# Patient Record
Sex: FEMALE | Race: WHITE | NOT HISPANIC OR LATINO | Employment: OTHER | URBAN - METROPOLITAN AREA
[De-identification: names, ages, dates, MRNs, and addresses within clinical notes are randomized per-mention and may not be internally consistent; named-entity substitution may affect disease eponyms.]

---

## 2017-01-06 ENCOUNTER — TELEPHONE (OUTPATIENT)
Dept: FAMILY MEDICINE | Facility: CLINIC | Age: 52
End: 2017-01-06

## 2017-01-06 DIAGNOSIS — Z00.00 ROUTINE GENERAL MEDICAL EXAMINATION AT A HEALTH CARE FACILITY: Primary | ICD-10-CM

## 2017-01-06 NOTE — TELEPHONE ENCOUNTER
----- Message from Arina Cabral sent at 1/6/2017 10:04 AM CST -----  Doctor appointment and lab have been scheduled.  Please link lab orders to the lab appointment.  Date of doctor appointment:  05/09/17  Physical or EP:  epp  Date of lab appointment:  05/02/17  Comments:

## 2017-04-12 ENCOUNTER — PATIENT MESSAGE (OUTPATIENT)
Dept: OBSTETRICS AND GYNECOLOGY | Facility: CLINIC | Age: 52
End: 2017-04-12

## 2017-04-12 RX ORDER — AMITRIPTYLINE HYDROCHLORIDE 10 MG/1
10 TABLET, FILM COATED ORAL NIGHTLY
Qty: 30 TABLET | Refills: 5 | Status: SHIPPED | OUTPATIENT
Start: 2017-04-12 | End: 2017-06-24

## 2017-04-12 NOTE — TELEPHONE ENCOUNTER
Dr. Mccarthy, Can you please refill my prescription for Elavil (Amitriotyln) to [facility] [phone], I am running low.   Thanks,   Melvi Brand

## 2017-05-02 ENCOUNTER — LAB VISIT (OUTPATIENT)
Dept: LAB | Facility: HOSPITAL | Age: 52
End: 2017-05-02
Attending: FAMILY MEDICINE
Payer: COMMERCIAL

## 2017-05-02 DIAGNOSIS — Z00.00 ROUTINE GENERAL MEDICAL EXAMINATION AT A HEALTH CARE FACILITY: ICD-10-CM

## 2017-05-02 LAB
ALBUMIN SERPL BCP-MCNC: 3.4 G/DL
ALP SERPL-CCNC: 41 U/L
ALT SERPL W/O P-5'-P-CCNC: 11 U/L
ANION GAP SERPL CALC-SCNC: 7 MMOL/L
AST SERPL-CCNC: 20 U/L
BASOPHILS # BLD AUTO: 0.02 K/UL
BASOPHILS NFR BLD: 0.4 %
BILIRUB SERPL-MCNC: 0.7 MG/DL
BUN SERPL-MCNC: 16 MG/DL
CALCIUM SERPL-MCNC: 8.9 MG/DL
CHLORIDE SERPL-SCNC: 106 MMOL/L
CHOLEST/HDLC SERPL: 3.2 {RATIO}
CO2 SERPL-SCNC: 26 MMOL/L
CREAT SERPL-MCNC: 0.9 MG/DL
DIFFERENTIAL METHOD: NORMAL
EOSINOPHIL # BLD AUTO: 0.1 K/UL
EOSINOPHIL NFR BLD: 1.9 %
ERYTHROCYTE [DISTWIDTH] IN BLOOD BY AUTOMATED COUNT: 14.1 %
EST. GFR  (AFRICAN AMERICAN): >60 ML/MIN/1.73 M^2
EST. GFR  (NON AFRICAN AMERICAN): >60 ML/MIN/1.73 M^2
GLUCOSE SERPL-MCNC: 86 MG/DL
HCT VFR BLD AUTO: 38.5 %
HDL/CHOLESTEROL RATIO: 31.6 %
HDLC SERPL-MCNC: 177 MG/DL
HDLC SERPL-MCNC: 56 MG/DL
HGB BLD-MCNC: 12.4 G/DL
LDLC SERPL CALC-MCNC: 100.6 MG/DL
LYMPHOCYTES # BLD AUTO: 2.1 K/UL
LYMPHOCYTES NFR BLD: 43 %
MCH RBC QN AUTO: 28.7 PG
MCHC RBC AUTO-ENTMCNC: 32.2 %
MCV RBC AUTO: 89 FL
MONOCYTES # BLD AUTO: 0.5 K/UL
MONOCYTES NFR BLD: 9.5 %
NEUTROPHILS # BLD AUTO: 2.2 K/UL
NEUTROPHILS NFR BLD: 45.2 %
NONHDLC SERPL-MCNC: 121 MG/DL
PLATELET # BLD AUTO: 228 K/UL
PMV BLD AUTO: 11.8 FL
POTASSIUM SERPL-SCNC: 5 MMOL/L
PROT SERPL-MCNC: 6.9 G/DL
RBC # BLD AUTO: 4.32 M/UL
SODIUM SERPL-SCNC: 139 MMOL/L
TRIGL SERPL-MCNC: 102 MG/DL
TSH SERPL DL<=0.005 MIU/L-ACNC: 1.73 UIU/ML
WBC # BLD AUTO: 4.86 K/UL

## 2017-05-02 PROCEDURE — 36415 COLL VENOUS BLD VENIPUNCTURE: CPT | Mod: PO

## 2017-05-02 PROCEDURE — 84443 ASSAY THYROID STIM HORMONE: CPT

## 2017-05-02 PROCEDURE — 80053 COMPREHEN METABOLIC PANEL: CPT

## 2017-05-02 PROCEDURE — 85025 COMPLETE CBC W/AUTO DIFF WBC: CPT

## 2017-05-02 PROCEDURE — 80061 LIPID PANEL: CPT

## 2017-05-09 ENCOUNTER — OFFICE VISIT (OUTPATIENT)
Dept: FAMILY MEDICINE | Facility: CLINIC | Age: 52
End: 2017-05-09
Payer: COMMERCIAL

## 2017-05-09 VITALS
HEART RATE: 72 BPM | TEMPERATURE: 98 F | SYSTOLIC BLOOD PRESSURE: 120 MMHG | HEIGHT: 66 IN | DIASTOLIC BLOOD PRESSURE: 88 MMHG | BODY MASS INDEX: 21.64 KG/M2 | WEIGHT: 134.69 LBS

## 2017-05-09 DIAGNOSIS — N92.6 IRREGULAR MENSES: ICD-10-CM

## 2017-05-09 DIAGNOSIS — Z12.11 SCREEN FOR COLON CANCER: ICD-10-CM

## 2017-05-09 DIAGNOSIS — Z00.00 ROUTINE GENERAL MEDICAL EXAMINATION AT A HEALTH CARE FACILITY: Primary | ICD-10-CM

## 2017-05-09 DIAGNOSIS — L98.9 SKIN LESION OF BACK: ICD-10-CM

## 2017-05-09 PROCEDURE — 99999 PR PBB SHADOW E&M-EST. PATIENT-LVL III: CPT | Mod: PBBFAC,,, | Performed by: FAMILY MEDICINE

## 2017-05-09 PROCEDURE — 99396 PREV VISIT EST AGE 40-64: CPT | Mod: S$GLB,,, | Performed by: FAMILY MEDICINE

## 2017-05-09 NOTE — MR AVS SNAPSHOT
[facility]  [address] [facility], [address]  [place] [address]  Phone: [phone]  Fax: [phone]                  Vicky Brand   2017 2:00 PM   Office Visit    Description:  Female : 1965   Provider:  Lia Cristina MD   Department:  [facility]           Reason for Visit     Annual Exam           Diagnoses this Visit        Comments    Routine general medical examination at a health care facility    -  Primary     Screen for colon cancer         Irregular menses         Skin lesion of back                To Do List           Goals (5 Years of Data)     None      [facility]     [facility] Care Line -  Assistance  Unless otherwise directed by your provider, please contact [facility], our nurse care line that is available for  assistance.     Registered nurses in the [facility] provide: appointment scheduling, clinical advisement, health education, and other advisory services.  Call: [phone] (toll free)               Medications           Message regarding Medications     Verify the changes and/or additions to your medication regime listed below are the same as discussed with your clinician today.  If any of these changes or additions are incorrect, please notify your healthcare provider.             Verify that the below list of medications is an accurate representation of the medications you are currently taking.  If none reported, the list may be blank. If incorrect, please contact your healthcare provider. Carry this list with you in case of emergency.           Current Medications     amitriptyline (ELAVIL) 10 MG tablet Take 1 tablet (10 mg total) by mouth every evening.    multivitamin (ONE DAILY MULTIVITAMIN) per tablet Take 1 tablet by mouth once daily.    triamcinolone acetonide 0.1% (KENALOG) 0.1 % cream Apply topically 2 (two) times daily.    azelastine (ASTELIN) 137 mcg (0.1 %)  "nasal spray 1 spray (137 mcg total) by Nasal route 2 (two) times daily.           Clinical Reference Information           Your Vitals Were     BP Pulse Temp Height Weight Last Period    120/88 (BP Location: Right arm) 72 98.4 °F (36.9 °C) 5' 6" (1.676 m) 61.1 kg (134 lb 11.2 oz) 04/21/2017 (Exact Date)    BMI                21.74 kg/m2          Blood Pressure          Most Recent Value    BP  120/88      Allergies as of 5/9/2017     Sulfa (Sulfonamide Antibiotics)    Cetirizine    Erythromycin    Zyrtec-d  [Cetirizine-pseudoephedrine]      Immunizations Administered on Date of Encounter - 5/9/2017     None      Orders Placed During Today's Visit      Normal Orders This Visit    Ambulatory consult to Dermatology     Case request GI: COLONOSCOPY       Instructions    Follow with GYN Dr Solis  if needed     ==============================================================      I'd like to advise you on current CANCER SCREENING recommendations:  ~~~~~~~~~~~~~~~~~~~~~~~~~~~~~~~~~~~~~~~~~~~~~~~~~~~~~~~~~~~~~   If all previous PAP SMEARS have been normal, no current problems, and no family history of female cancers, it is recommended to have Pap smear every 3 years (or after 31 yo, every 5 years with HPV co-testing).   MAMMOGRAM  every 1-2 years, from 50 - 74 years old. We can discuss your risk at 40 & determine whether to get mammogram sooner  Of course, I can perform this sooner if there is family history of cancer, or if you have problems or questions    RECOMMENDATIONS FOR FEMALES    Prevent the #1 cause of death- cardiovascular disease (HEART ATTACK & STROKE) by checking for normal blood pressure, cholesterol, sugars, & by not smoking.     VACCINES: Yearly FLU shot, ONE PNEUMONIA shot after 65, ONE SHINGLES shot after 60    Screening colonoscopy at AGE  50 & every 10 years to check for COLON CANCER,  one of the most common & preventable cancers. Repeat in 3 years if POLYP found    I recommend  high fiber (5 fresh " fruits or vegetables daily), low fat diet and aerobic  exercise (huffing/ puffing/ sweating for 20 min straight at least 4 days a week)    Follow up yearly with mammogram (every 2 years) , fasting lipids, CMP, CBC, TSH prior.   ==============================================================           Language Assistance Services     ATTENTION: Language assistance services are available, free of charge. Please call [phone].      ATENCIÓN: Si habla español, tiene a downs disposición servicios gratuitos de asistencia lingüística. Llame al [phone].     CHÚ Ý: N?u b?n nói Ti?ng Vi?t, có các d?ch v? h? tr? ngôn ng? mi?n phí dành cho b?n. G?i s? [phone].         [facility] complies with applicable Federal civil rights laws and does not discriminate on the basis of race, color, national origin, age, disability, or sex.

## 2017-05-09 NOTE — PROGRESS NOTES
Subjective:      Patient ID: Vicky Brand is a 51 y.o. female.    Chief Complaint: Annual Exam    Here today for general exam.     Last year her menses were irregular, but now they've been a little more frequent.  Ultrasound last year showed normal ovarian cysts, no significant fibroids.  She will follow-up with GYN    She has difficulty with sleeping 5 days prior to her menses and gynecologist prescribed amitriptyline 2.5 mg nightly which greatly helps.    Her mom had hip fracture and osteoporosis she is asking when to start checking for this.  She does exercise and walk regularly      She has a lesion on her left back which was only noted a few weeks ago.  She would like me to evaluate this.    Denies any chest pain, shortness of breath, nausea vomiting constipation diarrhea, blood in stool, heartburn    Never had colonoscopy      No results found for: HGBA1C  No results found for: MICROALBUR  Lab Results   Component Value Date    LDLCALC 100.6 05/02/2017    LDLCALC 83.8 04/27/2016    CHOL 177 05/02/2017    HDL 56 05/02/2017    TRIG 102 05/02/2017       Lab Results   Component Value Date     05/02/2017    K 5.0 05/02/2017     05/02/2017    CO2 26 05/02/2017    GLU 86 05/02/2017    BUN 16 05/02/2017    CREATININE 0.9 05/02/2017    CALCIUM 8.9 05/02/2017    PROT 6.9 05/02/2017    ALBUMIN 3.4 (L) 05/02/2017    BILITOT 0.7 05/02/2017    ALKPHOS 41 (L) 05/02/2017    AST 20 05/02/2017    ALT 11 05/02/2017    ANIONGAP 7 (L) 05/02/2017    ESTGFRAFRICA >60.0 05/02/2017    EGFRNONAA >60.0 05/02/2017    WBC 4.86 05/02/2017    HGB 12.4 05/02/2017    HCT 38.5 05/02/2017    MCV 89 05/02/2017     05/02/2017    TSH 1.731 05/02/2017         Current Outpatient Prescriptions on File Prior to Visit   Medication Sig    amitriptyline (ELAVIL) 10 MG tablet Take 1 tablet (10 mg total) by mouth every evening.    multivitamin (ONE DAILY MULTIVITAMIN) per tablet Take 1 tablet by mouth once daily.     "triamcinolone acetonide 0.1% (KENALOG) 0.1 % cream Apply topically 2 (two) times daily.    azelastine (ASTELIN) 137 mcg (0.1 %) nasal spray 1 spray (137 mcg total) by Nasal route 2 (two) times daily.     No current facility-administered medications on file prior to visit.      Past Medical History:   Diagnosis Date    Allergic rhinitis 3/25/2014    Dysmenorrhea     Eye abnormalities     early yellowing of lens, precursor to cataracts, Dr Webster,  wear glasses at all times    Insomnia 04/15/2015    prior to menses, amitryptiline 2.5 mg qhs helps from GYN    Irregular menses 5/9/2017    Ocular migraine     vision fuzzy x 3 in 2013, metallic shapes in her vision, Dr Webster,  resolved after 30 min , no pain    Oral contraceptive use 4/15/2014    Shoulder pain 2007    LEFT, after fall, better with PT at [facility]    Vision changes 3/25/2014     Past Surgical History:   Procedure Laterality Date    DILATION AND CURETTAGE OF UTERUS      TUBAL LIGATION       Social History     Social History Narrative    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 She appears well-developed and well-nourished.   HENT:   Head: Normocephalic and atraumatic.   Right Ear: External ear normal.   Left Ear: External ear normal.   Nose: Nose normal.   Mouth/Throat: Oropharynx is clear and moist.   Eyes: EOM are normal. Pupils are equal, round, and reactive to light.   Neck: Neck supple. No thyromegaly present.   Cardiovascular: Normal rate, " regular rhythm, normal heart sounds and intact distal pulses.    No murmur heard.  Pulmonary/Chest: Effort normal and breath sounds normal. She has no wheezes.   Abdominal: Soft. Bowel sounds are normal. She exhibits no distension and no mass. There is no tenderness. There is no rebound and no guarding.   Musculoskeletal: She exhibits no edema.   Lymphadenopathy:     She has no cervical adenopathy.   Neurological: She is alert and oriented to person, place, and time.   Skin: Skin is warm and dry.   0.25 flaking lesion brown left mid back below bra line   Psychiatric: She has a normal mood and affect. Her behavior is normal.     Assessment:     1. Routine general medical examination at a health care facility    2. Screen for colon cancer    3. Irregular menses    4. Skin lesion of back      Plan:     Orders Placed This Encounter    Ambulatory consult to Dermatology    Case request GI: COLONOSCOPY       Patient Instructions   Follow with GYN Dr Solis  if needed     ==============================================================      I'd like to advise you on current CANCER SCREENING recommendations:  ~~~~~~~~~~~~~~~~~~~~~~~~~~~~~~~~~~~~~~~~~~~~~~~~~~~~~~~~~~~~~   If all previous PAP SMEARS have been normal, no current problems, and no family history of female cancers, it is recommended to have Pap smear every 3 years (or after 31 yo, every 5 years with HPV co-testing).   MAMMOGRAM  every 1-2 years, from 50 - 74 years old. We can discuss your risk at 40 & determine whether to get mammogram sooner  Of course, I can perform this sooner if there is family history of cancer, or if you have problems or questions    RECOMMENDATIONS FOR FEMALES    Prevent the #1 cause of death- cardiovascular disease (HEART ATTACK & STROKE) by checking for normal blood pressure, cholesterol, sugars, & by not smoking.     VACCINES: Yearly FLU shot, ONE PNEUMONIA shot after 65, ONE SHINGLES shot after 60    Screening colonoscopy at AGE  50 &  every 10 years to check for COLON CANCER,  one of the most common & preventable cancers. Repeat in 3 years if POLYP found    I recommend  high fiber (5 fresh fruits or vegetables daily), low fat diet and aerobic  exercise (huffing/ puffing/ sweating for 20 min straight at least 4 days a week)    Follow up yearly with mammogram (every 2 years) , fasting lipids, CMP, CBC, TSH prior.   ==============================================================

## 2017-05-09 NOTE — PATIENT INSTRUCTIONS
Follow with GYN Dr Solis  if needed     ==============================================================      I'd like to advise you on current CANCER SCREENING recommendations:  ~~~~~~~~~~~~~~~~~~~~~~~~~~~~~~~~~~~~~~~~~~~~~~~~~~~~~~~~~~~~~   If all previous PAP SMEARS have been normal, no current problems, and no family history of female cancers, it is recommended to have Pap smear every 3 years (or after 29 yo, every 5 years with HPV co-testing).   MAMMOGRAM  every 1-2 years, from 50 - 74 years old. We can discuss your risk at 40 & determine whether to get mammogram sooner  Of course, I can perform this sooner if there is family history of cancer, or if you have problems or questions    RECOMMENDATIONS FOR FEMALES    Prevent the #1 cause of death- cardiovascular disease (HEART ATTACK & STROKE) by checking for normal blood pressure, cholesterol, sugars, & by not smoking.     VACCINES: Yearly FLU shot, ONE PNEUMONIA shot after 65, ONE SHINGLES shot after 60    Screening colonoscopy at AGE  50 & every 10 years to check for COLON CANCER,  one of the most common & preventable cancers. Repeat in 3 years if POLYP found    I recommend  high fiber (5 fresh fruits or vegetables daily), low fat diet and aerobic  exercise (huffing/ puffing/ sweating for 20 min straight at least 4 days a week)    Follow up yearly with mammogram (every 2 years) , fasting lipids, CMP, CBC, TSH prior.   ==============================================================

## 2017-05-10 DIAGNOSIS — Z12.11 SPECIAL SCREENING FOR MALIGNANT NEOPLASMS, COLON: Primary | ICD-10-CM

## 2017-05-10 RX ORDER — POLYETHYLENE GLYCOL 3350, SODIUM SULFATE ANHYDROUS, SODIUM BICARBONATE, SODIUM CHLORIDE, POTASSIUM CHLORIDE 236; 22.74; 6.74; 5.86; 2.97 G/4L; G/4L; G/4L; G/4L; G/4L
4 POWDER, FOR SOLUTION ORAL ONCE
Qty: 4000 ML | Refills: 0 | Status: SHIPPED | OUTPATIENT
Start: 2017-05-10 | End: 2017-05-10

## 2017-06-05 DIAGNOSIS — Z12.11 SPECIAL SCREENING FOR MALIGNANT NEOPLASMS, COLON: Primary | ICD-10-CM

## 2017-06-05 RX ORDER — SODIUM, POTASSIUM,MAG SULFATES 17.5-3.13G
SOLUTION, RECONSTITUTED, ORAL ORAL
Qty: 1 BOTTLE | Refills: 0 | Status: ON HOLD | OUTPATIENT
Start: 2017-06-05 | End: 2017-06-09 | Stop reason: HOSPADM

## 2017-06-09 ENCOUNTER — HOSPITAL ENCOUNTER (OUTPATIENT)
Facility: HOSPITAL | Age: 52
Discharge: HOME | End: 2017-06-09
Attending: COLON & RECTAL SURGERY | Admitting: COLON & RECTAL SURGERY
Payer: COMMERCIAL

## 2017-06-09 ENCOUNTER — ANESTHESIA (OUTPATIENT)
Dept: ENDOSCOPY | Facility: HOSPITAL | Age: 52
End: 2017-06-09
Payer: COMMERCIAL

## 2017-06-09 ENCOUNTER — SURGERY (OUTPATIENT)
Age: 52
End: 2017-06-09

## 2017-06-09 ENCOUNTER — ANESTHESIA EVENT (OUTPATIENT)
Dept: ENDOSCOPY | Facility: HOSPITAL | Age: 52
End: 2017-06-09
Payer: COMMERCIAL

## 2017-06-09 VITALS
RESPIRATION RATE: 14 BRPM | BODY MASS INDEX: 21.53 KG/M2 | TEMPERATURE: 98 F | DIASTOLIC BLOOD PRESSURE: 80 MMHG | WEIGHT: 134 LBS | HEART RATE: 76 BPM | OXYGEN SATURATION: 99 % | SYSTOLIC BLOOD PRESSURE: 126 MMHG | HEIGHT: 66 IN

## 2017-06-09 DIAGNOSIS — Z12.11 SCREENING FOR COLON CANCER: ICD-10-CM

## 2017-06-09 LAB — HCG INTACT+B SERPL-ACNC: <1.2 MIU/ML

## 2017-06-09 PROCEDURE — 37000009 HC ANESTHESIA EA ADD 15 MINS: Performed by: COLON & RECTAL SURGERY

## 2017-06-09 PROCEDURE — D9220A PRA ANESTHESIA: Mod: 33,ANES,, | Performed by: ANESTHESIOLOGY

## 2017-06-09 PROCEDURE — D9220A PRA ANESTHESIA: Mod: 33,CRNA,, | Performed by: NURSE ANESTHETIST, CERTIFIED REGISTERED

## 2017-06-09 PROCEDURE — 63600175 PHARM REV CODE 636 W HCPCS: Performed by: NURSE ANESTHETIST, CERTIFIED REGISTERED

## 2017-06-09 PROCEDURE — G0121 COLON CA SCRN NOT HI RSK IND: HCPCS | Performed by: COLON & RECTAL SURGERY

## 2017-06-09 PROCEDURE — 25000003 PHARM REV CODE 250: Performed by: NURSE PRACTITIONER

## 2017-06-09 PROCEDURE — 84702 CHORIONIC GONADOTROPIN TEST: CPT

## 2017-06-09 PROCEDURE — 37000008 HC ANESTHESIA 1ST 15 MINUTES: Performed by: COLON & RECTAL SURGERY

## 2017-06-09 PROCEDURE — 25000003 PHARM REV CODE 250: Performed by: NURSE ANESTHETIST, CERTIFIED REGISTERED

## 2017-06-09 PROCEDURE — G0121 COLON CA SCRN NOT HI RSK IND: HCPCS | Mod: ,,, | Performed by: COLON & RECTAL SURGERY

## 2017-06-09 RX ORDER — LIDOCAINE HCL/PF 100 MG/5ML
SYRINGE (ML) INTRAVENOUS
Status: DISCONTINUED | OUTPATIENT
Start: 2017-06-09 | End: 2017-06-09

## 2017-06-09 RX ORDER — SODIUM CHLORIDE 9 MG/ML
INJECTION, SOLUTION INTRAVENOUS CONTINUOUS
Status: DISCONTINUED | OUTPATIENT
Start: 2017-06-09 | End: 2017-06-09 | Stop reason: HOSPADM

## 2017-06-09 RX ORDER — PROPOFOL 10 MG/ML
VIAL (ML) INTRAVENOUS
Status: DISCONTINUED | OUTPATIENT
Start: 2017-06-09 | End: 2017-06-09

## 2017-06-09 RX ORDER — PROPOFOL 10 MG/ML
VIAL (ML) INTRAVENOUS CONTINUOUS PRN
Status: DISCONTINUED | OUTPATIENT
Start: 2017-06-09 | End: 2017-06-09

## 2017-06-09 RX ADMIN — LIDOCAINE HYDROCHLORIDE 50 MG: 20 INJECTION, SOLUTION INTRAVENOUS at 11:06

## 2017-06-09 RX ADMIN — PROPOFOL 80 MG: 10 INJECTION, EMULSION INTRAVENOUS at 11:06

## 2017-06-09 RX ADMIN — SODIUM CHLORIDE: 0.9 INJECTION, SOLUTION INTRAVENOUS at 11:06

## 2017-06-09 RX ADMIN — SODIUM CHLORIDE: 0.9 INJECTION, SOLUTION INTRAVENOUS at 07:06

## 2017-06-09 RX ADMIN — PROPOFOL 175 MCG/KG/MIN: 10 INJECTION, EMULSION INTRAVENOUS at 11:06

## 2017-06-09 NOTE — ANESTHESIA RELEASE NOTE
"Anesthesia Release from PACU Note    Patient: Vicky Brand    Procedure(s) Performed: Procedure(s) (LRB):  COLONOSCOPY (N/A)    Anesthesia type: general    Post pain: Adequate analgesia    Post assessment: no apparent anesthetic complications, tolerated procedure well and no evidence of recall    Last Vitals:   Visit Vitals  /80   Pulse 76   Temp 36.6 °C (97.8 °F)   Resp 14   Ht 5' 6" (1.676 m)   Wt 60.8 kg (134 lb)   SpO2 99%   Breastfeeding? No   BMI 21.63 kg/m²       Post vital signs: stable    Level of consciousness: awake, alert  and oriented    Nausea/Vomiting: no nausea/no vomiting    Complications: none    Airway Patency: patent    Respiratory: unassisted, spontaneous ventilation, room air    Cardiovascular: stable and blood pressure at baseline    Hydration: euvolemic  "

## 2017-06-09 NOTE — TRANSFER OF CARE
"Anesthesia Transfer of Care Note    Patient: Vicky Brand    Procedure(s) Performed: Procedure(s) (LRB):  COLONOSCOPY (N/A)    Patient location: PACU    Anesthesia Type: general    Transport from OR: Transported from OR on room air with adequate spontaneous ventilation    Post pain: adequate analgesia    Post assessment: no apparent anesthetic complications    Post vital signs: stable    Level of consciousness: sedated    Nausea/Vomiting: no nausea/vomiting    Complications: none    Transfer of care protocol was followed      Last vitals:   Visit Vitals  /63   Pulse 83   Temp 36.6 °C (97.8 °F) (Oral)   Resp 16   Ht 5' 6" (1.676 m)   Wt 60.8 kg (134 lb)   SpO2 100%   Breastfeeding? No   BMI 21.63 kg/m²     "

## 2017-06-09 NOTE — ANESTHESIA PREPROCEDURE EVALUATION
06/09/2017  Vicky Brand is a 51 y.o., female.    Anesthesia Evaluation    I have reviewed the Patient Summary Reports.    I have reviewed the Nursing Notes.   I have reviewed the Medications.     Review of Systems  Anesthesia Hx:  No problems with previous Anesthesia  History of prior surgery of interest to airway management or planning: Previous anesthesia: General Denies Family Hx of Anesthesia complications.   Denies Personal Hx of Anesthesia complications.   Social:  Non-Smoker    Hematology/Oncology:  Hematology Normal   Oncology Normal     EENT/Dental:EENT/Dental Normal   Cardiovascular:  Cardiovascular Normal Exercise tolerance: good     Pulmonary:  Pulmonary Normal    Renal/:  Renal/ Normal     Hepatic/GI:  Hepatic/GI Normal    Musculoskeletal:  Musculoskeletal Normal    Neurological:   Headaches    Endocrine:  Endocrine Normal    Dermatological:  Skin Normal    Psych:  Psychiatric Normal           Physical Exam  General:  Well nourished    Airway/Jaw/Neck:  Airway Findings: Mouth Opening: Small, but > 3cm Tongue: Normal  General Airway Assessment: Adult, Average  Mallampati: II  Improves to II with phonation.  TM Distance: 4 - 6 cm        Eyes/Ears/Nose:  EYES/EARS/NOSE FINDINGS: Normal   Dental:  Dental Findings: In tact   Chest/Lungs:  Chest/Lungs Findings: Clear to auscultation, Normal Respiratory Rate     Heart/Vascular:  Heart Findings: Rate: Normal  Rhythm: Regular Rhythm  Sounds: Normal  Heart murmur: negative Vascular Findings: Normal    Abdomen:  Abdomen Findings: Normal    Musculoskeletal:  Musculoskeletal Findings: Normal   Skin:  Skin Findings: Normal    Mental Status:  Mental Status Findings:  Cooperative, Alert and Oriented         Anesthesia Plan  Type of Anesthesia, risks & benefits discussed:  Anesthesia Type:  general  Patient's Preference:   Intra-op Monitoring  Plan:   Intra-op Monitoring Plan Comments:   Post Op Pain Control Plan:   Post Op Pain Control Plan Comments:   Induction:   IV  Beta Blocker:  Patient is not currently on a Beta-Blocker (No further documentation required).       Informed Consent: Patient understands risks and agrees with Anesthesia plan.  Questions answered. Anesthesia consent signed with patient.  ASA Score: 2     Day of Surgery Review of History & Physical:            Ready For Surgery From Anesthesia Perspective.

## 2017-06-09 NOTE — ANESTHESIA POSTPROCEDURE EVALUATION
"Anesthesia Post Evaluation    Patient: Vicky Brand    Procedure(s) Performed: Procedure(s) (LRB):  COLONOSCOPY (N/A)    Final Anesthesia Type: general  Patient location during evaluation: GI PACU  Patient participation: Yes- Able to Participate  Level of consciousness: awake and alert and oriented  Post-procedure vital signs: reviewed and stable  Pain management: adequate  Airway patency: patent  PONV status at discharge: No PONV  Anesthetic complications: no      Cardiovascular status: hemodynamically stable  Respiratory status: unassisted, spontaneous ventilation and room air  Hydration status: euvolemic  Follow-up not needed.        Visit Vitals  /80   Pulse 76   Temp 36.6 °C (97.8 °F)   Resp 14   Ht 5' 6" (1.676 m)   Wt 60.8 kg (134 lb)   SpO2 99%   Breastfeeding? No   BMI 21.63 kg/m²       Pain/Alan Score: Pain Assessment Performed: Yes (6/9/2017  7:57 AM)  Presence of Pain: denies (6/9/2017  7:57 AM)  Alan Score: 10 (6/9/2017 11:57 AM)      "

## 2017-06-09 NOTE — PATIENT INSTRUCTIONS
Discharge Summary/Instructions for after Colonoscopy without   Biopsy/Polypectomy  Patient Name: Vicky Brand  Patient MRN: [id number]  Patient YOB: 1965 Friday, June 09, 2017    Davy Prince MD  RESTRICTIONS ON ACTIVITY:  - Do not drive a car or operate machinery until the day after the procedure.      - The following day: return to full activity including work.  - Diet: Eat and drink normally unless instructed otherwise.  TREATMENT FOR COMMON SIDE EFFECTS:  - Mild abdominal pain and bloating or excessive gas: walk, eat lightly, and   use a heating pad.  SYMPTOMS TO WATCH FOR AND REPORT TO YOUR PHYSICIAN:  1. Severe abdominal pain.  2. Fever greater than 101 degrees F within 24 hours after a procedure.  3. A large amount of rectal bleeding. (A small amount of blood from the   rectum is not serious, especially if hemorrhoids are present.  3.  Because air was put into your colon during the procedure, expelling   large amounts of air from your rectum is normal.  4.  You may not have a bowel movement for 1-3 days because of the   colonoscopy prep.  This is normal.  5.  Call you Doctor or go to the emergency room if you notice any of the   following:   Chills and/or fever over 101   Persistent vomiting   Severe abdominal pain, other than gas cramps   Severe chest pain   Black, tarry stools   Any bleeding - exceeding one cup  Your doctor recommends these additional instructions:  You are being discharged to home.   Eat a high fiber diet.   Continue your present medications.   You have a contact number available for emergencies.  The signs and symptoms   of potential delayed complications were discussed with you.  You may return   to normal activities tomorrow.  Written discharge instructions were   provided to you.   Your physician has recommended a repeat colonoscopy in 10 years for   screening purposes.  If you have any questions or problems, please call your physician.  COLON AND RECTAL SURGERY OFFICE:   [phone]  EMERGENCY PHONE NUMBER: [phone]  Davy Prince MD  6/9/2017 11:25:32 AM  This report has been verified and signed electronically.

## 2017-06-09 NOTE — H&P
Procedure : Colonoscopy    Indication(s):  asymptomatic screening exam    Review of patient's allergies indicates:   Allergen Reactions    Sulfa (sulfonamide antibiotics) Rash    Cetirizine      Other reaction(s): Unable to Function    Erythromycin      Other reaction(s): Nausea    Zyrtec-d  [cetirizine-pseudoephedrine]      Other reaction(s): Inability to focus/function       Past Medical History:   Diagnosis Date    Allergic rhinitis 3/25/2014    Dysmenorrhea     Eye abnormalities     early yellowing of lens, precursor to cataracts, Dr Webster,  wear glasses at all times    Insomnia 04/15/2015    prior to menses, amitryptiline 2.5 mg qhs helps from GYN    Irregular menses 5/9/2017    Ocular migraine     vision fuzzy x 3 in 2013, metallic shapes in her vision, Dr Webster,  resolved after 30 min , no pain    Oral contraceptive use 4/15/2014    Shoulder pain 2007    LEFT, after fall, better with PT at [facility]    Vision changes 3/25/2014       Prior to Admission medications    Medication Sig Start Date End Date Taking? Authorizing Provider   amitriptyline (ELAVIL) 10 MG tablet Take 1 tablet (10 mg total) by mouth every evening. 4/12/17  Yes Arabella Mccarthy MD   multivitamin (ONE DAILY MULTIVITAMIN) per tablet Take 1 tablet by mouth once daily.   Yes Historical Provider, MD   sodium,potassium,mag sulfates (SUPREP BOWEL PREP KIT) 17.5-3.13-1.6 gram SolR As directed, dispense 1 kit. 6/5/17  Yes Davy Prince MD   azelastine (ASTELIN) 137 mcg (0.1 %) nasal spray 1 spray (137 mcg total) by Nasal route 2 (two) times daily. 12/7/16 12/7/17  Apurva Bermudez MD   triamcinolone acetonide 0.1% (KENALOG) 0.1 % cream Apply topically 2 (two) times daily. 10/23/16   Raisa Sykes NP       Sedation Problems: NO    Family History   Problem Relation Age of Onset    Diabetes Father     Stroke Father 49    Hypertension Mother        Fam Hx of Sedation Problems: NO    Social History     Social  History    Marital status:      Spouse name: N/A    Number of children: N/A    Years of education: N/A     Occupational History    Not on file.     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, , no children, nonsmoker, nondrinker , GYN here & Dr Mccarthy               Review of Systems -     Respiratory ROS: no cough, shortness of breath, or wheezing  Cardiovascular ROS: no chest pain or dyspnea on exertion  Gastrointestinal ROS: no abdominal pain, change in bowel habits, or black or bloody stools  Musculoskeletal ROS: negative  Neurological ROS: no TIA or stroke symptoms        Physical Exam:  General: no distress  Head: normocephalic  Airway:  normal oropharynx, airway normal  Neck: supple, symmetrical, trachea midline  Lungs:  normal respiratory effort  Heart: regular rate and rhythm  Abdomen: soft, non-tender non-distented; bowel sounds normal; no masses,  no organomegaly  Extremities: no cyanosis or edema, or clubbing       Deep Sedation: Mallampati Score per anesthesia     SedationPlan :Moderate     ASA : I    Patient is medically cleared for anesthesia.    Anesthesia/Surgery risks, benefits and alternative options discussed and understood by patient/family.

## 2017-06-16 ENCOUNTER — TELEPHONE (OUTPATIENT)
Dept: ENDOSCOPY | Facility: HOSPITAL | Age: 52
End: 2017-06-16

## 2017-06-24 ENCOUNTER — OFFICE VISIT (OUTPATIENT)
Dept: INTERNAL MEDICINE | Facility: CLINIC | Age: 52
End: 2017-06-24
Payer: COMMERCIAL

## 2017-06-24 VITALS
BODY MASS INDEX: 21.69 KG/M2 | WEIGHT: 134.94 LBS | HEART RATE: 84 BPM | TEMPERATURE: 99 F | RESPIRATION RATE: 16 BRPM | SYSTOLIC BLOOD PRESSURE: 105 MMHG | DIASTOLIC BLOOD PRESSURE: 76 MMHG | HEIGHT: 66 IN

## 2017-06-24 DIAGNOSIS — J04.0 LARYNGITIS: Primary | ICD-10-CM

## 2017-06-24 PROBLEM — Z12.11 SCREENING FOR COLON CANCER: Status: RESOLVED | Noted: 2017-06-09 | Resolved: 2017-06-24

## 2017-06-24 PROCEDURE — 99999 PR PBB SHADOW E&M-EST. PATIENT-LVL III: CPT | Mod: PBBFAC,,, | Performed by: FAMILY MEDICINE

## 2017-06-24 PROCEDURE — 99212 OFFICE O/P EST SF 10 MIN: CPT | Mod: S$GLB,,, | Performed by: FAMILY MEDICINE

## 2017-06-24 RX ORDER — AMITRIPTYLINE HYDROCHLORIDE 10 MG/1
10 TABLET, FILM COATED ORAL NIGHTLY
Start: 2017-06-24 | End: 2017-08-31 | Stop reason: SDUPTHER

## 2017-06-24 NOTE — PROGRESS NOTES
Subjective:     Patient ID: Vicky Brand is a 51 y.o. female.    Chief Complaint: Laryngitis (since yesterday; last occured 12/2016)    HPI sick since yesterday with scratchy throat and PND, No voice, no fever, no ear pain,  Slight cough, No chest pain no sob,   She was sick in December with similar stuff symptoms - review of chart shows she was given a zpak then and astylin nasal spray which she didn't think helped much.   She had the episode in December for 2 weeks.     Review of Systems  per HPI  Objective:      Physical Exam   Constitutional: She appears well-developed and well-nourished.   HENT:   Head: Normocephalic and atraumatic.   Right Ear: External ear normal.   Left Ear: External ear normal.   Nose: Nose normal.   Mouth/Throat: Oropharynx is clear and moist.   Eyes: Conjunctivae and EOM are normal. Pupils are equal, round, and reactive to light.   Neck: Normal range of motion. Neck supple.   Cardiovascular: Normal rate, regular rhythm, normal heart sounds and intact distal pulses.    Pulmonary/Chest: Effort normal and breath sounds normal.   Skin: Skin is warm and dry.   Psychiatric: She has a normal mood and affect. Her behavior is normal. Judgment and thought content normal.   Nursing note and vitals reviewed.      Assessment:     Vicky was seen today for laryngitis.    Diagnoses and all orders for this visit:    Laryngitis  Rest, fluids, mucinex,   Tylenol prn for pain, may get in steamy shower and hydrate, tea with honey or cool drinks may help.   If not improving over 1-2 weeks then recommend ent consultation since that would be the second prolonged episode of laryngitis in 6 months  Pt agrees to this plan. Literature on this disorder provided

## 2017-06-24 NOTE — PATIENT INSTRUCTIONS
Laryngitis    Laryngitis is a swelling of the tissues around the vocal cords. Symptoms include a hoarse (scratchy) voice. The voice may be lost completely. It may be caused by a viral illness, such as a head or chest cold. It may also be due to overuse and strain of the voice. Smoking, drinking alcohol, acid reflux, allergies, or inhaling harsh chemicals may also lead to symptoms. This condition will usually resolve in 1-2 weeks.  Home care  · Rest your voice until it recovers. Talk as little as possible. If your symptoms are severe, rest at home for a day or so.  · Breathing cool steam from a humidifier/vaporizer or in a steamy shower may be helpful.  · Drink plenty of fluids to stay well hydrated.  · Do not smoke  Follow-up care  Follow up with your healthcare provider or this facility if you have not improved after one week.  When to seek medical advice  Contact your healthcare provider for any of the following:  · Severe pain with swallowing  · Trouble opening mouth  · Neck swelling, neck pain, or trouble moving neck  · Noisy breathing or trouble breathing  · Fever of 100.4°F (38.ºC) or higher, or as directed by your healthcare provider  · Drooling  · Symptoms do not resolve in 2 weeks  Date Last Reviewed: 4/26/2015 © 2000-2016 [employer]. [address] [place] [address], [place] [address]. All rights reserved. This information is not intended as a substitute for professional medical care. Always follow your healthcare professional's instructions.

## 2017-06-27 ENCOUNTER — OFFICE VISIT (OUTPATIENT)
Dept: OBSTETRICS AND GYNECOLOGY | Facility: CLINIC | Age: 52
End: 2017-06-27
Payer: COMMERCIAL

## 2017-06-27 VITALS
DIASTOLIC BLOOD PRESSURE: 64 MMHG | WEIGHT: 134.25 LBS | BODY MASS INDEX: 21.57 KG/M2 | HEIGHT: 66 IN | SYSTOLIC BLOOD PRESSURE: 116 MMHG

## 2017-06-27 DIAGNOSIS — Z87.42 HISTORY OF METRORRHAGIA: ICD-10-CM

## 2017-06-27 DIAGNOSIS — N95.1 PERIMENOPAUSAL: Primary | ICD-10-CM

## 2017-06-27 PROCEDURE — 99999 PR PBB SHADOW E&M-EST. PATIENT-LVL IV: CPT | Mod: PBBFAC,,, | Performed by: OBSTETRICS & GYNECOLOGY

## 2017-06-27 PROCEDURE — 99214 OFFICE O/P EST MOD 30 MIN: CPT | Mod: S$GLB,,, | Performed by: OBSTETRICS & GYNECOLOGY

## 2017-06-27 NOTE — PROGRESS NOTES
CC: perimenopausal bleeding    Vicky Brand is a 51 y.o. female  presents for perimenopausal bleeding.    DId not tolerate OCPs- optical migraines.  We evaluated her menses calendar-  Irregular bleeding with perimenopausal sx, mood changes,  Insomnia. Etc  Dysmenorrhea  TSH and CBC normal in May.   EMBx last year was benign.  US as follows      Findings: The uterus measures 8.7 cm x 4.8 cm x 5.7 cm.There is redemonstration of the right fundal fibroid.  Although the fibroid measures slightly smaller on today's exam, it is a not significantly changed in size from the recent exam.  The endometrium measures approximately 1.1 cm. The right ovary measures 3.5 cm x 1.2 cm x 1.7 cm.  The left ovary measures 5.7 cm x 3.1 cm x 4.3 cm.  There is a small amount of free fluid surrounding the left ovary.  Normal ovarian arterial and venous flow.  No adnexal abnormalities identified.   Impression        1.  Interval resolution of right ovarian cyst and development of a left ovarian cyst.  This is likely physiologic however followup can be performed in 8-12 weeks to document resolution of this cyst.  The small amount of free fluid surrounding the left ovary may represent rupturing of the cyst.         Past Medical History:   Diagnosis Date    Allergic rhinitis 3/25/2014    Dysmenorrhea     Eye abnormalities     early yellowing of lens, precursor to cataracts, Dr Webster,  wear glasses at all times    Insomnia 04/15/2015    prior to menses, amitryptiline 2.5 mg qhs helps from GYN    Irregular menses 2017    Ocular migraine     vision fuzzy x 3 in , metallic shapes in her vision, Dr Webster,  resolved after 30 min , no pain    Oral contraceptive use 4/15/2014    Shoulder pain     LEFT, after fall, better with PT [facility] [place]    Vision changes 3/25/2014       Past Surgical History:   Procedure Laterality Date    COLONOSCOPY N/A 2017    Procedure: COLONOSCOPY;  Surgeon: Davy AYALA  "MD Niki;  Location: [facility] (4TH FLR);  Service: Endoscopy;  Laterality: N/A;    DILATION AND CURETTAGE OF UTERUS      TUBAL LIGATION         OB History    Para Term  AB Living   1       1     SAB TAB Ectopic Multiple Live Births   1              # Outcome Date GA Lbr Giorgio/2nd Weight Sex Delivery Anes PTL Lv   1 SAB                   Family History   Problem Relation Age of Onset    Diabetes Father     Stroke Father 49    Hypertension Mother        Social History   Substance Use Topics    Smoking status: Never Smoker    Smokeless tobacco: Never Used    Alcohol use No       /65   Ht 5' 6" (1.676 m)   Wt 60.9 kg (134 lb 4.2 oz)   LMP 06/10/2017 (Exact Date)   BMI 21.67 kg/m²     ROS:  GENERAL: Denies weight gain or weight loss. Feeling well overall.   SKIN: Denies rash or lesions.   HEAD: Denies head injury or headache.   NODES: Denies enlarged lymph nodes.   CHEST: Denies chest pain or shortness of breath.   CARDIOVASCULAR: Denies palpitations or left sided chest pain.   ABDOMEN: No abdominal pain, constipation, diarrhea, nausea, vomiting or rectal bleeding.   URINARY: No frequency, dysuria, hematuria, or burning on urination.  REPRODUCTIVE: See HPI.   BREASTS: The patient performs breast self-examination and denies pain, lumps, or nipple discharge.   HEMATOLOGIC: No easy bruisability or excessive bleeding.  MUSCULOSKELETAL: Denies joint pain or swelling.   NEUROLOGIC: Denies syncope or weakness.   PSYCHIATRIC: Denies depression, anxiety or mood swings.    Physical Exam:    APPEARANCE: Well nourished, well developed, in no acute distress.  AFFECT: WNL, alert and oriented x 3  SKIN: No acne or hirsutism  NECK: Neck symmetric without masses or thyromegaly  NODES: No inguinal, cervical, axillary, or femoral lymph node enlargement  CHEST: Good respiratory effect  ABDOMEN: Soft.  No tenderness or masses.  No hepatosplenomegaly.  No hernias.  PELVIC: Normal external genitalia without " lesions.  Normal hair distribution.  Adequate perineal body, normal urethral meatus.  Vagina moist and well rugated without lesions or discharge.  Cervix pink, without lesions, discharge or tenderness.  No significant cystocele or rectocele.  Bimanual exam shows uterus to be 10 wk size, irregular, mobile and slightly  tender.  Fibroids present.   Adnexa without masses or tenderness.    EXTREMITIES: No edema.    ASSESSMENT AND PLAN  1. Perimenopausal  US Pelvis Comp with Transvag NON-OB (xpd    Case Request Operating Room: ABLATION-ENDOMETRIAL, D & C hyst   2. History of metrorrhagia  US Pelvis Comp with Transvag NON-OB (xpd    Case Request Operating Room: ABLATION-ENDOMETRIAL, D & C hyst   3.      History of BTL      Discussed fibroid treatment options- Possible UAE, VS myomectomy vs HYST. Treat menorrhagia with OCPs, Lysteda or medical management.   Risks and benefits of fibroid procedures discussed.   Will consider options and notify the office once able to review options.         Patient was counseled today on possible medical vs surgical management  WIll procede with  A Novasure endometrial ablation

## 2017-06-29 ENCOUNTER — HOSPITAL ENCOUNTER (OUTPATIENT)
Dept: RADIOLOGY | Facility: HOSPITAL | Age: 52
Discharge: HOME | End: 2017-06-29
Attending: OBSTETRICS & GYNECOLOGY
Payer: COMMERCIAL

## 2017-06-29 DIAGNOSIS — Z87.42 HISTORY OF METRORRHAGIA: ICD-10-CM

## 2017-06-29 DIAGNOSIS — N95.1 PERIMENOPAUSAL: ICD-10-CM

## 2017-06-29 PROCEDURE — 76856 US EXAM PELVIC COMPLETE: CPT | Mod: 26,,, | Performed by: RADIOLOGY

## 2017-06-29 PROCEDURE — 76830 TRANSVAGINAL US NON-OB: CPT | Mod: 26,,, | Performed by: RADIOLOGY

## 2017-06-29 PROCEDURE — 76856 US EXAM PELVIC COMPLETE: CPT | Mod: TC

## 2017-07-26 ENCOUNTER — INITIAL CONSULT (OUTPATIENT)
Dept: DERMATOLOGY | Facility: CLINIC | Age: 52
End: 2017-07-26
Payer: COMMERCIAL

## 2017-07-26 ENCOUNTER — OFFICE VISIT (OUTPATIENT)
Dept: FAMILY MEDICINE | Facility: CLINIC | Age: 52
End: 2017-07-26
Payer: COMMERCIAL

## 2017-07-26 VITALS
SYSTOLIC BLOOD PRESSURE: 110 MMHG | HEIGHT: 66 IN | TEMPERATURE: 99 F | WEIGHT: 131.81 LBS | HEART RATE: 84 BPM | DIASTOLIC BLOOD PRESSURE: 79 MMHG | BODY MASS INDEX: 21.18 KG/M2

## 2017-07-26 DIAGNOSIS — D22.9 NEVUS: ICD-10-CM

## 2017-07-26 DIAGNOSIS — L81.4 LENTIGO: ICD-10-CM

## 2017-07-26 DIAGNOSIS — R19.7 DIARRHEA, UNSPECIFIED TYPE: Primary | ICD-10-CM

## 2017-07-26 DIAGNOSIS — D18.00 ANGIOMA: ICD-10-CM

## 2017-07-26 DIAGNOSIS — L82.1 SK (SEBORRHEIC KERATOSIS): Primary | ICD-10-CM

## 2017-07-26 PROCEDURE — 99999 PR PBB SHADOW E&M-EST. PATIENT-LVL III: CPT | Mod: PBBFAC,,, | Performed by: FAMILY MEDICINE

## 2017-07-26 PROCEDURE — 99203 OFFICE O/P NEW LOW 30 MIN: CPT | Mod: S$GLB,,, | Performed by: DERMATOLOGY

## 2017-07-26 PROCEDURE — 99999 PR PBB SHADOW E&M-EST. PATIENT-LVL II: CPT | Mod: PBBFAC,,, | Performed by: DERMATOLOGY

## 2017-07-26 PROCEDURE — 99214 OFFICE O/P EST MOD 30 MIN: CPT | Mod: S$GLB,,, | Performed by: FAMILY MEDICINE

## 2017-07-26 RX ORDER — HYOSCYAMINE SULFATE 0.125 MG
125 TABLET ORAL EVERY 4 HOURS PRN
Qty: 30 TABLET | Refills: 0 | Status: SHIPPED | OUTPATIENT
Start: 2017-07-26 | End: 2017-09-15

## 2017-07-26 NOTE — PROGRESS NOTES
Subjective:      Patient ID: Vicky Brand is a 51 y.o. female.    Chief Complaint: Diarrhea (1 week intermittent) and GI Problem (stomach pain 6 days)    Here today for diarrhea over the past week, stomach cramping, 4 days ago she had 5 diarrheal episodes.  Abdominal cramps awakened at night. No blood in stool.  Denies any fever, no nausea vomiting.  She is tolerating water.  She has decreased appetite.  She has not traveled.  Pepto-Bismol did not help.  No recent antibiotics, no raw foods, no contact with sick individuals.  She actually stopped amitriptyline 2 weeks ago which she took for insomnia.    Normal colonoscopy last month    Has upcoming endometrial ablation procedure scheduled with Dr Jarrell 8/2 for irregular menses      Current Outpatient Prescriptions on File Prior to Visit   Medication Sig    amitriptyline (ELAVIL) 10 MG tablet Take 1 tablet (10 mg total) by mouth every evening.    multivitamin (ONE DAILY MULTIVITAMIN) per tablet Take 1 tablet by mouth once daily.     No current facility-administered medications on file prior to visit.      Past Medical History:   Diagnosis Date    Allergic rhinitis 3/25/2014    Dysmenorrhea     Eye abnormalities     early yellowing of lens, precursor to cataracts, Dr Webster,  wear glasses at all times    Insomnia 04/15/2015    prior to menses, amitryptiline 2.5 mg qhs helps from GYN    Irregular menses 5/9/2017    Ocular migraine     vision fuzzy x 3 in 2013, metallic shapes in her vision, Dr Webster,  resolved after 30 min , no pain    Oral contraceptive use 4/15/2014    Shoulder pain 2007    LEFT, after fall, better with PT [facility] [place]    Vision changes 3/25/2014     Past Surgical History:   Procedure Laterality Date    COLONOSCOPY N/A 6/9/2017    Procedure: COLONOSCOPY;  Surgeon: Davy Prince MD;  Location: 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   Eyes: Conjunctivae are normal.   Abdominal: Soft. She exhibits no distension and no mass. There is tenderness. There is no rebound and no guarding. No hernia.   Mild tenderness diffuse, increase bowel sounds     Assessment:     1. Diarrhea, unspecified type      Plan:     Orders Placed This Encounter    Stool culture    Clostridium difficile EIA    Stool Exam-Ova,Cysts,Parasites    hyoscyamine (ANASPAZ,LEVSIN) 0.125 mg Tab     Let me know if sx persist    I will email results    Patient Instructions   Clear liquids and fluid replacement drinks     [place] diet after that  Avoid milk products and  fried, heavy foods    Immodium( over the counter )as needed for diarrhea.                                               We will send stool for stool culture, Ova, cysts, and parasites, and Clostridium Difficile.    She will decide if she is strong enough for surgery 8/2                                    "

## 2017-07-26 NOTE — PATIENT INSTRUCTIONS
Clear liquids and fluid replacement drinks     [place] diet after that  Avoid milk products and  fried, heavy foods    Immodium( over the counter )as needed for diarrhea.                                               We will send stool for stool culture, Ova, cysts, and parasites, and Clostridium Difficile.    She will decide if she is strong enough for surgery 8/2

## 2017-07-26 NOTE — LETTER
July 26, 2017      Lia Cristina MD  [address]  [address]  [place] [facility] [address]           [facility]  [address] [place] [address]  Phone: [phone]  Fax: [phone]          Patient: Vicky Brand   MR Number: [id number]   YOB: 1965   Date of Visit: 7/26/2017       Dear Dr. Lia Cristina:    Thank you for referring Vicky Brand to me for evaluation. Attached you will find relevant portions of my assessment and plan of care.    If you have questions, please do not hesitate to call me. I look forward to following Vicky Brand along with you.    Sincerely,    Mallory Sanford MD    Enclosure  CC:  No Recipients    If you would like to receive this communication electronically, please contact [email or web address] or [phone] to request more information on [employer] access.    For providers and/or their staff who would like to refer a patient to [facility], please contact us through our one-stop-shop provider referral line, [facility], at [phone].    If you feel you have received this communication in error or would no longer like to receive these types of communications, please e-mail [email or web address]

## 2017-07-26 NOTE — PROGRESS NOTES
Subjective:       Patient ID:  Vicky Brand is a 51 y.o. female who presents for   Chief Complaint   Patient presents with    Mole     Pt presents for mole on back x 2 months.  Grew rapidly but now stable.  Initially was itching. Denies pain or bleeding. No tx. Saw PCP in may for new mole and has grown over the past 2 months.  No h/o MM or NMSC.   Mother with h/o NMSC.      Mole  - Initial  Affected locations: back  Duration: 2 months  Signs / symptoms: itching  Severity: mild to moderate  Aggravated by: nothing  Relieving factors/Treatments tried: nothing  Improvement on treatment: no relief        Review of Systems   Constitutional: Negative for fever, chills, weight loss, weight gain, fatigue, night sweats and malaise.   Gastrointestinal: Positive for nausea, vomiting and diarrhea.   Skin: Positive for activity-related sunscreen use and wears hat (with activity.). Negative for daily sunscreen use.   Hematologic/Lymphatic: Does not bruise/bleed easily.        Objective:    Physical Exam   Constitutional: She appears well-developed and well-nourished. No distress.   Neurological: She is alert and oriented to person, place, and time. She is not disoriented.   Psychiatric: She has a normal mood and affect.   Skin:   Areas Examined (abnormalities noted in diagram):   Scalp / Hair Palpated and Inspected  Head / Face Inspection Performed  Neck Inspection Performed  Chest / Axilla Inspection Performed  Abdomen Inspection Performed  Genitals / Buttocks / Groin Inspection Performed  Back Inspection Performed  RUE Inspected  LUE Inspection Performed  RLE Inspected  LLE Inspection Performed  Nails and Digits Inspection Performed              Diagram Legend     Erythematous scaling macule/papule c/w actinic keratosis       Vascular papule c/w angioma      Pigmented verrucoid papule/plaque c/w seborrheic keratosis      Yellow umbilicated papule c/w sebaceous hyperplasia      Irregularly shaped tan macule c/w  lentigo     1-2 mm smooth white papules consistent with Milia      Movable subcutaneous cyst with punctum c/w epidermal inclusion cyst      Subcutaneous movable cyst c/w pilar cyst      Firm pink to brown papule c/w dermatofibroma      Pedunculated fleshy papule(s) c/w skin tag(s)      Evenly pigmented macule c/w junctional nevus     Mildly variegated pigmented, slightly irregular-bordered macule c/w mildly atypical nevus      Flesh colored to evenly pigmented papule c/w intradermal nevus       Pink pearly papule/plaque c/w basal cell carcinoma      Erythematous hyperkeratotic cursted plaque c/w SCC      Surgical scar with no sign of skin cancer recurrence      Open and closed comedones      Inflammatory papules and pustules      Verrucoid papule consistent consistent with wart     Erythematous eczematous patches and plaques     Dystrophic onycholytic nail with subungual debris c/w onychomycosis     Umbilicated papule    Erythematous-base heme-crusted tan verrucoid plaque consistent with inflamed seborrheic keratosis     Erythematous Silvery Scaling Plaque c/w Psoriasis     See annotation      Assessment / Plan:        SK (seborrheic keratosis)  These are benign inherited growths without a malignant potential. Reassurance given to patient. No treatment is necessary.     Lentigo  This is a benign hyperpigmented sun induced lesion. Daily sun protection will reduce the number of new lesions. Treatment of these benign lesions are considered cosmetic.  The nature of sun-induced photo-aging and skin cancers is discussed.  Sun avoidance, protective clothing, and the use of 30-SPF sunscreens is advised. Observe closely for skin damage/changes, and call if such occurs.    Nevus  Discussed ABCDE's of nevi.  Monitor for new mole or moles that are becoming bigger, darker, irritated, or developing irregular borders. Brochure provided.    Angioma  These are benign vascular lesions that are inherited.  Treatment is not  necessary.    Total body skin examination performed today including at least 12 points as noted in physical examination. No lesions suspicious for malignancy noted.             Return if symptoms worsen or fail to improve.

## 2017-07-27 ENCOUNTER — LAB VISIT (OUTPATIENT)
Dept: LAB | Facility: HOSPITAL | Age: 52
End: 2017-07-27
Attending: FAMILY MEDICINE
Payer: COMMERCIAL

## 2017-07-27 DIAGNOSIS — R19.7 DIARRHEA, UNSPECIFIED TYPE: ICD-10-CM

## 2017-07-27 PROCEDURE — 87209 SMEAR COMPLEX STAIN: CPT

## 2017-07-27 PROCEDURE — 87427 SHIGA-LIKE TOXIN AG IA: CPT

## 2017-07-27 PROCEDURE — 87045 FECES CULTURE AEROBIC BACT: CPT

## 2017-07-27 PROCEDURE — 87046 STOOL CULTR AEROBIC BACT EA: CPT

## 2017-07-28 ENCOUNTER — TELEPHONE (OUTPATIENT)
Dept: FAMILY MEDICINE | Facility: CLINIC | Age: 52
End: 2017-07-28

## 2017-07-28 LAB
E COLI SXT1 STL QL IA: NEGATIVE
E COLI SXT2 STL QL IA: NEGATIVE
O+P STL TRI STN: NORMAL

## 2017-07-28 NOTE — TELEPHONE ENCOUNTER
----- Message from Олег Barger sent at 7/28/2017  9:47 AM CDT -----  Contact: Lab Client Services  Hi,           my name is Олег we received a specimen for Clostridium Difficile test. The test was unable to be performed due to the stool sample was formed stool. If you have any questions regarding this please contact client services at [phone]. Thank You.

## 2017-07-31 ENCOUNTER — HOSPITAL ENCOUNTER (OUTPATIENT)
Dept: PREADMISSION TESTING | Facility: OTHER | Age: 52
Discharge: HOME | End: 2017-07-31
Attending: OBSTETRICS & GYNECOLOGY
Payer: COMMERCIAL

## 2017-07-31 ENCOUNTER — ANESTHESIA EVENT (OUTPATIENT)
Dept: SURGERY | Facility: OTHER | Age: 52
End: 2017-07-31
Payer: COMMERCIAL

## 2017-07-31 ENCOUNTER — OFFICE VISIT (OUTPATIENT)
Dept: OBSTETRICS AND GYNECOLOGY | Facility: CLINIC | Age: 52
End: 2017-07-31
Payer: COMMERCIAL

## 2017-07-31 VITALS
HEART RATE: 60 BPM | OXYGEN SATURATION: 100 % | DIASTOLIC BLOOD PRESSURE: 60 MMHG | TEMPERATURE: 98 F | BODY MASS INDEX: 21.53 KG/M2 | SYSTOLIC BLOOD PRESSURE: 122 MMHG | HEIGHT: 66 IN | WEIGHT: 134 LBS

## 2017-07-31 VITALS
WEIGHT: 134.94 LBS | HEIGHT: 66 IN | DIASTOLIC BLOOD PRESSURE: 80 MMHG | SYSTOLIC BLOOD PRESSURE: 120 MMHG | BODY MASS INDEX: 21.69 KG/M2

## 2017-07-31 DIAGNOSIS — N92.4 EXCESSIVE BLEEDING IN PREMENOPAUSAL PERIOD: ICD-10-CM

## 2017-07-31 DIAGNOSIS — N95.1 PERIMENOPAUSAL: Primary | ICD-10-CM

## 2017-07-31 DIAGNOSIS — N94.6 DYSMENORRHEA: Primary | ICD-10-CM

## 2017-07-31 DIAGNOSIS — N92.6 IRREGULAR MENSTRUAL CYCLE: ICD-10-CM

## 2017-07-31 LAB
BACTERIA STL CULT: NORMAL
HCT VFR BLD AUTO: 36.9 %
HGB BLD-MCNC: 11.8 G/DL

## 2017-07-31 PROCEDURE — 36415 COLL VENOUS BLD VENIPUNCTURE: CPT

## 2017-07-31 PROCEDURE — 99499 UNLISTED E&M SERVICE: CPT | Mod: S$GLB,,, | Performed by: OBSTETRICS & GYNECOLOGY

## 2017-07-31 PROCEDURE — 85014 HEMATOCRIT: CPT

## 2017-07-31 PROCEDURE — 99999 PR PBB SHADOW E&M-EST. PATIENT-LVL III: CPT | Mod: PBBFAC,,, | Performed by: OBSTETRICS & GYNECOLOGY

## 2017-07-31 PROCEDURE — 85018 HEMOGLOBIN: CPT

## 2017-07-31 RX ORDER — ONDANSETRON 4 MG/1
8 TABLET, ORALLY DISINTEGRATING ORAL EVERY 8 HOURS PRN
Status: DISCONTINUED | OUTPATIENT
Start: 2017-07-31 | End: 2017-08-02 | Stop reason: HOSPADM

## 2017-07-31 RX ORDER — FAMOTIDINE 20 MG/1
20 TABLET, FILM COATED ORAL
Status: CANCELLED | OUTPATIENT
Start: 2017-07-31 | End: 2017-07-31

## 2017-07-31 RX ORDER — HYDROCODONE BITARTRATE AND ACETAMINOPHEN 5; 325 MG/1; MG/1
1 TABLET ORAL EVERY 6 HOURS PRN
Qty: 30 TABLET | Refills: 0 | Status: SHIPPED | OUTPATIENT
Start: 2017-07-31 | End: 2017-09-15

## 2017-07-31 RX ORDER — SODIUM CHLORIDE, SODIUM LACTATE, POTASSIUM CHLORIDE, CALCIUM CHLORIDE 600; 310; 30; 20 MG/100ML; MG/100ML; MG/100ML; MG/100ML
INJECTION, SOLUTION INTRAVENOUS CONTINUOUS
Status: CANCELLED | OUTPATIENT
Start: 2017-07-31

## 2017-07-31 RX ORDER — OXYCODONE HYDROCHLORIDE 5 MG/1
5 TABLET ORAL ONCE AS NEEDED
Status: CANCELLED | OUTPATIENT
Start: 2017-07-31 | End: 2017-07-31

## 2017-07-31 RX ORDER — LIDOCAINE HYDROCHLORIDE 10 MG/ML
1 INJECTION, SOLUTION EPIDURAL; INFILTRATION; INTRACAUDAL; PERINEURAL ONCE
Status: CANCELLED | OUTPATIENT
Start: 2017-07-31 | End: 2017-07-31

## 2017-07-31 NOTE — DISCHARGE INSTRUCTIONS
PRE-ADMIT TESTING -  [phone]    [address]  [facility]        OUTPATIENT SURGERY UNIT - [phone]    Your surgery has been scheduled at [facility]. We are pleased to have the opportunity to serve you. For Further Information please call [phone].    On the day of surgery please report to the Information Desk on the 1st floor.    · CONTACT YOUR PHYSICIAN'S OFFICE THE DAY PRIOR TO YOUR SURGERY TO OBTAIN YOUR ARRIVAL TIME.     · The evening before surgery do not eat anything after 9 p.m. ( this includes hard candy, chewing gum and mints).  You may only have GATORADE, POWERADE AND WATER  from 9 p.m. until you leave your home.   DO NOT DRINK ANY LIQUIDS ON THE WAY TO THE HOSPITAL.      SPECIAL MEDICATION INSTRUCTIONS: TAKE medications checked off by the Anesthesiologist on your Medication List.    Angiogram Patients: Take medications as instructed by your physician, including aspirin.     Surgery Patients:    If you take ASPIRIN - Your PHYSICIAN/SURGEON will need to inform you IF/OR when you need to stop taking aspirin prior to your surgery.     Do Not take any medications containing IBUPROFEN.  Do Not Wear any make-up or dark nail polish   (especially eye make-up) to surgery. If you come to surgery with makeup on you will be required to remove the makeup or nail polish.    Do not shave your surgical area at least 5 days prior to your surgery. The surgical prep will be performed at the hospital according to Infection Control regulations.    Leave all valuables at home.   Do Not wear any jewelry or watches, including any metal in body piercings.  Contact Lens must be removed before surgery. Either do not wear the contact lens or bring a case and solution for storage.  Please bring a container for eyeglasses or dentures as required.  Bring any paperwork your physician has provided, such as consent forms,  history and physicals, doctor's orders, etc.   Bring comfortable clothes  that are loose fitting to wear upon discharge. Take into consideration the type of surgery being performed.  Maintain your diet as advised per your physician the day prior to surgery.      Adequate rest the night before surgery is advised.   Park in the Parking lot behind the hospital or in the [facility] across the street from the parking lot. Parking is complimentary.  If you will be discharged the same day as your procedure, please arrange for a responsible adult to drive you home or to accompany you if traveling by taxi.   YOU WILL NOT BE PERMITTED TO DRIVE OR TO LEAVE THE HOSPITAL ALONE AFTER SURGERY.   It is strongly recommended that you arrange for someone to remain with you for the first 24 hrs following your surgery.       Thank you for your cooperation.  The Staff of [facility].        Bathing Instructions                                                                 Please shower the evening before and morning of your procedure with    ANTIBACTERIAL SOAP. ( DIAL, etc )  Concentrate on the surgical area   for at least 3 minutes and rinse completely. Dry off as usual.   Do not use any deodorant, powder, body lotions, perfume, after shave or    cologne.

## 2017-07-31 NOTE — ANESTHESIA PREPROCEDURE EVALUATION
07/31/2017  Vicky Brand is a 51 y.o., female.    Anesthesia Evaluation    I have reviewed the Patient Summary Reports.    I have reviewed the Nursing Notes.   I have reviewed the Medications.     Review of Systems  Anesthesia Hx:  No problems with previous Anesthesia    Social:  Non-Smoker, No Alcohol Use    Hematology/Oncology:  Hematology Normal   Oncology Normal     EENT/Dental:EENT/Dental Normal   Cardiovascular:  Cardiovascular Normal Exercise tolerance: good     Pulmonary:  Pulmonary Normal    Renal/:  Renal/ Normal     Hepatic/GI:  Hepatic/GI Normal    Musculoskeletal:  Musculoskeletal Normal    Neurological:   Headaches    Endocrine:  Endocrine Normal    Dermatological:  Skin Normal    Psych:  Psychiatric Normal           Physical Exam  General:  Well nourished    Airway/Jaw/Neck:  Airway Findings: Mouth Opening: Normal Tongue: Normal  General Airway Assessment: Adult, Average  Mallampati: II  TM Distance: 4 - 6 cm  Jaw/Neck Findings:  Neck ROM: Normal ROM      Dental:  Dental Findings: In tact             Anesthesia Plan  Type of Anesthesia, risks & benefits discussed:  Anesthesia Type:  general  Patient's Preference:   Intra-op Monitoring Plan: standard ASA monitors  Intra-op Monitoring Plan Comments:   Post Op Pain Control Plan:   Post Op Pain Control Plan Comments:   Induction:   IV  Beta Blocker:         Informed Consent: Patient understands risks and agrees with Anesthesia plan.  Questions answered. Anesthesia consent signed with patient.  ASA Score: 1     Day of Surgery Review of History & Physical:    H&P update referred to the surgeon.     Anesthesia Plan Notes: H/H        Ready For Surgery From Anesthesia Perspective.

## 2017-07-31 NOTE — PROGRESS NOTES
C.C Pre-op Exam (surgery 8-2 D&C hyst w/endometrial ablation)      HPI : Vicky Brand is a 51 y.o. female  for evaluation and discussion of treatment options for Pre-op Exam (surgery 8-2 D&C hyst w/endometrial ablation)  Dysmenorrhea, menorrhagia and irregular cycles  Benign EMBx    Uterus measures 9.0cm with a single subserosal fibroid which may be pedunculated measuring 3.1 x 2.3 x 3.6 cm, slightly enlarged from the prior examination when it measured 2.2 x 2.4 x 1.6 cm.  The endometrial stripe is not thickened in this perimenopausal patient, measuring 8mm.    The ovaries are normal in size and appearance.    Right ovary measures 3.4 x 1.7 x 2.2cm.  There is normal arterial and venous flow with resistive index 0.69. There are multiple follicles, the largest measuring 1.4 x 0.9 x 1.5 cm.    Left ovary measures 2.9 x 1.5 x 1.4 cm. There is normal arterial and venous flow with resistive index 0.65. There multiple follicles largest measuring 1.0 x 0.6 x .7 cm.    There is no evidence of free fluid within the pelvis.   Impression         Enlarged fundal subserosal possibly pedunculated fibroid.    Otherwise no significant sonographic abnormalities.         Past Medical History:   Diagnosis Date    Allergic rhinitis 3/25/2014    Dysmenorrhea     Eye abnormalities     early yellowing of lens, precursor to cataracts, Dr Webster,  wear glasses at all times    Insomnia 04/15/2015    prior to menses, amitryptiline 2.5 mg qhs helps from GYN    Irregular menses 2017    Ocular migraine     vision fuzzy x 3 in , metallic shapes in her vision, Dr Webster,  resolved after 30 min , no pain    Oral contraceptive use 4/15/2014    Shoulder pain     LEFT, after fall, better with PT at [facility]    Vision changes 3/25/2014     Past Surgical History:   Procedure Laterality Date    COLONOSCOPY N/A 2017    Procedure: COLONOSCOPY;  Surgeon: Davy Prince MD;  Location: [address]  "[facility]);  Service: Endoscopy;  Laterality: N/A;    DILATION AND CURETTAGE OF UTERUS      TUBAL LIGATION       Family History   Problem Relation Age of Onset    Diabetes Father     Stroke Father 49    Hypertension Mother     Ovarian cancer Maternal Grandmother     Melanoma Neg Hx     Breast cancer Neg Hx     Colon cancer Neg Hx      Social History   Substance Use Topics    Smoking status: Never Smoker    Smokeless tobacco: Never Used    Alcohol use No     OB History    Para Term  AB Living   1       1     SAB TAB Ectopic Multiple Live Births   1              # Outcome Date GA Lbr Giorgio/2nd Weight Sex Delivery Anes PTL Lv   1 SAB                   /80   Ht 5' 5.5" (1.664 m)   Wt 61.2 kg (134 lb 14.7 oz)   LMP 2017 (Exact Date)   BMI 22.11 kg/m²     ROS:    GENERAL: Denies weight gain or weight loss. Feeling well overall.   SKIN: Denies rash or lesions.   HEAD: Denies head injury or headache.   NODES: Denies enlarged lymph nodes.   CHEST: Denies chest pain or shortness of breath.   CARDIOVASCULAR: Denies palpitations or left sided chest pain.   ABDOMEN: No abdominal pain, constipation, diarrhea, nausea, vomiting or rectal bleeding.   URINARY: No frequency, dysuria, hematuria, or burning on urination.  REPRODUCTIVE: See HPI.   BREASTS: The patient performs breast self-examination and denies pain, lumps, or nipple discharge.   HEMATOLOGIC: No easy bruisability or excessive bleeding with the exception of menstrual cycles.  MUSCULOSKELETAL: Denies joint pain or swelling.   NEUROLOGIC: Denies syncope or weakness.   PSYCHIATRIC: Denies depression, anxiety or mood swings.    PHYSICAL EXAM:    APPEARANCE: Well nourished, well developed, in no acute distress.  AFFECT: WNL, alert and oriented x 3  SKIN: No acne or hirsutism  NECK: Neck symmetric without masses or thyromegaly  NODES: No inguinal, cervical, axillary, or femoral lymph node enlargement  CHEST: Good respiratory effect  ABDOMEN: " Soft.  No tenderness or masses.  No hepatosplenomegaly.  No hernias.  PELVIC: Normal external genitalia without lesions.  Normal hair distribution.  Adequate perineal body, normal urethral meatus.  Vagina moist and well rugated without lesions or discharge.  Cervix pink, without lesions, discharge or tenderness.  No significant cystocele or rectocele.  Bimanual exam shows uterus to be normal size, regular, mobile and nontender.  Adnexa without masses or tenderness.    EXTREMITIES: No edema.    ASSESSMENT & PLAN:    1. Dysmenorrhea    2. Excessive bleeding in premenopausal period    3. Irregular menstrual cycle        I have discussed the risks, benefits, indications, and alternatives of the procedure in detail.  The patient verbalizes her understanding.  All questions answered.  Consents signed.  The patient agrees to proceed to proceed as planned.      Vicodin rx sent  Post op appt in 4-6 wks

## 2017-08-02 ENCOUNTER — HOSPITAL ENCOUNTER (OUTPATIENT)
Facility: OTHER | Age: 52
Discharge: HOME | End: 2017-08-02
Attending: OBSTETRICS & GYNECOLOGY | Admitting: OBSTETRICS & GYNECOLOGY
Payer: COMMERCIAL

## 2017-08-02 ENCOUNTER — ANESTHESIA (OUTPATIENT)
Dept: SURGERY | Facility: OTHER | Age: 52
End: 2017-08-02
Payer: COMMERCIAL

## 2017-08-02 VITALS
HEIGHT: 66 IN | OXYGEN SATURATION: 100 % | HEART RATE: 75 BPM | TEMPERATURE: 98 F | SYSTOLIC BLOOD PRESSURE: 128 MMHG | WEIGHT: 134 LBS | DIASTOLIC BLOOD PRESSURE: 63 MMHG | RESPIRATION RATE: 16 BRPM | BODY MASS INDEX: 21.53 KG/M2

## 2017-08-02 DIAGNOSIS — N92.4 EXCESSIVE BLEEDING IN PREMENOPAUSAL PERIOD: ICD-10-CM

## 2017-08-02 DIAGNOSIS — N94.6 DYSMENORRHEA: Primary | ICD-10-CM

## 2017-08-02 DIAGNOSIS — Z98.890 S/P D&C (STATUS POST DILATION AND CURETTAGE): ICD-10-CM

## 2017-08-02 LAB
B-HCG UR QL: NEGATIVE
CTP QC/QA: YES
POCT GLUCOSE: 93 MG/DL (ref 70–110)

## 2017-08-02 PROCEDURE — 25000003 PHARM REV CODE 250: Performed by: NURSE ANESTHETIST, CERTIFIED REGISTERED

## 2017-08-02 PROCEDURE — 36000707: Performed by: OBSTETRICS & GYNECOLOGY

## 2017-08-02 PROCEDURE — 58563 HYSTEROSCOPY ABLATION: CPT | Mod: ,,, | Performed by: OBSTETRICS & GYNECOLOGY

## 2017-08-02 PROCEDURE — 36000706: Performed by: OBSTETRICS & GYNECOLOGY

## 2017-08-02 PROCEDURE — 25000003 PHARM REV CODE 250: Performed by: SPECIALIST

## 2017-08-02 PROCEDURE — 37000008 HC ANESTHESIA 1ST 15 MINUTES: Performed by: OBSTETRICS & GYNECOLOGY

## 2017-08-02 PROCEDURE — 71000015 HC POSTOP RECOV 1ST HR: Performed by: OBSTETRICS & GYNECOLOGY

## 2017-08-02 PROCEDURE — 81025 URINE PREGNANCY TEST: CPT | Performed by: SPECIALIST

## 2017-08-02 PROCEDURE — 63600175 PHARM REV CODE 636 W HCPCS: Performed by: NURSE ANESTHETIST, CERTIFIED REGISTERED

## 2017-08-02 PROCEDURE — 71000033 HC RECOVERY, INTIAL HOUR: Performed by: OBSTETRICS & GYNECOLOGY

## 2017-08-02 PROCEDURE — 25000003 PHARM REV CODE 250: Performed by: STUDENT IN AN ORGANIZED HEALTH CARE EDUCATION/TRAINING PROGRAM

## 2017-08-02 PROCEDURE — C1782 MORCELLATOR: HCPCS | Performed by: OBSTETRICS & GYNECOLOGY

## 2017-08-02 PROCEDURE — 88305 TISSUE EXAM BY PATHOLOGIST: CPT | Mod: 26,,, | Performed by: PATHOLOGY

## 2017-08-02 PROCEDURE — 27201423 OPTIME MED/SURG SUP & DEVICES STERILE SUPPLY: Performed by: OBSTETRICS & GYNECOLOGY

## 2017-08-02 PROCEDURE — 88305 TISSUE EXAM BY PATHOLOGIST: CPT | Performed by: PATHOLOGY

## 2017-08-02 PROCEDURE — 37000009 HC ANESTHESIA EA ADD 15 MINS: Performed by: OBSTETRICS & GYNECOLOGY

## 2017-08-02 PROCEDURE — 71000039 HC RECOVERY, EACH ADD'L HOUR: Performed by: OBSTETRICS & GYNECOLOGY

## 2017-08-02 PROCEDURE — 82962 GLUCOSE BLOOD TEST: CPT | Performed by: OBSTETRICS & GYNECOLOGY

## 2017-08-02 PROCEDURE — 63600175 PHARM REV CODE 636 W HCPCS: Performed by: SPECIALIST

## 2017-08-02 PROCEDURE — 71000016 HC POSTOP RECOV ADDL HR: Performed by: OBSTETRICS & GYNECOLOGY

## 2017-08-02 RX ORDER — PROMETHAZINE HYDROCHLORIDE 25 MG/1
25 TABLET ORAL EVERY 6 HOURS PRN
Status: DISCONTINUED | OUTPATIENT
Start: 2017-08-02 | End: 2017-08-02 | Stop reason: HOSPADM

## 2017-08-02 RX ORDER — DIPHENHYDRAMINE HYDROCHLORIDE 50 MG/ML
25 INJECTION INTRAMUSCULAR; INTRAVENOUS EVERY 6 HOURS PRN
Status: DISCONTINUED | OUTPATIENT
Start: 2017-08-02 | End: 2017-08-02 | Stop reason: HOSPADM

## 2017-08-02 RX ORDER — MIDAZOLAM HYDROCHLORIDE 1 MG/ML
INJECTION INTRAMUSCULAR; INTRAVENOUS
Status: DISCONTINUED | OUTPATIENT
Start: 2017-08-02 | End: 2017-08-02

## 2017-08-02 RX ORDER — ONDANSETRON 2 MG/ML
INJECTION INTRAMUSCULAR; INTRAVENOUS
Status: DISCONTINUED | OUTPATIENT
Start: 2017-08-02 | End: 2017-08-02

## 2017-08-02 RX ORDER — ONDANSETRON 4 MG/1
4 TABLET, ORALLY DISINTEGRATING ORAL EVERY 6 HOURS PRN
Qty: 30 TABLET | Refills: 0 | Status: SHIPPED | OUTPATIENT
Start: 2017-08-02 | End: 2017-09-15

## 2017-08-02 RX ORDER — IBUPROFEN 600 MG/1
600 TABLET ORAL EVERY 6 HOURS PRN
Status: DISCONTINUED | OUTPATIENT
Start: 2017-08-02 | End: 2017-08-02 | Stop reason: HOSPADM

## 2017-08-02 RX ORDER — PROPOFOL 10 MG/ML
VIAL (ML) INTRAVENOUS
Status: DISCONTINUED | OUTPATIENT
Start: 2017-08-02 | End: 2017-08-02

## 2017-08-02 RX ORDER — SODIUM CHLORIDE, SODIUM LACTATE, POTASSIUM CHLORIDE, CALCIUM CHLORIDE 600; 310; 30; 20 MG/100ML; MG/100ML; MG/100ML; MG/100ML
INJECTION, SOLUTION INTRAVENOUS CONTINUOUS
Status: DISCONTINUED | OUTPATIENT
Start: 2017-08-02 | End: 2017-08-02 | Stop reason: HOSPADM

## 2017-08-02 RX ORDER — ONDANSETRON 8 MG/1
8 TABLET, ORALLY DISINTEGRATING ORAL EVERY 8 HOURS PRN
Status: DISCONTINUED | OUTPATIENT
Start: 2017-08-02 | End: 2017-08-02 | Stop reason: HOSPADM

## 2017-08-02 RX ORDER — LIDOCAINE HCL/PF 100 MG/5ML
SYRINGE (ML) INTRAVENOUS
Status: DISCONTINUED | OUTPATIENT
Start: 2017-08-02 | End: 2017-08-02

## 2017-08-02 RX ORDER — OXYCODONE AND ACETAMINOPHEN 5; 325 MG/1; MG/1
1 TABLET ORAL EVERY 4 HOURS PRN
Qty: 15 TABLET | Refills: 0 | Status: SHIPPED | OUTPATIENT
Start: 2017-08-02 | End: 2017-09-15

## 2017-08-02 RX ORDER — SODIUM CHLORIDE 0.9 % (FLUSH) 0.9 %
3 SYRINGE (ML) INJECTION
Status: DISCONTINUED | OUTPATIENT
Start: 2017-08-02 | End: 2017-08-02 | Stop reason: HOSPADM

## 2017-08-02 RX ORDER — MEPERIDINE HYDROCHLORIDE 50 MG/ML
12.5 INJECTION INTRAMUSCULAR; INTRAVENOUS; SUBCUTANEOUS ONCE AS NEEDED
Status: COMPLETED | OUTPATIENT
Start: 2017-08-02 | End: 2017-08-02

## 2017-08-02 RX ORDER — FENTANYL CITRATE 50 UG/ML
25 INJECTION, SOLUTION INTRAMUSCULAR; INTRAVENOUS EVERY 5 MIN PRN
Status: DISCONTINUED | OUTPATIENT
Start: 2017-08-02 | End: 2017-08-02 | Stop reason: HOSPADM

## 2017-08-02 RX ORDER — LIDOCAINE HYDROCHLORIDE 10 MG/ML
1 INJECTION, SOLUTION EPIDURAL; INFILTRATION; INTRACAUDAL; PERINEURAL ONCE
Status: DISCONTINUED | OUTPATIENT
Start: 2017-08-02 | End: 2017-08-02 | Stop reason: HOSPADM

## 2017-08-02 RX ORDER — KETOROLAC TROMETHAMINE 30 MG/ML
INJECTION, SOLUTION INTRAMUSCULAR; INTRAVENOUS
Status: DISCONTINUED | OUTPATIENT
Start: 2017-08-02 | End: 2017-08-02

## 2017-08-02 RX ORDER — IBUPROFEN 600 MG/1
600 TABLET ORAL 3 TIMES DAILY
Qty: 30 TABLET | Refills: 0 | Status: SHIPPED | OUTPATIENT
Start: 2017-08-02 | End: 2017-09-15

## 2017-08-02 RX ORDER — HYDROMORPHONE HYDROCHLORIDE 2 MG/ML
0.4 INJECTION, SOLUTION INTRAMUSCULAR; INTRAVENOUS; SUBCUTANEOUS EVERY 5 MIN PRN
Status: DISCONTINUED | OUTPATIENT
Start: 2017-08-02 | End: 2017-08-02 | Stop reason: HOSPADM

## 2017-08-02 RX ORDER — GLYCOPYRROLATE 0.2 MG/ML
INJECTION INTRAMUSCULAR; INTRAVENOUS
Status: DISCONTINUED | OUTPATIENT
Start: 2017-08-02 | End: 2017-08-02

## 2017-08-02 RX ORDER — OXYCODONE HYDROCHLORIDE 5 MG/1
5 TABLET ORAL ONCE AS NEEDED
Status: DISCONTINUED | OUTPATIENT
Start: 2017-08-02 | End: 2017-08-02 | Stop reason: HOSPADM

## 2017-08-02 RX ORDER — FAMOTIDINE 20 MG/1
20 TABLET, FILM COATED ORAL
Status: COMPLETED | OUTPATIENT
Start: 2017-08-02 | End: 2017-08-02

## 2017-08-02 RX ORDER — ACETAMINOPHEN 10 MG/ML
INJECTION, SOLUTION INTRAVENOUS
Status: DISCONTINUED | OUTPATIENT
Start: 2017-08-02 | End: 2017-08-02

## 2017-08-02 RX ORDER — DEXAMETHASONE SODIUM PHOSPHATE 4 MG/ML
INJECTION, SOLUTION INTRA-ARTICULAR; INTRALESIONAL; INTRAMUSCULAR; INTRAVENOUS; SOFT TISSUE
Status: DISCONTINUED | OUTPATIENT
Start: 2017-08-02 | End: 2017-08-02

## 2017-08-02 RX ORDER — HYDROCODONE BITARTRATE AND ACETAMINOPHEN 5; 325 MG/1; MG/1
1 TABLET ORAL EVERY 4 HOURS PRN
Status: DISCONTINUED | OUTPATIENT
Start: 2017-08-02 | End: 2017-08-02 | Stop reason: HOSPADM

## 2017-08-02 RX ORDER — OXYCODONE HYDROCHLORIDE 5 MG/1
5 TABLET ORAL
Status: DISCONTINUED | OUTPATIENT
Start: 2017-08-02 | End: 2017-08-02 | Stop reason: HOSPADM

## 2017-08-02 RX ORDER — FENTANYL CITRATE 50 UG/ML
INJECTION, SOLUTION INTRAMUSCULAR; INTRAVENOUS
Status: DISCONTINUED | OUTPATIENT
Start: 2017-08-02 | End: 2017-08-02

## 2017-08-02 RX ADMIN — FENTANYL CITRATE 100 MCG: 50 INJECTION, SOLUTION INTRAMUSCULAR; INTRAVENOUS at 02:08

## 2017-08-02 RX ADMIN — FAMOTIDINE 20 MG: 20 TABLET, FILM COATED ORAL at 10:08

## 2017-08-02 RX ADMIN — LIGHT MINERAL OIL, WHITE PETROLATUM 1 TUBE: 150; 850 OINTMENT OPHTHALMIC at 02:08

## 2017-08-02 RX ADMIN — KETOROLAC TROMETHAMINE 30 MG: 30 INJECTION, SOLUTION INTRAMUSCULAR; INTRAVENOUS at 03:08

## 2017-08-02 RX ADMIN — DEXTROSE 1 G: 50 INJECTION, SOLUTION INTRAVENOUS at 02:08

## 2017-08-02 RX ADMIN — DEXAMETHASONE SODIUM PHOSPHATE 8 MG: 4 INJECTION, SOLUTION INTRAMUSCULAR; INTRAVENOUS at 02:08

## 2017-08-02 RX ADMIN — OXYCODONE HYDROCHLORIDE 5 MG: 5 TABLET ORAL at 04:08

## 2017-08-02 RX ADMIN — MIDAZOLAM HYDROCHLORIDE 2 MG: 1 INJECTION, SOLUTION INTRAMUSCULAR; INTRAVENOUS at 02:08

## 2017-08-02 RX ADMIN — CARBOXYMETHYLCELLULOSE SODIUM 4 DROP: 2.5 SOLUTION/ DROPS OPHTHALMIC at 02:08

## 2017-08-02 RX ADMIN — ONDANSETRON 4 MG: 2 INJECTION INTRAMUSCULAR; INTRAVENOUS at 02:08

## 2017-08-02 RX ADMIN — ACETAMINOPHEN 1000 MG: 10 INJECTION, SOLUTION INTRAVENOUS at 02:08

## 2017-08-02 RX ADMIN — SODIUM CHLORIDE, SODIUM LACTATE, POTASSIUM CHLORIDE, AND CALCIUM CHLORIDE: 600; 310; 30; 20 INJECTION, SOLUTION INTRAVENOUS at 01:08

## 2017-08-02 RX ADMIN — MEPERIDINE HYDROCHLORIDE 12.5 MG: 50 INJECTION INTRAMUSCULAR; INTRAVENOUS; SUBCUTANEOUS at 04:08

## 2017-08-02 RX ADMIN — LIDOCAINE HYDROCHLORIDE 100 MG: 20 INJECTION, SOLUTION INTRAVENOUS at 02:08

## 2017-08-02 RX ADMIN — GLYCOPYRROLATE 0.2 MG: 0.2 INJECTION, SOLUTION INTRAMUSCULAR; INTRAVENOUS at 02:08

## 2017-08-02 RX ADMIN — ONDANSETRON 8 MG: 8 TABLET, ORALLY DISINTEGRATING ORAL at 05:08

## 2017-08-02 RX ADMIN — PROPOFOL 200 MG: 10 INJECTION, EMULSION INTRAVENOUS at 02:08

## 2017-08-02 NOTE — OP NOTE
Operative Report     Procedure:  1. Dilation and Curettage  2. Endometrial ablation     Date of Surgery: 08/02/2017     Surgeon: Jannette Jarrell MD     Assistant: Sushma K Reddy, MD - Resident     Pre-Op Diagnosis:   1. AUB  2. Uterine polyps    Post-op Diagnosis:  same     EBL: < 25 cc      IVF:  1000 cc     Urine Output: 25 cc     H-scope Fluid Deficit: 130 cc with additional fluid on drapes/floor      Cavity Width: 4.4 cm  Cavity Length: 5 cm  Power: 121  Time: 57 sec      Specimen:   1. Endometrial currettings     Findings:   1. Normal vulva and introitus  2. Uterus sounded to 8.5 cm with cervical length of 3.5 cm  3. Upon hysteroscopic inspection, one right-sided fundal polyp was noted. No other uterine abnormalities were observed.  4. The endometrial cavity was ablated using the Novasure device  5. Post-ablation assessment revealed thorough ablation of endometrial cavity and no remaining bleeding sites.     Procedure in Detail:  The patient was taken to the Operating Room where general anesthesia was obtained, the patient was placed in the lithotomy position, with her legs in the Yellow fin stirrups. The patient was then prepped and draped in the normal sterile fashion and her bladder was drained using in-and-out catheterization. The right angle retractor was placed in the posterior aspect of the vagina and another right angle retractor was placed in the anterior aspect of the vagina.  The cervix was visualized and a single-tooth tenaculum was placed on the anterior aspect of the cervix. The uterus sounded to 8.5 cm. The cervix was dilated to 16 Martin[place] to allow introduction of the 5mm hysteroscope. The hysteroscope was then white balanced and the line cleared of air. The hysteroscope was inserted until the tip was at the point of the internal cervical os, and the length of the cervix was measured to be 3.5 cm. The hysteroscope was then inserted into the cervical os and the uterine cavity was directly  visualized, as well as both ostea.  A right-sided fundal/ cornual polyp was noted. There were no other abnormal uterine findings. Curettage was performed to remove endometrial tissue for sampling .  The Novasure system was placed into the endometrial cavity, the width was measured.  The Novasure system was placed into the endometrial cavity to the uterine fundus, and was then pulled back 0.5 cm from the fundus, and was then deployed. Following removal of the Novasure device, the cavity was again inspected by hysteroscopy, and a thorough burn was noted. The cervix was not burned.     The single tooth tenaculum was then removed and the cervix was noted to be hemostatic.     Sponge, lap and needle counts were correct x 2.  The patient tolerated the procedure well and was taken to recovery in stable condition.     Sushma K. Reddy, MD

## 2017-08-02 NOTE — INTERVAL H&P NOTE
The patient has been examined and the H&P has been reviewed:    I concur with the findings and no changes have occurred since H&P was written.    Anesthesia/Surgery risks, benefits and alternative options discussed and understood by patient/family.          Active Hospital Problems    Diagnosis  POA    Dysmenorrhea [N94.6]  Yes      Resolved Hospital Problems    Diagnosis Date Resolved POA   No resolved problems to display.

## 2017-08-02 NOTE — DISCHARGE INSTRUCTIONS
Home Care Instruction D&C Hysteroscopy             ACTIVITY LEVEL:  If you received sedation and/or an anesthetic, you may feel sleepy for several hours. Rest until you feel more  awake. Gradually resume your normal activities.    DIET:  At home, continue with liquids. If there is no nausea, you may eat a soft diet and gradually resume a regular diet.    BATHING:  You may shower or tub bathe as desired in one day.    CARE:  You can expect watery or bloody vaginal discharge for several days. Do not use tampons, please only use pads. Sexual activity is restricted as advised by your doctor.    MEDICATIONS:  You will receive instructions for any pain and/or antibiotic prescriptions. Pain medication should be taken only if needed and as directed. Antibiotics, if ordered, should be taken as directed until the entire prescription is completed.    WHEN TO CALL THE DOCTOR:   For any heavy vaginal bleeding   Fever over 101°F (38.4°C)   Any lower abdominal pain not relieved by the pain mediation    FOR EMERGENCIES:  If any unusual problems or difficulties occur, contact Dr. Jarrell or the resident at [phone] (page ) or the Clinic office, [phone].      Endometrial Ablation  Endometrial ablation is an outpatient surgery that can reduce or stop heavy menstrual bleeding. Ablation destroys the lining of the uterus. This surgery is for women who do not want to have any more children and who have not yet entered menopause. It should not be used by women with endometrial hyperplasia or cancer of the uterus.  Treatment takes less than an hour, and you can go home later that day.    Your recovery  · You may have cramping or aching in your abdomen after surgery. Your health care provider can give you pain medicine.  · You may also have a bloody or watery discharge or bleeding for days or weeks. Use sanitary pads, not tampons.  · Dont have sexual intercourse or play active sports for 2 weeks after surgery.  · You  can likely return to work in 2 days.  · Ask your health care provider about using contraception after an ablation.  · Your health care provider will see you in about 6 weeks to be sure youre healing well.    Call your health care provider if you have any of the following after surgery:    · Persistent or increased abdominal pain  · Shortness of breath  · Heavy vaginal bleeding  · Fever over 100.4°F (38°C) or chills  · Nausea  · Frequent urination for 24 hours         Discharge Instructions: After Your Surgery  Youve just had surgery. During surgery you were given medicine called anesthesia to keep you relaxed and free of pain. After surgery you may have some pain or nausea. This is common. Here are some tips for feeling better and getting well after surgery.     Stay on schedule with your medication.     Going home  Your doctor or nurse will show you how to take care of yourself when you go home. He or she will also answer your questions. Have an adult family member or friend drive you home. For the first 24 hours after your surgery:    · Do not drive or use heavy equipment.  · Do not make important decisions or sign legal papers.  · Do not drink alcohol.  · Have someone stay with you, if needed. He or she can watch for problems and help keep you safe.    Be sure to go to all follow-up visits with your doctor. And rest after your surgery for as long as your doctor tells you to.    Coping with pain  If you have pain after surgery, pain medicine will help you feel better. Take it as told, before pain becomes severe. Also, ask your doctor or pharmacist about other ways to control pain. This might be with heat, ice, or relaxation. And follow any other instructions your surgeon or nurse gives you.    Tips for taking pain medicine  To get the best relief possible, remember these points:    · Pain medicines can upset your stomach. Taking them with a little food may help.  · Most pain relievers taken by mouth need at  least 20 to 30 minutes to start to work.  · Taking medicine on a schedule can help you remember to take it. Try to time your medicine so that you can take it before starting an activity. This might be before you get dressed, go for a walk, or sit down for dinner.  · Constipation is a common side effect of pain medicines. Call your doctor before taking any medicines such as laxatives or stool softeners to help ease constipation. Also ask if you should skip any foods. Drinking lots of fluids and eating foods such as fruits and vegetables that are high in fiber can also help. Remember, do not take laxatives unless your surgeon has prescribed them.  · Drinking alcohol and taking pain medicine can cause dizziness and slow your breathing. It can even be deadly. Do not drink alcohol while taking pain medicine.  · Pain medicine can make you react more slowly to things. Do not drive or run machinery while taking pain medicine.    Your health care provider may tell you to take acetaminophen to help ease your pain. Ask him or her how much you are supposed to take each day. Acetaminophen or other pain relievers may interact with your prescription medicines or other over-the-counter (OTC) drugs. Some prescription medicines have acetaminophen and other ingredients. Using both prescription and OTC acetaminophen for pain can cause you to overdose. Read the labels on your OTC medicines with care. This will help you to clearly know the list of ingredients, how much to take, and any warnings. It may also help you not take too much acetaminophen. If you have questions or do not understand the information, ask your pharmacist or health care provider to explain it to you before you take the OTC medicine.    Managing nausea  Some people have an upset stomach after surgery. This is often because of anesthesia, pain, or pain medicine, or the stress of surgery. These tips will help you handle nausea and eat healthy foods as you get better.  If you were on a special food plan before surgery, ask your doctor if you should follow it while you get better. These tips may help:    · Do not push yourself to eat. Your body will tell you when to eat and how much.  · Start off with clear liquids and soup. They are easier to digest.  · Next try semi-solid foods, such as mashed potatoes, applesauce, and gelatin, as you feel ready.  · Slowly move to solid foods. Dont eat fatty, rich, or spicy foods at first.  · Do not force yourself to have 3 large meals a day. Instead eat smaller amounts more often.  · Take pain medicines with a small amount of solid food, such as crackers or toast, to avoid nausea.     Call your surgeon if  · You still have pain an hour after taking medicine. The medicine may not be strong enough.  · You feel too sleepy, dizzy, or groggy. The medicine may be too strong.  · You have side effects like nausea, vomiting, or skin changes, such as rash, itching, or hives.       If you have obstructive sleep apnea  You were given anesthesia medicine during surgery to keep you comfortable and free of pain. After surgery, you may have more apnea spells because of this medicine and other medicines you were given. The spells may last longer than usual.   At home:    · Keep using the continuous positive airway pressure (CPAP) device when you sleep. Unless your health care provider tells you not to, use it when you sleep, day or night. CPAP is a common device used to treat obstructive sleep apnea.  · Talk with your provider before taking any pain medicine, muscle relaxants, or sedatives. Your provider will tell you about the possible dangers of taking these medicines.    © 8499-1420 [employer]. [address] [place] [address], [place] [address]. All rights reserved. This information is not intended as a substitute for professional medical care. Always follow your healthcare professional's instructions.    PLEASE FOLLOW ANY OTHER INSTRUCTIONS PROVIDED  TO YOU BY DR. VIZCAINO!

## 2017-08-02 NOTE — TRANSFER OF CARE
"Anesthesia Transfer of Care Note    Patient: Vicky Brand    Procedure(s) Performed: Procedure(s) (LRB):  ABLATION-ENDOMETRIAL, (N/A)  [id number] (N/A)    Patient location: PACU    Anesthesia Type: general    Transport from OR: Transported from OR on 2-3 L/min O2 by NC with adequate spontaneous ventilation    Post pain: adequate analgesia    Post assessment: no apparent anesthetic complications and tolerated procedure well    Post vital signs: stable    Level of consciousness: awake, alert and oriented    Nausea/Vomiting: no nausea/vomiting    Complications: none    Transfer of care protocol was followed      Last vitals:   Visit Vitals  /60 (BP Location: Left arm, Patient Position: Sitting, BP Method: Automatic)   Pulse 61   Temp 36.6 °C (97.9 °F) (Oral)   Resp 18   Ht 5' 6" (1.676 m)   Wt 60.8 kg (134 lb)   LMP 08/02/2017 (Exact Date)   SpO2 100%   Breastfeeding? No   BMI 21.63 kg/m²     "

## 2017-08-02 NOTE — H&P (VIEW-ONLY)
C.C Pre-op Exam (surgery 8-2 D&C hyst w/endometrial ablation)      HPI : Vicky Brand is a 51 y.o. female  for evaluation and discussion of treatment options for Pre-op Exam (surgery 8-2 D&C hyst w/endometrial ablation)  Dysmenorrhea, menorrhagia and irregular cycles  Benign EMBx    Uterus measures 9.0cm with a single subserosal fibroid which may be pedunculated measuring 3.1 x 2.3 x 3.6 cm, slightly enlarged from the prior examination when it measured 2.2 x 2.4 x 1.6 cm.  The endometrial stripe is not thickened in this perimenopausal patient, measuring 8mm.    The ovaries are normal in size and appearance.    Right ovary measures 3.4 x 1.7 x 2.2cm.  There is normal arterial and venous flow with resistive index 0.69. There are multiple follicles, the largest measuring 1.4 x 0.9 x 1.5 cm.    Left ovary measures 2.9 x 1.5 x 1.4 cm. There is normal arterial and venous flow with resistive index 0.65. There multiple follicles largest measuring 1.0 x 0.6 x .7 cm.    There is no evidence of free fluid within the pelvis.   Impression         Enlarged fundal subserosal possibly pedunculated fibroid.    Otherwise no significant sonographic abnormalities.         Past Medical History:   Diagnosis Date    Allergic rhinitis 3/25/2014    Dysmenorrhea     Eye abnormalities     early yellowing of lens, precursor to cataracts, Dr Webster,  wear glasses at all times    Insomnia 04/15/2015    prior to menses, amitryptiline 2.5 mg qhs helps from GYN    Irregular menses 2017    Ocular migraine     vision fuzzy x 3 in , metallic shapes in her vision, Dr Webster,  resolved after 30 min , no pain    Oral contraceptive use 4/15/2014    Shoulder pain     LEFT, after fall, better with PT at [facility]    Vision changes 3/25/2014     Past Surgical History:   Procedure Laterality Date    COLONOSCOPY N/A 2017    Procedure: COLONOSCOPY;  Surgeon: Davy Prince MD;  Location: [address]  "[facility]);  Service: Endoscopy;  Laterality: N/A;    DILATION AND CURETTAGE OF UTERUS      TUBAL LIGATION       Family History   Problem Relation Age of Onset    Diabetes Father     Stroke Father 49    Hypertension Mother     Ovarian cancer Maternal Grandmother     Melanoma Neg Hx     Breast cancer Neg Hx     Colon cancer Neg Hx      Social History   Substance Use Topics    Smoking status: Never Smoker    Smokeless tobacco: Never Used    Alcohol use No     OB History    Para Term  AB Living   1       1     SAB TAB Ectopic Multiple Live Births   1              # Outcome Date GA Lbr Giorgio/2nd Weight Sex Delivery Anes PTL Lv   1 SAB                   /80   Ht 5' 5.5" (1.664 m)   Wt 61.2 kg (134 lb 14.7 oz)   LMP 2017 (Exact Date)   BMI 22.11 kg/m²     ROS:    GENERAL: Denies weight gain or weight loss. Feeling well overall.   SKIN: Denies rash or lesions.   HEAD: Denies head injury or headache.   NODES: Denies enlarged lymph nodes.   CHEST: Denies chest pain or shortness of breath.   CARDIOVASCULAR: Denies palpitations or left sided chest pain.   ABDOMEN: No abdominal pain, constipation, diarrhea, nausea, vomiting or rectal bleeding.   URINARY: No frequency, dysuria, hematuria, or burning on urination.  REPRODUCTIVE: See HPI.   BREASTS: The patient performs breast self-examination and denies pain, lumps, or nipple discharge.   HEMATOLOGIC: No easy bruisability or excessive bleeding with the exception of menstrual cycles.  MUSCULOSKELETAL: Denies joint pain or swelling.   NEUROLOGIC: Denies syncope or weakness.   PSYCHIATRIC: Denies depression, anxiety or mood swings.    PHYSICAL EXAM:    APPEARANCE: Well nourished, well developed, in no acute distress.  AFFECT: WNL, alert and oriented x 3  SKIN: No acne or hirsutism  NECK: Neck symmetric without masses or thyromegaly  NODES: No inguinal, cervical, axillary, or femoral lymph node enlargement  CHEST: Good respiratory effect  ABDOMEN: " Soft.  No tenderness or masses.  No hepatosplenomegaly.  No hernias.  PELVIC: Normal external genitalia without lesions.  Normal hair distribution.  Adequate perineal body, normal urethral meatus.  Vagina moist and well rugated without lesions or discharge.  Cervix pink, without lesions, discharge or tenderness.  No significant cystocele or rectocele.  Bimanual exam shows uterus to be normal size, regular, mobile and nontender.  Adnexa without masses or tenderness.    EXTREMITIES: No edema.    ASSESSMENT & PLAN:    1. Dysmenorrhea    2. Excessive bleeding in premenopausal period    3. Irregular menstrual cycle        I have discussed the risks, benefits, indications, and alternatives of the procedure in detail.  The patient verbalizes her understanding.  All questions answered.  Consents signed.  The patient agrees to proceed to proceed as planned.      Vicodin rx sent  Post op appt in 4-6 wks

## 2017-08-02 NOTE — ANESTHESIA POSTPROCEDURE EVALUATION
"Anesthesia Post Evaluation    Patient: Vicky Brand    Procedure(s) Performed: Procedure(s) (LRB):  ABLATION-ENDOMETRIAL WITH  NOVASURE (N/A)  [id number] (N/A)    Final Anesthesia Type: general  Patient location during evaluation: PACU  Patient participation: Yes- Able to Participate  Level of consciousness: awake and alert and oriented  Post-procedure vital signs: reviewed and stable  Pain management: adequate  Airway patency: patent  PONV status at discharge: No PONV  Anesthetic complications: no      Cardiovascular status: blood pressure returned to baseline and hemodynamically stable  Respiratory status: unassisted, spontaneous ventilation and room air  Hydration status: euvolemic  Follow-up not needed.        Visit Vitals  /65   Pulse 72   Temp 36.4 °C (97.5 °F)   Resp 16   Ht 5' 6" (1.676 m)   Wt 60.8 kg (134 lb)   LMP 08/02/2017 (Exact Date)   SpO2 99%   Breastfeeding? No   BMI 21.63 kg/m²       Pain/Alan Score: Pain Assessment Performed: Yes (8/2/2017 10:00 AM)  Presence of Pain: complains of pain/discomfort (8/2/2017  4:10 PM)  Pain Rating Prior to Med Admin: 0 (8/2/2017  4:50 PM)  Pain Rating Post Med Admin: 3 (8/2/2017  4:20 PM)  Alan Score: 9 (8/2/2017  4:50 PM)      "

## 2017-08-02 NOTE — PLAN OF CARE
Patient prefers to have  Lamonte Brand present for discharge teaching. Please contact them @[phone].

## 2017-08-02 NOTE — BRIEF OP NOTE
[facility], operated by [facility]  Brief Operative Note     SUMMARY     Surgery Date: 8/2/2017     Surgeon(s) and Role:     * Jannette Jarrell MD - Primary     * Sushma Reddy, MD - Assisting        Pre-op Diagnosis:  Perimenopausal [N95.1]  History of metrorrhagia [Z87.42]    Post-op Diagnosis:  Post-Op Diagnosis Codes:     * Perimenopausal [N95.1]     * History of metrorrhagia [Z87.42]    Procedure(s) (LRB):  ABLATION-ENDOMETRIAL WITH  NOVASURE (N/A)  [id number] (N/A)    Anesthesia: General    Description of the findings of the procedure:   1. AUB  2. Right fundal polyp, resected using resectoscope  3. Endometrial ablation using Novasure    Findings/Key Components:   1. Normal vulva and introitus  2. Uterus sounded to 8.5 cm with cervical length of 3 cm  3. Upon hysteroscopic inspection, no anatomical abnormalities were noted. There was visible bleeding of the endometrium.  4. The endometrial cavity was ablated using the Novasure device  5. Post-ablation assessment revealed thorough ablation of endometrial cavity and no remaining bleeding sites.    Estimated Blood Loss: <25 cc       Specimens:   Specimen (12h ago through future)    Start     Ordered    08/02/17 1517  Specimen to Pathology - Surgery  Once     Comments:  1. ENDOMETRIAL CURRETTINGS      08/02/17 1517          Discharge Note    SUMMARY     Admit Date: 8/2/2017    Discharge Date and Time:  08/02/2017 4:10 PM    Hospital Course (synopsis of major diagnoses, care, treatment, and services provided during the course of the hospital stay): Patient presented for scheduled procedure. Please see OP note for further details. Tolerated procedure well and patient was taken to recovery in a stable condition. Prior to discharge patient was able to void, ambulate, tolerate PO and pain was well controlled with PO meds. Patient was given routine post-op instructions for which patient voiced understanding. Follow up appointment made with Dr. Jarrell in  4-6 weeks. Patient was subsequently discharged home.       Final Diagnosis: Post-Op Diagnosis Codes:     * Perimenopausal [N95.1]     * History of metrorrhagia [Z87.42]    Disposition: Home or Self Care    Follow Up/Patient Instructions:     Medications:  Reconciled Home Medications:   Current Discharge Medication List      START taking these medications    Details   ibuprofen (ADVIL,MOTRIN) 600 MG tablet Take 1 tablet (600 mg total) by mouth 3 (three) times daily.  Qty: 30 tablet, Refills: 0      oxycodone-acetaminophen (PERCOCET) 5-325 mg per tablet Take 1 tablet by mouth every 4 (four) hours as needed for Pain.  Qty: 15 tablet, Refills: 0         CONTINUE these medications which have NOT CHANGED    Details   amitriptyline (ELAVIL) 10 MG tablet Take 1 tablet (10 mg total) by mouth every evening.      hyoscyamine (ANASPAZ,LEVSIN) 0.125 mg Tab Take 1 tablet (125 mcg total) by mouth every 4 (four) hours as needed.  Qty: 30 tablet, Refills: 0      multivitamin (ONE DAILY MULTIVITAMIN) per tablet Take 1 tablet by mouth once daily.      hydrocodone-acetaminophen 5-325mg (NORCO) 5-325 mg per tablet Take 1 tablet by mouth every 6 (six) hours as needed.  Qty: 30 tablet, Refills: 0    Associated Diagnoses: Dysmenorrhea; Excessive bleeding in premenopausal period             Discharge Procedure Orders  Diet general     Activity as tolerated     Other restrictions (specify):   Order Comments: Pelvic rest for 1-2 weeks. Nothing in vagina -no sex, tampons, douching, etc.     Call MD for:  temperature >100.4     Call MD for:  persistent nausea and vomiting or diarrhea     Call MD for:  severe uncontrolled pain     Call MD for:  difficulty breathing or increased cough     Call MD for:  severe persistent headache     Call MD for:  worsening rash     Call MD for:  persistent dizziness, light-headedness, or visual disturbances     Call MD for:  increased confusion or weakness     Call MD for:   Order Comments: Vaginal bleeding  soaking 2 or more pads every hour for 2 or more hours       Follow-up Information     Jannette Jarrell MD In 4 weeks.    Specialties:  Obstetrics, Obstetrics and Gynecology  Contact information:  [address] [address] [phone]

## 2017-08-03 NOTE — PLAN OF CARE
Vicky Aprilcampos Brand has met all discharge criteria from Phase II. Vital Signs are stable, ambulating  without difficulty. Nausea has now subsided. Discharge instructions given, patient verbalized understanding. Discharged from facility via wheelchair in stable condition.

## 2017-08-23 DIAGNOSIS — R10.2 PELVIC CRAMPING: Primary | ICD-10-CM

## 2017-08-23 RX ORDER — NAPROXEN SODIUM 550 MG/1
550 TABLET ORAL 2 TIMES DAILY WITH MEALS
Qty: 30 TABLET | Refills: 1 | Status: SHIPPED | OUTPATIENT
Start: 2017-08-23 | End: 2018-07-23

## 2017-08-23 NOTE — TELEPHONE ENCOUNTER
----- Message from Marilyn England sent at 8/23/2017  2:53 PM CDT -----  Contact: pt  _x  1st Request  _  2nd Request  _  3rd Request      Who:pt    Why: pt states that her  The discharge after the ablation  has lasted longer than 5-7 days along with cramps,pt also states that she also has a lil pink discharge     What Number to Call Back: [phone]    When to Expect a call back: (Before the end of the day)   -- if call after 3:00 call back will be tomorrow.

## 2017-08-23 NOTE — TELEPHONE ENCOUNTER
Ablation 8/2/17, Patient reports pelvic cramping and discharge. Patient desires refill on anaprox, Motrin 600 did not help with cramping, anaprox has helped in the past. Patient informed discharge can be expected up to 6-8 weeks. Cramping can be associated with menses or uterus expelling lining. Patient to contact office if condition does not improve.

## 2017-08-30 ENCOUNTER — PATIENT MESSAGE (OUTPATIENT)
Dept: OBSTETRICS AND GYNECOLOGY | Facility: CLINIC | Age: 52
End: 2017-08-30

## 2017-08-31 RX ORDER — AMITRIPTYLINE HYDROCHLORIDE 10 MG/1
10 TABLET, FILM COATED ORAL NIGHTLY
Start: 2017-08-31 | End: 2017-09-02 | Stop reason: SDUPTHER

## 2017-08-31 NOTE — TELEPHONE ENCOUNTER
Patient is requesting a refill of amitriptyline to help with her PMS related insomnia. Patient stated that Dr. Mccarthy was her previous prescriber.

## 2017-09-02 ENCOUNTER — NURSE TRIAGE (OUTPATIENT)
Dept: ADMINISTRATIVE | Facility: CLINIC | Age: 52
End: 2017-09-02

## 2017-09-02 RX ORDER — AMITRIPTYLINE HYDROCHLORIDE 10 MG/1
10 TABLET, FILM COATED ORAL NIGHTLY
Qty: 30 TABLET | Refills: 0 | Status: SHIPPED | OUTPATIENT
Start: 2017-09-02 | End: 2017-11-06 | Stop reason: SDUPTHER

## 2017-09-02 NOTE — TELEPHONE ENCOUNTER
"    Reason for Disposition   [1] Request for URGENT new prescription or refill of "essential" medication (i.e., likelihood of harm to patient if not taken) AND [2] triager unable to fill per unit policy    Protocols used: ST MEDICATION QUESTION CALL-A-    Patient called for refill of amitriptyline. Called Dr. Jarrell (on call md ) for request. Dr. Jarrell gave verbal order for amitriptyline 10 mg PO qhs, #30, 0 refills. Patient should follow up with her pcp. Left message on the [facility] refill request line and left a message for the patient to call me if she has any questions.   "

## 2017-09-15 ENCOUNTER — OFFICE VISIT (OUTPATIENT)
Dept: OBSTETRICS AND GYNECOLOGY | Facility: CLINIC | Age: 52
End: 2017-09-15
Payer: COMMERCIAL

## 2017-09-15 VITALS
SYSTOLIC BLOOD PRESSURE: 122 MMHG | DIASTOLIC BLOOD PRESSURE: 80 MMHG | WEIGHT: 137.13 LBS | HEIGHT: 66 IN | BODY MASS INDEX: 22.04 KG/M2

## 2017-09-15 DIAGNOSIS — Z12.39 BREAST CANCER SCREENING: ICD-10-CM

## 2017-09-15 DIAGNOSIS — N84.0 ENDOMETRIAL POLYP: ICD-10-CM

## 2017-09-15 DIAGNOSIS — N94.6 DYSMENORRHEA: Primary | ICD-10-CM

## 2017-09-15 DIAGNOSIS — N92.6 IRREGULAR BLEEDING: ICD-10-CM

## 2017-09-15 PROCEDURE — 99024 POSTOP FOLLOW-UP VISIT: CPT | Mod: S$GLB,,, | Performed by: OBSTETRICS & GYNECOLOGY

## 2017-09-15 PROCEDURE — 99999 PR PBB SHADOW E&M-EST. PATIENT-LVL III: CPT | Mod: PBBFAC,,, | Performed by: OBSTETRICS & GYNECOLOGY

## 2017-09-15 NOTE — PROGRESS NOTES
"CC: Postoperative visit    Vicky Brand is a 51 y.o. female  presents for a postoperative visit s/p  ABLATION-ENDOMETRIAL WITH  NOVASURE and  [id number]  on 17.  Her postoperative course was uncomplicated.  She is doing well postoperative.  She did have a cycle with yellow discharge at the end of AUG.     Pathology showed:  ENDOMETRIUM: FRAGMENTS OF DISORDERED PROLIFERATIVE ENDOMETRIUM; SEPARATE FRAGMENT  OF ENDOMETRIUM WITH COMPACT STROMA, SUGGESTIVE OF A BENIGN ENDOMETRIAL POLYP; NO  EVIDENCE OF ENDOMETRIAL HYPERPLASIA OR MALIGNANCY    /80   Ht 5' 6" (1.676 m)   Wt 62.2 kg (137 lb 2 oz)   LMP 2017 (Exact Date)   BMI 22.13 kg/m²     ROS:  GENERAL: No fever, chills, fatigability or weight loss.  VULVAR: No pain, no lesions and no itching.  VAGINAL: No relaxation, no itching, no discharge, no abnormal bleeding and no lesions.  ABDOMEN: No abdominal pain. Denies nausea. Denies vomiting. No diarrhea. No constipation  BREAST: Denies pain. No lumps. No discharge.  URINARY: No incontinence, no nocturia, no frequency and no dysuria.  CARDIOVASCULAR: No chest pain. No shortness of breath. No leg cramps.  NEUROLOGICAL: No headaches. No vision changes.    Physical Exam      PELVIC: Normal external genitalia without lesions.  Normal hair distribution.  Adequate perineal body, normal urethral meatus.  Vagina moist and well rugated without lesions or discharge.  Cervix pink, without lesions, discharge or tenderness.  No significant cystocele or rectocele.  Bimanual exam shows uterus to be normal size, regular, mobile and nontender.  Adnexa without masses or tenderness.      1. Dysmenorrhea     2. Irregular bleeding     3. Endometrial polyp         Patient can return to normal activities.  Return to clinic in April for well woman exam/ papsmear  MMG in OCT    "

## 2017-09-27 ENCOUNTER — HOSPITAL ENCOUNTER (OUTPATIENT)
Dept: RADIOLOGY | Facility: HOSPITAL | Age: 52
Discharge: HOME | End: 2017-09-27
Attending: OBSTETRICS & GYNECOLOGY
Payer: COMMERCIAL

## 2017-09-27 DIAGNOSIS — Z12.31 ENCOUNTER FOR SCREENING MAMMOGRAM FOR BREAST CANCER: ICD-10-CM

## 2017-09-27 DIAGNOSIS — Z12.39 BREAST CANCER SCREENING: ICD-10-CM

## 2017-09-27 PROCEDURE — 77067 SCR MAMMO BI INCL CAD: CPT | Mod: TC

## 2017-09-27 PROCEDURE — 77067 SCR MAMMO BI INCL CAD: CPT | Mod: 26,,, | Performed by: RADIOLOGY

## 2017-09-27 PROCEDURE — 77063 BREAST TOMOSYNTHESIS BI: CPT | Mod: 26,,, | Performed by: RADIOLOGY

## 2017-10-27 ENCOUNTER — PATIENT MESSAGE (OUTPATIENT)
Dept: FAMILY MEDICINE | Facility: CLINIC | Age: 52
End: 2017-10-27

## 2017-10-27 ENCOUNTER — PATIENT MESSAGE (OUTPATIENT)
Dept: OBSTETRICS AND GYNECOLOGY | Facility: CLINIC | Age: 52
End: 2017-10-27

## 2017-10-30 PROBLEM — N95.1 INSOMNIA ASSOCIATED WITH MENOPAUSE: Status: ACTIVE | Noted: 2017-10-30

## 2017-11-02 ENCOUNTER — OFFICE VISIT (OUTPATIENT)
Dept: FAMILY MEDICINE | Facility: CLINIC | Age: 52
End: 2017-11-02
Payer: COMMERCIAL

## 2017-11-02 VITALS
HEART RATE: 64 BPM | HEIGHT: 66 IN | BODY MASS INDEX: 21.51 KG/M2 | DIASTOLIC BLOOD PRESSURE: 80 MMHG | WEIGHT: 133.81 LBS | TEMPERATURE: 99 F | SYSTOLIC BLOOD PRESSURE: 120 MMHG

## 2017-11-02 DIAGNOSIS — G47.00 INSOMNIA, UNSPECIFIED TYPE: Primary | ICD-10-CM

## 2017-11-02 DIAGNOSIS — Z23 NEED FOR PROPHYLACTIC VACCINATION AND INOCULATION AGAINST INFLUENZA: ICD-10-CM

## 2017-11-02 PROBLEM — N92.6 IRREGULAR MENSES: Status: RESOLVED | Noted: 2017-05-09 | Resolved: 2017-11-02

## 2017-11-02 PROBLEM — Z98.890 S/P D&C (STATUS POST DILATION AND CURETTAGE): Status: RESOLVED | Noted: 2017-08-02 | Resolved: 2017-11-02

## 2017-11-02 PROBLEM — N95.1 INSOMNIA ASSOCIATED WITH MENOPAUSE: Status: RESOLVED | Noted: 2017-10-30 | Resolved: 2017-11-02

## 2017-11-02 PROCEDURE — 99213 OFFICE O/P EST LOW 20 MIN: CPT | Mod: 25,S$GLB,, | Performed by: FAMILY MEDICINE

## 2017-11-02 PROCEDURE — 99999 PR PBB SHADOW E&M-EST. PATIENT-LVL III: CPT | Mod: PBBFAC,,, | Performed by: FAMILY MEDICINE

## 2017-11-02 PROCEDURE — 90686 IIV4 VACC NO PRSV 0.5 ML IM: CPT | Mod: S$GLB,,, | Performed by: FAMILY MEDICINE

## 2017-11-02 PROCEDURE — 90471 IMMUNIZATION ADMIN: CPT | Mod: S$GLB,,, | Performed by: FAMILY MEDICINE

## 2017-11-02 RX ORDER — TRAZODONE HYDROCHLORIDE 50 MG/1
50 TABLET ORAL NIGHTLY
Qty: 30 TABLET | Refills: 0 | Status: SHIPPED | OUTPATIENT
Start: 2017-11-02 | End: 2017-11-06

## 2017-11-02 NOTE — PROGRESS NOTES
Subjective:      Patient ID: Vicky Brand is a 51 y.o. female.    Chief Complaint: Insomnia    Here today for difficulty sleeping. She has always had insomnia.  After stopping birth control, her gynecologist Dr tillman prescribed amitriptyline, but she would take one-fourth or one-half of a 10 mg tablet nightly for one week prior to her menses.  This greatly helped her to sleep and get through with the hormonal changes. Her mom recently had a pacemaker and she was concerned that amitriptyline could cause an irregular rhythm.  She is here to discuss to treatment options for insomnia.  Melatonin does not help.  She has stopped caffeine, chocolate, she exercises.    She recently had an endometrial ablation and follows with gynecology    No results found for: HGBA1C  No results found for: MICALBCREAT      Current Outpatient Prescriptions on File Prior to Visit   Medication Sig    multivitamin (ONE DAILY MULTIVITAMIN) per tablet Take 1 tablet by mouth once daily.    naproxen sodium (ANAPROX) 550 MG tablet Take 1 tablet (550 mg total) by mouth 2 (two) times daily with meals.    amitriptyline (ELAVIL) 10 MG tablet Take 1 tablet (10 mg total) by mouth every evening.     No current facility-administered medications on file prior to visit.      Past Medical History:   Diagnosis Date    Allergic rhinitis 3/25/2014    Dysmenorrhea     Eye abnormalities     early yellowing of lens, precursor to cataracts, Dr Webster,  wear glasses at all times    Insomnia 04/15/2015    prior to menses, amitryptiline 2.5 mg qhs helps    Insomnia associated with menopause 10/30/2017    amitryptiline helps    Irregular menses 5/9/2017    Ocular migraine     vision fuzzy x 3 in 2013, metallic shapes in her vision, Dr Webster,  resolved after 30 min , no pain    Oral contraceptive use 4/15/2014    Shoulder pain 2007    LEFT, after fall, better with PT at [facility]    Vision changes 3/25/2014     Past Surgical  "History:   Procedure Laterality Date    COLONOSCOPY N/A 6/9/2017    Procedure: COLONOSCOPY;  Surgeon: Davy Prince MD;  Location: [facility] ([address] [place] [address]);  Service: Endoscopy;  Laterality: N/A;    DILATION AND CURETTAGE OF UTERUS      TUBAL LIGATION       Social History     Social History Narrative    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[employer],  & 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    Pulmonary/Chest: Effort normal and breath sounds normal. No respiratory distress.   Psychiatric: She has a normal mood and affect. Her behavior is normal. Judgment and thought content normal.     Assessment:     1. Routine general medical examination at a health care facility    2. Insomnia associated with menopause    3. Need for prophylactic vaccination and inoculation against influenza      Plan:     Orders Placed This Encounter    Flu Vaccine - Quadrivalent (PF) (3 years & older)    traZODone (DESYREL) 50 MG tablet       Patient Instructions   Due flu vaccine    Try trazodone 25-50 mg nightly 1 week prior to menses    If it doesn't work for premenstrual insomnia, I can then refill Amitryptiline 10 mg qhs prn    Follow with GYN    ==============================================================                                  Answers for HPI/ROS submitted by the patient on 11/1/2017   activity change: No  unexpected " weight change: No  neck pain: No  hearing loss: No  rhinorrhea: No  trouble swallowing: No  eye discharge: No  visual disturbance: No  chest tightness: No  wheezing: No  chest pain: No  palpitations: No  blood in stool: No  constipation: No  vomiting: No  diarrhea: No  polydipsia: No  polyuria: No  difficulty urinating: No  hematuria: No  menstrual problem: No  dysuria: No  joint swelling: No  arthralgias: No  headaches: No  weakness: No  confusion: No  dysphoric mood: No

## 2017-11-02 NOTE — PATIENT INSTRUCTIONS
Due flu vaccine    Try trazodone 25-50 mg nightly 1 week prior to menses    If it doesn't work for premenstrual insomnia, I can then refill Amitryptiline 10 mg qhs prn    Follow with GYN    ==============================================================

## 2017-11-06 ENCOUNTER — PATIENT MESSAGE (OUTPATIENT)
Dept: FAMILY MEDICINE | Facility: CLINIC | Age: 52
End: 2017-11-06

## 2017-11-06 RX ORDER — AMITRIPTYLINE HYDROCHLORIDE 10 MG/1
10 TABLET, FILM COATED ORAL NIGHTLY
Qty: 90 TABLET | Refills: 3 | Status: SHIPPED | OUTPATIENT
Start: 2017-11-06 | End: 2017-11-06 | Stop reason: SDUPTHER

## 2017-11-06 RX ORDER — AMITRIPTYLINE HYDROCHLORIDE 10 MG/1
10 TABLET, FILM COATED ORAL NIGHTLY
Qty: 90 TABLET | Refills: 3 | Status: SHIPPED | OUTPATIENT
Start: 2017-11-06 | End: 2018-07-23

## 2018-01-08 ENCOUNTER — TELEPHONE (OUTPATIENT)
Dept: FAMILY MEDICINE | Facility: CLINIC | Age: 53
End: 2018-01-08

## 2018-01-08 DIAGNOSIS — Z00.00 ANNUAL PHYSICAL EXAM: Primary | ICD-10-CM

## 2018-01-08 NOTE — TELEPHONE ENCOUNTER
----- Message from Lea Whyte sent at 1/8/2018  2:43 PM CST -----  Contact: self  Doctor appointment and lab have been scheduled.  Please link lab orders to the lab appointment.  Date of doctor appointment:  5/1018  Physical or EP:  phys  Date of lab appointment:  5/3/18  Comments:

## 2018-03-19 ENCOUNTER — TELEPHONE (OUTPATIENT)
Dept: OBSTETRICS AND GYNECOLOGY | Facility: CLINIC | Age: 53
End: 2018-03-19

## 2018-03-19 NOTE — TELEPHONE ENCOUNTER
----- Message from Bell Don sent at 3/19/2018  3:13 PM CDT -----  Contact: pt  X  1st Request  _  2nd Request  _  3rd Request        Who:ANANYA SHER [[id number]]    Why: Patient states she is returning a call     What Number to Call Back: [phone]    When to Expect a call back: (Before the end of the day)   -- if call after 3:00 call back will be tomorrow.

## 2018-03-19 NOTE — TELEPHONE ENCOUNTER
Spoke with patient regarding getting an appointment for her concerns. Was able to schedule patient.

## 2018-03-19 NOTE — TELEPHONE ENCOUNTER
----- Message from Disha Jose Luis sent at 3/19/2018  2:14 PM CDT -----  x_  1st Request  _  2nd Request  _  3rd Request        Who: ana    Why: pt. Stating she is experiencing abdominal pain and bleeding. Please call to discuss  What Number to Call Back:[phone]      When to Expect a call back: (Within 24 hours)

## 2018-03-20 ENCOUNTER — PATIENT MESSAGE (OUTPATIENT)
Dept: FAMILY MEDICINE | Facility: CLINIC | Age: 53
End: 2018-03-20

## 2018-04-03 ENCOUNTER — OFFICE VISIT (OUTPATIENT)
Dept: OBSTETRICS AND GYNECOLOGY | Facility: CLINIC | Age: 53
End: 2018-04-03
Payer: COMMERCIAL

## 2018-04-03 VITALS
DIASTOLIC BLOOD PRESSURE: 70 MMHG | HEIGHT: 66 IN | WEIGHT: 134.25 LBS | SYSTOLIC BLOOD PRESSURE: 110 MMHG | BODY MASS INDEX: 21.57 KG/M2

## 2018-04-03 DIAGNOSIS — Z12.31 SCREENING MAMMOGRAM, ENCOUNTER FOR: ICD-10-CM

## 2018-04-03 DIAGNOSIS — Z98.890 S/P ENDOMETRIAL ABLATION: ICD-10-CM

## 2018-04-03 DIAGNOSIS — N94.6 DYSMENORRHEA: ICD-10-CM

## 2018-04-03 DIAGNOSIS — N93.9 ABNORMAL UTERINE BLEEDING (AUB): Primary | ICD-10-CM

## 2018-04-03 DIAGNOSIS — Z01.419 WELL WOMAN EXAM WITH ROUTINE GYNECOLOGICAL EXAM: ICD-10-CM

## 2018-04-03 PROCEDURE — 88175 CYTOPATH C/V AUTO FLUID REDO: CPT

## 2018-04-03 PROCEDURE — 99999 PR PBB SHADOW E&M-EST. PATIENT-LVL III: CPT | Mod: PBBFAC,,, | Performed by: OBSTETRICS & GYNECOLOGY

## 2018-04-03 PROCEDURE — 99214 OFFICE O/P EST MOD 30 MIN: CPT | Mod: S$GLB,,, | Performed by: OBSTETRICS & GYNECOLOGY

## 2018-04-03 NOTE — PROGRESS NOTES
"CC: Irregular bleeding    Vicky Brand is a 52 y.o. female  presents for irregular bleeding  Novasure endometrial ablation- .  HAVING DARK BLOOD AND BRIGHT RED BLEEDING PER VAGINA.  DYSMENORRHEA IS INTENSE.       Past Medical History:   Diagnosis Date    Allergic rhinitis 3/25/2014    Dysmenorrhea     Eye abnormalities     early yellowing of lens, precursor to cataracts, Dr Webster,  wear glasses at all times    Insomnia 04/15/2015    prior to menses, amitryptiline 2.5 mg qhs helps    Insomnia associated with menopause 10/30/2017    amitryptiline helps    Irregular menses 2017    Ocular migraine     vision fuzzy x 3 in , metallic shapes in her vision, Dr Webster,  resolved after 30 min , no pain    Oral contraceptive use 4/15/2014    Shoulder pain 2007    LEFT, after fall, better with PT at [facility]    Vision changes 3/25/2014       Past Surgical History:   Procedure Laterality Date    COLONOSCOPY N/A 2017    Procedure: COLONOSCOPY;  Surgeon: Davy Prince MD;  Location: [facility] ([address] [place] [address]);  Service: Endoscopy;  Laterality: N/A;    DILATION AND CURETTAGE OF UTERUS      TUBAL LIGATION         OB History    Para Term  AB Living   1   0   1     SAB TAB Ectopic Multiple Live Births   1              # Outcome Date GA Lbr Giorigo/2nd Weight Sex Delivery Anes PTL Lv   1 SAB                   Family History   Problem Relation Age of Onset    Diabetes Father     Stroke Father 49    Hypertension Mother     Pacemaker/defibrilator Mother     Ovarian cancer Maternal Grandmother     Melanoma Neg Hx     Breast cancer Neg Hx     Colon cancer Neg Hx        Social History   Substance Use Topics    Smoking status: Never Smoker    Smokeless tobacco: Never Used    Alcohol use No       /70   Ht 5' 6" (1.676 m)   Wt 60.9 kg (134 lb 4.2 oz)   LMP 2018 (Exact Date)   BMI 21.67 kg/m²     ROS:  GENERAL: Denies weight gain or weight loss. " Feeling well overall.   SKIN: Denies rash or lesions.   HEAD: Denies head injury or headache.   NODES: Denies enlarged lymph nodes.   CHEST: Denies chest pain or shortness of breath.   CARDIOVASCULAR: Denies palpitations or left sided chest pain.   ABDOMEN: No abdominal pain, constipation, diarrhea, nausea, vomiting or rectal bleeding.   URINARY: No frequency, dysuria, hematuria, or burning on urination.  REPRODUCTIVE: See HPI.   BREASTS: The patient performs breast self-examination and denies pain, lumps, or nipple discharge.   HEMATOLOGIC: No easy bruisability or excessive bleeding.  MUSCULOSKELETAL: Denies joint pain or swelling.   NEUROLOGIC: Denies syncope or weakness.   PSYCHIATRIC: Denies depression, anxiety or mood swings.    Physical Exam:    APPEARANCE: Well nourished, well developed, in no acute distress.  AFFECT: WNL, alert and oriented x 3  SKIN: No acne or hirsutism  NECK: Neck symmetric without masses or thyromegaly  NODES: No inguinal, cervical, axillary, or femoral lymph node enlargement  CHEST: Good respiratory effect  ABDOMEN: Soft.  No tenderness or masses.  No hepatosplenomegaly.  No hernias.  BREASTS: Symmetrical, no skin changes or visible lesions.  No palpable masses, nipple discharge bilaterally.  PELVIC: Normal external genitalia without lesions.  Normal hair distribution.  Adequate perineal body, normal urethral meatus.  Vagina moist and well rugated without lesions or discharge.  Cervix pink, without lesions, discharge or tenderness.  No significant cystocele or rectocele.  Bimanual exam shows uterus to be 10-12 WK size, IRregular, mobile and SLIGHTLY tender.  Adnexa without masses or tenderness.    EXTREMITIES: No edema.    ASSESSMENT AND PLAN  1. Abnormal uterine bleeding (AUB)  US Pelvis Comp with Transvag NON-OB (xpd   2. Well woman exam with routine gynecological exam  Liquid-based pap smear, screening   3. Screening mammogram, encounter for  Mammo Digital Screening Bilat with [facility]    4. Dysmenorrhea     5. S/P endometrial ablation       embx       Discussed fibroid treatment options- Possible UAE, VS myomectomy vs HYST. Treat menorrhagia with OCPs, Lysteda or medical management.   Risks and benefits of fibroid procedures discussed.   Will consider options and notify the office once able to review options.       Patient was counseled today on A.C.S. Pap guidelines and recommendations for yearly pelvic exams, mammograms and monthly self breast exams; to see her PCP for other health maintenance.     F/u PRN

## 2018-04-06 ENCOUNTER — HOSPITAL ENCOUNTER (OUTPATIENT)
Dept: RADIOLOGY | Facility: OTHER | Age: 53
Discharge: HOME | End: 2018-04-06
Attending: OBSTETRICS & GYNECOLOGY
Payer: COMMERCIAL

## 2018-04-06 DIAGNOSIS — N93.9 ABNORMAL UTERINE BLEEDING (AUB): ICD-10-CM

## 2018-04-06 PROCEDURE — 76856 US EXAM PELVIC COMPLETE: CPT | Mod: 26,,, | Performed by: RADIOLOGY

## 2018-04-06 PROCEDURE — 76830 TRANSVAGINAL US NON-OB: CPT | Mod: 26,,, | Performed by: RADIOLOGY

## 2018-04-06 PROCEDURE — 76830 TRANSVAGINAL US NON-OB: CPT | Mod: TC

## 2018-04-09 ENCOUNTER — PATIENT MESSAGE (OUTPATIENT)
Dept: OBSTETRICS AND GYNECOLOGY | Facility: CLINIC | Age: 53
End: 2018-04-09

## 2018-04-10 ENCOUNTER — TELEPHONE (OUTPATIENT)
Dept: OBSTETRICS AND GYNECOLOGY | Facility: CLINIC | Age: 53
End: 2018-04-10

## 2018-04-10 NOTE — TELEPHONE ENCOUNTER
Patient is requesting the results of her recent US.    Angela Arnold posted the test results from vaginal ultrasound, but I don't know how to interpret them. How can I get an explanation of the test results that I can understand?   Thanks, Melvi Brand     Impression       Enlarging heterogeneous mass within the fundus of the uterus likely representing an enlarging degenerating fibroid.    Nonspecific heterogeneity of the myometrium.    Prominent endometrial stripe measuring 15 mm in this patient with history of endometrial ablation.  There is also fluid within the endometrial cavity.  In this patient with abnormal uterine bleeding, close follow-up and/or further evaluation is recommended.    This report was flagged in Epic as abnormal.      Electronically signed by: Vasquez Gunn MD  Date: 04/06/2018  Time: 09:12

## 2018-04-10 NOTE — TELEPHONE ENCOUNTER
There is a possible fibroid that is starting to degenerate which can cause pain and bleeding. I still recommend the endometrial biopsy as discussed at our appt to rule out any pathology inside of the uterine lining.  If the pain and bleeding continues I would recommend removing the uterus with the fibroid-  A hysterectomy

## 2018-04-14 ENCOUNTER — TELEPHONE (OUTPATIENT)
Dept: OBSTETRICS AND GYNECOLOGY | Facility: CLINIC | Age: 53
End: 2018-04-14

## 2018-04-14 ENCOUNTER — PATIENT MESSAGE (OUTPATIENT)
Dept: FAMILY MEDICINE | Facility: CLINIC | Age: 53
End: 2018-04-14

## 2018-04-18 ENCOUNTER — TELEPHONE (OUTPATIENT)
Dept: OBSTETRICS AND GYNECOLOGY | Facility: CLINIC | Age: 53
End: 2018-04-18

## 2018-04-18 NOTE — TELEPHONE ENCOUNTER
Patient called to see how she can find results from 2016. Patient advised that she was sent a message via the patient portal and that she can find that in the message tab. Patient verbalized understanding and verified that she found the message.

## 2018-04-18 NOTE — TELEPHONE ENCOUNTER
----- Message from Nara Murphy sent at 4/18/2018  1:08 PM CDT -----  Contact: pt            Name of Who is Calling: ANANYA SHER [[id number]]      What is the request in detail: pt calling in regards to appt with Dr Mccarthy.. Please advise      Can the clinic reply by [facility]: yes      What [facility] Back [facility]: [phone]

## 2018-04-18 NOTE — TELEPHONE ENCOUNTER
----- Message from Matthew Whyte sent at 4/17/2018 10:41 AM CDT -----  Contact: patient            Name of Who is Calling: Vicky      What is the request in detail: patient is requesting a call back in reference to a question about the results of a previous biopsy done by Dr Mccarthy as well as one about her upcomming biopsy.      Can the clinic reply by [facility]: no      What Number to Call Back [facility] not [facility]: [phone]

## 2018-05-03 ENCOUNTER — LAB VISIT (OUTPATIENT)
Dept: LAB | Facility: HOSPITAL | Age: 53
End: 2018-05-03
Attending: FAMILY MEDICINE
Payer: COMMERCIAL

## 2018-05-03 DIAGNOSIS — Z00.00 ANNUAL PHYSICAL EXAM: ICD-10-CM

## 2018-05-03 LAB
ALBUMIN SERPL BCP-MCNC: 3.9 G/DL
ALP SERPL-CCNC: 60 U/L
ALT SERPL W/O P-5'-P-CCNC: 15 U/L
ANION GAP SERPL CALC-SCNC: 5 MMOL/L
AST SERPL-CCNC: 23 U/L
BASOPHILS # BLD AUTO: 0.05 K/UL
BASOPHILS NFR BLD: 1.1 %
BILIRUB SERPL-MCNC: 0.8 MG/DL
BUN SERPL-MCNC: 16 MG/DL
CALCIUM SERPL-MCNC: 9.8 MG/DL
CHLORIDE SERPL-SCNC: 103 MMOL/L
CHOLEST SERPL-MCNC: 177 MG/DL
CHOLEST/HDLC SERPL: 3 {RATIO}
CO2 SERPL-SCNC: 32 MMOL/L
CREAT SERPL-MCNC: 1 MG/DL
DIFFERENTIAL METHOD: ABNORMAL
EOSINOPHIL # BLD AUTO: 0.1 K/UL
EOSINOPHIL NFR BLD: 2.5 %
ERYTHROCYTE [DISTWIDTH] IN BLOOD BY AUTOMATED COUNT: 12.5 %
EST. GFR  (AFRICAN AMERICAN): >60 ML/MIN/1.73 M^2
EST. GFR  (NON AFRICAN AMERICAN): >60 ML/MIN/1.73 M^2
GLUCOSE SERPL-MCNC: 96 MG/DL
HCT VFR BLD AUTO: 43.9 %
HDLC SERPL-MCNC: 59 MG/DL
HDLC SERPL: 33.3 %
HGB BLD-MCNC: 13.7 G/DL
IMM GRANULOCYTES # BLD AUTO: 0 K/UL
IMM GRANULOCYTES NFR BLD AUTO: 0 %
LDLC SERPL CALC-MCNC: 103.2 MG/DL
LYMPHOCYTES # BLD AUTO: 1.8 K/UL
LYMPHOCYTES NFR BLD: 41.5 %
MCH RBC QN AUTO: 29.1 PG
MCHC RBC AUTO-ENTMCNC: 31.2 G/DL
MCV RBC AUTO: 93 FL
MONOCYTES # BLD AUTO: 0.4 K/UL
MONOCYTES NFR BLD: 9.9 %
NEUTROPHILS # BLD AUTO: 2 K/UL
NEUTROPHILS NFR BLD: 45 %
NONHDLC SERPL-MCNC: 118 MG/DL
NRBC BLD-RTO: 0 /100 WBC
PLATELET # BLD AUTO: 225 K/UL
PMV BLD AUTO: 11.6 FL
POTASSIUM SERPL-SCNC: 4.8 MMOL/L
PROT SERPL-MCNC: 8.1 G/DL
RBC # BLD AUTO: 4.7 M/UL
SODIUM SERPL-SCNC: 140 MMOL/L
TRIGL SERPL-MCNC: 74 MG/DL
WBC # BLD AUTO: 4.43 K/UL

## 2018-05-03 PROCEDURE — 36415 COLL VENOUS BLD VENIPUNCTURE: CPT | Mod: PO

## 2018-05-03 PROCEDURE — 80061 LIPID PANEL: CPT

## 2018-05-03 PROCEDURE — 85025 COMPLETE CBC W/AUTO DIFF WBC: CPT

## 2018-05-03 PROCEDURE — 80053 COMPREHEN METABOLIC PANEL: CPT

## 2018-05-07 ENCOUNTER — PROCEDURE VISIT (OUTPATIENT)
Dept: OBSTETRICS AND GYNECOLOGY | Facility: CLINIC | Age: 53
End: 2018-05-07
Payer: COMMERCIAL

## 2018-05-07 VITALS — WEIGHT: 137.38 LBS | BODY MASS INDEX: 22.08 KG/M2 | HEIGHT: 66 IN

## 2018-05-07 DIAGNOSIS — N94.6 DYSMENORRHEA: ICD-10-CM

## 2018-05-07 DIAGNOSIS — N93.9 ABNORMAL UTERINE BLEEDING (AUB): Primary | ICD-10-CM

## 2018-05-07 DIAGNOSIS — D25.9 UTERINE LEIOMYOMA, UNSPECIFIED LOCATION: ICD-10-CM

## 2018-05-07 LAB
B-HCG UR QL: NEGATIVE
CTP QC/QA: YES

## 2018-05-07 PROCEDURE — 88305 TISSUE EXAM BY PATHOLOGIST: CPT | Performed by: PATHOLOGY

## 2018-05-07 PROCEDURE — 88305 TISSUE EXAM BY PATHOLOGIST: CPT | Mod: 26,,, | Performed by: PATHOLOGY

## 2018-05-07 PROCEDURE — 81025 URINE PREGNANCY TEST: CPT | Mod: S$GLB,,, | Performed by: OBSTETRICS & GYNECOLOGY

## 2018-05-07 PROCEDURE — 58100 BIOPSY OF UTERUS LINING: CPT | Mod: S$GLB,,, | Performed by: OBSTETRICS & GYNECOLOGY

## 2018-05-07 NOTE — PROCEDURES
Endometrial biopsy  Date/Time: 5/7/2018 9:30 AM  Performed by: ROSEANNE VIZCAINO.  Authorized by: ROSEANNE VIZCAINO   Preparation: Patient was prepped and draped in the usual sterile fashion.  Local anesthesia used: no    Anesthesia:  Local anesthesia used: no    Sedation:  Patient sedated: no  Patient tolerance: Patient tolerated the procedure well with no immediate complications      Here for endometrial biopsy  Risks, benefits and alternatives to procedure discussed.        Patient presents for endometrial biopsy.      The risks, benefits and alternatives to procedure was discussed, and will also determine the plan of care, treatments available, the minimal risk of bleeding and infection with endometrial biopsy,and she agrees to proceed.      UPT is negative    ENDOMETRIAL BIOPSY     The cervix was swabbed with betadine times 3.  The anterior lip of the cervix was grasped with a single toothed tenaculum.  A uterine pipelle was inserted into the uterus to a level of 8 cm.  The patient tolerated with above procedure WELL.     All collected specimens sent to pathology for histologic analysis.    Post-biopsy counseling:  The patient was instructed to manage post endometrial biopsy cramping with NSAIDs or Tylenol, or with a prescription per the medication card.  Avoid intercourse, douching, or tampons in the vagina for at least 2-3 days.  .  Report heavy bleeding, worsening pain or pain that does not respond to above medications, or foul-smelling vaginal discharge.   Importance of follow-up stressed.      Follow up based on endometrial biopsy results.    MInimal tissue. Discussed the US findings, concerns for fibroids and further intervention.  Patient wants to conservatively manage at this time.   PRIOR ABLATION-   And dysmenorrhea and bleeding is tolerable at this time

## 2018-05-10 ENCOUNTER — OFFICE VISIT (OUTPATIENT)
Dept: FAMILY MEDICINE | Facility: CLINIC | Age: 53
End: 2018-05-10
Payer: COMMERCIAL

## 2018-05-10 VITALS
DIASTOLIC BLOOD PRESSURE: 80 MMHG | RESPIRATION RATE: 16 BRPM | HEIGHT: 66 IN | WEIGHT: 135.13 LBS | BODY MASS INDEX: 21.72 KG/M2 | TEMPERATURE: 98 F | SYSTOLIC BLOOD PRESSURE: 136 MMHG

## 2018-05-10 DIAGNOSIS — Z00.00 ANNUAL PHYSICAL EXAM: Primary | ICD-10-CM

## 2018-05-10 DIAGNOSIS — F51.03 PARADOXICAL INSOMNIA: ICD-10-CM

## 2018-05-10 DIAGNOSIS — N94.6 DYSMENORRHEA: ICD-10-CM

## 2018-05-10 PROCEDURE — 99396 PREV VISIT EST AGE 40-64: CPT | Mod: S$GLB,,, | Performed by: FAMILY MEDICINE

## 2018-05-10 PROCEDURE — 99999 PR PBB SHADOW E&M-EST. PATIENT-LVL III: CPT | Mod: PBBFAC,,, | Performed by: FAMILY MEDICINE

## 2018-05-10 NOTE — PATIENT INSTRUCTIONS
Follow with GYN    RECOMMENDATIONS FOR FEMALES    Your #1 MEDICINE is DAILY EXERCISE - 15-20 minutes of huffing & puffing EVERY DAY.     Prevent the #1 cause of death- cardiovascular disease (HEART ATTACK & STROKE) by checking for normal blood pressure, cholesterol, sugars, & by not smoking.     VACCINES: Yearly FLU shot, ONE PNEUMONIA shot after 65,  SHINGLES shot after 60    Screening colonoscopy at AGE  50 & every 10 years to check for COLON CANCER,  one of the most common & preventable cancers. Or FIT z12.11, Repeat in 3 years if POLYP found     I recommend  high fiber (5 fresh fruits or vegetables daily), low fat diet and aerobic  exercise (huffing/ puffing/ sweating for 20 min straight at least 4 days a week)    Follow up yearly with  fasting lipids, CMP, CBC, TSH prior.   ==============================================================

## 2018-05-10 NOTE — PROGRESS NOTES
Subjective:      Patient ID: Vicky Brand is a 52 y.o. female.    Chief Complaint: Annual Exam    Here today for general exam.     She is following with GYN [facility] for painful menses & now fibroid has increased in size.     With difficulty sleeping, she takes 1/4 amitryptiline nightly.     BP more elevated with pain of menses. Taking NSAIDS 2-3 days a week.     Denies any chest pain, shortness of breath, nausea vomiting constipation diarrhea, blood in stool, heartburn    The 10-year ASCVD risk score (Nba DHALIWAL Jr., et al., 2013) is: 1.2%    Values used to calculate the score:      Age: 52 years      Sex: Female      Is Non- : No      Diabetic: No      Tobacco smoker: No      Systolic Blood Pressure: 136 mmHg      Is BP treated: No      HDL Cholesterol: 59 mg/dL      Total Cholesterol: 177 mg/dL      No results found for: HGBA1C  No results found for: MICALBCREAT  Lab Results   Component Value Date    LDLCALC 103.2 05/03/2018    LDLCALC 100.6 05/02/2017    CHOL 177 05/03/2018    HDL 59 05/03/2018    TRIG 74 05/03/2018       Lab Results   Component Value Date     05/03/2018    K 4.8 05/03/2018     05/03/2018    CO2 32 (H) 05/03/2018    GLU 96 05/03/2018    BUN 16 05/03/2018    CREATININE 1.0 05/03/2018    CALCIUM 9.8 05/03/2018    PROT 8.1 05/03/2018    ALBUMIN 3.9 05/03/2018    BILITOT 0.8 05/03/2018    ALKPHOS 60 05/03/2018    AST 23 05/03/2018    ALT 15 05/03/2018    ANIONGAP 5 (L) 05/03/2018    ESTGFRAFRICA >60.0 05/03/2018    EGFRNONAA >60.0 05/03/2018    WBC 4.43 05/03/2018    HGB 13.7 05/03/2018    HCT 43.9 05/03/2018    MCV 93 05/03/2018     05/03/2018    TSH 1.731 05/02/2017         Current Outpatient Prescriptions on File Prior to Visit   Medication Sig    amitriptyline (ELAVIL) 10 MG tablet Take 1 tablet (10 mg total) by mouth every evening.    multivitamin (ONE DAILY MULTIVITAMIN) per tablet Take 1 tablet by mouth once daily.    naproxen sodium  "(ANAPROX) 550 MG tablet Take 1 tablet (550 mg total) by mouth 2 (two) times daily with meals.     No current facility-administered medications on file prior to visit.      Past Medical History:   Diagnosis Date    Allergic rhinitis 3/25/2014    Dysmenorrhea     Eye abnormalities     early yellowing of lens, precursor to cataracts, Dr Webster,  wear glasses at all times    Insomnia 04/15/2015    prior to menses, amitryptiline 2.5 mg qhs helps    Insomnia associated with menopause 10/30/2017    amitryptiline helps    Irregular menses 5/9/2017    Ocular migraine     vision fuzzy x 3 in 2013, metallic shapes in her vision, Dr Webster,  resolved after 30 min , no pain    Oral contraceptive use 4/15/2014    Paradoxical insomnia 4/15/2015    Shoulder pain 2007    LEFT, after fall, better with PT at [facility]    Vision changes 3/25/2014     Past Surgical History:   Procedure Laterality Date    COLONOSCOPY N/A 6/9/2017    Procedure: COLONOSCOPY;  Surgeon: Davy Prince MD;  Location: [facility] ([address] [place] [address]);  Service: Endoscopy;  Laterality: N/A;    DILATION AND CURETTAGE OF UTERUS      ENDOMETRIAL ABLATION N/A 2017    TUBAL LIGATION       Social History     Social History Narrative    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[employer],  & 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 She appears " well-developed and well-nourished.   HENT:   Head: Normocephalic and atraumatic.   Right Ear: External ear normal.   Left Ear: External ear normal.   Nose: Nose normal.   Mouth/Throat: Oropharynx is clear and moist.   Eyes: EOM are normal. Pupils are equal, round, and reactive to light.   Neck: Neck supple. No thyromegaly present.   Cardiovascular: Normal rate, regular rhythm, normal heart sounds and intact distal pulses.    No murmur heard.  Pulmonary/Chest: Effort normal and breath sounds normal. She has no wheezes.   Abdominal: Soft. Bowel sounds are normal. She exhibits no distension and no mass. There is no tenderness. There is no rebound and no guarding.   Musculoskeletal: She exhibits no edema.   Lymphadenopathy:     She has no cervical adenopathy.   Neurological: She is alert and oriented to person, place, and time.   Skin: Skin is warm and dry.   Psychiatric: She has a normal mood and affect. Her behavior is normal.     Assessment:     1. Annual physical exam    2. Paradoxical insomnia    3. Dysmenorrhea      Plan:          Patient Instructions   Follow with GYN    RECOMMENDATIONS FOR FEMALES    Your #1 MEDICINE is DAILY EXERCISE - 15-20 minutes of huffing & puffing EVERY DAY.     Prevent the #1 cause of death- cardiovascular disease (HEART ATTACK & STROKE) by checking for normal blood pressure, cholesterol, sugars, & by not smoking.     VACCINES: Yearly FLU shot, ONE PNEUMONIA shot after 65,  SHINGLES shot after 60    Screening colonoscopy at AGE  50 & every 10 years to check for COLON CANCER,  one of the most common & preventable cancers. Or FIT z12.11, Repeat in 3 years if POLYP found     I recommend  high fiber (5 fresh fruits or vegetables daily), low fat diet and aerobic  exercise (huffing/ puffing/ sweating for 20 min straight at least 4 days a week)    Follow up yearly with  fasting lipids, CMP, CBC, TSH prior.    ==============================================================

## 2018-05-21 ENCOUNTER — TELEPHONE (OUTPATIENT)
Dept: OBSTETRICS AND GYNECOLOGY | Facility: CLINIC | Age: 53
End: 2018-05-21

## 2018-05-21 NOTE — TELEPHONE ENCOUNTER
----- Message from Kayla Mcneil sent at 5/21/2018  8:11 AM CDT -----  Contact: pt            Name of Who is Calling: pt      What is the request in detail: calling in regards to her biopsy results      Can the clinic reply by [facility]: no      What Number to Call Back if not in [facility]: [phone]

## 2018-06-20 ENCOUNTER — HOSPITAL ENCOUNTER (OUTPATIENT)
Dept: RADIOLOGY | Facility: HOSPITAL | Age: 53
Discharge: HOME | End: 2018-06-20
Attending: FAMILY MEDICINE
Payer: COMMERCIAL

## 2018-06-20 ENCOUNTER — OFFICE VISIT (OUTPATIENT)
Dept: FAMILY MEDICINE | Facility: CLINIC | Age: 53
End: 2018-06-20
Payer: COMMERCIAL

## 2018-06-20 VITALS
HEART RATE: 75 BPM | BODY MASS INDEX: 22.11 KG/M2 | WEIGHT: 137.56 LBS | TEMPERATURE: 98 F | SYSTOLIC BLOOD PRESSURE: 128 MMHG | HEIGHT: 66 IN | DIASTOLIC BLOOD PRESSURE: 84 MMHG

## 2018-06-20 DIAGNOSIS — M79.675 TOE PAIN, LEFT: Primary | ICD-10-CM

## 2018-06-20 DIAGNOSIS — M79.675 TOE PAIN, LEFT: ICD-10-CM

## 2018-06-20 DIAGNOSIS — R03.0 ELEVATED BLOOD PRESSURE, SITUATIONAL: ICD-10-CM

## 2018-06-20 PROCEDURE — 73660 X-RAY EXAM OF TOE(S): CPT | Mod: TC,FY,PO

## 2018-06-20 PROCEDURE — 99213 OFFICE O/P EST LOW 20 MIN: CPT | Mod: S$GLB,,, | Performed by: FAMILY MEDICINE

## 2018-06-20 PROCEDURE — 3079F DIAST BP 80-89 MM HG: CPT | Mod: CPTII,S$GLB,, | Performed by: FAMILY MEDICINE

## 2018-06-20 PROCEDURE — 3074F SYST BP LT 130 MM HG: CPT | Mod: CPTII,S$GLB,, | Performed by: FAMILY MEDICINE

## 2018-06-20 PROCEDURE — 99999 PR PBB SHADOW E&M-EST. PATIENT-LVL IV: CPT | Mod: PBBFAC,,, | Performed by: FAMILY MEDICINE

## 2018-06-20 PROCEDURE — 3008F BODY MASS INDEX DOCD: CPT | Mod: CPTII,S$GLB,, | Performed by: FAMILY MEDICINE

## 2018-06-20 PROCEDURE — 73660 X-RAY EXAM OF TOE(S): CPT | Mod: 26,LT,, | Performed by: RADIOLOGY

## 2018-06-20 NOTE — PROGRESS NOTES
Subjective:     Patient ID: Vicky Brand is a 52 y.o. female.    Chief Complaint: Toe Pain (little toe on left foot)    HPI  Left little toe pain for 10 days ago after stubbing this on some concrete. It is still painful and swollen.    she is concerned that it is still bothering her. Actually it was better  And then this morning it started bothering her again. She did use her elliptical machine -  She has not   Review of Systems  per HPI  Objective:      Physical Exam  swelling and mild resolving ecchymosis of left 5th toe and to a lesser extent the 4th toe , pain with pressure and movement of 5th to.  Assessment:     Vicky was seen today for toe pain.    Diagnoses and all orders for this visit:    Toe pain, left  -     X-Ray Toe 2 View; Future    Xray was negative- I suspect this is strained and she just re-injured when she did her exercise.   Recommend ice and rest     Elevated blood pressure, situational  Reduce salt, and rtc to see Dr Cristina for this

## 2018-07-16 ENCOUNTER — TELEPHONE (OUTPATIENT)
Dept: FAMILY MEDICINE | Facility: CLINIC | Age: 53
End: 2018-07-16

## 2018-07-16 DIAGNOSIS — M79.676 PAIN OF TOE, UNSPECIFIED LATERALITY: Primary | ICD-10-CM

## 2018-07-16 NOTE — TELEPHONE ENCOUNTER
Please notify pt that at this point if it isnt better I would recommend she see a podiatrist.   I have entered a referral for her to see a podiatrist. Please have her scheduled for this.   thanks

## 2018-07-16 NOTE — TELEPHONE ENCOUNTER
----- Message from Krystle Lopez sent at 7/16/2018  2:59 PM CDT -----  Contact: Self [phone]  Patient would like a call back concerning her visit for her left pinky toe. Patient states it been 6 weeks since the accident and she still experiencing pain. Patient would lie to know whats the next step she can take

## 2018-07-23 ENCOUNTER — OFFICE VISIT (OUTPATIENT)
Dept: PODIATRY | Facility: CLINIC | Age: 53
End: 2018-07-23
Payer: COMMERCIAL

## 2018-07-23 ENCOUNTER — HOSPITAL ENCOUNTER (OUTPATIENT)
Dept: RADIOLOGY | Facility: HOSPITAL | Age: 53
Discharge: HOME | End: 2018-07-23
Attending: PODIATRIST
Payer: COMMERCIAL

## 2018-07-23 VITALS
HEIGHT: 66 IN | HEART RATE: 80 BPM | WEIGHT: 137 LBS | SYSTOLIC BLOOD PRESSURE: 127 MMHG | DIASTOLIC BLOOD PRESSURE: 80 MMHG | BODY MASS INDEX: 22.02 KG/M2

## 2018-07-23 DIAGNOSIS — M79.675 TOE PAIN, LEFT: ICD-10-CM

## 2018-07-23 DIAGNOSIS — S99.922A TOE INJURY, LEFT, INITIAL ENCOUNTER: Primary | ICD-10-CM

## 2018-07-23 DIAGNOSIS — S99.922A TOE INJURY, LEFT, INITIAL ENCOUNTER: ICD-10-CM

## 2018-07-23 PROCEDURE — 3079F DIAST BP 80-89 MM HG: CPT | Mod: CPTII,S$GLB,, | Performed by: PODIATRIST

## 2018-07-23 PROCEDURE — 3074F SYST BP LT 130 MM HG: CPT | Mod: CPTII,S$GLB,, | Performed by: PODIATRIST

## 2018-07-23 PROCEDURE — 73630 X-RAY EXAM OF FOOT: CPT | Mod: TC,FY,PO,LT

## 2018-07-23 PROCEDURE — 3008F BODY MASS INDEX DOCD: CPT | Mod: CPTII,S$GLB,, | Performed by: PODIATRIST

## 2018-07-23 PROCEDURE — 73630 X-RAY EXAM OF FOOT: CPT | Mod: 26,LT,, | Performed by: RADIOLOGY

## 2018-07-23 PROCEDURE — 99999 PR PBB SHADOW E&M-EST. PATIENT-LVL III: CPT | Mod: PBBFAC,,, | Performed by: PODIATRIST

## 2018-07-23 PROCEDURE — 99203 OFFICE O/P NEW LOW 30 MIN: CPT | Mod: S$GLB,,, | Performed by: PODIATRIST

## 2018-07-23 RX ORDER — MELOXICAM 7.5 MG/1
7.5 TABLET ORAL DAILY
Qty: 30 TABLET | Refills: 0 | Status: SHIPPED | OUTPATIENT
Start: 2018-07-23 | End: 2018-10-30

## 2018-07-23 NOTE — PROGRESS NOTES
"Subjective:      Patient ID: Vicky Brand is a 52 y.o. female.    Chief Complaint: Foot Injury (left foot 5th toe)    c/o pain and swelling left fifth toe since stubbing it on 6/10/18. X-ray negative for fx per PCP. Unable to wear enclosed shoe, however relates the pain has improved significantly since it happened and the bruising has fully resolved.    Vitals:    07/23/18 0930   BP: 127/80   Pulse: 80   Weight: 62.1 kg (137 lb)   Height: 5' 6" (1.676 m)   PainSc:   1      Past Medical History:   Diagnosis Date    Allergic rhinitis 3/25/2014    Dysmenorrhea     Eye abnormalities     early yellowing of lens, precursor to cataracts, Dr Webster,  wear glasses at all times    Insomnia 04/15/2015    prior to menses, amitryptiline 2.5 mg qhs helps    Insomnia associated with menopause 10/30/2017    amitryptiline helps    Irregular menses 5/9/2017    Ocular migraine     vision fuzzy x 3 in 2013, metallic shapes in her vision, Dr Webster,  resolved after 30 min , no pain    Oral contraceptive use 4/15/2014    Paradoxical insomnia 4/15/2015    Shoulder pain 2007    LEFT, after fall, better with PT at [facility]    Vision changes 3/25/2014       Past Surgical History:   Procedure Laterality Date    COLONOSCOPY N/A 6/9/2017    Procedure: COLONOSCOPY;  Surgeon: Davy Prince MD;  Location: [facility] ([address] [place] [address]);  Service: Endoscopy;  Laterality: N/A;    DILATION AND CURETTAGE OF UTERUS      ENDOMETRIAL ABLATION N/A 2017    TUBAL LIGATION         Family History   Problem Relation Age of Onset    Diabetes Father     Stroke Father 49    Hypertension Mother     Pacemaker/defibrilator Mother     Ovarian cancer Maternal Grandmother     Melanoma Neg Hx     Breast cancer Neg Hx     Colon cancer Neg Hx        Social History     Social History    Marital status:      Spouse name: N/A    Number of children: N/A    Years of education: N/A     Social History Main Topics    Smoking " status: Never Smoker    Smokeless tobacco: Never Used    Alcohol use No    Drug use: No    Sexual activity: Yes     Partners: Male     Birth control/ protection: See Surgical Hx     Other Topics Concern    Not on file     Social History Narrative    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 30 tablet 0     No current facility-administered medications for this visit.        Review of patient's allergies indicates:   Allergen Reactions    Sulfa (sulfonamide antibiotics) Rash    Cetirizine      Other reaction(s): Unable to Function    Erythromycin      Other reaction(s): Nausea    Zyrtec-d  [cetirizine-pseudoephedrine]      Other reaction(s): Inability to focus/function         Review of Systems   Constitution: Negative for chills, fever, weakness and malaise/fatigue.   Cardiovascular: Negative for chest pain, claudication and leg swelling.   Respiratory: Negative for cough and shortness of breath.    Skin: Negative for color change, itching, poor wound healing and rash.   Musculoskeletal: Negative for back pain, joint pain, muscle cramps and muscle weakness.   Gastrointestinal: Negative for nausea and vomiting.   Neurological: Negative for numbness and paresthesias.   Psychiatric/Behavioral: Negative for altered mental status.           Objective:      Physical Exam   Constitutional: She is oriented to person, place, and time. She appears well-developed and well-nourished. No distress.   Cardiovascular: Intact distal pulses.    Pulses:       Dorsalis pedis pulses are 2+ on the right side, and 2+ on the left side.        Posterior tibial pulses are 2+ on the right side, and 2+ on the left side.   CFT< 3 secs all toes  bilateral foot, skin temp warm bilateral foot, diminished digital hair growth bilateral foot, no lower extremity edema bilateral.     Musculoskeletal:        Feet:    Semi-reducible adductovarus contracture fifth toe bilateral.         Neurological: She is alert and oriented to person, place, and time. She has normal strength. No sensory deficit.   Skin: Skin is warm, dry and intact. Capillary refill takes less than 2 seconds. No ecchymosis and no rash noted. She is not diaphoretic. No cyanosis or erythema. No pallor. Nails show no clubbing.             Assessment:       Encounter Diagnoses   Name Primary?    Toe injury, left, initial encounter Yes    Toe pain, left          Plan:       Vicky was seen today for foot injury.    Diagnoses and all orders for this visit:    Toe injury, left, initial encounter  -     X-Ray Foot Complete Left; Future    Toe pain, left  -     X-Ray Foot Complete Left; Future    Other orders  -     meloxicam (MOBIC) 7.5 MG tablet; Take 1 tablet (7.5 mg total) by mouth once daily.      I counseled the patient on her conditions, their implications and medical management.    Reviewed previous xray noting no fracture or dislocation, however as we discussed may have impacted fracture of the middle or distal phalanx not visualized. Follow up xray ordered.    Continue activity modifications with rest ,ice and elevation prn.    RTC 4-6 weeks or prn.    .

## 2018-07-23 NOTE — LETTER
July 24, 2018      Daly Winston, DO  [address]  [address]  [facility] [address]           [facility]  [address] [place] [address]  Phone: [phone]          Patient: Vicky Brand   MR Number: [id number]   YOB: 1965   Date of Visit: 7/23/2018       Dear Dr. Daly Winston:    Thank you for referring Vicky Brand to me for evaluation. Attached you will find relevant portions of my assessment and plan of care.    If you have questions, please do not hesitate to call me. I look forward to following Vicky Brand along with you.    Sincerely,    Dwaine Jones, ELIAS    Enclosure  CC:  No Recipients    If you would like to receive this communication electronically, please contact [email or web address] or [phone] to request more information on "[employer]" Link access.    For providers and/or their staff who would like to refer a patient to [facility], please contact us through our one-stop-shop provider referral line, [facility] , at [phone].    If you feel you have received this communication in error or would no longer like to receive these types of communications, please e-mail [email or web address]

## 2018-08-20 ENCOUNTER — HOSPITAL ENCOUNTER (OUTPATIENT)
Dept: RADIOLOGY | Facility: HOSPITAL | Age: 53
Discharge: HOME | End: 2018-08-20
Attending: PODIATRIST
Payer: COMMERCIAL

## 2018-08-20 ENCOUNTER — OFFICE VISIT (OUTPATIENT)
Dept: PODIATRY | Facility: CLINIC | Age: 53
End: 2018-08-20
Payer: COMMERCIAL

## 2018-08-20 VITALS
BODY MASS INDEX: 22.02 KG/M2 | DIASTOLIC BLOOD PRESSURE: 81 MMHG | SYSTOLIC BLOOD PRESSURE: 136 MMHG | HEIGHT: 66 IN | WEIGHT: 137 LBS | HEART RATE: 65 BPM

## 2018-08-20 DIAGNOSIS — S92.502G: ICD-10-CM

## 2018-08-20 DIAGNOSIS — M62.81 MUSCLE WEAKNESS OF LOWER EXTREMITY: Primary | ICD-10-CM

## 2018-08-20 PROCEDURE — 99213 OFFICE O/P EST LOW 20 MIN: CPT | Mod: S$GLB,,, | Performed by: PODIATRIST

## 2018-08-20 PROCEDURE — 99999 PR PBB SHADOW E&M-EST. PATIENT-LVL III: CPT | Mod: PBBFAC,,, | Performed by: PODIATRIST

## 2018-08-20 PROCEDURE — 73630 X-RAY EXAM OF FOOT: CPT | Mod: 26,LT,, | Performed by: RADIOLOGY

## 2018-08-20 PROCEDURE — 3079F DIAST BP 80-89 MM HG: CPT | Mod: CPTII,S$GLB,, | Performed by: PODIATRIST

## 2018-08-20 PROCEDURE — 3008F BODY MASS INDEX DOCD: CPT | Mod: CPTII,S$GLB,, | Performed by: PODIATRIST

## 2018-08-20 PROCEDURE — 73630 X-RAY EXAM OF FOOT: CPT | Mod: TC,FY,PO,LT

## 2018-08-20 PROCEDURE — 3075F SYST BP GE 130 - 139MM HG: CPT | Mod: CPTII,S$GLB,, | Performed by: PODIATRIST

## 2018-08-20 NOTE — PROGRESS NOTES
"Subjective:      Patient ID: Vicky Brand is a 52 y.o. female.    Chief Complaint: Follow-up (left foot second toe)    Pt is a 51 y/o female  has a past medical history of Allergic rhinitis, Dysmenorrhea, Eye abnormalities, Insomnia, Insomnia associated with menopause, Irregular menses, Ocular migraine, Oral contraceptive use, Paradoxical insomnia, Shoulder pain, and Vision changes. c/o pain and swelling left fifth toe since stubbing it on 6/10/18. X-ray negative for fx per PCP. Unable to wear enclosed shoe, however relates the pain has improved significantly since it happened and the bruising has fully resolved.    8/20/18: Pt relates to minor pain, relates to decreased flexion of 5th toe of left foot. She has not resume her previous activity yet.    Vitals:    08/20/18 0913   BP: 136/81   Pulse: 65   Weight: 62.1 kg (137 lb)   Height: 5' 6" (1.676 m)   PainSc:   1      Past Medical History:   Diagnosis Date    Allergic rhinitis 3/25/2014    Dysmenorrhea     Eye abnormalities     early yellowing of lens, precursor to cataracts, Dr Webster,  wear glasses at all times    Insomnia 04/15/2015    prior to menses, amitryptiline 2.5 mg qhs helps    Insomnia associated with menopause 10/30/2017    amitryptiline helps    Irregular menses 5/9/2017    Ocular migraine     vision fuzzy x 3 in 2013, metallic shapes in her vision, Dr Webster,  resolved after 30 min , no pain    Oral contraceptive use 4/15/2014    Paradoxical insomnia 4/15/2015    Shoulder pain 2007    LEFT, after fall, better with PT at [facility]    Vision changes 3/25/2014       Past Surgical History:   Procedure Laterality Date    DILATION AND CURETTAGE OF UTERUS      ENDOMETRIAL ABLATION N/A 2017    TUBAL LIGATION         Family History   Problem Relation Age of Onset    Diabetes Father     Stroke Father 49    Hypertension Mother     Pacemaker/defibrilator Mother     Ovarian cancer Maternal Grandmother     Melanoma " Neg Hx     Breast cancer Neg Hx     Colon cancer Neg Hx        Social History     Socioeconomic History    Marital status:      Spouse name: Not on file    Number of children: Not on file    Years of education: Not on file    Highest education level: Not on file   Social Needs    Financial resource strain: Not on file    Food insecurity - worry: Not on file    Food insecurity - inability: Not on file    Transportation needs - medical: Not on file    Transportation needs - non-medical: Not on file   Occupational History    Not on file   Tobacco Use    Smoking status: Never Smoker    Smokeless tobacco: Never Used   Substance and Sexual Activity    Alcohol use: No    Drug use: No    Sexual activity: Yes     Partners: Male     Birth control/protection: See Surgical Hx   Other Topics Concern    Are you pregnant or think you may be? 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 30 tablet 0     No current facility-administered medications for this visit.        Review of patient's allergies indicates:   Allergen Reactions    Sulfa (sulfonamide antibiotics) Rash    Cetirizine      Other reaction(s): Unable to Function    Erythromycin      Other reaction(s): Nausea    Zyrtec-d  [cetirizine-pseudoephedrine]      Other reaction(s): Inability to focus/function         Review of Systems   Constitution: Negative for chills, fever, weakness and malaise/fatigue.   Cardiovascular: Negative for chest pain, claudication and leg swelling.   Respiratory: Negative for cough and shortness of breath.    Skin: Negative for color change, itching, poor wound  healing and rash.   Musculoskeletal: Negative for back pain, joint pain, muscle cramps and muscle weakness.   Gastrointestinal: Negative for nausea and vomiting.   Neurological: Negative for numbness and paresthesias.   Psychiatric/Behavioral: Negative for altered mental status.           Objective:      Physical Exam   Constitutional: She is oriented to person, place, and time. She appears well-developed and well-nourished. No distress.   Cardiovascular: Intact distal pulses.   Pulses:       Dorsalis pedis pulses are 2+ on the right side, and 2+ on the left side.        Posterior tibial pulses are 2+ on the right side, and 2+ on the left side.   CFT< 3 secs all toes bilateral foot, skin temp warm bilateral foot, diminished digital hair growth bilateral foot, no lower extremity edema bilateral.     Musculoskeletal:        Feet:    Semi-reducible adductovarus contracture fifth toe bilateral.      Decreased flexion of 5th, left   Neurological: She is alert and oriented to person, place, and time. She has normal strength. No sensory deficit.   Skin: Skin is warm, dry and intact. Capillary refill takes less than 2 seconds. No ecchymosis and no rash noted. She is not diaphoretic. No cyanosis or erythema. No pallor. Nails show no clubbing.             Assessment:       Encounter Diagnoses   Name Primary?    Muscle weakness of lower extremity Yes    Fracture of fifth toe, left, closed, with delayed healing, subsequent encounter          Plan:       Vicky was seen today for follow-up.    Diagnoses and all orders for this visit:    Muscle weakness of lower extremity  -     Ambulatory Referral to Physical/Occupational Therapy    Fracture of fifth toe, left, closed, with delayed healing, subsequent encounter  -     X-Ray Foot Complete Left; Future  -     Ambulatory Referral to Physical/Occupational Therapy      I counseled the patient on her conditions, their implications and medical management.    Reviewed previous xray  noting delayed union,follow up xray ordered    Recommend PT to help with joint stiffness    Consider bone stimulator    Continue vit D supplement    Continue activity modifications with rest, ice and elevation prn.    RTC as specified     Rosa Sutton, PGY3    I have personally taken the history and examined this patient and agree with the resident's note as stated as above.   Dwaine Jones DPM, FACFAS      .

## 2018-08-21 PROBLEM — S92.502G: Status: ACTIVE | Noted: 2018-08-21

## 2018-08-24 ENCOUNTER — OFFICE VISIT (OUTPATIENT)
Dept: INTERNAL MEDICINE | Facility: CLINIC | Age: 53
End: 2018-08-24
Payer: COMMERCIAL

## 2018-08-24 VITALS
OXYGEN SATURATION: 99 % | SYSTOLIC BLOOD PRESSURE: 122 MMHG | WEIGHT: 138.25 LBS | BODY MASS INDEX: 22.22 KG/M2 | DIASTOLIC BLOOD PRESSURE: 78 MMHG | HEART RATE: 75 BPM | TEMPERATURE: 98 F | HEIGHT: 66 IN

## 2018-08-24 DIAGNOSIS — J04.0 LARYNGITIS: Primary | ICD-10-CM

## 2018-08-24 PROCEDURE — 99999 PR PBB SHADOW E&M-EST. PATIENT-LVL III: CPT | Mod: PBBFAC,,, | Performed by: INTERNAL MEDICINE

## 2018-08-24 PROCEDURE — 3078F DIAST BP <80 MM HG: CPT | Mod: CPTII,S$GLB,, | Performed by: INTERNAL MEDICINE

## 2018-08-24 PROCEDURE — 3008F BODY MASS INDEX DOCD: CPT | Mod: CPTII,S$GLB,, | Performed by: INTERNAL MEDICINE

## 2018-08-24 PROCEDURE — 3074F SYST BP LT 130 MM HG: CPT | Mod: CPTII,S$GLB,, | Performed by: INTERNAL MEDICINE

## 2018-08-24 PROCEDURE — 99213 OFFICE O/P EST LOW 20 MIN: CPT | Mod: S$GLB,,, | Performed by: INTERNAL MEDICINE

## 2018-08-25 NOTE — PROGRESS NOTES
Subjective:       Patient ID: Vicky Brand is a 52 y.o. female.    Chief Complaint: Sore Throat (post nasal drip)    Has lost her voice which happens to her about once a year because of post nasal drip.  She wants to know if anything new has come along to treat this.  Is a teacher      Review of Systems   Constitutional: Negative for activity change, chills, fatigue and fever.   HENT: Negative for congestion, ear pain, nosebleeds, postnasal drip, sinus pressure and sore throat.    Eyes: Negative.  Negative for visual disturbance.   Respiratory: Negative for cough, chest tightness, shortness of breath and wheezing.    Cardiovascular: Negative for chest pain.   Gastrointestinal: Negative for abdominal pain, diarrhea, nausea and vomiting.   Genitourinary: Negative for difficulty urinating, dysuria, frequency and urgency.   Musculoskeletal: Negative for arthralgias and neck stiffness.   Skin: Negative for rash.   Neurological: Negative for dizziness, weakness and headaches.   Psychiatric/Behavioral: Negative for sleep disturbance. The patient is not nervous/anxious.        Objective:      Physical Exam   Constitutional: She is oriented to person, place, and time. She appears well-developed and well-nourished.  Non-toxic appearance. No distress.   HENT:   Head: Normocephalic and atraumatic.   Right Ear: Tympanic membrane, external ear and ear canal normal.   Left Ear: Tympanic membrane, external ear and ear canal normal.   Mouth/Throat:       Eyes: EOM are normal. Pupils are equal, round, and reactive to light. No scleral icterus.   Neck: Normal range of motion. Neck supple. No thyromegaly present.   Cardiovascular: Normal rate, regular rhythm and normal heart sounds.   Pulmonary/Chest: Effort normal and breath sounds normal.   Abdominal: Soft. Bowel sounds are normal. She exhibits no mass. There is no tenderness. There is no rebound.   Musculoskeletal: Normal range of motion.   Lymphadenopathy:     She has  no cervical adenopathy.   Neurological: She is alert and oriented to person, place, and time. She has normal reflexes. She displays normal reflexes. No cranial nerve deficit. She exhibits normal muscle tone. Coordination normal.   Skin: Skin is warm and dry.   Psychiatric: She has a normal mood and affect. Her behavior is normal.       Assessment:       1. Laryngitis        Plan:   Vicky was seen today for sore throat.    Diagnoses and all orders for this visit:    Laryngitis

## 2018-08-29 ENCOUNTER — CLINICAL SUPPORT (OUTPATIENT)
Dept: REHABILITATION | Facility: HOSPITAL | Age: 53
End: 2018-08-29
Attending: PODIATRIST
Payer: COMMERCIAL

## 2018-08-29 DIAGNOSIS — M79.676 PAIN OF FIFTH TOE: ICD-10-CM

## 2018-08-29 PROCEDURE — 97161 PT EVAL LOW COMPLEX 20 MIN: CPT | Mod: PO

## 2018-08-29 PROCEDURE — 97110 THERAPEUTIC EXERCISES: CPT | Mod: PO

## 2018-08-29 NOTE — PROGRESS NOTES
PHYSICAL THERAPY INITIAL EVALUATION     Date: 08/29/2018  Name: Vicky Brand  Clinic Number: [id number]    Visit #: 1   Start Time:  705  Stop Time:  755  Time in treatment: 50 minutes    History   History of Present Illness: Pt is a 51 y/o female  has pain and swelling left fifth toe since stubbing it on 6/10/18. . Sustained fracture   8/20/18: Pt relates to minor pain, relates to decreased flexion of 5th toe of left foot. She has not resume her previous activity yet.       Treatment Diagnosis:   1. Pain of fifth toe         Past Medical History      Past Medical History:   Diagnosis Date    Allergic rhinitis 3/25/2014    Dysmenorrhea     Eye abnormalities     early yellowing of lens, precursor to cataracts, Dr Webster,  wear glasses at all times    Insomnia 04/15/2015    prior to menses, amitryptiline 2.5 mg qhs helps    Insomnia associated with menopause 10/30/2017    amitryptiline helps    Irregular menses 5/9/2017    Ocular migraine     vision fuzzy x 3 in 2013, metallic shapes in her vision, Dr Webster,  resolved after 30 min , no pain    Oral contraceptive use 4/15/2014    Paradoxical insomnia 4/15/2015    Shoulder pain 2007    LEFT, after fall, better with PT at [facility]    Vision changes 3/25/2014       Allergies  Review of patient's allergies indicates:   Allergen Reactions    Sulfa (sulfonamide antibiotics) Rash    Cetirizine      Other reaction(s): Unable to Function    Erythromycin      Other reaction(s): Nausea    Zyrtec-d  [cetirizine-pseudoephedrine]      Other reaction(s): Inability to focus/function       Current Medication list:   Current Outpatient Medications on File Prior to Visit   Medication Sig Dispense Refill    meloxicam (MOBIC) 7.5 MG tablet Take 1 tablet (7.5 mg total) by mouth once daily. 30 tablet 0     No current facility-administered medications on file prior to visit.        Precautions: Standard  Prior Therapy: no    Diagnostic Tests: Bony  resorption or erosion involving the mid and distal phalanges of the left little toe.  Proximal phalanx appears intact.  Seated job  Sports/Recreational Activities: walks several miles, ellyptical  Assistive devices/equipment none    Cultural, Spiritual, Developmental and Educational concerns: none  Abuse/Neglect, Nutritional concerns: none     Patient Therapy Goals: return to normal function of ellyptical and walking    Subjective   Vicky Brand states she has pain 2/10 in left fifth toe but most problem occurs that she cannot bend toes.  She has not done her normal routine of exercises since June.  Activities that increase symptoms:   Walking, wearing shoes      Objective   52 y.o. White female arrives to clinic wearing flip flops.  No abnormality noted in left foot/toes    GAIT: slight decrease in  toe off on left  Joint Mobility: WNL AAROM all joints of left foot/toes/ankle  Initially displayed decreased active toe flexion all digits on left but after isolation and facilitation, pt was able ot achieve normal AROM with appropriate timing  Displays slightly decreased strength to testing but this appears more due to fear than actual loss of strength  PALPATION: Tender to palpation at left fifth digit                  Muscle  Tone:  Normal  Atrophy noted: no    EDEMA:   Left: absent  Right: absent      TREATMENT: Evaluation completed    THEREX: 20 minutes of one to one instruction , manual and/or verbal cues for exercises designed to stretch, strengthen and build endurance of the affected body parts     10 reps Ankle plantar and dorsiflexion  10 reps towel crunches  10 reps up on toes  ABCs of foot 3x 20 sec gastroc stretch with towel  Toe walk  Heel walk  Gait training with emphasis on toe off  Ellyptical x 15 min 9no charge)  Manual Therapy to facilitate active motion of toes    Education   Patient was provided with a written copy of the above home exercises indicated in bold to perform as tolerated  within limits of pain.  Exercises were reviewed and patient was able to demonstrate them prior to the end of the session.  Pt was instructed in ways to improve safety of home environment to prevent falls  Pt instructed in proper use of ice or heat to limb.    All of the patients questions were answered  Goals of therapy, the roles of PT and PTA, and the need for compliance of appointments, attendance policy and HEP was discussed with patient .  Patient expressed understanding and agreement with above education.      No barriers to learning or social/cultural issues were identified that could hinder therapy.    Assessment   Pt with healed fracture of 5th toe left foot was experiencing pain and fear of movement of the toes.  She  felt  Improvement in symptoms post therapy and suffered no adverse effects with treatment.   .  Pt presents with  a stable and uncomplicated clinical presentation which includes a painful toe and fear of movement.  Following today's session she felt much improved, much encouraged and felt as though she could continue HEP and resume normal activity without further sessions in therapy.        G Code   na      Goals   One treatment Pt agrees with goals set.  Independent with HEP.    Plan   One time treatment        Apryl Harrison, PT, MHA, WCC  08/29/2018

## 2018-10-07 ENCOUNTER — PATIENT MESSAGE (OUTPATIENT)
Dept: OBSTETRICS AND GYNECOLOGY | Facility: CLINIC | Age: 53
End: 2018-10-07

## 2018-10-24 ENCOUNTER — HOSPITAL ENCOUNTER (OUTPATIENT)
Dept: RADIOLOGY | Facility: HOSPITAL | Age: 53
Discharge: HOME | End: 2018-10-24
Attending: OBSTETRICS & GYNECOLOGY
Payer: COMMERCIAL

## 2018-10-24 DIAGNOSIS — Z12.31 SCREENING MAMMOGRAM, ENCOUNTER FOR: ICD-10-CM

## 2018-10-24 PROCEDURE — 77067 SCR MAMMO BI INCL CAD: CPT | Mod: TC,PO

## 2018-10-24 PROCEDURE — 77063 BREAST TOMOSYNTHESIS BI: CPT | Mod: 26,,, | Performed by: RADIOLOGY

## 2018-10-24 PROCEDURE — 77067 SCR MAMMO BI INCL CAD: CPT | Mod: 26,,, | Performed by: RADIOLOGY

## 2018-10-30 ENCOUNTER — OFFICE VISIT (OUTPATIENT)
Dept: PODIATRY | Facility: CLINIC | Age: 53
End: 2018-10-30
Payer: COMMERCIAL

## 2018-10-30 VITALS
BODY MASS INDEX: 22.18 KG/M2 | DIASTOLIC BLOOD PRESSURE: 77 MMHG | SYSTOLIC BLOOD PRESSURE: 133 MMHG | WEIGHT: 138 LBS | HEART RATE: 81 BPM | HEIGHT: 66 IN

## 2018-10-30 DIAGNOSIS — M79.675 TOE PAIN, LEFT: ICD-10-CM

## 2018-10-30 DIAGNOSIS — S92.502G: Primary | ICD-10-CM

## 2018-10-30 DIAGNOSIS — B35.1 ONYCHOMYCOSIS: ICD-10-CM

## 2018-10-30 PROCEDURE — 3078F DIAST BP <80 MM HG: CPT | Mod: CPTII,S$GLB,, | Performed by: PODIATRIST

## 2018-10-30 PROCEDURE — 3075F SYST BP GE 130 - 139MM HG: CPT | Mod: CPTII,S$GLB,, | Performed by: PODIATRIST

## 2018-10-30 PROCEDURE — 99999 PR PBB SHADOW E&M-EST. PATIENT-LVL III: CPT | Mod: PBBFAC,,, | Performed by: PODIATRIST

## 2018-10-30 PROCEDURE — 99213 OFFICE O/P EST LOW 20 MIN: CPT | Mod: S$GLB,,, | Performed by: PODIATRIST

## 2018-10-30 PROCEDURE — 3008F BODY MASS INDEX DOCD: CPT | Mod: CPTII,S$GLB,, | Performed by: PODIATRIST

## 2018-10-31 NOTE — PROGRESS NOTES
"Subjective:      Patient ID: Vicky Brand is a 52 y.o. female.    Chief Complaint: Follow-up and Nail Problem (Bilateral)    Pt is a 53 y/o female  has a past medical history of Allergic rhinitis, Dysmenorrhea, Eye abnormalities, Insomnia, Insomnia associated with menopause, Irregular menses, Ocular migraine, Oral contraceptive use, Paradoxical insomnia, Shoulder pain, and Vision changes. c/o pain and swelling left fifth toe since stubbing it on 6/10/18. X-ray negative for fx per PCP. Unable to wear enclosed shoe, however relates the pain has improved significantly since it happened and the bruising has fully resolved.    8/20/18: Pt relates to minor pain, relates to decreased flexion of 5th toe of left foot. She has not resume her previous activity yet.    10/30/18: Follow up for left fifth toe fracture. Relates pain resolved except for intermittent slight pain to the left fifth toe. She has resumed exercising and wearing enclosed shoes. PT x 1 session helped. She continued on with home exercises demonstrated per PT. Also complains of discolored toenails she discovered after removing her nail polish that had been left on all summer.    Vitals:    10/30/18 0929   BP: 133/77   Pulse: 81   Weight: 62.6 kg (138 lb)   Height: 5' 6" (1.676 m)   PainSc: 0-No pain      Past Medical History:   Diagnosis Date    Allergic rhinitis 3/25/2014    Dysmenorrhea     Eye abnormalities     early yellowing of lens, precursor to cataracts, Dr Webster,  wear glasses at all times    Insomnia 04/15/2015    prior to menses, amitryptiline 2.5 mg qhs helps    Insomnia associated with menopause 10/30/2017    amitryptiline helps    Irregular menses 5/9/2017    Ocular migraine     vision fuzzy x 3 in 2013, metallic shapes in her vision, Dr Webster,  resolved after 30 min , no pain    Oral contraceptive use 4/15/2014    Paradoxical insomnia 4/15/2015    Shoulder pain 2007    LEFT, after fall, better with PT at [facility]    Vision changes 3/25/2014       Past Surgical History:   Procedure Laterality Date    ABLATION-ENDOMETRIAL WITH  NOVASURE N/A 8/2/2017    [facility] OR    COLONOSCOPY N/A 6/9/2017    Procedure: COLONOSCOPY;  Surgeon: Davy Prince MD;  Location: [facility] ENDO (4TH FLR);  Service: Endoscopy;  Laterality: N/A;    COLONOSCOPY N/A 6/9/2017    Performed by Davy Prince MD at [facility] ENDO (4TH FLR)    DILATION AND CURETTAGE OF UTERUS      ENDOMETRIAL ABLATION N/A 2017    PEKZJCFNBURM-WVTINPKL-XCRAZDTTT N/A 8/2/2017    [facility] OR    UWHTYMLG-WHXFS-GBAVWTGAKNOT Bilateral 11/21/2014    Per[facility] by Arabella Mccarthy [facility] at [facility] OR    TUBAL LIGATION         Family History   Problem Relation Age of Onset    Diabetes Father     Stroke Father 49    Hypertension Mother     Pacemaker/defibrilator Mother     Ovarian cancer Maternal Grandmother     Melanoma Neg Hx     Breast cancer Neg Hx     Colon cancer Neg Hx        Social History     Socioeconomic History    Marital status:      Spouse name: None    Number of children: None    Years of education: None    Highest education level: None   Social Needs    Financial resource strain: None    Food insecurity - worry: None    Food insecurity - inability: None    Transportation needs - medical: None    Transportation needs - non-medical: None   Occupational History    None   Tobacco Use    Smoking status: Never Smoker    Smokeless tobacco: Never Used   Substance and Sexual Activity    Alcohol use: No    Drug use: No    Sexual activity: Yes     Partners: Male     Birth control/protection: See Surgical Hx   Other Topics Concern    Are you pregnant or think you may be? 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     No current facility-administered medications for this visit.        Review of patient's allergies indicates:   Allergen Reactions    Sulfa (sulfonamide antibiotics) Rash    Cetirizine      Other reaction(s): Unable to Function    Erythromycin      Other reaction(s): Nausea    Zyrtec-d  [cetirizine-pseudoephedrine]      Other reaction(s): Inability to focus/function         Review of Systems   Constitution: Negative for chills, fever, weakness and malaise/fatigue.   Cardiovascular: Negative for chest pain, claudication and leg swelling.   Respiratory: Negative for cough and shortness of breath.    Skin: Positive for color change and nail changes. Negative for itching, poor wound healing and rash.   Musculoskeletal: Negative for back pain, joint pain, muscle cramps and muscle weakness.   Gastrointestinal: Negative for nausea and vomiting.   Neurological: Negative for numbness and paresthesias.   Psychiatric/Behavioral: Negative for altered mental status.           Objective:      Physical Exam   Constitutional: She is oriented to person, place, and time. She appears well-developed and well-nourished. No distress.   Cardiovascular: Intact distal pulses.   Pulses:       Dorsalis pedis pulses are 2+ on the right side, and 2+ on the left side.        Posterior tibial pulses are 2+ on the right side, and 2+ on the left side.   CFT< 3 secs all toes bilateral foot, skin temp warm bilateral foot, diminished digital hair growth bilateral foot, no lower extremity edema bilateral.     Musculoskeletal:        Feet:    Semi-reducible adductovarus contracture fifth toe bilateral.      Decreased flexion of 5th, left   Neurological: She is alert and oriented to person, place, and time. She has normal strength. No sensory deficit.   Skin: Skin is warm, dry and intact. Capillary refill takes less than 2 seconds. No ecchymosis and no rash  noted. She is not diaphoretic. No cyanosis or erythema. No pallor. Nails show no clubbing.   Refer to media picture revealing superficial white onychomycosis of nails 1-5 bilateral.                 Assessment:       Encounter Diagnoses   Name Primary?    Fracture of fifth toe, left, closed, with delayed healing, subsequent encounter Yes    Toe pain, left     Onychomycosis          Plan:       Vicky was seen today for follow-up and nail problem.    Diagnoses and all orders for this visit:    Fracture of fifth toe, left, closed, with delayed healing, subsequent encounter    Toe pain, left    Onychomycosis      I counseled the patient on her conditions, their implications and medical management.    Continue with activity to tolerance.    Discussed treatment options for fungal toenails to include topical vs oral vs laser vs combined therapy in detail.    Recommended Kerasal nail gel available OTC 3-6 months.    RTC prn.    .

## 2019-01-09 ENCOUNTER — TELEPHONE (OUTPATIENT)
Dept: FAMILY MEDICINE | Facility: CLINIC | Age: 54
End: 2019-01-09

## 2019-01-09 DIAGNOSIS — Z00.00 ANNUAL PHYSICAL EXAM: Primary | ICD-10-CM

## 2019-01-09 NOTE — TELEPHONE ENCOUNTER
----- Message from Cortney Whyte sent at 1/9/2019  2:35 PM CST -----  Contact: Patient [phone]  Doctor appointment and lab have been scheduled.  Please link lab orders to the lab appointment.  Date of doctor appointment:  05/22/19    Physical or EP:  Physical    Date of lab appointment:  05/15/19     Thanks

## 2019-02-18 DIAGNOSIS — R10.2 PELVIC CRAMPING: Primary | ICD-10-CM

## 2019-02-19 RX ORDER — NAPROXEN SODIUM 550 MG/1
550 TABLET ORAL 2 TIMES DAILY WITH MEALS
Qty: 30 TABLET | Refills: 3 | Status: SHIPPED | OUTPATIENT
Start: 2019-02-19 | End: 2020-02-05

## 2019-04-09 ENCOUNTER — OFFICE VISIT (OUTPATIENT)
Dept: OBSTETRICS AND GYNECOLOGY | Facility: CLINIC | Age: 54
End: 2019-04-09
Payer: COMMERCIAL

## 2019-04-09 VITALS
SYSTOLIC BLOOD PRESSURE: 132 MMHG | WEIGHT: 141.63 LBS | BODY MASS INDEX: 22.76 KG/M2 | DIASTOLIC BLOOD PRESSURE: 80 MMHG | HEIGHT: 66 IN

## 2019-04-09 DIAGNOSIS — N94.10 DYSPAREUNIA, FEMALE: ICD-10-CM

## 2019-04-09 DIAGNOSIS — D21.9 FIBROIDS: ICD-10-CM

## 2019-04-09 DIAGNOSIS — N95.1 MENOPAUSAL SYMPTOMS: ICD-10-CM

## 2019-04-09 DIAGNOSIS — Z98.890 S/P ENDOMETRIAL ABLATION: ICD-10-CM

## 2019-04-09 DIAGNOSIS — Z12.39 BREAST CANCER SCREENING: ICD-10-CM

## 2019-04-09 DIAGNOSIS — Z01.419 ENCOUNTER FOR GYNECOLOGICAL EXAMINATION WITHOUT ABNORMAL FINDING: Primary | ICD-10-CM

## 2019-04-09 PROCEDURE — 99999 PR PBB SHADOW E&M-EST. PATIENT-LVL III: CPT | Mod: PBBFAC,,, | Performed by: OBSTETRICS & GYNECOLOGY

## 2019-04-09 PROCEDURE — 3079F DIAST BP 80-89 MM HG: CPT | Mod: CPTII,S$GLB,, | Performed by: OBSTETRICS & GYNECOLOGY

## 2019-04-09 PROCEDURE — 3008F BODY MASS INDEX DOCD: CPT | Mod: CPTII,S$GLB,, | Performed by: OBSTETRICS & GYNECOLOGY

## 2019-04-09 PROCEDURE — 99999 PR PBB SHADOW E&M-EST. PATIENT-LVL III: ICD-10-PCS | Mod: PBBFAC,,, | Performed by: OBSTETRICS & GYNECOLOGY

## 2019-04-09 PROCEDURE — 99396 PREV VISIT EST AGE 40-64: CPT | Mod: S$GLB,,, | Performed by: OBSTETRICS & GYNECOLOGY

## 2019-04-09 PROCEDURE — 3079F PR MOST RECENT DIASTOLIC BLOOD PRESSURE 80-89 MM HG: ICD-10-PCS | Mod: CPTII,S$GLB,, | Performed by: OBSTETRICS & GYNECOLOGY

## 2019-04-09 PROCEDURE — 3075F PR MOST RECENT SYSTOLIC BLOOD PRESS GE 130-139MM HG: ICD-10-PCS | Mod: CPTII,S$GLB,, | Performed by: OBSTETRICS & GYNECOLOGY

## 2019-04-09 PROCEDURE — 3075F SYST BP GE 130 - 139MM HG: CPT | Mod: CPTII,S$GLB,, | Performed by: OBSTETRICS & GYNECOLOGY

## 2019-04-09 PROCEDURE — 99396 PR PREVENTIVE VISIT,EST,40-64: ICD-10-PCS | Mod: S$GLB,,, | Performed by: OBSTETRICS & GYNECOLOGY

## 2019-04-09 PROCEDURE — 3008F PR BODY MASS INDEX (BMI) DOCUMENTED: ICD-10-PCS | Mod: CPTII,S$GLB,, | Performed by: OBSTETRICS & GYNECOLOGY

## 2019-04-09 NOTE — PROGRESS NOTES
CC: Well woman exam    Vicky Brand is a 53 y.o. female  presents for a well woman exam.  LMP: No LMP recorded. Patient has had an ablation..    S/P endometrial ablation. Has dark brown bleeding after the ablation.  NO heavy bleeding. Has occasional fibroid pain.  The pain is stable on Naproxen.  Having vision changes.  Having menopausal changes.  Insomnia,  Hot flashes and difficulty going back to sleep at night.      Past Medical History:   Diagnosis Date    Allergic rhinitis 3/25/2014    Dysmenorrhea     Eye abnormalities     early yellowing of lens, precursor to cataracts, Dr Webster,  wear glasses at all times    Insomnia 04/15/2015    prior to menses, amitryptiline 2.5 mg qhs helps    Insomnia associated with menopause 10/30/2017    amitryptiline helps    Irregular menses 2017    Ocular migraine     vision fuzzy x 3 in , metallic shapes in her vision, Dr Webster,  resolved after 30 min , no pain    Oral contraceptive use 4/15/2014    Paradoxical insomnia 4/15/2015    Shoulder pain 2007    LEFT, after fall, better with PT [facility]    Vision changes 3/25/2014     Past Surgical History:   Procedure Laterality Date    ABLATION-ENDOMETRIAL WITH  NOVASURE N/A 2017    [facility] OR    COLONOSCOPY N/A 2017    Performed by Davy Prince MD at [facility] ENDO (4TH FLR)    DILATION AND CURETTAGE OF UTERUS      ENDOMETRIAL ABLATION N/A     OUOKPVWVHNZS-CYQTMSYV-JBQJMLHAG N/A 2017    [facility] OR    NQHRTLXZ-PXTSS-QCYGZCNAMMDH Bilateral 2014    Per[facility]cc[facility] at [facility] OR    TUBAL LIGATION       Social History     Socioeconomic History    Marital status:      Spouse name: Not on file    Number of children: Not on file    Years of education: Not on file    Highest education level: Not on file   Occupational History    Not on file   Social Needs     "Financial resource strain: Not on file    Food insecurity:     Worry: Not on file     Inability: Not on file    Transportation needs:     Medical: Not on file     Non-medical: Not on file   Tobacco Use    Smoking status: Never Smoker    Smokeless tobacco: Never Used   Substance and Sexual Activity    Alcohol use: No    Drug use: No    Sexual activity: Yes     Partners: Male     Birth control/protection: See Surgical Hx   Lifestyle    Physical activity:     Days per week: Not on file     Minutes per session: Not on file    Stress: Not on file   Relationships    Social connections:     Talks on phone: Not on file     Gets together: Not on file     Attends Zoroastrian service: Not on file     Active member of club or organization: Not on file     Attends meetings of clubs or organizations: Not on file     Relationship status: Not on file   Other Topics Concern    Are you pregnant or think you may be? 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, , no children, nonsmoker, nondrinker , GYN here & Dr Mccarthy         Family History   Problem Relation Age of Onset    Diabetes Father     Stroke Father 49    Hypertension Mother     Pacemaker/defibrilator Mother     Ovarian cancer Maternal Grandmother     Melanoma Neg Hx     Breast cancer Neg Hx     Colon cancer Neg Hx      OB History        1    Para        Term   0            AB   1    Living           SAB   1    TAB        Ectopic        Multiple        Live Births                     /80   Ht 5' 6" (1.676 m)   Wt 64.3 kg (141 lb 10.3 oz)   BMI 22.86 kg/m²       ROS:    ROS:  GENERAL: Denies weight gain or weight loss. Feeling well overall.   SKIN: Denies rash or lesions.   HEAD: Denies head injury or headache.   NODES: Denies enlarged lymph nodes.   CHEST: Denies chest pain or shortness of breath. "   CARDIOVASCULAR: Denies palpitations or left sided chest pain.   ABDOMEN: No abdominal pain, constipation, diarrhea, nausea, vomiting or rectal bleeding.   URINARY: No frequency, dysuria, hematuria, or burning on urination.  REPRODUCTIVE: See HPI.   BREASTS: The patient performs breast self-examination and denies pain, lumps, or nipple discharge.   HEMATOLOGIC: No easy bruisability or excessive bleeding.   MUSCULOSKELETAL: Denies joint pain or swelling.   NEUROLOGIC: Denies syncope or weakness.   PSYCHIATRIC: Denies depression, anxiety or mood swings.    PHYSICAL EXAM:    APPEARANCE: Well nourished, well developed, in no acute distress.  AFFECT: WNL, alert and oriented x 3  SKIN: No acne or hirsutism  NECK: Neck symmetric without masses or thyromegaly  NODES: No inguinal, cervical, axillary, or femoral lymph node enlargement  CHEST: Good respiratory effect  ABDOMEN: Soft.  No tenderness or masses.  No hepatosplenomegaly.  No hernias.  BREASTS: Symmetrical, no skin changes or visible lesions.  No palpable masses, nipple discharge bilaterally.  PELVIC: Normal external genitalia without lesions.  Normal hair distribution.  Adequate perineal body, normal urethral meatus.  Vagina moist and well rugated without lesions or discharge.  Cervix pink, without lesions, discharge or tenderness.  No significant cystocele or rectocele.  Bimanual exam shows uterus to be normal size, regular, mobile and nontender.  Adnexa without masses or tenderness.    RECTAL: Rectovaginal exam confirms above with normal sphincter tone, no masses.  EXTREMITIES: No edema.    Diagnosis      ICD-10-CM ICD-9-CM    1. Encounter for gynecological examination without abnormal finding Z01.419 V72.31    2. S/P endometrial ablation Z98.890 V45.89    3. Fibroids D21.9 215.9    4. Breast cancer screening Z12.31 V76.10 Mammo Digital Screening Bilat w/ Tim   5. Menopausal symptoms N95.1 627.2    6. Dyspareunia, female N94.10 625.0 ospemifene (OSPHENA) 60 mg  Tab     Vaginitis prevention including :   a. avoiding feminine products such as deoderant soaps, body wash, bubble bath, douches, scented toilet paper, deoderant tampons or pads, baby or feminine wipes, chronic pad use, etc. and   b. avoiding other vulvovaginal irritants such as long hot baths, humidity, tight, synthetic clothing, chlorine and sitting around in wet bathing suits and   c. wearing cotton underwear, avoiding thong underwear and no underwear to bed and   d. taking showers instead of baths and use a hair dryer on cool setting afterwards to dry and   e.wearing cotton to exercise and shower immediately after exercise and change clothes and   f. using polyurethane condoms without spermicide if sexually active and symptoms are triggered by intercourse;   Diflucan and Mycolog cream use and potential side effects;   -pelvic rest until symptoms resolve.         A full discussion of the benefit-risk ratio of hormonal replacement therapy was carried out. Improvement in vasomotor and other climacteric symptoms is discussed, including possible improvements in sleep and mood. Reduction of risk for osteoporosis was explained. We discussed the study data showing increased risk of thrombo-embolic events such as myocardial infarction, stroke and also possibly breast cancer with estrogen replacement, and how this might affect her. The range of side effects such as breast tenderness, weight gain and including possible increases in lifetime risk of breast cancer and possible thrombotic complications was discussed. We also discussed ACOG's recommendation to use hormone replacement therapy for the relief of hot flashes alone and to be on the lowest dose possible for the shortest amount of time.  Alternative such as herbal and soy-based products were reviewed. All of her questions about this therapy were answered.      Patient was counseled today on A.C.S. Pap guidelines and recommendations for yearly pelvic exams,  mammograms and monthly self breast exams; to see her PCP for other health maintenance.     Follow up in about 1 year (around 4/9/2020).

## 2019-05-15 ENCOUNTER — LAB VISIT (OUTPATIENT)
Dept: LAB | Facility: HOSPITAL | Age: 54
End: 2019-05-15
Attending: FAMILY MEDICINE
Payer: COMMERCIAL

## 2019-05-15 DIAGNOSIS — Z00.00 ANNUAL PHYSICAL EXAM: ICD-10-CM

## 2019-05-15 LAB
ALBUMIN SERPL BCP-MCNC: 3.5 G/DL (ref 3.5–5.2)
ALP SERPL-CCNC: 47 U/L (ref 55–135)
ALT SERPL W/O P-5'-P-CCNC: 7 U/L (ref 10–44)
ANION GAP SERPL CALC-SCNC: 4 MMOL/L (ref 8–16)
AST SERPL-CCNC: 19 U/L (ref 10–40)
BASOPHILS # BLD AUTO: 0.03 K/UL (ref 0–0.2)
BASOPHILS NFR BLD: 0.7 % (ref 0–1.9)
BILIRUB SERPL-MCNC: 0.6 MG/DL (ref 0.1–1)
BUN SERPL-MCNC: 14 MG/DL (ref 6–20)
CALCIUM SERPL-MCNC: 9.7 MG/DL (ref 8.7–10.5)
CHLORIDE SERPL-SCNC: 105 MMOL/L (ref 95–110)
CHOLEST SERPL-MCNC: 151 MG/DL (ref 120–199)
CHOLEST/HDLC SERPL: 3.4 {RATIO} (ref 2–5)
CO2 SERPL-SCNC: 31 MMOL/L (ref 23–29)
CREAT SERPL-MCNC: 0.9 MG/DL (ref 0.5–1.4)
DIFFERENTIAL METHOD: ABNORMAL
EOSINOPHIL # BLD AUTO: 0.1 K/UL (ref 0–0.5)
EOSINOPHIL NFR BLD: 1.9 % (ref 0–8)
ERYTHROCYTE [DISTWIDTH] IN BLOOD BY AUTOMATED COUNT: 12.9 % (ref 11.5–14.5)
EST. GFR  (AFRICAN AMERICAN): >60 ML/MIN/1.73 M^2
EST. GFR  (NON AFRICAN AMERICAN): >60 ML/MIN/1.73 M^2
GLUCOSE SERPL-MCNC: 89 MG/DL (ref 70–110)
HCT VFR BLD AUTO: 41.1 % (ref 37–48.5)
HDLC SERPL-MCNC: 45 MG/DL (ref 40–75)
HDLC SERPL: 29.8 % (ref 20–50)
HGB BLD-MCNC: 12.9 G/DL (ref 12–16)
IMM GRANULOCYTES # BLD AUTO: 0 K/UL (ref 0–0.04)
IMM GRANULOCYTES NFR BLD AUTO: 0 % (ref 0–0.5)
LDLC SERPL CALC-MCNC: 84.4 MG/DL (ref 63–159)
LYMPHOCYTES # BLD AUTO: 1.6 K/UL (ref 1–4.8)
LYMPHOCYTES NFR BLD: 39.8 % (ref 18–48)
MCH RBC QN AUTO: 28.9 PG (ref 27–31)
MCHC RBC AUTO-ENTMCNC: 31.4 G/DL (ref 32–36)
MCV RBC AUTO: 92 FL (ref 82–98)
MONOCYTES # BLD AUTO: 0.5 K/UL (ref 0.3–1)
MONOCYTES NFR BLD: 11.9 % (ref 4–15)
NEUTROPHILS # BLD AUTO: 1.9 K/UL (ref 1.8–7.7)
NEUTROPHILS NFR BLD: 45.7 % (ref 38–73)
NONHDLC SERPL-MCNC: 106 MG/DL
NRBC BLD-RTO: 0 /100 WBC
PLATELET # BLD AUTO: 222 K/UL (ref 150–350)
PMV BLD AUTO: 11.7 FL (ref 9.2–12.9)
POTASSIUM SERPL-SCNC: 4.3 MMOL/L (ref 3.5–5.1)
PROT SERPL-MCNC: 7.3 G/DL (ref 6–8.4)
RBC # BLD AUTO: 4.46 M/UL (ref 4–5.4)
SODIUM SERPL-SCNC: 140 MMOL/L (ref 136–145)
TRIGL SERPL-MCNC: 108 MG/DL (ref 30–150)
WBC # BLD AUTO: 4.12 K/UL (ref 3.9–12.7)

## 2019-05-15 PROCEDURE — 36415 COLL VENOUS BLD VENIPUNCTURE: CPT | Mod: PO

## 2019-05-15 PROCEDURE — 85025 COMPLETE CBC W/AUTO DIFF WBC: CPT

## 2019-05-15 PROCEDURE — 80061 LIPID PANEL: CPT

## 2019-05-15 PROCEDURE — 80053 COMPREHEN METABOLIC PANEL: CPT

## 2019-05-22 ENCOUNTER — TELEPHONE (OUTPATIENT)
Dept: OBSTETRICS AND GYNECOLOGY | Facility: CLINIC | Age: 54
End: 2019-05-22

## 2019-05-22 ENCOUNTER — OFFICE VISIT (OUTPATIENT)
Dept: PRIMARY CARE CLINIC | Facility: CLINIC | Age: 54
End: 2019-05-22
Payer: COMMERCIAL

## 2019-05-22 VITALS
SYSTOLIC BLOOD PRESSURE: 122 MMHG | RESPIRATION RATE: 16 BRPM | BODY MASS INDEX: 22.82 KG/M2 | DIASTOLIC BLOOD PRESSURE: 82 MMHG | WEIGHT: 142 LBS | TEMPERATURE: 98 F | HEIGHT: 66 IN

## 2019-05-22 DIAGNOSIS — N92.6 IRREGULAR MENSES: ICD-10-CM

## 2019-05-22 DIAGNOSIS — F51.03 PARADOXICAL INSOMNIA: ICD-10-CM

## 2019-05-22 DIAGNOSIS — R10.31 RLQ ABDOMINAL PAIN: ICD-10-CM

## 2019-05-22 DIAGNOSIS — H43.812 POSTERIOR VITREOUS DETACHMENT OF LEFT EYE: ICD-10-CM

## 2019-05-22 DIAGNOSIS — D25.1 INTRAMURAL LEIOMYOMA OF UTERUS: ICD-10-CM

## 2019-05-22 DIAGNOSIS — Z00.00 ROUTINE GENERAL MEDICAL EXAMINATION AT A HEALTH CARE FACILITY: Primary | ICD-10-CM

## 2019-05-22 DIAGNOSIS — N94.6 DYSMENORRHEA: ICD-10-CM

## 2019-05-22 PROCEDURE — 3074F SYST BP LT 130 MM HG: CPT | Mod: CPTII,S$GLB,, | Performed by: FAMILY MEDICINE

## 2019-05-22 PROCEDURE — 99396 PR PREVENTIVE VISIT,EST,40-64: ICD-10-PCS | Mod: 25,S$GLB,, | Performed by: FAMILY MEDICINE

## 2019-05-22 PROCEDURE — 3079F PR MOST RECENT DIASTOLIC BLOOD PRESSURE 80-89 MM HG: ICD-10-PCS | Mod: CPTII,S$GLB,, | Performed by: FAMILY MEDICINE

## 2019-05-22 PROCEDURE — 99396 PREV VISIT EST AGE 40-64: CPT | Mod: 25,S$GLB,, | Performed by: FAMILY MEDICINE

## 2019-05-22 PROCEDURE — 99999 PR PBB SHADOW E&M-EST. PATIENT-LVL III: CPT | Mod: PBBFAC,,, | Performed by: FAMILY MEDICINE

## 2019-05-22 PROCEDURE — 99999 PR PBB SHADOW E&M-EST. PATIENT-LVL III: ICD-10-PCS | Mod: PBBFAC,,, | Performed by: FAMILY MEDICINE

## 2019-05-22 PROCEDURE — 3079F DIAST BP 80-89 MM HG: CPT | Mod: CPTII,S$GLB,, | Performed by: FAMILY MEDICINE

## 2019-05-22 PROCEDURE — 3074F PR MOST RECENT SYSTOLIC BLOOD PRESSURE < 130 MM HG: ICD-10-PCS | Mod: CPTII,S$GLB,, | Performed by: FAMILY MEDICINE

## 2019-05-22 NOTE — TELEPHONE ENCOUNTER
Patient is requesting a cream in replace of osphena. Patient would also to have an ultrasound due to pelvic pain stemming from fibroids. Patient is aware that Dr. Jarrell is out and would like to wait for return.

## 2019-05-22 NOTE — PATIENT INSTRUCTIONS
Follow with Ophthalmologist    Follow with GYN - she would like to try vaginal treatment instead of oral pill. Also, concerned about RLQ abd pain  - possible ovarian cyst or her fibroid    If pain worsens, to ER for urgent evaluation of possible appendicits, which is life threatening, but I don't feel that is her diagnosis now. If not better, call me & I can order CT abd/ pelvis    ==============================  RECOMMENDATIONS FOR FEMALES  ==============================  Your #1 MEDICINE is DAILY EXERCISE - 15-20 minutes of huffing & puffing EVERY DAY.     Prevent the #1 cause of death- cardiovascular disease (HEART ATTACK & STROKE) by checking for normal blood pressure, cholesterol, sugars, & by not smoking.     VACCINES: Yearly FLU shot, PNEUMONIA shot after 65,  SHINGLES shot after 50    Screening colonoscopy at AGE  50 & every 10 years to check for COLON CANCER,  one of the most common & preventable cancers (Or FIT kit yearly) Repeat in 3 years if POLYP found     I recommend  high fiber (5 fresh fruits or vegetables daily), low fat diet and aerobic  exercise (huffing/ puffing/ sweating for 20 min straight at least 4 days a week)    Follow up yearly with mammogram, fasting lipids, CMP, CBC prior.   ==============================================================

## 2019-05-22 NOTE — TELEPHONE ENCOUNTER
----- Message from Karly Velázquez sent at 5/22/2019  9:30 AM CDT -----  Contact: self  Patient states that  prescribed her ospemifene (OSPHENA) 60 mg Tab and she also told her about a topical treatment. Patient will like to try the topical treatment because she is worried that the OSPHENA will cause liver damage. Patient also states that she been in pain(abdomen) for the past 16 days in the area of the fibroid. Patient will like a call back to see if she need an ultrasound. Patient can be contacted at [phone].

## 2019-05-22 NOTE — PROGRESS NOTES
Subjective:      Patient ID: Vicky Brand is a 53 y.o. female.    Chief Complaint: Annual Exam    Here today for general exam.     She follows with ophthalmologist and retinal specialist for posterior vitreous detachment of the left eye causing flash sugars.  She was told not to get her heart rate up and stop exercise until her follow-up next week.  This is difficult because she lost exercise.    She saw gynecologist who prescribed oral medication for vaginal atrophy, but she is scared to take oral medication with the possible side effects.  After discussion, she is willing to try vaginal treamtnet.      She has been having right lower quadrant abdominal pain for the past 16 days.  She states it is similar to discomfort that she has with her fibroids.  She says it feels like menstrual pain but is not excruciating.  She is having normal bowel movements, but she is exercising like usual.  She has no nausea and vomiting.  Her appendix is intact. No fever, recent WBC count normal.  Pain is 2/10.  She has only taken for naproxen pills within the past 16 days.  It is worse with lying down night and upon awakening.  She does not feel it too much during the day.    She has discontinued amitriptyline for insomnia.    Denies any chest pain, shortness of breath, nausea vomiting constipation diarrhea, blood in stool, heartburn      Current Outpatient Medications:     naproxen sodium (ANAPROX) 550 MG tablet, Take 1 tablet (550 mg total) by mouth 2 (two) times daily with meals., Disp: 30 tablet, Rfl: 3    ospemifene (OSPHENA) 60 mg Tab, Take 60 mg by mouth once daily., Disp: 90 tablet, Rfl: 4    No results found for: HGBA1C  No results found for: MICALBCREAT  Lab Results   Component Value Date    LDLCALC 84.4 05/15/2019    LDLCALC 103.2 05/03/2018    CHOL 151 05/15/2019    HDL 45 05/15/2019    TRIG 108 05/15/2019       Lab Results   Component Value Date     05/15/2019    K 4.3 05/15/2019     05/15/2019     CO2 31 (H) 05/15/2019    GLU 89 05/15/2019    BUN 14 05/15/2019    CREATININE 0.9 05/15/2019    CALCIUM 9.7 05/15/2019    PROT 7.3 05/15/2019    ALBUMIN 3.5 05/15/2019    BILITOT 0.6 05/15/2019    ALKPHOS 47 (L) 05/15/2019    AST 19 05/15/2019    ALT 7 (L) 05/15/2019    ANIONGAP 4 (L) 05/15/2019    ESTGFRAFRICA >60.0 05/15/2019    EGFRNONAA >60.0 05/15/2019    WBC 4.12 05/15/2019    HGB 12.9 05/15/2019    HGB 13.7 05/03/2018    HCT 41.1 05/15/2019    MCV 92 05/15/2019     05/15/2019    TSH 1.731 05/02/2017       Past Medical History:   Diagnosis Date    Dysmenorrhea     Eye abnormalities     early yellowing of lens, precursor to cataracts, Dr Webster,  wear glasses at all times    Intramural leiomyoma of uterus 5/22/2019    Irregular, dysmenorrhea    Irregular menses 05/09/2017    Ocular migraine     vision fuzzy x 3 in 2013, metallic shapes in her vision, Dr Webster,  resolved after 30 min , no pain    Paradoxical insomnia 10/30/2017    prior to menses, amitryptiline 2.5 mg prn  qhs helps    Posterior vitreous detachment of left eye 5/22/2019    Flashes of light, ocular migranes, Ophthalmologist Dr Orourke, retinal specialist Dr Soni, rec avoid exercise until she follows up 5/2019    Shoulder pain, left 2007    LEFT, after fall, better with PT at [facility]     Past Surgical History:   Procedure Laterality Date    ABLATION-ENDOMETRIAL WITH  NOVASURE N/A 8/2/2017    Per[facility] at [facility]    COLONOSCOPY N/A 6/9/2017    [facility] (4TH FLR)    DILATION AND CURETTAGE OF UTERUS      ENDOMETRIAL ABLATION N/A 2017    [id number]-[id number]-LGTDFDYMC N/A 8/2/2017    Per[facility] at [facility] OR    [id number]-[id number] Bilateral 11/21/2014    Performed by Arabella Mccarthy MD at [facility] OR    TUBAL LIGATION       Social History     Social History Narrative    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[employer], [employer]  "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 She appears well-developed and well-nourished.   HENT:   Head: Normocephalic and atraumatic.   Right Ear: External ear normal.   Left Ear: External ear normal.   Nose: Nose normal.   Mouth/Throat: Oropharynx is clear and moist.   Eyes: Pupils are equal, round, and reactive to light. EOM are normal.   Neck: Neck supple. No thyromegaly present.   Cardiovascular: Normal rate, regular rhythm, normal heart sounds and intact distal pulses.   No murmur heard.  Pulmonary/Chest: Effort normal and breath sounds normal. She has no wheezes.   Abdominal: Soft. Bowel sounds are normal. She exhibits no distension and no mass. There is tenderness. There is no rebound and no guarding.   Mild tenderness right lower quadrant, new no rebound, no guarding   Musculoskeletal: She exhibits no edema.   Lymphadenopathy:     She has no cervical adenopathy.   Neurological: She is alert and oriented to person, place, and time.   Skin: Skin is warm and dry.   Psychiatric: She has a normal mood and affect. Her behavior is normal.     Assessment:     1. Routine general medical examination at a health care facility    2. RLQ abdominal pain    3. Intramural leiomyoma of uterus    4. Irregular menses    5. Dysmenorrhea    6. Posterior vitreous detachment of left eye    7. Paradoxical insomnia      Plan:          Patient Instructions   Follow " with Ophthalmologist    Follow with GYN - she would like to try vaginal treatment instead of oral pill. Also, concerned about RLQ abd pain  - possible ovarian cyst or her fibroid    If pain worsens, to ER for urgent evaluation of possible appendicits, which is life threatening, but I don't feel that is her diagnosis now. If not better, call me & I can order CT abd/ pelvis    ==============================  RECOMMENDATIONS FOR FEMALES  ==============================  Your #1 MEDICINE is DAILY EXERCISE - 15-20 minutes of huffing & puffing EVERY DAY.     Prevent the #1 cause of death- cardiovascular disease (HEART ATTACK & STROKE) by checking for normal blood pressure, cholesterol, sugars, & by not smoking.     VACCINES: Yearly FLU shot, PNEUMONIA shot after 65,  SHINGLES shot after 50    Screening colonoscopy at AGE  50 & every 10 years to check for COLON CANCER,  one of the most common & preventable cancers (Or FIT kit yearly) Repeat in 3 years if POLYP found     I recommend  high fiber (5 fresh fruits or vegetables daily), low fat diet and aerobic  exercise (huffing/ puffing/ sweating for 20 min straight at least 4 days a week)    Follow up yearly with mammogram, fasting lipids, CMP, CBC prior.   ==============================================================                                  Answers for HPI/ROS submitted by the patient on 5/15/2019   activity change: Yes  unexpected weight change: Yes  neck pain: No  hearing loss: No  rhinorrhea: No  trouble swallowing: No  eye discharge: No  visual disturbance: Yes  chest tightness: No  wheezing: No  chest pain: No  palpitations: No  blood in stool: No  constipation: No  vomiting: No  diarrhea: No  polydipsia: No  polyuria: No  difficulty urinating: No  hematuria: No  menstrual problem: Yes  dysuria: No  joint swelling: No  arthralgias: No  headaches: No  weakness: No  confusion: No  dysphoric mood: No

## 2019-08-20 ENCOUNTER — OFFICE VISIT (OUTPATIENT)
Dept: PRIMARY CARE CLINIC | Facility: CLINIC | Age: 54
End: 2019-08-20
Payer: COMMERCIAL

## 2019-08-20 VITALS
TEMPERATURE: 98 F | BODY MASS INDEX: 22.15 KG/M2 | WEIGHT: 137.81 LBS | HEIGHT: 66 IN | RESPIRATION RATE: 16 BRPM | SYSTOLIC BLOOD PRESSURE: 100 MMHG | DIASTOLIC BLOOD PRESSURE: 82 MMHG

## 2019-08-20 DIAGNOSIS — F51.03 PARADOXICAL INSOMNIA: ICD-10-CM

## 2019-08-20 DIAGNOSIS — D25.1 INTRAMURAL LEIOMYOMA OF UTERUS: ICD-10-CM

## 2019-08-20 DIAGNOSIS — J04.0 LARYNGITIS: Primary | ICD-10-CM

## 2019-08-20 DIAGNOSIS — N92.6 IRREGULAR MENSES: ICD-10-CM

## 2019-08-20 PROCEDURE — 3074F SYST BP LT 130 MM HG: CPT | Mod: CPTII,S$GLB,, | Performed by: FAMILY MEDICINE

## 2019-08-20 PROCEDURE — 99213 OFFICE O/P EST LOW 20 MIN: CPT | Mod: 25,S$GLB,, | Performed by: FAMILY MEDICINE

## 2019-08-20 PROCEDURE — 3008F PR BODY MASS INDEX (BMI) DOCUMENTED: ICD-10-PCS | Mod: CPTII,S$GLB,, | Performed by: FAMILY MEDICINE

## 2019-08-20 PROCEDURE — 3079F PR MOST RECENT DIASTOLIC BLOOD PRESSURE 80-89 MM HG: ICD-10-PCS | Mod: CPTII,S$GLB,, | Performed by: FAMILY MEDICINE

## 2019-08-20 PROCEDURE — 3074F PR MOST RECENT SYSTOLIC BLOOD PRESSURE < 130 MM HG: ICD-10-PCS | Mod: CPTII,S$GLB,, | Performed by: FAMILY MEDICINE

## 2019-08-20 PROCEDURE — 3079F DIAST BP 80-89 MM HG: CPT | Mod: CPTII,S$GLB,, | Performed by: FAMILY MEDICINE

## 2019-08-20 PROCEDURE — 99999 PR PBB SHADOW E&M-EST. PATIENT-LVL III: ICD-10-PCS | Mod: PBBFAC,,, | Performed by: FAMILY MEDICINE

## 2019-08-20 PROCEDURE — 99213 PR OFFICE/OUTPT VISIT, EST, LEVL III, 20-29 MIN: ICD-10-PCS | Mod: 25,S$GLB,, | Performed by: FAMILY MEDICINE

## 2019-08-20 PROCEDURE — 99999 PR PBB SHADOW E&M-EST. PATIENT-LVL III: CPT | Mod: PBBFAC,,, | Performed by: FAMILY MEDICINE

## 2019-08-20 PROCEDURE — 96372 THER/PROPH/DIAG INJ SC/IM: CPT | Mod: S$GLB,,, | Performed by: FAMILY MEDICINE

## 2019-08-20 PROCEDURE — 96372 PR INJECTION,THERAP/PROPH/DIAG2ST, IM OR SUBCUT: ICD-10-PCS | Mod: S$GLB,,, | Performed by: FAMILY MEDICINE

## 2019-08-20 PROCEDURE — 3008F BODY MASS INDEX DOCD: CPT | Mod: CPTII,S$GLB,, | Performed by: FAMILY MEDICINE

## 2019-08-20 RX ORDER — BENZONATATE 200 MG/1
200 CAPSULE ORAL 3 TIMES DAILY PRN
Qty: 30 CAPSULE | Refills: 0 | Status: SHIPPED | OUTPATIENT
Start: 2019-08-20 | End: 2019-08-30

## 2019-08-20 RX ORDER — FLUCONAZOLE 150 MG/1
150 TABLET ORAL DAILY
Qty: 1 TABLET | Refills: 2 | Status: SHIPPED | OUTPATIENT
Start: 2019-08-20 | End: 2019-08-21

## 2019-08-20 RX ORDER — METHYLPREDNISOLONE 4 MG/1
TABLET ORAL
Qty: 1 PACKAGE | Refills: 0 | Status: SHIPPED | OUTPATIENT
Start: 2019-08-20 | End: 2019-09-10

## 2019-08-20 RX ORDER — AZITHROMYCIN 250 MG/1
TABLET, FILM COATED ORAL
Qty: 6 TABLET | Refills: 0 | Status: SHIPPED | OUTPATIENT
Start: 2019-08-20 | End: 2020-02-01

## 2019-08-20 RX ORDER — TRIAMCINOLONE ACETONIDE 40 MG/ML
40 INJECTION, SUSPENSION INTRA-ARTICULAR; INTRAMUSCULAR
Status: COMPLETED | OUTPATIENT
Start: 2019-08-20 | End: 2019-08-20

## 2019-08-20 RX ADMIN — TRIAMCINOLONE ACETONIDE 40 MG: 40 INJECTION, SUSPENSION INTRA-ARTICULAR; INTRAMUSCULAR at 01:08

## 2019-08-21 PROBLEM — H43.812 POSTERIOR VITREOUS DETACHMENT OF LEFT EYE: Status: RESOLVED | Noted: 2019-05-22 | Resolved: 2019-08-21

## 2019-08-21 PROBLEM — M79.676 PAIN OF FIFTH TOE: Status: RESOLVED | Noted: 2018-08-29 | Resolved: 2019-08-21

## 2019-08-21 PROBLEM — S92.502G: Status: RESOLVED | Noted: 2018-08-21 | Resolved: 2019-08-21

## 2019-08-21 NOTE — PROGRESS NOTES
Subjective:   Patient ID: Vicky Brand is a 53 y.o. female.    Chief Complaint: Cough and Hoarse (voice completely gone)      Sore Throat    This is a new problem. The current episode started in the past 7 days. The problem has been rapidly worsening. Neither side of throat is experiencing more pain than the other. There has been no fever. The pain is at a severity of 8/10. The pain is severe. Associated symptoms include congestion, a hoarse voice and swollen glands. Pertinent negatives include no abdominal pain, coughing, diarrhea, drooling, ear discharge, ear pain, headaches, plugged ear sensation, neck pain, shortness of breath, stridor, trouble swallowing or vomiting. She has had no exposure to mono. She has tried acetaminophen, cool liquids and gargles for the symptoms. The treatment provided no relief.       Patient queried and denies any further complaints.    LOCATION  DURATION  SEVERITY  QUALITY  TIMING  CAUSE  ASSOCIATED SYMPTOMS  MODIFIERS.    ALLERGIES AND MEDICATIONS: updated and reviewed.  Review of patient's allergies indicates:   Allergen Reactions    Sulfa (sulfonamide antibiotics) Rash    Cetirizine      Other reaction(s): Unable to Function  Other reaction(s): Unable to Function    Erythromycin Nausea And Vomiting     Other reaction(s): Nausea    Zyrtec-d  [cetirizine-pseudoephedrine]      Other reaction(s): Inability to focus/function       Current Outpatient Medications:     naproxen sodium (ANAPROX) 550 MG tablet, Take 1 tablet (550 mg total) by mouth 2 (two) times daily with meals., Disp: 30 tablet, Rfl: 3    ospemifene (OSPHENA) 60 mg Tab, Take 60 mg by mouth once daily., Disp: 90 tablet, Rfl: 4    azithromycin (Z-OBI) 250 MG tablet, Take as directed; e-mycin is not a true allergy but causes nausea-she can take azithromycin, Disp: 6 tablet, Rfl: 0    benzonatate (TESSALON) 200 MG capsule, Take 1 capsule (200 mg total) by mouth 3 (three) times daily as needed for Cough.,  Disp: 30 capsule, Rfl: 0    fluconazole (DIFLUCAN) 150 MG Tab, Take 1 tablet (150 mg total) by mouth once daily. As needed for yeast vaginitis for 1 day, Disp: 1 tablet, Rfl: 2    methylPREDNISolone (MEDROL DOSEPACK) 4 mg tablet, use as directed, Disp: 1 Package, Rfl: 0  No current facility-administered medications for this visit.     Review of Systems   Constitutional: Negative for activity change, appetite change, chills, diaphoresis, fatigue, fever and unexpected weight change.   HENT: Positive for congestion, hoarse voice and sore throat. Negative for drooling, ear discharge, ear pain, facial swelling, hearing loss, nosebleeds, postnasal drip, rhinorrhea, sinus pressure, sneezing, tinnitus, trouble swallowing and voice change.    Eyes: Negative for photophobia, pain, discharge, redness, itching and visual disturbance.   Respiratory: Negative for cough, chest tightness, shortness of breath, wheezing and stridor.    Cardiovascular: Negative for chest pain, palpitations and leg swelling.   Gastrointestinal: Negative for abdominal distention, abdominal pain, anal bleeding, blood in stool, constipation, diarrhea, nausea, rectal pain and vomiting.   Endocrine: Negative for cold intolerance, heat intolerance, polydipsia, polyphagia and polyuria.   Genitourinary: Negative for difficulty urinating, dysuria and flank pain.   Musculoskeletal: Negative for arthralgias, back pain, joint swelling, myalgias and neck pain.   Skin: Negative for rash.   Neurological: Negative for dizziness, tremors, seizures, syncope, speech difficulty, weakness, light-headedness, numbness and headaches.   Psychiatric/Behavioral: Negative for behavioral problems, confusion, decreased concentration, dysphoric mood, sleep disturbance and suicidal ideas. The patient is not nervous/anxious and is not hyperactive.        Objective:     Vitals:    08/20/19 1332   BP: 100/82   Resp: 16   Temp: 98.2 °F (36.8 °C)   Weight: 62.5 kg (137 lb 12.6 oz)  "  Height: 5' 6" (1.676 m)   PainSc: 0-No pain     Body mass index is 22.24 kg/m².    Physical Exam   Constitutional: She is oriented to person, place, and time. She appears well-developed and well-nourished. She is cooperative. She does not have a sickly appearance. No distress.   HENT:   Head: Normocephalic and atraumatic.   Right Ear: Hearing, tympanic membrane, external ear and ear canal normal. No tenderness.   Left Ear: Hearing, tympanic membrane, external ear and ear canal normal. No tenderness.   Nose: Nose normal.   Mouth/Throat: Oropharynx is clear and moist.   Eyes: Pupils are equal, round, and reactive to light. Conjunctivae and lids are normal. Right eye exhibits no discharge. Left eye exhibits no discharge. Right conjunctiva is not injected. Left conjunctiva is not injected. No scleral icterus. Right eye exhibits normal extraocular motion. Left eye exhibits normal extraocular motion.   Neck: Normal range of motion. Neck supple. No JVD present. Carotid bruit is not present. No tracheal deviation and no edema present. No thyromegaly present.   Cardiovascular: Normal rate, regular rhythm, normal heart sounds and normal pulses. Exam reveals no friction rub.   No murmur heard.  Pulmonary/Chest: Effort normal and breath sounds normal. No accessory muscle usage. No respiratory distress. She has no wheezes. She has no rhonchi. She has no rales.   Abdominal: Soft. Bowel sounds are normal. She exhibits no distension, no abdominal bruit, no pulsatile midline mass and no mass. There is no hepatosplenomegaly. There is no tenderness. There is no rebound, no guarding, no CVA tenderness, no tenderness at McBurney's point and negative Delgadillo's sign.   Musculoskeletal: She exhibits no edema.   Lymphadenopathy:        Head (right side): No submandibular, no preauricular and no posterior auricular adenopathy present.        Head (left side): No submandibular, no preauricular and no posterior auricular adenopathy present. "     She has no cervical adenopathy.   Neurological: She is alert and oriented to person, place, and time. GCS eye subscore is 4. GCS verbal subscore is 5. GCS motor subscore is 6.   Skin: Skin is warm and dry. No ecchymosis and no rash noted. Rash is not maculopapular and not urticarial. She is not diaphoretic. No cyanosis or erythema. Nails show no clubbing.   Psychiatric: She has a normal mood and affect. Her speech is normal and behavior is normal. Thought content normal. Her mood appears not anxious. Her affect is not angry and not inappropriate. She does not exhibit a depressed mood.   Nursing note and vitals reviewed.      Assessment and Plan:   Vicky was seen today for cough and hoarse.    Diagnoses and all orders for this visit:    Laryngitis    Irregular menses    Intramural leiomyoma of uterus    Paradoxical insomnia    Other orders  -     triamcinolone acetonide injection 40 mg  -     azithromycin (Z-OBI) 250 MG tablet; Take as directed; e-mycin is not a true allergy but causes nausea-she can take azithromycin  -     methylPREDNISolone (MEDROL DOSEPACK) 4 mg tablet; use as directed  -     fluconazole (DIFLUCAN) 150 MG Tab; Take 1 tablet (150 mg total) by mouth once daily. As needed for yeast vaginitis for 1 day  -     benzonatate (TESSALON) 200 MG capsule; Take 1 capsule (200 mg total) by mouth 3 (three) times daily as needed for Cough.    Hydrate, rest, OTC Mucinex Expectorant as directed, Nasal saline as needed.  OTC Zyrtec as directed.      Follow up in about 2 weeks (around 9/3/2019).    THIS NOTE WILL BE SHARED WITH THE PATIENT.

## 2019-10-25 ENCOUNTER — HOSPITAL ENCOUNTER (OUTPATIENT)
Dept: RADIOLOGY | Facility: HOSPITAL | Age: 54
Discharge: HOME | End: 2019-10-25
Attending: OBSTETRICS & GYNECOLOGY
Payer: COMMERCIAL

## 2019-10-25 DIAGNOSIS — Z12.39 BREAST CANCER SCREENING: ICD-10-CM

## 2019-10-25 PROCEDURE — 77067 SCR MAMMO BI INCL CAD: CPT | Mod: TC,PO

## 2019-10-25 PROCEDURE — 77067 SCR MAMMO BI INCL CAD: CPT | Mod: 26,,, | Performed by: RADIOLOGY

## 2019-10-25 PROCEDURE — 77063 MAMMO DIGITAL SCREENING BILAT WITH TOMOSYNTHESIS_CAD: ICD-10-PCS | Mod: 26,,, | Performed by: RADIOLOGY

## 2019-10-25 PROCEDURE — 77067 MAMMO DIGITAL SCREENING BILAT WITH TOMOSYNTHESIS_CAD: ICD-10-PCS | Mod: 26,,, | Performed by: RADIOLOGY

## 2019-10-25 PROCEDURE — 77063 BREAST TOMOSYNTHESIS BI: CPT | Mod: 26,,, | Performed by: RADIOLOGY

## 2019-12-11 ENCOUNTER — PATIENT MESSAGE (OUTPATIENT)
Dept: PRIMARY CARE CLINIC | Facility: CLINIC | Age: 54
End: 2019-12-11

## 2020-01-16 ENCOUNTER — PATIENT MESSAGE (OUTPATIENT)
Dept: OBSTETRICS AND GYNECOLOGY | Facility: CLINIC | Age: 55
End: 2020-01-16

## 2020-01-16 ENCOUNTER — TELEPHONE (OUTPATIENT)
Dept: OBSTETRICS AND GYNECOLOGY | Facility: CLINIC | Age: 55
End: 2020-01-16

## 2020-01-16 DIAGNOSIS — N95.1 MENOPAUSAL SYMPTOMS: Primary | ICD-10-CM

## 2020-01-16 NOTE — TELEPHONE ENCOUNTER
Patient stated that she would like to revisit hormone replacement to help with episodes of crying and painful intercourse. Patient stated that the previous medication that was suggested had a side effect of cancer, so she did not take it. Requesting labs to check her hormone levels.

## 2020-01-21 ENCOUNTER — LAB VISIT (OUTPATIENT)
Dept: LAB | Facility: HOSPITAL | Age: 55
End: 2020-01-21
Attending: OBSTETRICS & GYNECOLOGY
Payer: COMMERCIAL

## 2020-01-21 DIAGNOSIS — N95.1 MENOPAUSAL SYMPTOMS: ICD-10-CM

## 2020-01-21 LAB
ESTRADIOL SERPL-MCNC: <10 PG/ML
FSH SERPL-ACNC: 125.3 MIU/ML
PROGEST SERPL-MCNC: 0.2 NG/ML

## 2020-01-21 PROCEDURE — 36415 COLL VENOUS BLD VENIPUNCTURE: CPT | Mod: PO

## 2020-01-21 PROCEDURE — 82670 ASSAY OF TOTAL ESTRADIOL: CPT

## 2020-01-21 PROCEDURE — 84144 ASSAY OF PROGESTERONE: CPT

## 2020-01-21 PROCEDURE — 83001 ASSAY OF GONADOTROPIN (FSH): CPT

## 2020-01-21 PROCEDURE — 84402 ASSAY OF FREE TESTOSTERONE: CPT

## 2020-01-22 ENCOUNTER — TELEPHONE (OUTPATIENT)
Dept: OBSTETRICS AND GYNECOLOGY | Facility: CLINIC | Age: 55
End: 2020-01-22

## 2020-01-22 LAB — TESTOST FREE SERPL-MCNC: 0.5 PG/ML

## 2020-01-22 NOTE — TELEPHONE ENCOUNTER
Your numbers are in the postmenopausal range. We can start a low dose estrogen and progesterone tablet to help with the symptoms of menopause such as hot flashes decreased sex drive, mood symptoms but the vaginal dryness can be helped with nightly intrarosa vaginal suppositories without getting on systemic hormones.  Do you want me to send that medication into the pharmacy instead?  ABEBA Jarrell MD

## 2020-01-22 NOTE — TELEPHONE ENCOUNTER
Patient is requesting a prescription for hormone replacement therapy based on her lab results.    I received the results of the blood work; if I am reading them correctly, they all show my hormones to fall within the post menopausal range. That would explain the pain with intercourse. What are the lowest doses of hormone replacement that I could consider using in topical or pill form to reduce pain with intercourse?   Thanks,   Melvi Brand

## 2020-01-24 ENCOUNTER — OFFICE VISIT (OUTPATIENT)
Dept: URGENT CARE | Facility: CLINIC | Age: 55
End: 2020-01-24
Payer: COMMERCIAL

## 2020-01-24 ENCOUNTER — TELEPHONE (OUTPATIENT)
Dept: PRIMARY CARE CLINIC | Facility: CLINIC | Age: 55
End: 2020-01-24

## 2020-01-24 VITALS
RESPIRATION RATE: 17 BRPM | DIASTOLIC BLOOD PRESSURE: 71 MMHG | HEART RATE: 82 BPM | BODY MASS INDEX: 22.02 KG/M2 | WEIGHT: 137 LBS | OXYGEN SATURATION: 100 % | SYSTOLIC BLOOD PRESSURE: 129 MMHG | TEMPERATURE: 97 F | HEIGHT: 66 IN

## 2020-01-24 DIAGNOSIS — T78.40XA ALLERGIC REACTION, INITIAL ENCOUNTER: Primary | ICD-10-CM

## 2020-01-24 PROCEDURE — 99214 OFFICE O/P EST MOD 30 MIN: CPT | Mod: 25,S$GLB,, | Performed by: PHYSICIAN ASSISTANT

## 2020-01-24 PROCEDURE — 96372 PR INJECTION,THERAP/PROPH/DIAG2ST, IM OR SUBCUT: ICD-10-PCS | Mod: S$GLB,,, | Performed by: PHYSICIAN ASSISTANT

## 2020-01-24 PROCEDURE — 99214 PR OFFICE/OUTPT VISIT, EST, LEVL IV, 30-39 MIN: ICD-10-PCS | Mod: 25,S$GLB,, | Performed by: PHYSICIAN ASSISTANT

## 2020-01-24 PROCEDURE — 96372 THER/PROPH/DIAG INJ SC/IM: CPT | Mod: S$GLB,,, | Performed by: PHYSICIAN ASSISTANT

## 2020-01-24 RX ORDER — METHYLPREDNISOLONE 4 MG/1
4 TABLET ORAL SEE ADMIN INSTRUCTIONS
Qty: 1 PACKAGE | Refills: 0 | Status: SHIPPED | OUTPATIENT
Start: 2020-01-25 | End: 2020-02-01

## 2020-01-24 RX ORDER — TRIAMCINOLONE ACETONIDE 0.25 MG/G
CREAM TOPICAL 2 TIMES DAILY
Qty: 80 G | Refills: 0 | Status: SHIPPED | OUTPATIENT
Start: 2020-01-24 | End: 2020-02-05

## 2020-01-24 NOTE — PATIENT INSTRUCTIONS
Start medrol dose epi tomorrow  Start today with benadryl every 6 hours, zantac daily once  If anything worsens please go to the emergency room  Use gentle unscented detergent, avoid the cold and flu medication you used as well as avoid peanuts  Follow up with allergist in 1 week  Apply triamcinolone to any rash      Please return here or go to the Emergency Department for any concerns or worsening of condition.  If you were prescribed antibiotics, please take them to completion.  If you were prescribed a narcotic medication, do not drive or operate heavy equipment or machinery while taking these medications.  Please follow up with your primary care doctor or specialist as needed.    If you  smoke, please stop smoking.        General Allergic Reactions  An allergic reaction is a set of symptoms caused by an allergen. An allergen is something that causes a persons immune system to react. When a person comes in contact with an allergen, it causes the body to release chemicals. These include the chemical histamine. Histamine causes swelling and itching. It may affect the entire body. This is called a general allergic reaction. Often symptoms affect only 1 part of the body. This is called a local allergic reaction.  You are having an allergic reaction. Almost anything can cause one. Different people are allergic to different things. It is usually something that you ate or swallowed, came into contact with by getting or putting it on your skin or clothes, or something you breathed in the air. This can be very annoying and sometimes scary.  Most of us think of allergic reactions when we have a rash or itchy skin. Symptoms can include:  · Itching of the eyes, nose, and roof of the mouth  · Runny or stuffy nose  · Watery eyes   · Sneezing or coughing   · A blocked feeling in the ear  · Red, itchy rash called hives  · Red and purple spots  · Rash, redness, welts, blisters  · Itching, burning, stinging, pain  · Dry, flaky,  cracking, scaly skin  Severe symptoms include:  · Swelling of the face, lips, or other parts of the body  · Hoarse voice  · Trouble swallowing, feeling like your throat is closing  · Trouble breathing, wheezing  · Nausea, vomiting, diarrhea, stomach cramps  · Feeling faint or lightheaded, rapid heart rate  Sometimes the cause may be obvious. But there are so many things that can cause a reaction that you may not be able to figure out. The most important things to help find your allergen are:  · Remembering when it started  · What you were doing at the time or just before that  · Any activities you were involved in  · Any new products or contacts  Below are some common causes. But remember that almost anything can cause a reaction. You may not even be aware that you came into contact with one of these things:  · Dust, mold, pollen  · Plants (common ones are poison ivy and poison oak, but there are many others)   · Animals  · Foods such as shrimp, shellfish, peanuts, milk products, gluten, and eggs. Also food colorings, flavorings, and additives.  · Insect bites or stings such as bees, mosquitos, fleas, ticks  · Medicines such as penicillin, sulfa medicines, amoxicillin, aspirin, and ibuprofen. But any medicine can cause a reaction.  · Jewelry such as nickel or gold. This can be new, or something youve worn for a while, including zippers and buttons.  · Latex such as in gloves, clothes, toys, balloons, or some tapes. Some people allergic to latex may also have problems with foods like bananas, avocados, kiwi, papaya, or chestnuts.  · Lotions, perfumes, cosmetics, soaps, shampoos, skincare products, nail products  · Chemicals or dyes in clothing, linen, , hair dyes, soaps, iodine  Many viruses and common colds can cause a rash that is not an allergic reaction. Sometimes it is hard to tell the difference between allergies, sensitivity, or an intolerance to something. This is especially true with food. Many  things can cause diarrhea, vomiting, stomach cramps, and skin irritation.  Home care    The goal of treatment is to help relieve the symptoms and get you feeling better. The rash will usually fade over several days. But it can sometimes last a couple of weeks. Over the next couple of days, there may be times when it is gets a little worse, and then better again. Here are some things to do:  · If you know what you are allergic to, stay away from it. Future reactions could be worse than this one.  · Avoid tight clothing and anything that heats up your skin (hot showers or baths, direct sunlight). Heat will make itching worse.  · An ice pack will relieve local areas of intense itching and redness. To make an ice pack, put ice cubes in a plastic bag that seals at the top. Wrap it in a thin, clean towel. Dont put the ice directly on the skin because it can damage the skin.  · Oral diphenhydramine is an over-the-counter antihistamine sold at pharmacy and grocery stores. Unless a prescription antihistamine was given, diphenhydramine may be used to reduce itching if large areas of the skin are involved. It may make you sleepy. So be careful using it in the daytime or when going to school, working, or driving. Note: Dont use diphenhydramine if you have glaucoma or if you are a man with trouble urinating due to an enlarged prostate. There are other antihistamines that wont make you so sleepy. These are good choices for daytime use. Ask your pharmacist for suggestions.  · Dont use diphenhydramine cream on your skin. It can cause a further reaction in some people.  · To help prevent an infection, don't scratch the affected area. Scratching may worsen the reaction and damage your skin. It can also lead to an infection. Always check the affected for signs of an infection.  · Call your healthcare provider and ask what you can use to help decrease the itching.  · To decrease allergic reactions, try the following:    · Use  heat-steam to clean your home  · Use high-efficiency particulate (HEPA) vacuums and filters  · Stay away from food and pet triggers  · Kill any cockroaches  · Clean your house often  Follow-up care  Follow up with your healthcare provider, or as advised. If you had a severe reaction today, or if you have had several mild to medium allergic reactions in the past, ask your provider about allergy testing. This can help you find out what you are allergic to. If your reaction included dizziness, fainting, or trouble breathing or swallowing, ask your provider about carrying auto-injectable epinephrine.  Call 911  Call 911 if any of these occur:  · Trouble breathing or swallowing, wheezing  · Cool, moist, pale skin  · Shortness of breath  · Hoarse voice or trouble speaking  · Confused   · Very drowsy or trouble awakening  · Fainting or loss of consciousness  · Rapid heart rate  · Feeling of dizziness or weakness or a sudden drop in blood pressure  · Feeling of doom  · Feeling lightheaded  · Severe nausea or vomiting, or diarrhea  · Seizure  · Swelling in the face, eyelids, lips, mouth, throat or tongue  · Drooling  When to seek medical advice  Call your healthcare provider right away if any of these occur:  · Spreading areas of itching, redness or swelling  · Nausea or stomach cramps or abdominal pain  · Continuing or recurring symptoms  · Spreading areas of redness, swelling, or itching  · Signs of infection at the affected site:  ¨ Spreading redness  ¨ Increased pain or swelling  ¨ Fluid or colored drainage from the site  ¨ Fever of 100.4°F (38°C) or above lasting for 24 to 48 hours, or as directed by your provider  Date Last Reviewed: 3/1/2017  © 9432-0282 [employer]. [address] [place] [address], [place] [address]. All rights reserved. This information is not intended as a substitute for professional medical care. Always follow your healthcare professional's instructions.

## 2020-01-24 NOTE — TELEPHONE ENCOUNTER
Incoming call received from patient stating that she has just gotten out of the shower and she has noticed this rash developing all over her body.  Denies taking any new prescribed medication that she may be allergic to.  Does admit to taking some antihistamine medication from the [facility] that she has taken in the past but has never reacted to.  WE have no appointments, so I advised her to put the medication in her purse along with the box that it came in and go to the [facility] [address] to be evaluated because she is probably allergic to some ingredient in that medication.  States that she will do that.  Also advised her to be very careful purchasing medication from [employer] stores as a lot of those medications are not regulated by the FDA.

## 2020-01-24 NOTE — PROGRESS NOTES
"Subjective:       Patient ID: Vicky Brand is a 54 y.o. female.    Vitals:  height is 5' 6" (1.676 m) and weight is 62.1 kg (137 lb). Her oral temperature is 97.3 °F (36.3 °C). Her blood pressure is 129/71 and her pulse is 82. Her respiration is 17 and oxygen saturation is 100%.     Chief Complaint: Rash    This is a 54-year-old female presents complaining of diffuse rash to bilateral arms, abdomen, upper legs that started this morning.  Rash has been improving but is still present.  It is red and itchy.  This started after being exposed to new detergent over the past week.  She also reports that she ate peanuts yesterday as well as took a cold medication.  She is unsure what caused her reaction.  She denies any shortness of breath, difficulty breathing, tongue swelling, throat swelling, difficulty swallowing.  She denies any allergies other than sulfa antibiotic drugs.    Rash   This is a new problem. The current episode started yesterday. The rash is diffuse. The rash is characterized by itchiness, redness and dryness. She was exposed to a new detergent/soap. Pertinent negatives include no cough, fever or sore throat. Treatments tried: cold and allergy otc. The treatment provided no relief. Her past medical history is significant for allergies.       Constitution: Negative for chills and fever.   HENT: Negative for facial swelling and sore throat.    Neck: Negative for painful lymph nodes.   Eyes: Negative for eye itching and eyelid swelling.   Respiratory: Negative for cough.    Musculoskeletal: Negative for joint pain and joint swelling.   Skin: Positive for rash. Negative for color change, pale, wound, abrasion, laceration, lesion, skin thickening/induration, puncture wound, erythema, bruising, abscess, avulsion and hives.   Allergic/Immunologic: Negative for environmental allergies, immunocompromised state and hives.   Hematologic/Lymphatic: Negative for swollen lymph nodes.       Objective:    "   Physical Exam   Constitutional: She is oriented to person, place, and time. She appears well-developed and well-nourished.   HENT:   Head: Normocephalic and atraumatic. Head is without abrasion, without contusion and without laceration.   Right Ear: External ear normal.   Left Ear: External ear normal.   Nose: Nose normal.   Mouth/Throat: Oropharynx is clear and moist and mucous membranes are normal.   Airway open   Eyes: Pupils are equal, round, and reactive to light. Conjunctivae, EOM and lids are normal.   Neck: Trachea normal, full passive range of motion without pain and phonation normal. Neck supple.   Cardiovascular: Normal rate, regular rhythm and normal heart sounds.   Pulmonary/Chest: Effort normal and breath sounds normal. No stridor. No respiratory distress.   Musculoskeletal: Normal range of motion.   Neurological: She is alert and oriented to person, place, and time.   Skin: Skin is warm, dry and intact. Capillary refill takes less than 2 seconds.   Macular papular urticaria 2 bilateral inner upper arms, bilateral upper inner thighs.  No rash to abdomen or back. abrasion, burn, bruising, erythema and ecchymosis  Psychiatric: She has a normal mood and affect. Her speech is normal and behavior is normal. Judgment and thought content normal. Cognition and memory are normal.   Nursing note and vitals reviewed.        Assessment:       1. Allergic reaction, initial encounter        Plan:           Allergic reaction, initial encounter  -     methylPREDNISolone sod suc(PF) injection 125 mg  -     triamcinolone acetonide 0.025% (KENALOG) 0.025 % cream; Apply topically 2 (two) times daily. for 10 days  Dispense: 80 g; Refill: 0  -     methylPREDNISolone (MEDROL DOSEPACK) 4 mg tablet; Take 1 tablet (4 mg total) by mouth As instructed (Take as directed). Take as directed  Dispense: 1 Package; Refill: 0         Patient Instructions   Start medrol dose epi tomorrow  Start today with benadryl every 6 hours, zantac  daily once  If anything worsens please go to the emergency room  Use gentle unscented detergent, avoid the cold and flu medication you used as well as avoid peanuts  Follow up with allergist in 1 week  Apply triamcinolone to any rash      Please return here or go to the Emergency Department for any concerns or worsening of condition.  If you were prescribed antibiotics, please take them to completion.  If you were prescribed a narcotic medication, do not drive or operate heavy equipment or machinery while taking these medications.  Please follow up with your primary care doctor or specialist as needed.    If you  smoke, please stop smoking.        General Allergic Reactions  An allergic reaction is a set of symptoms caused by an allergen. An allergen is something that causes a persons immune system to react. When a person comes in contact with an allergen, it causes the body to release chemicals. These include the chemical histamine. Histamine causes swelling and itching. It may affect the entire body. This is called a general allergic reaction. Often symptoms affect only 1 part of the body. This is called a local allergic reaction.  You are having an allergic reaction. Almost anything can cause one. Different people are allergic to different things. It is usually something that you ate or swallowed, came into contact with by getting or putting it on your skin or clothes, or something you breathed in the air. This can be very annoying and sometimes scary.  Most of us think of allergic reactions when we have a rash or itchy skin. Symptoms can include:  · Itching of the eyes, nose, and roof of the mouth  · Runny or stuffy nose  · Watery eyes   · Sneezing or coughing   · A blocked feeling in the ear  · Red, itchy rash called hives  · Red and purple spots  · Rash, redness, welts, blisters  · Itching, burning, stinging, pain  · Dry, flaky, cracking, scaly skin  Severe symptoms include:  · Swelling of the face, lips, or  other parts of the body  · Hoarse voice  · Trouble swallowing, feeling like your throat is closing  · Trouble breathing, wheezing  · Nausea, vomiting, diarrhea, stomach cramps  · Feeling faint or lightheaded, rapid heart rate  Sometimes the cause may be obvious. But there are so many things that can cause a reaction that you may not be able to figure out. The most important things to help find your allergen are:  · Remembering when it started  · What you were doing at the time or just before that  · Any activities you were involved in  · Any new products or contacts  Below are some common causes. But remember that almost anything can cause a reaction. You may not even be aware that you came into contact with one of these things:  · Dust, mold, pollen  · Plants (common ones are poison ivy and poison oak, but there are many others)   · Animals  · Foods such as shrimp, shellfish, peanuts, milk products, gluten, and eggs. Also food colorings, flavorings, and additives.  · Insect bites or stings such as bees, mosquitos, fleas, ticks  · Medicines such as penicillin, sulfa medicines, amoxicillin, aspirin, and ibuprofen. But any medicine can cause a reaction.  · Jewelry such as nickel or gold. This can be new, or something youve worn for a while, including zippers and buttons.  · Latex such as in gloves, clothes, toys, balloons, or some tapes. Some people allergic to latex may also have problems with foods like bananas, avocados, kiwi, papaya, or chestnuts.  · Lotions, perfumes, cosmetics, soaps, shampoos, skincare products, nail products  · Chemicals or dyes in clothing, linen, , hair dyes, soaps, iodine  Many viruses and common colds can cause a rash that is not an allergic reaction. Sometimes it is hard to tell the difference between allergies, sensitivity, or an intolerance to something. This is especially true with food. Many things can cause diarrhea, vomiting, stomach cramps, and skin irritation.  Home  care    The goal of treatment is to help relieve the symptoms and get you feeling better. The rash will usually fade over several days. But it can sometimes last a couple of weeks. Over the next couple of days, there may be times when it is gets a little worse, and then better again. Here are some things to do:  · If you know what you are allergic to, stay away from it. Future reactions could be worse than this one.  · Avoid tight clothing and anything that heats up your skin (hot showers or baths, direct sunlight). Heat will make itching worse.  · An ice pack will relieve local areas of intense itching and redness. To make an ice pack, put ice cubes in a plastic bag that seals at the top. Wrap it in a thin, clean towel. Dont put the ice directly on the skin because it can damage the skin.  · Oral diphenhydramine is an over-the-counter antihistamine sold at pharmacy and grocery stores. Unless a prescription antihistamine was given, diphenhydramine may be used to reduce itching if large areas of the skin are involved. It may make you sleepy. So be careful using it in the daytime or when going to school, working, or driving. Note: Dont use diphenhydramine if you have glaucoma or if you are a man with trouble urinating due to an enlarged prostate. There are other antihistamines that wont make you so sleepy. These are good choices for daytime use. Ask your pharmacist for suggestions.  · Dont use diphenhydramine cream on your skin. It can cause a further reaction in some people.  · To help prevent an infection, don't scratch the affected area. Scratching may worsen the reaction and damage your skin. It can also lead to an infection. Always check the affected for signs of an infection.  · Call your healthcare provider and ask what you can use to help decrease the itching.  · To decrease allergic reactions, try the following:    · Use heat-steam to clean your home  · Use high-efficiency particulate (HEPA) vacuums and  filters  · Stay away from food and pet triggers  · Kill any cockroaches  · Clean your house often  Follow-up care  Follow up with your healthcare provider, or as advised. If you had a severe reaction today, or if you have had several mild to medium allergic reactions in the past, ask your provider about allergy testing. This can help you find out what you are allergic to. If your reaction included dizziness, fainting, or trouble breathing or swallowing, ask your provider about carrying auto-injectable epinephrine.  Call 911  Call 911 if any of these occur:  · Trouble breathing or swallowing, wheezing  · Cool, moist, pale skin  · Shortness of breath  · Hoarse voice or trouble speaking  · Confused   · Very drowsy or trouble awakening  · Fainting or loss of consciousness  · Rapid heart rate  · Feeling of dizziness or weakness or a sudden drop in blood pressure  · Feeling of doom  · Feeling lightheaded  · Severe nausea or vomiting, or diarrhea  · Seizure  · Swelling in the face, eyelids, lips, mouth, throat or tongue  · Drooling  When to seek medical advice  Call your healthcare provider right away if any of these occur:  · Spreading areas of itching, redness or swelling  · Nausea or stomach cramps or abdominal pain  · Continuing or recurring symptoms  · Spreading areas of redness, swelling, or itching  · Signs of infection at the affected site:  ¨ Spreading redness  ¨ Increased pain or swelling  ¨ Fluid or colored drainage from the site  ¨ Fever of 100.4°F (38°C) or above lasting for 24 to 48 hours, or as directed by your provider  Date Last Reviewed: 3/1/2017  © 5459-6498 [employer]. [address] [place] [address], [place] [address]. All rights reserved. This information is not intended as a substitute for professional medical care. Always follow your healthcare professional's instructions.

## 2020-01-28 ENCOUNTER — PATIENT MESSAGE (OUTPATIENT)
Dept: PRIMARY CARE CLINIC | Facility: CLINIC | Age: 55
End: 2020-01-28

## 2020-01-28 DIAGNOSIS — J37.0 LARYNGITIS, CHRONIC: Primary | ICD-10-CM

## 2020-01-30 ENCOUNTER — PATIENT MESSAGE (OUTPATIENT)
Dept: INTERNAL MEDICINE | Facility: CLINIC | Age: 55
End: 2020-01-30

## 2020-01-31 ENCOUNTER — HOSPITAL ENCOUNTER (OUTPATIENT)
Dept: RADIOLOGY | Facility: HOSPITAL | Age: 55
Discharge: HOME | End: 2020-01-31
Attending: INTERNAL MEDICINE
Payer: COMMERCIAL

## 2020-01-31 ENCOUNTER — OFFICE VISIT (OUTPATIENT)
Dept: INTERNAL MEDICINE | Facility: CLINIC | Age: 55
End: 2020-01-31
Payer: COMMERCIAL

## 2020-01-31 VITALS
BODY MASS INDEX: 21.86 KG/M2 | HEART RATE: 84 BPM | SYSTOLIC BLOOD PRESSURE: 124 MMHG | DIASTOLIC BLOOD PRESSURE: 78 MMHG | HEIGHT: 66 IN | OXYGEN SATURATION: 99 % | WEIGHT: 136 LBS

## 2020-01-31 DIAGNOSIS — J04.0 LARYNGITIS, ACUTE: ICD-10-CM

## 2020-01-31 DIAGNOSIS — J00 ACUTE RHINITIS: ICD-10-CM

## 2020-01-31 DIAGNOSIS — J04.0 LARYNGITIS, ACUTE: Primary | ICD-10-CM

## 2020-01-31 PROCEDURE — 99999 PR PBB SHADOW E&M-EST. PATIENT-LVL III: CPT | Mod: PBBFAC,,, | Performed by: INTERNAL MEDICINE

## 2020-01-31 PROCEDURE — 3074F PR MOST RECENT SYSTOLIC BLOOD PRESSURE < 130 MM HG: ICD-10-PCS | Mod: CPTII,S$GLB,, | Performed by: INTERNAL MEDICINE

## 2020-01-31 PROCEDURE — 99214 OFFICE O/P EST MOD 30 MIN: CPT | Mod: S$GLB,,, | Performed by: INTERNAL MEDICINE

## 2020-01-31 PROCEDURE — 3008F BODY MASS INDEX DOCD: CPT | Mod: CPTII,S$GLB,, | Performed by: INTERNAL MEDICINE

## 2020-01-31 PROCEDURE — 70360 X-RAY EXAM OF NECK: CPT | Mod: 26,,, | Performed by: RADIOLOGY

## 2020-01-31 PROCEDURE — 3008F PR BODY MASS INDEX (BMI) DOCUMENTED: ICD-10-PCS | Mod: CPTII,S$GLB,, | Performed by: INTERNAL MEDICINE

## 2020-01-31 PROCEDURE — 3078F PR MOST RECENT DIASTOLIC BLOOD PRESSURE < 80 MM HG: ICD-10-PCS | Mod: CPTII,S$GLB,, | Performed by: INTERNAL MEDICINE

## 2020-01-31 PROCEDURE — 99214 PR OFFICE/OUTPT VISIT, EST, LEVL IV, 30-39 MIN: ICD-10-PCS | Mod: S$GLB,,, | Performed by: INTERNAL MEDICINE

## 2020-01-31 PROCEDURE — 3074F SYST BP LT 130 MM HG: CPT | Mod: CPTII,S$GLB,, | Performed by: INTERNAL MEDICINE

## 2020-01-31 PROCEDURE — 70360 X-RAY EXAM OF NECK: CPT | Mod: TC

## 2020-01-31 PROCEDURE — 70360 XR NECK SOFT TISSUE: ICD-10-PCS | Mod: 26,,, | Performed by: RADIOLOGY

## 2020-01-31 PROCEDURE — 3078F DIAST BP <80 MM HG: CPT | Mod: CPTII,S$GLB,, | Performed by: INTERNAL MEDICINE

## 2020-01-31 PROCEDURE — 99999 PR PBB SHADOW E&M-EST. PATIENT-LVL III: ICD-10-PCS | Mod: PBBFAC,,, | Performed by: INTERNAL MEDICINE

## 2020-01-31 RX ORDER — PREDNISONE 20 MG/1
TABLET ORAL
Qty: 24 TABLET | Refills: 0 | Status: SHIPPED | OUTPATIENT
Start: 2020-01-31 | End: 2020-07-23

## 2020-01-31 NOTE — PROGRESS NOTES
54-year-old female  Presents with severe hoarseness and essentially unable to speak or lose in a voice    January 24th presented to urgent care diffuse erythematous rash on the body for few days, there was no other symptoms in conjunction with this, and she feels very strong and in fact that was related to a new detergent  She was given steroid injection and Medrol Dosepak    Started around January 26, the throat voice symptoms developed and has not improved, and she is aware postnasal drip and some nasal congestion    there is no significant sneezing or watery itchy eyes  There is note chest pain, chest tightness, stridor, wheezing, throat pain, abdominal cramps, diarrhea, joint swelling    She did complain of some upper abdominal discomfort while taking the Medrol Dosepak    She has had laryngitis and losing a voice previously when she has had acute allergic reactions of postnasal drip and rhinorrhea   she had a similar situation in August 2019 was given a Medrol Dosepak which was helpful      Examination  Weight 136  Blood pressure 120/72  Pulse 80  Tympanic membranes normal  Nasal mucosa is clear  Or pharynx slightly hyperemic  No palpable adenopathy in the neck  Lungs clear  Heart regular rate and rhythm  Abdominal exam are bowel sounds soft nontender  She is not able to communicate verbally and the  is here helping    Impression  Acute allergic laryngitis  Acute rhinitis    Plan  Allergy referral  Urgent ENT referral while she has had the active symptoms  Soft tissue x-ray of the neck  CBC inflammatory markers IgE level    Prednisone taper over the next 12 days  Pepcid AC once a day or twice a day

## 2020-02-01 ENCOUNTER — PATIENT MESSAGE (OUTPATIENT)
Dept: INTERNAL MEDICINE | Facility: CLINIC | Age: 55
End: 2020-02-01

## 2020-02-04 ENCOUNTER — PATIENT OUTREACH (OUTPATIENT)
Dept: ADMINISTRATIVE | Facility: OTHER | Age: 55
End: 2020-02-04

## 2020-02-05 ENCOUNTER — LAB VISIT (OUTPATIENT)
Dept: LAB | Facility: HOSPITAL | Age: 55
End: 2020-02-05
Attending: STUDENT IN AN ORGANIZED HEALTH CARE EDUCATION/TRAINING PROGRAM
Payer: COMMERCIAL

## 2020-02-05 ENCOUNTER — PATIENT MESSAGE (OUTPATIENT)
Dept: INTERNAL MEDICINE | Facility: CLINIC | Age: 55
End: 2020-02-05

## 2020-02-05 ENCOUNTER — OFFICE VISIT (OUTPATIENT)
Dept: ALLERGY | Facility: CLINIC | Age: 55
End: 2020-02-05
Payer: COMMERCIAL

## 2020-02-05 VITALS — HEIGHT: 66 IN | RESPIRATION RATE: 16 BRPM | WEIGHT: 125.44 LBS | BODY MASS INDEX: 20.16 KG/M2

## 2020-02-05 DIAGNOSIS — R21 RASH: ICD-10-CM

## 2020-02-05 DIAGNOSIS — R49.9 VOICE DISORDER: ICD-10-CM

## 2020-02-05 DIAGNOSIS — R49.9 VOICE DISORDER: Primary | ICD-10-CM

## 2020-02-05 PROCEDURE — 36415 COLL VENOUS BLD VENIPUNCTURE: CPT | Mod: PO

## 2020-02-05 PROCEDURE — 86003 ALLG SPEC IGE CRUDE XTRC EA: CPT | Mod: 59

## 2020-02-05 PROCEDURE — 99999 PR PBB SHADOW E&M-EST. PATIENT-LVL III: ICD-10-PCS | Mod: PBBFAC,,, | Performed by: STUDENT IN AN ORGANIZED HEALTH CARE EDUCATION/TRAINING PROGRAM

## 2020-02-05 PROCEDURE — 99244 PR OFFICE CONSULTATION,LEVEL IV: ICD-10-PCS | Mod: S$GLB,,, | Performed by: STUDENT IN AN ORGANIZED HEALTH CARE EDUCATION/TRAINING PROGRAM

## 2020-02-05 PROCEDURE — 99244 OFF/OP CNSLTJ NEW/EST MOD 40: CPT | Mod: S$GLB,,, | Performed by: STUDENT IN AN ORGANIZED HEALTH CARE EDUCATION/TRAINING PROGRAM

## 2020-02-05 PROCEDURE — 99999 PR PBB SHADOW E&M-EST. PATIENT-LVL III: CPT | Mod: PBBFAC,,, | Performed by: STUDENT IN AN ORGANIZED HEALTH CARE EDUCATION/TRAINING PROGRAM

## 2020-02-05 PROCEDURE — 86003 ALLG SPEC IGE CRUDE XTRC EA: CPT

## 2020-02-05 PROCEDURE — 82785 ASSAY OF IGE: CPT

## 2020-02-05 RX ORDER — CHLORPHENIRAMINE MALEATE 4 MG
4 TABLET ORAL EVERY 6 HOURS PRN
COMMUNITY
End: 2020-07-23

## 2020-02-05 RX ORDER — OMEPRAZOLE 20 MG/1
CAPSULE, DELAYED RELEASE ORAL
COMMUNITY
Start: 2020-01-31 | End: 2020-07-23

## 2020-02-05 RX ORDER — GUAIFENESIN 400 MG/1
400 TABLET ORAL EVERY 6 HOURS PRN
COMMUNITY
End: 2020-07-23

## 2020-02-05 RX ORDER — IPRATROPIUM BROMIDE 21 UG/1
2 SPRAY, METERED NASAL 2 TIMES DAILY
Qty: 30 ML | Refills: 5 | Status: SHIPPED | OUTPATIENT
Start: 2020-02-05 | End: 2020-07-23

## 2020-02-05 RX ORDER — CEFDINIR 300 MG/1
CAPSULE ORAL
COMMUNITY
Start: 2020-01-31 | End: 2020-07-23

## 2020-02-05 NOTE — LETTER
February 7, 2020      Eder Holt MD  [address]  [facility] [address]           [facility]  [address] [facility] [address]  [facility] [address]  Phone: [phone]  Fax: [phone]          Patient: Vicky Brand   MR Number: [id number]   YOB: 1965   Date of Visit: 2/5/2020       Dear Dr. Eder Holt:    Thank you for referring Vicky Brand to me for evaluation. Attached you will find relevant portions of my assessment and plan of care.    If you have questions, please do not hesitate to call me. I look forward to following Vicky Brand along with you.    Sincerely,    Lori Fernandes MD    Enclosure  CC:  No Recipients    If you would like to receive this communication electronically, please contact [email or web address] or [phone] to request more information on "[employer]" Link access.    For providers and/or their staff who would like to refer a patient to [facility], please contact us through our one-stop-shop provider referral line, [facility], at [phone].    If you feel you have received this communication in error or would no longer like to receive these types of communications, please e-mail [email or web address]

## 2020-02-05 NOTE — PATIENT INSTRUCTIONS
Testing  Blood work for allergy testing        Check [facility] in one week for results or call [phone]       Contact me with questions or concerns       I will contact you if anything needs immediate attention.      Keep apt with Dr Roberts at [facility]        Treatment  ipatropium nasal spray:  2 squirts each nostril twice a day  Stop after 1 week if no improvement    OK to resume Cold and Allergy    Follow up in 2 weeks, either in clinic or by phone/email

## 2020-02-05 NOTE — PROGRESS NOTES
Allergy Clinic Note  [facility]    Subjective:      Patient ID: Vicky Brand is a 54 y.o. female.    Chief Complaint: Hoarse      Referring Provider: Eder Holt    History of Present Illness:  54-year-old female referred from Internal Medicine for voice disorder episodically since 2013.    Episodes of losing her voice have been occurring approximately yearly since 2013.  Sometimes it is associated with cough and runny nos,; other times there are no associated symptoms at all.  Episodes generally last several days.  The longest episode lasted 14 days.  She says that recently she had an endoscope by an outside ENT (Dr. Ogden) who told her she had infection in her throat as well as in her nose/sinuses.  Patient also had an x-ray of the soft tissue of the neck which was normal (01/31/2020)  In March client has an appointment at the voice center with Dr. Roberts at [facility].  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    Patient has a secondary complaint of rashes which she attributes to contact reactions.  One was to drier sheets and the other to a particular Lysol product.  She has multiple photographs of the rashes.  They are not urticarial nor eczematous.    Additional History:   Past medical history is noncontributory.  No Hx of ENT surgery.  Family history is negative for asthma and allergies.  Client  reports that she has never smoked. She has never used smokeless tobacco.  Exposures are notable for needing her voice to perform her job as a .  No exposure to smoke, pets, mold, or unusual substances.     Patient Active Problem List   Diagnosis    Paradoxical insomnia    Irregular menses    Intramural leiomyoma of uterus     Current Outpatient Medications on File Prior to Visit   Medication Sig Dispense Refill    cefdinir (OMNICEF) 300 MG capsule       chlorpheniramine (CHLOR-TRIMETON) 4 mg tablet Take 4 mg by mouth  "every 6 (six) hours as needed for Allergies.      guaiFENesin (HUMIBID E) 400 mg Tab Take 400 mg by mouth every 6 (six) hours as needed.      omeprazole (PRILOSEC) 20 MG capsule       predniSONE (DELTASONE) 20 MG tablet 3 tablets po daily for 4 days, then 2 tablets po daily for 4 days, then 1 tablets po daily for 4 days 24 tablet 0     No current facility-administered medications on file prior to visit.          Review of Systems   Constitutional: Negative for chills and fever.   HENT: Negative for ear discharge and nosebleeds.    Eyes: Negative for discharge and redness.   Respiratory: Negative for hemoptysis, sputum production, shortness of breath, wheezing and stridor.    Cardiovascular: Negative for chest pain and palpitations.   Gastrointestinal: Negative for blood in stool, melena and vomiting.   Genitourinary: Negative for dysuria and hematuria.   Skin: Negative for itching and rash.   Neurological: Negative for seizures and loss of consciousness.   Endo/Heme/Allergies: Negative for polydipsia. Does not bruise/bleed easily.       Objective:   Resp 16   Ht 5' 6" (1.676 m)   Wt 56.9 kg (125 lb 7.1 oz)   BMI 20.25 kg/m²       Physical Exam   Constitutional: She is well-developed, well-nourished, and in no distress.   Speaking in a very soft voice.  Not straining   HENT:   Head: Normocephalic and atraumatic.   Nose: Nose normal.   Eyes: Conjunctivae are normal. Right eye exhibits no discharge. Left eye exhibits no discharge.   Neck: Neck supple.   Cardiovascular: Normal rate, regular rhythm and intact distal pulses.   Pulmonary/Chest: Effort normal. No stridor. No respiratory distress.   Abdominal: She exhibits no distension.   Musculoskeletal: She exhibits no edema or deformity.   Neurological: She is alert. GCS score is 15.   Skin: No rash noted. No erythema.   Psychiatric: Memory and affect normal.       Data:   X-ray soft tissue of the neck (01/31/2019):  Normal    Endoscopy results not presently " available.    Assessment:     1. Voice disorder    2. Rash        Plan:     Medical decision making:  Patient is presenting with intermittent loss of voice dating back several years.  Given the absence of rhinitis symptoms I doubt this is due to allergies but think I do believe allergy should be ruled out before embarking on an expensive diagnostic workup.  I encouraged her to keep her appointment at the voice center with Dr. Roberts in March.  In the interim, we can do a trial of Atrovent nasal spray.  She is instructed to use twice a day for 1 week and.  If there is no improvement.      Vicky was seen today for hoarse.    Diagnoses and all orders for this visit:    Voice disorder  -     ipratropium (ATROVENT) 0.03 % nasal spray; 2 sprays by Nasal route 2 (two) times daily. For post nasal drip  -     IgE; Future  -     Dermatophagoides McClelland; Future  -     Dermatophagoides Pteronyssinus; Future  -     Bermuda; Future  -     Raphael; Future  -     [place]; Future  -     English Plantain; Future  -     Oak Pecan; Future  -     Pecan; Future  -     Marsh Elder; Future  -     Ragweed; Future  -     Alternaria; Future  -     Aspergillus; Future  -     Cat; Future  -     Cockroach; Future  -     Dog; Future    Rash  -     Corn IgE; Future  -     Egg, white IgE; Future  -     Milk IgE; Future  -     Soybean IgE; Future  -     Sesame seed IgE; Future  -     Rast Allergen-Latex; Future        Patient Instructions   Testing  Blood work for allergy testing        Check [facility] in one week for results or call [phone]       Contact me with questions or concerns       I will contact you if anything needs immediate attention.      Keep apt with Dr Roberts at the [facility] at [facility] on [address]        Treatment  ipatropium nasal spray:  2 squirts each nostril twice a day  Stop after 1 week if no improvement    OK to resume Cold and Allergy    Follow up in 2 weeks, either in clinic or by phone/email      No  follow-ups on file.    Lori Fernandes MD

## 2020-02-06 LAB — IGE SERPL-ACNC: <35 IU/ML (ref 0–100)

## 2020-02-07 LAB
A ALTERNATA IGE QN: <0.1 KU/L
A FUMIGATUS IGE QN: <0.1 KU/L
ALLERGEN LATEX IGE: <0.1 KU/L
BERMUDA GRASS IGE QN: <0.1 KU/L
CAT DANDER IGE QN: <0.1 KU/L
CEDAR IGE QN: <0.1 KU/L
CORN IGE QN: <0.1 KU/L
COW MILK IGE QN: <0.1 KU/L
D FARINAE IGE QN: 1.35 KU/L
D PTERONYSS IGE QN: 2.77 KU/L
DEPRECATED A ALTERNATA IGE RAST QL: NORMAL
DEPRECATED A FUMIGATUS IGE RAST QL: NORMAL
DEPRECATED BERMUDA GRASS IGE RAST QL: NORMAL
DEPRECATED CAT DANDER IGE RAST QL: NORMAL
DEPRECATED CEDAR IGE RAST QL: NORMAL
DEPRECATED CORN IGE RAST QL: NORMAL
DEPRECATED COW MILK IGE RAST QL: NORMAL
DEPRECATED D FARINAE IGE RAST QL: ABNORMAL
DEPRECATED D PTERONYSS IGE RAST QL: ABNORMAL
DEPRECATED DOG DANDER IGE RAST QL: NORMAL
DEPRECATED EGG WHITE IGE RAST QL: NORMAL
DEPRECATED ELDER IGE RAST QL: NORMAL
DEPRECATED ENGL PLANTAIN IGE RAST QL: NORMAL
DEPRECATED PECAN/HICK TREE IGE RAST QL: NORMAL
DEPRECATED ROACH IGE RAST QL: NORMAL
DEPRECATED SESAME SEED IGE RAST QL: NORMAL
DEPRECATED SOYBEAN IGE RAST QL: NORMAL
DEPRECATED TIMOTHY IGE RAST QL: NORMAL
DEPRECATED WEST RAGWEED IGE RAST QL: NORMAL
DEPRECATED WHITE OAK IGE RAST QL: NORMAL
DOG DANDER IGE QN: <0.1 KU/L
EGG WHITE IGE QN: <0.1 KU/L
ELDER IGE QN: <0.1 KU/L
ENGL PLANTAIN IGE QN: <0.1 KU/L
LATEX CLASS: NORMAL
PECAN/HICK TREE IGE QN: <0.1 KU/L
ROACH IGE QN: <0.1 KU/L
SESAME SEED IGE QN: <0.1 KU/L
SOYBEAN IGE QN: <0.1 KU/L
TIMOTHY IGE QN: <0.1 KU/L
WEST RAGWEED IGE QN: <0.1 KU/L
WHITE OAK IGE QN: <0.1 KU/L

## 2020-02-10 ENCOUNTER — PATIENT MESSAGE (OUTPATIENT)
Dept: OTOLARYNGOLOGY | Facility: CLINIC | Age: 55
End: 2020-02-10

## 2020-02-13 ENCOUNTER — PATIENT MESSAGE (OUTPATIENT)
Dept: ALLERGY | Facility: CLINIC | Age: 55
End: 2020-02-13

## 2020-02-14 ENCOUNTER — OFFICE VISIT (OUTPATIENT)
Dept: OTOLARYNGOLOGY | Facility: CLINIC | Age: 55
End: 2020-02-14
Payer: COMMERCIAL

## 2020-02-14 DIAGNOSIS — R49.0 DYSPHONIA: Primary | ICD-10-CM

## 2020-02-14 DIAGNOSIS — J38.5 LARYNGEAL SPASM: ICD-10-CM

## 2020-02-14 PROCEDURE — 99203 PR OFFICE/OUTPT VISIT, NEW, LEVL III, 30-44 MIN: ICD-10-PCS | Mod: 25,S$GLB,, | Performed by: OTOLARYNGOLOGY

## 2020-02-14 PROCEDURE — 99999 PR PBB SHADOW E&M-EST. PATIENT-LVL III: CPT | Mod: PBBFAC,,, | Performed by: OTOLARYNGOLOGY

## 2020-02-14 PROCEDURE — 99999 PR PBB SHADOW E&M-EST. PATIENT-LVL III: ICD-10-PCS | Mod: PBBFAC,,, | Performed by: OTOLARYNGOLOGY

## 2020-02-14 PROCEDURE — 31579 PR LARYNGOSCOPY, FLEX/RIGID TELESCOPIC, W/STROBOSCOPY: ICD-10-PCS | Mod: S$GLB,,, | Performed by: OTOLARYNGOLOGY

## 2020-02-14 PROCEDURE — 99203 OFFICE O/P NEW LOW 30 MIN: CPT | Mod: 25,S$GLB,, | Performed by: OTOLARYNGOLOGY

## 2020-02-14 PROCEDURE — 31579 LARYNGOSCOPY TELESCOPIC: CPT | Mod: S$GLB,,, | Performed by: OTOLARYNGOLOGY

## 2020-02-14 NOTE — PATIENT INSTRUCTIONS
MUSCLE TENSION DYSPHONIA     What is MTD?     Muscle tension dysphonia (MTD) is a condition in which laryngeal muscles are overactive, causing voice fatigue and discomfort with use. Sometimes MTD accompanies another organic condition, but it can also occur on its own. The overactivity of the muscles can result in incomplete closure of the vocal folds. Sometimes, the false vocal folds are overactive as well.     MTD is caused by over-activity in some laryngeal muscles, which is sometimes caused by the underactivity of other muscles. If there is an underlying condition, like nodules or an upper respiratory infection, the overcompensation to produce voice becomes a learned pattern.     How is MTD treated?     Voice therapy is the first line of treatment for MTD. There are a wide variety of treatment approaches, including laryngeal massage.             WHAT TO EXPECT FROM VOICE THERAPY    Purpose  The purpose of voice therapy is to help you find a better, more efficient way to use your voice or to reduce symptoms such as coughing, throat clearing, or difficulty breathing.  Depending on your symptoms, you may learn how to produce clearer voice quality, how to reduce fatigue or pain associated with speaking, how to take care of your voice, and how to eliminate chronic coughing or throat clearing.      Process: Evaluation  First, you may go through some initial testing.  In most cases, a videostroboscopy will be performed in order to allow your speech pathologist and your physician to look at your vocal cords to aid in deciding if you would benefit from voice therapy.  Next, you may work with the speech pathologist to assess the current capabilities of your voice.  Following evaluation, your speech pathologist will design a therapeutic plan to improve your voice as well as other symptoms that may bother you.  At the time of evaluation, your speech pathologist may provide you with exercises to try at home.      Process:  Therapy  Most patients benefit from 2-8 sessions over 1-3 months.  Voice therapy involves changing the behavior of your vocal cords and speaking habits, so it is very important that you attend your appointments and do home practices as instructed by your speech pathologist.  Home practice and participation in therapy are critical to meeting your desired voice goals, so of course, we are able to work with you to schedule appointments that are convenient for you.

## 2020-02-14 NOTE — LETTER
February 17, 2020      Lia Cristina MD  [address] [facility] [address]           [facility]  [address]  [facility] [address]  Phone: [phone]  Fax: [phone]          Patient: Vicky Brand   MR Number: [id number]   YOB: 1965   Date of Visit: 2/14/2020       Dear Dr. Lia Cristina:    Thank you for referring Vicky Brand to me for evaluation. Attached you will find relevant portions of my assessment and plan of care.    If you have questions, please do not hesitate to call me. I look forward to following Vicky Brand along with you.    Sincerely,    Jefe Roberts MD    Enclosure  CC:  No Recipients    If you would like to receive this communication electronically, please contact [email or web address] or [phone] to request more information on "[employer]" Link access.    For providers and/or their staff who would like to refer a patient to [facility], please contact us through our one-stop-shop provider referral line, [facility], at [phone].    If you feel you have received this communication in error or would no longer like to receive these types of communications, please e-mail [email or web address]

## 2020-02-14 NOTE — PROGRESS NOTES
[facility]  Department of Otorhinolaryngology and Communication Sciences    Vicky Brand is a 54 y.o. female who presents to the Voice Center for consultation at the kind request of Dr. Lia Cristina and Dr. Ogden for further management of hoarseness.     She complains of episodic loss of voice for 2 weeks at a time. Presently reports no power to her voice. Cannot sing. Onset was gradual. Duration is several months. Time course is intermittent. Symptoms are stable. Exacerbating factors include acute sinus infections, which seem to have a propensity to make her lose her voice. She denies any alleviating factors. Associated symptoms include inconsistent clear rhinorrhea.     She teaches at an online college. She recently saw Dr. Ogden, who told her her sinuses and vocal cords were infected. Put her on cefdinir and omeprazole. Rx's are now complete. She has seen an allergist and an atopic etiology has been ruled out.    Voice Handicap Index Total Score  2/14/2020   Voice Handicap Index Total Score 13       Past Medical History  She has a past medical history of Dysmenorrhea, Eye abnormalities, Eye abnormalities, Fracture of fifth toe, left, closed, with delayed healing, subsequent encounter, Intramural leiomyoma of uterus, Irregular menses, Ocular migraine, Paradoxical insomnia, Posterior vitreous detachment of left eye, Posterior vitreous detachment of left eye, and Shoulder pain, left.    Past Surgical History  She has a past surgical history that includes Dilation and curettage of uterus; Tubal ligation; Colonoscopy (N/A, 6/9/2017); and Endometrial ablation (N/A, 2017).    Family History  Her family history includes Diabetes in her father; Hypertension in her mother; Ovarian cancer in her maternal grandmother; Pacemaker/defibrilator in her mother; Stroke (age of onset: 49) in her father.    Social History  She reports that she has never smoked. She has never used smokeless tobacco. She  reports that she does not drink alcohol or use drugs.    Allergies  She is allergic to sulfa (sulfonamide antibiotics); cetirizine; erythromycin; and zyrtec-d  [cetirizine-pseudoephedrine].    Medications  She has a current medication list which includes the following prescription(s): cefdinir, chlorpheniramine, guaifenesin, ipratropium, omeprazole, and prednisone.    Review of Systems   Constitutional: Negative for fever.   HENT: Positive for congestion, ear discharge, postnasal drip, rhinorrhea, sinus pressure and sore throat.    Eyes: Negative for visual disturbance.   Respiratory: Positive for cough. Negative for wheezing.    Cardiovascular: Negative for chest pain.   Gastrointestinal: Negative for nausea.   Musculoskeletal: Negative for arthralgias.   Skin: Positive for rash.   Neurological: Negative for tremors.   Hematological: Does not bruise/bleed easily.   Psychiatric/Behavioral: The patient is not nervous/anxious.           Objective:     VS reviewed     Physical Exam    Constitutional: comfortable, well dressed  Psychiatric: appropriate affect  Respiratory: comfortably breathing, symmetric chest rise, no stridor  Voice: minimal roughness/francis; low volume  Cardiovascular: upper extremities non-edematous  Lymphatic: no cervical lymphadenopathy  Neurologic: alert and oriented to time, place, person, and situation; cranial nerves 3-12 grossly intact  Head: normocephalic  Eyes: conjunctivae and sclerae clear  Ears: normal pinnae, normal external auditory canals, tympanic membranes intact  Nose: mucosa pink and noncongested, no masses, no mucopurulence, no polyps  Oral cavity / oropharynx: no mucosal lesions  Neck: soft, full range of motion, laryngotracheal complex palpable with appropriate landmarks, larynx elevates on swallowing  Indirect laryngoscopy: limited due to gag    Procedure  Flexible Laryngeal Videostroboscopy (73611): Laryngeal videostroboscopy is indicated to assess laryngeal vibratory  biomechanics and vocal fold oscillation, which cannot be assessed with a plain light examination. This was carried out transnasally with a distal chip videoendoscope. After verbal consent was obtained, the patient was positioned and the nose was topically decongested with 1% phenylephrine and topically anesthetized with 4% lidocaine. The endoscope was passed through the most patent nasal cavity and positioned to image the nasopharynx, larynx, and hypopharynx in detail. The following features were examined: nasopharyngeal, laryngeal, hypopharyngeal masses; velopharyngeal strength, closure, and symmetry of motion; vocal fold range and symmetry of motion; laryngeal mucosal edema, erythema, inflammation, and hydration; salivary pooling; and gross laryngeal sensation. During phonation, the vocal folds were assessed for glottal closure; mucosal wave; vocal fold lesions; vibratory periodicity, amplitude, and phase symmetry; and vertical height match. The equipment was removed. The patient tolerated the procedure well without complication. All findings were normal except:  - mild variable intrinsic glottic and supraglottic hyperfunction      Assessment:     Vicky Brand is a 54 y.o. female with chronic intermittent dysphonia provoked by URI. I suspect this is due to laryngeal spasm of muscle tension dysphonia.       Plan:        I had a discussion with the patient regarding her condition and the further workup and management options.      SLP voice evaluation and subsequent therapy sessions are medically necessary for restoration of laryngeal function. We will arrange for this to occur here at the [facility] Center in the coming weeks. Depending on her progress and/or response to voice therapy, we could consider other etiologies.    She will follow up with me in about 3 month(s).    All questions were answered, and the patient is in agreement with the above.     Jefe Roberts M.D.  [facility]  [place]  Department of Otorhinolaryngology and Communication Sciences

## 2020-03-05 ENCOUNTER — CLINICAL SUPPORT (OUTPATIENT)
Dept: SPEECH THERAPY | Facility: HOSPITAL | Age: 55
End: 2020-03-05
Attending: OTOLARYNGOLOGY
Payer: COMMERCIAL

## 2020-03-05 DIAGNOSIS — J38.5 LARYNGEAL SPASM: ICD-10-CM

## 2020-03-05 DIAGNOSIS — R49.0 DYSPHONIA: ICD-10-CM

## 2020-03-05 PROCEDURE — 92524 BEHAVRAL QUALIT ANALYS VOICE: CPT | Mod: GN

## 2020-03-05 NOTE — PROGRESS NOTES
Referring provider: Dr. Jefe Roberts  Reason for visit:  Behavioral and qualitative analysis of voice and resonance (CPT 02798)    Subjective / History    Vikcy Brand is a 54 y.o. female referred for voice evaluation (CPT 63761) by Dr. Roberts.  She reports that within the past six months, she has had two episodes of sudden voice loss. She reports that this is associated with a cold/URI. She reports that during her assessment with Dr. Roberts, her voice still was fatigued and she did not have control over her voice. Since that time, her vocal quality has improved; however, she is concerned that she will lose her voice again. 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 She reports that she enjoys singing with her [employer], and notices that her voice feels weak while singing prior to an episode of voice loss. She is motivated to undergo voice therapy and use these exercises to prevent further voice loss and improve the efficiency of her voice.     Breathing: She reports difficulty breathing through her nose at night    Smokin packs/day   Reflux: no  Water: plenty t/o the day    Stroboscopy findings (per Dr. Roberts on 2020)  - mild variable intrinsic glottic and supraglottic hyperfunction       Past Medical History:   Diagnosis Date    Dysmenorrhea     Eye abnormalities     early yellowing of lens, precursor to cataracts, Dr Webster,  wear glasses at all times    Eye abnormalities     early yellowing of lens, precursor to cataracts, Dr Webster,  wear glasses at all times    Fracture of fifth toe, left, closed, with delayed healing, subsequent encounter 2018    Intramural leiomyoma of uterus 2019    Irregular, dysmenorrhea    Irregular menses 2017    Ocular migraine     vision fuzzy x 3 in , metallic shapes in her vision, Dr Webster,  resolved after 30 min , no pain    Paradoxical insomnia 10/30/2017    prior to menses, amitryptiline 2.5 mg prn  qhs helps  "   Posterior vitreous detachment of left eye 5/22/2019    Flashes of light, ocular migranes, Ophthalmologist Dr Orourke, retinal specialist Dr Soni, rec avoid exercise until she follows up 5/2019    Posterior vitreous detachment of left eye 5/22/2019    Flashes of light, ocular migranes, Ophthalmologist Dr Orourke, retinal specialist Dr Soni, rec avoid exercise until she follows up 5/2019    Shoulder pain, left 2007    LEFT, after fall, better with PT at [facility]     Current Outpatient Medications on File Prior to Visit   Medication Sig Dispense Refill    cefdinir (OMNICEF) 300 MG capsule       chlorpheniramine (CHLOR-TRIMETON) 4 mg tablet Take 4 mg by mouth every 6 (six) hours as needed for Allergies.      guaiFENesin (HUMIBID E) 400 mg Tab Take 400 mg by mouth every 6 (six) hours as needed.      ipratropium (ATROVENT) 0.03 % nasal spray 2 sprays by Nasal route 2 (two) times daily. For post nasal drip 30 mL 5    omeprazole (PRILOSEC) 20 MG capsule       predniSONE (DELTASONE) 20 MG tablet 3 tablets po daily for 4 days, then 2 tablets po daily for 4 days, then 1 tablets po daily for 4 days 24 tablet 0     No current facility-administered medications on file prior to visit.        Objective    Perceptual/behavioral assessment  -CAPE-V Overall Score: 10  -Quality: mild francis  -Volume: appropriate for age and gender  -Pitch: appropriate for age and gender identity  -Flexibility: instability  -Habitual respiratory pattern: chest/clavicular    Acoustic/aerodynamic assessment  Multi-Dimensional Voice Program (MDVP) Analysis (sustained "ah")   Post-trial tx Gender-matched norms (F)   Average fundamental frequency (Fo) 219.110 Hz Norm/SD: 243.97 / 27.46   Relative average perturbation 0.589 % Norm/SD: 0.378 / 0.21   Shimmer percent 2.938 % Norm/SD: 1.997 / 0.79   Noise to harmonic ratio 0.084  Norm/SD: 0.112 / 0.01   Voice turbulence index 0.011 Norm/SD: 0.046 / 0.012     Education / Stimulability " "Trials   Patient was stimulable for improved voice using straw phonation SOVT exercises. She reported feeling that her voice was "more forward." Good carryover to modal pitch, pitch glides, and connected speech. Patient stimulable for improved forward-fovus and decreased strain in melodic variations with cup bubble and straw SOVT exercises. Patient also identified an improvement in vocal quality using these exercises. She was recorded practicing the exercises on her phone for reference. Encouraged practicing exercises several times daily in isolation and into short phrases to solidify muscle memory patterns and reduce extralaryngeal tension during speech tasks.  She was amenable to all suggestions.     Functional goals  Length Status Goal   Long term Initiated Patient and clinician will facilitate changes in vocal function in order to restore functional use of voice for daily occupational, social, and emotional demands.    Long term Initiated Patient will re-establish phonation with adequate balance of airflow and resonance with decreased muscle tension.    Short term Initiated Patient will coordinate vocal subsystems in hierarchical speech tasks by producing sound in an efficient manner yielding improved or normal voice quality and vocal endurance in the presence of existing laryngeal disorder with 90% accuracy independently.   Short term Initiated Patient will reduce vocal effort and fatigue by decreasing upper body tension as evidenced by a decrease in symptoms and lack of observable/palpable signs of hyperkinetic muscular behaviors.   Short term Initiated Patient will complete SOVT exercises and/or resonant-focused exercises 3-5x daily to strengthen and balance the intrinsic laryngeal musculature and maximize glottic closure without medial hyperfunction.   Short term Initiated Patient will demonstrate the ability to increase awareness of voicing behavior through self-monitoring to facilitate generalization in " functional speaking situations with 80% accuracy.      Assessment     Vicky Brand presents with mild dysphonia.  Diagnoses of Dysphonia and Laryngeal spasm were pertinent to this visit.  Prognosis for continued improvement is good.     Recommendations / POC    Recommend 1-2 further sessions of voice/speech therapy over 8-10 weeks with a speech-language pathologist to optimize glottal postures for improved vocal function, vocal efficiency, and ease of phonation; however, patient reports that she would like to continue practicing the exercises at home at this time. She will contact me in the future if an episode of voice loss occurs.

## 2020-06-29 ENCOUNTER — TELEPHONE (OUTPATIENT)
Dept: PRIMARY CARE CLINIC | Facility: CLINIC | Age: 55
End: 2020-06-29

## 2020-06-29 DIAGNOSIS — Z00.00 ROUTINE GENERAL MEDICAL EXAMINATION AT A HEALTH CARE FACILITY: Primary | ICD-10-CM

## 2020-06-29 NOTE — TELEPHONE ENCOUNTER
----- Message from Madelaine Moody sent at 6/29/2020  8:23 AM CDT -----  Contact: patient  Doctor appointment and lab have been scheduled.  Please link lab orders to the lab appointment.  Date of doctor appointment:  07/21/20  Date of lab appointment:  07/16/20  Physical or EP: epp  Comments:

## 2020-07-16 ENCOUNTER — LAB VISIT (OUTPATIENT)
Dept: LAB | Facility: HOSPITAL | Age: 55
End: 2020-07-16
Attending: FAMILY MEDICINE
Payer: COMMERCIAL

## 2020-07-16 DIAGNOSIS — Z00.00 ROUTINE GENERAL MEDICAL EXAMINATION AT A HEALTH CARE FACILITY: ICD-10-CM

## 2020-07-16 LAB
ALBUMIN SERPL BCP-MCNC: 4.2 G/DL (ref 3.5–5.2)
ALP SERPL-CCNC: 48 U/L (ref 55–135)
ALT SERPL W/O P-5'-P-CCNC: 11 U/L (ref 10–44)
ANION GAP SERPL CALC-SCNC: 6 MMOL/L (ref 8–16)
AST SERPL-CCNC: 24 U/L (ref 10–40)
BASOPHILS # BLD AUTO: 0.03 K/UL (ref 0–0.2)
BASOPHILS NFR BLD: 0.8 % (ref 0–1.9)
BILIRUB SERPL-MCNC: 0.9 MG/DL (ref 0.1–1)
BUN SERPL-MCNC: 14 MG/DL (ref 6–20)
CALCIUM SERPL-MCNC: 9.9 MG/DL (ref 8.7–10.5)
CHLORIDE SERPL-SCNC: 104 MMOL/L (ref 95–110)
CHOLEST SERPL-MCNC: 162 MG/DL (ref 120–199)
CHOLEST/HDLC SERPL: 2.6 {RATIO} (ref 2–5)
CO2 SERPL-SCNC: 31 MMOL/L (ref 23–29)
CREAT SERPL-MCNC: 1 MG/DL (ref 0.5–1.4)
DIFFERENTIAL METHOD: ABNORMAL
EOSINOPHIL # BLD AUTO: 0.1 K/UL (ref 0–0.5)
EOSINOPHIL NFR BLD: 1.9 % (ref 0–8)
ERYTHROCYTE [DISTWIDTH] IN BLOOD BY AUTOMATED COUNT: 12.7 % (ref 11.5–14.5)
EST. GFR  (AFRICAN AMERICAN): >60 ML/MIN/1.73 M^2
EST. GFR  (NON AFRICAN AMERICAN): >60 ML/MIN/1.73 M^2
GLUCOSE SERPL-MCNC: 88 MG/DL (ref 70–110)
HCT VFR BLD AUTO: 44.7 % (ref 37–48.5)
HDLC SERPL-MCNC: 62 MG/DL (ref 40–75)
HDLC SERPL: 38.3 % (ref 20–50)
HGB BLD-MCNC: 13.5 G/DL (ref 12–16)
IMM GRANULOCYTES # BLD AUTO: 0.01 K/UL (ref 0–0.04)
IMM GRANULOCYTES NFR BLD AUTO: 0.3 % (ref 0–0.5)
LDLC SERPL CALC-MCNC: 89.6 MG/DL (ref 63–159)
LYMPHOCYTES # BLD AUTO: 1.7 K/UL (ref 1–4.8)
LYMPHOCYTES NFR BLD: 46.8 % (ref 18–48)
MCH RBC QN AUTO: 28.4 PG (ref 27–31)
MCHC RBC AUTO-ENTMCNC: 30.2 G/DL (ref 32–36)
MCV RBC AUTO: 94 FL (ref 82–98)
MONOCYTES # BLD AUTO: 0.4 K/UL (ref 0.3–1)
MONOCYTES NFR BLD: 10.9 % (ref 4–15)
NEUTROPHILS # BLD AUTO: 1.4 K/UL (ref 1.8–7.7)
NEUTROPHILS NFR BLD: 39.3 % (ref 38–73)
NONHDLC SERPL-MCNC: 100 MG/DL
NRBC BLD-RTO: 0 /100 WBC
PLATELET # BLD AUTO: 212 K/UL (ref 150–350)
PMV BLD AUTO: 12 FL (ref 9.2–12.9)
POTASSIUM SERPL-SCNC: 5.4 MMOL/L (ref 3.5–5.1)
PROT SERPL-MCNC: 7.4 G/DL (ref 6–8.4)
RBC # BLD AUTO: 4.75 M/UL (ref 4–5.4)
SODIUM SERPL-SCNC: 141 MMOL/L (ref 136–145)
TRIGL SERPL-MCNC: 52 MG/DL (ref 30–150)
WBC # BLD AUTO: 3.59 K/UL (ref 3.9–12.7)

## 2020-07-16 PROCEDURE — 80061 LIPID PANEL: CPT

## 2020-07-16 PROCEDURE — 85025 COMPLETE CBC W/AUTO DIFF WBC: CPT

## 2020-07-16 PROCEDURE — 36415 COLL VENOUS BLD VENIPUNCTURE: CPT | Mod: PO

## 2020-07-16 PROCEDURE — 80053 COMPREHEN METABOLIC PANEL: CPT

## 2020-07-23 ENCOUNTER — OFFICE VISIT (OUTPATIENT)
Dept: PRIMARY CARE CLINIC | Facility: CLINIC | Age: 55
End: 2020-07-23
Payer: COMMERCIAL

## 2020-07-23 VITALS
OXYGEN SATURATION: 99 % | HEART RATE: 74 BPM | BODY MASS INDEX: 19.49 KG/M2 | DIASTOLIC BLOOD PRESSURE: 80 MMHG | HEIGHT: 66 IN | SYSTOLIC BLOOD PRESSURE: 130 MMHG | TEMPERATURE: 98 F | WEIGHT: 121.25 LBS

## 2020-07-23 DIAGNOSIS — Z00.00 ROUTINE GENERAL MEDICAL EXAMINATION AT A HEALTH CARE FACILITY: Primary | ICD-10-CM

## 2020-07-23 DIAGNOSIS — D25.1 INTRAMURAL LEIOMYOMA OF UTERUS: ICD-10-CM

## 2020-07-23 PROCEDURE — 3008F BODY MASS INDEX DOCD: CPT | Mod: CPTII,S$GLB,, | Performed by: FAMILY MEDICINE

## 2020-07-23 PROCEDURE — 3079F DIAST BP 80-89 MM HG: CPT | Mod: CPTII,S$GLB,, | Performed by: FAMILY MEDICINE

## 2020-07-23 PROCEDURE — 3079F PR MOST RECENT DIASTOLIC BLOOD PRESSURE 80-89 MM HG: ICD-10-PCS | Mod: CPTII,S$GLB,, | Performed by: FAMILY MEDICINE

## 2020-07-23 PROCEDURE — 3008F PR BODY MASS INDEX (BMI) DOCUMENTED: ICD-10-PCS | Mod: CPTII,S$GLB,, | Performed by: FAMILY MEDICINE

## 2020-07-23 PROCEDURE — 99999 PR PBB SHADOW E&M-EST. PATIENT-LVL III: ICD-10-PCS | Mod: PBBFAC,,, | Performed by: FAMILY MEDICINE

## 2020-07-23 PROCEDURE — 3075F PR MOST RECENT SYSTOLIC BLOOD PRESS GE 130-139MM HG: ICD-10-PCS | Mod: CPTII,S$GLB,, | Performed by: FAMILY MEDICINE

## 2020-07-23 PROCEDURE — 99396 PREV VISIT EST AGE 40-64: CPT | Mod: S$GLB,,, | Performed by: FAMILY MEDICINE

## 2020-07-23 PROCEDURE — 3075F SYST BP GE 130 - 139MM HG: CPT | Mod: CPTII,S$GLB,, | Performed by: FAMILY MEDICINE

## 2020-07-23 PROCEDURE — 99396 PR PREVENTIVE VISIT,EST,40-64: ICD-10-PCS | Mod: S$GLB,,, | Performed by: FAMILY MEDICINE

## 2020-07-23 PROCEDURE — 99999 PR PBB SHADOW E&M-EST. PATIENT-LVL III: CPT | Mod: PBBFAC,,, | Performed by: FAMILY MEDICINE

## 2020-07-23 NOTE — PROGRESS NOTES
Subjective:      Patient ID: Vicky Brand is a 54 y.o. female.    Chief Complaint: Annual Exam    Here today for general exam.     Has upcoming appointment with gyn for irregular menses    She was following with speech therapy for dysphonia, laryngeal spasm after URI earlier in the year    Denies any chest pain, shortness of breath, nausea vomiting constipation diarrhea, blood in stool, heartburn    No current outpatient medications on file.    No results found for: HGBA1C  No results found for: MICALBCREAT  Lab Results   Component Value Date    LDLCALC 89.6 07/16/2020    LDLCALC 84.4 05/15/2019    CHOL 162 07/16/2020    HDL 62 07/16/2020    TRIG 52 07/16/2020       Lab Results   Component Value Date     07/16/2020    K 5.4 (H) 07/16/2020     07/16/2020    CO2 31 (H) 07/16/2020    GLU 88 07/16/2020    BUN 14 07/16/2020    CREATININE 1.0 07/16/2020    CALCIUM 9.9 07/16/2020    PROT 7.4 07/16/2020    ALBUMIN 4.2 07/16/2020    BILITOT 0.9 07/16/2020    ALKPHOS 48 (L) 07/16/2020    AST 24 07/16/2020    ALT 11 07/16/2020    ANIONGAP 6 (L) 07/16/2020    ESTGFRAFRICA >60.0 07/16/2020    EGFRNONAA >60.0 07/16/2020    WBC 3.59 (L) 07/16/2020    HGB 13.5 07/16/2020    HGB 14.5 01/31/2020    HCT 44.7 07/16/2020    MCV 94 07/16/2020     07/16/2020    TSH 1.731 05/02/2017       Lab Results   Component Value Date    .30 01/21/2020    PROGESTERONE 0.2 01/21/2020    ESTRADIOL <10 (A) 01/21/2020    FERRITIN 52.5 07/25/2005         Past Medical History:   Diagnosis Date    Eye abnormalities     early yellowing of lens, precursor to cataracts, Dr Webster,  wear glasses at all times    Fracture of fifth toe, left, closed, with delayed healing, subsequent encounter 8/21/2018    Intramural leiomyoma of uterus 5/22/2019    Irregular, dysmenorrhea    Ocular migraine     vision fuzzy x 3 in 2013, metallic shapes in her vision, Dr Webster,  resolved after 30 min , no pain    Paradoxical insomnia  "10/30/2017    prior to menses, amitryptiline 2.5 mg prn  qhs helps    Posterior vitreous detachment of left eye 5/22/2019    Flashes of light, ocular migranes, Ophthalmologist Dr Orourke, retinal specialist Dr Soni, rec avoid exercise until she follows up 5/2019    Shoulder pain, left 2007    LEFT, after fall, better with PT at [facility]     Past Surgical History:   Procedure Laterality Date    COLONOSCOPY N/A 6/9/2017    Procedure: COLONOSCOPY;  Surgeon: Davy Prince MD;  Location: [facility] ([address] [place] [address]);  Service: Endoscopy;  Laterality: N/A;    DILATION AND CURETTAGE OF UTERUS      ENDOMETRIAL ABLATION N/A 2017    TUBAL LIGATION       Social History     Social History Narrative    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[employer],  & 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   HENT:      Head: Normocephalic and atraumatic.      Right Ear: External ear normal.      Left Ear: External ear normal.      Nose: Nose normal.   Eyes:      Pupils: Pupils are equal, round, and reactive to light.   Neck:      Musculoskeletal: Neck supple.      Thyroid: No thyromegaly.   Cardiovascular:      Rate and Rhythm: Normal rate and regular rhythm.      Heart sounds: Normal heart sounds. No murmur.   Pulmonary:      Effort: Pulmonary effort is normal.      Breath sounds: Normal breath sounds. No wheezing.   Abdominal:      General: Bowel sounds " are normal. There is no distension.      Palpations: Abdomen is soft. There is no mass.      Tenderness: There is no abdominal tenderness. There is no guarding or rebound.   Lymphadenopathy:      Cervical: No cervical adenopathy.   Skin:     General: Skin is warm and dry.   Neurological:      Mental Status: She is alert and oriented to person, place, and time.   Psychiatric:         Behavior: Behavior normal.       Assessment:     1. Routine general medical examination at a health care facility    2. Intramural leiomyoma of uterus      Plan:        Follow with GYN for female health & cancer prevention    ==============================  RECOMMENDATIONS FOR FEMALES  ==============================  Your #1 MEDICINE is DAILY EXERCISE - 15-20 minutes of huffing & puffing EVERY DAY.     Prevent the #1 cause of death- cardiovascular disease (HEART ATTACK & STROKE) by checking for normal blood pressure, cholesterol, sugars, & by not smoking.     VACCINES: Yearly FLU shot, PNEUMONIA shot after 65,  SHINGLES shot after 50    Screening colonoscopy at AGE  50 & every 10 years to check for COLON CANCER,  one of the most common & preventable cancers (Or FIT kit yearly) Repeat in 3 years if POLYP found     I recommend  high fiber (5 fresh fruits or vegetables daily), low fat diet and aerobic  exercise (huffing/ puffing/ sweating for 20 min straight at least 4 days a week)    Follow up yearly with mammogram, fasting lipids, CMP, CBC prior.   ==============================================================      There are no Patient Instructions on file for this visit.                            Answers for HPI/ROS submitted by the patient on 7/21/2020   activity change: No  unexpected weight change: No  neck pain: No  hearing loss: No  rhinorrhea: No  trouble swallowing: No  eye discharge: No  visual disturbance: No  chest tightness: No  wheezing: No  chest pain: No  palpitations: No  blood in stool: No  constipation: No  vomiting:  No  diarrhea: No  polydipsia: No  polyuria: No  difficulty urinating: No  hematuria: No  menstrual problem: No  dysuria: No  joint swelling: No  arthralgias: No  headaches: No  weakness: No  confusion: No  dysphoric mood: No

## 2020-08-03 ENCOUNTER — OFFICE VISIT (OUTPATIENT)
Dept: OBSTETRICS AND GYNECOLOGY | Facility: CLINIC | Age: 55
End: 2020-08-03
Payer: COMMERCIAL

## 2020-08-03 VITALS
SYSTOLIC BLOOD PRESSURE: 130 MMHG | WEIGHT: 119.69 LBS | DIASTOLIC BLOOD PRESSURE: 96 MMHG | BODY MASS INDEX: 19.32 KG/M2

## 2020-08-03 DIAGNOSIS — Z01.419 ENCOUNTER FOR GYNECOLOGICAL EXAMINATION WITHOUT ABNORMAL FINDING: Primary | ICD-10-CM

## 2020-08-03 DIAGNOSIS — Z12.39 BREAST CANCER SCREENING: ICD-10-CM

## 2020-08-03 DIAGNOSIS — N95.2 VAGINAL ATROPHY: ICD-10-CM

## 2020-08-03 PROCEDURE — 99396 PR PREVENTIVE VISIT,EST,40-64: ICD-10-PCS | Mod: S$GLB,,, | Performed by: OBSTETRICS & GYNECOLOGY

## 2020-08-03 PROCEDURE — 3008F PR BODY MASS INDEX (BMI) DOCUMENTED: ICD-10-PCS | Mod: CPTII,S$GLB,, | Performed by: OBSTETRICS & GYNECOLOGY

## 2020-08-03 PROCEDURE — 99396 PREV VISIT EST AGE 40-64: CPT | Mod: S$GLB,,, | Performed by: OBSTETRICS & GYNECOLOGY

## 2020-08-03 PROCEDURE — 3075F SYST BP GE 130 - 139MM HG: CPT | Mod: CPTII,S$GLB,, | Performed by: OBSTETRICS & GYNECOLOGY

## 2020-08-03 PROCEDURE — 99999 PR PBB SHADOW E&M-EST. PATIENT-LVL III: CPT | Mod: PBBFAC,,, | Performed by: OBSTETRICS & GYNECOLOGY

## 2020-08-03 PROCEDURE — 3008F BODY MASS INDEX DOCD: CPT | Mod: CPTII,S$GLB,, | Performed by: OBSTETRICS & GYNECOLOGY

## 2020-08-03 PROCEDURE — 99999 PR PBB SHADOW E&M-EST. PATIENT-LVL III: ICD-10-PCS | Mod: PBBFAC,,, | Performed by: OBSTETRICS & GYNECOLOGY

## 2020-08-03 PROCEDURE — 3075F PR MOST RECENT SYSTOLIC BLOOD PRESS GE 130-139MM HG: ICD-10-PCS | Mod: CPTII,S$GLB,, | Performed by: OBSTETRICS & GYNECOLOGY

## 2020-08-03 PROCEDURE — 3080F PR MOST RECENT DIASTOLIC BLOOD PRESSURE >= 90 MM HG: ICD-10-PCS | Mod: CPTII,S$GLB,, | Performed by: OBSTETRICS & GYNECOLOGY

## 2020-08-03 PROCEDURE — 3080F DIAST BP >= 90 MM HG: CPT | Mod: CPTII,S$GLB,, | Performed by: OBSTETRICS & GYNECOLOGY

## 2020-08-03 RX ORDER — CALCIUM CARBONATE 600 MG
600 TABLET ORAL ONCE
COMMUNITY

## 2020-08-03 RX ORDER — PRASTERONE 6.5 MG/1
6.5 INSERT VAGINAL DAILY
Qty: 30 EACH | Refills: 12 | Status: SHIPPED | OUTPATIENT
Start: 2020-08-03 | End: 2020-08-31

## 2020-08-03 NOTE — PROGRESS NOTES
CC: Well woman exam    Vicky Brand is a 54 y.o. female  presents for a well woman exam.  LMP: No LMP recorded. Patient has had an ablation..      She has vaginal dryness.  Dyspareunia.   No PMB with the endometrial ablation    Past Medical History:   Diagnosis Date    Eye abnormalities     early yellowing of lens, precursor to cataracts, Dr Webster,  wear glasses at all times    Fracture of fifth toe, left, closed, with delayed healing, subsequent encounter 2018    Intramural leiomyoma of uterus 2019    Irregular, dysmenorrhea    Ocular migraine     vision fuzzy x 3 in , metallic shapes in her vision, Dr Webster,  resolved after 30 min , no pain    Paradoxical insomnia 10/30/2017    prior to menses, amitryptiline 2.5 mg prn  qhs helps    Posterior vitreous detachment of left eye 2019    Flashes of light, ocular migranes, Ophthalmologist Dr Orourke, retinal specialist Dr Soni, rec avoid exercise until she follows up 2019    Shoulder pain, left     LEFT, after fall, better with PT [facility]     Past Surgical History:   Procedure Laterality Date    COLONOSCOPY N/A 2017    Procedure: COLONOSCOPY;  Surgeon: Davy Prince MD;  Location: [facility] ([address] [place] [address]);  Service: Endoscopy;  Laterality: N/A;    DILATION AND CURETTAGE OF UTERUS      ENDOMETRIAL ABLATION N/A 2017    TUBAL LIGATION       Social History     Socioeconomic History    Marital status:      Spouse name: Not on file    Number of children: Not on file    Years of education: Not on file    Highest education level: Not on file   Occupational History    Not on file   Social Needs    Financial resource strain: Not hard at all    Food insecurity     Worry: Never true     Inability: Never true    Transportation needs     Medical: No     Non-medical: No   Tobacco Use    Smoking status: Never Smoker    Smokeless tobacco: Never Used   Substance and Sexual Activity    Alcohol  use: No     Frequency: Never     Drinks per session: Patient refused     Binge frequency: Never    Drug use: No    Sexual activity: Yes     Partners: Male     Birth control/protection: See Surgical Hx   Lifestyle    Physical activity     Days per week: 5 days     Minutes per session: 20 min    Stress: Only a little   Relationships    Social connections     Talks on phone: More than three times a week     Gets together: More than three times a week     Attends Zoroastrianism service: Not on file     Active member of club or organization: Yes     Attends meetings of clubs or organizations: More than 4 times per year     Relationship status:    Other Topics Concern    Are you pregnant or think you may be? 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4 oz)   BMI 19.32 kg/m²       ROS:    ROS:  GENERAL: Denies weight gain or weight loss. Feeling well overall.   SKIN: Denies rash or lesions.   HEAD: Denies head injury or headache.   NODES: Denies enlarged lymph nodes.   CHEST: Denies chest pain or shortness of breath.   CARDIOVASCULAR: Denies palpitations or left sided chest pain.   ABDOMEN: No abdominal pain, constipation, diarrhea, nausea, vomiting or rectal bleeding.   URINARY: No frequency, dysuria, hematuria, or burning on  urination.  REPRODUCTIVE: See HPI.   BREASTS: The patient performs breast self-examination and denies pain, lumps, or nipple discharge.   HEMATOLOGIC: No easy bruisability or excessive bleeding.   MUSCULOSKELETAL: Denies joint pain or swelling.   NEUROLOGIC: Denies syncope or weakness.   PSYCHIATRIC: Denies depression, anxiety or mood swings.    PHYSICAL EXAM:    APPEARANCE: Well nourished, well developed, in no acute distress.  AFFECT: WNL, alert and oriented x 3  SKIN: No acne or hirsutism  NECK: Neck symmetric without masses or thyromegaly  NODES: No inguinal, cervical, axillary, or femoral lymph node enlargement  CHEST: Good respiratory effect  ABDOMEN: Soft.  No tenderness or masses.  No hepatosplenomegaly.  No hernias.  BREASTS: Symmetrical, no skin changes or visible lesions.  No palpable masses, nipple discharge bilaterally.  PELVIC: Normal external genitalia without lesions.  Normal hair distribution.  Adequate perineal body, normal urethral meatus.  Vagina moist and well rugated without lesions or discharge.  Cervix pink, without lesions, discharge or tenderness.  No significant cystocele or rectocele.  Bimanual exam shows uterus to be normal size, regular, mobile and nontender.  Adnexa without masses or tenderness.    RECTAL: Rectovaginal exam confirms above with normal sphincter tone, no masses.  EXTREMITIES: No edema.    Diagnosis      ICD-10-CM ICD-9-CM    1. Encounter for gynecological examination without abnormal finding  Z01.419 V72.31    2. Breast cancer screening  Z12.39 V76.10 Mammo Digital Screening Bilat w/ Tim   3. Vaginal atrophy  N95.2 627.3 prasterone, dhea, (INTRAROSA) 6.5 mg Inst         Patient was counseled today on A.C.S. Pap guidelines and recommendations for yearly pelvic exams, mammograms and monthly self breast exams; to see her PCP for other health maintenance.     Follow up in about 1 year (around 8/3/2021).

## 2020-10-06 ENCOUNTER — OFFICE VISIT (OUTPATIENT)
Dept: FAMILY MEDICINE | Facility: CLINIC | Age: 55
End: 2020-10-06
Payer: COMMERCIAL

## 2020-10-06 ENCOUNTER — LAB VISIT (OUTPATIENT)
Dept: LAB | Facility: HOSPITAL | Age: 55
End: 2020-10-06
Attending: FAMILY MEDICINE
Payer: COMMERCIAL

## 2020-10-06 VITALS
BODY MASS INDEX: 19.42 KG/M2 | OXYGEN SATURATION: 99 % | SYSTOLIC BLOOD PRESSURE: 118 MMHG | HEIGHT: 66 IN | WEIGHT: 120.81 LBS | HEART RATE: 79 BPM | DIASTOLIC BLOOD PRESSURE: 70 MMHG | TEMPERATURE: 97 F

## 2020-10-06 DIAGNOSIS — M26.609 TMJ (TEMPOROMANDIBULAR JOINT DISORDER): Primary | ICD-10-CM

## 2020-10-06 DIAGNOSIS — M26.609 TMJ (TEMPOROMANDIBULAR JOINT DISORDER): ICD-10-CM

## 2020-10-06 LAB
CRP SERPL-MCNC: 0.4 MG/L (ref 0–8.2)
ERYTHROCYTE [SEDIMENTATION RATE] IN BLOOD BY WESTERGREN METHOD: 12 MM/HR (ref 0–36)

## 2020-10-06 PROCEDURE — 3078F DIAST BP <80 MM HG: CPT | Mod: CPTII,S$GLB,, | Performed by: NURSE PRACTITIONER

## 2020-10-06 PROCEDURE — 85652 RBC SED RATE AUTOMATED: CPT

## 2020-10-06 PROCEDURE — 3074F SYST BP LT 130 MM HG: CPT | Mod: CPTII,S$GLB,, | Performed by: NURSE PRACTITIONER

## 2020-10-06 PROCEDURE — 99214 PR OFFICE/OUTPT VISIT, EST, LEVL IV, 30-39 MIN: ICD-10-PCS | Mod: S$GLB,,, | Performed by: NURSE PRACTITIONER

## 2020-10-06 PROCEDURE — 99214 OFFICE O/P EST MOD 30 MIN: CPT | Mod: S$GLB,,, | Performed by: NURSE PRACTITIONER

## 2020-10-06 PROCEDURE — 3074F PR MOST RECENT SYSTOLIC BLOOD PRESSURE < 130 MM HG: ICD-10-PCS | Mod: CPTII,S$GLB,, | Performed by: NURSE PRACTITIONER

## 2020-10-06 PROCEDURE — 99999 PR PBB SHADOW E&M-EST. PATIENT-LVL IV: CPT | Mod: PBBFAC,,, | Performed by: NURSE PRACTITIONER

## 2020-10-06 PROCEDURE — 3078F PR MOST RECENT DIASTOLIC BLOOD PRESSURE < 80 MM HG: ICD-10-PCS | Mod: CPTII,S$GLB,, | Performed by: NURSE PRACTITIONER

## 2020-10-06 PROCEDURE — 99999 PR PBB SHADOW E&M-EST. PATIENT-LVL IV: ICD-10-PCS | Mod: PBBFAC,,, | Performed by: NURSE PRACTITIONER

## 2020-10-06 PROCEDURE — 3008F PR BODY MASS INDEX (BMI) DOCUMENTED: ICD-10-PCS | Mod: CPTII,S$GLB,, | Performed by: NURSE PRACTITIONER

## 2020-10-06 PROCEDURE — 86140 C-REACTIVE PROTEIN: CPT

## 2020-10-06 PROCEDURE — 3008F BODY MASS INDEX DOCD: CPT | Mod: CPTII,S$GLB,, | Performed by: NURSE PRACTITIONER

## 2020-10-06 PROCEDURE — 36415 COLL VENOUS BLD VENIPUNCTURE: CPT | Mod: PO

## 2020-10-06 RX ORDER — CYCLOBENZAPRINE HCL 5 MG
5 TABLET ORAL 3 TIMES DAILY PRN
Qty: 30 TABLET | Refills: 0 | Status: SHIPPED | OUTPATIENT
Start: 2020-10-06 | End: 2021-06-17

## 2020-10-06 RX ORDER — PENICILLIN V POTASSIUM 500 MG/1
TABLET, FILM COATED ORAL
COMMUNITY
Start: 2020-10-05 | End: 2021-06-17

## 2020-10-06 RX ORDER — INFLUENZA A VIRUS A/VICTORIA/2454/2019 IVR-207 (H1N1) ANTIGEN (PROPIOLACTONE INACTIVATED), INFLUENZA A VIRUS A/HONG KONG/2671/2019 IVR-208 (H3N2) ANTIGEN (PROPIOLACTONE INACTIVATED), INFLUENZA B VIRUS B/VICTORIA/705/2018 BVR-11 ANTIGEN (PROPIOLACTONE INACTIVATED), INFLUENZA B VIRUS B/PHUKET/3073/2013 BVR-1B ANTIGEN (PROPIOLACTONE INACTIVATED) 15; 15; 15; 15 UG/.5ML; UG/.5ML; UG/.5ML; UG/.5ML
INJECTION, SUSPENSION INTRAMUSCULAR
COMMUNITY
Start: 2020-09-04 | End: 2021-06-17

## 2020-10-06 RX ORDER — PRASTERONE 6.5 MG/1
INSERT VAGINAL
COMMUNITY
Start: 2020-10-03 | End: 2021-06-17

## 2020-10-06 RX ORDER — IBUPROFEN 800 MG/1
800 TABLET ORAL 3 TIMES DAILY
Qty: 42 TABLET | Refills: 0 | Status: SHIPPED | OUTPATIENT
Start: 2020-10-06 | End: 2020-10-20

## 2020-10-06 NOTE — PROGRESS NOTES
Subjective:       Patient ID: Vicky Brand is a 54 y.o. female.    Chief Complaint: Jaw Pain    53 y/o female reports to clinic with complaints of left-sided jaw pain x 5 days.  The pain is described as aching and constant with sharp intermittent exacerbations.  The patient reports sensitivity when eating ice cream.  She denies blurred vision or extra pain with chewing.  She had an appt with her dentist on yesterday and was prescribed PCN  mg every 8 hours and Tylenol 1000 mg together with Advil 400 mg every 6 hours as needed.  She has a history of sinusitis, laryngitis, and migraines with no symptomatology of these illnesses now.       Facial Pain  This is a new problem. The current episode started in the past 7 days. The problem occurs constantly. The problem has been waxing and waning. Pertinent negatives include no coughing, headaches, neck pain, sore throat, swollen glands or visual change. Exacerbated by: palpation. She has tried acetaminophen and NSAIDs (antibiotics) for the symptoms. The treatment provided mild relief.     Review of Systems   HENT: Negative for dental problem, ear pain, facial swelling, sinus pressure/congestion, sore throat and trouble swallowing.    Eyes: Negative for visual disturbance.   Respiratory: Negative for cough.    Musculoskeletal: Negative for neck pain.   Neurological: Negative for headaches.   All other systems reviewed and are negative.        Objective:      Physical Exam  Vitals signs and nursing note reviewed.   Constitutional:       General: She is not in acute distress.     Appearance: Normal appearance. She is well-developed.   HENT:      Head: Normocephalic and atraumatic.      Jaw: There is normal jaw occlusion. Tenderness (to palpation; left side) present. No swelling or pain on movement.      Right Ear: Hearing, tympanic membrane and ear canal normal.      Left Ear: Hearing, tympanic membrane and ear canal normal.      Nose: No congestion or  rhinorrhea.      Right Sinus: No maxillary sinus tenderness or frontal sinus tenderness.      Left Sinus: No maxillary sinus tenderness or frontal sinus tenderness.   Eyes:      Pupils: Pupils are equal, round, and reactive to light.   Neck:      Musculoskeletal: Neck supple.      Vascular: No JVD.      Trachea: No tracheal deviation.   Cardiovascular:      Rate and Rhythm: Normal rate and regular rhythm.      Heart sounds: Normal heart sounds. No murmur.   Pulmonary:      Effort: Pulmonary effort is normal. No respiratory distress.      Breath sounds: Normal breath sounds. No wheezing or rales.   Musculoskeletal: Normal range of motion.         General: No tenderness.   Neurological:      Mental Status: She is alert and oriented to person, place, and time.   Psychiatric:         Behavior: Behavior normal.         Thought Content: Thought content normal. Thought content does not include homicidal or suicidal ideation.         Judgment: Judgment normal.         Assessment:       1. TMJ (temporomandibular joint disorder)        Plan:       Vicky was seen today for jaw pain.    Diagnoses and all orders for this visit:    TMJ (temporomandibular joint disorder)  D/C Tylenol.  Increase ibuprofen, add Flexeril for breakthrough pain, and continue PCN VK prophylactically.    Educated on TMJ disorders and given isometric jaw opening exercises and isometric jaw forward thrust exercises per UpTo[employer].  -     Sedimentation rate; Future  -     C-reactive protein; Future  -     ibuprofen (ADVIL,MOTRIN) 800 MG tablet; Take 1 tablet (800 mg total) by mouth 3 (three) times daily. for 14 days  -     cyclobenzaprine (FLEXERIL) 5 MG tablet; Take 1 tablet (5 mg total) by mouth 3 (three) times daily as needed for Muscle spasms.      Follow-up with PCP if symptoms worsen or fail to improve.

## 2020-10-28 ENCOUNTER — HOSPITAL ENCOUNTER (OUTPATIENT)
Dept: RADIOLOGY | Facility: HOSPITAL | Age: 55
Discharge: HOME | End: 2020-10-28
Attending: OBSTETRICS & GYNECOLOGY
Payer: COMMERCIAL

## 2020-10-28 DIAGNOSIS — Z12.39 BREAST CANCER SCREENING: ICD-10-CM

## 2020-10-28 PROCEDURE — 77067 MAMMO DIGITAL SCREENING BILAT WITH TOMOSYNTHESIS_CAD: ICD-10-PCS | Mod: 26,,, | Performed by: RADIOLOGY

## 2020-10-28 PROCEDURE — 77063 BREAST TOMOSYNTHESIS BI: CPT | Mod: 26,,, | Performed by: RADIOLOGY

## 2020-10-28 PROCEDURE — 77063 MAMMO DIGITAL SCREENING BILAT WITH TOMOSYNTHESIS_CAD: ICD-10-PCS | Mod: 26,,, | Performed by: RADIOLOGY

## 2020-10-28 PROCEDURE — 77067 SCR MAMMO BI INCL CAD: CPT | Mod: TC,PO

## 2020-10-28 PROCEDURE — 77067 SCR MAMMO BI INCL CAD: CPT | Mod: 26,,, | Performed by: RADIOLOGY

## 2020-12-07 ENCOUNTER — PATIENT MESSAGE (OUTPATIENT)
Dept: PRIMARY CARE CLINIC | Facility: CLINIC | Age: 55
End: 2020-12-07

## 2020-12-07 DIAGNOSIS — Z20.822 EXPOSURE TO COVID-19 VIRUS: Primary | ICD-10-CM

## 2020-12-09 ENCOUNTER — CLINICAL SUPPORT (OUTPATIENT)
Dept: URGENT CARE | Facility: CLINIC | Age: 55
End: 2020-12-09
Payer: COMMERCIAL

## 2020-12-09 ENCOUNTER — PATIENT MESSAGE (OUTPATIENT)
Dept: PRIMARY CARE CLINIC | Facility: CLINIC | Age: 55
End: 2020-12-09

## 2020-12-09 DIAGNOSIS — Z03.818 ENCOUNTER FOR OBSERVATION FOR SUSPECTED EXPOSURE TO OTHER BIOLOGICAL AGENTS RULED OUT: Primary | ICD-10-CM

## 2020-12-09 DIAGNOSIS — U07.1 COVID-19 VIRUS DETECTED: ICD-10-CM

## 2020-12-09 LAB
CTP QC/QA: YES
SARS-COV-2 RDRP RESP QL NAA+PROBE: POSITIVE

## 2020-12-09 PROCEDURE — U0002: ICD-10-PCS | Mod: QW,S$GLB,, | Performed by: PHYSICIAN ASSISTANT

## 2020-12-09 PROCEDURE — U0002 COVID-19 LAB TEST NON-CDC: HCPCS | Mod: QW,S$GLB,, | Performed by: PHYSICIAN ASSISTANT

## 2020-12-09 NOTE — PROGRESS NOTES
Pt is here for asymptomatic testing.  I have recommended that this patient see a provider but she declines at this time. I have discussed the risks and benefits of this procedure with her. The patient verbalizes understanding.

## 2021-01-03 ENCOUNTER — PATIENT MESSAGE (OUTPATIENT)
Dept: PRIMARY CARE CLINIC | Facility: CLINIC | Age: 56
End: 2021-01-03

## 2021-01-05 ENCOUNTER — PATIENT MESSAGE (OUTPATIENT)
Dept: OBSTETRICS AND GYNECOLOGY | Facility: CLINIC | Age: 56
End: 2021-01-05

## 2021-01-06 RX ORDER — OSPEMIFENE 60 MG/1
60 TABLET, FILM COATED ORAL DAILY
Qty: 30 TABLET | Refills: 11 | Status: SHIPPED | OUTPATIENT
Start: 2021-01-06 | End: 2021-12-16

## 2021-03-30 ENCOUNTER — IMMUNIZATION (OUTPATIENT)
Dept: PRIMARY CARE CLINIC | Facility: CLINIC | Age: 56
End: 2021-03-30

## 2021-03-30 DIAGNOSIS — Z23 NEED FOR VACCINATION: Primary | ICD-10-CM

## 2021-03-30 PROCEDURE — 0011A PR IMMUNIZ ADMIN, SARS-COV-2 COVID-19 VACC, 100MCG/0.5ML, 1ST DOSE: ICD-10-PCS | Mod: CV19,S$GLB,, | Performed by: INTERNAL MEDICINE

## 2021-03-30 PROCEDURE — 91301 PR SARS-COV-2 COVID-19 VACCINE, NO PRSV, 100MCG/0.5ML, IM: CPT | Mod: S$GLB,,, | Performed by: INTERNAL MEDICINE

## 2021-03-30 PROCEDURE — 0011A PR IMMUNIZ ADMIN, SARS-COV-2 COVID-19 VACC, 100MCG/0.5ML, 1ST DOSE: CPT | Mod: CV19,S$GLB,, | Performed by: INTERNAL MEDICINE

## 2021-03-30 PROCEDURE — 91301 PR SARS-COV-2 COVID-19 VACCINE, NO PRSV, 100MCG/0.5ML, IM: ICD-10-PCS | Mod: S$GLB,,, | Performed by: INTERNAL MEDICINE

## 2021-03-30 RX ADMIN — Medication 0.5 ML: at 07:03

## 2021-04-28 ENCOUNTER — IMMUNIZATION (OUTPATIENT)
Dept: PRIMARY CARE CLINIC | Facility: CLINIC | Age: 56
End: 2021-04-28
Payer: COMMERCIAL

## 2021-04-28 DIAGNOSIS — Z23 NEED FOR VACCINATION: Primary | ICD-10-CM

## 2021-04-28 PROCEDURE — 0012A PR IMMUNIZ ADMIN, SARS-COV-2 COVID-19 VACC, 100MCG/0.5ML, 2ND DOSE: ICD-10-PCS | Mod: CV19,S$GLB,, | Performed by: INTERNAL MEDICINE

## 2021-04-28 PROCEDURE — 91301 PR SARS-COV-2 COVID-19 VACCINE, NO PRSV, 100MCG/0.5ML, IM: ICD-10-PCS | Mod: S$GLB,,, | Performed by: INTERNAL MEDICINE

## 2021-04-28 PROCEDURE — 91301 PR SARS-COV-2 COVID-19 VACCINE, NO PRSV, 100MCG/0.5ML, IM: CPT | Mod: S$GLB,,, | Performed by: INTERNAL MEDICINE

## 2021-04-28 PROCEDURE — 0012A PR IMMUNIZ ADMIN, SARS-COV-2 COVID-19 VACC, 100MCG/0.5ML, 2ND DOSE: CPT | Mod: CV19,S$GLB,, | Performed by: INTERNAL MEDICINE

## 2021-04-28 RX ADMIN — Medication 0.5 ML: at 07:04

## 2021-06-04 ENCOUNTER — PATIENT MESSAGE (OUTPATIENT)
Dept: OBSTETRICS AND GYNECOLOGY | Facility: CLINIC | Age: 56
End: 2021-06-04

## 2021-06-04 ENCOUNTER — TELEPHONE (OUTPATIENT)
Dept: OBSTETRICS AND GYNECOLOGY | Facility: CLINIC | Age: 56
End: 2021-06-04

## 2021-06-17 ENCOUNTER — OFFICE VISIT (OUTPATIENT)
Dept: PRIMARY CARE CLINIC | Facility: CLINIC | Age: 56
End: 2021-06-17
Payer: COMMERCIAL

## 2021-06-17 DIAGNOSIS — R35.1 NOCTURIA: ICD-10-CM

## 2021-06-17 DIAGNOSIS — F51.03 PARADOXICAL INSOMNIA: ICD-10-CM

## 2021-06-17 DIAGNOSIS — R40.0 DAYTIME SOMNOLENCE: ICD-10-CM

## 2021-06-17 DIAGNOSIS — R06.83 SNORING: Primary | ICD-10-CM

## 2021-06-17 PROCEDURE — 99212 OFFICE O/P EST SF 10 MIN: CPT | Mod: 95,,, | Performed by: FAMILY MEDICINE

## 2021-06-17 PROCEDURE — 99212 PR OFFICE/OUTPT VISIT, EST, LEVL II, 10-19 MIN: ICD-10-PCS | Mod: 95,,, | Performed by: FAMILY MEDICINE

## 2021-06-19 ENCOUNTER — TELEPHONE (OUTPATIENT)
Dept: INTERNAL MEDICINE | Facility: CLINIC | Age: 56
End: 2021-06-19

## 2021-06-25 ENCOUNTER — PATIENT OUTREACH (OUTPATIENT)
Dept: ADMINISTRATIVE | Facility: OTHER | Age: 56
End: 2021-06-25

## 2021-06-28 ENCOUNTER — OFFICE VISIT (OUTPATIENT)
Dept: SLEEP MEDICINE | Facility: CLINIC | Age: 56
End: 2021-06-28
Payer: COMMERCIAL

## 2021-06-28 DIAGNOSIS — G47.00 INSOMNIA, UNSPECIFIED TYPE: Primary | ICD-10-CM

## 2021-06-28 PROCEDURE — 99203 PR OFFICE/OUTPT VISIT, NEW, LEVL III, 30-44 MIN: ICD-10-PCS | Mod: 95,,, | Performed by: PSYCHIATRY & NEUROLOGY

## 2021-06-28 PROCEDURE — 99203 OFFICE O/P NEW LOW 30 MIN: CPT | Mod: 95,,, | Performed by: PSYCHIATRY & NEUROLOGY

## 2021-06-29 ENCOUNTER — TELEPHONE (OUTPATIENT)
Dept: SLEEP MEDICINE | Facility: OTHER | Age: 56
End: 2021-06-29

## 2021-06-29 ENCOUNTER — PATIENT MESSAGE (OUTPATIENT)
Dept: SLEEP MEDICINE | Facility: CLINIC | Age: 56
End: 2021-06-29

## 2021-06-30 ENCOUNTER — HOSPITAL ENCOUNTER (OUTPATIENT)
Dept: SLEEP MEDICINE | Facility: OTHER | Age: 56
Discharge: HOME | End: 2021-06-30
Attending: PSYCHIATRY & NEUROLOGY
Payer: COMMERCIAL

## 2021-06-30 DIAGNOSIS — G47.00 INSOMNIA, UNSPECIFIED TYPE: ICD-10-CM

## 2021-06-30 DIAGNOSIS — G47.33 OSA (OBSTRUCTIVE SLEEP APNEA): ICD-10-CM

## 2021-06-30 PROCEDURE — 95800 SLP STDY UNATTENDED: CPT | Mod: 26,,, | Performed by: PSYCHIATRY & NEUROLOGY

## 2021-06-30 PROCEDURE — 95800 PR SLEEP STUDY, UNATTENDED, RECORD HEART RATE/O2 SAT/RESP ANAL/SLEEP TIME: ICD-10-PCS | Mod: 26,,, | Performed by: PSYCHIATRY & NEUROLOGY

## 2021-06-30 PROCEDURE — 95800 SLP STDY UNATTENDED: CPT

## 2021-07-03 ENCOUNTER — TELEPHONE (OUTPATIENT)
Dept: SLEEP MEDICINE | Facility: CLINIC | Age: 56
End: 2021-07-03

## 2021-07-03 DIAGNOSIS — G47.33 OSA (OBSTRUCTIVE SLEEP APNEA): Primary | ICD-10-CM

## 2021-07-06 ENCOUNTER — TELEPHONE (OUTPATIENT)
Dept: SLEEP MEDICINE | Facility: CLINIC | Age: 56
End: 2021-07-06

## 2021-07-07 ENCOUNTER — PATIENT MESSAGE (OUTPATIENT)
Dept: SLEEP MEDICINE | Facility: CLINIC | Age: 56
End: 2021-07-07

## 2021-07-08 ENCOUNTER — TELEPHONE (OUTPATIENT)
Dept: SLEEP MEDICINE | Facility: CLINIC | Age: 56
End: 2021-07-08

## 2021-07-09 ENCOUNTER — OFFICE VISIT (OUTPATIENT)
Dept: SLEEP MEDICINE | Facility: CLINIC | Age: 56
End: 2021-07-09
Payer: COMMERCIAL

## 2021-07-09 VITALS
SYSTOLIC BLOOD PRESSURE: 154 MMHG | DIASTOLIC BLOOD PRESSURE: 79 MMHG | BODY MASS INDEX: 20.59 KG/M2 | HEART RATE: 72 BPM | HEIGHT: 66 IN | WEIGHT: 128.13 LBS

## 2021-07-09 DIAGNOSIS — G47.33 OBSTRUCTIVE SLEEP APNEA: Primary | ICD-10-CM

## 2021-07-09 PROCEDURE — 99213 PR OFFICE/OUTPT VISIT, EST, LEVL III, 20-29 MIN: ICD-10-PCS | Mod: S$GLB,,, | Performed by: NURSE PRACTITIONER

## 2021-07-09 PROCEDURE — 99999 PR PBB SHADOW E&M-EST. PATIENT-LVL III: CPT | Mod: PBBFAC,,, | Performed by: NURSE PRACTITIONER

## 2021-07-09 PROCEDURE — 1126F AMNT PAIN NOTED NONE PRSNT: CPT | Mod: S$GLB,,, | Performed by: NURSE PRACTITIONER

## 2021-07-09 PROCEDURE — 1126F PR PAIN SEVERITY QUANTIFIED, NO PAIN PRESENT: ICD-10-PCS | Mod: S$GLB,,, | Performed by: NURSE PRACTITIONER

## 2021-07-09 PROCEDURE — 3008F BODY MASS INDEX DOCD: CPT | Mod: CPTII,S$GLB,, | Performed by: NURSE PRACTITIONER

## 2021-07-09 PROCEDURE — 99999 PR PBB SHADOW E&M-EST. PATIENT-LVL III: ICD-10-PCS | Mod: PBBFAC,,, | Performed by: NURSE PRACTITIONER

## 2021-07-09 PROCEDURE — 3008F PR BODY MASS INDEX (BMI) DOCUMENTED: ICD-10-PCS | Mod: CPTII,S$GLB,, | Performed by: NURSE PRACTITIONER

## 2021-07-09 PROCEDURE — 99213 OFFICE O/P EST LOW 20 MIN: CPT | Mod: S$GLB,,, | Performed by: NURSE PRACTITIONER

## 2021-07-19 ENCOUNTER — TELEPHONE (OUTPATIENT)
Dept: UROLOGY | Facility: CLINIC | Age: 56
End: 2021-07-19

## 2021-07-19 ENCOUNTER — TELEPHONE (OUTPATIENT)
Dept: PRIMARY CARE CLINIC | Facility: CLINIC | Age: 56
End: 2021-07-19

## 2021-07-19 DIAGNOSIS — Z00.00 ROUTINE GENERAL MEDICAL EXAMINATION AT A HEALTH CARE FACILITY: Primary | ICD-10-CM

## 2021-07-20 ENCOUNTER — PATIENT MESSAGE (OUTPATIENT)
Dept: SLEEP MEDICINE | Facility: CLINIC | Age: 56
End: 2021-07-20

## 2021-07-21 ENCOUNTER — LAB VISIT (OUTPATIENT)
Dept: LAB | Facility: HOSPITAL | Age: 56
End: 2021-07-21
Payer: COMMERCIAL

## 2021-07-21 DIAGNOSIS — Z00.00 ROUTINE GENERAL MEDICAL EXAMINATION AT A HEALTH CARE FACILITY: ICD-10-CM

## 2021-07-21 LAB
ALBUMIN SERPL BCP-MCNC: 3.8 G/DL (ref 3.5–5.2)
ALP SERPL-CCNC: 45 U/L (ref 55–135)
ALT SERPL W/O P-5'-P-CCNC: 10 U/L (ref 10–44)
ANION GAP SERPL CALC-SCNC: 6 MMOL/L (ref 8–16)
AST SERPL-CCNC: 23 U/L (ref 10–40)
BASOPHILS # BLD AUTO: 0.03 K/UL (ref 0–0.2)
BASOPHILS NFR BLD: 0.8 % (ref 0–1.9)
BILIRUB SERPL-MCNC: 0.8 MG/DL (ref 0.1–1)
BUN SERPL-MCNC: 15 MG/DL (ref 6–20)
CALCIUM SERPL-MCNC: 9.7 MG/DL (ref 8.7–10.5)
CHLORIDE SERPL-SCNC: 104 MMOL/L (ref 95–110)
CHOLEST SERPL-MCNC: 162 MG/DL (ref 120–199)
CHOLEST/HDLC SERPL: 3.1 {RATIO} (ref 2–5)
CO2 SERPL-SCNC: 30 MMOL/L (ref 23–29)
CREAT SERPL-MCNC: 1 MG/DL (ref 0.5–1.4)
DIFFERENTIAL METHOD: ABNORMAL
EOSINOPHIL # BLD AUTO: 0.1 K/UL (ref 0–0.5)
EOSINOPHIL NFR BLD: 1.6 % (ref 0–8)
ERYTHROCYTE [DISTWIDTH] IN BLOOD BY AUTOMATED COUNT: 13.2 % (ref 11.5–14.5)
EST. GFR  (AFRICAN AMERICAN): >60 ML/MIN/1.73 M^2
EST. GFR  (NON AFRICAN AMERICAN): >60 ML/MIN/1.73 M^2
GLUCOSE SERPL-MCNC: 88 MG/DL (ref 70–110)
HCT VFR BLD AUTO: 41.1 % (ref 37–48.5)
HDLC SERPL-MCNC: 52 MG/DL (ref 40–75)
HDLC SERPL: 32.1 % (ref 20–50)
HGB BLD-MCNC: 13.1 G/DL (ref 12–16)
IMM GRANULOCYTES # BLD AUTO: 0.01 K/UL (ref 0–0.04)
IMM GRANULOCYTES NFR BLD AUTO: 0.3 % (ref 0–0.5)
LDLC SERPL CALC-MCNC: 93.4 MG/DL (ref 63–159)
LYMPHOCYTES # BLD AUTO: 1.8 K/UL (ref 1–4.8)
LYMPHOCYTES NFR BLD: 48.7 % (ref 18–48)
MCH RBC QN AUTO: 29.1 PG (ref 27–31)
MCHC RBC AUTO-ENTMCNC: 31.9 G/DL (ref 32–36)
MCV RBC AUTO: 91 FL (ref 82–98)
MONOCYTES # BLD AUTO: 0.4 K/UL (ref 0.3–1)
MONOCYTES NFR BLD: 11.3 % (ref 4–15)
NEUTROPHILS # BLD AUTO: 1.4 K/UL (ref 1.8–7.7)
NEUTROPHILS NFR BLD: 37.3 % (ref 38–73)
NONHDLC SERPL-MCNC: 110 MG/DL
NRBC BLD-RTO: 0 /100 WBC
PLATELET # BLD AUTO: 211 K/UL (ref 150–450)
PMV BLD AUTO: 11.2 FL (ref 9.2–12.9)
POTASSIUM SERPL-SCNC: 4.2 MMOL/L (ref 3.5–5.1)
PROT SERPL-MCNC: 7.6 G/DL (ref 6–8.4)
RBC # BLD AUTO: 4.5 M/UL (ref 4–5.4)
SODIUM SERPL-SCNC: 140 MMOL/L (ref 136–145)
TRIGL SERPL-MCNC: 83 MG/DL (ref 30–150)
WBC # BLD AUTO: 3.72 K/UL (ref 3.9–12.7)

## 2021-07-21 PROCEDURE — 36415 COLL VENOUS BLD VENIPUNCTURE: CPT | Mod: PN | Performed by: FAMILY MEDICINE

## 2021-07-21 PROCEDURE — 80061 LIPID PANEL: CPT | Performed by: FAMILY MEDICINE

## 2021-07-21 PROCEDURE — 80053 COMPREHEN METABOLIC PANEL: CPT | Performed by: FAMILY MEDICINE

## 2021-07-21 PROCEDURE — 85025 COMPLETE CBC W/AUTO DIFF WBC: CPT | Performed by: FAMILY MEDICINE

## 2021-07-22 ENCOUNTER — PATIENT MESSAGE (OUTPATIENT)
Dept: UROLOGY | Facility: CLINIC | Age: 56
End: 2021-07-22

## 2021-07-22 DIAGNOSIS — M62.89 PELVIC FLOOR DYSFUNCTION: Primary | ICD-10-CM

## 2021-07-27 ENCOUNTER — OFFICE VISIT (OUTPATIENT)
Dept: PRIMARY CARE CLINIC | Facility: CLINIC | Age: 56
End: 2021-07-27
Payer: COMMERCIAL

## 2021-07-27 ENCOUNTER — PATIENT MESSAGE (OUTPATIENT)
Dept: PRIMARY CARE CLINIC | Facility: CLINIC | Age: 56
End: 2021-07-27

## 2021-07-27 VITALS
TEMPERATURE: 98 F | WEIGHT: 127.63 LBS | HEART RATE: 66 BPM | OXYGEN SATURATION: 99 % | HEIGHT: 66 IN | BODY MASS INDEX: 20.51 KG/M2 | DIASTOLIC BLOOD PRESSURE: 88 MMHG | SYSTOLIC BLOOD PRESSURE: 132 MMHG

## 2021-07-27 DIAGNOSIS — G47.33 OSA (OBSTRUCTIVE SLEEP APNEA): ICD-10-CM

## 2021-07-27 DIAGNOSIS — Z00.00 ROUTINE GENERAL MEDICAL EXAMINATION AT A HEALTH CARE FACILITY: Primary | ICD-10-CM

## 2021-07-27 DIAGNOSIS — F51.03 PARADOXICAL INSOMNIA: ICD-10-CM

## 2021-07-27 DIAGNOSIS — N94.10 DYSPAREUNIA IN FEMALE: ICD-10-CM

## 2021-07-27 PROCEDURE — 1160F PR REVIEW ALL MEDS BY PRESCRIBER/CLIN PHARMACIST DOCUMENTED: ICD-10-PCS | Mod: CPTII,S$GLB,, | Performed by: FAMILY MEDICINE

## 2021-07-27 PROCEDURE — 99396 PREV VISIT EST AGE 40-64: CPT | Mod: S$GLB,,, | Performed by: FAMILY MEDICINE

## 2021-07-27 PROCEDURE — 99396 PR PREVENTIVE VISIT,EST,40-64: ICD-10-PCS | Mod: S$GLB,,, | Performed by: FAMILY MEDICINE

## 2021-07-27 PROCEDURE — 1159F MED LIST DOCD IN RCRD: CPT | Mod: CPTII,S$GLB,, | Performed by: FAMILY MEDICINE

## 2021-07-27 PROCEDURE — 1126F AMNT PAIN NOTED NONE PRSNT: CPT | Mod: CPTII,S$GLB,, | Performed by: FAMILY MEDICINE

## 2021-07-27 PROCEDURE — 99999 PR PBB SHADOW E&M-EST. PATIENT-LVL III: CPT | Mod: PBBFAC,,, | Performed by: FAMILY MEDICINE

## 2021-07-27 PROCEDURE — 99999 PR PBB SHADOW E&M-EST. PATIENT-LVL III: ICD-10-PCS | Mod: PBBFAC,,, | Performed by: FAMILY MEDICINE

## 2021-07-27 PROCEDURE — 3008F PR BODY MASS INDEX (BMI) DOCUMENTED: ICD-10-PCS | Mod: CPTII,S$GLB,, | Performed by: FAMILY MEDICINE

## 2021-07-27 PROCEDURE — 1126F PR PAIN SEVERITY QUANTIFIED, NO PAIN PRESENT: ICD-10-PCS | Mod: CPTII,S$GLB,, | Performed by: FAMILY MEDICINE

## 2021-07-27 PROCEDURE — 1159F PR MEDICATION LIST DOCUMENTED IN MEDICAL RECORD: ICD-10-PCS | Mod: CPTII,S$GLB,, | Performed by: FAMILY MEDICINE

## 2021-07-27 PROCEDURE — 1160F RVW MEDS BY RX/DR IN RCRD: CPT | Mod: CPTII,S$GLB,, | Performed by: FAMILY MEDICINE

## 2021-07-27 PROCEDURE — 3008F BODY MASS INDEX DOCD: CPT | Mod: CPTII,S$GLB,, | Performed by: FAMILY MEDICINE

## 2021-07-28 ENCOUNTER — TELEPHONE (OUTPATIENT)
Dept: SLEEP MEDICINE | Facility: CLINIC | Age: 56
End: 2021-07-28

## 2021-07-29 ENCOUNTER — PATIENT MESSAGE (OUTPATIENT)
Dept: SLEEP MEDICINE | Facility: CLINIC | Age: 56
End: 2021-07-29

## 2021-07-31 ENCOUNTER — PATIENT MESSAGE (OUTPATIENT)
Dept: SLEEP MEDICINE | Facility: CLINIC | Age: 56
End: 2021-07-31

## 2021-08-02 ENCOUNTER — PATIENT MESSAGE (OUTPATIENT)
Dept: SLEEP MEDICINE | Facility: CLINIC | Age: 56
End: 2021-08-02

## 2021-08-03 ENCOUNTER — PATIENT MESSAGE (OUTPATIENT)
Dept: SLEEP MEDICINE | Facility: CLINIC | Age: 56
End: 2021-08-03

## 2021-08-04 ENCOUNTER — PATIENT MESSAGE (OUTPATIENT)
Dept: SLEEP MEDICINE | Facility: CLINIC | Age: 56
End: 2021-08-04

## 2021-08-06 ENCOUNTER — PATIENT MESSAGE (OUTPATIENT)
Dept: SLEEP MEDICINE | Facility: CLINIC | Age: 56
End: 2021-08-06

## 2021-08-06 ENCOUNTER — TELEPHONE (OUTPATIENT)
Dept: SLEEP MEDICINE | Facility: CLINIC | Age: 56
End: 2021-08-06

## 2021-08-06 DIAGNOSIS — F41.8 SITUATIONAL ANXIETY: Primary | ICD-10-CM

## 2021-08-06 RX ORDER — ALPRAZOLAM 0.5 MG/1
0.5 TABLET ORAL NIGHTLY PRN
Qty: 5 TABLET | Refills: 0 | Status: SHIPPED | OUTPATIENT
Start: 2021-08-06 | End: 2021-12-16

## 2021-08-09 ENCOUNTER — PATIENT MESSAGE (OUTPATIENT)
Dept: SLEEP MEDICINE | Facility: CLINIC | Age: 56
End: 2021-08-09

## 2021-08-09 DIAGNOSIS — G47.00 INSOMNIA, UNSPECIFIED TYPE: Primary | ICD-10-CM

## 2021-08-10 ENCOUNTER — TELEPHONE (OUTPATIENT)
Dept: SLEEP MEDICINE | Facility: CLINIC | Age: 56
End: 2021-08-10

## 2021-08-10 DIAGNOSIS — G47.00 INSOMNIA, UNSPECIFIED TYPE: Primary | ICD-10-CM

## 2021-08-10 PROBLEM — M62.81 MUSCLE WEAKNESS: Status: ACTIVE | Noted: 2021-08-10

## 2021-08-10 PROBLEM — M79.18 MYALGIA OF PELVIC FLOOR: Status: ACTIVE | Noted: 2021-08-10

## 2021-08-10 PROBLEM — R27.9 LACK OF COORDINATION: Status: ACTIVE | Noted: 2021-08-10

## 2021-08-10 RX ORDER — HYDROXYZINE HYDROCHLORIDE 50 MG/1
TABLET, FILM COATED ORAL
Qty: 60 TABLET | Refills: 1 | Status: SHIPPED | OUTPATIENT
Start: 2021-08-10 | End: 2021-09-16

## 2021-08-13 ENCOUNTER — CLINICAL SUPPORT (OUTPATIENT)
Dept: REHABILITATION | Facility: OTHER | Age: 56
End: 2021-08-13
Attending: OBSTETRICS & GYNECOLOGY
Payer: COMMERCIAL

## 2021-08-13 DIAGNOSIS — R27.9 LACK OF COORDINATION: ICD-10-CM

## 2021-08-13 DIAGNOSIS — M62.81 MUSCLE WEAKNESS: ICD-10-CM

## 2021-08-13 DIAGNOSIS — M62.89 PELVIC FLOOR DYSFUNCTION: ICD-10-CM

## 2021-08-13 DIAGNOSIS — M79.18 MYALGIA OF PELVIC FLOOR: ICD-10-CM

## 2021-08-13 PROCEDURE — 97161 PT EVAL LOW COMPLEX 20 MIN: CPT

## 2021-08-13 PROCEDURE — 97530 THERAPEUTIC ACTIVITIES: CPT

## 2021-08-13 PROCEDURE — 97112 NEUROMUSCULAR REEDUCATION: CPT

## 2021-08-16 ENCOUNTER — PATIENT MESSAGE (OUTPATIENT)
Dept: SLEEP MEDICINE | Facility: CLINIC | Age: 56
End: 2021-08-16

## 2021-08-16 RX ORDER — HYDROXYZINE HYDROCHLORIDE 10 MG/1
TABLET, FILM COATED ORAL
Qty: 60 TABLET | Refills: 3 | Status: SHIPPED | OUTPATIENT
Start: 2021-08-16 | End: 2021-09-16

## 2021-08-19 ENCOUNTER — PATIENT MESSAGE (OUTPATIENT)
Dept: REHABILITATION | Facility: OTHER | Age: 56
End: 2021-08-19

## 2021-08-27 ENCOUNTER — PATIENT OUTREACH (OUTPATIENT)
Dept: ADMINISTRATIVE | Facility: OTHER | Age: 56
End: 2021-08-27

## 2021-08-30 ENCOUNTER — TELEPHONE (OUTPATIENT)
Dept: SLEEP MEDICINE | Facility: CLINIC | Age: 56
End: 2021-08-30

## 2021-09-16 ENCOUNTER — OFFICE VISIT (OUTPATIENT)
Dept: OBSTETRICS AND GYNECOLOGY | Facility: CLINIC | Age: 56
End: 2021-09-16
Payer: COMMERCIAL

## 2021-09-16 VITALS
SYSTOLIC BLOOD PRESSURE: 106 MMHG | WEIGHT: 128.88 LBS | DIASTOLIC BLOOD PRESSURE: 58 MMHG | BODY MASS INDEX: 20.71 KG/M2 | HEIGHT: 66 IN

## 2021-09-16 DIAGNOSIS — Z12.31 BREAST CANCER SCREENING BY MAMMOGRAM: ICD-10-CM

## 2021-09-16 DIAGNOSIS — Z01.419 ENCOUNTER FOR GYNECOLOGICAL EXAMINATION WITHOUT ABNORMAL FINDING: Primary | ICD-10-CM

## 2021-09-16 DIAGNOSIS — N94.10 DYSPAREUNIA, FEMALE: ICD-10-CM

## 2021-09-16 DIAGNOSIS — N95.2 VAGINAL ATROPHY: ICD-10-CM

## 2021-09-16 PROCEDURE — 3074F PR MOST RECENT SYSTOLIC BLOOD PRESSURE < 130 MM HG: ICD-10-PCS | Mod: CPTII,S$GLB,, | Performed by: OBSTETRICS & GYNECOLOGY

## 2021-09-16 PROCEDURE — 3078F PR MOST RECENT DIASTOLIC BLOOD PRESSURE < 80 MM HG: ICD-10-PCS | Mod: CPTII,S$GLB,, | Performed by: OBSTETRICS & GYNECOLOGY

## 2021-09-16 PROCEDURE — 1160F RVW MEDS BY RX/DR IN RCRD: CPT | Mod: CPTII,S$GLB,, | Performed by: OBSTETRICS & GYNECOLOGY

## 2021-09-16 PROCEDURE — 88175 CYTOPATH C/V AUTO FLUID REDO: CPT | Performed by: OBSTETRICS & GYNECOLOGY

## 2021-09-16 PROCEDURE — 99396 PR PREVENTIVE VISIT,EST,40-64: ICD-10-PCS | Mod: S$GLB,,, | Performed by: OBSTETRICS & GYNECOLOGY

## 2021-09-16 PROCEDURE — 3008F BODY MASS INDEX DOCD: CPT | Mod: CPTII,S$GLB,, | Performed by: OBSTETRICS & GYNECOLOGY

## 2021-09-16 PROCEDURE — 99999 PR PBB SHADOW E&M-EST. PATIENT-LVL III: CPT | Mod: PBBFAC,,, | Performed by: OBSTETRICS & GYNECOLOGY

## 2021-09-16 PROCEDURE — 1159F MED LIST DOCD IN RCRD: CPT | Mod: CPTII,S$GLB,, | Performed by: OBSTETRICS & GYNECOLOGY

## 2021-09-16 PROCEDURE — 99396 PREV VISIT EST AGE 40-64: CPT | Mod: S$GLB,,, | Performed by: OBSTETRICS & GYNECOLOGY

## 2021-09-16 PROCEDURE — 99999 PR PBB SHADOW E&M-EST. PATIENT-LVL III: ICD-10-PCS | Mod: PBBFAC,,, | Performed by: OBSTETRICS & GYNECOLOGY

## 2021-09-16 PROCEDURE — 3008F PR BODY MASS INDEX (BMI) DOCUMENTED: ICD-10-PCS | Mod: CPTII,S$GLB,, | Performed by: OBSTETRICS & GYNECOLOGY

## 2021-09-16 PROCEDURE — 3074F SYST BP LT 130 MM HG: CPT | Mod: CPTII,S$GLB,, | Performed by: OBSTETRICS & GYNECOLOGY

## 2021-09-16 PROCEDURE — 3078F DIAST BP <80 MM HG: CPT | Mod: CPTII,S$GLB,, | Performed by: OBSTETRICS & GYNECOLOGY

## 2021-09-16 PROCEDURE — 1160F PR REVIEW ALL MEDS BY PRESCRIBER/CLIN PHARMACIST DOCUMENTED: ICD-10-PCS | Mod: CPTII,S$GLB,, | Performed by: OBSTETRICS & GYNECOLOGY

## 2021-09-16 PROCEDURE — 1159F PR MEDICATION LIST DOCUMENTED IN MEDICAL RECORD: ICD-10-PCS | Mod: CPTII,S$GLB,, | Performed by: OBSTETRICS & GYNECOLOGY

## 2021-09-16 RX ORDER — OSPEMIFENE 60 MG/1
60 TABLET, FILM COATED ORAL DAILY
Qty: 30 TABLET | Refills: 12 | Status: SHIPPED | OUTPATIENT
Start: 2021-09-16 | End: 2021-12-16

## 2021-09-20 ENCOUNTER — TELEPHONE (OUTPATIENT)
Dept: SLEEP MEDICINE | Facility: CLINIC | Age: 56
End: 2021-09-20

## 2021-09-21 ENCOUNTER — OFFICE VISIT (OUTPATIENT)
Dept: SLEEP MEDICINE | Facility: CLINIC | Age: 56
End: 2021-09-21
Payer: COMMERCIAL

## 2021-09-21 DIAGNOSIS — G47.00 INSOMNIA, UNSPECIFIED TYPE: Primary | ICD-10-CM

## 2021-09-21 LAB
FINAL PATHOLOGIC DIAGNOSIS: NORMAL
Lab: NORMAL

## 2021-09-21 PROCEDURE — 1160F RVW MEDS BY RX/DR IN RCRD: CPT | Mod: CPTII,95,, | Performed by: PSYCHIATRY & NEUROLOGY

## 2021-09-21 PROCEDURE — 1159F MED LIST DOCD IN RCRD: CPT | Mod: CPTII,95,, | Performed by: PSYCHIATRY & NEUROLOGY

## 2021-09-21 PROCEDURE — 1160F PR REVIEW ALL MEDS BY PRESCRIBER/CLIN PHARMACIST DOCUMENTED: ICD-10-PCS | Mod: CPTII,95,, | Performed by: PSYCHIATRY & NEUROLOGY

## 2021-09-21 PROCEDURE — 99214 OFFICE O/P EST MOD 30 MIN: CPT | Mod: 95,,, | Performed by: PSYCHIATRY & NEUROLOGY

## 2021-09-21 PROCEDURE — 1159F PR MEDICATION LIST DOCUMENTED IN MEDICAL RECORD: ICD-10-PCS | Mod: CPTII,95,, | Performed by: PSYCHIATRY & NEUROLOGY

## 2021-09-21 PROCEDURE — 99214 PR OFFICE/OUTPT VISIT, EST, LEVL IV, 30-39 MIN: ICD-10-PCS | Mod: 95,,, | Performed by: PSYCHIATRY & NEUROLOGY

## 2021-09-21 RX ORDER — HYDROXYZINE HYDROCHLORIDE 10 MG/1
TABLET, FILM COATED ORAL
Qty: 30 TABLET | Refills: 5 | Status: SHIPPED | OUTPATIENT
Start: 2021-09-21 | End: 2021-12-16

## 2021-09-30 ENCOUNTER — CLINICAL SUPPORT (OUTPATIENT)
Dept: REHABILITATION | Facility: OTHER | Age: 56
End: 2021-09-30
Attending: OBSTETRICS & GYNECOLOGY
Payer: COMMERCIAL

## 2021-09-30 DIAGNOSIS — M62.81 MUSCLE WEAKNESS: ICD-10-CM

## 2021-09-30 DIAGNOSIS — R27.9 LACK OF COORDINATION: Primary | ICD-10-CM

## 2021-09-30 DIAGNOSIS — M79.18 MYALGIA OF PELVIC FLOOR: ICD-10-CM

## 2021-09-30 PROCEDURE — 97140 MANUAL THERAPY 1/> REGIONS: CPT

## 2021-09-30 PROCEDURE — 97110 THERAPEUTIC EXERCISES: CPT

## 2021-09-30 PROCEDURE — 97112 NEUROMUSCULAR REEDUCATION: CPT

## 2021-10-01 ENCOUNTER — PATIENT MESSAGE (OUTPATIENT)
Dept: REHABILITATION | Facility: OTHER | Age: 56
End: 2021-10-01

## 2021-10-14 ENCOUNTER — CLINICAL SUPPORT (OUTPATIENT)
Dept: REHABILITATION | Facility: OTHER | Age: 56
End: 2021-10-14
Attending: OBSTETRICS & GYNECOLOGY
Payer: COMMERCIAL

## 2021-10-14 DIAGNOSIS — R27.9 LACK OF COORDINATION: Primary | ICD-10-CM

## 2021-10-14 DIAGNOSIS — M79.18 MYALGIA OF PELVIC FLOOR: ICD-10-CM

## 2021-10-14 DIAGNOSIS — M62.81 MUSCLE WEAKNESS: ICD-10-CM

## 2021-10-14 PROCEDURE — 97530 THERAPEUTIC ACTIVITIES: CPT

## 2021-10-14 PROCEDURE — 97140 MANUAL THERAPY 1/> REGIONS: CPT

## 2021-10-16 ENCOUNTER — PATIENT MESSAGE (OUTPATIENT)
Dept: OBSTETRICS AND GYNECOLOGY | Facility: CLINIC | Age: 56
End: 2021-10-16
Payer: COMMERCIAL

## 2021-10-19 RX ORDER — ESTRADIOL 0.1 MG/G
1 CREAM VAGINAL DAILY
Qty: 42.5 G | Refills: 1 | Status: SHIPPED | OUTPATIENT
Start: 2021-10-19 | End: 2022-01-10 | Stop reason: CLARIF

## 2021-10-28 ENCOUNTER — CLINICAL SUPPORT (OUTPATIENT)
Dept: REHABILITATION | Facility: OTHER | Age: 56
End: 2021-10-28
Attending: OBSTETRICS & GYNECOLOGY
Payer: COMMERCIAL

## 2021-10-28 DIAGNOSIS — R27.9 LACK OF COORDINATION: Primary | ICD-10-CM

## 2021-10-28 DIAGNOSIS — M79.18 MYALGIA OF PELVIC FLOOR: ICD-10-CM

## 2021-10-28 DIAGNOSIS — M62.81 MUSCLE WEAKNESS: ICD-10-CM

## 2021-10-28 PROCEDURE — 97112 NEUROMUSCULAR REEDUCATION: CPT

## 2021-10-28 PROCEDURE — 97530 THERAPEUTIC ACTIVITIES: CPT

## 2021-10-29 ENCOUNTER — HOSPITAL ENCOUNTER (OUTPATIENT)
Dept: RADIOLOGY | Facility: HOSPITAL | Age: 56
Discharge: HOME | End: 2021-10-29
Attending: OBSTETRICS & GYNECOLOGY
Payer: COMMERCIAL

## 2021-10-29 ENCOUNTER — PATIENT MESSAGE (OUTPATIENT)
Dept: SLEEP MEDICINE | Facility: CLINIC | Age: 56
End: 2021-10-29
Payer: COMMERCIAL

## 2021-10-29 VITALS — WEIGHT: 130 LBS | BODY MASS INDEX: 20.98 KG/M2

## 2021-10-29 DIAGNOSIS — Z12.31 BREAST CANCER SCREENING BY MAMMOGRAM: ICD-10-CM

## 2021-10-29 PROCEDURE — 77067 MAMMO DIGITAL SCREENING BILAT WITH TOMO: ICD-10-PCS | Mod: 26,,, | Performed by: RADIOLOGY

## 2021-10-29 PROCEDURE — 77067 SCR MAMMO BI INCL CAD: CPT | Mod: TC,PO

## 2021-10-29 PROCEDURE — 77063 BREAST TOMOSYNTHESIS BI: CPT | Mod: 26,,, | Performed by: RADIOLOGY

## 2021-10-29 PROCEDURE — 77067 SCR MAMMO BI INCL CAD: CPT | Mod: 26,,, | Performed by: RADIOLOGY

## 2021-10-29 PROCEDURE — 77063 MAMMO DIGITAL SCREENING BILAT WITH TOMO: ICD-10-PCS | Mod: 26,,, | Performed by: RADIOLOGY

## 2021-11-01 ENCOUNTER — TELEPHONE (OUTPATIENT)
Dept: OBSTETRICS AND GYNECOLOGY | Facility: CLINIC | Age: 56
End: 2021-11-01
Payer: COMMERCIAL

## 2021-11-10 ENCOUNTER — CLINICAL SUPPORT (OUTPATIENT)
Dept: REHABILITATION | Facility: OTHER | Age: 56
End: 2021-11-10
Attending: OBSTETRICS & GYNECOLOGY
Payer: COMMERCIAL

## 2021-11-10 DIAGNOSIS — M79.18 MYALGIA OF PELVIC FLOOR: ICD-10-CM

## 2021-11-10 DIAGNOSIS — M62.81 MUSCLE WEAKNESS: ICD-10-CM

## 2021-11-10 DIAGNOSIS — R27.9 LACK OF COORDINATION: Primary | ICD-10-CM

## 2021-11-10 PROCEDURE — 97530 THERAPEUTIC ACTIVITIES: CPT

## 2021-11-17 ENCOUNTER — PATIENT MESSAGE (OUTPATIENT)
Dept: PRIMARY CARE CLINIC | Facility: CLINIC | Age: 56
End: 2021-11-17
Payer: COMMERCIAL

## 2021-11-17 ENCOUNTER — PATIENT MESSAGE (OUTPATIENT)
Dept: REHABILITATION | Facility: OTHER | Age: 56
End: 2021-11-17
Payer: COMMERCIAL

## 2021-12-02 ENCOUNTER — CLINICAL SUPPORT (OUTPATIENT)
Dept: REHABILITATION | Facility: OTHER | Age: 56
End: 2021-12-02
Attending: OBSTETRICS & GYNECOLOGY
Payer: COMMERCIAL

## 2021-12-02 DIAGNOSIS — M79.18 MYALGIA OF PELVIC FLOOR: ICD-10-CM

## 2021-12-02 DIAGNOSIS — M62.81 MUSCLE WEAKNESS: ICD-10-CM

## 2021-12-02 DIAGNOSIS — R27.9 LACK OF COORDINATION: Primary | ICD-10-CM

## 2021-12-02 PROCEDURE — 97530 THERAPEUTIC ACTIVITIES: CPT

## 2021-12-03 ENCOUNTER — IMMUNIZATION (OUTPATIENT)
Dept: PHARMACY | Facility: CLINIC | Age: 56
End: 2021-12-03
Payer: COMMERCIAL

## 2021-12-03 DIAGNOSIS — Z23 NEED FOR VACCINATION: Primary | ICD-10-CM

## 2021-12-15 ENCOUNTER — PATIENT MESSAGE (OUTPATIENT)
Dept: OBSTETRICS AND GYNECOLOGY | Facility: CLINIC | Age: 56
End: 2021-12-15
Payer: COMMERCIAL

## 2021-12-15 ENCOUNTER — PATIENT MESSAGE (OUTPATIENT)
Dept: REHABILITATION | Facility: OTHER | Age: 56
End: 2021-12-15
Payer: COMMERCIAL

## 2021-12-16 ENCOUNTER — OFFICE VISIT (OUTPATIENT)
Dept: OBSTETRICS AND GYNECOLOGY | Facility: CLINIC | Age: 56
End: 2021-12-16
Payer: COMMERCIAL

## 2021-12-16 ENCOUNTER — PATIENT MESSAGE (OUTPATIENT)
Dept: OBSTETRICS AND GYNECOLOGY | Facility: CLINIC | Age: 56
End: 2021-12-16
Payer: COMMERCIAL

## 2021-12-16 ENCOUNTER — PATIENT MESSAGE (OUTPATIENT)
Dept: OBSTETRICS AND GYNECOLOGY | Facility: CLINIC | Age: 56
End: 2021-12-16

## 2021-12-16 VITALS
BODY MASS INDEX: 22.36 KG/M2 | WEIGHT: 139.13 LBS | HEIGHT: 66 IN | DIASTOLIC BLOOD PRESSURE: 84 MMHG | SYSTOLIC BLOOD PRESSURE: 122 MMHG

## 2021-12-16 DIAGNOSIS — Z98.890 S/P ENDOMETRIAL ABLATION: ICD-10-CM

## 2021-12-16 DIAGNOSIS — R10.2 PELVIC PAIN IN FEMALE: Primary | ICD-10-CM

## 2021-12-16 DIAGNOSIS — N93.9 VAGINAL SPOTTING: ICD-10-CM

## 2021-12-16 DIAGNOSIS — N95.1 MENOPAUSAL SYMPTOMS: ICD-10-CM

## 2021-12-16 PROCEDURE — 99999 PR PBB SHADOW E&M-EST. PATIENT-LVL III: ICD-10-PCS | Mod: PBBFAC,,, | Performed by: OBSTETRICS & GYNECOLOGY

## 2021-12-16 PROCEDURE — 99214 OFFICE O/P EST MOD 30 MIN: CPT | Mod: S$GLB,,, | Performed by: OBSTETRICS & GYNECOLOGY

## 2021-12-16 PROCEDURE — 99214 PR OFFICE/OUTPT VISIT, EST, LEVL IV, 30-39 MIN: ICD-10-PCS | Mod: S$GLB,,, | Performed by: OBSTETRICS & GYNECOLOGY

## 2021-12-16 PROCEDURE — 99999 PR PBB SHADOW E&M-EST. PATIENT-LVL III: CPT | Mod: PBBFAC,,, | Performed by: OBSTETRICS & GYNECOLOGY

## 2021-12-16 RX ORDER — NAPROXEN SODIUM 550 MG/1
550 TABLET ORAL
Qty: 60 TABLET | Refills: 6 | Status: SHIPPED | OUTPATIENT
Start: 2021-12-16 | End: 2022-02-23

## 2021-12-21 ENCOUNTER — HOSPITAL ENCOUNTER (OUTPATIENT)
Dept: RADIOLOGY | Facility: OTHER | Age: 56
Discharge: HOME | End: 2021-12-21
Attending: OBSTETRICS & GYNECOLOGY
Payer: COMMERCIAL

## 2021-12-21 DIAGNOSIS — R10.2 PELVIC PAIN IN FEMALE: ICD-10-CM

## 2021-12-21 DIAGNOSIS — N93.9 VAGINAL SPOTTING: ICD-10-CM

## 2021-12-21 PROCEDURE — 76830 US PELVIS COMP WITH TRANSVAG NON-OB (XPD): ICD-10-PCS | Mod: 26,,, | Performed by: RADIOLOGY

## 2021-12-21 PROCEDURE — 76856 US EXAM PELVIC COMPLETE: CPT | Mod: TC

## 2021-12-21 PROCEDURE — 76856 US EXAM PELVIC COMPLETE: CPT | Mod: 26,,, | Performed by: RADIOLOGY

## 2021-12-21 PROCEDURE — 76856 US PELVIS COMP WITH TRANSVAG NON-OB (XPD): ICD-10-PCS | Mod: 26,,, | Performed by: RADIOLOGY

## 2021-12-21 PROCEDURE — 76830 TRANSVAGINAL US NON-OB: CPT | Mod: 26,,, | Performed by: RADIOLOGY

## 2021-12-22 ENCOUNTER — OFFICE VISIT (OUTPATIENT)
Dept: INTERNAL MEDICINE | Facility: CLINIC | Age: 56
End: 2021-12-22
Attending: INTERNAL MEDICINE
Payer: COMMERCIAL

## 2021-12-22 ENCOUNTER — TELEPHONE (OUTPATIENT)
Dept: OBSTETRICS AND GYNECOLOGY | Facility: CLINIC | Age: 56
End: 2021-12-22
Payer: COMMERCIAL

## 2021-12-22 ENCOUNTER — PATIENT MESSAGE (OUTPATIENT)
Dept: OBSTETRICS AND GYNECOLOGY | Facility: CLINIC | Age: 56
End: 2021-12-22
Payer: COMMERCIAL

## 2021-12-22 VITALS
HEART RATE: 71 BPM | DIASTOLIC BLOOD PRESSURE: 110 MMHG | SYSTOLIC BLOOD PRESSURE: 142 MMHG | BODY MASS INDEX: 22.64 KG/M2 | OXYGEN SATURATION: 99 % | HEIGHT: 66 IN | WEIGHT: 140.88 LBS

## 2021-12-22 DIAGNOSIS — M25.512 CHRONIC LEFT SHOULDER PAIN: Primary | ICD-10-CM

## 2021-12-22 DIAGNOSIS — R03.0 ELEVATED BLOOD PRESSURE, SITUATIONAL: ICD-10-CM

## 2021-12-22 DIAGNOSIS — G89.29 CHRONIC LEFT SHOULDER PAIN: Primary | ICD-10-CM

## 2021-12-22 PROCEDURE — 3008F BODY MASS INDEX DOCD: CPT | Mod: CPTII,S$GLB,, | Performed by: INTERNAL MEDICINE

## 2021-12-22 PROCEDURE — 3080F PR MOST RECENT DIASTOLIC BLOOD PRESSURE >= 90 MM HG: ICD-10-PCS | Mod: CPTII,S$GLB,, | Performed by: INTERNAL MEDICINE

## 2021-12-22 PROCEDURE — 3080F DIAST BP >= 90 MM HG: CPT | Mod: CPTII,S$GLB,, | Performed by: INTERNAL MEDICINE

## 2021-12-22 PROCEDURE — 3077F PR MOST RECENT SYSTOLIC BLOOD PRESSURE >= 140 MM HG: ICD-10-PCS | Mod: CPTII,S$GLB,, | Performed by: INTERNAL MEDICINE

## 2021-12-22 PROCEDURE — 3008F PR BODY MASS INDEX (BMI) DOCUMENTED: ICD-10-PCS | Mod: CPTII,S$GLB,, | Performed by: INTERNAL MEDICINE

## 2021-12-22 PROCEDURE — 99214 PR OFFICE/OUTPT VISIT, EST, LEVL IV, 30-39 MIN: ICD-10-PCS | Mod: S$GLB,,, | Performed by: INTERNAL MEDICINE

## 2021-12-22 PROCEDURE — 99999 PR PBB SHADOW E&M-EST. PATIENT-LVL III: ICD-10-PCS | Mod: PBBFAC,,, | Performed by: INTERNAL MEDICINE

## 2021-12-22 PROCEDURE — 1159F MED LIST DOCD IN RCRD: CPT | Mod: CPTII,S$GLB,, | Performed by: INTERNAL MEDICINE

## 2021-12-22 PROCEDURE — 99999 PR PBB SHADOW E&M-EST. PATIENT-LVL III: CPT | Mod: PBBFAC,,, | Performed by: INTERNAL MEDICINE

## 2021-12-22 PROCEDURE — 99214 OFFICE O/P EST MOD 30 MIN: CPT | Mod: S$GLB,,, | Performed by: INTERNAL MEDICINE

## 2021-12-22 PROCEDURE — 3077F SYST BP >= 140 MM HG: CPT | Mod: CPTII,S$GLB,, | Performed by: INTERNAL MEDICINE

## 2021-12-22 PROCEDURE — 1159F PR MEDICATION LIST DOCUMENTED IN MEDICAL RECORD: ICD-10-PCS | Mod: CPTII,S$GLB,, | Performed by: INTERNAL MEDICINE

## 2021-12-27 ENCOUNTER — PATIENT MESSAGE (OUTPATIENT)
Dept: PRIMARY CARE CLINIC | Facility: CLINIC | Age: 56
End: 2021-12-27
Payer: COMMERCIAL

## 2021-12-27 ENCOUNTER — PATIENT MESSAGE (OUTPATIENT)
Dept: OBSTETRICS AND GYNECOLOGY | Facility: CLINIC | Age: 56
End: 2021-12-27
Payer: COMMERCIAL

## 2021-12-27 ENCOUNTER — TELEPHONE (OUTPATIENT)
Dept: OBSTETRICS AND GYNECOLOGY | Facility: CLINIC | Age: 56
End: 2021-12-27
Payer: COMMERCIAL

## 2021-12-28 ENCOUNTER — TELEPHONE (OUTPATIENT)
Dept: OBSTETRICS AND GYNECOLOGY | Facility: CLINIC | Age: 56
End: 2021-12-28
Payer: COMMERCIAL

## 2021-12-28 DIAGNOSIS — R93.89 THICKENED ENDOMETRIUM: Primary | ICD-10-CM

## 2021-12-30 ENCOUNTER — PATIENT MESSAGE (OUTPATIENT)
Dept: OBSTETRICS AND GYNECOLOGY | Facility: CLINIC | Age: 56
End: 2021-12-30

## 2021-12-30 ENCOUNTER — OFFICE VISIT (OUTPATIENT)
Dept: OBSTETRICS AND GYNECOLOGY | Facility: CLINIC | Age: 56
End: 2021-12-30
Payer: COMMERCIAL

## 2021-12-30 VITALS — BODY MASS INDEX: 21.97 KG/M2 | WEIGHT: 136.69 LBS | HEIGHT: 66 IN

## 2021-12-30 DIAGNOSIS — D21.9 FIBROIDS: Primary | ICD-10-CM

## 2021-12-30 DIAGNOSIS — R93.89 THICKENED ENDOMETRIUM: ICD-10-CM

## 2021-12-30 PROCEDURE — 1160F PR REVIEW ALL MEDS BY PRESCRIBER/CLIN PHARMACIST DOCUMENTED: ICD-10-PCS | Mod: CPTII,S$GLB,, | Performed by: OBSTETRICS & GYNECOLOGY

## 2021-12-30 PROCEDURE — 1159F MED LIST DOCD IN RCRD: CPT | Mod: CPTII,S$GLB,, | Performed by: OBSTETRICS & GYNECOLOGY

## 2021-12-30 PROCEDURE — 99213 OFFICE O/P EST LOW 20 MIN: CPT | Mod: S$GLB,,, | Performed by: OBSTETRICS & GYNECOLOGY

## 2021-12-30 PROCEDURE — 99999 PR PBB SHADOW E&M-EST. PATIENT-LVL III: ICD-10-PCS | Mod: PBBFAC,,, | Performed by: OBSTETRICS & GYNECOLOGY

## 2021-12-30 PROCEDURE — 99999 PR PBB SHADOW E&M-EST. PATIENT-LVL III: CPT | Mod: PBBFAC,,, | Performed by: OBSTETRICS & GYNECOLOGY

## 2021-12-30 PROCEDURE — 1159F PR MEDICATION LIST DOCUMENTED IN MEDICAL RECORD: ICD-10-PCS | Mod: CPTII,S$GLB,, | Performed by: OBSTETRICS & GYNECOLOGY

## 2021-12-30 PROCEDURE — 1160F RVW MEDS BY RX/DR IN RCRD: CPT | Mod: CPTII,S$GLB,, | Performed by: OBSTETRICS & GYNECOLOGY

## 2021-12-30 PROCEDURE — 3008F PR BODY MASS INDEX (BMI) DOCUMENTED: ICD-10-PCS | Mod: CPTII,S$GLB,, | Performed by: OBSTETRICS & GYNECOLOGY

## 2021-12-30 PROCEDURE — 99213 PR OFFICE/OUTPT VISIT, EST, LEVL III, 20-29 MIN: ICD-10-PCS | Mod: S$GLB,,, | Performed by: OBSTETRICS & GYNECOLOGY

## 2021-12-30 PROCEDURE — 3008F BODY MASS INDEX DOCD: CPT | Mod: CPTII,S$GLB,, | Performed by: OBSTETRICS & GYNECOLOGY

## 2022-01-03 ENCOUNTER — PATIENT MESSAGE (OUTPATIENT)
Dept: OBSTETRICS AND GYNECOLOGY | Facility: CLINIC | Age: 57
End: 2022-01-03
Payer: COMMERCIAL

## 2022-01-03 DIAGNOSIS — Z01.818 PRE-OP TESTING: ICD-10-CM

## 2022-01-06 ENCOUNTER — PATIENT MESSAGE (OUTPATIENT)
Dept: OBSTETRICS AND GYNECOLOGY | Facility: CLINIC | Age: 57
End: 2022-01-06
Payer: COMMERCIAL

## 2022-01-09 ENCOUNTER — PATIENT OUTREACH (OUTPATIENT)
Dept: ADMINISTRATIVE | Facility: OTHER | Age: 57
End: 2022-01-09
Payer: COMMERCIAL

## 2022-01-10 ENCOUNTER — OFFICE VISIT (OUTPATIENT)
Dept: OBSTETRICS AND GYNECOLOGY | Facility: CLINIC | Age: 57
End: 2022-01-10
Payer: COMMERCIAL

## 2022-01-10 ENCOUNTER — HOSPITAL ENCOUNTER (OUTPATIENT)
Dept: PREADMISSION TESTING | Facility: OTHER | Age: 57
Discharge: HOME | End: 2022-01-10
Attending: OBSTETRICS & GYNECOLOGY
Payer: COMMERCIAL

## 2022-01-10 ENCOUNTER — TELEPHONE (OUTPATIENT)
Dept: OBSTETRICS AND GYNECOLOGY | Facility: CLINIC | Age: 57
End: 2022-01-10

## 2022-01-10 ENCOUNTER — PATIENT MESSAGE (OUTPATIENT)
Dept: SURGERY | Facility: OTHER | Age: 57
End: 2022-01-10
Payer: COMMERCIAL

## 2022-01-10 VITALS
WEIGHT: 138 LBS | HEART RATE: 74 BPM | OXYGEN SATURATION: 100 % | DIASTOLIC BLOOD PRESSURE: 74 MMHG | SYSTOLIC BLOOD PRESSURE: 167 MMHG | TEMPERATURE: 97 F | BODY MASS INDEX: 22.27 KG/M2

## 2022-01-10 VITALS
DIASTOLIC BLOOD PRESSURE: 84 MMHG | SYSTOLIC BLOOD PRESSURE: 126 MMHG | HEIGHT: 66 IN | BODY MASS INDEX: 22.25 KG/M2 | WEIGHT: 138.44 LBS

## 2022-01-10 DIAGNOSIS — R93.89 THICKENED ENDOMETRIUM: Primary | ICD-10-CM

## 2022-01-10 PROCEDURE — 3079F DIAST BP 80-89 MM HG: CPT | Mod: CPTII,S$GLB,, | Performed by: OBSTETRICS & GYNECOLOGY

## 2022-01-10 PROCEDURE — 3008F PR BODY MASS INDEX (BMI) DOCUMENTED: ICD-10-PCS | Mod: CPTII,S$GLB,, | Performed by: OBSTETRICS & GYNECOLOGY

## 2022-01-10 PROCEDURE — 99999 PR PBB SHADOW E&M-EST. PATIENT-LVL IV: ICD-10-PCS | Mod: PBBFAC,,, | Performed by: OBSTETRICS & GYNECOLOGY

## 2022-01-10 PROCEDURE — 3008F BODY MASS INDEX DOCD: CPT | Mod: CPTII,S$GLB,, | Performed by: OBSTETRICS & GYNECOLOGY

## 2022-01-10 PROCEDURE — 99499 UNLISTED E&M SERVICE: CPT | Mod: S$GLB,,, | Performed by: OBSTETRICS & GYNECOLOGY

## 2022-01-10 PROCEDURE — 1160F PR REVIEW ALL MEDS BY PRESCRIBER/CLIN PHARMACIST DOCUMENTED: ICD-10-PCS | Mod: CPTII,S$GLB,, | Performed by: OBSTETRICS & GYNECOLOGY

## 2022-01-10 PROCEDURE — 1159F MED LIST DOCD IN RCRD: CPT | Mod: CPTII,S$GLB,, | Performed by: OBSTETRICS & GYNECOLOGY

## 2022-01-10 PROCEDURE — 3074F SYST BP LT 130 MM HG: CPT | Mod: CPTII,S$GLB,, | Performed by: OBSTETRICS & GYNECOLOGY

## 2022-01-10 PROCEDURE — 1159F PR MEDICATION LIST DOCUMENTED IN MEDICAL RECORD: ICD-10-PCS | Mod: CPTII,S$GLB,, | Performed by: OBSTETRICS & GYNECOLOGY

## 2022-01-10 PROCEDURE — 3074F PR MOST RECENT SYSTOLIC BLOOD PRESSURE < 130 MM HG: ICD-10-PCS | Mod: CPTII,S$GLB,, | Performed by: OBSTETRICS & GYNECOLOGY

## 2022-01-10 PROCEDURE — 1160F RVW MEDS BY RX/DR IN RCRD: CPT | Mod: CPTII,S$GLB,, | Performed by: OBSTETRICS & GYNECOLOGY

## 2022-01-10 PROCEDURE — 99999 PR PBB SHADOW E&M-EST. PATIENT-LVL IV: CPT | Mod: PBBFAC,,, | Performed by: OBSTETRICS & GYNECOLOGY

## 2022-01-10 PROCEDURE — 3079F PR MOST RECENT DIASTOLIC BLOOD PRESSURE 80-89 MM HG: ICD-10-PCS | Mod: CPTII,S$GLB,, | Performed by: OBSTETRICS & GYNECOLOGY

## 2022-01-10 PROCEDURE — 99499 NO LOS: ICD-10-PCS | Mod: S$GLB,,, | Performed by: OBSTETRICS & GYNECOLOGY

## 2022-01-10 RX ORDER — MUPIROCIN 20 MG/G
OINTMENT TOPICAL
Status: CANCELLED | OUTPATIENT
Start: 2022-01-10

## 2022-01-10 RX ORDER — SODIUM CHLORIDE, SODIUM LACTATE, POTASSIUM CHLORIDE, CALCIUM CHLORIDE 600; 310; 30; 20 MG/100ML; MG/100ML; MG/100ML; MG/100ML
INJECTION, SOLUTION INTRAVENOUS CONTINUOUS
Status: CANCELLED | OUTPATIENT
Start: 2022-01-10

## 2022-01-10 RX ORDER — PREGABALIN 75 MG/1
75 CAPSULE ORAL ONCE
Status: CANCELLED | OUTPATIENT
Start: 2022-01-10 | End: 2022-01-10

## 2022-01-10 RX ORDER — SODIUM CHLORIDE 9 MG/ML
INJECTION, SOLUTION INTRAVENOUS CONTINUOUS
Status: CANCELLED | OUTPATIENT
Start: 2022-01-10

## 2022-01-10 RX ORDER — INFLUENZA VIRUS VACCINE 15; 15; 15; 15 UG/.5ML; UG/.5ML; UG/.5ML; UG/.5ML
SUSPENSION INTRAMUSCULAR
COMMUNITY
Start: 2021-10-08 | End: 2022-02-23

## 2022-01-10 RX ORDER — LIDOCAINE HYDROCHLORIDE 10 MG/ML
0.5 INJECTION, SOLUTION EPIDURAL; INFILTRATION; INTRACAUDAL; PERINEURAL ONCE
Status: CANCELLED | OUTPATIENT
Start: 2022-01-10 | End: 2022-01-10

## 2022-01-10 RX ORDER — ACETAMINOPHEN 500 MG
1000 TABLET ORAL
Status: CANCELLED | OUTPATIENT
Start: 2022-01-10 | End: 2022-01-10

## 2022-01-10 NOTE — DISCHARGE INSTRUCTIONS
Information to Prepare you for your Surgery    PRE-ADMIT TESTING -  [phone]    [address] [facility]          Your surgery has been scheduled at [facility]. We are pleased to have the opportunity to serve you. For Further Information please call [phone].    On the day of surgery please report to the Information Desk on the 1st floor.    · CONTACT YOUR PHYSICIAN'S OFFICE THE DAY PRIOR TO YOUR SURGERY TO OBTAIN YOUR ARRIVAL TIME.     · The evening before surgery do not eat anything after 9 p.m. ( this includes hard candy, chewing gum and mints).  You may only have GATORADE, POWERADE AND WATER  from 9 p.m. until you leave your home.   DO NOT DRINK ANY LIQUIDS ON THE WAY TO THE HOSPITAL.      Why does your anesthesiologist allow you to drink Gatorade/Powerade before surgery?  Gatorade/Powerade helps to increase your comfort before surgery and to decrease your nausea after surgery. The carbohydrates in Gatorade/Powerade help reduce your body's stress response to surgery.  If you are a diabetic-drink only water prior to surgery.      Current Visitor policy(12/27/2021) - Patients may have 1 adult visitor pre and post procedure. Only 1 visitor will be allowed in the Surgical building with the patient.     SPECIAL MEDICATION INSTRUCTIONS: TAKE medications checked off by the Anesthesiologist on your Medication List.    Angiogram Patients: Take medications as instructed by your physician, including aspirin.     Surgery Patients:    If you take ASPIRIN - Your PHYSICIAN/SURGEON will need to inform you IF/OR when you need to stop taking aspirin prior to your surgery.     Do Not take any medications containing IBUPROFEN.    Do Not Wear any make-up (especially eye make-up) to surgery. Please remove any false eyelashes or eyelash extensions. If you arrive the day of surgery with makeup/eyelashes on you will be required to remove prior to surgery. (There is a risk of  corneal abrasions if eye makeup/eyelash extensions are not removed)      Leave all valuables at home.   Do Not wear any jewelry or watches, including any metal in body piercings. Jewelry must be removed prior to coming to the hospital.  There is a possibility that rings that are unable to be removed may be cut off if they are on the surgical extremity.    Please remove all hair extensions, wigs, clips and any other metal accessories/ ornaments from your hair.  These items may pose a flammable/fire risk in Surgery and must be removed.    Do not shave your surgical area at least 5 days prior to your surgery. The surgical prep will be performed at the hospital according to Infection Control regulations.    Contact Lens must be removed before surgery. Either do not wear the contact lens or bring a case and solution for storage.  Please bring a container for eyeglasses or dentures as required.  Bring any paperwork your physician has provided, such as consent forms,  history and physicals, doctor's orders, etc.   Bring comfortable clothes that are loose fitting to wear upon discharge. Take into consideration the type of surgery being performed.  Maintain your diet as advised per your physician the day prior to surgery.      Adequate rest the night before surgery is advised.   Park in the Parking lot behind the hospital or in the [employer] [facility] across the street from the parking lot. Parking is complimentary.  If you will be discharged the same day as your procedure, please arrange for a responsible adult to drive you home or to accompany you if traveling by taxi.   YOU WILL NOT BE PERMITTED TO DRIVE OR TO LEAVE THE HOSPITAL ALONE AFTER SURGERY.   If you are being discharged the same day, it is strongly recommended that you arrange for someone to remain with you for the first 24 hrs following your surgery.    The Surgeon will speak to your family/visitor after your surgery regarding the outcome of your surgery and  post op care.  The Surgeon may speak to you after your surgery, but there is a possibility you may not remember the details.  Please check with your family members regarding the conversation with the Surgeon.    We strongly recommend whoever is bringing you home be present for discharge instructions.  This will ensure a thorough understanding for your post op home care.    ALL CHILDREN MUST ALWAYS BE ACCOMPANIED BY AN ADULT.    Visitors-Refer to current Visitor policy handouts.    Thank you for your cooperation.  The Staff of [facility].                Bathing Instructions with Hibiclens     Shower the evening before and morning of your procedure with Hibiclens:   Wash your face with water and your regular face wash/soap   Apply Hibiclens directly on your skin or on a wet washcloth and wash gently. When showering: Move away from the shower stream when applying Hibiclens to avoid rinsing off too soon.   Rinse thoroughly with warm water   Do not dilute Hibiclens         Dry off as usual, do not use any deodorant, powder, body lotions, perfume, after shave or cologne.

## 2022-01-10 NOTE — PROGRESS NOTES
Care Everywhere:  Immunization: updated  Health Maintenance: updated  Media Review:   Legacy Review:   DIS:  Order placed:   Upcoming appts:  EFAX:  Task Tickets:  Referrals:

## 2022-01-10 NOTE — PROGRESS NOTES
[facility] GYN  Obstetrics & Gynecology  History & Physical    Patient Name: Vicky Brand  MRN: [id number]  Admission Date: (Not on file)  Primary Care Provider: Lia Cristina MD    Subjective:     Chief Complaint/Reason for Admission:  Fluid in the endometrium  S/P endometrial ablation   History of fibroids    History of Present Illness:   CC: abdominal bloating  Fluid in the endometrium        Vicky Brand is a 56 y.o. female  presents for a discussion on the fluid in the endometrium   She Is having abdominal bloating, history of fibroids     US shows  CLINICAL HISTORY:  Pelvic and perineal pain     TECHNIQUE:  Transabdominal sonography of the pelvis was performed, followed by transvaginal sonography to better evaluate the uterus and ovaries.     COMPARISON:  2018     FINDINGS:  Uterus:     Size: 8.2 x 5.2 x 7.1 cm     Masses: Again identified is a an intramural fundal fibroid that measures 5.3 x 3.4 x 4.5 cm.  This is smaller than when compared with the prior study and also describe as a degenerating fibroid.  No other mass is seen within the uterus     Endometrium: There is marked irregularity of the endometrial cavity in this patient with a history of prior ablation.  Additionally, the endometrial cavity is fluid filled with complex fluid.  It is difficult to ascertain the true measurement of the endometrial.  However, the endometrial cavity measures approximately 1.5 cm.  This could be secondary to cervical stenosis.     Right ovary:     Size: 2.6 x 2.2 x 1.5 cm     Appearance: Unremarkable     Vascular flow: Normal.     Left ovary:     Size: 3.0 x 0.7 x 2.5 cm     Appearance: The ovary appears unremarkable.  However there is a tubular fluid-filled structure adjacent to the left adnexa could represent hydrosalpinx.     Vascular Flow: Normal.     Free Fluid:     None.     Impression:     Irregular appearing and fluid filled endometrial cavity as described above.  Malignant  process cannot be excluded.  Additionally, fluid within the may atrium could be secondary to cervical stenosis.  Further evaluation is advised.     Interval decrease in size of previously described degenerating uterine fibroid in the fundus.     Fluid-filled structure in the left adnexal region that could represent hydrosalpinx.     Current Outpatient Medications on File Prior to Visit   Medication Sig    calcium carbonate (OS-GISSELL) 600 mg calcium (1,500 mg) Tab Take 600 mg by mouth once.    EVENING PRIMROSE OIL ORAL Take by mouth.    multivitamin capsule Take 1 capsule by mouth once daily.    naproxen sodium (ANAPROX) 550 MG tablet Take 1 tablet (550 mg total) by mouth 3 (three) times daily with meals.    FLUARIX QUAD 3334-1593, PF, 60 mcg (15 mcg x 4)/0.5 mL Syrg     [DISCONTINUED] estradioL (ESTRACE) 0.01 % (0.1 mg/gram) vaginal cream Place 1 g vaginally once daily. (Patient not taking: No sig reported)     No current facility-administered medications on file prior to visit.       Review of patient's allergies indicates:   Allergen Reactions    Sulfa (sulfonamide antibiotics) Rash    Cetirizine      Other reaction(s): Unable to Function  Other reaction(s): Unable to Function    Erythromycin Nausea And Vomiting     Other reaction(s): Nausea    Zyrtec-d  [cetirizine-pseudoephedrine]      Other reaction(s): Inability to focus/function       Past Medical History:   Diagnosis Date    Dyspareunia in female 7/27/2021    Osphena, pelvic floor tx 2021, [facility]    Eye abnormalities     early yellowing of lens, precursor to cataracts, Dr Webster,  wear glasses at all times    Fracture of fifth toe, left, closed, with delayed healing, subsequent encounter 8/21/2018    Intramural leiomyoma of uterus 5/22/2019    Irregular, dysmenorrhea    Nocturia 6/17/2021    Ocular migraine     vision fuzzy x 3 in 2013, metallic shapes in her vision, Dr Webster,  resolved after 30 min , no pain    CALEB (obstructive sleep  apnea)     Lysenko, CPAP start     Paradoxical insomnia 10/30/2017    prior to menses, previously took amitryptiline 2.5 mg prn  qhs     Posterior vitreous detachment of left eye 2019    Flashes of light, ocular migranes, Ophthalmologist Dr Orourke, retinal specialist Dr Soni, rec avoid exercise until she follows up 2019    Shoulder pain, left     LEFT, after fall, better with PT at [facility]     OB History    Para Term  AB Living   1   0   1     SAB IAB Ectopic Multiple Live Births   1              # Outcome Date GA Lbr Giorgio/2nd Weight Sex Delivery Anes PTL Lv   1 SAB              Past Surgical History:   Procedure Laterality Date    COLONOSCOPY N/A 2017    Procedure: COLONOSCOPY;  Surgeon: Davy Prince MD;  Location: [facility] ([address] [place] [address]);  Service: Endoscopy;  Laterality: N/A;    DILATION AND CURETTAGE OF UTERUS      ENDOMETRIAL ABLATION N/A     TUBAL LIGATION       Family History     Problem Relation (Age of Onset)    Diabetes Father    Hypertension Mother    Ovarian cancer Maternal Grandmother    Pacemaker/defibrilator Mother    Stroke Father (49)        Tobacco Use    Smoking status: Never Smoker    Smokeless tobacco: Never Used   Substance and Sexual Activity    Alcohol use: No    Drug use: No    Sexual activity: Yes     Partners: Male     Birth control/protection: See Surgical Hx     Review of Systems  Objective:     Vital Signs (Most Recent):  BP: 126/84 (01/10/22 0820) Vital Signs (24h Range):  [unfilled]     Weight: 62.8 kg (138 lb 7.2 oz)  Body mass index is 22.35 kg/m².  No LMP recorded (lmp unknown). Patient has had an ablation.    Physical Exam  PE:  APPEARANCE: Well nourished, well developed, in no acute distress.  AFFECT: WNL, alert and oriented x 3.  SKIN: No lesions.  NECK: Neck symmetric without masses or thyromegaly.  NODES: No inguinal, cervical, axillary or femoral lymph node enlargement.  CHEST: Good respiratory effort.    ABDOMEN: Soft. No tenderness or masses. No hepatosplenomegaly. No hernias.  BREASTS: Symmetrical, no skin changes or visible lesions. No palpable masses, nipple discharge bilaterally.  PELVIC: ATROPHIC EXTERNAL FEMALE GENITALIA without lesions. Normal hair distribution. Adequate perineal body, normal urethral meatus. Vagina dry without lesions or discharge. CERVIX STENOTIC without lesions, discharge or tenderness. No significant cystocele or rectocele. Bimanual exam shows uterus to be normal size, regular, mobile and nontender. Adnexa without masses or tenderness.  RECTAL: Rectovaginal exam confirms above with normal sphincter tone, no masses.  EXTREMITIES: No edema.            Assessment/Plan:     There are no hospital problems to display for this patient.  Fluid in the endometrium  S/P endometrial ablation     Will prepare for surgery as discussed  The risks benefits and alternatives of surgery discussed in detail  Vicodin rx given   Post op appt given  Pelvic rest X 2 wks      Jannette Jarrell MD  Obstetrics & Gynecology  [facility]

## 2022-01-10 NOTE — H&P (VIEW-ONLY)
[facility] GYN  Obstetrics & Gynecology  History & Physical    Patient Name: Vicky Brand  MRN: [id number]  Admission Date: (Not on file)  Primary Care Provider: Lia Cristina MD    Subjective:     Chief Complaint/Reason for Admission:  Fluid in the endometrium  S/P endometrial ablation   History of fibroids    History of Present Illness:   CC: abdominal bloating  Fluid in the endometrium        Vicky Brand is a 56 y.o. female  presents for a discussion on the fluid in the endometrium   She Is having abdominal bloating, history of fibroids     US shows  CLINICAL HISTORY:  Pelvic and perineal pain     TECHNIQUE:  Transabdominal sonography of the pelvis was performed, followed by transvaginal sonography to better evaluate the uterus and ovaries.     COMPARISON:  2018     FINDINGS:  Uterus:     Size: 8.2 x 5.2 x 7.1 cm     Masses: Again identified is a an intramural fundal fibroid that measures 5.3 x 3.4 x 4.5 cm.  This is smaller than when compared with the prior study and also describe as a degenerating fibroid.  No other mass is seen within the uterus     Endometrium: There is marked irregularity of the endometrial cavity in this patient with a history of prior ablation.  Additionally, the endometrial cavity is fluid filled with complex fluid.  It is difficult to ascertain the true measurement of the endometrial.  However, the endometrial cavity measures approximately 1.5 cm.  This could be secondary to cervical stenosis.     Right ovary:     Size: 2.6 x 2.2 x 1.5 cm     Appearance: Unremarkable     Vascular flow: Normal.     Left ovary:     Size: 3.0 x 0.7 x 2.5 cm     Appearance: The ovary appears unremarkable.  However there is a tubular fluid-filled structure adjacent to the left adnexa could represent hydrosalpinx.     Vascular Flow: Normal.     Free Fluid:     None.     Impression:     Irregular appearing and fluid filled endometrial cavity as described above.  Malignant  process cannot be excluded.  Additionally, fluid within the may atrium could be secondary to cervical stenosis.  Further evaluation is advised.     Interval decrease in size of previously described degenerating uterine fibroid in the fundus.     Fluid-filled structure in the left adnexal region that could represent hydrosalpinx.     Current Outpatient Medications on File Prior to Visit   Medication Sig    calcium carbonate (OS-GISSELL) 600 mg calcium (1,500 mg) Tab Take 600 mg by mouth once.    EVENING PRIMROSE OIL ORAL Take by mouth.    multivitamin capsule Take 1 capsule by mouth once daily.    naproxen sodium (ANAPROX) 550 MG tablet Take 1 tablet (550 mg total) by mouth 3 (three) times daily with meals.    FLUARIX QUAD [id number], PF, 60 mcg (15 mcg x 4)/0.5 mL Syrg     [DISCONTINUED] estradioL (ESTRACE) 0.01 % (0.1 mg/gram) vaginal cream Place 1 g vaginally once daily. (Patient not taking: No sig reported)     No current facility-administered medications on file prior to visit.       Review of patient's allergies indicates:   Allergen Reactions    Sulfa (sulfonamide antibiotics) Rash    Cetirizine      Other reaction(s): Unable to Function  Other reaction(s): Unable to Function    Erythromycin Nausea And Vomiting     Other reaction(s): Nausea    Zyrtec-d  [cetirizine-pseudoephedrine]      Other reaction(s): Inability to focus/function       Past Medical History:   Diagnosis Date    Dyspareunia in female 7/27/2021    Osphena, pelvic floor tx 2021, [facility]    Eye abnormalities     early yellowing of lens, precursor to cataracts, Dr Webster,  wear glasses at all times    Fracture of fifth toe, left, closed, with delayed healing, subsequent encounter 8/21/2018    Intramural leiomyoma of uterus 5/22/2019    Irregular, dysmenorrhea    Nocturia 6/17/2021    Ocular migraine     vision fuzzy x 3 in 2013, metallic shapes in her vision, Dr Webster,  resolved after 30 min , no pain    CALEB (obstructive sleep  apnea)     Lysenko, CPAP start     Paradoxical insomnia 10/30/2017    prior to menses, previously took amitryptiline 2.5 mg prn  qhs     Posterior vitreous detachment of left eye 2019    Flashes of light, ocular migranes, Ophthalmologist Dr Orourke, retinal specialist Dr Soni, rec avoid exercise until she follows up 2019    Shoulder pain, left     LEFT, after fall, better with PT at [facility]     OB History    Para Term  AB Living   1   0   1     SAB IAB Ectopic Multiple Live Births   1              # Outcome Date GA Lbr Giorgio/2nd Weight Sex Delivery Anes PTL Lv   1 SAB              Past Surgical History:   Procedure Laterality Date    COLONOSCOPY N/A 2017    Procedure: COLONOSCOPY;  Surgeon: Davy Prince MD;  Location: [facility] ([address] [place] [address]);  Service: Endoscopy;  Laterality: N/A;    DILATION AND CURETTAGE OF UTERUS      ENDOMETRIAL ABLATION N/A     TUBAL LIGATION       Family History     Problem Relation (Age of Onset)    Diabetes Father    Hypertension Mother    Ovarian cancer Maternal Grandmother    Pacemaker/defibrilator Mother    Stroke Father (49)        Tobacco Use    Smoking status: Never Smoker    Smokeless tobacco: Never Used   Substance and Sexual Activity    Alcohol use: No    Drug use: No    Sexual activity: Yes     Partners: Male     Birth control/protection: See Surgical Hx     Review of Systems  Objective:     Vital Signs (Most Recent):  BP: 126/84 (01/10/22 0820) Vital Signs (24h Range):  [unfilled]     Weight: 62.8 kg (138 lb 7.2 oz)  Body mass index is 22.35 kg/m².  No LMP recorded (lmp unknown). Patient has had an ablation.    Physical Exam  PE:  APPEARANCE: Well nourished, well developed, in no acute distress.  AFFECT: WNL, alert and oriented x 3.  SKIN: No lesions.  NECK: Neck symmetric without masses or thyromegaly.  NODES: No inguinal, cervical, axillary or femoral lymph node enlargement.  CHEST: Good respiratory effort.    ABDOMEN: Soft. No tenderness or masses. No hepatosplenomegaly. No hernias.  BREASTS: Symmetrical, no skin changes or visible lesions. No palpable masses, nipple discharge bilaterally.  PELVIC: ATROPHIC EXTERNAL FEMALE GENITALIA without lesions. Normal hair distribution. Adequate perineal body, normal urethral meatus. Vagina dry without lesions or discharge. CERVIX STENOTIC without lesions, discharge or tenderness. No significant cystocele or rectocele. Bimanual exam shows uterus to be normal size, regular, mobile and nontender. Adnexa without masses or tenderness.  RECTAL: Rectovaginal exam confirms above with normal sphincter tone, no masses.  EXTREMITIES: No edema.            Assessment/Plan:     There are no hospital problems to display for this patient.  Fluid in the endometrium  S/P endometrial ablation     Will prepare for surgery as discussed  The risks benefits and alternatives of surgery discussed in detail  Vicodin rx given   Post op appt given  Pelvic rest X 2 wks      Jannette Jarrell MD  Obstetrics & Gynecology  [facility] - OB GYN

## 2022-01-16 ENCOUNTER — LAB VISIT (OUTPATIENT)
Dept: PRIMARY CARE CLINIC | Facility: CLINIC | Age: 57
End: 2022-01-16
Payer: COMMERCIAL

## 2022-01-16 DIAGNOSIS — Z01.818 PRE-OP TESTING: ICD-10-CM

## 2022-01-16 PROCEDURE — U0005 INFEC AGEN DETEC AMPLI PROBE: HCPCS | Performed by: OBSTETRICS & GYNECOLOGY

## 2022-01-16 PROCEDURE — U0003 INFECTIOUS AGENT DETECTION BY NUCLEIC ACID (DNA OR RNA); SEVERE ACUTE RESPIRATORY SYNDROME CORONAVIRUS 2 (SARS-COV-2) (CORONAVIRUS DISEASE [COVID-19]), AMPLIFIED PROBE TECHNIQUE, MAKING USE OF HIGH THROUGHPUT TECHNOLOGIES AS DESCRIBED BY CMS-2020-01-R: HCPCS | Performed by: OBSTETRICS & GYNECOLOGY

## 2022-01-17 ENCOUNTER — PATIENT MESSAGE (OUTPATIENT)
Dept: SURGERY | Facility: OTHER | Age: 57
End: 2022-01-17
Payer: COMMERCIAL

## 2022-01-17 LAB
SARS-COV-2 RNA RESP QL NAA+PROBE: NOT DETECTED
SARS-COV-2- CYCLE NUMBER: NORMAL

## 2022-01-18 ENCOUNTER — TELEPHONE (OUTPATIENT)
Dept: OBSTETRICS AND GYNECOLOGY | Facility: CLINIC | Age: 57
End: 2022-01-18
Payer: COMMERCIAL

## 2022-01-18 ENCOUNTER — PATIENT MESSAGE (OUTPATIENT)
Dept: SURGERY | Facility: OTHER | Age: 57
End: 2022-01-18
Payer: COMMERCIAL

## 2022-01-18 DIAGNOSIS — G89.18 POST-OP PAIN: Primary | ICD-10-CM

## 2022-01-18 RX ORDER — HYDROCODONE BITARTRATE AND ACETAMINOPHEN 5; 325 MG/1; MG/1
1 TABLET ORAL EVERY 6 HOURS PRN
Qty: 20 TABLET | Refills: 0 | Status: SHIPPED | OUTPATIENT
Start: 2022-01-18 | End: 2022-01-18 | Stop reason: SDUPTHER

## 2022-01-19 ENCOUNTER — ANESTHESIA (OUTPATIENT)
Dept: SURGERY | Facility: OTHER | Age: 57
End: 2022-01-19
Payer: COMMERCIAL

## 2022-01-19 ENCOUNTER — ANESTHESIA EVENT (OUTPATIENT)
Dept: SURGERY | Facility: OTHER | Age: 57
End: 2022-01-19
Payer: COMMERCIAL

## 2022-01-19 ENCOUNTER — HOSPITAL ENCOUNTER (OUTPATIENT)
Facility: OTHER | Age: 57
Discharge: HOME | End: 2022-01-19
Attending: OBSTETRICS & GYNECOLOGY | Admitting: OBSTETRICS & GYNECOLOGY
Payer: COMMERCIAL

## 2022-01-19 DIAGNOSIS — Z98.890 STATUS POST HYSTEROSCOPY: Primary | ICD-10-CM

## 2022-01-19 DIAGNOSIS — R93.89 THICKENED ENDOMETRIUM: ICD-10-CM

## 2022-01-19 LAB
B-HCG UR QL: NEGATIVE
CTP QC/QA: YES

## 2022-01-19 PROCEDURE — 36000706: Performed by: OBSTETRICS & GYNECOLOGY

## 2022-01-19 PROCEDURE — 71000015 HC POSTOP RECOV 1ST HR: Performed by: OBSTETRICS & GYNECOLOGY

## 2022-01-19 PROCEDURE — 71000033 HC RECOVERY, INTIAL HOUR: Performed by: OBSTETRICS & GYNECOLOGY

## 2022-01-19 PROCEDURE — 88305 TISSUE EXAM BY PATHOLOGIST: CPT | Mod: 26,,, | Performed by: PATHOLOGY

## 2022-01-19 PROCEDURE — 71000016 HC POSTOP RECOV ADDL HR: Performed by: OBSTETRICS & GYNECOLOGY

## 2022-01-19 PROCEDURE — 25000003 PHARM REV CODE 250: Performed by: ANESTHESIOLOGY

## 2022-01-19 PROCEDURE — 58558 HYSTEROSCOPY BIOPSY: CPT | Mod: ,,, | Performed by: OBSTETRICS & GYNECOLOGY

## 2022-01-19 PROCEDURE — 58558 PR HYSTEROSCOPY,W/ENDO BX: ICD-10-PCS | Mod: ,,, | Performed by: OBSTETRICS & GYNECOLOGY

## 2022-01-19 PROCEDURE — 27201423 OPTIME MED/SURG SUP & DEVICES STERILE SUPPLY: Performed by: OBSTETRICS & GYNECOLOGY

## 2022-01-19 PROCEDURE — 25000003 PHARM REV CODE 250: Performed by: OBSTETRICS & GYNECOLOGY

## 2022-01-19 PROCEDURE — 36000707: Performed by: OBSTETRICS & GYNECOLOGY

## 2022-01-19 PROCEDURE — 63600175 PHARM REV CODE 636 W HCPCS: Performed by: NURSE ANESTHETIST, CERTIFIED REGISTERED

## 2022-01-19 PROCEDURE — 37000009 HC ANESTHESIA EA ADD 15 MINS: Performed by: OBSTETRICS & GYNECOLOGY

## 2022-01-19 PROCEDURE — 25000003 PHARM REV CODE 250: Performed by: NURSE ANESTHETIST, CERTIFIED REGISTERED

## 2022-01-19 PROCEDURE — 81025 URINE PREGNANCY TEST: CPT | Performed by: ANESTHESIOLOGY

## 2022-01-19 PROCEDURE — 88305 TISSUE EXAM BY PATHOLOGIST: CPT | Performed by: PATHOLOGY

## 2022-01-19 PROCEDURE — 88305 TISSUE EXAM BY PATHOLOGIST: ICD-10-PCS | Mod: 26,,, | Performed by: PATHOLOGY

## 2022-01-19 PROCEDURE — 37000008 HC ANESTHESIA 1ST 15 MINUTES: Performed by: OBSTETRICS & GYNECOLOGY

## 2022-01-19 RX ORDER — ONDANSETRON 2 MG/ML
INJECTION INTRAMUSCULAR; INTRAVENOUS
Status: DISCONTINUED | OUTPATIENT
Start: 2022-01-19 | End: 2022-01-19

## 2022-01-19 RX ORDER — PHENYLEPHRINE HYDROCHLORIDE 10 MG/ML
INJECTION INTRAVENOUS
Status: DISCONTINUED | OUTPATIENT
Start: 2022-01-19 | End: 2022-01-19

## 2022-01-19 RX ORDER — MEPERIDINE HYDROCHLORIDE 25 MG/ML
12.5 INJECTION INTRAMUSCULAR; INTRAVENOUS; SUBCUTANEOUS ONCE AS NEEDED
Status: DISCONTINUED | OUTPATIENT
Start: 2022-01-19 | End: 2022-01-19 | Stop reason: HOSPADM

## 2022-01-19 RX ORDER — ACETAMINOPHEN 500 MG
1000 TABLET ORAL
Status: COMPLETED | OUTPATIENT
Start: 2022-01-19 | End: 2022-01-19

## 2022-01-19 RX ORDER — PROPOFOL 10 MG/ML
VIAL (ML) INTRAVENOUS
Status: DISCONTINUED | OUTPATIENT
Start: 2022-01-19 | End: 2022-01-19

## 2022-01-19 RX ORDER — MUPIROCIN 20 MG/G
OINTMENT TOPICAL
Status: DISCONTINUED | OUTPATIENT
Start: 2022-01-19 | End: 2022-01-19 | Stop reason: HOSPADM

## 2022-01-19 RX ORDER — FENTANYL CITRATE 50 UG/ML
INJECTION, SOLUTION INTRAMUSCULAR; INTRAVENOUS
Status: DISCONTINUED | OUTPATIENT
Start: 2022-01-19 | End: 2022-01-19

## 2022-01-19 RX ORDER — KETOROLAC TROMETHAMINE 30 MG/ML
INJECTION, SOLUTION INTRAMUSCULAR; INTRAVENOUS
Status: DISCONTINUED | OUTPATIENT
Start: 2022-01-19 | End: 2022-01-19

## 2022-01-19 RX ORDER — HYDROMORPHONE HYDROCHLORIDE 2 MG/ML
0.4 INJECTION, SOLUTION INTRAMUSCULAR; INTRAVENOUS; SUBCUTANEOUS EVERY 5 MIN PRN
Status: DISCONTINUED | OUTPATIENT
Start: 2022-01-19 | End: 2022-01-19 | Stop reason: HOSPADM

## 2022-01-19 RX ORDER — PROCHLORPERAZINE EDISYLATE 5 MG/ML
5 INJECTION INTRAMUSCULAR; INTRAVENOUS EVERY 30 MIN PRN
Status: DISCONTINUED | OUTPATIENT
Start: 2022-01-19 | End: 2022-01-19 | Stop reason: HOSPADM

## 2022-01-19 RX ORDER — DIPHENHYDRAMINE HYDROCHLORIDE 50 MG/ML
INJECTION INTRAMUSCULAR; INTRAVENOUS
Status: DISCONTINUED | OUTPATIENT
Start: 2022-01-19 | End: 2022-01-19

## 2022-01-19 RX ORDER — DIPHENHYDRAMINE HYDROCHLORIDE 50 MG/ML
25 INJECTION INTRAMUSCULAR; INTRAVENOUS EVERY 6 HOURS PRN
Status: DISCONTINUED | OUTPATIENT
Start: 2022-01-19 | End: 2022-01-19 | Stop reason: HOSPADM

## 2022-01-19 RX ORDER — SODIUM CHLORIDE 0.9 % (FLUSH) 0.9 %
3 SYRINGE (ML) INJECTION
Status: DISCONTINUED | OUTPATIENT
Start: 2022-01-19 | End: 2022-01-19 | Stop reason: HOSPADM

## 2022-01-19 RX ORDER — IBUPROFEN 600 MG/1
600 TABLET ORAL EVERY 6 HOURS PRN
Qty: 30 TABLET | Refills: 2 | Status: SHIPPED | OUTPATIENT
Start: 2022-01-19 | End: 2022-02-23

## 2022-01-19 RX ORDER — OXYCODONE HYDROCHLORIDE 5 MG/1
5 TABLET ORAL
Status: DISCONTINUED | OUTPATIENT
Start: 2022-01-19 | End: 2022-01-19 | Stop reason: HOSPADM

## 2022-01-19 RX ORDER — LIDOCAINE HYDROCHLORIDE 20 MG/ML
INJECTION INTRAVENOUS
Status: DISCONTINUED | OUTPATIENT
Start: 2022-01-19 | End: 2022-01-19

## 2022-01-19 RX ORDER — DEXAMETHASONE SODIUM PHOSPHATE 4 MG/ML
INJECTION, SOLUTION INTRA-ARTICULAR; INTRALESIONAL; INTRAMUSCULAR; INTRAVENOUS; SOFT TISSUE
Status: DISCONTINUED | OUTPATIENT
Start: 2022-01-19 | End: 2022-01-19

## 2022-01-19 RX ORDER — SODIUM CHLORIDE, SODIUM LACTATE, POTASSIUM CHLORIDE, CALCIUM CHLORIDE 600; 310; 30; 20 MG/100ML; MG/100ML; MG/100ML; MG/100ML
INJECTION, SOLUTION INTRAVENOUS CONTINUOUS
Status: DISCONTINUED | OUTPATIENT
Start: 2022-01-19 | End: 2022-01-19 | Stop reason: HOSPADM

## 2022-01-19 RX ORDER — PREGABALIN 75 MG/1
75 CAPSULE ORAL ONCE
Status: COMPLETED | OUTPATIENT
Start: 2022-01-19 | End: 2022-01-19

## 2022-01-19 RX ORDER — SODIUM CHLORIDE 9 MG/ML
INJECTION, SOLUTION INTRAVENOUS CONTINUOUS
Status: DISCONTINUED | OUTPATIENT
Start: 2022-01-19 | End: 2022-01-19 | Stop reason: HOSPADM

## 2022-01-19 RX ORDER — LIDOCAINE HYDROCHLORIDE 10 MG/ML
0.5 INJECTION, SOLUTION EPIDURAL; INFILTRATION; INTRACAUDAL; PERINEURAL ONCE
Status: DISCONTINUED | OUTPATIENT
Start: 2022-01-19 | End: 2022-01-19 | Stop reason: HOSPADM

## 2022-01-19 RX ADMIN — DIPHENHYDRAMINE HYDROCHLORIDE 12.5 MG: 50 INJECTION, SOLUTION INTRAMUSCULAR; INTRAVENOUS at 10:01

## 2022-01-19 RX ADMIN — MUPIROCIN: 20 OINTMENT TOPICAL at 09:01

## 2022-01-19 RX ADMIN — LIDOCAINE HYDROCHLORIDE 50 MG: 20 INJECTION, SOLUTION INTRAVENOUS at 10:01

## 2022-01-19 RX ADMIN — PROPOFOL 150 MG: 10 INJECTION, EMULSION INTRAVENOUS at 10:01

## 2022-01-19 RX ADMIN — PHENYLEPHRINE HYDROCHLORIDE 50 MCG: 10 INJECTION INTRAVENOUS at 10:01

## 2022-01-19 RX ADMIN — KETOROLAC TROMETHAMINE 30 MG: 30 INJECTION, SOLUTION INTRAMUSCULAR; INTRAVENOUS at 10:01

## 2022-01-19 RX ADMIN — FENTANYL CITRATE 50 MCG: 50 INJECTION, SOLUTION INTRAMUSCULAR; INTRAVENOUS at 10:01

## 2022-01-19 RX ADMIN — PREGABALIN 75 MG: 75 CAPSULE ORAL at 09:01

## 2022-01-19 RX ADMIN — ACETAMINOPHEN 1000 MG: 500 TABLET, FILM COATED ORAL at 09:01

## 2022-01-19 RX ADMIN — DEXAMETHASONE SODIUM PHOSPHATE 8 MG: 4 INJECTION, SOLUTION INTRAMUSCULAR; INTRAVENOUS at 10:01

## 2022-01-19 RX ADMIN — ONDANSETRON HYDROCHLORIDE 4 MG: 2 INJECTION INTRAMUSCULAR; INTRAVENOUS at 10:01

## 2022-01-19 NOTE — DISCHARGE INSTRUCTIONS
Anesthesia: After Your Surgery  Youve just had surgery. During surgery, you received medication called anesthesia to keep you comfortable and pain-free. After surgery, you may experience some pain or nausea. This is common. Here are some tips for feeling better and recovering after surgery.    Going home  Your doctor or nurse will show you how to take care of yourself when you go home. He or she will also answer your questions. Have an adult family member or friend drive you home. For the first 24 hours after your surgery:  · Do not drive or use heavy equipment.  · Do not make important decisions or sign legal documents.  · Avoid alcohol.  · Have someone stay with you, if needed. He or she can watch for problems and help keep you safe.  · Take your time getting up from a seated or lying position. You may experience dizziness for 24 hours  Be sure to keep all follow-up appointments with your doctor. And rest after your procedure for as long as your doctor tells you to.    Coping with pain  If you have pain after surgery, pain medication will help you feel better. Take it as directed, before pain becomes severe. Also, ask your doctor or pharmacist about other ways to control pain, such as with heat, ice, and relaxation. And follow any other instructions your surgeon or nurse gives you.    URINARY RETENTION  Should you experience a decrease in your urine output or are unable to urinate following surgery, this can be due to the medications given during surgery.  We recommend you going to the nearest Emergency Department.    Tips for taking pain medication  To get the best relief possible, remember these points:  · Pain medications can upset your stomach. Taking them with a little food may help.  · Most pain relievers taken by mouth need at least 20 to 30 minutes to take effect.  · Taking medication on a schedule can help you remember to take it. Try to time your medication so that you can take it before beginning an  activity, such as dressing, walking, or sitting down for dinner.  · Constipation is a common side effect of pain medications. Contact your doctor before taking any medications like laxatives or stool softeners to help relieve constipation. Also ask about any dietary restrictions, because drinking lots of fluids and eating foods like fruits and vegetables that are high in fiber can also help. Remember, dont take laxatives unless your surgeon has prescribed them.  · Mixing alcohol and pain medication can cause dizziness and slow your breathing. It can even be fatal. Dont drink alcohol while taking pain medication.  · Pain medication can slow your reflexes. Dont drive or operate machinery while taking pain medication.  If your health care provider tells you to take acetaminophen to help relieve your pain, ask him or her how much you are supposed to take each day. (Acetaminophen is the generic name for Tylenol and other brand-name pain relievers.) Acetaminophen or other pain relievers may interact with your prescription medicines or other over-the-counter (OTC) drugs. Some prescription medications contain acetaminophen along with other active ingredients. Using both prescription and OTC acetaminophen for pain can cause you to overdose. The FDA recommends that you read the labels on your OTC medications carefully. This will help you to clearly understand the list of active ingredients, dosing instructions, and any warnings. It may also help you avoid taking too much acetaminophen. If you have questions or don't understand the information, ask your pharmacist or health care provider to explain it to you before you take the OTC medication.    Managing nausea  Some people have an upset stomach after surgery. This is often due to anesthesia, pain, pain medications, or the stress of surgery. The following tips will help you manage nausea and get good nutrition as you recover. If you were on a special diet before surgery,  ask your doctor if you should follow it during recovery. These tips may help:  · Dont push yourself to eat. Your body will tell you when to eat and how much.  · Start off with clear liquids and soup. They are easier to digest.  · Progress to semi-solid foods (mashed potatoes, applesauce, and gelatin) as you feel ready.  · Slowly move to solid foods. Dont eat fatty, rich, or spicy foods at first.  · Dont force yourself to have three large meals a day. Instead, eat smaller amounts more often.  · Take pain medications with a small amount of solid food, such as crackers or toast to avoid nausea.      Call your surgeon if    · You feel too sleepy, dizzy, or groggy (medication may be too strong).  · You have side effects like nausea, vomiting, or skin changes (rash, itching, or hives).   © 7821-3339 [employer]. [address] [place] [address]. All rights reserved. This information is not intended as a substitute for professional medical care. Always follow your healthcare professional's instructions.                Home Care Instruction D&C Hysteroscopy             ACTIVITY LEVEL:  If you received sedation and/or an anesthetic, you may feel sleepy for several hours. Rest until you feel more  awake. Gradually resume your normal activities.    DIET:  At home, continue with liquids. If there is no nausea, you may eat a soft diet and gradually resume a regular diet.    BATHING:  You may shower  as desired in one day.  You should avoid tub baths, hot tubs and swimming pools until seen by your physician for a post-op follow up.    CARE:  You can expect watery or bloody vaginal discharge for several days. Do not use tampons, please only use pads. Sexual activity is restricted as advised by your doctor.    MEDICATIONS:  You will receive instructions for any pain and/or antibiotic prescriptions. Pain medication should be taken only if needed and as directed. Antibiotics, if ordered, should be taken as  directed until the entire prescription is completed.    ADDITIONAL INFORMATION:  __________________________________________________________________________________________  WHEN TO CALL THE DOCTOR:   For any heavy vaginal bleeding   Fever over 101°F (38.4°C)   Any lower abdominal pain not relieved by the pain mediation  RETURN APPOINTMENT:  __________________________________________________________________________________________  FOR EMERGENCIES:  If any unusual problems or difficulties occur, contact  __________________ or the resident at [phone] (page ) or the Clinic office, [phone].

## 2022-01-19 NOTE — TRANSFER OF CARE
"Anesthesia Transfer of Care Note    Patient: Vicky Brand    Procedure(s) Performed: Procedure(s) (LRB):  HYSTEROSCOPY, WITH DILATION AND CURETTAGE OF UTERUS (N/A)    Patient location: PACU    Anesthesia Type: general    Transport from OR: Transported from OR on 2-3 L/min O2 by NC with adequate spontaneous ventilation    Post pain: adequate analgesia    Post assessment: no apparent anesthetic complications    Post vital signs: stable    Level of consciousness: awake    Nausea/Vomiting: no nausea/vomiting    Complications: none    Transfer of care protocol was followed      Last vitals:   Visit Vitals  /76 (BP Location: Right arm, Patient Position: Lying)   Pulse 64   Temp 36.3 °C (97.4 °F) (Oral)   Resp 18   Ht 5' 6" (1.676 m)   Wt 62.6 kg (138 lb)   LMP  (LMP Unknown)   SpO2 98%   Breastfeeding No   BMI 22.27 kg/m²     "

## 2022-01-19 NOTE — ANESTHESIA PREPROCEDURE EVALUATION
01/19/2022  Vicky Brand is a 56 y.o., female.    Anesthesia Evaluation    I have reviewed the Patient Summary Reports.    I have reviewed the Nursing Notes.    I have reviewed the Medications.     Review of Systems  Anesthesia Hx:  No problems with previous Anesthesia    Social:  Non-Smoker, No Alcohol Use    Hematology/Oncology:  Hematology Normal   Oncology Normal     EENT/Dental:EENT/Dental Normal   Cardiovascular:  Cardiovascular Normal Exercise tolerance: good     Pulmonary:   Sleep Apnea, CPAP    Renal/:  Renal/ Normal     Hepatic/GI:  Hepatic/GI Normal    Musculoskeletal:  Musculoskeletal Normal    Neurological:   Headaches    Endocrine:  Endocrine Normal    Dermatological:  Skin Normal    Psych:  Psychiatric Normal           Physical Exam  General:  Well nourished    Airway/Jaw/Neck:  Airway Findings: Mouth Opening: Normal Tongue: Normal  General Airway Assessment: Adult, Average  Mallampati: II  TM Distance: 4 - 6 cm  Jaw/Neck Findings:  Neck ROM: Normal ROM      Dental:  Dental Findings: In tact             Anesthesia Plan  Type of Anesthesia, risks & benefits discussed:  Anesthesia Type:  general    Patient's Preference:   Plan Factors:          Intra-op Monitoring Plan: standard ASA monitors  Intra-op Monitoring Plan Comments:   Post Op Pain Control Plan:   Post Op Pain Control Plan Comments:     Induction:   IV  Beta Blocker:         Informed Consent: Patient understands risks and agrees with Anesthesia plan.  Questions answered. Anesthesia consent signed with patient.  ASA Score: 2     Day of Surgery Review of History & Physical:    H&P update referred to the surgeon.     Anesthesia Plan Notes: No labs        Ready For Surgery From Anesthesia Perspective.

## 2022-01-19 NOTE — ANESTHESIA PROCEDURE NOTES
Intubation  Performed by: Shanthi Daniel CRNA  Authorized by: Venkatesh Bonilla MD     Intubation:     Induction:  Intravenous    Intubated:  Postinduction    Mask Ventilation:  N/a    Attempts:  1    Attempted By:  CRNA    Difficult Airway Encountered?: No      Complications:  None    Airway Device:  Supraglottic airway/LMA    Airway Device Size:  3.5    Style/Cuff Inflation:  Cuffed (inflated to minimal occlusive pressure) (to seal)    Secured at:  The lips    Placement Verified By:  Capnometry    Complicating Factors:  None    Findings Post-Intubation:  BS equal bilateral and atraumatic/condition of teeth unchanged

## 2022-01-19 NOTE — OR NURSING
VSS on RA. Pt has no pain. Peripad and PIV C/D/I. Meets Phase I discharge criteria. Ready for transfer to Phase II. Family notified.

## 2022-01-19 NOTE — PLAN OF CARE
Vicky Craventh Sunday has met all discharge criteria from Phase II. Vital Signs are stable, ambulating  without difficulty. Discharge instructions given, patient verbalized understanding. Discharged from facility via wheelchair in stable condition.

## 2022-01-19 NOTE — INTERVAL H&P NOTE
The patient has been examined and the H&P has been reviewed:    I concur with the findings and no changes have been noted since the H&P was written:     Surgery risks, benefits and alternative options discussed and understood by patient/family.      Krystyna Jasso MD  OBGYN PGY-1      There are no hospital problems to display for this patient.

## 2022-01-19 NOTE — DISCHARGE SUMMARY
Discharge Summary  Gynecology      Admit Date: 2022    Discharge Date and Time: 2022     Attending Physician: Jannette Jarrell MD    Principal Diagnoses: Status post hysteroscopy    Active Hospital Problems    Diagnosis  POA    *Status post hysteroscopy [Z98.890]  Not Applicable      Resolved Hospital Problems   No resolved problems to display.       Procedures: Procedure(s) (LRB):  HYSTEROSCOPY, WITH DILATION AND CURETTAGE OF UTERUS (N/A)    Discharged Condition: good    Hospital Course:   Vicky Brand is a 56 y.o. y.o.  female who presented on 2022   for above procedures for the treatment of irregular appearing and fluid filled endometrial cavity associated with symptoms of abdominal bloating. Patient tolerated procedure. PMH significant for h/o endometrial ablation, fibroids, BTL. Post-operative course was uncomplicated.  On day of discharge, patient was urinating, ambulating, and tolerating a regular diet without difficulty. Pain was well controlled on PO medication. She was discharged home on POD#0 in stable condition with instructions to follow up with Dr. Jarrell in 2 weeks.     Consults: None      Significant Diagnostic Studies:  No results for input(s): WBC, HGB, HCT, MCV, PLT in the last 168 hours.     Treatments:   1.   Surgery as above    Disposition: Home or Self Care    Patient Instructions:   Current Discharge Medication List      START taking these medications    Details   ibuprofen (ADVIL,MOTRIN) 600 MG tablet Take 1 tablet (600 mg total) by mouth every 6 (six) hours as needed for Pain.  Qty: 30 tablet, Refills: 2         CONTINUE these medications which have NOT CHANGED    Details   calcium carbonate (OS-GISSELL) 600 mg calcium (1,500 mg) Tab Take 600 mg by mouth once.      EVENING PRIMROSE OIL ORAL Take by mouth.      multivitamin capsule Take 1 capsule by mouth once daily.      FLUARIX QUAD 2710-8636, PF, 60 mcg (15 mcg x 4)/0.5 mL Syrg        HYDROcodone-acetaminophen (NORCO) 5-325 mg per tablet Take 1 tablet by mouth every 6 (six) hours as needed for Pain.  Qty: 20 tablet, Refills: 0    Comments: Quantity prescribed more than 7 day supply? No  Associated Diagnoses: Post-op pain      naproxen sodium (ANAPROX) 550 MG tablet Take 1 tablet (550 mg total) by mouth 3 (three) times daily with meals.  Qty: 60 tablet, Refills: 6    Associated Diagnoses: Pelvic pain in female             Discharge Procedure Orders   Pelvic Rest   Order Comments: Pelvic rest until cleared by MD. Nothing in vagina till cleared by MD including tampons, douching, intercourse     Notify your health care provider if you experience any of the following:  persistent nausea and vomiting or diarrhea     Notify your health care provider if you experience any of the following:  temperature >100.4     Notify your health care provider if you experience any of the following:  severe uncontrolled pain     Notify your health care provider if you experience any of the following:  difficulty breathing or increased cough     Notify your health care provider if you experience any of the following:  severe persistent headache     Notify your health care provider if you experience any of the following:  persistent dizziness, light-headedness, or visual disturbances     Notify your health care provider if you experience any of the following:  increased confusion or weakness     Notify your health care provider if you experience any of the following:   Order Comments: Notify MD if bleeding 1 pad/hour for 2 consecutive hours.     Activity as tolerated        Follow-up Information     Jannette Jarrell MD In 2 weeks.    Specialties: Obstetrics, Obstetrics and Gynecology  [facility]  Contact information:  [address] [address]  [phone]                       Krystyna Jasso MD  OBGYN PGY-1

## 2022-01-19 NOTE — ANESTHESIA POSTPROCEDURE EVALUATION
Anesthesia Post Evaluation    Patient: Vicky Brand    Procedure(s) Performed: Procedure(s) (LRB):  HYSTEROSCOPY, WITH DILATION AND CURETTAGE OF UTERUS (N/A)    Final Anesthesia Type: general      Patient location during evaluation: PACU  Patient participation: Yes- Able to Participate  Level of consciousness: awake and alert  Post-procedure vital signs: reviewed and stable  Pain management: adequate  Airway patency: patent    PONV status at discharge: No PONV  Anesthetic complications: no      Cardiovascular status: blood pressure returned to baseline  Respiratory status: unassisted, spontaneous ventilation and room air  Hydration status: euvolemic  Follow-up not needed.          Vitals Value Taken Time   /72 01/19/22 1225   Temp 36.4 °C (97.5 °F) 01/19/22 1155   Pulse 65 01/19/22 1225   Resp 16 01/19/22 1225   SpO2 100 % 01/19/22 1225         Event Time   Out of Recovery 11:50:00         Pain/Alan Score: Pain Rating Prior to Med Admin: 0 (1/19/2022  9:45 AM)  Alan Score: 10 (1/19/2022 12:25 PM)

## 2022-01-19 NOTE — OP NOTE
OPERATIVE NOTE    DATE OR PROCEDURE 01/19/2022    HYSTEROSCOPY DILITATION AND CURETTAGE    SURGEON: Dr. Jannette Jarrell MD  ASSISTANT: Krystyna Jasso PGY1    ANESTHESIA: General     COMPLICATIONS: None     EBL: 10 cc     IV FLUIDS: see anesthesia report     URINE OUTPUT: 250  cc drained via in-and-out catheterization prior to procedure     Hysteroscopy fluid deficit: 50cc.  Total hysteroscopic fluid infused 1000 cc     FINDINGS:    1. Normal external genitalia   2. Uterus sounded to 6 cm  3. Cervix without decensus   4. Cavitary assessment showed atrophic endometrial tissue, very small uterine cavity, consistent with previous imaging. Overall benign appearing endometrial tissue and uterine cavity.  5. Bilateral ostia were not visualized with hysteroscope  6. Sharp curettage used in all four quadrants of the uterus, specimen sent to pathology.         SPECIMENS:  1. Endometrial curettage     PROCEDURE:   Patient was taken to the operating room where general anesthesia was administered and found to be adequate.  She was placed in the dorsal lithotomy position using Song stirrups, then prepped and draped in the usual sterile fashion. Bladder was drained via in-and-out catheterization prior to procedure.  A surgical timeout was performed with patient's name, date of birth, procedure to be performed, and allergies verbalized. All OR staff in agreement. No preoperative antibiotics were administered as none were indicated.     Attention was then turned to the vagina where a weighted sterile speculum was placed in the posterior aspect, and a right angle retractor was placed in the anterior aspect.  The anterior lip of the cervix was grasped with a single tooth tenaculum.  The uterus was sounded as above. The cervix was serially dilated to 4 mm with both pierre and brandon dilators. The 3.7mm hysteroscope was advanced through the cervical os initially to evaluate stenotic cervix. The uterine cavity was visualized with the 3.7mm  hysteroscope and appeared benign. No polyps or fibroids visualized. The cavity was very small, consistent with previous imaging (TVUS). The 5.5mm hysteroscope was then advanced through the external cervical os, but was unable to be passed through the internal cervical os to reach the uterine cavity.  The endometrium was inspected with the 3.7mm hysteroscope and findings as noted above. An endocervical currette was used to obtain endometrial sample. The hysteroscope was withdrawn without difficulty.      The uterus was then curetted in a clockwise fashion until gritty feeling was noted in all aspects of the uterus with the endocervical curette. The endometrial scrapings were sent to pathology. The tenaculum was removed and hemostasis was noted at the puncture sites in the cervix.      Sponge and instrument counts were correct x 2. The patient tolerated the procedure well and was awakened without difficulty. She was taken to the recovery room in stable condition.    Krystyna Jasso MD  OBGYN PGY-1

## 2022-01-20 VITALS
WEIGHT: 138 LBS | OXYGEN SATURATION: 100 % | DIASTOLIC BLOOD PRESSURE: 66 MMHG | RESPIRATION RATE: 16 BRPM | HEART RATE: 63 BPM | BODY MASS INDEX: 22.18 KG/M2 | HEIGHT: 66 IN | TEMPERATURE: 98 F | SYSTOLIC BLOOD PRESSURE: 152 MMHG

## 2022-01-23 ENCOUNTER — PATIENT MESSAGE (OUTPATIENT)
Dept: OBSTETRICS AND GYNECOLOGY | Facility: CLINIC | Age: 57
End: 2022-01-23
Payer: COMMERCIAL

## 2022-01-25 ENCOUNTER — PATIENT MESSAGE (OUTPATIENT)
Dept: OBSTETRICS AND GYNECOLOGY | Facility: CLINIC | Age: 57
End: 2022-01-25
Payer: COMMERCIAL

## 2022-01-25 LAB
FINAL PATHOLOGIC DIAGNOSIS: NORMAL
GROSS: NORMAL
Lab: NORMAL

## 2022-01-31 ENCOUNTER — PATIENT MESSAGE (OUTPATIENT)
Dept: OBSTETRICS AND GYNECOLOGY | Facility: CLINIC | Age: 57
End: 2022-01-31
Payer: COMMERCIAL

## 2022-02-16 ENCOUNTER — PATIENT MESSAGE (OUTPATIENT)
Dept: RESEARCH | Facility: HOSPITAL | Age: 57
End: 2022-02-16
Payer: COMMERCIAL

## 2022-02-23 ENCOUNTER — OFFICE VISIT (OUTPATIENT)
Dept: OBSTETRICS AND GYNECOLOGY | Facility: CLINIC | Age: 57
End: 2022-02-23
Payer: COMMERCIAL

## 2022-02-23 VITALS
DIASTOLIC BLOOD PRESSURE: 90 MMHG | WEIGHT: 134.06 LBS | HEIGHT: 66 IN | BODY MASS INDEX: 21.55 KG/M2 | SYSTOLIC BLOOD PRESSURE: 122 MMHG

## 2022-02-23 DIAGNOSIS — N95.2 VAGINAL ATROPHY: ICD-10-CM

## 2022-02-23 DIAGNOSIS — N95.0 POSTMENOPAUSAL BLEEDING: Primary | ICD-10-CM

## 2022-02-23 PROCEDURE — 99999 PR PBB SHADOW E&M-EST. PATIENT-LVL III: CPT | Mod: PBBFAC,,, | Performed by: OBSTETRICS & GYNECOLOGY

## 2022-02-23 PROCEDURE — 3074F SYST BP LT 130 MM HG: CPT | Mod: CPTII,S$GLB,, | Performed by: OBSTETRICS & GYNECOLOGY

## 2022-02-23 PROCEDURE — 3074F PR MOST RECENT SYSTOLIC BLOOD PRESSURE < 130 MM HG: ICD-10-PCS | Mod: CPTII,S$GLB,, | Performed by: OBSTETRICS & GYNECOLOGY

## 2022-02-23 PROCEDURE — 99999 PR PBB SHADOW E&M-EST. PATIENT-LVL III: ICD-10-PCS | Mod: PBBFAC,,, | Performed by: OBSTETRICS & GYNECOLOGY

## 2022-02-23 PROCEDURE — 3008F PR BODY MASS INDEX (BMI) DOCUMENTED: ICD-10-PCS | Mod: CPTII,S$GLB,, | Performed by: OBSTETRICS & GYNECOLOGY

## 2022-02-23 PROCEDURE — 1159F MED LIST DOCD IN RCRD: CPT | Mod: CPTII,S$GLB,, | Performed by: OBSTETRICS & GYNECOLOGY

## 2022-02-23 PROCEDURE — 99213 OFFICE O/P EST LOW 20 MIN: CPT | Mod: S$GLB,,, | Performed by: OBSTETRICS & GYNECOLOGY

## 2022-02-23 PROCEDURE — 1159F PR MEDICATION LIST DOCUMENTED IN MEDICAL RECORD: ICD-10-PCS | Mod: CPTII,S$GLB,, | Performed by: OBSTETRICS & GYNECOLOGY

## 2022-02-23 PROCEDURE — 3008F BODY MASS INDEX DOCD: CPT | Mod: CPTII,S$GLB,, | Performed by: OBSTETRICS & GYNECOLOGY

## 2022-02-23 PROCEDURE — 3080F DIAST BP >= 90 MM HG: CPT | Mod: CPTII,S$GLB,, | Performed by: OBSTETRICS & GYNECOLOGY

## 2022-02-23 PROCEDURE — 99213 PR OFFICE/OUTPT VISIT, EST, LEVL III, 20-29 MIN: ICD-10-PCS | Mod: S$GLB,,, | Performed by: OBSTETRICS & GYNECOLOGY

## 2022-02-23 PROCEDURE — 3080F PR MOST RECENT DIASTOLIC BLOOD PRESSURE >= 90 MM HG: ICD-10-PCS | Mod: CPTII,S$GLB,, | Performed by: OBSTETRICS & GYNECOLOGY

## 2022-02-23 RX ORDER — ESTRADIOL 0.1 MG/G
0.5 CREAM VAGINAL
Qty: 42.5 G | Refills: 3 | OUTPATIENT
Start: 2022-02-24 | End: 2022-06-07

## 2022-02-23 NOTE — PROGRESS NOTES
"  CC: Postoperative visit    Vicky Brand is a 56 y.o. female  presents for a postoperative visit s/p HYSTEROSCOPY DILITATION AND CURETTAGE on 2022.  Her postoperative course was uncomplicated.  She is doing well postoperative.    Pathology showed:  SCANT FRAGMENTS OF BENIGN ENDO AND ECTOCERVICAL TISSUE ADMIXED WITH BLOOD   CLOT, NO DYSPLASIA OR ENDOMETRIAL TISSUE IDENTIFIED       BP (!) 122/90   Ht 5' 6" (1.676 m)   Wt 60.8 kg (134 lb 0.6 oz)   BMI 21.63 kg/m²     ROS:  GENERAL: No fever, chills, fatigability or weight loss.  VULVAR: No pain, no lesions and no itching.  VAGINAL: No relaxation, no itching, no discharge, no abnormal bleeding and no lesions.  ABDOMEN: No abdominal pain. Denies nausea. Denies vomiting. No diarrhea. No constipation  BREAST: Denies pain. No lumps. No discharge.  URINARY: No incontinence, no nocturia, no frequency and no dysuria.  CARDIOVASCULAR: No chest pain. No shortness of breath. No leg cramps.  NEUROLOGICAL: No headaches. No vision changes.    Physical Exam    PELVIC: Normal external genitalia without lesions.  Normal hair distribution.  Adequate perineal body, normal urethral meatus.  Vagina moist and well rugated without lesions or discharge.  Cervix pink, without lesions, discharge or tenderness.  No significant cystocele or rectocele.  Bimanual exam shows uterus to be normal size, regular, mobile and nontender.  Adnexa without masses or tenderness.      1. Postmenopausal bleeding     2. Vaginal atrophy  estradioL (ESTRACE) 0.01 % (0.1 mg/gram) vaginal cream       Patient can return to normal activities.  Return to clinic in 1 year for well woman exam.      "

## 2022-03-09 ENCOUNTER — OFFICE VISIT (OUTPATIENT)
Dept: INTERNAL MEDICINE | Facility: CLINIC | Age: 57
End: 2022-03-09
Payer: COMMERCIAL

## 2022-03-09 DIAGNOSIS — S63.509A SPRAIN OF WRIST, UNSPECIFIED LATERALITY, INITIAL ENCOUNTER: ICD-10-CM

## 2022-03-09 PROCEDURE — 99213 PR OFFICE/OUTPT VISIT, EST, LEVL III, 20-29 MIN: ICD-10-PCS | Mod: 95,,, | Performed by: INTERNAL MEDICINE

## 2022-03-09 PROCEDURE — 99213 OFFICE O/P EST LOW 20 MIN: CPT | Mod: 95,,, | Performed by: INTERNAL MEDICINE

## 2022-03-09 NOTE — PROGRESS NOTES
[facility]  Internal Medicine Clinic Note  The patient location is: [place]  The chief complaint leading to consultation is: wrist pain     Visit type: audiovisual    Face to Face time with patient: 10  10 minutes of total time spent on the encounter, which includes face to face time and non-face to face time preparing to see the patient (eg, review of tests), Obtaining and/or reviewing separately obtained history, Documenting clinical information in the electronic or other health record, Independently interpreting results (not separately reported) and communicating results to the patient/family/caregiver, or Care coordination (not separately reported).         Each patient to whom he or she provides medical services by telemedicine is:  (1) informed of the relationship between the physician and patient and the respective role of any other health care provider with respect to management of the patient; and (2) notified that he or she may decline to receive medical services by telemedicine and may withdraw from such care at any time.    Notes:     Chief Complaint      Chief Complaint   Patient presents with    Hand Pain     History of Present Illness      Vicky Brand is a 56 y.o. female who presents today for chief complaint bilateral hand pain . Patient is new to me.    PCP: Lia Cristina MD  Patient comes to appointment telemed .     HPI   4 days ago moved furniture, inclusing she pulled on a rug in effort to move a very heavy dining room table, since then pain in both wrists at base of thump an d medial aspect of dorsum of hand at the wrist, pain with movement, no edema on exam via camera, is using a flexible brace and ice  Active Problem List with Overview Notes    Diagnosis Date Noted    Wrist sprain 03/09/2022    Lack of coordination 08/10/2021    Muscle weakness 08/10/2021    Myalgia of pelvic floor 08/10/2021    Dyspareunia in female 07/27/2021     Osphena, pelvic floor tx 2021, [facility]       CALEB (obstructive sleep apnea)      Lysenko, CPAP start 7/21      Nocturia 06/17/2021    Intramural leiomyoma of uterus 05/22/2019     Irregular, dysmenorrhea      Paradoxical insomnia 10/30/2017     prior to menses, amitryptiline 2.5 mg qhs helps      Status post hysteroscopy 08/02/2017    Irregular menses 05/09/2017       Health Maintenance   Topic Date Due    Hepatitis C Screening  Never done    Mammogram  10/29/2022    Lipid Panel  07/21/2026    TETANUS VACCINE  07/27/2031       Past Medical History:   Diagnosis Date    Dyspareunia in female 7/27/2021    Osphena, pelvic floor tx 2021, G[facility]    Eye abnormalities     early yellowing of lens, precursor to cataracts, Dr Webster,  wear glasses at all times    Fracture of fifth toe, left, closed, with delayed healing, subsequent encounter 8/21/2018    Intramural leiomyoma of uterus 5/22/2019    Irregular, dysmenorrhea    Nocturia 6/17/2021    Ocular migraine     vision fuzzy x 3 in 2013, metallic shapes in her vision, Dr Webster,  resolved after 30 min , no pain    CALEB (obstructive sleep apnea)     Lysenko, CPAP start 7/21    Paradoxical insomnia 10/30/2017    prior to menses, previously took amitryptiline 2.5 mg prn  qhs     Posterior vitreous detachment of left eye 5/22/2019    Flashes of light, ocular migranes, Ophthalmologist Dr Orourke, retinal specialist Dr Soni, rec avoid exercise until she follows up 5/2019    Shoulder pain, left 2007    LEFT, after fall, better with PT at [facility]       Past Surgical History:   Procedure Laterality Date    COLONOSCOPY N/A 6/9/2017    Procedure: COLONOSCOPY;  Surgeon: Davy Prince MD;  Location: [facility] ([address] [place] [address]);  Service: Endoscopy;  Laterality: N/A;    DILATION AND CURETTAGE OF UTERUS      ENDOMETRIAL ABLATION N/A 2017    HYSTEROSCOPY WITH DILATION AND CURETTAGE OF UTERUS N/A 1/19/2022    Procedure: HYSTEROSCOPY, WITH DILATION AND CURETTAGE OF UTERUS;  Surgeon: Jannette Jarrell,  MD;  Location: [facility];  Service: OB/GYN;  Laterality: N/A;    TUBAL LIGATION         family history includes Diabetes in her father; Hypertension in her mother; Ovarian cancer in her maternal grandmother; Pacemaker/defibrilator in her mother; Stroke (age of onset: 49) in her father.    Social History     Tobacco Use    Smoking status: Never Smoker    Smokeless tobacco: Never Used   Substance Use Topics    Alcohol use: No    Drug use: No       Review of Systems   HENT: Negative for hearing loss.    Eyes: Negative for discharge.   Respiratory: Negative for wheezing.    Cardiovascular: Negative for chest pain and palpitations.   Gastrointestinal: Negative for blood in stool, constipation, diarrhea and vomiting.   Genitourinary: Negative for dysuria and hematuria.   Musculoskeletal: Positive for joint pain. Negative for neck pain.   Neurological: Negative for weakness and headaches.   Endo/Heme/Allergies: Negative for polydipsia.        Outpatient Encounter Medications as of 3/9/2022   Medication Sig Dispense Refill    calcium carbonate (OS-GISSELL) 600 mg calcium (1,500 mg) Tab Take 600 mg by mouth once.      estradioL (ESTRACE) 0.01 % (0.1 mg/gram) vaginal cream Place 0.5 g vaginally twice a week. 42.5 g 3    EVENING PRIMROSE OIL ORAL Take by mouth.      multivitamin capsule Take 1 capsule by mouth once daily.       No facility-administered encounter medications on file as of 3/9/2022.        Review of patient's allergies indicates:   Allergen Reactions    Sulfa (sulfonamide antibiotics) Rash    Cetirizine      Other reaction(s): Unable to Function  Other reaction(s): Unable to Function    Erythromycin Nausea And Vomiting     Other reaction(s): Nausea    Zyrtec-d  [cetirizine-pseudoephedrine]      Other reaction(s): Inability to focus/function           Physical Exam       ]    Physical Exam  Constitutional:       General: She is not in acute distress.     Appearance: She is well-developed. She is not  diaphoretic.   HENT:      Head: Normocephalic and atraumatic.      Right Ear: External ear normal.      Left Ear: External ear normal.      Nose: Nose normal.   Eyes:      General:         Right eye: No discharge.         Left eye: No discharge.      Conjunctiva/sclera: Conjunctivae normal.   Pulmonary:      Effort: Pulmonary effort is normal. No respiratory distress.   Musculoskeletal:         General: Normal range of motion.      Cervical back: Normal range of motion.   Skin:     Coloration: Skin is not pale.      Findings: No rash.   Neurological:      Mental Status: She is alert and oriented to person, place, and time.   Psychiatric:         Behavior: Behavior normal.         Thought Content: Thought content normal.         Judgment: Judgment normal.          Laboratory:  CBC:  No results for input(s): WBC, RBC, HGB, HCT, PLT, MCV, MCH, MCHC in the last 2160 hours.  CMP:  No results for input(s): GLU, CALCIUM, ALBUMIN, PROT, NA, K, CO2, CL, BUN, ALKPHOS, ALT, AST, BILITOT in the last 2160 hours.    Invalid input(s): CREATININ  URINALYSIS:  No results for input(s): COLORU, CLARITYU, SPECGRAV, PHUR, PROTEINUA, GLUCOSEU, BILIRUBINCON, BLOODU, WBCU, RBCU, BACTERIA, MUCUS, NITRITE, LEUKOCYTESUR, UROBILINOGEN, HYALINECASTS in the last 2160 hours.   LIPIDS:  No results for input(s): TSH, HDL, CHOL, TRIG, LDLCALC, CHOLHDL, NONHDLCHOL, TOTALCHOLEST in the last 2160 hours.  TSH:  No results for input(s): TSH in the last 2160 hours.  A1C:  No results for input(s): HGBA1C in the last 2160 hours.    Radiology:      Assessment/Plan     Vicky Brand is a 56 y.o.female with:    Wrist sprain  Bilateral  Ice, advised more rigid brace and compression when not using brace, ibuprofen 600 tid as needed and ice as needed  Follow up via pcp if unresolved in 7-10 days post injury      No orders of the defined types were placed in this encounter.      Amairani Beltran MD  3/9/2022 7:38 AM    Primary Care Internal Medicine

## 2022-03-09 NOTE — ASSESSMENT & PLAN NOTE
Bilateral  Ice, advised more rigid brace and compression when not using brace, ibuprofen 600 tid as needed and ice as needed  Follow up via pcp if unresolved in 7-10 days post injury

## 2022-03-11 ENCOUNTER — PATIENT MESSAGE (OUTPATIENT)
Dept: PRIMARY CARE CLINIC | Facility: CLINIC | Age: 57
End: 2022-03-11
Payer: COMMERCIAL

## 2022-03-11 DIAGNOSIS — M25.539 PAIN IN WRIST, UNSPECIFIED LATERALITY: Primary | ICD-10-CM

## 2022-03-14 ENCOUNTER — HOSPITAL ENCOUNTER (EMERGENCY)
Facility: HOSPITAL | Age: 57
Discharge: HOME | End: 2022-03-14
Attending: EMERGENCY MEDICINE
Payer: COMMERCIAL

## 2022-03-14 VITALS
OXYGEN SATURATION: 100 % | RESPIRATION RATE: 18 BRPM | HEART RATE: 67 BPM | TEMPERATURE: 98 F | BODY MASS INDEX: 21.69 KG/M2 | SYSTOLIC BLOOD PRESSURE: 141 MMHG | WEIGHT: 135 LBS | DIASTOLIC BLOOD PRESSURE: 67 MMHG | HEIGHT: 66 IN

## 2022-03-14 DIAGNOSIS — M79.673 FOOT PAIN: Primary | ICD-10-CM

## 2022-03-14 PROCEDURE — 25000003 PHARM REV CODE 250: Performed by: EMERGENCY MEDICINE

## 2022-03-14 PROCEDURE — 99284 EMERGENCY DEPT VISIT MOD MDM: CPT | Mod: ,,, | Performed by: EMERGENCY MEDICINE

## 2022-03-14 PROCEDURE — 99283 EMERGENCY DEPT VISIT LOW MDM: CPT | Mod: 25

## 2022-03-14 PROCEDURE — 99284 PR EMERGENCY DEPT VISIT,LEVEL IV: ICD-10-PCS | Mod: ,,, | Performed by: EMERGENCY MEDICINE

## 2022-03-14 RX ORDER — IBUPROFEN 400 MG/1
400 TABLET ORAL
Status: COMPLETED | OUTPATIENT
Start: 2022-03-14 | End: 2022-03-14

## 2022-03-14 RX ORDER — ACETAMINOPHEN 500 MG
1000 TABLET ORAL
Status: COMPLETED | OUTPATIENT
Start: 2022-03-14 | End: 2022-03-14

## 2022-03-14 RX ADMIN — ACETAMINOPHEN 1000 MG: 500 TABLET ORAL at 09:03

## 2022-03-14 RX ADMIN — IBUPROFEN 400 MG: 400 TABLET, FILM COATED ORAL at 09:03

## 2022-03-15 ENCOUNTER — PATIENT OUTREACH (OUTPATIENT)
Dept: ADMINISTRATIVE | Facility: OTHER | Age: 57
End: 2022-03-15
Payer: COMMERCIAL

## 2022-03-15 NOTE — ED PROVIDER NOTES
Encounter Date: 3/14/2022    SCRIBE #1 NOTE: I, Carly Kearns, am scribing for, and in the presence of,  Karly Valdes MD. I have scribed the following portions of the note - Other sections scribed: HPI ROS PE.       History     Chief Complaint   Patient presents with    Foot Pain     Right foot pain, recent trip to [place] [employer] [place] walking 20,000 + steps     Time patient was seen by the provider: 9:13 PM      The patient is a 56 y.o. female with no significant past medical history, who presents to the ED with a complaint of right foot pain onset 1 week and a half ago. The patient reports she had a recent trip to [place] where she walked 20,000+ steps per day. She denies any injuries or trauma to her foot. States she is unable to bear weight to her right foot due to the pain. She has not taken anything for the pain today.    The history is provided by the patient and medical records. No  was used.     Review of patient's allergies indicates:   Allergen Reactions    Sulfa (sulfonamide antibiotics) Rash    Cetirizine      Other reaction(s): Unable to Function  Other reaction(s): Unable to Function    Erythromycin Nausea And Vomiting     Other reaction(s): Nausea    Zyrtec-d  [cetirizine-pseudoephedrine]      Other reaction(s): Inability to focus/function     Past Medical History:   Diagnosis Date    Dyspareunia in female 7/27/2021    Osphena, pelvic floor tx 2021, [facility]    Eye abnormalities     early yellowing of lens, precursor to cataracts, Dr Webster,  wear glasses at all times    Fracture of fifth toe, left, closed, with delayed healing, subsequent encounter 8/21/2018    Intramural leiomyoma of uterus 5/22/2019    Irregular, dysmenorrhea    Nocturia 6/17/2021    Ocular migraine     vision fuzzy x 3 in 2013, metallic shapes in her vision, Dr Webster,  resolved after 30 min , no pain    CALEB (obstructive sleep apnea)     Lysenko, CPAP start 7/21    Paradoxical insomnia  10/30/2017    prior to menses, previously took amitryptiline 2.5 mg prn  qhs     Posterior vitreous detachment of left eye 5/22/2019    Flashes of light, ocular migranes, Ophthalmologist Dr Orourke, retinal specialist Dr Soni, rec avoid exercise until she follows up 5/2019    Shoulder pain, left 2007    LEFT, after fall, better with PT at [facility]     Past Surgical History:   Procedure Laterality Date    COLONOSCOPY N/A 6/9/2017    Procedure: COLONOSCOPY;  Surgeon: Davy Prince MD;  [facility]  Service: Endoscopy;  Laterality: N/A;    DILATION AND CURETTAGE OF UTERUS      ENDOMETRIAL ABLATION N/A 2017    HYSTEROSCOPY WITH DILATION AND CURETTAGE OF UTERUS N/A 1/19/2022    Procedure: HYSTEROSCOPY, WITH DILATION AND CURETTAGE OF UTERUS;  Surgeon: Jannette Jarrell MD;  [facility];  Service: OB/GYN;  Laterality: N/A;    TUBAL LIGATION       Family History   Problem Relation Age of Onset    Diabetes Father     Stroke Father 49    Hypertension Mother     Pacemaker/defibrilator Mother     Ovarian cancer Maternal Grandmother     Melanoma Neg Hx     Breast cancer Neg Hx     Colon cancer Neg Hx      Social History     Tobacco Use    Smoking status: Never Smoker    Smokeless tobacco: Never Used   Substance Use Topics    Alcohol use: No    Drug use: No     Review of Systems   Constitutional: Negative for fever.   HENT: Negative for sore throat.    Respiratory: Negative for shortness of breath.    Cardiovascular: Negative for chest pain.   Gastrointestinal: Negative for nausea.   Genitourinary: Negative for dysuria.   Musculoskeletal: Positive for myalgias (right foot).   Skin: Negative for rash.   Neurological: Negative for weakness.   Hematological: Does not bruise/bleed easily.       Physical Exam     Initial Vitals [03/14/22 2037]   BP Pulse Resp Temp SpO2   (!) 180/80 77 18 98.2 °F (36.8 °C) 100 %      MAP       --         Physical Exam    Nursing note and  vitals reviewed.  Constitutional: Vital signs are normal. She appears well-developed and well-nourished. She is not diaphoretic.  Non-toxic appearance. She does not appear ill. No distress.   HENT:   Head: Normocephalic and atraumatic.   Mouth/Throat: Mucous membranes are normal. Mucous membranes are not dry.   Eyes: Conjunctivae and lids are normal.   Neck: Neck supple.   Normal range of motion.  Cardiovascular: Normal rate.   Pulses:       Dorsalis pedis pulses are 2+ on the right side.   Pulmonary/Chest: No respiratory distress.   Musculoskeletal:      Cervical back: Normal range of motion and neck supple.      Right foot: No deformity. Normal pulse.      Comments: No bony deformity, area of swelling, wound, erythema or warmth. 2+ DP pulse. Moderate tenderness over the base of the metatarsal extending to the length of the 5th toe. No significant tenderness to the plantar surface of the foot. The leg itself appears normal.      Neurological: She is alert and oriented to person, place, and time.   Skin: Skin is dry and intact. No erythema. No pallor.   Psychiatric: She has a normal mood and affect. Her speech is normal and behavior is normal.         ED Course   Procedures  Labs Reviewed - No data to display       Imaging Results          X-Ray Foot Complete Right (Final result)  Result time 03/14/22 22:14:51    Final result by Pramod Maddox MD (03/14/22 22:14:51)                 Impression:      No radiographic evidence of acute displaced fracture or traumatic subluxation of the right foot.  Further evaluation and follow-up as warranted.      Electronically signed by: Pramod Maddox MD  Date:    03/14/2022  Time:    22:14             Narrative:    EXAMINATION:  XR FOOT COMPLETE 3 VIEW RIGHT    CLINICAL HISTORY:  . Pain in unspecified foot    TECHNIQUE:  AP, lateral, and oblique views of the right foot were performed.    COMPARISON:  Right foot radiograph series 12/23/2013    FINDINGS:  There is no  radiographic evidence of acute displaced fracture or dislocation. The Lisfranc interval appears within normal limits.  Joint spaces appear appropriately maintained. There is minimal calcaneal enthesopathic change at the distal Achilles insertion. No retained radiopaque foreign body appreciated within the soft tissues.                                 Medications   ibuprofen tablet 400 mg (400 mg Oral Given 3/14/22 2123)   acetaminophen tablet 1,000 mg (1,000 mg Oral Given 3/14/22 2123)     Medical Decision Making:   History:   Old Medical Records: I decided to obtain old medical records.  Differential Diagnosis:   Plantar fascitis  Stress fracture  Arthritis  No signs of infection or ischemia  Independently Interpreted Test(s):   I have ordered and independently interpreted X-rays - see prior notes.  Clinical Tests:   Radiological Study: Ordered and Reviewed  ED Management:  Pain meds  xrays  Anticipate d/c home with podiatry f/u          Scribe Attestation:   Scribe #1: I performed the above scribed service and the documentation accurately describes the services I performed. I attest to the accuracy of the note.               I, Dr. Karly Valdes, personally performed the services described in this documentation. All medical record entries made by the scribe were at my direction and in my presence.  I have reviewed the chart and agree that the record reflects my personal performance and is accurate and complete. Karly Valdes MD.  9:42 PM 03/15/2022    Clinical Impression:   Final diagnoses:  [M79.673] Foot pain (Primary)          ED Disposition Condition    Discharge Stable        ED Prescriptions     None        Follow-up Information     Follow up With Specialties Details Why Contact Info Additional Information    [facility] [address] Podiatry Schedule an appointment as soon as possible for a visit   [address]  [facility] [address] [phone] [facility] -  Main Building, 5th Floor Please park in [facility] and use Atrium elevator           Karly Valdes MD  03/15/22 0037

## 2022-03-15 NOTE — PROGRESS NOTES
Health Maintenance Due   Topic Date Due    Hepatitis C Screening  Never done    HIV Screening  Never done    Shingles Vaccine (1 of 2) Never done     Updates were requested from care everywhere.  Chart was reviewed for overdue Proactive [facility] Encounters ([facility]) topics (CRS, Breast Cancer Screening, Eye exam)  Health Maintenance has been updated.  LINKS immunization registry triggered.  Immunizations were reconciled.

## 2022-03-17 ENCOUNTER — OFFICE VISIT (OUTPATIENT)
Dept: PODIATRY | Facility: CLINIC | Age: 57
End: 2022-03-17
Payer: COMMERCIAL

## 2022-03-17 VITALS
BODY MASS INDEX: 21.69 KG/M2 | DIASTOLIC BLOOD PRESSURE: 79 MMHG | WEIGHT: 135 LBS | HEIGHT: 66 IN | SYSTOLIC BLOOD PRESSURE: 146 MMHG | HEART RATE: 75 BPM

## 2022-03-17 DIAGNOSIS — M76.71 PERONEAL TENDINITIS OF RIGHT LOWER EXTREMITY: ICD-10-CM

## 2022-03-17 DIAGNOSIS — M79.671 RIGHT FOOT PAIN: Primary | ICD-10-CM

## 2022-03-17 DIAGNOSIS — M76.60 INSERTIONAL ACHILLES TENDINOPATHY: ICD-10-CM

## 2022-03-17 PROCEDURE — 1159F PR MEDICATION LIST DOCUMENTED IN MEDICAL RECORD: ICD-10-PCS | Mod: CPTII,S$GLB,, | Performed by: PODIATRIST

## 2022-03-17 PROCEDURE — 3077F PR MOST RECENT SYSTOLIC BLOOD PRESSURE >= 140 MM HG: ICD-10-PCS | Mod: CPTII,S$GLB,, | Performed by: PODIATRIST

## 2022-03-17 PROCEDURE — 1160F PR REVIEW ALL MEDS BY PRESCRIBER/CLIN PHARMACIST DOCUMENTED: ICD-10-PCS | Mod: CPTII,S$GLB,, | Performed by: PODIATRIST

## 2022-03-17 PROCEDURE — 3008F PR BODY MASS INDEX (BMI) DOCUMENTED: ICD-10-PCS | Mod: CPTII,S$GLB,, | Performed by: PODIATRIST

## 2022-03-17 PROCEDURE — 1159F MED LIST DOCD IN RCRD: CPT | Mod: CPTII,S$GLB,, | Performed by: PODIATRIST

## 2022-03-17 PROCEDURE — 99999 PR PBB SHADOW E&M-EST. PATIENT-LVL IV: ICD-10-PCS | Mod: PBBFAC,,, | Performed by: PODIATRIST

## 2022-03-17 PROCEDURE — 99999 PR PBB SHADOW E&M-EST. PATIENT-LVL IV: CPT | Mod: PBBFAC,,, | Performed by: PODIATRIST

## 2022-03-17 PROCEDURE — 99203 OFFICE O/P NEW LOW 30 MIN: CPT | Mod: S$GLB,,, | Performed by: PODIATRIST

## 2022-03-17 PROCEDURE — 3077F SYST BP >= 140 MM HG: CPT | Mod: CPTII,S$GLB,, | Performed by: PODIATRIST

## 2022-03-17 PROCEDURE — 1160F RVW MEDS BY RX/DR IN RCRD: CPT | Mod: CPTII,S$GLB,, | Performed by: PODIATRIST

## 2022-03-17 PROCEDURE — 3078F DIAST BP <80 MM HG: CPT | Mod: CPTII,S$GLB,, | Performed by: PODIATRIST

## 2022-03-17 PROCEDURE — 3008F BODY MASS INDEX DOCD: CPT | Mod: CPTII,S$GLB,, | Performed by: PODIATRIST

## 2022-03-17 PROCEDURE — 99203 PR OFFICE/OUTPT VISIT, NEW, LEVL III, 30-44 MIN: ICD-10-PCS | Mod: S$GLB,,, | Performed by: PODIATRIST

## 2022-03-17 PROCEDURE — 3078F PR MOST RECENT DIASTOLIC BLOOD PRESSURE < 80 MM HG: ICD-10-PCS | Mod: CPTII,S$GLB,, | Performed by: PODIATRIST

## 2022-03-17 RX ORDER — METHYLPREDNISOLONE 4 MG/1
TABLET ORAL
Qty: 1 EACH | Refills: 0 | Status: SHIPPED | OUTPATIENT
Start: 2022-03-17 | End: 2022-04-07

## 2022-03-17 NOTE — PROGRESS NOTES
"Subjective:      Patient ID: Vicky Brand is a 56 y.o. female.    Chief Complaint: Foot Pain (Right heel pain)    57 yo F presents with right lateral foot and heel pain onset approximately 1.5 weeks ago after a trip to [place] walking 20,000+ steps/day. She had right posterior heel pain on the trip back from [place] and then not again until today. She has had consistent right lateral foot pain since the trip to [place], worse with activity, and this is her primary concern. She recently was seen at the ED for this issue and was instructed to take Alleve and Tylenol, with little relief. She has also been using RICE with little relief. She wears [employer] athletic shoes, unsure of age/miles of shoes.     Vitals:    03/17/22 0820   BP: (!) 146/79   Pulse: 75   Weight: 61.2 kg (135 lb)   Height: 5' 6" (1.676 m)   PainSc:   2   PainLoc: Foot      Past Medical History:   Diagnosis Date    Dyspareunia in female 7/27/2021    Osphena, pelvic floor tx 2021, GYN [facility]    Eye abnormalities     early yellowing of lens, precursor to cataracts, Dr Webster,  wear glasses at all times    Fracture of fifth toe, left, closed, with delayed healing, subsequent encounter 8/21/2018    Intramural leiomyoma of uterus 5/22/2019    Irregular, dysmenorrhea    Nocturia 6/17/2021    Ocular migraine     vision fuzzy x 3 in 2013, metallic shapes in her vision, Dr Webster,  resolved after 30 min , no pain    CALEB (obstructive sleep apnea)     Lysenko, CPAP start 7/21    Paradoxical insomnia 10/30/2017    prior to menses, previously took amitryptiline 2.5 mg prn  qhs     Posterior vitreous detachment of left eye 5/22/2019    Flashes of light, ocular migranes, Ophthalmologist Dr Orourke, retinal specialist Dr Soni, rec avoid exercise until she follows up 5/2019    Shoulder pain, left 2007    LEFT, after fall, better with PT at [facility]       Past Surgical History:   Procedure Laterality Date    " COLONOSCOPY N/A 6/9/2017    Procedure: COLONOSCOPY;  Surgeon: Davy Prince MD;  Location: [facility] ENDO ([address] [place] [address]);  Service: Endoscopy;  Laterality: N/A;    DILATION AND CURETTAGE OF UTERUS      ENDOMETRIAL ABLATION N/A 2017    HYSTEROSCOPY WITH DILATION AND CURETTAGE OF UTERUS N/A 1/19/2022    Procedure: HYSTEROSCOPY, WITH DILATION AND CURETTAGE OF UTERUS;  Surgeon: Jannette Jarrell MD;  Location: [facility];  Service: OB/GYN;  Laterality: N/A;    TUBAL LIGATION         Family History   Problem Relation Age of Onset    Diabetes Father     Stroke Father 49    Hypertension Mother     Pacemaker/defibrilator Mother     Ovarian cancer Maternal Grandmother     Melanoma Neg Hx     Breast cancer Neg Hx     Colon cancer Neg Hx        Social History     Socioeconomic History    Marital status:    Tobacco Use    Smoking status: Never Smoker    Smokeless tobacco: Never Used   Substance and Sexual Activity    Alcohol use: No    Drug use: No    Sexual activity: Yes     Partners: Male     Birth control/protection: See Surgical Hx   Social History Narrative    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[employer],  & 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,      Social Determinants of Health     Financial Resource Strain: Low Risk     Difficulty of Paying Living Expenses: Not hard at all   Food Insecurity: No Food Insecurity    Worried About Running Out of Food in the Last Year: Never true    Ran Out of Food in the Last Year: Never true   Transportation Needs: No Transportation Needs    Lack of Transportation (Medical): No    Lack of Transportation (Non-Medical): No   Physical Activity: Insufficiently Active    Days of Exercise per Week: 4 days    Minutes of Exercise per Session: 20 min   Stress: No Stress Concern Present    Feeling of Stress : Only a little   Social Connections: Unknown    Frequency of Communication with Friends and Family: More than three times a week    Frequency of  Social Gatherings with Friends and Family: More than three times a week    Active Member of Clubs or Organizations: Yes    Attends Club or Organization Meetings: More than 4 times per year    Marital Status:    Housing Stability: Low Risk     Unable to Pay for Housing in the Last Year: No    Number of Places Lived in the Last Year: 1    Unstable Housing in the Last Year: No       Current Outpatient Medications   Medication Sig Dispense Refill    calcium carbonate (OS-GISSELL) 600 mg calcium (1,500 mg) Tab Take 600 mg by mouth once.      estradioL (ESTRACE) 0.01 % (0.1 mg/gram) vaginal cream Place 0.5 g vaginally twice a week. 42.5 g 3    multivitamin capsule Take 1 capsule by mouth once daily.      methylPREDNISolone (MEDROL DOSEPACK) 4 mg tablet use as directed 1 each 0     No current facility-administered medications for this visit.       Review of patient's allergies indicates:   Allergen Reactions    Sulfa (sulfonamide antibiotics) Rash    Cetirizine      Other reaction(s): Unable to Function  Other reaction(s): Unable to Function    Erythromycin Nausea And Vomiting     Other reaction(s): Nausea    Zyrtec-d  [cetirizine-pseudoephedrine]      Other reaction(s): Inability to focus/function       Review of Systems   Constitutional: Negative for chills and fever.   HENT: Negative for ear pain.    Eyes: Negative for pain.   Cardiovascular: Negative for chest pain.   Respiratory: Negative for cough and shortness of breath.    Skin: Negative.    Musculoskeletal: Negative for back pain, joint pain and muscle weakness.        Right foot pain   Gastrointestinal: Negative for nausea and vomiting.   Genitourinary: Negative for dysuria.   Neurological: Negative for focal weakness.   All other systems reviewed and are negative.          Objective:      Physical Exam  Constitutional:       General: She is not in acute distress.     Appearance: Normal appearance. She is not ill-appearing.   Cardiovascular:       Pulses:           Dorsalis pedis pulses are 2+ on the right side and 2+ on the left side.        Posterior tibial pulses are 1+ on the right side and 1+ on the left side.   Musculoskeletal:      Right lower leg: Tenderness present. No edema.      Left lower leg: No tenderness. No edema.      Comments: Tenderness at right base of 5th metatarsal and distal fibular tendon, reproduced on resisted ankle eversion. Guarding with resisted ankle eversion, MMT 4/5.  Was pain on palpation overlying the distal aspect of the peroneus brevis on to the styloid process/base of 5th metatarsal right foot.    Tenderness at Achilles insertion on posterior calcaneus, not reproduced with double limb calf raises, single limb calf raises, or double limb hops.      Otherwise normal angle, base, station of gait. All ten toes without clubbing, cyanosis, or signs of ischemia.  No pain to palpation bilateral lower extremities.  Range of motion, stability, muscle strength, and muscle tone normal bilateral feet and legs.     Skin:     General: Skin is warm and dry.      Comments: Skin is normal age and health appropriate color, turgor, texture, and temperature bilateral lower extremities without ulceration, hyperpigmentation, discoloration, masses nodules or cords palpated.  No ecchymosis, erythema, edema, or cardinal signs of infection bilateral lower extremities.     Neurological:      Mental Status: She is alert and oriented to person, place, and time.      Sensory: Sensation is intact.      Gait: Gait abnormal (antalgic).               Assessment:       Encounter Diagnoses   Name Primary?    Right foot pain Yes    Insertional Achilles tendinopathy     Peroneal tendinitis of right lower extremity          Plan:       Vicky was seen today for foot pain.    Diagnoses and all orders for this visit:    Right foot pain  -     Ambulatory referral/consult to Physical/Occupational Therapy; Future    Insertional Achilles tendinopathy  -      Ambulatory referral/consult to Physical/Occupational Therapy; Future    Peroneal tendinitis of right lower extremity  -     Ambulatory referral/consult to Physical/Occupational Therapy; Future    Other orders  -     methylPREDNISolone (MEDROL DOSEPACK) 4 mg tablet; use as directed      I counseled the patient on her conditions, their implications and medical management.    Dispensed tall orthopedic boot to help offload the injured tendons allow her to recover sufficiently.  She is to rest, ice elevate p.r.n..    Medrol Dosepak to help with pain and inflammation.    Discussed wearing the orthopedic boot for 3 weeks and gradually transitioning out of the boot into her tennis shoes with 1 hour daily increments as discussed.    Referred to physical therapy for modalities help reduce pain along the peroneal tendons in the posterior aspect of the right heel followed by gradual strengthening and conditioning.  Patient will begin transitioning of her orthopedic boot on 04/05/2022.    Assisted by Cyrus Johnson DPM PGY 2    RTC within 2 months or p.r.n. as discussed.    A portion of this note was generated by voice recognition software and may contain spelling and grammar errors.      .

## 2022-03-17 NOTE — PATIENT INSTRUCTIONS
On 04/05/22 Transition from orthopedic boot to tennis shoe with gradual 1 hour daily increments as discussed. Patient may gradually increase activity as discussed

## 2022-04-07 ENCOUNTER — PATIENT MESSAGE (OUTPATIENT)
Dept: PODIATRY | Facility: CLINIC | Age: 57
End: 2022-04-07
Payer: COMMERCIAL

## 2022-04-09 ENCOUNTER — OFFICE VISIT (OUTPATIENT)
Dept: URGENT CARE | Facility: CLINIC | Age: 57
End: 2022-04-09
Payer: COMMERCIAL

## 2022-04-09 VITALS
BODY MASS INDEX: 21.69 KG/M2 | RESPIRATION RATE: 17 BRPM | DIASTOLIC BLOOD PRESSURE: 87 MMHG | TEMPERATURE: 99 F | HEART RATE: 79 BPM | WEIGHT: 135 LBS | SYSTOLIC BLOOD PRESSURE: 154 MMHG | OXYGEN SATURATION: 99 % | HEIGHT: 66 IN

## 2022-04-09 DIAGNOSIS — M25.532 BILATERAL WRIST PAIN: Primary | ICD-10-CM

## 2022-04-09 DIAGNOSIS — M25.531 BILATERAL WRIST PAIN: Primary | ICD-10-CM

## 2022-04-09 PROCEDURE — 1159F PR MEDICATION LIST DOCUMENTED IN MEDICAL RECORD: ICD-10-PCS | Mod: CPTII,S$GLB,, | Performed by: NURSE PRACTITIONER

## 2022-04-09 PROCEDURE — 73110 X-RAY EXAM OF WRIST: CPT | Mod: FY,S$GLB,, | Performed by: RADIOLOGY

## 2022-04-09 PROCEDURE — 1159F MED LIST DOCD IN RCRD: CPT | Mod: CPTII,S$GLB,, | Performed by: NURSE PRACTITIONER

## 2022-04-09 PROCEDURE — 3008F BODY MASS INDEX DOCD: CPT | Mod: CPTII,S$GLB,, | Performed by: NURSE PRACTITIONER

## 2022-04-09 PROCEDURE — 3077F SYST BP >= 140 MM HG: CPT | Mod: CPTII,S$GLB,, | Performed by: NURSE PRACTITIONER

## 2022-04-09 PROCEDURE — 3008F PR BODY MASS INDEX (BMI) DOCUMENTED: ICD-10-PCS | Mod: CPTII,S$GLB,, | Performed by: NURSE PRACTITIONER

## 2022-04-09 PROCEDURE — 3079F DIAST BP 80-89 MM HG: CPT | Mod: CPTII,S$GLB,, | Performed by: NURSE PRACTITIONER

## 2022-04-09 PROCEDURE — 3077F PR MOST RECENT SYSTOLIC BLOOD PRESSURE >= 140 MM HG: ICD-10-PCS | Mod: CPTII,S$GLB,, | Performed by: NURSE PRACTITIONER

## 2022-04-09 PROCEDURE — 1160F PR REVIEW ALL MEDS BY PRESCRIBER/CLIN PHARMACIST DOCUMENTED: ICD-10-PCS | Mod: CPTII,S$GLB,, | Performed by: NURSE PRACTITIONER

## 2022-04-09 PROCEDURE — 3079F PR MOST RECENT DIASTOLIC BLOOD PRESSURE 80-89 MM HG: ICD-10-PCS | Mod: CPTII,S$GLB,, | Performed by: NURSE PRACTITIONER

## 2022-04-09 PROCEDURE — 99213 OFFICE O/P EST LOW 20 MIN: CPT | Mod: S$GLB,,, | Performed by: NURSE PRACTITIONER

## 2022-04-09 PROCEDURE — 1160F RVW MEDS BY RX/DR IN RCRD: CPT | Mod: CPTII,S$GLB,, | Performed by: NURSE PRACTITIONER

## 2022-04-09 PROCEDURE — 73110 XR WRIST COMPLETE 3 VIEWS BILATERAL: ICD-10-PCS | Mod: FY,S$GLB,, | Performed by: RADIOLOGY

## 2022-04-09 PROCEDURE — 99213 PR OFFICE/OUTPT VISIT, EST, LEVL III, 20-29 MIN: ICD-10-PCS | Mod: S$GLB,,, | Performed by: NURSE PRACTITIONER

## 2022-04-09 RX ORDER — NAPROXEN 500 MG/1
500 TABLET ORAL 2 TIMES DAILY WITH MEALS
Qty: 30 TABLET | Refills: 0 | Status: SHIPPED | OUTPATIENT
Start: 2022-04-09 | End: 2022-08-04

## 2022-04-09 NOTE — PROGRESS NOTES
"Subjective:       Patient ID: Vicky Brand is a 56 y.o. female.    Vitals:  height is 5' 6" (1.676 m) and weight is 61.2 kg (135 lb). Her temperature is 98.5 °F (36.9 °C). Her blood pressure is 154/87 (abnormal) and her pulse is 79. Her respiration is 17 and oxygen saturation is 99%.     Chief Complaint: Wrist Pain    This is a 56 y.o. female who presents today with a chief complaint of bilateral wrist pain and tightness.   Pt injured wrists 3 weeks ago while moving rug with heavy furniture atop.  She was inching the rug little by little, on each side.  She initially had   Swelling, which has subsided, but still having pain and popping sensation to posterior hand/wrist area. Pain up to 4/10.  Has been using bilateral hand splint with compression to wrists, which has helped pain some.     Wrist Pain   The pain is present in the left wrist and right wrist. This is a new problem. The current episode started 1 to 4 weeks ago. There has been a history of trauma. The problem occurs intermittently. The problem has been unchanged. The quality of the pain is described as sharp and aching. The pain is at a severity of 4/10. The pain is moderate. Associated symptoms include joint swelling. The symptoms are aggravated by activity. She has tried nothing for the symptoms. The treatment provided no relief.       Musculoskeletal: Positive for pain (to bilateral wrists).       Objective:      Physical Exam   HENT:   Head: Normocephalic.   Pulmonary/Chest: Effort normal.   Abdominal: Normal appearance.   Musculoskeletal:         General: Tenderness and signs of injury present. No swelling or deformity.      Right wrist: She exhibits decreased range of motion and tenderness.      Left wrist: She exhibits tenderness.      Comments: Negative finkelstein test   Neurological: She is alert.   Nursing note and vitals reviewed.        Assessment:       1. Bilateral wrist pain        X-Ray Foot Complete Right    Result Date: " 3/14/2022  EXAMINATION: XR FOOT COMPLETE 3 VIEW RIGHT CLINICAL HISTORY: . Pain in unspecified foot TECHNIQUE: AP, lateral, and oblique views of the right foot were performed. COMPARISON: Right foot radiograph series 12/23/2013 FINDINGS: There is no radiographic evidence of acute displaced fracture or dislocation. The Lisfranc interval appears within normal limits.  Joint spaces appear appropriately maintained. There is minimal calcaneal enthesopathic change at the distal Achilles insertion. No retained radiopaque foreign body appreciated within the soft tissues.     No radiographic evidence of acute displaced fracture or traumatic subluxation of the right foot.  Further evaluation and follow-up as warranted. Electronically signed by: Pramod Maddox MD Date:    03/14/2022 Time:    22:14    X-Ray Wrist Complete 3 views Bilateral    Result Date: 4/9/2022  EXAMINATION: XR WRIST COMPLETE 3 VIEWS BILATERAL CLINICAL HISTORY: bilateral wrist injury pulling heavy furniture (not a foosh injury); Pain in right wrist TECHNIQUE: PA, lateral, and oblique views of both wrists were performed. COMPARISON: None FINDINGS: There is no acute fracture or dislocation of the bilateral wrists.  Alignment is normal.  Joint spaces are preserved.  Soft tissues are unremarkable.     No acute osseous abnormality of the bilateral wrists. Electronically signed by: John Velázquez Date:    04/09/2022 Time:    09:39  Plan:         Bilateral wrist pain  -     X-Ray Wrist Complete 3 views Bilateral; Future; Expected date: 04/09/2022  -     Ambulatory referral/consult to Orthopedics  -     naproxen (NAPROSYN) 500 MG tablet; Take 1 tablet (500 mg total) by mouth 2 (two) times daily with meals.  Dispense: 30 tablet; Refill: 0      Patient Instructions   Rest thumbs/immobilize  Ice packs  OTC ibuprofen  Follow up with ortho

## 2022-04-13 ENCOUNTER — CLINICAL SUPPORT (OUTPATIENT)
Dept: REHABILITATION | Facility: HOSPITAL | Age: 57
End: 2022-04-13
Attending: PODIATRIST
Payer: COMMERCIAL

## 2022-04-13 DIAGNOSIS — M76.60 INSERTIONAL ACHILLES TENDINOPATHY: ICD-10-CM

## 2022-04-13 DIAGNOSIS — M76.71 PERONEAL TENDINITIS OF RIGHT LOWER EXTREMITY: ICD-10-CM

## 2022-04-13 DIAGNOSIS — M79.671 RIGHT FOOT PAIN: ICD-10-CM

## 2022-04-13 DIAGNOSIS — M72.2 PLANTAR FASCIITIS: Primary | ICD-10-CM

## 2022-04-13 PROCEDURE — 97161 PT EVAL LOW COMPLEX 20 MIN: CPT | Mod: PO

## 2022-04-13 NOTE — PLAN OF CARE
[facility]   Physical Therapy Initial Evaluation     Date: 4/13/2022   Name: Vicky Brand  Clinic Number: [id number]    Therapy Diagnosis:   Encounter Diagnoses   Name Primary?    Right foot pain     Insertional Achilles tendinopathy     Peroneal tendinitis of right lower extremity     Plantar fasciitis Yes     Physician: Dwaine Jones DPM    Physician Orders: PT Eval and Treat   Medical Diagnosis from Referral: M79.671 (ICD-10-CM) - Right foot pain M76.60 (ICD-10-CM) - Insertional Achilles tendinopathy M76.71 (ICD-10-CM) - Peroneal tendinitis of right lower extremity   Evaluation Date: 4/13/2022  Authorization Period Expiration: 12/31/2022   Plan of Care Expiration: 6/01/2022  Progress Note Due: 5/13/2022  Visit # / Visits authorized: 1/ 1   FOTO: 1/3    Precautions: Standard     Time In: 1100  Time Out: 1141  Total Appointment Time (timed & untimed codes): 41 minutes      SUBJECTIVE     Date of onset: ~1.5 months    History of current condition - Vicky reports: that she went to [place] walking ~20,000 + steps/day. On the way home, she had severe heel pain requiring assistance from her  to get on the airplane because she couldn't bear weight on her heel. No swelling or bruising. She went to the podiatrist and was prescribed tall boot for the 3 weeks and gradually transitioning out of boot into tennis shoe. She reports that she occasionally gets pain first thing in the morning but then calms down. She wears [employer] athletic shoes, unsure of age/miles of shoes.        Falls: none    Imaging, XR Right Foot: FINDINGS:  There is no radiographic evidence of acute displaced fracture or dislocation. The Lisfranc interval appears within normal limits.  Joint spaces appear appropriately maintained. There is minimal calcaneal enthesopathic change at the distal Achilles insertion. No retained radiopaque foreign body appreciated within the soft tissues.        Impression:  No radiographic evidence of acute displaced fracture or traumatic subluxation of the right foot.  Further evaluation and follow-up as warranted.      Prior Therapy: yes  Social History:  lives with their family  Occupation: teacher at [employer]  Prior Level of Function:  elliptical 2x per week, walking outdoors 3-4x per week for 20-30 minutes outdoors   Current Level of Function: she has been wearing orthopaedic boot for 3 weeks and gradually started transitioning out of the boot into her tennis shoes with 1 hour daily increments as discussed with Podiatrist       Pain:  Current 0/10, worst 0/10, best 0/10   Location: right foot - heel  Description: Tight  Aggravating Factors: Standing and Walking  Easing Factors:  steroid pack, ice, ibuprofen       Patients goals: would like exercises to strengthen her ankle/foot       Medical History:   Past Medical History:   Diagnosis Date    Dyspareunia in female 7/27/2021    Osphena, pelvic floor tx 2021, [facility]    Eye abnormalities     early yellowing of lens, precursor to cataracts, Dr Webster,  wear glasses at all times    Fracture of fifth toe, left, closed, with delayed healing, subsequent encounter 8/21/2018    Intramural leiomyoma of uterus 5/22/2019    Irregular, dysmenorrhea    Nocturia 6/17/2021    Ocular migraine     vision fuzzy x 3 in 2013, metallic shapes in her vision, Dr Webster,  resolved after 30 min , no pain    CALEB (obstructive sleep apnea)     Lysenko, CPAP start 7/21    Paradoxical insomnia 10/30/2017    prior to menses, previously took amitryptiline 2.5 mg prn  qhs     Posterior vitreous detachment of left eye 5/22/2019    Flashes of light, ocular migranes, Ophthalmologist Dr Orourke, retinal specialist Dr Soni, rec avoid exercise until she follows up 5/2019    Shoulder pain, left 2007    LEFT, after fall, better with PT at [facility]       Surgical History:   Vicky Brand  has a past surgical  history that includes Dilation and curettage of uterus; Tubal ligation; Colonoscopy (N/A, 6/9/2017); Endometrial ablation (N/A, 2017); and Hysteroscopy with dilation and curettage of uterus (N/A, 1/19/2022).    Medications:   Vicky has a current medication list which includes the following prescription(s): calcium carbonate, estradiol, multivitamin, and naproxen.    Allergies:   Review of patient's allergies indicates:   Allergen Reactions    Sulfa (sulfonamide antibiotics) Rash    Cetirizine      Other reaction(s): Unable to Function  Other reaction(s): Unable to Function    Erythromycin Nausea And Vomiting     Other reaction(s): Nausea    Zyrtec-d  [cetirizine-pseudoephedrine]      Other reaction(s): Inability to focus/function          OBJECTIVE     Observation: good arch height, mild thickening of right achilles tendon      Ankle Range of Motion (measured in degrees):   Ankle Right Left   Dorsiflexion 6 3   Plantarflexion 60 50   Inversion 43 36   Eversion 12 18   Great toe extension 56 45      Lower Extremity Strength  Right LE  Left LE    Knee extension: 5/5 Knee extension: 5/5   Knee flexion: 5/5 Knee flexion: 5/5   Hip abduction: 4/5 Hip abduction: 4+/5   Ankle dorsiflexion: 5/5 Ankle dorsiflexion: 5/5   Ankle plantarflexion: 5/5 Ankle plantarflexion: 5/5   Ankle inversion: 5/5 Ankle inversion:  5/5   Ankle eversion: 5/5 Ankle eversion: 5/5       Functional:  · Right SLS: >/= 30 seconds   · Left SLS: >/= 30 seconds   · Double leg squat: symmetrical weight shift, no knee valgus    Gait: decreased toe off, externally rotated at heel strike, and increased pronation     Joint Mobility:   · Right talocrural joint: posterior glide, hypomobile  · Right subtalar joint: lateral glide, hypomobile    Pulse: dorsalis pedis and posterior tibial pulse, normal bilaterally     Palpation: tenderness to palpation to plantar fascia, no tenderness to achilles or medial / lateral calcaneus     Sensation: intact to light  touch throughout lower extremities            Limitation/Restriction for FOTO Survey - not administered, will be given at next follow up session.       TREATMENT     Total Treatment time (time-based codes) separate from Evaluation: 00 minutes      Vicky did not receive treatment.       PATIENT EDUCATION AND HOME EXERCISES     Education provided:   - Patient was given [facility] patient dry needling handout and consent form to review for next visit. She was educated on evidence based practice for plantar fasciitis treatment.   - Patient educated on all objective findings, POC, and team based approach  - Patient educated on [place] sock, water bottle massage, and proper shoe wear    Written Home Exercises Provided: no, will be given exercises next treatment session.     ASSESSMENT     Vicky is a 56 y.o. female referred to outpatient Physical Therapy with a medical diagnosis of right foot pain, insertional Achilles tendinopathy, and peroneal tendinitis of right lower extremity. Patient presents with signs and symptoms that are consistent with early stages of plantar fasciitis. She has limited ankle dorsiflexion ROM with decreased joint mobility. She was counseled on proper shoe wear/management as well as dry needling or other manual techniques combined with exercises to address deficits.           Patient prognosis is Excellent.   Patient will benefit from skilled outpatient Physical Therapy to address the deficits stated above and in the chart below, provide patient /family education, and to maximize patientt's level of independence.     Plan of care discussed with patient: Yes  Patient's spiritual, cultural and educational needs considered and patient is agreeable to the plan of care and goals as stated below:     Anticipated Barriers for therapy: none anticipated     Medical Necessity is demonstrated by the following  History  Co-morbidities and personal factors that may impact the plan of care Co-morbidities:    education level     Personal Factors:   education level  lifestyle     moderate   Examination  Body Structures and Functions, activity limitations and participation restrictions that may impact the plan of care Body Regions:   lower extremities    Body Systems:    gross symmetry  ROM  strength  balance    Participation Restrictions:   none    Activity limitations:   Learning and applying knowledge  no deficits    General Tasks and Commands  no deficits    Communication  no deficits    Mobility  walking    Self care  no deficits    Domestic Life  no deficits    Interactions/Relationships  no deficits    Life Areas  no deficits    Community and Social Life  recreation and leisure         moderate   Clinical Presentation stable and uncomplicated low   Decision Making/ Complexity Score: low     Goals:  Short Term Goals: 3 weeks   1. Patient will become compliant and independent with her initial HEP to promote decreased pain and improved mobility and strength.   2. Patient will demonstrate an increase in right ankle dorsiflexion to >/= 10 degrees to normalize gait pattern.   3. Patient will demonstrate ability to walk for 10 minutes in 1 bout on treadmill in clinic in order to simulate recreational fitness goals.        Long Term Goals: 6 weeks   1. Patient will become compliant and independent with an updated, progressed HEP to promote decreased pain and improved mobility and strength as well as prep for discharge from further PT intervention.   2. Patient specific goal: Patient able to return to walking routine 3-4x per week for 20 minutes with little to no complaints of increased right foot pain.      PLAN   Plan of care Certification: 4/13/2022 to 6/01/2022.    Outpatient Physical Therapy 1 times weekly for 6 weeks to include the following interventions: Cervical/Lumbar Traction, Manual Therapy, Moist Heat/ Ice, Neuromuscular Re-ed, Patient Education, Therapeutic Activities, Therapeutic Exercise and Dry Needling.      oPlly Ryedr, PT      I CERTIFY THE NEED FOR THESE SERVICES FURNISHED UNDER THIS PLAN OF TREATMENT AND WHILE UNDER MY CARE   Physician's comments:     Physician's Signature: ___________________________________________________

## 2022-04-14 ENCOUNTER — TELEPHONE (OUTPATIENT)
Dept: PRIMARY CARE CLINIC | Facility: CLINIC | Age: 57
End: 2022-04-14
Payer: COMMERCIAL

## 2022-04-14 DIAGNOSIS — Z00.00 ROUTINE GENERAL MEDICAL EXAMINATION AT A HEALTH CARE FACILITY: Primary | ICD-10-CM

## 2022-04-18 ENCOUNTER — PATIENT OUTREACH (OUTPATIENT)
Dept: ADMINISTRATIVE | Facility: OTHER | Age: 57
End: 2022-04-18
Payer: COMMERCIAL

## 2022-04-19 ENCOUNTER — OFFICE VISIT (OUTPATIENT)
Dept: ORTHOPEDICS | Facility: CLINIC | Age: 57
End: 2022-04-19
Payer: COMMERCIAL

## 2022-04-19 VITALS — BODY MASS INDEX: 21.69 KG/M2 | WEIGHT: 135 LBS | HEIGHT: 66 IN

## 2022-04-19 DIAGNOSIS — M65.4 DE QUERVAIN'S TENOSYNOVITIS, RIGHT: ICD-10-CM

## 2022-04-19 DIAGNOSIS — M65.4 DE QUERVAIN'S TENOSYNOVITIS, BILATERAL: Primary | ICD-10-CM

## 2022-04-19 DIAGNOSIS — M25.539 PAIN IN WRIST, UNSPECIFIED LATERALITY: ICD-10-CM

## 2022-04-19 PROCEDURE — 99203 PR OFFICE/OUTPT VISIT, NEW, LEVL III, 30-44 MIN: ICD-10-PCS | Mod: 25,S$GLB,, | Performed by: ORTHOPAEDIC SURGERY

## 2022-04-19 PROCEDURE — 3008F PR BODY MASS INDEX (BMI) DOCUMENTED: ICD-10-PCS | Mod: CPTII,S$GLB,, | Performed by: ORTHOPAEDIC SURGERY

## 2022-04-19 PROCEDURE — 20550 TENDON SHEATH: ICD-10-PCS | Mod: RT,S$GLB,, | Performed by: ORTHOPAEDIC SURGERY

## 2022-04-19 PROCEDURE — 99203 OFFICE O/P NEW LOW 30 MIN: CPT | Mod: 25,S$GLB,, | Performed by: ORTHOPAEDIC SURGERY

## 2022-04-19 PROCEDURE — 99999 PR PBB SHADOW E&M-EST. PATIENT-LVL III: CPT | Mod: PBBFAC,,, | Performed by: ORTHOPAEDIC SURGERY

## 2022-04-19 PROCEDURE — 99999 PR PBB SHADOW E&M-EST. PATIENT-LVL III: ICD-10-PCS | Mod: PBBFAC,,, | Performed by: ORTHOPAEDIC SURGERY

## 2022-04-19 PROCEDURE — 1159F PR MEDICATION LIST DOCUMENTED IN MEDICAL RECORD: ICD-10-PCS | Mod: CPTII,S$GLB,, | Performed by: ORTHOPAEDIC SURGERY

## 2022-04-19 PROCEDURE — 20550 NJX 1 TENDON SHEATH/LIGAMENT: CPT | Mod: RT,S$GLB,, | Performed by: ORTHOPAEDIC SURGERY

## 2022-04-19 PROCEDURE — 3008F BODY MASS INDEX DOCD: CPT | Mod: CPTII,S$GLB,, | Performed by: ORTHOPAEDIC SURGERY

## 2022-04-19 PROCEDURE — 1159F MED LIST DOCD IN RCRD: CPT | Mod: CPTII,S$GLB,, | Performed by: ORTHOPAEDIC SURGERY

## 2022-04-19 RX ORDER — DEXAMETHASONE SODIUM PHOSPHATE 4 MG/ML
4 INJECTION, SOLUTION INTRA-ARTICULAR; INTRALESIONAL; INTRAMUSCULAR; INTRAVENOUS; SOFT TISSUE
Status: DISCONTINUED | OUTPATIENT
Start: 2022-04-19 | End: 2022-04-19 | Stop reason: HOSPADM

## 2022-04-19 RX ADMIN — DEXAMETHASONE SODIUM PHOSPHATE 4 MG: 4 INJECTION, SOLUTION INTRA-ARTICULAR; INTRALESIONAL; INTRAMUSCULAR; INTRAVENOUS; SOFT TISSUE at 08:04

## 2022-04-19 NOTE — PROGRESS NOTES
[facility]  History & Physical  Orthopedics    SUBJECTIVE:      COVID-19 attestation:  This patient was treated during the COVID-19 pandemic.  This was discussed with the patient, they are aware of our current policies and procedures, were given the option of delaying their visit and or switching to a virtual visit, delaying their surgery when applicable, and they elect to proceed.    Chief Complaint:  Bilateral radial sided wrist pain    Referring Provider: Lia Cristina MD     History of Present Illness:  Patient is a 56 y.o. right hand dominant female who presents today with complaints of bilateral radial sided wrist pain.  The patient notes that 5-6 weeks ago she was moving furniture and subsequently thereafter developed significant right greater than left radial sided wrist pain.  This worsens with particular motions of the thumbs.  She denies any other complaints and presents today for initial evaluation.  She has attempted a cock-up wrist splint on right with no relief..     The patient is a/an online teacher.    Onset of symptoms/DOI was 5-6 weeks ago.    Symptoms are aggravated by activity and movement.    Symptoms are alleviated by rest.    Symptoms consist of pain.    The patient rates their pain as a 5/10.    Attempted treatment(s) and/or interventions include activity modifications, rest, immobilization.     The patient denies any fevers, chills, N/V, D/C and presents for evaluation.       Past Medical History:   Diagnosis Date    Dyspareunia in female 7/27/2021    Osphena, pelvic floor tx 2021, GYN [facility]    Eye abnormalities     early yellowing of lens, precursor to cataracts, Dr Webster,  wear glasses at all times    Fracture of fifth toe, left, closed, with delayed healing, subsequent encounter 8/21/2018    Intramural leiomyoma of uterus 5/22/2019    Irregular, dysmenorrhea    Nocturia 6/17/2021    Ocular migraine     vision fuzzy x 3 in 2013, metallic shapes in her  vision, Dr Webster,  resolved after 30 min , no pain    CALEB (obstructive sleep apnea)     Lysenko, CPAP start 7/21    Paradoxical insomnia 10/30/2017    prior to menses, previously took amitryptiline 2.5 mg prn  qhs     Posterior vitreous detachment of left eye 5/22/2019    Flashes of light, ocular migranes, Ophthalmologist Dr Orourke, retinal specialist Dr Soni, rec avoid exercise until she follows up 5/2019    Shoulder pain, left 2007    LEFT, after fall, better with PT at [facility]     Past Surgical History:   Procedure Laterality Date    COLONOSCOPY N/A 6/9/2017    Procedure: COLONOSCOPY;  Surgeon: Davy Prince MD;  [facility]  Service: Endoscopy;  Laterality: N/A;    DILATION AND CURETTAGE OF UTERUS      ENDOMETRIAL ABLATION N/A 2017    HYSTEROSCOPY WITH DILATION AND CURETTAGE OF UTERUS N/A 1/19/2022    Procedure: HYSTEROSCOPY, WITH DILATION AND CURETTAGE OF UTERUS;  Surgeon: Jannette Jarrell MD;  [facility];  Service: OB/GYN;  Laterality: N/A;    TUBAL LIGATION       Review of patient's allergies indicates:   Allergen Reactions    Sulfa (sulfonamide antibiotics) Rash    Cetirizine      Other reaction(s): Unable to Function  Other reaction(s): Unable to Function    Erythromycin Nausea And Vomiting     Other reaction(s): Nausea    Zyrtec-d  [cetirizine-pseudoephedrine]      Other reaction(s): Inability to focus/function     Social History     Social History Narrative    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[employer],  & 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,      Family History   Problem Relation Age of Onset    Diabetes Father     Stroke Father 49    Hypertension Mother     Pacemaker/defibrilator Mother     Ovarian cancer Maternal Grandmother     Melanoma Neg Hx     Breast cancer Neg Hx     Colon cancer Neg Hx          Current Outpatient Medications:     calcium carbonate (OS-GISSELL) 600 mg calcium (1,500 mg) Tab, Take 600 mg by  "mouth once., Disp: , Rfl:     estradioL (ESTRACE) 0.01 % (0.1 mg/gram) vaginal cream, Place 0.5 g vaginally twice a week., Disp: 42.5 g, Rfl: 3    multivitamin capsule, Take 1 capsule by mouth once daily., Disp: , Rfl:     naproxen (NAPROSYN) 500 MG tablet, Take 1 tablet (500 mg total) by mouth 2 (two) times daily with meals., Disp: 30 tablet, Rfl: 0      Review of Systems:  As per HPI otherwise noncontributory    OBJECTIVE:      Vital Signs (Most Recent):  Vitals:    04/19/22 0825   Weight: 61.2 kg (135 lb)   Height: 5' 6" (1.676 m)     Body mass index is 21.79 kg/m².      Physical Exam:  Constitutional: The patient appears well-developed and well-nourished. No distress.   Skin: No lesions appreciated  Head: Normocephalic and atraumatic.   Nose: Nose normal.   Ears: No deformities seen  Eyes: Conjunctivae and EOM are normal.   Neck: No tracheal deviation present.   Cardiovascular: Normal rate and intact distal pulses.    Pulmonary/Chest: Effort normal. No respiratory distress.   Abdominal: There is no guarding.   Neurological: The patient is alert.   Psychiatric: The patient has a normal mood and affect.     Bilateral Hand/Wrist Examination:    Observation/Inspection:  Swelling  none    Deformity  none  Discoloration  none     Scars   none    Atrophy  none    HAND/WRIST EXAMINATION:  Finkelstein's Test   positive right greater than left  WHAT Test    positive right greater than left  Snuff box tenderness   Neg  Contreras's Test    Neg  Hook of Hamate Tenderness  Neg  CMC grind    Neg  Circumduction test   Neg    Neurovascular Exam:  Digits WWP, brisk CR < 3s throughout  NVI motor/LTS to M/R/U nerves, radial pulse 2+  Tinel's Test - Carpal Tunnel  Neg  Tinel's Test - Cubital Tunnel  Neg  Phalen's Test    Neg  Median Nerve Compression Test Neg    ROM hand full, painless    ROM wrist full, painless    ROM elbow full, painless    Abdomen not guarded  Respirations nonlabored  Perfusion intact    Diagnostic Results:   "   Imaging - I independently viewed the patient's imaging as well as the radiology report.  Xrays of the patient's bilateral hands  demonstrate no evidence of any acute fractures or dislocations or significant degenerative changes.    EMG - none    ASSESSMENT/PLAN:      56 y.o. yo female with bilateral de Quervain tenosynovitis, right greater than left  Plan: The patient and I had a thorough discussion today.  We discussed the working diagnosis as well as several other potential alternative diagnoses.  Treatment options were discussed, both conservative and surgical.  Conservative treatment options would include things such as activity modifications, workplace modifications, a period of rest, oral vs topical OTC and prescription anti-inflammatory medications, occupational therapy, splinting/bracing, immobilization, corticosteroid injections, and others.  Surgical options were discussed as well.     At this time, the patient would like to proceed with a trial of corticosteroid injection into her 1st dorsal compartment on the right side only as well as bilateral thumb spica braces which she will be fitted for and occupational therapy which she will be referred to.  Also advised on over-the-counter anti-inflammatory medications as needed for pain.    Should the patient's symptoms worsen, persist, or fail to improve they should return for reevaluation and I would be happy to see them back anytime.        Devan Wagner M.D.     Please be aware that this note has been generated with the assistance of [employer] voice-to-text.  Please excuse any spelling or grammatical errors.    Thank you for choosing Dr. Devan Wagner for your orthopedic hand and upper extremity care. It is our goal to provide you with exceptional care that will help keep you healthy, active, and get you back in the game.     If you felt that you received exemplary care today, please consider leaving feedback for Dr. Wagner on [employer] at  [email or web address].    Please do not hesitate to reach out to us via email, phone, or MyChart with any questions, concerns, or feedback.

## 2022-04-19 NOTE — PROCEDURES
Tendon Sheath    Date/Time: 4/19/2022 8:45 AM  Performed by: Devan Wagner MD  Authorized by: Devan Wagner MD     Consent Done?:  Yes (Verbal)  Indications:  Pain  Site marked: the procedure site was marked    Timeout: prior to procedure the correct patient, procedure, and site was verified    Prep: patient was prepped and draped in usual sterile fashion      Local anesthesia used?: Yes    Anesthesia method: Topical cold spray used prior to the injection and 1cc 1% plain lidocaine contained in the injectate.  Location:  Wrist  Site:  R first doral compartment  Ultrasonic guidance for needle placement?: No    Needle size:  25 G  Approach:  Radial  Medications:  4 mg dexamethasone 4 mg/mL  Patient tolerance:  Patient tolerated the procedure well with no immediate complications

## 2022-04-26 NOTE — PROGRESS NOTES
"  [facility] Occupational Therapy Wrist & Hand  Initial Evaluation     Date: 4/27/2022  Name: Vicky Brand  Clinic Number: [id number]    Therapy Diagnosis:   Encounter Diagnoses   Name Primary?    De Quervain's tenosynovitis, bilateral     Pain in right wrist     Decreased range of motion of right thumb     Swelling of right wrist     Pain aggravated by activities of daily living      Physician: Devan Wagner MD    Medical Diagnosis: M65.4 (ICD-10-CM) - De Quervain's tenosynovitis, bilateral  Physician Orders:   Post Surgical? No   Eval and Treat Yes   Type of Therapy Hand Therapy (CHT)   Location: Wrist     Evaluation Date: 4/27/2022  Insurance Authorization Period Expiration: 12/31/22  Plan of Care from 4/27/2022 to 6/27/2022   Surgery Date and Procedure: n/a  Date of Return to MD: To be set    Visit # / Visits authorized: 1 / 50  FOTO (DASH) at eval: 58%    Time In: 2:55 pm  Time Out: 4:00 pm  Total Appointment Time (timed & untimed codes): 60 minutes    Precautions:  Standard  Other: recent tendinitis in foot and bone spurs after [place] vacation; pt had been prescribed a boot for this; she is now back to fitness program including Elliptical and riding bike  Subjective     Involved Side: Both  Dominant Side: Right  Date of Onset: On/About 3/6/22 (just after [place])  History of Current Condition/Mechanism of Injury: Injection in Right rec'd on 4/19/22. 4/19/22 MD Hx: "Patient is a 56 y.o. right hand dominant female who presents today with complaints of bilateral radial sided wrist pain.  The patient notes that 5-6 weeks ago she was moving furniture (and heavy cleaning for a 2 week period), subsequently thereafter developed significant right greater than left radial sided wrist pain.  This worsens with particular motions of the thumbs.  She denies any other complaints and presents today for initial evaluation.  She has attempted a cock-up wrist splint on right with no " "relief."  Imaging: negative x-ray per patient report  Previous Therapy: none    Past Medical History/Physical Systems Review:   Vicky Brand  has a past medical history of Dyspareunia in female, Eye abnormalities, Fracture of fifth toe, left, closed, with delayed healing, subsequent encounter, Intramural leiomyoma of uterus, Nocturia, Ocular migraine, CALEB (obstructive sleep apnea), Paradoxical insomnia, Posterior vitreous detachment of left eye, and Shoulder pain, left.    Vicky Brand  has a past surgical history that includes Dilation and curettage of uterus; Tubal ligation; Colonoscopy (N/A, 6/9/2017); Endometrial ablation (N/A, 2017); and Hysteroscopy with dilation and curettage of uterus (N/A, 1/19/2022).    Vicky has a current medication list which includes the following prescription(s): calcium carbonate, estradiol, multivitamin, and naproxen.    Review of patient's allergies indicates:   Allergen Reactions    Sulfa (sulfonamide antibiotics) Rash    Cetirizine      Other reaction(s): Unable to Function  Other reaction(s): Unable to Function    Erythromycin Nausea And Vomiting     Other reaction(s): Nausea    Zyrtec-d  [cetirizine-pseudoephedrine]      Other reaction(s): Inability to focus/function        Patient's Goals for Therapy: Vicky desires to restore pain-free function of the Right & Left UE for participating in all customary & necessary I/ADLs and work such as typing, fitness interests and caring for home/family.  Vicky desires to learn the pathology of her condition, how to manage her symptoms, and she desires to learn adaptive strategies for improved function in her daily life.    Pain:  Functional Pain Scale Rating 0-10:   1/10 at best  6/10 at worst  Location: Right radial wrist; Left radial wrist has subsided to only feeling "tight"  Description: "tight" in the Left, "sharp ache" in the right  Aggravating Factors: Brushing teeth, washing hair  Easing Factors: " injection in right, wearing OTS wrist brace    Occupation:  Teacher, online, part-to-full time  Working presently: employed  Duties: typing    Functional Limitations/Social History:    Previous functional status includes:  Independent with all ADLs.     Current Level of Function    Home/Living environment : lives with their spouse   Limitation of Functional Status as follows:  ADLs/IADLs:   - Pt is unable to wash the dishes or perform moderate to heavy household tasks. Her  is performing this presently.  Self Care activities:    - Feeding: Mod (I), removes the brace    - Bathing: Mod (I)    - Dressing/Grooming: Mod (I); snaps on bra are difficulty    - Driving: Mod (I)   Leisure: Currently unable to return to Gardening and Fitness Program, or Crafts     CMS Impairment/Limitation/Restriction for FOTO Hand Survey     Therapist reviewed FOTO scores for Vicky on 4/27/2022.   FOTO documents entered into [employer] - see Media section.     Limitation Score: DASH=58%        Objective     Observations: Pt arrives wearing a Right OTS Thumb SPICA that purchased from a store. She purchased this after having discomfort from the ones provided at her MD office.  1st CMC Shoulder signs (L>R), skin is red and dry    Special Tests:    4/27/22    R/L   Finkelstein's +/-   Thumb [facility]MC Grind test -/-   Extrinsic extensor tightness +/-     EDEMA (Girth in cm)   Right/Left   DATE IE-4/27/22   Wrist 14.4/14.2   Thumb P1 5.6.5.5      Thumb AROM in (Wrist neutral position)   Right Left   DATE IE-4/27/22 IE-4/27/22   MP e/f +18/54 +22/64   IP e/f +17/67 +25/75   RA/PA 48/36 49/44   KING 205 232   Tip to DPC   (in cm) .5 0       Strength: (AVELINA Dynamometer in psi.)    Right/Left    IE-2/27/22   Rung II 45/40     Pinch Strength (Measured in psi)   Right/Left    IE-4/27/22   Key Pinch 9/13   3pt Pinch 9.0/8.5    2pt Pinch defer       Treatment     Treatment Time In: 3:25 pm  Treatment Time Out: 4:00 pm  Total Treatment time (time-based  "codes) separate from Evaluation: 35 minutes    Vicky received the following supervised modalities after being cleared for contradictions for 0 minutes:   - defer    Vicky received the following direct contact modalities after being cleared for contraindications for 0 minutes:  - defer    Vicky received the following manual therapy techniques for 6 minutes:   - STM, both radial wrists/hands for promoting healing  - Elastic tape applied over [place] skin barrier, right first dorsal compartment with space correction for reducing pain & edema.  - defer-fit & issued compression glove    Vicky received therapeutic exercises for 20 minutes including:  Flexibility Gentle, graded Finkelstein's stretch: 30" x 3, Right and Left   1st CMC OA protocol - [place]ar stretch, right and left; 30" x 1 each  - "C"s to "O"s, 1x10  - Graded tip pressure in "O" position (with no MP collapse)  - 1st DAB strengthening with isometric; 1x10   Wrist strengthening defer   Postural strengthening defer   Objective measures Evaluation today   HEP as initially set -4/27/22     Vicky received Self Care and Orthotic mgmt instructions for 8 minutes while participated in a concurrent discussion of evaluation findings and specific home care, including:  - education related to the tissue involvement as evidenced by MD diagnosis, positive response during provative movements and/or special testing.  - the anatomy and pathophysiology of diagnosis.  - Removing the elastic tape before sleeping tonight or if abnormal symptoms occur that would indicate sensitivity  - Benefits of adding postural strengthening to current fitness program for reducing risks of wrist/elbow over-use and strain injuries  - instruction in activity modifications for goals of protecting healing tissues to include:  - continue wearing the right thumb SPICA.  - alternative reaching strategies, such as fastening bra hooks anteriorly and then twisting in place instead of " snapping behind the back    Home Exercise Program and Home Care Resources/Education:  Issued HEP (see patient instructions in EMR) and educated on modality use for pain management . Exercises were reviewed and Vicky was able to demonstrate them prior to the end of the session.   Pt received a written copy of exercises to perform at home. Vicky demonstrated good  understanding of the education provided.  Pt was advised to perform these exercises free of pain, and to stop performing them if pain occurs.    Patient/Family Education: role of OT, goals for OT, scheduling/cancellations - pt verbalized understanding. Discussed insurance limitations with patient.    Assessment     Vicky Brand is a 56 y.o. female referred to outpatient occupational therapy and presents with a medical diagnosis of Bilateral DeQuervain's Tendonitis, resulting in Decreased ROM, Decreased pinch strength, Decreased functional hand use, Increased pain, Edema and Joint Stiffness and demonstrates limitations as described in the chart below. Following medical record review it is determined that pt will benefit from occupational therapy services in order to maximize pain free and/or functional use of Both hands. The following goals were discussed with the patient and patient is in agreement with them as to be addressed in the treatment plan. The patient's rehab potential is Good.     Anticipated barriers to occupational therapy: none  Pt has no cultural, educational or language barriers to learning provided.    Profile and History Assessment of Occupational Performance Level of Clinical Decision Making Complexity Score   Occupational Profile:   Vicky Brand is a 56 y.o. female who lives with their spouse and is currently employed Vicky Brand has difficulty with  ADLs and IADLs as listed previously, which  affecting his/her daily functional abilities.      Comorbidities:    has a past medical history of  Dyspareunia in female, Eye abnormalities, Fracture of fifth toe, left, closed, with delayed healing, subsequent encounter, Intramural leiomyoma of uterus, Nocturia, Ocular migraine, CALEB (obstructive sleep apnea), Paradoxical insomnia, Posterior vitreous detachment of left eye, and Shoulder pain, left.    Medical and Therapy History Review:   Brief               Performance Deficits    Physical:  Joint Mobility  Muscle Power/Strength  Edema   Strength  Postural Control  Pain    Cognitive:  No Deficits    Psychosocial:    No Deficits     Clinical Decision Making:  moderate    Assessment Process:  Detailed Assessments    Modification/Need for Assistance:  Not Necessary    Intervention Selection:  Several Treatment Options       moderate  Based on PMHX, co morbidities , data from assessments and functional level of assistance required with task and clinical presentation directly impacting function.       The following goals were discussed with the patient and patient is in agreement with them as to be addressed in the treatment plan.   Goals:     LTG's (6-8 weeks):  1)  Decrease complaints of pain to 2 out of 10 at worst to increase functional hand use for gardening, fitness program, & household ADLs.  2)  Right lateral pinch strength/tolerance to be >95% of the assymptomatic hand to demonstrate healing tissues and tolerance for fasteners, locks and latches.  3)  Right Wrist and thumb KING to be WFL as compared to the Left hand for demonstrating healing tissues without adverse scar adhesions.  4)  Patient to score at 25% or less on Quick/DASH or FOTO to demonstrate improved perception of functional bilateral hand use and evidence near to prior level of function for ADLs with or without newly learning activity modifications or compensatory strategies.    STG's (3-4 weeks)  1)   Pt to report decreased episodes of pain in Both wrist/thumb during daytime activities w/or without newly learned activity modifications.  2)    Patient to be IND with Phase II of HEP and modalities for pain/edema managment.  3)   Increase thumb KING to >95% of the left indicate effective tissue healing with improved tendon gliding for increase functional hand use for crafts and other leisure activities.  4)   Patient to be IND with Orthotic use, wear and care precautions.   5)   Pt to tolerate advancing PREs with self-graded intensity and effective symptom monitoring.       Plan   Certification Period/Plan of care expiration: 4/27/2022 to 06/27/22.    Outpatient Occupational Therapy 1-2 times weekly for 6-8 weeks to include the following interventions: Paraffin, Fluidotherapy, Manual therapy/joint mobilizations, Modalities for pain management, US 3 mhz, Therapeutic exercises/activities., Strengthening, Orthotic Fabrication/Fit/Training, Edema Control, Scar Management, Joint Protection and Energy Conservation.    LATESHA Rhodaes, T  Occupational Therapist, Certified Hand Therapist

## 2022-04-27 ENCOUNTER — CLINICAL SUPPORT (OUTPATIENT)
Dept: REHABILITATION | Facility: HOSPITAL | Age: 57
End: 2022-04-27
Attending: ORTHOPAEDIC SURGERY
Payer: COMMERCIAL

## 2022-04-27 DIAGNOSIS — M25.531 PAIN IN RIGHT WRIST: ICD-10-CM

## 2022-04-27 DIAGNOSIS — M25.641 DECREASED RANGE OF MOTION OF RIGHT THUMB: ICD-10-CM

## 2022-04-27 DIAGNOSIS — R52 PAIN AGGRAVATED BY ACTIVITIES OF DAILY LIVING: ICD-10-CM

## 2022-04-27 DIAGNOSIS — M25.431 SWELLING OF RIGHT WRIST: ICD-10-CM

## 2022-04-27 DIAGNOSIS — M65.4 DE QUERVAIN'S TENOSYNOVITIS, BILATERAL: ICD-10-CM

## 2022-04-27 PROCEDURE — 97535 SELF CARE MNGMENT TRAINING: CPT | Mod: PN

## 2022-04-27 PROCEDURE — 97140 MANUAL THERAPY 1/> REGIONS: CPT | Mod: PN

## 2022-04-27 PROCEDURE — 97110 THERAPEUTIC EXERCISES: CPT | Mod: PN

## 2022-04-27 PROCEDURE — 97166 OT EVAL MOD COMPLEX 45 MIN: CPT | Mod: PN

## 2022-04-27 NOTE — PATIENT INSTRUCTIONS
"  [facility]  OCCUPATIONAL THERAPY  HOME EXERCISE PROGRAM    Apply pressure to the webspace between your thumb & index finger. You can use your other hand or a chip clip. Hold for 30 seconds. Do 3 repetitions, 2x/day.     Reach around the back of your thumb using your opposite hand & wrap your fingers around your thumb. Pull your palm open using your opposite hand. To deepen the stretch, bring your palm to your chest.   Hold for 30 seconds. Do 3 repetitions, 2x/day.     Make the shape of the letter "c" using your thumb & index finger.   Hold for 10 seconds. Do 10 repetitions.      Make the shape of the letter "c" using your thumb & index finger. Slowly start to pinch the tips of your thumb and index finger together like you are closing the "c". Stop before you lose the [place] shape. Return to the "c" position and repeat.   Do 10 repetitions, 2x/day.   Rest your effected hand on your other hand. Lift your index finger away from your middle finger. Use your other hand to resist your index finger from being pushed back towards the middle finger. Hold 10 seconds. Do 10 repetitions, 2x/day.        Hold this stretch in the pain-free ranges for a count of 10-30, 3x/session, 1-3x/day  "

## 2022-04-28 ENCOUNTER — PATIENT MESSAGE (OUTPATIENT)
Dept: REHABILITATION | Facility: HOSPITAL | Age: 57
End: 2022-04-28
Payer: COMMERCIAL

## 2022-04-28 PROBLEM — M25.641 DECREASED RANGE OF MOTION OF RIGHT THUMB: Status: ACTIVE | Noted: 2022-04-27

## 2022-04-28 PROBLEM — M25.531 PAIN IN RIGHT WRIST: Status: ACTIVE | Noted: 2022-04-27

## 2022-04-28 PROBLEM — M25.431 SWELLING OF RIGHT WRIST: Status: ACTIVE | Noted: 2022-04-27

## 2022-04-28 PROBLEM — R52 PAIN AGGRAVATED BY ACTIVITIES OF DAILY LIVING: Status: ACTIVE | Noted: 2022-04-27

## 2022-04-28 NOTE — PLAN OF CARE
"  [facility] Occupational Therapy Wrist & Hand  Initial Evaluation     Date: 4/27/2022  Name: Vicky Brand  Clinic Number: [id number]    Therapy Diagnosis:   Encounter Diagnoses   Name Primary?    De Quervain's tenosynovitis, bilateral     Pain in right wrist     Decreased range of motion of right thumb     Swelling of right wrist     Pain aggravated by activities of daily living      Physician: Devan Wagner MD    Medical Diagnosis: M65.4 (ICD-10-CM) - De Quervain's tenosynovitis, bilateral  Physician Orders:   Post Surgical? No   Eval and Treat Yes   Type of Therapy Hand Therapy (CHT)   Location: Wrist     Evaluation Date: 4/27/2022  Insurance Authorization Period Expiration: 12/31/22  Plan of Care from 4/27/2022 to 6/27/2022   Surgery Date and Procedure: n/a  Date of Return to MD: To be set    Visit # / Visits authorized: 1 / 50  FOTO (DASH) at eval: 58%    Time In: 2:55 pm  Time Out: 4:00 pm  Total Appointment Time (timed & untimed codes): 60 minutes    Precautions:  Standard  Other: recent tendinitis in foot and bone spurs after [place] vacation; pt had been prescribed a boot for this; she is now back to fitness program including Elliptical and riding bike  Subjective     Involved Side: Both  Dominant Side: Right  Date of Onset: On/About 3/6/22 (just after [place])  History of Current Condition/Mechanism of Injury: Injection in Right rec'd on 4/19/22. 4/19/22 MD Hx: "Patient is a 56 y.o. right hand dominant female who presents today with complaints of bilateral radial sided wrist pain.  The patient notes that 5-6 weeks ago she was moving furniture (and heavy cleaning for a 2 week period), subsequently thereafter developed significant right greater than left radial sided wrist pain.  This worsens with particular motions of the thumbs.  She denies any other complaints and presents today for initial evaluation.  She has attempted a cock-up wrist splint on right with no " "relief."  Imaging: negative x-ray per patient report  Previous Therapy: none    Past Medical History/Physical Systems Review:   Vicky Brand  has a past medical history of Dyspareunia in female, Eye abnormalities, Fracture of fifth toe, left, closed, with delayed healing, subsequent encounter, Intramural leiomyoma of uterus, Nocturia, Ocular migraine, CALEB (obstructive sleep apnea), Paradoxical insomnia, Posterior vitreous detachment of left eye, and Shoulder pain, left.    Vicky Brand  has a past surgical history that includes Dilation and curettage of uterus; Tubal ligation; Colonoscopy (N/A, 6/9/2017); Endometrial ablation (N/A, 2017); and Hysteroscopy with dilation and curettage of uterus (N/A, 1/19/2022).    Vicky has a current medication list which includes the following prescription(s): calcium carbonate, estradiol, multivitamin, and naproxen.    Review of patient's allergies indicates:   Allergen Reactions    Sulfa (sulfonamide antibiotics) Rash    Cetirizine      Other reaction(s): Unable to Function  Other reaction(s): Unable to Function    Erythromycin Nausea And Vomiting     Other reaction(s): Nausea    Zyrtec-d  [cetirizine-pseudoephedrine]      Other reaction(s): Inability to focus/function        Patient's Goals for Therapy: Vicky desires to restore pain-free function of the Right & Left UE for participating in all customary & necessary I/ADLs and work such as typing, fitness interests and caring for home/family.  Vicky desires to learn the pathology of her condition, how to manage her symptoms, and she desires to learn adaptive strategies for improved function in her daily life.    Pain:  Functional Pain Scale Rating 0-10:   1/10 at best  6/10 at worst  Location: Right radial wrist; Left radial wrist has subsided to only feeling "tight"  Description: "tight" in the Left, "sharp ache" in the right  Aggravating Factors: Brushing teeth, washing hair  Easing Factors: " injection in right, wearing OTS wrist brace    Occupation:  Teacher, online, part-to-full time  Working presently: employed  Duties: typing    Functional Limitations/Social History:    Previous functional status includes:  Independent with all ADLs.     Current Level of Function    Home/Living environment : lives with their spouse   Limitation of Functional Status as follows:  ADLs/IADLs:   - Pt is unable to wash the dishes or perform moderate to heavy household tasks. Her  is performing this presently.  Self Care activities:    - Feeding: Mod (I), removes the brace    - Bathing: Mod (I)    - Dressing/Grooming: Mod (I); snaps on bra are difficulty    - Driving: Mod (I)   Leisure: Currently unable to return to Gardening and Fitness Program, or Crafts     CMS Impairment/Limitation/Restriction for FOTO Hand Survey     Therapist reviewed FOTO scores for Vicky on 4/27/2022.   FOTO documents entered into [employer] - see Media section.     Limitation Score: DASH=58%        Objective     Observations: Pt arrives wearing a Right OTS Thumb SPICA that purchased from a store. She purchased this after having discomfort from the ones provided at her MD office.  1st [facility] Shoulder signs (L>R), skin is red and dry    Special Tests:    4/27/22    R/L   Finkelstein's +/-   Thumb [facility] Grind test -/-   Extrinsic extensor tightness +/-     EDEMA (Girth in cm)   Right/Left   DATE IE-4/27/22   Wrist 14.4/14.2   Thumb P1 5.6.5.5      Thumb AROM in (Wrist neutral position)   Right Left   DATE IE-4/27/22 IE-4/27/22   MP e/f +18/54 +22/64   IP e/f +17/67 +25/75   RA/PA 48/36 49/44   KING 205 232   Tip to DPC   (in cm) .5 0       Strength: (AVELINA Dynamometer in psi.)    Right/Left    IE-2/27/22   Rung II 45/40     Pinch Strength (Measured in psi)   Right/Left    IE-4/27/22   Key Pinch 9/13   3pt Pinch 9.0/8.5    2pt Pinch defer       Treatment     Treatment Time In: 3:25 pm  Treatment Time Out: 4:00 pm  Total Treatment time (time-based  "codes) separate from Evaluation: 35 minutes    Vicky received the following supervised modalities after being cleared for contradictions for 0 minutes:   - defer    Vicky received the following direct contact modalities after being cleared for contraindications for 0 minutes:  - defer    Vicky received the following manual therapy techniques for 6 minutes:   - STM, both radial wrists/hands for promoting healing  - Elastic tape applied over [place] skin barrier, right first dorsal compartment with space correction for reducing pain & edema.  - defer-fit & issued compression glove    Vicky received therapeutic exercises for 20 minutes including:  Flexibility Gentle, graded Finkelstein's stretch: 30" x 3, Right and Left   1st CMC OA protocol - Thenar stretch, right and left; 30" x 1 each  - "C"s to "O"s, 1x10  - Graded tip pressure in "O" position (with no MP collapse)  - 1st DAB strengthening with isometric; 1x10   Wrist strengthening defer   Postural strengthening defer   Objective measures Evaluation today   HEP as initially set -4/27/22     Vicky received Self Care and Orthotic mgmt instructions for 8 minutes while participated in a concurrent discussion of evaluation findings and specific home care, including:  - education related to the tissue involvement as evidenced by MD diagnosis, positive response during provative movements and/or special testing.  - the anatomy and pathophysiology of diagnosis.  - Removing the elastic tape before sleeping tonight or if abnormal symptoms occur that would indicate sensitivity  - Benefits of adding postural strengthening to current fitness program for reducing risks of wrist/elbow over-use and strain injuries  - instruction in activity modifications for goals of protecting healing tissues to include:  - continue wearing the right thumb SPICA.  - alternative reaching strategies, such as fastening bra hooks anteriorly and then twisting in place instead of " snapping behind the back    Home Exercise Program and Home Care Resources/Education:  Issued HEP (see patient instructions in EMR) and educated on modality use for pain management . Exercises were reviewed and Vicky was able to demonstrate them prior to the end of the session.   Pt received a written copy of exercises to perform at home. Vicky demonstrated good  understanding of the education provided.  Pt was advised to perform these exercises free of pain, and to stop performing them if pain occurs.    Patient/Family Education: role of OT, goals for OT, scheduling/cancellations - pt verbalized understanding. Discussed insurance limitations with patient.    Assessment     Vicky Brand is a 56 y.o. female referred to outpatient occupational therapy and presents with a medical diagnosis of Bilateral DeQuervain's Tendonitis, resulting in Decreased ROM, Decreased pinch strength, Decreased functional hand use, Increased pain, Edema and Joint Stiffness and demonstrates limitations as described in the chart below. Following medical record review it is determined that pt will benefit from occupational therapy services in order to maximize pain free and/or functional use of Both hands. The following goals were discussed with the patient and patient is in agreement with them as to be addressed in the treatment plan. The patient's rehab potential is Good.     Anticipated barriers to occupational therapy: none  Pt has no cultural, educational or language barriers to learning provided.    Profile and History Assessment of Occupational Performance Level of Clinical Decision Making Complexity Score   Occupational Profile:   Vicky Brand is a 56 y.o. female who lives with their spouse and is currently employed Vicky Brand has difficulty with  ADLs and IADLs as listed previously, which  affecting his/her daily functional abilities.      Comorbidities:    has a past medical history of  Dyspareunia in female, Eye abnormalities, Fracture of fifth toe, left, closed, with delayed healing, subsequent encounter, Intramural leiomyoma of uterus, Nocturia, Ocular migraine, CALEB (obstructive sleep apnea), Paradoxical insomnia, Posterior vitreous detachment of left eye, and Shoulder pain, left.    Medical and Therapy History Review:   Brief               Performance Deficits    Physical:  Joint Mobility  Muscle Power/Strength  Edema   Strength  Postural Control  Pain    Cognitive:  No Deficits    Psychosocial:    No Deficits     Clinical Decision Making:  moderate    Assessment Process:  Detailed Assessments    Modification/Need for Assistance:  Not Necessary    Intervention Selection:  Several Treatment Options       moderate  Based on PMHX, co morbidities , data from assessments and functional level of assistance required with task and clinical presentation directly impacting function.       The following goals were discussed with the patient and patient is in agreement with them as to be addressed in the treatment plan.   Goals:     LTG's (6-8 weeks):  1)  Decrease complaints of pain to 2 out of 10 at worst to increase functional hand use for gardening, fitness program, & household ADLs.  2)  Right lateral pinch strength/tolerance to be >95% of the assymptomatic hand to demonstrate healing tissues and tolerance for fasteners, locks and latches.  3)  Right Wrist and thumb KING to be WFL as compared to the Left hand for demonstrating healing tissues without adverse scar adhesions.  4)  Patient to score at 25% or less on Quick/DASH or FOTO to demonstrate improved perception of functional bilateral hand use and evidence near to prior level of function for ADLs with or without newly learning activity modifications or compensatory strategies.    STG's (3-4 weeks)  1)   Pt to report decreased episodes of pain in Both wrist/thumb during daytime activities w/or without newly learned activity modifications.  2)    Patient to be IND with Phase II of HEP and modalities for pain/edema managment.  3)   Increase thumb KING to >95% of the left indicate effective tissue healing with improved tendon gliding for increase functional hand use for crafts and other leisure activities.  4)   Patient to be IND with Orthotic use, wear and care precautions.   5)   Pt to tolerate advancing PREs with self-graded intensity and effective symptom monitoring.       Plan   Certification Period/Plan of care expiration: 4/27/2022 to 06/27/22.    Outpatient Occupational Therapy 1-2 times weekly for 6-8 weeks to include the following interventions: Paraffin, Fluidotherapy, Manual therapy/joint mobilizations, Modalities for pain management, US 3 mhz, Therapeutic exercises/activities., Strengthening, Orthotic Fabrication/Fit/Training, Edema Control, Scar Management, Joint Protection and Energy Conservation.    LATESHA Rhoades, [facility]  Occupational Therapist, Certified Hand Therapist      I CERTIFY THE NEED FOR THESE SERVICES FURNISHED UNDER THIS PLAN OF TREATMENT AND WHILE UNDER MY CARE    Physician's comments:        Physician's Signature: ___________________________________________________

## 2022-05-05 ENCOUNTER — PATIENT MESSAGE (OUTPATIENT)
Dept: PODIATRY | Facility: CLINIC | Age: 57
End: 2022-05-05
Payer: COMMERCIAL

## 2022-05-05 ENCOUNTER — PATIENT MESSAGE (OUTPATIENT)
Dept: ORTHOPEDICS | Facility: CLINIC | Age: 57
End: 2022-05-05
Payer: COMMERCIAL

## 2022-05-05 DIAGNOSIS — M79.671 CHRONIC HEEL PAIN, RIGHT: Primary | ICD-10-CM

## 2022-05-05 DIAGNOSIS — M76.60 INSERTIONAL ACHILLES TENDINOPATHY: ICD-10-CM

## 2022-05-05 DIAGNOSIS — G89.29 CHRONIC HEEL PAIN, RIGHT: Primary | ICD-10-CM

## 2022-05-05 DIAGNOSIS — M65.4 DE QUERVAIN'S TENOSYNOVITIS, RIGHT: Primary | ICD-10-CM

## 2022-05-05 DIAGNOSIS — M76.71 PERONEAL TENDINITIS, RIGHT: ICD-10-CM

## 2022-05-06 NOTE — TELEPHONE ENCOUNTER
Patient feels she has exhausted conservative care consisting of immobilization in an orthopedic boot, physical therapy, activity restrictions and modifications, shoe gear restrictions and continues to have recurring pain at the posterior aspect of the heel and lateral hindfoot/ankle. Please assist with scheduling MRI.

## 2022-05-09 ENCOUNTER — PATIENT MESSAGE (OUTPATIENT)
Dept: REHABILITATION | Facility: HOSPITAL | Age: 57
End: 2022-05-09
Payer: COMMERCIAL

## 2022-05-10 ENCOUNTER — PATIENT MESSAGE (OUTPATIENT)
Dept: REHABILITATION | Facility: HOSPITAL | Age: 57
End: 2022-05-10
Payer: COMMERCIAL

## 2022-05-23 ENCOUNTER — PATIENT MESSAGE (OUTPATIENT)
Dept: PRIMARY CARE CLINIC | Facility: CLINIC | Age: 57
End: 2022-05-23

## 2022-05-23 ENCOUNTER — HOSPITAL ENCOUNTER (OUTPATIENT)
Dept: RADIOLOGY | Facility: OTHER | Age: 57
Discharge: HOME | End: 2022-05-23
Attending: NURSE PRACTITIONER
Payer: COMMERCIAL

## 2022-05-23 ENCOUNTER — OFFICE VISIT (OUTPATIENT)
Dept: PRIMARY CARE CLINIC | Facility: CLINIC | Age: 57
End: 2022-05-23
Payer: COMMERCIAL

## 2022-05-23 VITALS
OXYGEN SATURATION: 99 % | WEIGHT: 139.31 LBS | HEIGHT: 66 IN | SYSTOLIC BLOOD PRESSURE: 142 MMHG | HEART RATE: 78 BPM | TEMPERATURE: 98 F | BODY MASS INDEX: 22.39 KG/M2 | RESPIRATION RATE: 18 BRPM | DIASTOLIC BLOOD PRESSURE: 90 MMHG

## 2022-05-23 DIAGNOSIS — M25.522 LEFT ELBOW PAIN: ICD-10-CM

## 2022-05-23 DIAGNOSIS — M25.522 LEFT ELBOW PAIN: Primary | ICD-10-CM

## 2022-05-23 PROCEDURE — 73030 X-RAY EXAM OF SHOULDER: CPT | Mod: TC,FY,LT

## 2022-05-23 PROCEDURE — 3008F BODY MASS INDEX DOCD: CPT | Mod: CPTII,S$GLB,, | Performed by: NURSE PRACTITIONER

## 2022-05-23 PROCEDURE — 3077F SYST BP >= 140 MM HG: CPT | Mod: CPTII,S$GLB,, | Performed by: NURSE PRACTITIONER

## 2022-05-23 PROCEDURE — 99215 OFFICE O/P EST HI 40 MIN: CPT | Mod: S$GLB,,, | Performed by: NURSE PRACTITIONER

## 2022-05-23 PROCEDURE — 73080 X-RAY EXAM OF ELBOW: CPT | Mod: TC,FY,LT

## 2022-05-23 PROCEDURE — 1159F PR MEDICATION LIST DOCUMENTED IN MEDICAL RECORD: ICD-10-PCS | Mod: CPTII,S$GLB,, | Performed by: NURSE PRACTITIONER

## 2022-05-23 PROCEDURE — 1160F RVW MEDS BY RX/DR IN RCRD: CPT | Mod: CPTII,S$GLB,, | Performed by: NURSE PRACTITIONER

## 2022-05-23 PROCEDURE — 3077F PR MOST RECENT SYSTOLIC BLOOD PRESSURE >= 140 MM HG: ICD-10-PCS | Mod: CPTII,S$GLB,, | Performed by: NURSE PRACTITIONER

## 2022-05-23 PROCEDURE — 73080 X-RAY EXAM OF ELBOW: CPT | Mod: 26,LT,, | Performed by: RADIOLOGY

## 2022-05-23 PROCEDURE — 3080F PR MOST RECENT DIASTOLIC BLOOD PRESSURE >= 90 MM HG: ICD-10-PCS | Mod: CPTII,S$GLB,, | Performed by: NURSE PRACTITIONER

## 2022-05-23 PROCEDURE — 73090 X-RAY EXAM OF FOREARM: CPT | Mod: TC,FY,LT

## 2022-05-23 PROCEDURE — 99999 PR PBB SHADOW E&M-EST. PATIENT-LVL V: ICD-10-PCS | Mod: PBBFAC,,, | Performed by: NURSE PRACTITIONER

## 2022-05-23 PROCEDURE — 99999 PR PBB SHADOW E&M-EST. PATIENT-LVL V: CPT | Mod: PBBFAC,,, | Performed by: NURSE PRACTITIONER

## 2022-05-23 PROCEDURE — 73090 X-RAY EXAM OF FOREARM: CPT | Mod: 26,LT,, | Performed by: RADIOLOGY

## 2022-05-23 PROCEDURE — 73090 XR FOREARM LEFT: ICD-10-PCS | Mod: 26,LT,, | Performed by: RADIOLOGY

## 2022-05-23 PROCEDURE — 3008F PR BODY MASS INDEX (BMI) DOCUMENTED: ICD-10-PCS | Mod: CPTII,S$GLB,, | Performed by: NURSE PRACTITIONER

## 2022-05-23 PROCEDURE — 73030 XR SHOULDER COMPLETE 2 OR MORE VIEWS LEFT: ICD-10-PCS | Mod: 26,LT,, | Performed by: RADIOLOGY

## 2022-05-23 PROCEDURE — 3080F DIAST BP >= 90 MM HG: CPT | Mod: CPTII,S$GLB,, | Performed by: NURSE PRACTITIONER

## 2022-05-23 PROCEDURE — 73080 XR ELBOW COMPLETE 3 VIEW LEFT: ICD-10-PCS | Mod: 26,LT,, | Performed by: RADIOLOGY

## 2022-05-23 PROCEDURE — 1159F MED LIST DOCD IN RCRD: CPT | Mod: CPTII,S$GLB,, | Performed by: NURSE PRACTITIONER

## 2022-05-23 PROCEDURE — 73030 X-RAY EXAM OF SHOULDER: CPT | Mod: 26,LT,, | Performed by: RADIOLOGY

## 2022-05-23 PROCEDURE — 99215 PR OFFICE/OUTPT VISIT, EST, LEVL V, 40-54 MIN: ICD-10-PCS | Mod: S$GLB,,, | Performed by: NURSE PRACTITIONER

## 2022-05-23 PROCEDURE — 1160F PR REVIEW ALL MEDS BY PRESCRIBER/CLIN PHARMACIST DOCUMENTED: ICD-10-PCS | Mod: CPTII,S$GLB,, | Performed by: NURSE PRACTITIONER

## 2022-05-23 NOTE — PATIENT INSTRUCTIONS
1) Left elbow pain:  - Xray of left elbow  - Xray of left forearm  - Xray of left shoulder  - Continue naproxyn as directed  - Return to clinic as needed.
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
## 2022-05-24 ENCOUNTER — OFFICE VISIT (OUTPATIENT)
Dept: ORTHOPEDICS | Facility: CLINIC | Age: 57
End: 2022-05-24
Payer: COMMERCIAL

## 2022-05-24 VITALS — WEIGHT: 140.88 LBS | BODY MASS INDEX: 22.64 KG/M2 | HEIGHT: 66 IN

## 2022-05-24 DIAGNOSIS — M77.12 LATERAL EPICONDYLITIS OF LEFT ELBOW: Primary | ICD-10-CM

## 2022-05-24 DIAGNOSIS — M25.522 LEFT ELBOW PAIN: ICD-10-CM

## 2022-05-24 PROCEDURE — 99999 PR PBB SHADOW E&M-EST. PATIENT-LVL III: ICD-10-PCS | Mod: PBBFAC,,, | Performed by: ORTHOPAEDIC SURGERY

## 2022-05-24 PROCEDURE — 1159F MED LIST DOCD IN RCRD: CPT | Mod: CPTII,S$GLB,, | Performed by: ORTHOPAEDIC SURGERY

## 2022-05-24 PROCEDURE — 1159F PR MEDICATION LIST DOCUMENTED IN MEDICAL RECORD: ICD-10-PCS | Mod: CPTII,S$GLB,, | Performed by: ORTHOPAEDIC SURGERY

## 2022-05-24 PROCEDURE — 99999 PR PBB SHADOW E&M-EST. PATIENT-LVL III: CPT | Mod: PBBFAC,,, | Performed by: ORTHOPAEDIC SURGERY

## 2022-05-24 PROCEDURE — 99203 OFFICE O/P NEW LOW 30 MIN: CPT | Mod: S$GLB,,, | Performed by: ORTHOPAEDIC SURGERY

## 2022-05-24 PROCEDURE — 3008F PR BODY MASS INDEX (BMI) DOCUMENTED: ICD-10-PCS | Mod: CPTII,S$GLB,, | Performed by: ORTHOPAEDIC SURGERY

## 2022-05-24 PROCEDURE — 99203 PR OFFICE/OUTPT VISIT, NEW, LEVL III, 30-44 MIN: ICD-10-PCS | Mod: S$GLB,,, | Performed by: ORTHOPAEDIC SURGERY

## 2022-05-24 PROCEDURE — 1160F RVW MEDS BY RX/DR IN RCRD: CPT | Mod: CPTII,S$GLB,, | Performed by: ORTHOPAEDIC SURGERY

## 2022-05-24 PROCEDURE — 3008F BODY MASS INDEX DOCD: CPT | Mod: CPTII,S$GLB,, | Performed by: ORTHOPAEDIC SURGERY

## 2022-05-24 PROCEDURE — 1160F PR REVIEW ALL MEDS BY PRESCRIBER/CLIN PHARMACIST DOCUMENTED: ICD-10-PCS | Mod: CPTII,S$GLB,, | Performed by: ORTHOPAEDIC SURGERY
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
## 2022-05-25 ENCOUNTER — HOSPITAL ENCOUNTER (OUTPATIENT)
Dept: RADIOLOGY | Facility: HOSPITAL | Age: 57
Discharge: HOME | End: 2022-05-25
Attending: PODIATRIST
Payer: COMMERCIAL

## 2022-05-25 ENCOUNTER — PATIENT MESSAGE (OUTPATIENT)
Dept: PODIATRY | Facility: CLINIC | Age: 57
End: 2022-05-25
Payer: COMMERCIAL

## 2022-05-25 ENCOUNTER — HOSPITAL ENCOUNTER (OUTPATIENT)
Dept: RADIOLOGY | Facility: HOSPITAL | Age: 57
Discharge: HOME | End: 2022-05-25
Attending: ORTHOPAEDIC SURGERY
Payer: COMMERCIAL

## 2022-05-25 DIAGNOSIS — M76.71 PERONEAL TENDINITIS, RIGHT: ICD-10-CM

## 2022-05-25 DIAGNOSIS — M65.4 DE QUERVAIN'S TENOSYNOVITIS, RIGHT: ICD-10-CM

## 2022-05-25 DIAGNOSIS — M76.60 INSERTIONAL ACHILLES TENDINOPATHY: ICD-10-CM

## 2022-05-25 DIAGNOSIS — M79.671 CHRONIC HEEL PAIN, RIGHT: ICD-10-CM

## 2022-05-25 DIAGNOSIS — G89.29 CHRONIC HEEL PAIN, RIGHT: ICD-10-CM

## 2022-05-25 PROCEDURE — 73221 MRI JOINT UPR EXTREM W/O DYE: CPT | Mod: 26,RT,, | Performed by: RADIOLOGY

## 2022-05-25 PROCEDURE — 73721 MRI JNT OF LWR EXTRE W/O DYE: CPT | Mod: TC,RT

## 2022-05-25 PROCEDURE — 73221 MRI JOINT UPR EXTREM W/O DYE: CPT | Mod: TC,RT

## 2022-05-25 PROCEDURE — 73221 MRI WRIST WITHOUT CONTRAST RIGHT: ICD-10-PCS | Mod: 26,RT,, | Performed by: RADIOLOGY

## 2022-05-25 PROCEDURE — 73721 MRI JNT OF LWR EXTRE W/O DYE: CPT | Mod: 26,RT,, | Performed by: RADIOLOGY

## 2022-05-25 PROCEDURE — 73721 MRI ANKLE WITHOUT CONTRAST RIGHT: ICD-10-PCS | Mod: 26,RT,, | Performed by: RADIOLOGY

## 2022-05-26 RX ORDER — DICLOFENAC SODIUM 10 MG/G
2 GEL TOPICAL 4 TIMES DAILY
Qty: 100 G | Refills: 4 | OUTPATIENT
Start: 2022-05-26 | End: 2023-10-19

## 2022-06-01 ENCOUNTER — PATIENT MESSAGE (OUTPATIENT)
Dept: REHABILITATION | Facility: HOSPITAL | Age: 57
End: 2022-06-01
Payer: COMMERCIAL

## 2022-06-02 ENCOUNTER — PATIENT MESSAGE (OUTPATIENT)
Dept: REHABILITATION | Facility: HOSPITAL | Age: 57
End: 2022-06-02

## 2022-06-02 ENCOUNTER — PATIENT MESSAGE (OUTPATIENT)
Dept: PREADMISSION TESTING | Facility: HOSPITAL | Age: 57
End: 2022-06-02
Payer: COMMERCIAL

## 2022-06-02 ENCOUNTER — PATIENT MESSAGE (OUTPATIENT)
Dept: ORTHOPEDICS | Facility: CLINIC | Age: 57
End: 2022-06-02

## 2022-06-02 ENCOUNTER — OFFICE VISIT (OUTPATIENT)
Dept: ORTHOPEDICS | Facility: CLINIC | Age: 57
End: 2022-06-02
Payer: COMMERCIAL

## 2022-06-02 ENCOUNTER — CLINICAL SUPPORT (OUTPATIENT)
Dept: REHABILITATION | Facility: HOSPITAL | Age: 57
End: 2022-06-02
Payer: COMMERCIAL

## 2022-06-02 DIAGNOSIS — M65.4 DE QUERVAIN'S TENOSYNOVITIS, RIGHT: Primary | ICD-10-CM

## 2022-06-02 DIAGNOSIS — M77.12 LATERAL EPICONDYLITIS OF LEFT ELBOW: ICD-10-CM

## 2022-06-02 DIAGNOSIS — M25.522 PAIN IN LEFT ELBOW: ICD-10-CM

## 2022-06-02 DIAGNOSIS — R52 PAIN AGGRAVATED BY ACTIVITIES OF DAILY LIVING: ICD-10-CM

## 2022-06-02 PROCEDURE — 1159F MED LIST DOCD IN RCRD: CPT | Mod: CPTII,S$GLB,, | Performed by: PHYSICIAN ASSISTANT

## 2022-06-02 PROCEDURE — 99214 PR OFFICE/OUTPT VISIT, EST, LEVL IV, 30-39 MIN: ICD-10-PCS | Mod: S$GLB,,, | Performed by: PHYSICIAN ASSISTANT

## 2022-06-02 PROCEDURE — 97535 SELF CARE MNGMENT TRAINING: CPT | Mod: PN

## 2022-06-02 PROCEDURE — 99999 PR PBB SHADOW E&M-EST. PATIENT-LVL III: CPT | Mod: PBBFAC,,, | Performed by: PHYSICIAN ASSISTANT

## 2022-06-02 PROCEDURE — 97165 OT EVAL LOW COMPLEX 30 MIN: CPT | Mod: PN

## 2022-06-02 PROCEDURE — 97140 MANUAL THERAPY 1/> REGIONS: CPT | Mod: PN

## 2022-06-02 PROCEDURE — 99214 OFFICE O/P EST MOD 30 MIN: CPT | Mod: S$GLB,,, | Performed by: PHYSICIAN ASSISTANT

## 2022-06-02 PROCEDURE — 99999 PR PBB SHADOW E&M-EST. PATIENT-LVL III: ICD-10-PCS | Mod: PBBFAC,,, | Performed by: PHYSICIAN ASSISTANT

## 2022-06-02 PROCEDURE — 1159F PR MEDICATION LIST DOCUMENTED IN MEDICAL RECORD: ICD-10-PCS | Mod: CPTII,S$GLB,, | Performed by: PHYSICIAN ASSISTANT

## 2022-06-02 RX ORDER — MUPIROCIN 20 MG/G
OINTMENT TOPICAL
Status: CANCELLED | OUTPATIENT
Start: 2022-06-02

## 2022-06-02 RX ORDER — CEFAZOLIN SODIUM/D5W 2 G/100 ML
2 PLASTIC BAG, INJECTION (ML) INTRAVENOUS
Status: CANCELLED | OUTPATIENT
Start: 2022-06-02

## 2022-06-02 NOTE — PROGRESS NOTES
[facility]  History & Physical  Orthopedics    SUBJECTIVE:      COVID-19 attestation:  This patient was treated during the COVID-19 pandemic.  This was discussed with the patient, they are aware of our current policies and procedures, were given the option of delaying their visit and or switching to a virtual visit, delaying their surgery when applicable, and they elect to proceed.    Chief Complaint:  Bilateral radial sided wrist pain    Referring Provider: No ref. provider found     History of Present Illness:  Patient is a 56 y.o. right hand dominant female who presents today with complaints of bilateral radial sided wrist pain.  The patient notes that 5-6 weeks ago she was moving furniture and subsequently thereafter developed significant right greater than left radial sided wrist pain.  This worsens with particular motions of the thumbs.  She denies any other complaints and presents today for initial evaluation.  She has attempted a cock-up wrist splint on right with no relief..     Interval History 6/2/22: the patient is seen today to schedule surgery for the right wrist. She was treated for DeQuervains tendonitis with [facility], has been wearing brace and is scheduled to see OT today. She is now requesting surgery be performed due to no improvement in her pain. 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    The patient is a/an online teacher.    Onset of symptoms/DOI was 5-6 weeks ago.    Symptoms are aggravated by activity and movement.    Symptoms are alleviated by rest.    Symptoms consist of pain.    The patient rates their pain as a 5/10.    Attempted treatment(s) and/or interventions include activity modifications, rest, immobilization, steroid injection.     The patient denies any fevers, chills, N/V, D/C and presents for evaluation.       Past Medical History:   Diagnosis Date    Dyspareunia in female 7/27/2021    Osphena, pelvic floor tx 2021, G[facility]     Eye abnormalities     early yellowing of lens, precursor to cataracts, Dr Webster,  wear glasses at all times    Fracture of fifth toe, left, closed, with delayed healing, subsequent encounter 8/21/2018    Intramural leiomyoma of uterus 5/22/2019    Irregular, dysmenorrhea    Nocturia 6/17/2021    Ocular migraine     vision fuzzy x 3 in 2013, metallic shapes in her vision, Dr Webster,  resolved after 30 min , no pain    CALEB (obstructive sleep apnea)     Lysenko, CPAP start 7/21    Paradoxical insomnia 10/30/2017    prior to menses, previously took amitryptiline 2.5 mg prn  qhs     Posterior vitreous detachment of left eye 5/22/2019    Flashes of light, ocular migranes, Ophthalmologist Dr Orourke, retinal specialist Dr Soin, rec avoid exercise until she follows up 5/2019    Shoulder pain, left 2007    LEFT, after fall, better with PT at [facility]     Past Surgical History:   Procedure Laterality Date    COLONOSCOPY N/A 6/9/2017    Procedure: COLONOSCOPY;  Surgeon: Davy Prince MD;  Location: [address]);  Service: Endoscopy;  Laterality: N/A;    DILATION AND CURETTAGE OF UTERUS      ENDOMETRIAL ABLATION N/A 2017    HYSTEROSCOPY WITH DILATION AND CURETTAGE OF UTERUS N/A 1/19/2022    Procedure: HYSTEROSCOPY, WITH DILATION AND CURETTAGE OF UTERUS;  Surgeon: Jannette Jarrell MD;  Location: [facility];  Service: OB/GYN;  Laterality: N/A;    TUBAL LIGATION       Review of patient's allergies indicates:   Allergen Reactions    Sulfa (sulfonamide antibiotics) Rash    Cetirizine      Other reaction(s): Unable to Function  Other reaction(s): Unable to Function    Erythromycin Nausea And Vomiting     Other reaction(s): Nausea    Zyrtec-d  [cetirizine-pseudoephedrine]      Other reaction(s): Inability to focus/function     Social History     Social History Narrative    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 Apply to affected area up to 4 times per day PRN pain, Disp: 100 g, Rfl: 4    estradioL (ESTRACE) 0.01 % (0.1 mg/gram) vaginal cream, Place 0.5 g vaginally twice a week., Disp: 42.5 g, Rfl: 3    multivitamin capsule, Take 1 capsule by mouth once daily., Disp: , Rfl:     naproxen (NAPROSYN) 500 MG tablet, Take 1 tablet (500 mg total) by mouth 2 (two) times daily with meals., Disp: 30 tablet, Rfl: 0      Review of Systems:  As per HPI otherwise noncontributory    OBJECTIVE:      Vital Signs (Most Recent):  There were no vitals filed for this visit.  There is no height or weight on file to calculate BMI.      Physical Exam:  Constitutional: The patient appears well-developed and well-nourished. No distress.   Skin: No lesions appreciated  Head: Normocephalic and atraumatic.   Nose: Nose normal.   Ears: No deformities seen  Eyes: Conjunctivae and EOM are normal.   Neck: No tracheal deviation present.   Cardiovascular: Normal rate and intact distal pulses.    Pulmonary/Chest: Effort normal. No respiratory distress.   Abdominal: There is no guarding.   Neurological: The patient is alert.   Psychiatric: The patient has a normal mood and affect.     Bilateral Hand/Wrist Examination:    Observation/Inspection:  Swelling  none    Deformity  none  Discoloration  none     Scars   none    Atrophy  none    HAND/WRIST EXAMINATION:  Finkelstein's Test   positive right greater than left  WHAT Test    positive right greater than left  Snuff box  tenderness   Neg  Contreras's Test    Neg  Hook of Hamate Tenderness  Neg  CMC grind    Neg  Circumduction test   Neg    Neurovascular Exam:  Digits WWP, brisk CR < 3s throughout  NVI motor/LTS to M/R/U nerves, radial pulse 2+  Tinel's Test - Carpal Tunnel  Neg  Tinel's Test - Cubital Tunnel  Neg  Phalen's Test    Neg  Median Nerve Compression Test Neg    ROM hand full, painless    ROM wrist full, painless    ROM elbow full, painless    Abdomen not guarded  Respirations nonlabored  Perfusion intact    Diagnostic Results:     Imaging - I independently viewed the patient's imaging as well as the radiology report.  Xrays of the patient's bilateral hands  demonstrate no evidence of any acute fractures or dislocations or significant degenerative changes.    MRI - 5/5/22  Impression:     Tenosynovitis of the 1st extensor compartment with tendinosis and partial-thickness tearing of the abductor pollicis longus tendon and surrounding soft tissue edema.  Findings are compatible with de Quervain tenosynovitis.    EMG - none    ASSESSMENT/PLAN:      56 y.o. yo female with Right Dequervains tendonitis    Plan: The patient and I had a thorough discussion today.  We discussed the working diagnosis as well as several other potential alternative diagnoses.  Treatment options were discussed, both conservative and surgical.  Conservative treatment options would include things such as activity modifications, workplace modifications, a period of rest, oral vs topical OTC and prescription anti-inflammatory medications, occupational therapy, splinting/bracing, immobilization, corticosteroid injections, and others.  Surgical options were discussed as well.     At this time, the patient would like to proceed surgery. She is consented for Right First Dorsal Compartment Release with Dr. Wagner on 6/8/22. Case ordered.    The patient has not responded to adequate non operative treatment at this time and/or non operative treatment is not  indicated. Thus, the risks, benefits and alternatives to surgery were discussed with the patient in detail.  Specific risks include but are not limited to bleeding, infection, vessel and/or nerve damage, pain, numbness, tingling, complex regional pain syndrome, compartment syndrome, failure to return to pre-injury and/or preoperative functional status, scar sensitivity, delayed healing, inability to return to work, pulley injury, tendon injury, bowstringing, partial and/or incomplete relief of symptoms, weakness, persistence of and/or worsening of symptoms, surgical failure, osteomyelitis, amputation, loss of function, stiffness, functional debility, dysfunction, decreased  strength, need for prolonged postoperative rehabilitation, need for further surgery, deep venous thrombosis, pulmonary embolism, arthritis and death.  The patient states an understanding and wishes to proceed with surgery.   All questions were answered.  No guarantees were implied or stated.  Written informed consent was obtained.    Should the patient's symptoms worsen, persist, or fail to improve they should return for reevaluation and I would be happy to see them back anytime.      Jamie Russo-DiGeorge, PA-C

## 2022-06-02 NOTE — PATIENT INSTRUCTIONS
Vicky Brand  MRN #[id number]      DATE: 6/2/22    Understanding Lateral Epicondylitis    Tendons are strong bands of tissue that connect muscles to bones. Lateral epicondylitis affects the tendons that connect muscles in the forearm to the lateral epicondyle. This is the bony knob on the outer side of the elbow. The condition occurs if the extensor tendons of the wrist become red and swollen (irritated). This can cause pain in the elbow, forearm, and wrist. Because the condition is sometimes caused by playing tennis, it is also known as tennis elbow.  How to say it  [id number]   What causes lateral epicondylitis?  The condition most often occurs because of overuse. This can be from any activity that repeatedly puts stress on the forearm extensor muscles or tendons and wrist. For instance, playing tennis, lifting weights, cutting meat, painting, and typing can all cause the condition. Wear and tear of the tendons from aging or an injury to the tendons can also cause the condition.  Symptoms of lateral epicondylitis  The most common symptom is pain. You may feel it on the outer side of the elbow and down the back of the forearm. It may be worse when moving or using the elbow, forearm, or wrist. You may also feel pain when gripping or lifting things.  Treatment for lateral epicondylitis  Treatments may include:  Resting the elbow, forearm, and wrist. Youll need to avoid movements that can make your symptoms worse. You also may need to avoid certain sports and types of work for a time. This helps relieve symptoms and prevent further damage to the tendons.  Changing the action that caused the problem. For instance, if the tendons were damaged from playing tennis, it may help to change your playing technique or use different equipment. This helps prevent further damage to the tendons.  Using cold packs. Putting an ice pack on the injured area can help reduce pain and swelling.  Taking pain  "medicines. Taking prescription or over-the-counter pain medicines may help reduce pain and swelling.    Wearing a brace. This helps reduce strain on the muscles and tendons in the forearm, which may relieve symptoms. It is very important to wear the brace properly.  Doing exercises and physical therapy. These help improve strength and range of motion in the elbow, forearm, and wrist.  Getting shots of medicine into the injured area. These may help relieve symptoms for a time.  Having surgery. This may be an option if other treatments fail to relieve symptoms. In many cases, the surgeon removes the damaged tissue.    Possible complications of lateral epicondylitis  If the tendons involved dont heal properly, symptoms may return or get worse. To help prevent this, follow your treatment plan as directed.          Your treatment will depend on how inflamed your tendon is. The goal is to relieve your symptoms and help you regain full use of your elbow.      Counter-force Brace Option    The Counter-force or "Tennis Elbow" brace must be worn precisely as instructed and be removed when not engaged in activities that require wrist and hand function. If worn improperly, this brace can make symptoms worse.    WRIST brace option can be worn to allow resting of the tendons for 4-6 weeks.      The Wrist Brace can be worn throughout the day, at rest as well as during activities. It is effective when it is worn while participating in daily activities that have proven to be painful or provocative to your condition.      REMOVE THE BRACE AT LEAST 4 TIMES A DAY TO PERFORM GENTLE WRIST AROM FOR PREVENTING STIFFNESS.     PERFORM THE EXERCISES INSTRUCTED BY YOUR THERAPIST WHILE THE WRIST BRACE IS REMOVED.    IT IS OK TO SLEEP IN THE BRACE, BUT NOT NECESSARY.       PHASE I OF YOUR THERAPY IS DIRECTED AT HEALING THE INFLAMED AND/OR IRRITATED TISSUES.      Protecting Healing Tissues:   Use larger joints and muscles when possible (resting " objects in crook of elbow)  Stop activities before the point of discomfort  Avoid activities that put strain on painful tissues and/or joints  Avoid a tight or prolonged grasp on objects  Avoid any lifting or gripping with elbow in extension  If you must lift, lift with elbows bent at side, object should be held close to your body, and hands turned palm up.  Balance Rest and Activity:  Take frequent, short breaks to stretch  Avoid staying in one position for a long time  Alternate heavy and light activities  Eliminate unnecessary activities  Reduce the effort:  Avoid excessive loads. Don't be afraid to ask for help. Use carts and tabletops to make tasks easier.  Keep items nearby (such as keyboard) and store frequently used items in loweest shelves/cabinets  Helpful Tips: slide objects; use your palms instead of fingers, keeps heavy items close to your body, use larger handles, pens and pencils, look for lightweight options, use wheels or carts to roll objects

## 2022-06-02 NOTE — PLAN OF CARE
"  [facility] Occupational Therapy  HAND/WRIST/ELBOW Initial Evaluation     Date: 6/2/2022  Patient: Vicky Brand  Chart Number: [id number]  Referring Physician: Petey Camarena  Therapy Diagnosis:   1. Lateral epicondylitis of left elbow  Ambulatory referral/consult to Physical/Occupational Therapy   2. Pain in left elbow     3. Pain aggravated by activities of daily living       Medical Diagnosis: M77.12 (ICD-10-CM) - Lateral epicondylitis of left elbow  Physician Orders:   Post Surgical? No   Eval and Treat Yes   Type of Therapy Outpatient Therapy   Location: Elbow     Order comments: L elbow lateral epicondylitis. PT/OT eval/treat. ROMAT, WBAT, modalities prn. HEP     Evaluation Date: 6/2/2022  Insurance Authorization Period Expiration: 12/31/22  Plan of Care from 6/2/2022 to 8/2/22   Surgery Date and Procedure: n/a  Date of Return to MD: to be scheduled    Visit #: 1 of 50  FOTO at [facility]: deferred to 2nd visit    Time In: 1:00 pm  Time Out: 1:45 pm  Total Billable Time: 45 min    Precautions:  Standard  Other: recent tendinitis in foot and bone spurs after [place] vacation; pt had been prescribed a boot for this; she is now back to fitness program including Elliptical and riding bike  Subjective      Involved Side: Left for this evaluation  Dominant Side: Right  Date of Onset: On/About 3/6/22 (just after Mardi Gras)  History of Current Condition/Mechanism of Injury: Pt arrives explaining new onset Left elbow pain believed to be due to using the left more since protecting the right. She believes that the Left elbow is related to the activity engaged in that was heavy furniture lifting/moving and days of cleaning.   An injection in Right rec'd on 4/19/22. 4/19/22 MD Hx: "Patient is a 56 y.o. right hand dominant female who presents today with complaints of bilateral radial sided wrist pain.  The patient notes that 5-6 weeks ago she was moving furniture (and heavy cleaning for a 2 " "week period), subsequently thereafter developed significant right greater than left radial sided wrist pain.  This worsens with particular motions of the thumbs.  She denies any other complaints and presents today for initial evaluation.  She has attempted a cock-up wrist splint on right with no relief."  Imagin22: Impression:  No acute osseous abnormality seen.  Previous Therapy: OT for evaluation of Right Dequervain's. Pt did not have subsequent sessions beyond eval.    Patient's Goals for Therapy: Vicky desires to restore pain-free function of the Left UE for participating in all customary & necessary I/ADLs and work such as typing, fitness interests and caring for home/family.  Vicky desires to learn the pathology of her condition, how to manage her symptoms, and she desires to learn adaptive strategies for improved function in her daily life..    Pain:  Functional Pain Scale Rating 0-10:   2/10 at best  8/10 at worst  Location: Left elbow  Description: aching  Aggravating Factors: Lifting, resistive gripping  Easing Factors: wearing counter-force brace     Occupation:  Teacher, online, part-to-full time  Working presently: employed  Duties: typing     Functional Limitations/Social History:     Previous functional status includes:  Independent with all ADLs.      Current Level of Function               Home/Living environment : lives with their spouse              Limitation of Functional Status as follows:  ADLs/IADLs:   - Pt is unable to wash the dishes or perform moderate to heavy household tasks. Her  is performing this presently.  Self Care activities:                          - Feeding: Mod (I), removes the brace                          - Bathing: Mod (I)                          - Dressing/Grooming: Mod (I); snaps on bra are difficulty                          - Driving: Mod (I)              Leisure: Currently unable to return to Gardening and Fitness Program, or Crafts     CMS " Impairment/Limitation/Restriction for FOTO Hand Survey     Therapist will review FOTO scores for Vicky on her next visit.   FOTO documents entered into "[employer] - see Media section.     Limitation Score: TBA next visit        Past Medical History/Physical Systems Review:   Vicky Brand  has a past medical history of Dyspareunia in female, Eye abnormalities, Fracture of fifth toe, left, closed, with delayed healing, subsequent encounter, Intramural leiomyoma of uterus, Nocturia, Ocular migraine, CALEB (obstructive sleep apnea), Paradoxical insomnia, Posterior vitreous detachment of left eye, and Shoulder pain, left.    Vicky Brand  has a past surgical history that includes Dilation and curettage of uterus; Tubal ligation; Colonoscopy (N/A, 6/9/2017); Endometrial ablation (N/A, 2017); and Hysteroscopy with dilation and curettage of uterus (N/A, 1/19/2022).    Vicky has a current medication list which includes the following prescription(s): calcium carbonate, diclofenac sodium, estradiol, multivitamin, and naproxen.    Review of patient's allergies indicates:   Allergen Reactions    Sulfa (sulfonamide antibiotics) Rash    Cetirizine      Other reaction(s): Unable to Function  Other reaction(s): Unable to Function    Erythromycin Nausea And Vomiting     Other reaction(s): Nausea    Zyrtec-d  [cetirizine-pseudoephedrine]      Other reaction(s): Inability to focus/function          Objective     Mental status: alert, interactive   Observations: Pt arrives wearing a Left Counter-force brace positioned slightly medially and posteriorly to the optimum place. She reports having Right and Left OTS Thumb SPICA that she purchased from a store. She was wearing the Right one. She purchased this after having discomfort from the ones provided at her MD office.  1st [facility] Shoulder signs (L>R).    Edema (girth measured in cm)   Right Left   DATE IE-6/2/22 IE-6/2/22   Elbow crease 23.9 23.8   3 Inches distal  to elbow crease defer defer     Range of Motion:   Left  Comments: No elbow or forearm AROM limitations as observed    Special Tests:   Left   6/2/2022   Cozen's Test positive   Radial Tunnel Test/s defer   Dance Test (radial head mobility) defer     Manual Muscle Testing: deferred due to high irritability      Common Extensor Tolerance measured with: (AVELINA Dynamometer (measured in psi.)    at Rung II Right/Left   Date IE-6/2/22   With Elbow flexed in neutral forearm position 28/13   With elbow extended in neutral forearm position 22/nt   With elbow extended in pronation position defer   With elbow extended in supination position defer       Treatment     Treatment Time In: 1:20 pm  Treatment Time Out: 1:45 pm  Total Treatment time separate from Evaluation time: 25 min    Vicky received the following supervised modalities after being cleared for contraindications for 0 minutes:   - defer    Vicky received the following direct contact modalities after being cleared for contraindications for 0 minutes:  - defer    Vicky received the following manual therapy techniques for 8 minutes:   - DTM in paulette-epicondyle areas for promoting healing and reducing adverse scar adhesions  - application of elastic tape for space correction at lateral epicondyle for reducing pain and promoting healing. Precautions were discussed in detail.     Vicky performed therapeutic exercises for 4 minutes including:  - Extrinsic extensor stretch, pain-free, in 2 positions    Vicky received Self Care and Orthotic mgmt for 10 minutes while participating in a concurrent discussion of evaluation findings and specific home care, including:  - education related to the tissue involvement as evidenced by MD diagnosis, positive response during provative movements and/or special testing.  - the anatomy and pathophysiology of diagnosis.  - instruction in activity modifications for goals of protecting healing tissues to include:  - wear  of the wrist support during daily activities for reducing provocative movement  - wear of counter-force brace during daily activities only; proper donning instructed and contraindications discussed  - minimizing episodes of pain elicited by improper lifting strategies.  - safe tape application  - Use of voice recognition software and other word retrieval short cuts as able to reduce episodes of pain and or potential for overuse of common extensor.  - Use of positioning devices for reducing episodes of pain while using cell phone & other tech devices.  - Removing the elastic tape before sleeping tonight or if abnormal symptoms occur that would indicate sensitivity. Additional skin barrier samples provided.  - Benefits of adding postural strengthening to current fitness program for reducing risks of wrist/elbow over-use and strain injuries.    Home Exercise Program and Home Care Resources/Education:  Issued HEP (see patient instructions in EMR) and educated on modality use for pain management . Exercises were reviewed and Vicky was able to demonstrate them prior to the end of the session.   Pt received a written copy of exercises to perform at home. Vicky demonstrated good  understanding of the education provided.  Pt was advised to perform these exercises free of pain, and to stop performing them if pain occurs.    Patient/Family Education: role of OT, goals for OT, scheduling/cancellations - pt verbalized understanding. Discussed insurance limitations with patient.    Assessment     Vicky Brand is a 56 y.o. female presents with limitations as described in problem list. Patient can benefit from Occupational Therapy services for Iontophoresis, ultrasound, moist heat, therapeutic exercises, home exercise program provied with written instructions, ice and strengthening and orthotics, if deemed necessary . The following goals were discussed with the patient and she is in agreement with them as to be  addressed in the treatment plan.    The patient's rehab potential is Good.     Anticipated barriers to occupational therapy: good  Pt has no cultural, educational or language barriers to learning provided.    Profile and History Assessment of Occupational Performance Level of Clinical Decision Making Complexity Score   Occupational Profile:   Vicky Brand is a 56 y.o. female who is currently employed Vicky Brand has difficulty with  ADLs and IADLs as listed previously, which  affecting his/her daily functional abilities.      Comorbidities:    has a past medical history of Dyspareunia in female, Eye abnormalities, Fracture of fifth toe, left, closed, with delayed healing, subsequent encounter, Intramural leiomyoma of uterus, Nocturia, Ocular migraine, CALEB (obstructive sleep apnea), Paradoxical insomnia, Posterior vitreous detachment of left eye, and Shoulder pain, left.    Medical and Therapy History Review:   Expanded               Performance Deficits    Physical:   Strength  Postural Control  Pain    Cognitive:  No Deficits    Psychosocial:    No Deficits     Clinical Decision Making:  low    Assessment Process:  Detailed Assessments    Modification/Need for Assistance:  Not Necessary    Intervention Selection:  Limited Treatment Options       low  Based on PMHX, co morbidities , data from assessments and functional level of assistance required with task and clinical presentation directly impacting function.         Goals:   LTG's (6-8 weeks):  1)  Increase left /lift tolerance (<3/10 pain) by 50% for advancing participation in ADLs and fitness program without further tendon irritation.  2)  Decrease complaints of pain to 2 out of 10 at worst to indicate follow through with effective home care and compliance with protecting healing tissues during advancing I/ADL and leisure activities.  3)  Pt to be (I) with Phase II of HEP and demonstrate understanding of appropriately grading  home care with or without orthotics/positioning assist.  4)   Patient to score at 25% or less on Quick/DASH to demonstrate improved perception of functional UE use for return to near to prior level of function for ADLs and household management with or without newly learned activity modifications.      STG's (4-6 weeks)  1)   Patient to be IND with Phase I of HEP and modalities for pain/edema managment.  2)   Pt to tolerate (<3/10 pain) advancing PREs with self-graded intensity and effective symptom monitoring.   3)  Patient to be IND with Orthotic use, wear and care precautions as needed for improving daytime and nighttime UE positioning as needed to protect healing tissues.  4)   Pt to report decreased episodes of pain in Left elbow during daytime activities with or without newly learned activity modifications.      Plan     Pt to be treated by Occupational Therapy 2 times per week for 8-10 weeks during the certification period from 6/2/2022 to 8/2/22 to achieve the established goals.     Treatment to include:  Fluidotherapy, Manual therapy/joint mobilizations, Modalities for pain management, US 3 mhz, Therapeutic exercises/activities, Self Care Instructions, Orthotic fit and management instructions, and Iontophoresis with 2.0 cc Dexamethasone, as well as any other treatments deemed necessary based on the patient's needs or progress.     Rachana Lizarraga, OT, CHT  Occupational Therapist, Certified Hand Therapist      I CERTIFY THE NEED FOR THESE SERVICES FURNISHED UNDER THIS PLAN OF TREATMENT AND WHILE UNDER MY CARE    Physician's comments:        Physician's Signature: ___________________________________________________

## 2022-06-02 NOTE — PROGRESS NOTES
"  [facility] Occupational Therapy  HAND/WRIST/ELBOW Initial Evaluation     Date: 6/2/2022  Patient: Vicky Brand  Chart Number: [id number]  Referring Physician: Petey Camarena  Therapy Diagnosis:   1. Lateral epicondylitis of left elbow  Ambulatory referral/consult to Physical/Occupational Therapy   2. Pain in left elbow     3. Pain aggravated by activities of daily living       Medical Diagnosis: M77.12 (ICD-10-CM) - Lateral epicondylitis of left elbow  Physician Orders:   Post Surgical? No   Eval and Treat Yes   Type of Therapy Outpatient Therapy   Location: Elbow     Order comments: L elbow lateral epicondylitis. PT/OT eval/treat. ROMAT, WBAT, modalities prn. HEP     Evaluation Date: 6/2/2022  Insurance Authorization Period Expiration: 12/31/22  Plan of Care from 6/2/2022 to 8/2/22   Surgery Date and Procedure: n/a  Date of Return to MD: to be scheduled    Visit #: 1 of 50  FOTO at [facility]: deferred to 2nd visit    Time In: 1:00 pm  Time Out: 1:45 pm  Total Billable Time: 45 min    Precautions:  Standard  Other: recent tendinitis in foot and bone spurs after [place] vacation; pt had been prescribed a boot for this; she is now back to fitness program including Elliptical and riding bike  Subjective      Involved Side: Left for this evaluation  Dominant Side: Right  Date of Onset: On/About 3/6/22 (just after Mardi [place]as)  History of Current Condition/Mechanism of Injury: Pt arrives explaining new onset Left elbow pain believed to be due to using the left more since protecting the right. She believes that the Left elbow is related to the activity engaged in that was heavy furniture lifting/moving and days of cleaning.   An injection in Right rec'd on 4/19/22. 4/19/22 MD Hx: "Patient is a 56 y.o. right hand dominant female who presents today with complaints of bilateral radial sided wrist pain.  The patient notes that 5-6 weeks ago she was moving furniture (and heavy cleaning for a 2 " "week period), subsequently thereafter developed significant right greater than left radial sided wrist pain.  This worsens with particular motions of the thumbs.  She denies any other complaints and presents today for initial evaluation.  She has attempted a cock-up wrist splint on right with no relief."  Imagin22: Impression:  No acute osseous abnormality seen.  Previous Therapy: OT for evaluation of Right Dequervain's. Pt did not have subsequent sessions beyond eval.    Patient's Goals for Therapy: Vicky desires to restore pain-free function of the Left UE for participating in all customary & necessary I/ADLs and work such as typing, fitness interests and caring for home/family.  Vicky desires to learn the pathology of her condition, how to manage her symptoms, and she desires to learn adaptive strategies for improved function in her daily life..    Pain:  Functional Pain Scale Rating 0-10:   2/10 at best  8/10 at worst  Location: Left elbow  Description: aching  Aggravating Factors: Lifting, resistive gripping  Easing Factors: wearing counter-force brace     Occupation:  Teacher, online, part-to-full time  Working presently: employed  Duties: typing     Functional Limitations/Social History:     Previous functional status includes:  Independent with all ADLs.      Current Level of Function               Home/Living environment : lives with their spouse              Limitation of Functional Status as follows:  ADLs/IADLs:   - Pt is unable to wash the dishes or perform moderate to heavy household tasks. Her  is performing this presently.  Self Care activities:                          - Feeding: Mod (I), removes the brace                          - Bathing: Mod (I)                          - Dressing/Grooming: Mod (I); snaps on bra are difficulty                          - Driving: Mod (I)              Leisure: Currently unable to return to Gardening and Fitness Program, or Crafts     CMS " Impairment/Limitation/Restriction for FOTO Hand Survey     Therapist will review FOTO scores for Vicky on her next visit.   FOTO documents entered into [employer] - see Media section.     Limitation Score: TBA next visit        Past Medical History/Physical Systems Review:   Vicky Brand  has a past medical history of Dyspareunia in female, Eye abnormalities, Fracture of fifth toe, left, closed, with delayed healing, subsequent encounter, Intramural leiomyoma of uterus, Nocturia, Ocular migraine, CALEB (obstructive sleep apnea), Paradoxical insomnia, Posterior vitreous detachment of left eye, and Shoulder pain, left.    Vicky Brand  has a past surgical history that includes Dilation and curettage of uterus; Tubal ligation; Colonoscopy (N/A, 6/9/2017); Endometrial ablation (N/A, 2017); and Hysteroscopy with dilation and curettage of uterus (N/A, 1/19/2022).    Vicky has a current medication list which includes the following prescription(s): calcium carbonate, diclofenac sodium, estradiol, multivitamin, and naproxen.    Review of patient's allergies indicates:   Allergen Reactions    Sulfa (sulfonamide antibiotics) Rash    Cetirizine      Other reaction(s): Unable to Function  Other reaction(s): Unable to Function    Erythromycin Nausea And Vomiting     Other reaction(s): Nausea    Zyrtec-d  [cetirizine-pseudoephedrine]      Other reaction(s): Inability to focus/function          Objective     Mental status: alert, interactive   Observations: Pt arrives wearing a Left Counter-force brace positioned slightly medially and posteriorly to the optimum place. She reports having Right and Left OTS Thumb SPICA that she purchased from a store. She was wearing the Right one. She purchased this after having discomfort from the ones provided at her MD office.  1st [facility] Shoulder signs (L>R).    Edema (girth measured in cm)   Right Left   DATE IE-6/2/22 IE-6/2/22   Elbow crease 23.9 23.8   3 Inches distal  to elbow crease defer defer     Range of Motion:   Left  Comments: No elbow or forearm AROM limitations as observed    Special Tests:   Left   6/2/2022   Cozen's Test positive   Radial Tunnel Test/s defer   Dance Test (radial head mobility) defer     Manual Muscle Testing: deferred due to high irritability      Common Extensor Tolerance measured with: (AVELINA Dynamometer (measured in psi.)    at Rung II Right/Left   Date IE-6/2/22   With Elbow flexed in neutral forearm position 28/13   With elbow extended in neutral forearm position 22/nt   With elbow extended in pronation position defer   With elbow extended in supination position defer       Treatment     Treatment Time In: 1:20 pm  Treatment Time Out: 1:45 pm  Total Treatment time separate from Evaluation time: 25 min    Vicky received the following supervised modalities after being cleared for contraindications for 0 minutes:   - defer    Vicky received the following direct contact modalities after being cleared for contraindications for 0 minutes:  - defer    Vicky received the following manual therapy techniques for 8 minutes:   - DTM in paulette-epicondyle areas for promoting healing and reducing adverse scar adhesions  - application of elastic tape for space correction at lateral epicondyle for reducing pain and promoting healing. Precautions were discussed in detail.     Vicky performed therapeutic exercises for 4 minutes including:  - Extrinsic extensor stretch, pain-free, in 2 positions    Vicky received Self Care and Orthotic mgmt for 10 minutes while participating in a concurrent discussion of evaluation findings and specific home care, including:  - education related to the tissue involvement as evidenced by MD diagnosis, positive response during provative movements and/or special testing.  - the anatomy and pathophysiology of diagnosis.  - instruction in activity modifications for goals of protecting healing tissues to include:  - wear  of the wrist support during daily activities for reducing provocative movement  - wear of counter-force brace during daily activities only; proper donning instructed and contraindications discussed  - minimizing episodes of pain elicited by improper lifting strategies.  - safe tape application  - Use of voice recognition software and other word retrieval short cuts as able to reduce episodes of pain and or potential for overuse of common extensor.  - Use of positioning devices for reducing episodes of pain while using cell phone & other tech devices.  - Removing the elastic tape before sleeping tonight or if abnormal symptoms occur that would indicate sensitivity. Additional skin barrier samples provided.  - Benefits of adding postural strengthening to current fitness program for reducing risks of wrist/elbow over-use and strain injuries.    Home Exercise Program and Home Care Resources/Education:  Issued HEP (see patient instructions in EMR) and educated on modality use for pain management . Exercises were reviewed and Vicky was able to demonstrate them prior to the end of the session.   Pt received a written copy of exercises to perform at home. Vicky demonstrated good  understanding of the education provided.  Pt was advised to perform these exercises free of pain, and to stop performing them if pain occurs.    Patient/Family Education: role of OT, goals for OT, scheduling/cancellations - pt verbalized understanding. Discussed insurance limitations with patient.    Assessment     Vicky Brand is a 56 y.o. female presents with limitations as described in problem list. Patient can benefit from Occupational Therapy services for Iontophoresis, ultrasound, moist heat, therapeutic exercises, home exercise program provied with written instructions, ice and strengthening and orthotics, if deemed necessary . The following goals were discussed with the patient and she is in agreement with them as to be  addressed in the treatment plan.    The patient's rehab potential is Good.     Anticipated barriers to occupational therapy: good  Pt has no cultural, educational or language barriers to learning provided.    Profile and History Assessment of Occupational Performance Level of Clinical Decision Making Complexity Score   Occupational Profile:   Vicky Brand is a 56 y.o. female who is currently employed Vicky Brand has difficulty with  ADLs and IADLs as listed previously, which  affecting his/her daily functional abilities.      Comorbidities:    has a past medical history of Dyspareunia in female, Eye abnormalities, Fracture of fifth toe, left, closed, with delayed healing, subsequent encounter, Intramural leiomyoma of uterus, Nocturia, Ocular migraine, CALEB (obstructive sleep apnea), Paradoxical insomnia, Posterior vitreous detachment of left eye, and Shoulder pain, left.    Medical and Therapy History Review:   Expanded               Performance Deficits    Physical:   Strength  Postural Control  Pain    Cognitive:  No Deficits    Psychosocial:    No Deficits     Clinical Decision Making:  low    Assessment Process:  Detailed Assessments    Modification/Need for Assistance:  Not Necessary    Intervention Selection:  Limited Treatment Options       low  Based on PMHX, co morbidities , data from assessments and functional level of assistance required with task and clinical presentation directly impacting function.         Goals:   LTG's (6-8 weeks):  1)  Increase left /lift tolerance (<3/10 pain) by 50% for advancing participation in ADLs and fitness program without further tendon irritation.  2)  Decrease complaints of pain to 2 out of 10 at worst to indicate follow through with effective home care and compliance with protecting healing tissues during advancing I/ADL and leisure activities.  3)  Pt to be (I) with Phase II of HEP and demonstrate understanding of appropriately grading  home care with or without orthotics/positioning assist.  4)   Patient to score at 25% or less on Quick/DASH to demonstrate improved perception of functional UE use for return to near to prior level of function for ADLs and household management with or without newly learned activity modifications.      STG's (4-6 weeks)  1)   Patient to be IND with Phase I of HEP and modalities for pain/edema managment.  2)   Pt to tolerate (<3/10 pain) advancing PREs with self-graded intensity and effective symptom monitoring.   3)  Patient to be IND with Orthotic use, wear and care precautions as needed for improving daytime and nighttime UE positioning as needed to protect healing tissues.  4)   Pt to report decreased episodes of pain in Left elbow during daytime activities with or without newly learned activity modifications.      Plan     Pt to be treated by Occupational Therapy 2 times per week for 8-10 weeks during the certification period from 6/2/2022 to 8/2/22 to achieve the established goals.     Treatment to include:  Fluidotherapy, Manual therapy/joint mobilizations, Modalities for pain management, US 3 mhz, Therapeutic exercises/activities, Self Care Instructions, Orthotic fit and management instructions, and Iontophoresis with 2.0 cc Dexamethasone, as well as any other treatments deemed necessary based on the patient's needs or progress.     Rachana Lizarraga, OT, CHT  Occupational Therapist, Certified Hand Therapist

## 2022-06-05 ENCOUNTER — ANESTHESIA EVENT (OUTPATIENT)
Dept: SURGERY | Facility: HOSPITAL | Age: 57
End: 2022-06-05
Payer: COMMERCIAL

## 2022-06-07 ENCOUNTER — PATIENT MESSAGE (OUTPATIENT)
Dept: SLEEP MEDICINE | Facility: CLINIC | Age: 57
End: 2022-06-07
Payer: COMMERCIAL

## 2022-06-07 ENCOUNTER — OFFICE VISIT (OUTPATIENT)
Dept: ORTHOPEDICS | Facility: CLINIC | Age: 57
End: 2022-06-07
Payer: COMMERCIAL

## 2022-06-07 ENCOUNTER — PATIENT MESSAGE (OUTPATIENT)
Dept: REHABILITATION | Facility: HOSPITAL | Age: 57
End: 2022-06-07
Payer: COMMERCIAL

## 2022-06-07 ENCOUNTER — TELEPHONE (OUTPATIENT)
Dept: ORTHOPEDICS | Facility: CLINIC | Age: 57
End: 2022-06-07
Payer: COMMERCIAL

## 2022-06-07 VITALS — HEIGHT: 66 IN | WEIGHT: 140 LBS | BODY MASS INDEX: 22.5 KG/M2

## 2022-06-07 DIAGNOSIS — M77.12 LATERAL EPICONDYLITIS OF LEFT ELBOW: Primary | ICD-10-CM

## 2022-06-07 PROBLEM — M65.4 DE QUERVAIN'S TENOSYNOVITIS, RIGHT: Status: ACTIVE | Noted: 2022-06-07

## 2022-06-07 PROCEDURE — 20605 INTERMEDIATE JOINT ASPIRATION/INJECTION: ICD-10-PCS | Mod: LT,S$GLB,, | Performed by: ORTHOPAEDIC SURGERY

## 2022-06-07 PROCEDURE — 99999 PR PBB SHADOW E&M-EST. PATIENT-LVL III: CPT | Mod: PBBFAC,,, | Performed by: ORTHOPAEDIC SURGERY

## 2022-06-07 PROCEDURE — 99213 PR OFFICE/OUTPT VISIT, EST, LEVL III, 20-29 MIN: ICD-10-PCS | Mod: 25,S$GLB,, | Performed by: ORTHOPAEDIC SURGERY

## 2022-06-07 PROCEDURE — 99213 OFFICE O/P EST LOW 20 MIN: CPT | Mod: 25,S$GLB,, | Performed by: ORTHOPAEDIC SURGERY

## 2022-06-07 PROCEDURE — 1159F PR MEDICATION LIST DOCUMENTED IN MEDICAL RECORD: ICD-10-PCS | Mod: CPTII,S$GLB,, | Performed by: ORTHOPAEDIC SURGERY

## 2022-06-07 PROCEDURE — 1159F MED LIST DOCD IN RCRD: CPT | Mod: CPTII,S$GLB,, | Performed by: ORTHOPAEDIC SURGERY

## 2022-06-07 PROCEDURE — 20605 DRAIN/INJ JOINT/BURSA W/O US: CPT | Mod: LT,S$GLB,, | Performed by: ORTHOPAEDIC SURGERY

## 2022-06-07 PROCEDURE — 99999 PR PBB SHADOW E&M-EST. PATIENT-LVL III: ICD-10-PCS | Mod: PBBFAC,,, | Performed by: ORTHOPAEDIC SURGERY

## 2022-06-07 PROCEDURE — 3008F PR BODY MASS INDEX (BMI) DOCUMENTED: ICD-10-PCS | Mod: CPTII,S$GLB,, | Performed by: ORTHOPAEDIC SURGERY

## 2022-06-07 PROCEDURE — 3008F BODY MASS INDEX DOCD: CPT | Mod: CPTII,S$GLB,, | Performed by: ORTHOPAEDIC SURGERY

## 2022-06-07 RX ORDER — TRAMADOL HYDROCHLORIDE 50 MG/1
50 TABLET ORAL EVERY 6 HOURS PRN
Qty: 10 TABLET | Refills: 0 | Status: SHIPPED | OUTPATIENT
Start: 2022-06-07 | End: 2022-08-04

## 2022-06-07 RX ORDER — TRIAMCINOLONE ACETONIDE 40 MG/ML
20 INJECTION, SUSPENSION INTRA-ARTICULAR; INTRAMUSCULAR
Status: DISCONTINUED | OUTPATIENT
Start: 2022-06-07 | End: 2022-06-07 | Stop reason: HOSPADM

## 2022-06-07 RX ADMIN — TRIAMCINOLONE ACETONIDE 20 MG: 40 INJECTION, SUSPENSION INTRA-ARTICULAR; INTRAMUSCULAR at 10:06

## 2022-06-07 NOTE — PATIENT INSTRUCTIONS
You may take Tylenol and Motrin in addition to your prescribed pain medication UNLESS you were prescribed Norco or Percocet.    Norco and Percocet contain tylenol, do not take tylenol with these medications if you were prescribed them.     Do not bear weight on the operative extremity.    Leave your dressing on until your follow up appointment.     Call us if you experience: fever, chills, chest pain, shortness of breath, severe headache, pain not relieved by oral medications, increased pain and swelling at the operative site, or any other questions or concerns. We are happy to assist you at any time.

## 2022-06-07 NOTE — PROGRESS NOTES
[facility]  History & Physical  Orthopedics    SUBJECTIVE:      COVID-19 attestation:  This patient was treated during the COVID-19 pandemic.  This was discussed with the patient, they are aware of our current policies and procedures, were given the option of delaying their visit and or switching to a virtual visit, delaying their surgery when applicable, and they elect to proceed.    Chief Complaint:  Bilateral radial sided wrist pain    Referring Provider: No ref. provider found     History of Present Illness:  Patient is a 56 y.o. right hand dominant female who presents today with complaints of bilateral radial sided wrist pain.  The patient notes that 5-6 weeks ago she was moving furniture and subsequently thereafter developed significant right greater than left radial sided wrist pain.  This worsens with particular motions of the thumbs.  She denies any other complaints and presents today for initial evaluation.  She has attempted a cock-up wrist splint on right with no relief..     Interval History 6/2/22: the patient is seen today to schedule surgery for the right wrist. She was treated for DeQuervains tendonitis with CSI, has been wearing brace and is scheduled to see OT today. She is now requesting surgery be performed due to no improvement in her pain. 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    Interval history June 7, 2022:  The patient presents today for re-evaluation.  She is scheduled for a right de Quervain release tomorrow but in the interim has developed significant left lateral elbow pain.  She has been treating this with a tennis elbow counterforce brace as well as Occupational therapy which she has begun.  She is here today to inquire as to additional treatment options for her left elbow while she is preparing for her right radial wrist surgery tomorrow.  No other new complaints.    The patient is a/an online teacher.    Onset of symptoms/DOI  was 5-6 weeks ago.    Symptoms are aggravated by activity and movement.    Symptoms are alleviated by rest.    Symptoms consist of pain.    The patient rates their pain as a 5/10.    Attempted treatment(s) and/or interventions include activity modifications, rest, immobilization, steroid injection.     The patient denies any fevers, chills, N/V, D/C and presents for evaluation.       Past Medical History:   Diagnosis Date    Dyspareunia in female 7/27/2021    Osphena, pelvic floor tx 2021, GYN [facility]    Eye abnormalities     early yellowing of lens, precursor to cataracts, Dr Webster,  wear glasses at all times    Fracture of fifth toe, left, closed, with delayed healing, subsequent encounter 8/21/2018    Intramural leiomyoma of uterus 5/22/2019    Irregular, dysmenorrhea    Nocturia 6/17/2021    Ocular migraine     vision fuzzy x 3 in 2013, metallic shapes in her vision, Dr Webster,  resolved after 30 min , no pain    CALEB (obstructive sleep apnea)     Lysenko, CPAP start 7/21    Paradoxical insomnia 10/30/2017    prior to menses, previously took amitryptiline 2.5 mg prn  qhs     Posterior vitreous detachment of left eye 5/22/2019    Flashes of light, ocular migranes, Ophthalmologist Dr Orourke, retinal specialist Dr Soni, rec avoid exercise until she follows up 5/2019    Shoulder pain, left 2007    LEFT, after fall, better with PT at [facility]     Past Surgical History:   Procedure Laterality Date    COLONOSCOPY N/A 6/9/2017    Procedure: COLONOSCOPY;  Surgeon: Davy Prince MD;  Location: [facility] ([address] [place] [address]);  Service: Endoscopy;  Laterality: N/A;    DILATION AND CURETTAGE OF UTERUS      ENDOMETRIAL ABLATION N/A 2017    HYSTEROSCOPY WITH DILATION AND CURETTAGE OF UTERUS N/A 1/19/2022    Procedure: HYSTEROSCOPY, WITH DILATION AND CURETTAGE OF UTERUS;  Surgeon: Jannette Jarrell MD;  Location[facility];  Service: OB/GYN;  Laterality: N/A;    TUBAL LIGATION       Review of patient's  "allergies indicates:   Allergen Reactions    Sulfa (sulfonamide antibiotics) Rash    Cetirizine      Other reaction(s): Unable to Function  Other reaction(s): Unable to Function    Erythromycin Nausea And Vomiting     Other reaction(s): Nausea    Zyrtec-d  [cetirizine-pseudoephedrine]      Other reaction(s): Inability to focus/function     Social History     Social History Narrative    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[employer],  & 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,      Family History   Problem Relation Age of Onset    Diabetes Father     Stroke Father 49    Hypertension Mother     Pacemaker/defibrilator Mother     Ovarian cancer Maternal Grandmother     Melanoma Neg Hx     Breast cancer Neg Hx     Colon cancer Neg Hx          Current Outpatient Medications:     calcium carbonate (OS-GISSELL) 600 mg calcium (1,500 mg) Tab, Take 600 mg by mouth once., Disp: , Rfl:     multivitamin capsule, Take 1 capsule by mouth once daily., Disp: , Rfl:     naproxen (NAPROSYN) 500 MG tablet, Take 1 tablet (500 mg total) by mouth 2 (two) times daily with meals., Disp: 30 tablet, Rfl: 0    traMADoL (ULTRAM) 50 mg tablet, Take 1 tablet (50 mg total) by mouth every 6 (six) hours as needed for Pain., Disp: 10 tablet, Rfl: 0      Review of Systems:  As per HPI otherwise noncontributory    OBJECTIVE:      Vital Signs (Most Recent):  Vitals:    06/07/22 1049   Weight: 63.5 kg (140 lb)   Height: 5' 6" (1.676 m)     Body mass index is 22.6 kg/m².      Physical Exam:  Constitutional: The patient appears well-developed and well-nourished. No distress.   Skin: No lesions appreciated  Head: Normocephalic and atraumatic.   Nose: Nose normal.   Ears: No deformities seen  Eyes: Conjunctivae and EOM are normal.   Neck: No tracheal deviation present.   Cardiovascular: Normal rate and intact distal pulses.    Pulmonary/Chest: Effort normal. No respiratory distress.   Abdominal: There is no guarding. "   Neurological: The patient is alert.   Psychiatric: The patient has a normal mood and affect.     Bilateral Hand/Wrist Examination:    Observation/Inspection:  Swelling  none    Deformity  none  Discoloration  none     Scars   none    Atrophy  None    Severe tenderness palpation about the left lateral epicondyle with a positive tennis elbow test with pain reproduced with resisted wrist extension    HAND/WRIST EXAMINATION:  Finkelstein's Test   positive right greater than left  WHAT Test    positive right greater than left  Snuff box tenderness   Neg  Contreras's Test    Neg  Hook of Hamate Tenderness  Neg  CMC grind    Neg  Circumduction test   Neg    Neurovascular Exam:  Digits WWP, brisk CR < 3s throughout  NVI motor/LTS to M/R/U nerves, radial pulse 2+  Tinel's Test - Carpal Tunnel  Neg  Tinel's Test - Cubital Tunnel  Neg  Phalen's Test    Neg  Median Nerve Compression Test Neg    ROM hand full, painless    ROM wrist full, painless    ROM elbow full, painless    Abdomen not guarded  Respirations nonlabored  Perfusion intact    Diagnostic Results:     Imaging - I independently viewed the patient's imaging as well as the radiology report.  Xrays of the patient's bilateral hands  demonstrate no evidence of any acute fractures or dislocations or significant degenerative changes.    MRI - 5/5/22  Impression:     Tenosynovitis of the 1st extensor compartment with tendinosis and partial-thickness tearing of the abductor pollicis longus tendon and surrounding soft tissue edema.  Findings are compatible with de Quervain tenosynovitis.    EMG - none    ASSESSMENT/PLAN:      56 y.o. yo female with Right Dequervains tendonitis, left lateral epicondylitis    Plan: The patient and I had a thorough discussion today.  We discussed the working diagnosis as well as several other potential alternative diagnoses.  Treatment options were discussed, both conservative and surgical.  Conservative treatment options would include things  such as activity modifications, workplace modifications, a period of rest, oral vs topical OTC and prescription anti-inflammatory medications, occupational therapy, splinting/bracing, immobilization, corticosteroid injections, and others.  Surgical options were discussed as well.     At this time, the patient would like to proceed surgery tomorrow as scheduled including a right de Quervain release.  For today, she would like to attempt a left tennis elbow injection which will be administered.  Follow-up tomorrow for surgery.      The patient has not responded to adequate non operative treatment at this time and/or non operative treatment is not indicated. Thus, the risks, benefits and alternatives to surgery were discussed with the patient in detail.  Specific risks include but are not limited to bleeding, infection, vessel and/or nerve damage, pain, numbness, tingling, complex regional pain syndrome, compartment syndrome, failure to return to pre-injury and/or preoperative functional status, scar sensitivity, delayed healing, inability to return to work, pulley injury, tendon injury, bowstringing, partial and/or incomplete relief of symptoms, weakness, persistence of and/or worsening of symptoms, surgical failure, osteomyelitis, amputation, loss of function, stiffness, functional debility, dysfunction, decreased  strength, need for prolonged postoperative rehabilitation, need for further surgery, deep venous thrombosis, pulmonary embolism, arthritis and death.  The patient states an understanding and wishes to proceed with surgery.   All questions were answered.  No guarantees were implied or stated.  Written informed consent was obtained.    Should the patient's symptoms worsen, persist, or fail to improve they should return for reevaluation and I would be happy to see them back anytime.

## 2022-06-07 NOTE — ANESTHESIA PREPROCEDURE EVALUATION
06/07/2022  Vicky Brand is a 56 y.o., female   Pre-operative evaluation for Procedure(s) (LRB):  RELEASE, HAND, FOR DEQUERVAIN'S TENOSYNOVITIS - RIGHT (Right)    Vicky Brand is a 56 y.o. female     Patient Active Problem List   Diagnosis    Status post hysteroscopy    Paradoxical insomnia    Irregular menses    Intramural leiomyoma of uterus    Nocturia    CALEB (obstructive sleep apnea)    Dyspareunia in female    Lack of coordination    Muscle weakness    Myalgia of pelvic floor    Wrist sprain    Plantar fasciitis    Pain in right wrist    Decreased range of motion of right thumb    Swelling of right wrist    Pain in left elbow    Pain aggravated by activities of daily living    De Quervain's tenosynovitis, right       Review of patient's allergies indicates:   Allergen Reactions    Sulfa (sulfonamide antibiotics) Rash    Cetirizine      Other reaction(s): Unable to Function  Other reaction(s): Unable to Function    Erythromycin Nausea And Vomiting     Other reaction(s): Nausea    Zyrtec-d  [cetirizine-pseudoephedrine]      Other reaction(s): Inability to focus/function       No current facility-administered medications on file prior to encounter.     Current Outpatient Medications on File Prior to Encounter   Medication Sig Dispense Refill    calcium carbonate (OS-GISSELL) 600 mg calcium (1,500 mg) Tab Take 600 mg by mouth once.      multivitamin capsule Take 1 capsule by mouth once daily.      naproxen (NAPROSYN) 500 MG tablet Take 1 tablet (500 mg total) by mouth 2 (two) times daily with meals. 30 tablet 0    [DISCONTINUED] diclofenac sodium (VOLTAREN) 1 % Gel Apply 2 g topically 4 (four) times daily. Apply to affected area up to 4 times per day PRN pain 100 g 4    [DISCONTINUED] estradioL (ESTRACE) 0.01 % (0.1 mg/gram) vaginal cream Place 0.5 g vaginally  twice a week. 42.5 g 3       Past Surgical History:   Procedure Laterality Date    COLONOSCOPY N/A 6/9/2017    Procedure: COLONOSCOPY;  Surgeon: Davy Prince MD;  Location: [address]);  Service: Endoscopy;  Laterality: N/A;    DILATION AND CURETTAGE OF UTERUS      ENDOMETRIAL ABLATION N/A 2017    HYSTEROSCOPY WITH DILATION AND CURETTAGE OF UTERUS N/A 1/19/2022    Procedure: HYSTEROSCOPY, WITH DILATION AND CURETTAGE OF UTERUS;  Surgeon: Jannette Jarrell MD;  [facility];  Service: OB/GYN;  Laterality: N/A;    TUBAL LIGATION           CBC:  No results found for: WBC, RBC, HGB, HCT, PLT, MCV, MCH, MCHC    CMP:   No results found for: NA, K, CL, CO2, BUN, CREATININE, GLU, MG, PHOS, CALCIUM, ALBUMIN, PROT, ALKPHOS, ALT, AST, BILITOT    INR:  No results found for: PT, INR, PROTIME, APTT      Diagnostic Studies:      EKG:   No results found for this or any previous visit.     2D Echo:  No results found for this or any previous visit.    Stress Test:   No results found for this or any previous visit.         .      Pre-op Assessment          Review of Systems      Physical Exam  General: Well nourished    Airway:  Mallampati: I / I  Mouth Opening: Normal  TM Distance: Normal  Tongue: Normal  Neck ROM: Normal ROM    Dental:  Intact    Chest/Lungs:  Clear to auscultation    Heart:  Rate: Normal  Rhythm: Regular Rhythm  Sounds: Normal        Anesthesia Plan  Type of Anesthesia, risks & benefits discussed:    Anesthesia Type: MAC, Gen ETT, Gen Supraglottic Airway, Gen Natural Airway  Intra-op Monitoring Plan: Standard ASA Monitors  Post Op Pain Control Plan: multimodal analgesia and peripheral nerve block  Induction:  IV  Informed Consent: Informed consent signed with the Patient and all parties understand the risks and agree with anesthesia plan.  All questions answered.   ASA Score: 2  Day of Surgery Review of History & Physical: H&P Update referred to the surgeon/provider.    Ready For Surgery From  Anesthesia Perspective.     .

## 2022-06-07 NOTE — TELEPHONE ENCOUNTER
Spoke with the patient. Notified of 5 AM arrival time to the [facility].  Informed of current visitor policy.  Reminded of NPO and need for transportation. Patient verbalized understanding to all.    Imani Blackwood MS, OTC  Clinical Assistant to Dr. Ross Dunbar [facility]

## 2022-06-07 NOTE — H&P
[facility]  History & Physical  Orthopedics     SUBJECTIVE:       COVID-19 attestation:  This patient was treated during the COVID-19 pandemic.  This was discussed with the patient, they are aware of our current policies and procedures, were given the option of delaying their visit and or switching to a virtual visit, delaying their surgery when applicable, and they elect to proceed.     Chief Complaint:  Bilateral radial sided wrist pain     Referring Provider: No ref. provider found      History of Present Illness:  Patient is a 56 y.o. right hand dominant female who presents today with complaints of bilateral radial sided wrist pain.  The patient notes that 5-6 weeks ago she was moving furniture and subsequently thereafter developed significant right greater than left radial sided wrist pain.  This worsens with particular motions of the thumbs.  She denies any other complaints and presents today for initial evaluation.  She has attempted a cock-up wrist splint on right with no relief..      Interval History 6/2/22: the patient is seen today to schedule surgery for the right wrist. She was treated for DeQuervains tendonitis with CSI, has been wearing brace and is scheduled to see OT today. She is now requesting surgery be performed due to no improvement in her pain. 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     Interval history June 7, 2022:  The patient presents today for re-evaluation.  She is scheduled for a right de Quervain release tomorrow but in the interim has developed significant left lateral elbow pain.  She has been treating this with a tennis elbow counterforce brace as well as Occupational therapy which she has begun.  She is here today to inquire as to additional treatment options for her left elbow while she is preparing for her right radial wrist surgery tomorrow.  No other new complaints.     The patient is a/an online teacher.     Onset of  symptoms/DOI was 5-6 weeks ago.     Symptoms are aggravated by activity and movement.     Symptoms are alleviated by rest.     Symptoms consist of pain.     The patient rates their pain as a 5/10.     Attempted treatment(s) and/or interventions include activity modifications, rest, immobilization, steroid injection.     The patient denies any fevers, chills, N/V, D/C and presents for evaluation.             Past Medical History:   Diagnosis Date    Dyspareunia in female 7/27/2021     Osphena, pelvic floor tx 2021, GYN [place]    Eye abnormalities       early yellowing of lens, precursor to cataracts, Dr Webster,  wear glasses at all times    Fracture of fifth toe, left, closed, with delayed healing, subsequent encounter 8/21/2018    Intramural leiomyoma of uterus 5/22/2019     Irregular, dysmenorrhea    Nocturia 6/17/2021    Ocular migraine       vision fuzzy x 3 in 2013, metallic shapes in her vision, Dr Webster,  resolved after 30 min , no pain    CALEB (obstructive sleep apnea)       [place], CPAP start 7/21    Paradoxical insomnia 10/30/2017     prior to menses, previously took amitryptiline 2.5 mg prn  qhs     Posterior vitreous detachment of left eye 5/22/2019     Flashes of light, ocular migranes, Ophthalmologist Dr Orourke, retinal specialist Dr Soni, rec avoid exercise until she follows up 5/2019    Shoulder pain, left 2007     LEFT, after fall, better with PT at "[facility] [facility] [place]            Past Surgical History:   Procedure Laterality Date    COLONOSCOPY N/A 6/9/2017     Procedure: COLONOSCOPY;  Surgeon: Davy Prince MD;  Location: [facility] ([address] [place] [address]);  Service: Endoscopy;  Laterality: N/A;    DILATION AND CURETTAGE OF UTERUS        ENDOMETRIAL ABLATION N/A 2017    HYSTEROSCOPY WITH DILATION AND CURETTAGE OF UTERUS N/A 1/19/2022     Procedure: HYSTEROSCOPY, WITH DILATION AND CURETTAGE OF UTERUS;  Surgeon: Jannette Jarrell MD;  Location: [facility];  Service: OB/GYN;  Laterality: N/A;     "TUBAL LIGATION                Review of patient's allergies indicates:   Allergen Reactions    Sulfa (sulfonamide antibiotics) Rash    Cetirizine         Other reaction(s): Unable to Function  Other reaction(s): Unable to Function    Erythromycin Nausea And Vomiting       Other reaction(s): Nausea    Zyrtec-d  [cetirizine-pseudoephedrine]         Other reaction(s): Inability to focus/function      Social History          Social History Narrative     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[employer],  & 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,             Family History   Problem Relation Age of Onset    Diabetes Father      Stroke Father 49    Hypertension Mother      Pacemaker/defibrilator Mother      Ovarian cancer Maternal Grandmother      Melanoma Neg Hx      Breast cancer Neg Hx      Colon cancer Neg Hx              Current Outpatient Medications:     calcium carbonate (OS-GISSELL) 600 mg calcium (1,500 mg) Tab, Take 600 mg by mouth once., Disp: , Rfl:     multivitamin capsule, Take 1 capsule by mouth once daily., Disp: , Rfl:     naproxen (NAPROSYN) 500 MG tablet, Take 1 tablet (500 mg total) by mouth 2 (two) times daily with meals., Disp: 30 tablet, Rfl: 0    traMADoL (ULTRAM) 50 mg tablet, Take 1 tablet (50 mg total) by mouth every 6 (six) hours as needed for Pain., Disp: 10 tablet, Rfl: 0        Review of Systems:  As per HPI otherwise noncontributory     OBJECTIVE:       Vital Signs (Most Recent):  Vitals       Vitals:     06/07/22 1049   Weight: 63.5 kg (140 lb)   Height: 5' 6" (1.676 m)         Body mass index is 22.6 kg/m².        Physical Exam:  Constitutional: The patient appears well-developed and well-nourished. No distress.   Skin: No lesions appreciated  Head: Normocephalic and atraumatic.   Nose: Nose normal.   Ears: No deformities seen  Eyes: Conjunctivae and EOM are normal.   Neck: No tracheal deviation present.   Cardiovascular: Normal rate and intact distal pulses.  "   Pulmonary/Chest: Effort normal. No respiratory distress.   Abdominal: There is no guarding.   Neurological: The patient is alert.   Psychiatric: The patient has a normal mood and affect.      Bilateral Hand/Wrist Examination:     Observation/Inspection:  Swelling                       none                  Deformity                     none  Discoloration               none                  Scars                           none                  Atrophy                        None     Severe tenderness palpation about the left lateral epicondyle with a positive tennis elbow test with pain reproduced with resisted wrist extension     HAND/WRIST EXAMINATION:  Finkelstein's Test                                positive right greater than left  WHAT Test                                         positive right greater than left  Snuff box tenderness                          Neg  Contreras's Test                                     Neg  Hook of Hamate Tenderness              Neg  CMC grind                                           Neg  Circumduction test                              Neg     Neurovascular Exam:  Digits WWP, brisk CR < 3s throughout  NVI motor/LTS to M/R/U nerves, radial pulse 2+  Tinel's Test - Carpal Tunnel                Neg  Tinel's Test - Cubital Tunnel               Neg  Phalen's Test                                      Neg  Median Nerve Compression Test       Neg     ROM hand full, painless     ROM wrist full, painless    ROM elbow full, painless     Abdomen not guarded  Respirations nonlabored  Perfusion intact     Diagnostic Results:     Imaging - I independently viewed the patient's imaging as well as the radiology report.  Xrays of the patient's bilateral hands  demonstrate no evidence of any acute fractures or dislocations or significant degenerative changes.     MRI - 5/5/22  Impression:     Tenosynovitis of the 1st extensor compartment with tendinosis and partial-thickness tearing of the abductor  pollicis longus tendon and surrounding soft tissue edema.  Findings are compatible with de Quervain tenosynovitis.     EMG - none     ASSESSMENT/PLAN:       56 y.o. yo female with Right Dequervains tendonitis, left lateral epicondylitis     Plan: The patient and I had a thorough discussion today.  We discussed the working diagnosis as well as several other potential alternative diagnoses.  Treatment options were discussed, both conservative and surgical.  Conservative treatment options would include things such as activity modifications, workplace modifications, a period of rest, oral vs topical OTC and prescription anti-inflammatory medications, occupational therapy, splinting/bracing, immobilization, corticosteroid injections, and others.  Surgical options were discussed as well.      At this time, the patient would like to proceed surgery tomorrow as scheduled including a right de Quervain release.  For today, she would like to attempt a left tennis elbow injection which will be administered.  Follow-up tomorrow for surgery.        The patient has not responded to adequate non operative treatment at this time and/or non operative treatment is not indicated. Thus, the risks, benefits and alternatives to surgery were discussed with the patient in detail.  Specific risks include but are not limited to bleeding, infection, vessel and/or nerve damage, pain, numbness, tingling, complex regional pain syndrome, compartment syndrome, failure to return to pre-injury and/or preoperative functional status, scar sensitivity, delayed healing, inability to return to work, pulley injury, tendon injury, bowstringing, partial and/or incomplete relief of symptoms, weakness, persistence of and/or worsening of symptoms, surgical failure, osteomyelitis, amputation, loss of function, stiffness, functional debility, dysfunction, decreased  strength, need for prolonged postoperative rehabilitation, need for further surgery, deep  venous thrombosis, pulmonary embolism, arthritis and death.  The patient states an understanding and wishes to proceed with surgery.   All questions were answered.  No guarantees were implied or stated.  Written informed consent was obtained.     Should the patient's symptoms worsen, persist, or fail to improve they should return for reevaluation and I would be happy to see them back anytime.

## 2022-06-07 NOTE — PROCEDURES
L lateral epicondyleIntermediate Joint Aspiration/Injection    Date/Time: 6/7/2022 10:45 AM  Performed by: Devan Wagner MD  Authorized by: Devan Wagner MD     Consent Done?: Yes (Verbal)  Indications:  Pain  Site marked: The procedure site was marked    Timeout: Prior to procedure the correct patient, procedure, and site was verified      Location:  Elbow  Prep: Patient was prepped and draped in usual sterile fashion    Ultrasonic Guidance for needle placement: No  Needle size:  25 G  Approach:  Posterolateral  Medications:  20 mg triamcinolone acetonide 40 mg/mL  Patient tolerance:  Patient tolerated the procedure well with no immediate complications

## 2022-06-07 NOTE — H&P (VIEW-ONLY)
[facility]  History & Physical  Orthopedics     SUBJECTIVE:       COVID-19 attestation:  This patient was treated during the COVID-19 pandemic.  This was discussed with the patient, they are aware of our current policies and procedures, were given the option of delaying their visit and or switching to a virtual visit, delaying their surgery when applicable, and they elect to proceed.     Chief Complaint:  Bilateral radial sided wrist pain     Referring Provider: No ref. provider found      History of Present Illness:  Patient is a 56 y.o. right hand dominant female who presents today with complaints of bilateral radial sided wrist pain.  The patient notes that 5-6 weeks ago she was moving furniture and subsequently thereafter developed significant right greater than left radial sided wrist pain.  This worsens with particular motions of the thumbs.  She denies any other complaints and presents today for initial evaluation.  She has attempted a cock-up wrist splint on right with no relief..      Interval History 6/2/22: the patient is seen today to schedule surgery for the right wrist. She was treated for DeQuervains tendonitis with CSI, has been wearing brace and is scheduled to see OT today. She is now requesting surgery be performed due to no improvement in her pain. 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     Interval history June 7, 2022:  The patient presents today for re-evaluation.  She is scheduled for a right de Quervain release tomorrow but in the interim has developed significant left lateral elbow pain.  She has been treating this with a tennis elbow counterforce brace as well as Occupational therapy which she has begun.  She is here today to inquire as to additional treatment options for her left elbow while she is preparing for her right radial wrist surgery tomorrow.  No other new complaints.     The patient is a/an online teacher.     Onset of  symptoms/DOI was 5-6 weeks ago.     Symptoms are aggravated by activity and movement.     Symptoms are alleviated by rest.     Symptoms consist of pain.     The patient rates their pain as a 5/10.     Attempted treatment(s) and/or interventions include activity modifications, rest, immobilization, steroid injection.     The patient denies any fevers, chills, N/V, D/C and presents for evaluation.             Past Medical History:   Diagnosis Date    Dyspareunia in female 7/27/2021     Osphena, pelvic floor tx 2021, GYN [facility]    Eye abnormalities       early yellowing of lens, precursor to cataracts, Dr Webster,  wear glasses at all times    Fracture of fifth toe, left, closed, with delayed healing, subsequent encounter 8/21/2018    Intramural leiomyoma of uterus 5/22/2019     Irregular, dysmenorrhea    Nocturia 6/17/2021    Ocular migraine       vision fuzzy x 3 in 2013, metallic shapes in her vision, Dr Webster,  resolved after 30 min , no pain    CALEB (obstructive sleep apnea)       [place], CPAP start 7/21    Paradoxical insomnia 10/30/2017     prior to menses, previously took amitryptiline 2.5 mg prn  qhs     Posterior vitreous detachment of left eye 5/22/2019     Flashes of light, ocular migranes, Ophthalmologist Dr Orourke, retinal specialist Dr Soni, rec avoid exercise until she follows up 5/2019    Shoulder pain, left 2007     LEFT, after fall, better with PT at [facility] [place]            Past Surgical History:   Procedure Laterality Date    COLONOSCOPY N/A 6/9/2017     Procedure: COLONOSCOPY;  Surgeon: Davy Prince MD;  Location: [facility] ([address] [place] [address]);  Service: Endoscopy;  Laterality: N/A;    DILATION AND CURETTAGE OF UTERUS        ENDOMETRIAL ABLATION N/A 2017    HYSTEROSCOPY WITH DILATION AND CURETTAGE OF UTERUS N/A 1/19/2022     Procedure: HYSTEROSCOPY, WITH DILATION AND CURETTAGE OF UTERUS;  Surgeon: Jannette Jarrell MD;  Location: [facility];  Service: OB/GYN;  Laterality: N/A;     "TUBAL LIGATION                Review of patient's allergies indicates:   Allergen Reactions    Sulfa (sulfonamide antibiotics) Rash    Cetirizine         Other reaction(s): Unable to Function  Other reaction(s): Unable to Function    Erythromycin Nausea And Vomiting       Other reaction(s): Nausea    Zyrtec-d  [cetirizine-pseudoephedrine]         Other reaction(s): Inability to focus/function      Social History          Social History Narrative     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,  & 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,             Family History   Problem Relation Age of Onset    Diabetes Father      Stroke Father 49    Hypertension Mother      Pacemaker/defibrilator Mother      Ovarian cancer Maternal Grandmother      Melanoma Neg Hx      Breast cancer Neg Hx      Colon cancer Neg Hx              Current Outpatient Medications:     calcium carbonate (OS-GISSELL) 600 mg calcium (1,500 mg) Tab, Take 600 mg by mouth once., Disp: , Rfl:     multivitamin capsule, Take 1 capsule by mouth once daily., Disp: , Rfl:     naproxen (NAPROSYN) 500 MG tablet, Take 1 tablet (500 mg total) by mouth 2 (two) times daily with meals., Disp: 30 tablet, Rfl: 0    traMADoL (ULTRAM) 50 mg tablet, Take 1 tablet (50 mg total) by mouth every 6 (six) hours as needed for Pain., Disp: 10 tablet, Rfl: 0        Review of Systems:  As per HPI otherwise noncontributory     OBJECTIVE:       Vital Signs (Most Recent):  Vitals       Vitals:     06/07/22 1049   Weight: 63.5 kg (140 lb)   Height: 5' 6" (1.676 m)         Body mass index is 22.6 kg/m².        Physical Exam:  Constitutional: The patient appears well-developed and well-nourished. No distress.   Skin: No lesions appreciated  Head: Normocephalic and atraumatic.   Nose: Nose normal.   Ears: No deformities seen  Eyes: Conjunctivae and EOM are normal.   Neck: No tracheal deviation present.   Cardiovascular: Normal rate and intact distal pulses.  "   Pulmonary/Chest: Effort normal. No respiratory distress.   Abdominal: There is no guarding.   Neurological: The patient is alert.   Psychiatric: The patient has a normal mood and affect.      Bilateral Hand/Wrist Examination:     Observation/Inspection:  Swelling                       none                  Deformity                     none  Discoloration               none                  Scars                           none                  Atrophy                        None     Severe tenderness palpation about the left lateral epicondyle with a positive tennis elbow test with pain reproduced with resisted wrist extension     HAND/WRIST EXAMINATION:  Finkelstein's Test                                positive right greater than left  WHAT Test                                         positive right greater than left  Snuff box tenderness                          Neg  Contreras's Test                                     Neg  Hook of Hamate Tenderness              Neg  CMC grind                                           Neg  Circumduction test                              Neg     Neurovascular Exam:  Digits WWP, brisk CR < 3s throughout  NVI motor/LTS to M/R/U nerves, radial pulse 2+  Tinel's Test - Carpal Tunnel                Neg  Tinel's Test - Cubital Tunnel               Neg  Phalen's Test                                      Neg  Median Nerve Compression Test       Neg     ROM hand full, painless     ROM wrist full, painless    ROM elbow full, painless     Abdomen not guarded  Respirations nonlabored  Perfusion intact     Diagnostic Results:     Imaging - I independently viewed the patient's imaging as well as the radiology report.  Xrays of the patient's bilateral hands  demonstrate no evidence of any acute fractures or dislocations or significant degenerative changes.     MRI - 5/5/22  Impression:     Tenosynovitis of the 1st extensor compartment with tendinosis and partial-thickness tearing of the abductor  pollicis longus tendon and surrounding soft tissue edema.  Findings are compatible with de Quervain tenosynovitis.     EMG - none     ASSESSMENT/PLAN:       56 y.o. yo female with Right Dequervains tendonitis, left lateral epicondylitis     Plan: The patient and I had a thorough discussion today.  We discussed the working diagnosis as well as several other potential alternative diagnoses.  Treatment options were discussed, both conservative and surgical.  Conservative treatment options would include things such as activity modifications, workplace modifications, a period of rest, oral vs topical OTC and prescription anti-inflammatory medications, occupational therapy, splinting/bracing, immobilization, corticosteroid injections, and others.  Surgical options were discussed as well.      At this time, the patient would like to proceed surgery tomorrow as scheduled including a right de Quervain release.  For today, she would like to attempt a left tennis elbow injection which will be administered.  Follow-up tomorrow for surgery.        The patient has not responded to adequate non operative treatment at this time and/or non operative treatment is not indicated. Thus, the risks, benefits and alternatives to surgery were discussed with the patient in detail.  Specific risks include but are not limited to bleeding, infection, vessel and/or nerve damage, pain, numbness, tingling, complex regional pain syndrome, compartment syndrome, failure to return to pre-injury and/or preoperative functional status, scar sensitivity, delayed healing, inability to return to work, pulley injury, tendon injury, bowstringing, partial and/or incomplete relief of symptoms, weakness, persistence of and/or worsening of symptoms, surgical failure, osteomyelitis, amputation, loss of function, stiffness, functional debility, dysfunction, decreased  strength, need for prolonged postoperative rehabilitation, need for further surgery, deep  venous thrombosis, pulmonary embolism, arthritis and death.  The patient states an understanding and wishes to proceed with surgery.   All questions were answered.  No guarantees were implied or stated.  Written informed consent was obtained.     Should the patient's symptoms worsen, persist, or fail to improve they should return for reevaluation and I would be happy to see them back anytime.

## 2022-06-08 ENCOUNTER — ANESTHESIA (OUTPATIENT)
Dept: SURGERY | Facility: HOSPITAL | Age: 57
End: 2022-06-08
Payer: COMMERCIAL

## 2022-06-08 ENCOUNTER — PATIENT MESSAGE (OUTPATIENT)
Dept: REHABILITATION | Facility: HOSPITAL | Age: 57
End: 2022-06-08
Payer: COMMERCIAL

## 2022-06-08 ENCOUNTER — HOSPITAL ENCOUNTER (OUTPATIENT)
Facility: HOSPITAL | Age: 57
Discharge: HOME | End: 2022-06-08
Attending: ORTHOPAEDIC SURGERY | Admitting: ORTHOPAEDIC SURGERY
Payer: COMMERCIAL

## 2022-06-08 VITALS
SYSTOLIC BLOOD PRESSURE: 147 MMHG | RESPIRATION RATE: 20 BRPM | BODY MASS INDEX: 22.5 KG/M2 | HEART RATE: 64 BPM | TEMPERATURE: 98 F | HEIGHT: 66 IN | WEIGHT: 140 LBS | DIASTOLIC BLOOD PRESSURE: 77 MMHG | OXYGEN SATURATION: 100 %

## 2022-06-08 DIAGNOSIS — M65.4 DE QUERVAIN'S TENOSYNOVITIS, RIGHT: Primary | ICD-10-CM

## 2022-06-08 LAB
B-HCG UR QL: NEGATIVE
CTP QC/QA: YES

## 2022-06-08 PROCEDURE — 81025 POCT URINE PREGNANCY: ICD-10-PCS | Mod: ,,, | Performed by: ORTHOPAEDIC SURGERY

## 2022-06-08 PROCEDURE — 64417 PR NERVE BLOCK INJ, ANES/STEROID, AXILLARY, INCL IMAG GUIDANCE: ICD-10-PCS | Mod: 59,RT,, | Performed by: ANESTHESIOLOGY

## 2022-06-08 PROCEDURE — 25000 INCISION OF TENDON SHEATH: CPT | Mod: RT,,, | Performed by: ORTHOPAEDIC SURGERY

## 2022-06-08 PROCEDURE — 63600175 PHARM REV CODE 636 W HCPCS: Performed by: SURGERY

## 2022-06-08 PROCEDURE — 25000003 PHARM REV CODE 250: Performed by: NURSE ANESTHETIST, CERTIFIED REGISTERED

## 2022-06-08 PROCEDURE — 25000003 PHARM REV CODE 250: Performed by: PHYSICIAN ASSISTANT

## 2022-06-08 PROCEDURE — 63600175 PHARM REV CODE 636 W HCPCS: Performed by: NURSE ANESTHETIST, CERTIFIED REGISTERED

## 2022-06-08 PROCEDURE — D9220A PRA ANESTHESIA: ICD-10-PCS | Mod: ANES,,, | Performed by: ANESTHESIOLOGY

## 2022-06-08 PROCEDURE — 36000706: Performed by: ORTHOPAEDIC SURGERY

## 2022-06-08 PROCEDURE — 64417 NJX AA&/STRD AX NERVE IMG: CPT | Mod: 59,RT,, | Performed by: ANESTHESIOLOGY

## 2022-06-08 PROCEDURE — 36000707: Performed by: ORTHOPAEDIC SURGERY

## 2022-06-08 PROCEDURE — 25000 PR INCIS TENDON SHEATH,RADIAL STYLOID: ICD-10-PCS | Mod: RT,,, | Performed by: ORTHOPAEDIC SURGERY

## 2022-06-08 PROCEDURE — 81025 URINE PREGNANCY TEST: CPT | Mod: ,,, | Performed by: ORTHOPAEDIC SURGERY

## 2022-06-08 PROCEDURE — D9220A PRA ANESTHESIA: Mod: CRNA,,, | Performed by: NURSE ANESTHETIST, CERTIFIED REGISTERED

## 2022-06-08 PROCEDURE — 76942 PR U/S GUIDANCE FOR NEEDLE GUIDANCE: ICD-10-PCS | Mod: 26,,, | Performed by: ANESTHESIOLOGY

## 2022-06-08 PROCEDURE — 27200704 HC ULTRASOUND NDL/ECHOSTIM: Performed by: ANESTHESIOLOGY

## 2022-06-08 PROCEDURE — 64415 NJX AA&/STRD BRCH PLXS IMG: CPT | Mod: 59,RT | Performed by: ANESTHESIOLOGY

## 2022-06-08 PROCEDURE — 76942 ECHO GUIDE FOR BIOPSY: CPT | Mod: 26,,, | Performed by: ANESTHESIOLOGY

## 2022-06-08 PROCEDURE — 63600175 PHARM REV CODE 636 W HCPCS: Performed by: ANESTHESIOLOGY

## 2022-06-08 PROCEDURE — 37000009 HC ANESTHESIA EA ADD 15 MINS: Performed by: ORTHOPAEDIC SURGERY

## 2022-06-08 PROCEDURE — 25000003 PHARM REV CODE 250: Performed by: SURGERY

## 2022-06-08 PROCEDURE — 81025 URINE PREGNANCY TEST: CPT | Performed by: ORTHOPAEDIC SURGERY

## 2022-06-08 PROCEDURE — 71000033 HC RECOVERY, INTIAL HOUR: Performed by: ORTHOPAEDIC SURGERY

## 2022-06-08 PROCEDURE — D9220A PRA ANESTHESIA: Mod: ANES,,, | Performed by: ANESTHESIOLOGY

## 2022-06-08 PROCEDURE — 37000008 HC ANESTHESIA 1ST 15 MINUTES: Performed by: ORTHOPAEDIC SURGERY

## 2022-06-08 PROCEDURE — 27201423 OPTIME MED/SURG SUP & DEVICES STERILE SUPPLY: Performed by: ORTHOPAEDIC SURGERY

## 2022-06-08 PROCEDURE — 71000015 HC POSTOP RECOV 1ST HR: Performed by: ORTHOPAEDIC SURGERY

## 2022-06-08 PROCEDURE — D9220A PRA ANESTHESIA: ICD-10-PCS | Mod: CRNA,,, | Performed by: NURSE ANESTHETIST, CERTIFIED REGISTERED

## 2022-06-08 RX ORDER — DEXAMETHASONE SODIUM PHOSPHATE 4 MG/ML
INJECTION, SOLUTION INTRA-ARTICULAR; INTRALESIONAL; INTRAMUSCULAR; INTRAVENOUS; SOFT TISSUE
Status: DISCONTINUED | OUTPATIENT
Start: 2022-06-08 | End: 2022-06-08

## 2022-06-08 RX ORDER — ROPIVACAINE HYDROCHLORIDE 5 MG/ML
INJECTION, SOLUTION EPIDURAL; INFILTRATION; PERINEURAL
Status: COMPLETED | OUTPATIENT
Start: 2022-06-08 | End: 2022-06-08

## 2022-06-08 RX ORDER — LIDOCAINE HYDROCHLORIDE 20 MG/ML
INJECTION INTRAVENOUS
Status: DISCONTINUED | OUTPATIENT
Start: 2022-06-08 | End: 2022-06-08

## 2022-06-08 RX ORDER — ACETAMINOPHEN 500 MG
1000 TABLET ORAL
Status: COMPLETED | OUTPATIENT
Start: 2022-06-08 | End: 2022-06-08

## 2022-06-08 RX ORDER — CEFAZOLIN SODIUM/D5W 2 G/100 ML
2 PLASTIC BAG, INJECTION (ML) INTRAVENOUS
Status: DISCONTINUED | OUTPATIENT
Start: 2022-06-08 | End: 2022-06-08 | Stop reason: HOSPADM

## 2022-06-08 RX ORDER — PROPOFOL 10 MG/ML
VIAL (ML) INTRAVENOUS CONTINUOUS PRN
Status: DISCONTINUED | OUTPATIENT
Start: 2022-06-08 | End: 2022-06-08

## 2022-06-08 RX ORDER — LIDOCAINE HYDROCHLORIDE 10 MG/ML
1 INJECTION, SOLUTION EPIDURAL; INFILTRATION; INTRACAUDAL; PERINEURAL ONCE
Status: DISCONTINUED | OUTPATIENT
Start: 2022-06-08 | End: 2022-09-29

## 2022-06-08 RX ORDER — FENTANYL CITRATE 50 UG/ML
25 INJECTION, SOLUTION INTRAMUSCULAR; INTRAVENOUS EVERY 5 MIN PRN
Status: DISCONTINUED | OUTPATIENT
Start: 2022-06-08 | End: 2022-06-08 | Stop reason: HOSPADM

## 2022-06-08 RX ORDER — PROPOFOL 10 MG/ML
VIAL (ML) INTRAVENOUS
Status: DISCONTINUED | OUTPATIENT
Start: 2022-06-08 | End: 2022-06-08

## 2022-06-08 RX ORDER — MUPIROCIN 20 MG/G
OINTMENT TOPICAL
Status: DISCONTINUED | OUTPATIENT
Start: 2022-06-08 | End: 2022-06-08 | Stop reason: HOSPADM

## 2022-06-08 RX ORDER — CELECOXIB 200 MG/1
400 CAPSULE ORAL ONCE
Status: DISCONTINUED | OUTPATIENT
Start: 2022-06-08 | End: 2022-09-29

## 2022-06-08 RX ORDER — MIDAZOLAM HYDROCHLORIDE 1 MG/ML
.5-4 INJECTION INTRAMUSCULAR; INTRAVENOUS
Status: DISCONTINUED | OUTPATIENT
Start: 2022-06-08 | End: 2022-09-29

## 2022-06-08 RX ORDER — FENTANYL CITRATE 50 UG/ML
25-200 INJECTION, SOLUTION INTRAMUSCULAR; INTRAVENOUS
Status: DISCONTINUED | OUTPATIENT
Start: 2022-06-08 | End: 2022-09-29

## 2022-06-08 RX ORDER — OXYCODONE HYDROCHLORIDE 5 MG/1
5 TABLET ORAL
Status: DISCONTINUED | OUTPATIENT
Start: 2022-06-08 | End: 2022-06-08 | Stop reason: HOSPADM

## 2022-06-08 RX ADMIN — PROPOFOL 50 MG: 10 INJECTION, EMULSION INTRAVENOUS at 07:06

## 2022-06-08 RX ADMIN — ACETAMINOPHEN 1000 MG: 500 TABLET ORAL at 05:06

## 2022-06-08 RX ADMIN — MIDAZOLAM 2 MG: 1 INJECTION INTRAMUSCULAR; INTRAVENOUS at 06:06

## 2022-06-08 RX ADMIN — DEXAMETHASONE SODIUM PHOSPHATE 4 MG: 4 INJECTION, SOLUTION INTRAMUSCULAR; INTRAVENOUS at 07:06

## 2022-06-08 RX ADMIN — ROPIVACAINE HYDROCHLORIDE 30 ML: 5 INJECTION, SOLUTION EPIDURAL; INFILTRATION; PERINEURAL at 06:06

## 2022-06-08 RX ADMIN — PROPOFOL 50 MCG/KG/MIN: 10 INJECTION, EMULSION INTRAVENOUS at 07:06

## 2022-06-08 RX ADMIN — SODIUM CHLORIDE: 9 INJECTION, SOLUTION INTRAVENOUS at 06:06

## 2022-06-08 RX ADMIN — MUPIROCIN: 20 OINTMENT TOPICAL at 05:06

## 2022-06-08 RX ADMIN — LIDOCAINE HYDROCHLORIDE 50 MG: 20 INJECTION, SOLUTION INTRAVENOUS at 07:06

## 2022-06-08 NOTE — DISCHARGE INSTRUCTIONS
AFTER HAND SURGERY    DOS:  Keep affected wrist elevated above the level of your heart  Check circulation frequently in fingers by pressing on the nail bed. Nail bed should turn white and then pink when released.  Exercise fingers to promote circulation.  Advance diet as tolerated  Resume home medications today.      DONT:  No driving for 24 hours or while taking narcotic pain medication  DO NOT TAKE ADDITIONAL TYLENOL/ACETAMINOPHEN WHILE TAKING NARCOTIC PAIN MEDICATION THAT CONTAINS TYLENOL/ACETAMINOPHEN.    CALL PHYSICIAN FOR:  Obvious bleeding  Excessive swelling  Drainage (pus) from the wound  Pain unrelieved by pain medication.

## 2022-06-08 NOTE — PLAN OF CARE
VSS. Pt able to tolerate oral liquids. Pt denies c/o pain. Dressing and sling intact. Spouse received home meds per bedside delivery. No distress noted. Pt states she is ready for D/C. D/C instructions reviewed with pt and spouse, verbalized understanding.

## 2022-06-08 NOTE — PLAN OF CARE
Pre Op Checklist Complete. Pt resting in bed with call lgith in reach.  brought to bedside and plan to take pt belongings. Pt still needs MD signature on consent/ site janusz/ Anes consent.

## 2022-06-08 NOTE — OP NOTE
ORTHOPEDIC SURGERY OPERATIVE NOTE     SERVICE: ORTHOPEDICS      DATE OF PROCEDURE:06/08/2022     SURGEON: Devan Wagner M.D.     ASSISTANT: SMA Jaison     PREOPERATIVE DIAGNOSIS:   1)Right deQuervains tenosynovitis     POSTOPERATIVE DIAGNOSIS:  1) Right deQuervains tenosynovitis     PROCEDURE PERFORMED:  1) Right first dorsal compartment release     ANESTHESIA: Local/MAC     ESTIMATED BLOOD LOSS: Minimal     SPECIMENS: None     COMPLICATIONS: None           CONDITION: Stable     IV FLUIDS: Crystalloid per anesthesia     IMPLANTS: None     TOURNIQUET TIME: 12 minutes at 250 mmHg     INDICATIONS FOR PROCEDURE: The patient is a 56 y.o. female who presented to clinic with findings and workup consistent with dequervains tenosynovitis of the Right wrist.  The patient had not responded to nonsurgical treatments and desired surgical intervention which was deemed appropriate.  Thus, the risks, benefits and alternatives to surgery were discussed with the patient in detail.  Specific risks include but are not limited to bleeding, infection, vessel and/or nerve damage, pain, numbness, tingling, complex regional pain syndrome, compartment syndrome, failure to return to pre-injury and/or preoperative functional status, scar sensitivity, delayed healing, inability to return to work, pulley injury, tendon injury, bowstringing, partial and/or incomplete relief of symptoms, weakness, persistence of and/or worsening of symptoms, surgical failure, osteomyelitis, amputation, loss of function, stiffness, functional debility, dysfunction, decreased  strength, need for prolonged postoperative rehabilitation, need for further surgery, deep venous thrombosis, pulmonary embolism, arthritis and death.  The patient states an understanding and wishes to proceed with surgery.   All questions were answered.  No guarantees were implied or stated.  Written informed consent was obtained.       PROCEDURE IN DETAIL: With the patient's participation  in the preoperative holding area, the Right upper extremity was marked as the surgical site. Preoperative checks were completed and consents were verified.  The patient was brought to the Operating Room and placed in supine position.  A well-padded nonsterile tourniquet was placed on the forearm.  The operative arm was then prepped and draped in the usual sterile fashion.  Timeout was called for to verify the correct site, procedure and patient.  All were in agreement and we proceeded.  MAC anesthesia was introduced.  Under sterile conditions, an injection of 1% plain lidocaine was injected into the skin and subcutaneous tissues at the first dorsal compartment.  The arm was exsanguinated using an Esmarch and the tourniquet was inflated to 250mmHg. I turned my attention toward the dequervains first dorsal compartment release.  Skin incision was made sharply with scalpel for a length of 2cm and dissection carried down through subcutaneous tissues.  The radial sensory nerve and several of its branches were identified and retracted.  Dissection carried more deeply to the level of the first dorsal compartment.  This was incised and complete division was taken proximally and distally to ensure complete release.  Division was made dorsally in the retinaculum to decrease the risk of tendon subluxation.  Complete division was undertaken both proximally and distally.  Once complete release was performed of both the APL as well as the EPB the wound was irrigated copiously with sterile saline. Skin was closed with 4-0 Vicryl, 4-0 Monocryl, mastisol and steris.  Sterile dressing was applied consisting of Xeroform 4 x 4 gauze and a thumb spica splint.  Tourniquet was then deflated at which point brisk capillary refill ensued throughout the operative extremity, specifically including the thumb.  There was no significant bleeding.  The patient was transferred to the recovery room in stable condition.      DISPOSITION: The patient  will be discharged home with followup in 22 weeks for suture removal.  They are to keep the dressing clean, dry, and intact until that time.  Range of motion of the non operative digits was discussed and encouraged as tolerated without restrictions.

## 2022-06-08 NOTE — ANESTHESIA POSTPROCEDURE EVALUATION
Anesthesia Post Evaluation    Patient: Vicky Brand    Procedure(s) Performed: Procedure(s) (LRB):  RELEASE, HAND, FOR DEQUERVAIN'S TENOSYNOVITIS - RIGHT (Right)    Final Anesthesia Type: general      Patient location during evaluation: PACU  Patient participation: Yes- Able to Participate  Level of consciousness: awake and alert  Post-procedure vital signs: reviewed and stable  Pain management: adequate  Airway patency: patent    PONV status at discharge: No PONV  Anesthetic complications: no      Cardiovascular status: blood pressure returned to baseline  Respiratory status: unassisted  Hydration status: euvolemic  Follow-up not needed.          Vitals Value Taken Time   /77 06/08/22 0802   Temp 36.5 °C (97.7 °F) 06/08/22 0800   Pulse 60 06/08/22 0809   Resp 27 06/08/22 0809   SpO2 100 % 06/08/22 0809   Vitals shown include unvalidated device data.      Event Time   Out of Recovery 08:07:00         Pain/Alan Score: Pain Rating Prior to Med Admin: 0 (6/8/2022  5:56 AM)  Alan Score: 9 (6/8/2022  7:37 AM)

## 2022-06-08 NOTE — ANESTHESIA PROCEDURE NOTES
R Axillary SS    Patient location during procedure: pre-op   Block not for primary anesthetic.  Reason for block: at surgeon's request and post-op pain management   Post-op Pain Location: R wrist pain   Start time: 6/8/2022 6:38 AM  Timeout: 6/8/2022 6:36 AM   End time: 6/8/2022 6:41 AM    Staffing  Authorizing Provider: Azalia Irizarry MD  Performing Provider: Azalia Irizarry MD    Preanesthetic Checklist  Completed: patient identified, IV checked, site marked, risks and benefits discussed, surgical consent, monitors and equipment checked, pre-op evaluation and timeout performed  Peripheral Block  Patient position: supine  Prep: ChloraPrep  Patient monitoring: heart rate, cardiac monitor, continuous pulse ox, continuous capnometry and frequent blood pressure checks  Block type: axillary  Laterality: right  Injection technique: single shot  Needle  Needle type: Stimuplex   Needle gauge: 22 G  Needle length: 2 in  Needle localization: anatomical landmarks and ultrasound guidance  Needle insertion depth: 2.5 cm   -ultrasound image captured on disc.  Assessment  Injection assessment: negative aspiration, negative parasthesia and local visualized surrounding nerve  Paresthesia pain: none  Heart rate change: no  Slow fractionated injection: yes  Pain Tolerance: comfortable throughout block and no complaints  Medications:    Medications: ropivacaine (NAROPIN) injection 0.5% - Perineural   30 mL - 6/8/2022 6:52:00 AM    Additional Notes  VSS.  [facility] RN monitoring vitals throughout procedure.  Patient tolerated procedure well.     Ropiv with 1: 400 k epi

## 2022-06-08 NOTE — INTERVAL H&P NOTE
The patient has been examined and the H&P has been reviewed:    I concur with the findings and no changes have occurred since H&P was written.    Surgery risks, benefits and alternative options discussed and understood by patient/family.          Active Hospital Problems    Diagnosis  POA    *De Quervain's tenosynovitis, right [M65.4]  Yes      Resolved Hospital Problems   No resolved problems to display.

## 2022-06-08 NOTE — TRANSFER OF CARE
"Anesthesia Transfer of Care Note    Patient: Vicky Brand    Procedure(s) Performed: Procedure(s) (LRB):  RELEASE, HAND, FOR DEQUERVAIN'S TENOSYNOVITIS - RIGHT (Right)    Patient location: PACU    Anesthesia Type: general and regional    Transport from OR: Transported from OR on 2-3 L/min O2 by NC with adequate spontaneous ventilation    Post pain: adequate analgesia    Post assessment: no apparent anesthetic complications and tolerated procedure well    Post vital signs: stable    Level of consciousness: awake, alert and oriented    Nausea/Vomiting: no nausea/vomiting    Complications: none    Transfer of care protocol was followed      Last vitals:   Visit Vitals  /63   Pulse 76   Temp 36.5 °C (97.7 °F)   Resp 16   Ht 5' 6" (1.676 m)   Wt 63.5 kg (140 lb)   SpO2 100%   Breastfeeding No   BMI 22.60 kg/m²     "

## 2022-06-08 NOTE — BRIEF OP NOTE
[facility] - Surgery (Hospital)  Brief Operative Note    Surgery Date: 6/8/2022     Surgeon(s) and Role:     * Devan Wagner MD - Primary    Assisting Surgeon: None    Pre-op Diagnosis:  De Quervain's tenosynovitis, right [M65.4]    Post-op Diagnosis:  Post-Op Diagnosis Codes:     * De Quervain's tenosynovitis, right [M65.4]    Procedure(s) (LRB):  RELEASE, HAND, FOR DEQUERVAIN'S TENOSYNOVITIS - RIGHT (Right)    Anesthesia: Regional    Operative Findings: See op note    Estimated Blood Loss: * No values recorded between 6/8/2022  7:16 AM and 6/8/2022  7:36 AM *         Specimens:   Specimen (24h ago, onward)            None            Discharge Note    OUTCOME: Patient tolerated treatment/procedure well without complication and is now ready for discharge.    DISPOSITION: Home or Self Care    FINAL DIAGNOSIS:  De Quervain's tenosynovitis, right    FOLLOWUP: In clinic    DISCHARGE INSTRUCTIONS:    Discharge Procedure Orders   Diet general     Call MD for:  temperature >100.4     Call MD for:  persistent nausea and vomiting     Call MD for:  severe uncontrolled pain     Call MD for:  difficulty breathing, headache or visual disturbances     Call MD for:  redness, tenderness, or signs of infection (pain, swelling, redness, odor or green/yellow discharge around incision site)     Call MD for:  hives     Call MD for:  persistent dizziness or light-headedness     Call MD for:  extreme fatigue

## 2022-06-09 ENCOUNTER — TELEPHONE (OUTPATIENT)
Dept: ORTHOPEDICS | Facility: CLINIC | Age: 57
End: 2022-06-09
Payer: COMMERCIAL

## 2022-06-09 NOTE — TELEPHONE ENCOUNTER
LVM for patient regarding PO swelling, recommended ice/elevation/motion.    Imani Blackwood, MS, OTC  Clinical & OR Assistant to Dr. Ross Dunbar [facility]  [phone]        ----- Message from Luisa Manuel RN sent at 6/8/2022  5:01 PM CDT -----    ----- Message -----  From: Moira Forte  Sent: 6/8/2022   4:52 PM CDT  To: [facility] Staff    Name of Who is Calling: Lamonte Brand on behalf of ANANYA BRAND [[id number]]           What is the request in detail: Patient's  is requesting a call back to discuss patient's post op swelling. Please assist.           Can the clinic reply by [facility]: NO           What Number to Call Back if not in [facility]: [phone]

## 2022-06-10 ENCOUNTER — TELEPHONE (OUTPATIENT)
Dept: ORTHOPEDICS | Facility: CLINIC | Age: 57
End: 2022-06-10
Payer: COMMERCIAL

## 2022-06-10 ENCOUNTER — OFFICE VISIT (OUTPATIENT)
Dept: ORTHOPEDICS | Facility: CLINIC | Age: 57
End: 2022-06-10
Payer: COMMERCIAL

## 2022-06-10 DIAGNOSIS — Z98.890 POST-OPERATIVE STATE: Primary | ICD-10-CM

## 2022-06-10 PROCEDURE — 99999 PR PBB SHADOW E&M-EST. PATIENT-LVL I: CPT | Mod: PBBFAC,,, | Performed by: PHYSICIAN ASSISTANT

## 2022-06-10 PROCEDURE — 99024 PR POST-OP FOLLOW-UP VISIT: ICD-10-PCS | Mod: S$GLB,,, | Performed by: PHYSICIAN ASSISTANT

## 2022-06-10 PROCEDURE — 99999 PR PBB SHADOW E&M-EST. PATIENT-LVL I: ICD-10-PCS | Mod: PBBFAC,,, | Performed by: PHYSICIAN ASSISTANT

## 2022-06-10 PROCEDURE — 99024 POSTOP FOLLOW-UP VISIT: CPT | Mod: S$GLB,,, | Performed by: PHYSICIAN ASSISTANT

## 2022-06-10 NOTE — PROGRESS NOTES
Pt is s/p below surgery. She presents for splint check. Her symptoms were relieved with loosening the outer ACE bandage. She did not require splint removal/ splint change.      DATE OF PROCEDURE:06/08/2022     SURGEON: Devan Wagner M.D.     PROCEDURE PERFORMED:  1) Right first dorsal compartment release    RTC for regular post op, call sooner if needed

## 2022-06-10 NOTE — TELEPHONE ENCOUNTER
Spoke with patient's , offered 9a and 3p slots with Whitney Burris PA-C (approved). Prefer 3p. All questions answered. Patient verbalized understanding and was thankful.     Imani Blackwood MS, OTC  Clinical & OR Assistant to Dr. Ross Dunbar [facility]  [phone]

## 2022-06-13 ENCOUNTER — PATIENT MESSAGE (OUTPATIENT)
Dept: ORTHOPEDICS | Facility: CLINIC | Age: 57
End: 2022-06-13
Payer: COMMERCIAL

## 2022-06-13 ENCOUNTER — CLINICAL SUPPORT (OUTPATIENT)
Dept: REHABILITATION | Facility: HOSPITAL | Age: 57
End: 2022-06-13
Payer: COMMERCIAL

## 2022-06-13 DIAGNOSIS — M25.531 PAIN IN RIGHT WRIST: ICD-10-CM

## 2022-06-13 DIAGNOSIS — M62.81 MUSCLE WEAKNESS: ICD-10-CM

## 2022-06-13 DIAGNOSIS — R52 PAIN AGGRAVATED BY ACTIVITIES OF DAILY LIVING: ICD-10-CM

## 2022-06-13 DIAGNOSIS — M77.12 LATERAL EPICONDYLITIS OF LEFT ELBOW: ICD-10-CM

## 2022-06-13 DIAGNOSIS — M25.641 DECREASED RANGE OF MOTION OF RIGHT THUMB: Primary | ICD-10-CM

## 2022-06-13 DIAGNOSIS — M65.4 DE QUERVAIN'S TENOSYNOVITIS, RIGHT: Primary | ICD-10-CM

## 2022-06-13 PROCEDURE — L3806 WHFO W/JOINT(S) CUSTOM FAB: HCPCS | Mod: PN

## 2022-06-13 NOTE — PATIENT INSTRUCTIONS
CUSTOM  ORTHOSIS (SPLINT) INSTRUCTIONS      A CUSTOM SPLINT HAS BEEN FABRICATED FOR YOUR Right Thumb and Hand and Wrist.    THE PURPOSE OF YOUR SPLINT IS TO:  - To protect your surgery site.      PLEASE WEAR YOUR SPLINT/S AS FOLLOWS:      - Wear at all times (24/7) including while sleeping. Remove for bathing and/or hygiene. Use your unaffected hand for bathing. Remove for gentle cleansing around the incision site and healed areas of the hand/wrist. Keep any remaining sutures dry.    REMOVE YOUR SPLINT TO PERFORM THE HOME EXERCISES IF INSTRUCTED.     BRING SPLINT/S TO EACH THERAPY SESSION SO THAT YOUR OCCUPATIONAL THERAPIST CAN PROVIDE MODIFICATIONS AS NEEDED TO:  -  Improve the fit and/or comfort. Reduce pressure areas or areas of abrasion. Repair straps or velcro. Monitor effectiveness of the splint.    PLEASE WATCH FOR AND BE AWARE OF THE FOLLOWING:  - Signs of infection that may include increased swelling, change in color, increased drainage, increased pain, change in smell. Contact your MD or seek advice from an urgent care MD if after hours. Broken straps or velcro. Use an ace wrap or tape as needed to secure the splint until it can be repaired.      Keep your orthosis away from any heat sources that may cause it to change shape. Do not leave your orthosis in, near or around the following sources:       Hot water       hand dryers       stove       clothes dryer         radiator     palak car    palak window sill     baggage stored under an airplane            microwave oven    DO NOT alter the SPLINT yourself. It may no longer fit or be effective if it is damaged.    Please contact your Occupational Therapist/Hand Therapist, Rachana Lizarraga or Bharat Bird OT at [phone] if you need to request a splint check. If you have specific questions or concerns, you can message her through the [facility] portal at [email or web address].

## 2022-06-13 NOTE — PLAN OF CARE
OT Orthosis Only    TIME RECORD    Date:  6/13/2022    Start Time:  0830  Stop Time:  0915    PROCEDURES:      UNTIMED  Procedure Min.    -               Total Timed Minutes:  0  Total Timed Units:  0  Total Untimed Units:  1  Charges Billed/# of units:   L2800h 1    Occupational Therapy Orthotic Note    Patient: Vicky Brand  MRN: [id number]  Date of visit: 6/13/2022  Referring Physician:  Petey Camarena MD ORDERS: postop splinting following right dequervains release  Next MD visit: 6/23/22  Primary Diagnosis: right dequervains release  Treatment Diagnosis:   Encounter Diagnoses   Name Primary?    Decreased range of motion of right thumb Yes    Muscle weakness     Pain aggravated by activities of daily living     Pain in right wrist      DOS  5/27/22    Subjective:   Patient arrived in bulky postop splint deeply concerned about treatment for her left elbow as well as her right wrist.    Objective:     Patient seen by OT this session for fabrication of orthosis per MD orders. Fabricated and applied forearm based thumb spica splint.  Assessment:     Orthosis with good fit following fabrication. Pt. Able to yasmine/doff independently.      Patient Education/Response:   Pt. Instructed to wear continuous, remove for bathing or hygiene. Patient/caregiver were provided written instructions on orthosis purpose, wear schedule, care and precautions to monitor for increased pain/edema, pressure points, skin breakdown or redness/skin irritation. Patient/caregiver to contact clinic for adjustments as needed.       (see Media or Patient Instructions for documents)    Plans and Goals:     Goal of Orthosis to protect sx site    Pt to return to treatment 2x/weekly for 6-8 weeks. Orthosis Check PRN, f/u with MD at RTD    Patient/caregiver to contact clinic for adjustments as needed.      Bharat Bird, OTR/L  Occupational Therapist      I certify the need for these services furnished under this plan of  treatment and while under my care    ____________________________________  Physician/Referring Practitioner    _______________  Date of Signature

## 2022-06-15 ENCOUNTER — CLINICAL SUPPORT (OUTPATIENT)
Dept: REHABILITATION | Facility: HOSPITAL | Age: 57
End: 2022-06-15
Attending: ORTHOPAEDIC SURGERY
Payer: COMMERCIAL

## 2022-06-15 DIAGNOSIS — M79.89 SWELLING OF RIGHT HAND: ICD-10-CM

## 2022-06-15 DIAGNOSIS — M25.641 DECREASED RANGE OF MOTION OF FINGER OF RIGHT HAND: ICD-10-CM

## 2022-06-15 DIAGNOSIS — M25.631 DECREASED RANGE OF MOTION OF RIGHT WRIST: ICD-10-CM

## 2022-06-15 DIAGNOSIS — M65.4 DE QUERVAIN'S TENOSYNOVITIS, RIGHT: ICD-10-CM

## 2022-06-15 DIAGNOSIS — M77.12 LATERAL EPICONDYLITIS OF LEFT ELBOW: ICD-10-CM

## 2022-06-15 DIAGNOSIS — M79.641 PAIN IN RIGHT HAND: ICD-10-CM

## 2022-06-15 DIAGNOSIS — Z74.1 REQUIRES ASSISTANCE WITH ACTIVITIES OF DAILY LIVING (ADL): ICD-10-CM

## 2022-06-15 PROBLEM — M25.531 PAIN IN RIGHT WRIST: Status: RESOLVED | Noted: 2022-04-27 | Resolved: 2022-06-15

## 2022-06-15 PROBLEM — M25.431 SWELLING OF RIGHT WRIST: Status: RESOLVED | Noted: 2022-04-27 | Resolved: 2022-06-15

## 2022-06-15 PROCEDURE — 97535 SELF CARE MNGMENT TRAINING: CPT | Mod: PN

## 2022-06-15 PROCEDURE — 97110 THERAPEUTIC EXERCISES: CPT | Mod: PN

## 2022-06-15 NOTE — PROGRESS NOTES
"  [facility] Occupational Therapy Wrist & Hand  Initial Evaluation     Date: 6/15/2022  Name: Vicky Brand  Clinic Number: [id number]    Therapy Diagnosis:   Encounter Diagnoses   Name Primary?    De Quervain's tenosynovitis, right     Lateral epicondylitis of left elbow     Pain in right hand     Decreased range of motion of finger of right hand     Decreased range of motion of right wrist     Swelling of right hand     Requires assistance with activities of daily living (ADL)      Physician: Devan Wagner MD    Medical Diagnosis: M65.4 (ICD-10-CM) - De Quervain's tenosynovitis, right  Physician Orders:   Post Surgical? Yes   Eval and Treat Yes   Type of Therapy Hand Therapy (CHT)     Order comments: Patient is scheduled for Right DeQuervains release on 6/8/22. Please see patient within the first week postop for orthosis and start postop rehab.     Evaluation Date: 6/15/2022  Insurance Authorization Period Expiration: 6/2/23  Plan of Care from 6/15/2022 to 8/15/22   Surgery Date and Procedure: 6/8/22; PROCEDURE PERFORMED: 1) Right first dorsal compartment release  Date of Return to MD: 6/23/22    Visit # / Visits authorized: 2 / Pending  Time In: 12:30 pm  Time Out: 1:40 pm  Total Appointment Time (timed & untimed codes): 70 minutes    Precautions:  Standard; Restrictions/precautions for po Day 7  DOS-6/8/22  Subjective     Involved Side: Right  Dominant Side: Right  Date of Onset: see sx specifics  History of Current Condition/Mechanism of Injury: The patient note bilateral radial wrist pain after she was moving furniture (and heavy cleaning for a 2 week period), subsequently thereafter developed significant right greater than left radial sided wrist pain. This was in early March 2022. She has attempted a cock-up wrist splint on right with no relief." She had right injection on 4/19/22  Imaging: see MRI,   Previous Therapy: Yes, conservative management had been initiated yet pt " had not seen past the evaluation. Her right post-op custom splint was fabricated on 6/13/22  Past Medical History/Physical Systems Review:   Vicky Brand  has a past medical history of Dyspareunia in female, Eye abnormalities, Fracture of fifth toe, left, closed, with delayed healing, subsequent encounter, Intramural leiomyoma of uterus, Nocturia, Ocular migraine, CALEB (obstructive sleep apnea), Paradoxical insomnia, Posterior vitreous detachment of left eye, and Shoulder pain, left.    Vicky Brand  has a past surgical history that includes Dilation and curettage of uterus; Tubal ligation; Colonoscopy (N/A, 6/9/2017); Endometrial ablation (N/A, 2017); Hysteroscopy with dilation and curettage of uterus (N/A, 1/19/2022); and De Quervain's release (Right, 6/8/2022).    Vicky has a current medication list which includes the following prescription(s): calcium carbonate, multivitamin, naproxen, tramadol, [DISCONTINUED] diclofenac sodium, and [DISCONTINUED] estradiol, and the following Facility-Administered Medications: celecoxib, fentanyl, lidocaine (pf) 10 mg/ml (1%), and midazolam.    Review of patient's allergies indicates:   Allergen Reactions    Sulfa (sulfonamide antibiotics) Rash    Cetirizine      Other reaction(s): Unable to Function  Other reaction(s): Unable to Function    Erythromycin Nausea And Vomiting     Other reaction(s): Nausea    Voltaren [diclofenac sodium] Diarrhea    Zyrtec-d  [cetirizine-pseudoephedrine]      Other reaction(s): Inability to focus/function        Patient's Goals for Therapy: Vicky desires to restore pain-free function of the Right UE for participating in all customary & necessary I/ADLs and work such as cooking, gardening and typing.  Vicky desires to learn the pathology of her condition, how to manage her symptoms, and she desires to learn adaptive strategies for improved function in her daily life.    Pain:  Functional Pain Scale Rating 0-10:    0/10 at best  5/10 at worst  Location: Right incision site  Description: Sharp  Aggravating Factors: post-op  Easing Factors: pain medication    Occupation:  Teacher, online, part-to-full time  Working presently: employed  Duties: typing    Functional Limitations/Social History:    Previous functional status includes:  Independent with all ADLs prior to March 2022.     Current Level of Function    Home/Living environment : lives with their family   Limitation of Functional Status as follows:  ADLs/IADLs:  prepares all of her meals before he leaves for work    - Feeding: Eating w/left hand    - Bathing: has been attending the beauty parlor to wash hair; sister is coming to stay    - Dressing/Grooming: Modified clothing choices, elastic waist pants yet causes pain in Left elbow to lower & raise;  ties shoe laces and yasmine socks    - Driving: not yet   Leisure: Currently unable to return to Gardening and Fitness Program, or Crafts     Objective     Observations: Glue in place, no drainage of signs of infection    EDEMA (Girth in cm)   Right/Left   DATE IE-6/15/22   Wrist 14.8/13.9   Thumb P1 6.1/5.6     Wrist AROM   Right/Left   DATE IE-6/15/22   EXT 42/67   FLX 52/71   RD 2/7   UD  7/43   KING 103/188      Thumb AROM in (Wrist neutral position)   Right Left   DATE IE-6/15/22 6/15/22   MP e/f 0/19 0/70   IP e/f 20+/15 25+/72   Radial Add/Abd -25/31 -22/44   Palmar Add/Abd -20/32 0/43   KING 52 137   Tip to DPC   (in cm) 5.0 1.0     FINGER AROM (measured in centimeters)  Tip to DPC Right/Left   DATE IE-6/15/22   Index  3.0/0   Long  2.0/0   Ring   2.0/0   Small   2.0/0      PULLED FORWARD FROM 4/27/22 Right PRE-OP EVAL (also Pre-Left Epicondylitis onset):   Strength: (AVELINA Dynamometer in psi.)     Right/Left     IE-4/27/22   Rung II 45/40   Pinch Strength (Measured in psi)    Right/Left     IE-4/27/22   Key Pinch 9/13   3pt Pinch 9.0/8.5    2pt Pinch defer       Treatment     Treatment Time In: 1:10  pm  Treatment Time Out: 1:40 pm  Total Treatment time (time-based codes) separate from Evaluation: 30 minutes    Vicky received the following supervised modalities after being cleared for contradictions for 10 minutes:   - LEFT: Fluidotherapy for promoting healing, reducing pain, desensitization and increasing tissue extensibility  - RIGHT: defer    Vicky received the following direct contact modalities after being cleared for contraindications for 0 minutes:  - defer    Vicky received the following manual therapy techniques for 5 minutes:   - fit and issued size Medium 3/4 finger compression glove for RIGHT pain and edema mgmt  - Elastic tape applied to LEFT common extensor tendon with space correction for promoting healing  - Check out LEFT OTS wrist brace; good fit and comfort    Vicky received therapeutic exercises for RIGHT hand for 10 minutes including:  Wrist and forearm AROM 1x10 each:  - wrist e/f  - wrist rd/ud  - sup/pronation   Hand AROM and tendon gliding 1x10 each:  - extension to wave  - wave to straight fist  - hook to full fist  - lifts, palm down over towel roll, each individual  - lifts, palm down over towel roll, composite  - Spreads   Thumb AROM 1x10 each:  - Palmar Ab/adduction  - Radial Ab/adduction  - Lifts, palm down over towel roll  - Circles, CW & CCW  - Opposition to each finger tip  - Composite opp/flexion    Postural strengthening defer   Objective Measures 6/15: Eval   HEP 6/15: As initially set     Vicky received Self Care Instruction and Orthotic mgmt instructions for 5 minutes while participated in an in-depth and concurrent discussion of evaluation findings and specific home care, including:  - education related to the Current Protocol Restriction/precautions and MD orders   - the anatomy and pathophysiology of diagnosis.  - instruction in activity modifications for goals of protecting healing tissues to include:  - wearing Right custom orthosis at all times except  during HEP and hand washing.   - Pt instructed not to soak her right hand until fully healed or otherwise directed by MD.  - defer-considerations for wearing the LEFT OTS wrist support, donning large plastic glove over it for toileting until able to use her right hand again.    Home Exercise Program and Home Care Resources/Education:  Issued HEP (see patient instructions in EMR) and educated on modality use for pain management . Exercises were reviewed and Vicky was able to demonstrate them prior to the end of the session.   Pt received a written copy of exercises to perform at home. Vicky demonstrated good  understanding of the education provided.  Pt was advised to perform these exercises free of pain, and to stop performing them if pain occurs.    Patient/Family Education: role of OT, goals for OT, scheduling/cancellations - pt verbalized understanding. Discussed insurance limitations with patient.    Additional Education provided: Wear Left wrist cock up brace to reduce common extensor strain    Assessment     Vicky Brand is a 56 y.o. female referred to outpatient occupational therapy and presents with limitations as measured  in the charts above. Following medical record review it is determined that pt will benefit from occupational therapy services in order to maximize pain free and/or functional use of her right hand. The patient's rehab potential is Good.     Anticipated barriers to occupational therapy: Onset Left Lateral Epicondylitis since Right hand symptoms first began.  Pt has no cultural, educational or language barriers to learning provided.    Profile and History Assessment of Occupational Performance Level of Clinical Decision Making Complexity Score   Occupational Profile:   Vicky Brand is a 56 y.o. female who lives with their family and is currently employed Vicky Brand has difficulty with  ADLs and IADLs as listed previously, which  affecting his/her  daily functional abilities.      Comorbidities:    has a past medical history of Dyspareunia in female, Eye abnormalities, Fracture of fifth toe, left, closed, with delayed healing, subsequent encounter, Intramural leiomyoma of uterus, Nocturia, Ocular migraine, CALEB (obstructive sleep apnea), Paradoxical insomnia, Posterior vitreous detachment of left eye, and Shoulder pain, left.    Medical and Therapy History Review:   Expanded               Performance Deficits    Physical:  Joint Mobility  Skin Integrity/Scar Formation  Edema  Postural Control  Pain    Cognitive:  No Deficits    Psychosocial:    No Deficits     Clinical Decision Making:  low    Assessment Process:  Detailed Assessments    Modification/Need for Assistance:  Minimal-Moderate Modifications/Assistance    Intervention Selection:  Limited Treatment Options       low  Based on PMHX, co morbidities , data from assessments and functional level of assistance required with task and clinical presentation directly impacting function.       The following goals were discussed with the patient and patient is in agreement with them as to be addressed in the treatment plan.     Goals:   LTG's (10-12 weeks):  1)  Decrease complaints of pain, bilaterally, to 2 out of 10 at worst to increase functional hand use for gardening, fitness program, & household ADLs.  2)  Right  and pinch strength to be >75% of the pre-operative measures (4/27/22) to demonstrate healing tissues and tolerance for fasteners, locks and latches.  3)  Right Wrist and thumb KING to >80% as compared to the Left hand for demonstrating healing tissues without adverse scar adhesions.  4)  Patient to score at 25% or less on Quick/DASH or FOTO to demonstrate improved perception of functional bilateral hand use and evidence near to prior level of function for ADLs with or without newly learning activity modifications or compensatory strategies.     STG's (6-8 weeks)  1)   Pt to report decreased  episodes of pain in Both wrist/thumb and elbows during daytime activities w/or without newly learned activity modifications.  2)   Patient to be IND with Phase II of HEP and modalities for pain/edema managment.  3)   Increase Right wrist and thumb KING by >60 degrees to indicate effective tissue healing with improved tendon gliding.  4)   Patient to be IND with Orthotic use, wear and care precautions.   5)   Pt to tolerate advancing PREs with self-graded intensity and effective symptom monitoring.     Plan   Certification Period/Plan of care expiration: 6/15/2022 to 9/15/22.    Outpatient Occupational Therapy 2-3 times weekly for 10-12 weeks to include the following interventions: Paraffin, Fluidotherapy, Manual therapy/joint mobilizations, Modalities for pain management, US 3 mhz, Therapeutic exercises/activities., Strengthening, Orthotic Fabrication/Fit/Training, Edema Control, Scar Management, Electrical Modalities, Joint Protection and Energy Conservation.    Rachana Lizarraga, OT, CHT  Occupational Therapist, Certified Hand Therapist

## 2022-06-15 NOTE — PATIENT INSTRUCTIONS
"  [facility], OCCUPATIONAL THERAPY  HOME EXERCISE PROGRAM     PERFORM EXERCISES 1 TO 3 SETS OF 10, 4-6 TIMES PER DAY; ALWAYS PAIN-FREE. DON'T LET THE PICTURES PRESSURE YOU TO PUSH THROUGH PAINFUL RANGES.      Elbow propped up on the tabletop:  AROM: Wrist Flexion / Extension               Bend your wrist forward and back as far as possible.             AROM: Wrist Radial / Ulnar Deviation  Bend your wrist from side to side as far as possible.          AROM: Isolated MCP Flexion / Extension ("Wave")   Bend only your large, bottom knuckles. Hold 3 seconds.   Keep the tips of your fingers straight. Straighten fingers.   AROM: MCP and PIP Flexion / Extension ("Straight Fist")  Bend your bottom and middle knuckles, keeping the tips of your fingers straight.   Try to touch the pads of your fingers on your palm. Hold 3 seconds.   Straighten your fingers.        AROM: Isolated IPJ Flexion / Extension ("Hook")   Bend only your middle and end knuckles. Hold 3 seconds.   Straighten your fingers.   AROM: Composite Flexion / Extension ("Full Fist")  Bend every joint in your hand into a fist. Hold 3 seconds.   Straighten your fingers.           AROM: Composte Extension ("Finger & THUMB Lifts")  Place your hand palm down over a book or towel roll as needed, leaving your fingers hanging over the edge. Lift each finger up towards the ceiling one at a time. Hold 3 seconds.   Relax your finger. Then, lift all fingers and thumb up away from the table surface.     AROM: Abduction / Adduction  With hand above your heart (prop elbow up on a table surface), spread all fingers AND THUMB  apart, then bring them together as close as possible. If your LF is mostly involved, wiggle it side to side while you have your other fingers in the spread position.                    "

## 2022-06-16 NOTE — PLAN OF CARE
"  [facility] Occupational Therapy Wrist & Hand  Initial Evaluation     Date: 6/15/2022  Name: Vicky Brand  Clinic Number: [id number]    Therapy Diagnosis:   Encounter Diagnoses   Name Primary?    De Quervain's tenosynovitis, right     Lateral epicondylitis of left elbow     Pain in right hand     Decreased range of motion of finger of right hand     Decreased range of motion of right wrist     Swelling of right hand     Requires assistance with activities of daily living (ADL)      Physician: Devan Wagner MD    Medical Diagnosis: M65.4 (ICD-10-CM) - De Quervain's tenosynovitis, right  Physician Orders:   Post Surgical? Yes   Eval and Treat Yes   Type of Therapy Hand Therapy (CHT)     Order comments: Patient is scheduled for Right DeQuervains release on 6/8/22. Please see patient within the first week postop for orthosis and start postop rehab.     Evaluation Date: 6/15/2022  Insurance Authorization Period Expiration: 6/2/23  Plan of Care from 6/15/2022 to 8/15/22   Surgery Date and Procedure: 6/8/22; PROCEDURE PERFORMED: 1) Right first dorsal compartment release  Date of Return to MD: 6/23/22    Visit # / Visits authorized: 2 / Pending  Time In: 12:30 pm  Time Out: 1:40 pm  Total Appointment Time (timed & untimed codes): 70 minutes    Precautions:  Standard; Restrictions/precautions for po Day 7  DOS-6/8/22  Subjective     Involved Side: Right  Dominant Side: Right  Date of Onset: see sx specifics  History of Current Condition/Mechanism of Injury: The patient note bilateral radial wrist pain after she was moving furniture (and heavy cleaning for a 2 week period), subsequently thereafter developed significant right greater than left radial sided wrist pain. This was in early March 2022. She has attempted a cock-up wrist splint on right with no relief." She had right injection on 4/19/22  Imaging: see MRI,   Previous Therapy: Yes, conservative management had been initiated yet pt " had not seen past the evaluation. Her right post-op custom splint was fabricated on 6/13/22  Past Medical History/Physical Systems Review:   Vicky Brand  has a past medical history of Dyspareunia in female, Eye abnormalities, Fracture of fifth toe, left, closed, with delayed healing, subsequent encounter, Intramural leiomyoma of uterus, Nocturia, Ocular migraine, CALEB (obstructive sleep apnea), Paradoxical insomnia, Posterior vitreous detachment of left eye, and Shoulder pain, left.    Vicky Brand  has a past surgical history that includes Dilation and curettage of uterus; Tubal ligation; Colonoscopy (N/A, 6/9/2017); Endometrial ablation (N/A, 2017); Hysteroscopy with dilation and curettage of uterus (N/A, 1/19/2022); and De Quervain's release (Right, 6/8/2022).    Vicky has a current medication list which includes the following prescription(s): calcium carbonate, multivitamin, naproxen, tramadol, [DISCONTINUED] diclofenac sodium, and [DISCONTINUED] estradiol, and the following Facility-Administered Medications: celecoxib, fentanyl, lidocaine (pf) 10 mg/ml (1%), and midazolam.    Review of patient's allergies indicates:   Allergen Reactions    Sulfa (sulfonamide antibiotics) Rash    Cetirizine      Other reaction(s): Unable to Function  Other reaction(s): Unable to Function    Erythromycin Nausea And Vomiting     Other reaction(s): Nausea    Voltaren [diclofenac sodium] Diarrhea    Zyrtec-d  [cetirizine-pseudoephedrine]      Other reaction(s): Inability to focus/function        Patient's Goals for Therapy: Vicky desires to restore pain-free function of the Right UE for participating in all customary & necessary I/ADLs and work such as cooking, gardening and typing.  Vicky desires to learn the pathology of her condition, how to manage her symptoms, and she desires to learn adaptive strategies for improved function in her daily life.    Pain:  Functional Pain Scale Rating 0-10:    0/10 at best  5/10 at worst  Location: Right incision site  Description: Sharp  Aggravating Factors: post-op  Easing Factors: pain medication    Occupation:  Teacher, online, part-to-full time  Working presently: employed  Duties: typing    Functional Limitations/Social History:    Previous functional status includes:  Independent with all ADLs prior to March 2022.     Current Level of Function    Home/Living environment : lives with their family   Limitation of Functional Status as follows:  ADLs/IADLs:  prepares all of her meals before he leaves for work    - Feeding: Eating w/left hand    - Bathing: has been attending the beauty parlor to wash hair; sister is coming to stay    - Dressing/Grooming: Modified clothing choices, elastic waist pants yet causes pain in Left elbow to lower & raise;  ties shoe laces and yasmine socks    - Driving: not yet   Leisure: Currently unable to return to Gardening and Fitness Program, or Crafts     Objective     Observations: Glue in place, no drainage of signs of infection    EDEMA (Girth in cm)   Right/Left   DATE IE-6/15/22   Wrist 14.8/13.9   Thumb P1 6.1/5.6     Wrist AROM   Right/Left   DATE IE-6/15/22   EXT 42/67   FLX 52/71   RD 2/7   UD  7/43   KING 103/188      Thumb AROM in (Wrist neutral position)   Right Left   DATE IE-6/15/22 6/15/22   MP e/f 0/19 0/70   IP e/f 20+/15 25+/72   Radial Add/Abd -25/31 -22/44   Palmar Add/Abd -20/32 0/43   KING 52 137   Tip to DPC   (in cm) 5.0 1.0     FINGER AROM (measured in centimeters)  Tip to DPC Right/Left   DATE IE-6/15/22   Index  3.0/0   Long  2.0/0   Ring   2.0/0   Small   2.0/0      PULLED FORWARD FROM 4/27/22 Right PRE-OP EVAL (also Pre-Left Epicondylitis onset):   Strength: (AVELINA Dynamometer in psi.)     Right/Left     IE-4/27/22   Rung II 45/40   Pinch Strength (Measured in psi)    Right/Left     IE-4/27/22   Key Pinch 9/13   3pt Pinch 9.0/8.5    2pt Pinch defer       Treatment     Treatment Time In: 1:10  pm  Treatment Time Out: 1:40 pm  Total Treatment time (time-based codes) separate from Evaluation: 30 minutes    Vicky received the following supervised modalities after being cleared for contradictions for 10 minutes:   - LEFT: Fluidotherapy for promoting healing, reducing pain, desensitization and increasing tissue extensibility  - RIGHT: defer    Vicky received the following direct contact modalities after being cleared for contraindications for 0 minutes:  - defer    Vicky received the following manual therapy techniques for 5 minutes:   - fit and issued size Medium 3/4 finger compression glove for RIGHT pain and edema mgmt  - Elastic tape applied to LEFT common extensor tendon with space correction for promoting healing  - Check out LEFT OTS wrist brace; good fit and comfort    Vicky received therapeutic exercises for RIGHT hand for 10 minutes including:  Wrist and forearm AROM 1x10 each:  - wrist e/f  - wrist rd/ud  - sup/pronation   Hand AROM and tendon gliding 1x10 each:  - extension to wave  - wave to straight fist  - hook to full fist  - lifts, palm down over towel roll, each individual  - lifts, palm down over towel roll, composite  - Spreads   Thumb AROM 1x10 each:  - Palmar Ab/adduction  - Radial Ab/adduction  - Lifts, palm down over towel roll  - Circles, CW & CCW  - Opposition to each finger tip  - Composite opp/flexion    Postural strengthening defer   Objective Measures 6/15: Eval   HEP 6/15: As initially set     Vicky received Self Care Instruction and Orthotic mgmt instructions for 5 minutes while participated in an in-depth and concurrent discussion of evaluation findings and specific home care, including:  - education related to the Current Protocol Restriction/precautions and MD orders   - the anatomy and pathophysiology of diagnosis.  - instruction in activity modifications for goals of protecting healing tissues to include:  - wearing Right custom orthosis at all times except  during HEP and hand washing.   - Pt instructed not to soak her right hand until fully healed or otherwise directed by MD.  - defer-considerations for wearing the LEFT OTS wrist support, donning large plastic glove over it for toileting until able to use her right hand again.    Home Exercise Program and Home Care Resources/Education:  Issued HEP (see patient instructions in EMR) and educated on modality use for pain management . Exercises were reviewed and Vicky was able to demonstrate them prior to the end of the session.   Pt received a written copy of exercises to perform at home. Vicky demonstrated good  understanding of the education provided.  Pt was advised to perform these exercises free of pain, and to stop performing them if pain occurs.    Patient/Family Education: role of OT, goals for OT, scheduling/cancellations - pt verbalized understanding. Discussed insurance limitations with patient.    Additional Education provided: Wear Left wrist cock up brace to reduce common extensor strain    Assessment     Vicky Brand is a 56 y.o. female referred to outpatient occupational therapy and presents with limitations as measured  in the charts above. Following medical record review it is determined that pt will benefit from occupational therapy services in order to maximize pain free and/or functional use of her right hand. The patient's rehab potential is Good.     Anticipated barriers to occupational therapy: Onset Left Lateral Epicondylitis since Right hand symptoms first began.  Pt has no cultural, educational or language barriers to learning provided.    Profile and History Assessment of Occupational Performance Level of Clinical Decision Making Complexity Score   Occupational Profile:   Vicky Brand is a 56 y.o. female who lives with their family and is currently employed Vicky Brand has difficulty with  ADLs and IADLs as listed previously, which  affecting his/her  daily functional abilities.      Comorbidities:    has a past medical history of Dyspareunia in female, Eye abnormalities, Fracture of fifth toe, left, closed, with delayed healing, subsequent encounter, Intramural leiomyoma of uterus, Nocturia, Ocular migraine, CALEB (obstructive sleep apnea), Paradoxical insomnia, Posterior vitreous detachment of left eye, and Shoulder pain, left.    Medical and Therapy History Review:   Expanded               Performance Deficits    Physical:  Joint Mobility  Skin Integrity/Scar Formation  Edema  Postural Control  Pain    Cognitive:  No Deficits    Psychosocial:    No Deficits     Clinical Decision Making:  low    Assessment Process:  Detailed Assessments    Modification/Need for Assistance:  Minimal-Moderate Modifications/Assistance    Intervention Selection:  Limited Treatment Options       low  Based on PMHX, co morbidities , data from assessments and functional level of assistance required with task and clinical presentation directly impacting function.       The following goals were discussed with the patient and patient is in agreement with them as to be addressed in the treatment plan.     Goals:   LTG's (10-12 weeks):  1)  Decrease complaints of pain, bilaterally, to 2 out of 10 at worst to increase functional hand use for gardening, fitness program, & household ADLs.  2)  Right  and pinch strength to be >75% of the pre-operative measures (4/27/22) to demonstrate healing tissues and tolerance for fasteners, locks and latches.  3)  Right Wrist and thumb KING to >80% as compared to the Left hand for demonstrating healing tissues without adverse scar adhesions.  4)  Patient to score at 25% or less on Quick/DASH or FOTO to demonstrate improved perception of functional bilateral hand use and evidence near to prior level of function for ADLs with or without newly learning activity modifications or compensatory strategies.     STG's (6-8 weeks)  1)   Pt to report decreased  episodes of pain in Both wrist/thumb and elbows during daytime activities w/or without newly learned activity modifications.  2)   Patient to be IND with Phase II of HEP and modalities for pain/edema managment.  3)   Increase Right wrist and thumb KING by >60 degrees to indicate effective tissue healing with improved tendon gliding.  4)   Patient to be IND with Orthotic use, wear and care precautions.   5)   Pt to tolerate advancing PREs with self-graded intensity and effective symptom monitoring.     Plan   Certification Period/Plan of care expiration: 6/15/2022 to 9/15/22.    Outpatient Occupational Therapy 2-3 times weekly for 10-12 weeks to include the following interventions: Paraffin, Fluidotherapy, Manual therapy/joint mobilizations, Modalities for pain management, US 3 mhz, Therapeutic exercises/activities., Strengthening, Orthotic Fabrication/Fit/Training, Edema Control, Scar Management, Electrical Modalities, Joint Protection and Energy Conservation.    Rachana Lizarraga, OT, CHT  Occupational Therapist, Certified Hand Therapist      I CERTIFY THE NEED FOR THESE SERVICES FURNISHED UNDER THIS PLAN OF TREATMENT AND WHILE UNDER MY CARE    Physician's comments:        Physician's Signature: ___________________________________________________

## 2022-06-17 NOTE — PROGRESS NOTES
Occupational Therapy Daily Treatment Note     Date: 6/20/2022  Name: Vicky Brand  Clinic Number: [id number]    Therapy Diagnosis:   Encounter Diagnoses   Name Primary?    Pain in right hand Yes    Decreased range of motion of finger of right hand     Decreased range of motion of right wrist     Swelling of right hand     Requires assistance with activities of daily living (ADL)      Physician: Petey Camarena*    Medical Diagnosis: M65.4 (ICD-10-CM) - De Quervain's tenosynovitis, right  Physician Orders:   Post Surgical? Yes   Eval and Treat Yes   Type of Therapy Hand Therapy (CHT)      Order comments: Patient is scheduled for Right DeQuervains release on 6/8/22. Please see patient within the first week postop for orthosis and start postop rehab.      Evaluation Date: 6/15/2022  Insurance Authorization Period Expiration: 6/2/23  Plan of Care from 6/15/2022 to 8/15/22   Surgery Date and Procedure: 6/8/22; PROCEDURE PERFORMED: 1) Right first dorsal compartment release  Date of Return to MD: 6/23/22     Visit # / Visits authorized: 3 / Pending  Time In: 10:10 am  Time Out: 11:00 am  Total Appointment Time (timed & untimed codes): 50 minutes     Precautions:  Standard; Restrictions/precautions for po Day 12  DOS-6/8/22    Subjective     Pt reports: 6/20: Pt arrives with no resting pain in the Left elbow or Right wrist/thumb.  Response to previous treatment: Good AROM improvements with commitment to HEP, symptom-guided, no medication    Pain: 0/10  Location: Right wrist/thumb, Left elbow    Objective     Observations: Glue in place, no drainage or signs of infection. There is an absorbable suture exiting the distal incision site. This was left undisturbed since patient will see MD/PA this Thursday.     EDEMA (Girth in cm)    Right/Left Right   DATE IE-6/15/22 6/20/22   Wrist 14.8/13.9 14.8 (=)   Thumb P1 6.1/5.6 5.6 (-.5)      Wrist AROM    Right/Left Right   DATE IE-6/15/22 6/20/22   EXT 42/67  42   FLX 52/71 47   RD 2/7 4   UD  7/43 36   KING 103/188 129 (+26)      Thumb AROM in (Wrist neutral position)    Right Left Right   DATE IE-6/15/22 6/15/22 6/20/22   MP e/f 0/19 0/70 0/47   IP e/f 20+/15 25+/72 20+/45   Radial Add/Abd -25/31 -22/44 -21/39   Palmar Add/Abd -20/32 0/43 -13/37   KING 52 137 134 (+82)   Tip to DPC   (in cm) 5.0 1.0 2.5 (+2.5)     FINGER AROM (measured in centimeters)  Tip to DPC Right/Left Right   DATE IE-6/15/22 6/20/22   Index  3.0/0 0   Long  2.0/0 0   Ring   2.0/0 0   Small   2.0/0 0      PULLED FORWARD FROM 4/27/22 Right PRE-OP EVAL (also Pre-Left Epicondylitis onset):   Strength: (AVELINA Dynamometer in psi.)     Right/Left     IE-4/27/22   Rung II 45/40   Pinch Strength (Measured in psi)    Right/Left     IE-4/27/22   Key Pinch 9/13   3pt Pinch 9.0/8.5    2pt Pinch defer         Treatment      Vicky received the following supervised modalities after being cleared for contradictions for 10 minutes:   - LEFT: Fluidotherapy for promoting healing, reducing pain, desensitization and increasing tissue extensibility  - RIGHT: defer     Vicky received the following direct contact modalities after being cleared for contraindications for 0 minutes:  - defer     Vicky received the following manual therapy techniques for 8 minutes:   - R/A girth; check out fit, comfort and effectiveness of size Medium 3/4 finger compression glove for RIGHT pain and edema mgmt  - Elastic tape applied to LEFT common extensor tendon with space correction for promoting healing       Vicky received therapeutic exercises for RIGHT hand for 25 minutes including:  Wrist and forearm AROM LEFT: 1x10 each performed during final minutes of Fluidotherapy:  - wrist e/f  - wrist rd/ud  - sup/pronation    RIGHT: 2-3 minutes each  - Jux-a-ciser   RIGHT Hand AROM and tendon gliding 2 min each:  - kinetic  - small/med pompoms, one container, stored, rotatd and translated   Thumb AROM 1x10 each:  - tape spool  "ladder on each digit, each CW & CCW    2 min each:  - DIP blocks over finger platter   Postural strengthening defer   Objective Measures 6/20: R/A AROM wrist and hand   HEP 6/20: Thumb pinky slides added informally  6/15: As initially set      Vicky received Self Care Instruction and Orthotic mgmt instructions for 5 minutes while participated in a concurrent discussion of evaluation findings and specific home care, including:  - education related to the Current Protocol Restriction/precautions and MD orders   - the anatomy and pathophysiology of diagnosis.  - instruction in activity modifications for goals of protecting healing tissues to include:  - wearing Right custom orthosis at all times except during HEP and hand washing.   - Pt instructed not to soak her right hand until fully healed or otherwise directed by MD.  - defer-considerations for wearing the LEFT OTS wrist support, donning large plastic glove over it for toileting until able to use her right hand again.  - Modified with strap "tabs" & return demonstrated donning with Right post-op precautions adhered to: LEFT OTS wrist brace; good fit and comfort    Home Exercise Program and Home Care Resources/Education:     Education provided:   - Pt to request MD/PA examine absorbable suture  - See Self Care above  - HEP as set  - Education provided on all interventions performed during today's session   - Progress towards goals     Written Home Exercises Provided: Patient instructed to cont prior HEP.  Exercises were reviewed and Vicky was able to demonstrate them prior to the end of the session.  Vicky demonstrated good  understanding of the HEP provided.     See EMR under Patient Instructions for exercises provided prior visit.        Assessment   6/20: Excellent thumb AROM improvements with decreased swelling and pain. Full commitment to HEP and splint compliance.    Goals:   The following goals were discussed with the patient and patient is in " agreement with them as to be addressed in the treatment plan.     Goals:  LTG's (10-12 weeks):  1)  Decrease complaints of pain, bilaterally, to 2 out of 10 at worst to increase functional hand use for gardening, fitness program, & household ADLs.  2)  Right  and pinch strength to be >75% of the pre-operative measures (4/27/22) to demonstrate healing tissues and tolerance for fasteners, locks and latches.  3)  Right Wrist and thumb KING to >80% as compared to the Left hand for demonstrating healing tissues without adverse scar adhesions.  4)  Patient to score at 25% or less on Quick/DASH or FOTO to demonstrate improved perception of functional bilateral hand use and evidence near to prior level of function for ADLs with or without newly learning activity modifications or compensatory strategies.     STG's (6-8 weeks)  1)   Pt to report decreased episodes of pain in Both wrist/thumb and elbows during daytime activities w/or without newly learned activity modifications. Progressing  2)   Patient to be IND with Phase II of HEP and modalities for pain/edema managment.  3)   Increase Right wrist and thumb KING by >60 degrees to indicate effective tissue healing with improved tendon gliding. MET for thumb 6/20/2022. Progressing for wrist.  4)   Patient to be IND with Orthotic use, wear and care precautions. Progressing  5)   Pt to tolerate advancing PREs with self-graded intensity and effective symptom monitoring.     Pt would continue to benefit from skilled OT as per original POC     Vicky is progressing faster than expected towards her goals and there are no updates to goals at this time. Pt prognosis is Good    Pt will continue to benefit from skilled outpatient occupational therapy to address the deficits listed in the problem list on initial evaluation provide pt/family education and to maximize pt's level of independence in the home and community environment.     Pt's spiritual, cultural and educational needs  considered and pt agreeable to plan of care and goals.    Plan   6/20: Acknowledge any new MD/PA orders.    Cont OT to address above goals as per original POC.    Certification Period/Plan of care expiration: 6/15/2022 to 9/15/22.     Outpatient Occupational Therapy 2-3 times weekly for 10-12 weeks to include the following interventions: Paraffin, Fluidotherapy, Manual therapy/joint mobilizations, Modalities for pain management, US 3 mhz, Therapeutic exercises/activities., Strengthening, Orthotic Fabrication/Fit/Training, Edema Control, Scar Management, Electrical Modalities, Joint Protection and Energy Conservation.    LATESHA Rivers, CHT  Occupational Therapist, Certified Hand Therapist

## 2022-06-20 ENCOUNTER — CLINICAL SUPPORT (OUTPATIENT)
Dept: REHABILITATION | Facility: HOSPITAL | Age: 57
End: 2022-06-20
Payer: COMMERCIAL

## 2022-06-20 DIAGNOSIS — M25.631 DECREASED RANGE OF MOTION OF RIGHT WRIST: ICD-10-CM

## 2022-06-20 DIAGNOSIS — M79.641 PAIN IN RIGHT HAND: Primary | ICD-10-CM

## 2022-06-20 DIAGNOSIS — M25.641 DECREASED RANGE OF MOTION OF FINGER OF RIGHT HAND: ICD-10-CM

## 2022-06-20 DIAGNOSIS — Z74.1 REQUIRES ASSISTANCE WITH ACTIVITIES OF DAILY LIVING (ADL): ICD-10-CM

## 2022-06-20 DIAGNOSIS — M79.89 SWELLING OF RIGHT HAND: ICD-10-CM

## 2022-06-20 PROCEDURE — 97140 MANUAL THERAPY 1/> REGIONS: CPT | Mod: PN

## 2022-06-20 PROCEDURE — 97110 THERAPEUTIC EXERCISES: CPT | Mod: PN

## 2022-06-20 PROCEDURE — 97022 WHIRLPOOL THERAPY: CPT | Mod: PN

## 2022-06-23 ENCOUNTER — OFFICE VISIT (OUTPATIENT)
Dept: ORTHOPEDICS | Facility: CLINIC | Age: 57
End: 2022-06-23
Payer: COMMERCIAL

## 2022-06-23 DIAGNOSIS — M65.4 DE QUERVAIN'S TENOSYNOVITIS, RIGHT: Primary | ICD-10-CM

## 2022-06-23 DIAGNOSIS — Z98.890 POST-OPERATIVE STATE: ICD-10-CM

## 2022-06-23 PROCEDURE — 1159F MED LIST DOCD IN RCRD: CPT | Mod: CPTII,S$GLB,, | Performed by: PHYSICIAN ASSISTANT

## 2022-06-23 PROCEDURE — 99999 PR PBB SHADOW E&M-EST. PATIENT-LVL II: ICD-10-PCS | Mod: PBBFAC,,, | Performed by: PHYSICIAN ASSISTANT

## 2022-06-23 PROCEDURE — 99024 PR POST-OP FOLLOW-UP VISIT: ICD-10-PCS | Mod: S$GLB,,, | Performed by: PHYSICIAN ASSISTANT

## 2022-06-23 PROCEDURE — 99999 PR PBB SHADOW E&M-EST. PATIENT-LVL II: CPT | Mod: PBBFAC,,, | Performed by: PHYSICIAN ASSISTANT

## 2022-06-23 PROCEDURE — 99024 POSTOP FOLLOW-UP VISIT: CPT | Mod: S$GLB,,, | Performed by: PHYSICIAN ASSISTANT

## 2022-06-23 PROCEDURE — 1159F PR MEDICATION LIST DOCUMENTED IN MEDICAL RECORD: ICD-10-PCS | Mod: CPTII,S$GLB,, | Performed by: PHYSICIAN ASSISTANT

## 2022-06-23 NOTE — PROGRESS NOTES
Dr. Wagner is the supervising physician for this encounter/patient    Vicky Brand presents for post-operative evaluation.  The patient is now 2 weeks s/p Right first dorsal compartment release with Dr. Wagner on 6/8/22.  Overall the patient reports doing well.  She reports 2/10 pain, denies any f/c/s and is not taking any medications for this. She is working with OT, has custom orthosis and does her HEP.    PE:    AA&O x 4.  NAD  HEENT:  NCAT, sclera nonicteric  Lungs:  Respirations are equal and unlabored.  CV:  2+ bilateral upper and lower extremity pulses.  MSK: The wound is healing well with no signs of erythema or warmth.  There is no drainage.  No clinical signs or symptoms of infection are present.  Near full wrist motion, near full hand motion with some decreased thumb MCP extension. SILT. DNVI.    A/P: Status post above, doing well  1) Continue with OT for postop rehab, progress per protocol as tolerated.  2) All sutures removed today. Wound care and signs of infection discussed.  3) F/U 4 weeks  4) Call with any questions/concerns in the interim      Jamie Russo-DiGeorge PA-C

## 2022-06-25 NOTE — PROGRESS NOTES
Occupational Therapy Daily Treatment Note     Date: 6/27/2022  Name: Vicky Brand  Clinic Number: [id number]    Therapy Diagnosis:   Encounter Diagnoses   Name Primary?    Pain in right hand Yes    Decreased range of motion of finger of right hand     Decreased range of motion of right wrist     Swelling of right hand     Requires assistance with activities of daily living (ADL)      Physician: Petey Camarena*    Medical Diagnosis: M65.4 (ICD-10-CM) - De Quervain's tenosynovitis, right  Physician Orders:   Post Surgical? Yes   Eval and Treat Yes   Type of Therapy Hand Therapy (CHT)      Order comments: Patient is scheduled for Right DeQuervains release on 6/8/22. Please see patient within the first week postop for orthosis and start postop rehab.      Evaluation Date: 6/15/2022  Insurance Authorization Period Expiration: 6/2/23  Plan of Care from 6/15/2022 to 8/15/22   Surgery Date and Procedure: 6/8/22; PROCEDURE PERFORMED: 1) Right first dorsal compartment release  Date of Return to MD/PA: 7/21/22 6/23/22: A/P: Status post above, doing well  1) Continue with OT for postop rehab, progress per protocol as tolerated.  2) All sutures removed today. Wound care and signs of infection discussed.     Visit # / Visits authorized: 4 / Pending  Time In: 11:45 am  Time Out: 12:45 pm  Total Appointment Time (timed & untimed codes): 45 minutes     Precautions:  Standard; Restrictions/precautions for po Week 2 day 5  DOS-6/8/22    Subjective     Pt reports: 6/27: Pt arrives after sutures had been removed. She has had no increase in pain in the Left elbow or Right wrist/thumb. She has some irritation at the incision site that she believes was caused by the steristrips and pressure from the orthosis. Pt confirms comfortable fit of the orthosis after modification.  Response to previous treatment: Good AROM improvements with commitment to HEP, symptom-guided, no medication    Pain: 0/10  Location: Right  wrist/thumb, Left elbow    Objective     Observations: Periphery of the incision site is red. No other signs of infection.      EDEMA (Girth in cm)    Right/Left Right R   DATE IE-6/15/22 6/20/22 6/27/22   Wrist 14.8/13.9 14.8 (=) 14.7 (-.1)   Thumb P1 6.1/5.6 5.6 (-.5) nt      Wrist AROM    Right/Left Right R   DATE IE-6/15/22 6/20/22 6/27/22   EXT 42/67 42 55   FLX 52/71 47 71   RD 2/7 4 7   UD  7/43 36 34   KING 103/188 129 (+26) 167 (+38)      Thumb AROM in (Wrist neutral position)    Right Left Right Right   DATE IE-6/15/22 6/15/22 6/20/22 6/27/22   MP e/f 0/19 0/70 0/47 49   IP e/f 20+/15 25+/72 20+/45 56   Radial Add/Abd -25/31 -22/44 -21/39 wnl/42   Palmar Add/Abd -20/32 0/43 -13/37 -9/35   KING 52 137 134 (+82) 173 (+39)   Tip to DPC   (in cm) 5.0 1.0 2.5 (+2.5) 1.5 (+1.0)     FINGER AROM (measured in centimeters)  Tip to DPC Right/Left Right   DATE IE-6/15/22 6/20/22   Index  3.0/0 0   Long  2.0/0 0   Ring   2.0/0 0   Small   2.0/0 0      PULLED FORWARD FROM 4/27/22 Right PRE-OP EVAL (also Pre-Left Epicondylitis onset):   Strength: (AVELINA Dynamometer in psi.)     Right/Left     IE-4/27/22   Rung II 45/40   Pinch Strength (Measured in psi)    Right/Left     IE-4/27/22   Key Pinch 9/13   3pt Pinch 9.0/8.5    2pt Pinch defer         Treatment      Vicky received the following supervised modalities after being cleared for contradictions for 10 minutes:   - LEFT: Fluidotherapy for promoting healing, reducing pain, desensitization and increasing tissue extensibility  - RIGHT: Fluidotherapy for promoting healing, reducing pain, desensitization and increasing tissue extensibility; tegaderm covering incision site     Vicky received the following direct contact modalities after being cleared for contraindications for 0 minutes:  - defer     Vicky received the following manual therapy techniques for 4 minutes:   - R/A girth; check out fit, comfort and effectiveness of size Medium 3/4 finger compression  glove for RIGHT pain and edema mgmt  - defer-Elastic tape applied to LEFT common extensor tendon with space correction for promoting healing     Vicky received therapeutic exercises for 25 minutes including:  Wrist and forearm AROM and flexibility LEFT: 1x10 each performed during final minutes of Fluidotherapy:  - wrist e/f  - wrist rd/ud  - sup/pronation    RIGHT: 2 x 10 sec holds each:  - [place] hand stretch with thumb relaxed (overlapping)   RIGHT Hand AROM and tendon gliding 2 min each:  - defer-kinetic  - defer-small/med pompoms, one container, stored, rotatd and translated   Thumb AROM 1x10 each:  - tape spool ladder on each digit, each CW & CCW    2 min each:  - DIP blocks over finger platter   Postural strengthening defer   LEFT strengthenign Red t-putty Strengthening, 5-10 reps each:  - 10 sec sustained   - tip pinch  - 3-jaw pinch  - lateral pinch  - PADs/DABs w/adduction log squeeze/pumps (scissors)  - PADs/DABs w/donut stretches (spreads-no thumb)  - Extrinsic extensors w/donut stretches with thumb  - extension rolls w/graded palm pressure     Objective Measures 6/27: R/A AROM wrist and thumb   HEP 6/27: [place] hand stretch added, thumbs relaxed  6/27: Red t-putty for LEFT only  6/20: Thumb pinky slides added informally  6/15: As initially set      Vicky received Self Care Instruction and Orthotic mgmt instructions for 10 minutes while participated in a concurrent discussion of evaluation findings and specific home care, including:  - education related to the Current Protocol Restriction/precautions and MD orders   - the anatomy and pathophysiology of diagnosis.  - instruction in activity modifications for goals of protecting healing tissues to include:  - Modified Right custom orthosis for reducing pressure.   - Pt instructed not to soak her right hand until fully healed or otherwise directed by MD.  - defer-considerations for wearing the LEFT OTS wrist support, donning large plastic glove over it  "for toileting until able to use her right hand again.  - patient reports now much easier to yasmine/doff since Modified with strap "tabs"; LEFT OTS wrist brace; good fit and comfort    Home Exercise Program and Home Care Resources/Education:     Education provided:   - See above  - See Self Care above  - HEP as set  - Education provided on all interventions performed during today's session   - Progress towards goals     Written Home Exercises Provided: Patient instructed to cont prior HEP.  Exercises were reviewed and Vicky was able to demonstrate them prior to the end of the session.  Vicky demonstrated good  understanding of the HEP provided.     See EMR under Patient Instructions for exercises provided prior visit. 6/27/22       Assessment   6/27: Excellent thumb and wrist AROM improvements with decreased swelling and pain. Full commitment to HEP and splint compliance.    Goals:   The following goals were discussed with the patient and patient is in agreement with them as to be addressed in the treatment plan.     Goals:  LTG's (10-12 weeks):  1)  Decrease complaints of pain, bilaterally, to 2 out of 10 at worst to increase functional hand use for gardening, fitness program, & household ADLs.  2)  Right  and pinch strength to be >75% of the pre-operative measures (4/27/22) to demonstrate healing tissues and tolerance for fasteners, locks and latches.  3)  Right Wrist and thumb KING to >80% as compared to the Left hand for demonstrating healing tissues without adverse scar adhesions.  4)  Patient to score at 25% or less on Quick/DASH or FOTO to demonstrate improved perception of functional bilateral hand use and evidence near to prior level of function for ADLs with or without newly learning activity modifications or compensatory strategies.     STG's (6-8 weeks)  1)   Pt to report decreased episodes of pain in Both wrist/thumb and elbows during daytime activities w/or without newly learned activity " modifications. MET  2)   Patient to be IND with Phase II of HEP and modalities for pain/edema managment.  3)   Increase Right wrist and thumb KING by >60 degrees to indicate effective tissue healing with improved tendon gliding. MET for thumb 6/20/2022. MET for wrist 6/27/22.  4)   Patient to be IND with Orthotic use, wear and care precautions. MET  5)   Pt to tolerate advancing PREs with self-graded intensity and effective symptom monitoring.     Pt would continue to benefit from skilled OT as per original POC     Vicky is progressing faster than expected towards her goals and there are no updates to goals at this time. Pt prognosis is Good    Pt will continue to benefit from skilled outpatient occupational therapy to address the deficits listed in the problem list on initial evaluation provide pt/family education and to maximize pt's level of independence in the home and community environment.     Pt's spiritual, cultural and educational needs considered and pt agreeable to plan of care and goals.    Plan   6/27: Advance as per Week 3 protocol.    Cont OT to address above goals as per original POC.    Certification Period/Plan of care expiration: 6/15/2022 to 9/15/22.     Outpatient Occupational Therapy 2-3 times weekly for 10-12 weeks to include the following interventions: Paraffin, Fluidotherapy, Manual therapy/joint mobilizations, Modalities for pain management, US 3 mhz, Therapeutic exercises/activities., Strengthening, Orthotic Fabrication/Fit/Training, Edema Control, Scar Management, Electrical Modalities, Joint Protection and Energy Conservation.    LATESHA Rivers, CHT  Occupational Therapist, Certified Hand Therapist

## 2022-06-27 ENCOUNTER — CLINICAL SUPPORT (OUTPATIENT)
Dept: REHABILITATION | Facility: HOSPITAL | Age: 57
End: 2022-06-27
Payer: COMMERCIAL

## 2022-06-27 DIAGNOSIS — M25.641 DECREASED RANGE OF MOTION OF FINGER OF RIGHT HAND: ICD-10-CM

## 2022-06-27 DIAGNOSIS — M25.631 DECREASED RANGE OF MOTION OF RIGHT WRIST: ICD-10-CM

## 2022-06-27 DIAGNOSIS — M79.89 SWELLING OF RIGHT HAND: ICD-10-CM

## 2022-06-27 DIAGNOSIS — M79.641 PAIN IN RIGHT HAND: Primary | ICD-10-CM

## 2022-06-27 DIAGNOSIS — Z74.1 REQUIRES ASSISTANCE WITH ACTIVITIES OF DAILY LIVING (ADL): ICD-10-CM

## 2022-06-27 PROCEDURE — 97110 THERAPEUTIC EXERCISES: CPT | Mod: PN

## 2022-06-27 PROCEDURE — 97763 ORTHC/PROSTC MGMT SBSQ ENC: CPT | Mod: PN

## 2022-06-27 PROCEDURE — 97022 WHIRLPOOL THERAPY: CPT | Mod: PN

## 2022-06-27 NOTE — PATIENT INSTRUCTIONS
PERFORM STRETCH EXERCISES BEFORE YOU PERFORM THE ACTIVE RANGE OF MOTION EXERCISES:    HOLD FOR A COUNT OF 10 TO 30  3X/SESSION  3X/DAY                    LEFT HAND ONLY:    [facility], OCCUPATIONAL THERAPY  HOME EXERCISE PROGRAM     PERFORM STRENGTHENING EXERCISES EVERY OTHER DAY AND ALWAYS PAIN-FREE                              You can do these using the log also:          Impression: Presbyopia: H52.4. Plan: Diagnosis discussed. SRx released, pt edu that a temporary adjustment is expected. Continue to monitor.   PT wants to be clearer farther away on computer

## 2022-06-28 ENCOUNTER — CLINICAL SUPPORT (OUTPATIENT)
Dept: REHABILITATION | Facility: HOSPITAL | Age: 57
End: 2022-06-28
Payer: COMMERCIAL

## 2022-06-28 DIAGNOSIS — M79.641 PAIN IN RIGHT HAND: Primary | ICD-10-CM

## 2022-06-28 DIAGNOSIS — M25.631 DECREASED RANGE OF MOTION OF RIGHT WRIST: ICD-10-CM

## 2022-06-28 DIAGNOSIS — Z74.1 REQUIRES ASSISTANCE WITH ACTIVITIES OF DAILY LIVING (ADL): ICD-10-CM

## 2022-06-28 DIAGNOSIS — M79.89 SWELLING OF RIGHT HAND: ICD-10-CM

## 2022-06-28 DIAGNOSIS — M25.641 DECREASED RANGE OF MOTION OF FINGER OF RIGHT HAND: ICD-10-CM

## 2022-06-28 PROCEDURE — 97763 ORTHC/PROSTC MGMT SBSQ ENC: CPT | Mod: PN

## 2022-06-28 NOTE — PROGRESS NOTES
Occupational Therapy Daily Treatment Note     Date: 6/28/2022  Name: Vicky Brand  Clinic Number: [id number]    Therapy Diagnosis:   Encounter Diagnoses   Name Primary?    Pain in right hand Yes    Decreased range of motion of finger of right hand     Decreased range of motion of right wrist     Swelling of right hand     Requires assistance with activities of daily living (ADL)      Physician: Petey Camarena*    Medical Diagnosis: M65.4 (ICD-10-CM) - De Quervain's tenosynovitis, right  Physician Orders:   Post Surgical? Yes   Eval and Treat Yes   Type of Therapy Hand Therapy (CHT)      Order comments: Patient is scheduled for Right DeQuervains release on 6/8/22. Please see patient within the first week postop for orthosis and start postop rehab.      Evaluation Date: 6/15/2022  Insurance Authorization Period Expiration: 6/2/23  Plan of Care from 6/15/2022 to 8/15/22   Surgery Date and Procedure: 6/8/22; PROCEDURE PERFORMED: 1) Right first dorsal compartment release  Date of Return to MD/PA: 7/21/22 6/23/22: A/P: Status post above, doing well  1) Continue with OT for postop rehab, progress per protocol as tolerated.  2) All sutures removed today. Wound care and signs of infection discussed.     Visit # / Visits authorized: 5 / Pending  Time In: 4:05 pm   Time Out: 4:22 pm  Total Appointment Time (timed & untimed codes): 15 minutes     Precautions:  Standard; Restrictions/precautions for po Week 2 day 6  DOS-6/8/22    Subjective     Pt reports: 6/28: Pt arrives for splint modification with concerns about fluid displacement. She leaves confirming comfortable fit after modification.  6/27: Pt arrives after sutures had been removed. She has had no increase in pain in the Left elbow or Right wrist/thumb. She has some irritation at the incision site that she believes was caused by the steristrips and pressure from the orthosis. Pt confirms comfortable fit of the orthosis after  modification.  Response to previous treatment: Good AROM improvements with commitment to HEP, symptom-guided, no medication    Pain: 0/10  Location: Right wrist/thumb, Left elbow    Objective     Observations: Periphery of the incision site is red. No other signs of infection.      EDEMA (Girth in cm)    Right/Left Right R   DATE IE-6/15/22 6/20/22 6/27/22   Wrist 14.8/13.9 14.8 (=) 14.7 (-.1)   Thumb P1 6.1/5.6 5.6 (-.5) nt      Wrist AROM    Right/Left Right R   DATE IE-6/15/22 6/20/22 6/27/22   EXT 42/67 42 55   FLX 52/71 47 71   RD 2/7 4 7   UD  7/43 36 34   KING 103/188 129 (+26) 167 (+38)      Thumb AROM in (Wrist neutral position)    Right Left Right Right   DATE IE-6/15/22 6/15/22 6/20/22 6/27/22   MP e/f 0/19 0/70 0/47 49   IP e/f 20+/15 25+/72 20+/45 56   Radial Add/Abd -25/31 -22/44 -21/39 wnl/42   Palmar Add/Abd -20/32 0/43 -13/37 -9/35   KING 52 137 134 (+82) 173 (+39)   Tip to DPC   (in cm) 5.0 1.0 2.5 (+2.5) 1.5 (+1.0)     FINGER AROM (measured in centimeters)  Tip to DPC Right/Left Right   DATE IE-6/15/22 6/20/22   Index  3.0/0 0   Long  2.0/0 0   Ring   2.0/0 0   Small   2.0/0 0      PULLED FORWARD FROM 4/27/22 Right PRE-OP EVAL (also Pre-Left Epicondylitis onset):   Strength: (AVELINA Dynamometer in psi.)     Right/Left     IE-4/27/22   Rung II 45/40   Pinch Strength (Measured in psi)    Right/Left     IE-4/27/22   Key Pinch 9/13   3pt Pinch 9.0/8.5    2pt Pinch defer         Treatment      Vicky received the following supervised modalities after being cleared for contradictions for 0 minutes:   Defer-- LEFT: Fluidotherapy for promoting healing, reducing pain, desensitization and increasing tissue extensibility  Defer-- RIGHT: Fluidotherapy for promoting healing, reducing pain, desensitization and increasing tissue extensibility; tegaderm covering incision site     Vicky received the following direct contact modalities after being cleared for contraindications for 0 minutes:  -  defer     Vicky received the following manual therapy techniques for 4 minutes:   - Scar massage provided and instructed for home care; only in the periphery of the incision at this time, avoiding the red irritated skin.  - defer-R/A girth; check out fit, comfort and effectiveness of size Medium 3/4 finger compression glove for RIGHT pain and edema mgmt  - defer-Elastic tape applied to LEFT common extensor tendon with space correction for promoting healing     Vicky received therapeutic exercises for 0 minutes including:  Wrist and forearm AROM and flexibility-defer LEFT: 1x10 each performed during final minutes of Fluidotherapy:  - wrist e/f  - wrist rd/ud  - sup/pronation    RIGHT: 2 x 10 sec holds each:  - [place] hand stretch with thumb relaxed (overlapping)   RIGHT Hand AROM and tendon gliding-defer 2 min each:  - defer-kinetic  - defer-small/med pompoms, one container, stored, rotatd and translated   Thumb AROM-defer 1x10 each:  - tape spool ladder on each digit, each CW & CCW    2 min each:  - DIP blocks over finger platter   Postural strengthening defer   LEFT strengthening-defer Red t-putty Strengthening, 5-10 reps each:  - 10 sec sustained   - tip pinch  - 3-jaw pinch  - lateral pinch  - PADs/DABs w/adduction log squeeze/pumps (scissors)  - PADs/DABs w/donut stretches (spreads-no thumb)  - Extrinsic extensors w/donut stretches with thumb  - extension rolls w/graded palm pressure     Objective Measures 6/27: R/A AROM wrist and thumb   HEP 6/27: [place] hand stretch added, thumbs relaxed  6/27: Red t-putty for LEFT only  6/20: Thumb pinky slides added informally  6/15: As initially set      Vicky received Self Care Instruction and Orthotic mgmt instructions for 12 minutes while participated in a concurrent discussion of evaluation findings and specific home care, including:  - education related to the Current Protocol Restriction/precautions and MD orders   - the anatomy and pathophysiology of  "diagnosis.  - instruction in activity modifications for goals of protecting healing tissues to include:  - Modified Right custom orthosis for reducing pressure.   - Pt instructed not to soak her right hand until fully healed or otherwise directed by MD.  - defer-considerations for wearing the LEFT OTS wrist support, donning large plastic glove over it for toileting until able to use her right hand again.  - patient reports now much easier to yasmine/doff since Modified with strap "tabs"; LEFT OTS wrist brace; good fit and comfort    Home Exercise Program and Home Care Resources/Education:     Education provided:   - See above  - See Self Care above  - HEP as set  - Education provided on all interventions performed during today's session   - Progress towards goals     Written Home Exercises Provided: Patient instructed to cont prior HEP.  Exercises were reviewed and Vicky was able to demonstrate them prior to the end of the session.  Vicky demonstrated good  understanding of the HEP provided.     See EMR under Patient Instructions for exercises provided prior visit. 6/27/22       Assessment   6/28: Pt concerns alleviated with splint modification. She was happy with returning for full session as scheduled on Friday, continuing with HEP until then.  6/27: Excellent thumb and wrist AROM improvements with decreased swelling and pain. Full commitment to HEP and splint compliance.    Goals:   The following goals were discussed with the patient and patient is in agreement with them as to be addressed in the treatment plan.     Goals:  LTG's (10-12 weeks):  1)  Decrease complaints of pain, bilaterally, to 2 out of 10 at worst to increase functional hand use for gardening, fitness program, & household ADLs.  2)  Right  and pinch strength to be >75% of the pre-operative measures (4/27/22) to demonstrate healing tissues and tolerance for fasteners, locks and latches.  3)  Right Wrist and thumb KING to >80% as compared to " the Left hand for demonstrating healing tissues without adverse scar adhesions.  4)  Patient to score at 25% or less on Quick/DASH or FOTO to demonstrate improved perception of functional bilateral hand use and evidence near to prior level of function for ADLs with or without newly learning activity modifications or compensatory strategies.     STG's (6-8 weeks)  1)   Pt to report decreased episodes of pain in Both wrist/thumb and elbows during daytime activities w/or without newly learned activity modifications. MET  2)   Patient to be IND with Phase II of HEP and modalities for pain/edema managment.  3)   Increase Right wrist and thumb KING by >60 degrees to indicate effective tissue healing with improved tendon gliding. MET for thumb 6/20/2022. MET for wrist 6/27/22.  4)   Patient to be IND with Orthotic use, wear and care precautions. MET  5)   Pt to tolerate advancing PREs with self-graded intensity and effective symptom monitoring.     Pt would continue to benefit from skilled OT as per original POC     Vicky is progressing faster than expected towards her goals and there are no updates to goals at this time. Pt prognosis is Good    Pt will continue to benefit from skilled outpatient occupational therapy to address the deficits listed in the problem list on initial evaluation provide pt/family education and to maximize pt's level of independence in the home and community environment.     Pt's spiritual, cultural and educational needs considered and pt agreeable to plan of care and goals.    Plan   6/28: Advance as per Week 3 protocol.    Cont OT to address above goals as per original POC.    Certification Period/Plan of care expiration: 6/15/2022 to 9/15/22.     Outpatient Occupational Therapy 2-3 times weekly for 10-12 weeks to include the following interventions: Paraffin, Fluidotherapy, Manual therapy/joint mobilizations, Modalities for pain management, US 3 mhz, Therapeutic exercises/activities.,  Strengthening, Orthotic Fabrication/Fit/Training, Edema Control, Scar Management, Electrical Modalities, Joint Protection and Energy Conservation.    LATESHA Rivers, CHT  Occupational Therapist, Certified Hand Therapist

## 2022-06-30 NOTE — PROGRESS NOTES
"  Occupational Therapy Daily Treatment Note     Date: 7/1/2022  Name: Vicky Brand  Clinic Number: [id number]    Therapy Diagnosis:   Encounter Diagnoses   Name Primary?    Pain in right hand Yes    Decreased range of motion of finger of right hand     Decreased range of motion of right wrist     Swelling of right hand     Requires assistance with activities of daily living (ADL)      Physician: Petey Camarena*    Medical Diagnosis: M65.4 (ICD-10-CM) - De Quervain's tenosynovitis, right  Physician Orders:   Post Surgical? Yes   Eval and Treat Yes   Type of Therapy Hand Therapy (CHT)      Order comments: Patient is scheduled for Right DeQuervains release on 6/8/22. Please see patient within the first week postop for orthosis and start postop rehab.      Evaluation Date: 6/15/2022  Insurance Authorization Period Expiration: 6/2/23  Plan of Care from 6/15/2022 to 8/15/22   Surgery Date and Procedure: 6/8/22; PROCEDURE PERFORMED: 1) Right first dorsal compartment release  Date of Return to MD/PA: 7/21/22 6/23/22: A/P: Status post above, doing well  1) Continue with OT for postop rehab, progress per protocol as tolerated.  2) All sutures removed today. Wound care and signs of infection discussed.     Visit # / Visits authorized: 6 / Pending  Time In: 9:00 am   Time Out: 10:00 am  Total Appointment Time (timed & untimed codes): 50 minutes     Precautions:  Standard; Restrictions/precautions for po Week 3  DOS-6/8/22    Subjective     Pt reports:  7/1: Pt arrives with all HEP media and custom splint.  She reports comfort and good fit of splint and no problems with HEP other than left hand "tired" with t-putty.    Response to previous treatment: Good AROM improvements with commitment to HEP, symptom-guided, no medication    Pain: 0/10  Location: Right wrist/thumb, Left elbow    Objective     Observations: Periphery of the incision site is red. No other signs of infection.      EDEMA (Girth in cm)    " Right/Left Right R   DATE IE-6/15/22 6/20/22 6/27/22   Wrist 14.8/13.9 14.8 (=) 14.7 (-.1)   Thumb P1 6.1/5.6 5.6 (-.5) nt      Wrist AROM    Right/Left Right R   DATE IE-6/15/22 6/20/22 6/27/22   EXT 42/67 42 55   FLX 52/71 47 71   RD 2/7 4 7   UD  7/43 36 34   KING 103/188 129 (+26) 167 (+38)      Thumb AROM in (Wrist neutral position)    Right Left Right Right   DATE IE-6/15/22 6/15/22 6/20/22 6/27/22   MP e/f 0/19 0/70 0/47 49   IP e/f 20+/15 25+/72 20+/45 56   Radial Add/Abd -25/31 -22/44 -21/39 wnl/42   Palmar Add/Abd -20/32 0/43 -13/37 -9/35   KING 52 137 134 (+82) 173 (+39)   Tip to DPC   (in cm) 5.0 1.0 2.5 (+2.5) 1.5 (+1.0)     FINGER AROM (measured in centimeters)  Tip to DPC Right/Left Right   DATE IE-6/15/22 6/20/22   Index  3.0/0 0   Long  2.0/0 0   Ring   2.0/0 0   Small   2.0/0 0      PULLED FORWARD FROM 4/27/22 Right PRE-OP EVAL (also Pre-Left Epicondylitis onset):   Strength: (AVELINA Dynamometer in psi.)     Right/Left     IE-4/27/22   Rung II 45/40   Pinch Strength (Measured in psi)    Right/Left     IE-4/27/22   Key Pinch 9/13   3pt Pinch 9.0/8.5    2pt Pinch defer         Treatment      Vicky received the following supervised modalities after being cleared for contradictions for 15 minutes:   LEFT: Fluidotherapy for promoting healing, reducing pain, desensitization and increasing tissue extensibility  RIGHT: Fluidotherapy for promoting healing, reducing pain, desensitization and increasing tissue extensibility; tegaderm covering incision site     Vicky received the following direct contact modalities after being cleared for contraindications for 0 minutes:  - defer     Vicky received the following manual therapy techniques for 4 minutes:   - Scar massage provided and instructed for home care; only in the periphery of the incision at this time, avoiding the red irritated skin.  - defer-R/A girth; check out fit, comfort and effectiveness of size Medium 3/4 finger compression glove for  RIGHT pain and edema mgmt  - defer-Elastic tape applied to LEFT common extensor tendon with space correction for promoting healing     Vicky received therapeutic exercises for 40minutes including:  Wrist and forearm AROM and flexibility-defer LEFT: 1x10 each performed during final minutes of Fluidotherapy:  - wrist e/f  - wrist rd/ud  - sup/pronation    RIGHT: 2 x 10 sec holds each:  - [place] hand stretch with thumb relaxed (overlapping)   RIGHT Hand AROM and tendon gliding-defer 2 min each:  - defer-kinetic  - small/med pompoms, one container, stored, rotatd and translated  - [place] spring walks   Thumb AROM & gliding 1x10 each:  - tape spool ladder on each digit, each CW & CCW    2 min each:  - DIP blocks over finger platter   Wrist/forearm strengthening - Yellow Flexbar, 1 min each:  -smiles- right thumb free  -defer-frowns  -twists- right thumb free (right hand on top)  -defer- for Left-stabilization w/oscillations  -isometric supination- Left only  -isometric pronation- Left only     Postural strengthening defer   LEFT strengthening Defer to HEP: Red t-putty Strengthening, 5 reps reviewed & recommended     Objective Measures 6/27: R/A AROM wrist and thumb   HEP 6/27: [place] hand stretch added, thumbs relaxed  6/27: Red t-putty for LEFT only  6/20: Thumb pinky slides added informally  6/15: As initially set      Vicky received Self Care Instruction and Orthotic mgmt instructions for 0 minutes while participated in a concurrent discussion of evaluation findings and specific home care, including:  - education related to the Current Protocol Restriction/precautions and MD orders   - the anatomy and pathophysiology of diagnosis.  - instruction in activity modifications for goals of protecting healing tissues to include:  - Check out Right custom orthosis; good fit and comfort   - Pt instructed not to soak her right hand until fully healed or otherwise directed by MD.  - defer-considerations for wearing the LEFT  "OTS wrist support, donning large plastic glove over it for toileting until able to use her right hand again.  - patient reports now much easier to yasmine/doff since Modified with strap "tabs"; LEFT OTS wrist brace; good fit and comfort    Home Exercise Program and Home Care Resources/Education:     Education provided:   - See above  - See Self Care above  - HEP as set  - Education provided on all interventions performed during today's session   - Progress towards goals     Written Home Exercises Provided: Patient instructed to cont prior HEP.  Exercises were reviewed and Vicky was able to demonstrate them prior to the end of the session.  Vicky demonstrated good  understanding of the HEP provided.     See EMR under Patient Instructions for exercises provided prior visit. 6/27/22       Assessment   7/1: Good tolerance for advancing activities and PREs for Left UE.    Goals:   The following goals were discussed with the patient and patient is in agreement with them as to be addressed in the treatment plan.     Goals:  LTG's (10-12 weeks):  1)  Decrease complaints of pain, bilaterally, to 2 out of 10 at worst to increase functional hand use for gardening, fitness program, & household ADLs.  2)  Right  and pinch strength to be >75% of the pre-operative measures (4/27/22) to demonstrate healing tissues and tolerance for fasteners, locks and latches.  3)  Right Wrist and thumb KING to >80% as compared to the Left hand for demonstrating healing tissues without adverse scar adhesions.  4)  Patient to score at 25% or less on Quick/DASH or FOTO to demonstrate improved perception of functional bilateral hand use and evidence near to prior level of function for ADLs with or without newly learning activity modifications or compensatory strategies.     STG's (6-8 weeks)  1)   Pt to report decreased episodes of pain in Both wrist/thumb and elbows during daytime activities w/or without newly learned activity modifications. " MET  2)   Patient to be IND with Phase II of HEP and modalities for pain/edema managment.  3)   Increase Right wrist and thumb KING by >60 degrees to indicate effective tissue healing with improved tendon gliding. MET for thumb 6/20/2022. MET for wrist 6/27/22.  4)   Patient to be IND with Orthotic use, wear and care precautions. MET  5)   Pt to tolerate advancing PREs with self-graded intensity and effective symptom monitoring.     Pt would continue to benefit from skilled OT as per original POC     Vicky is progressing faster than expected towards her goals and there are no updates to goals at this time. Pt prognosis is Good    Pt will continue to benefit from skilled outpatient occupational therapy to address the deficits listed in the problem list on initial evaluation provide pt/family education and to maximize pt's level of independence in the home and community environment.     Pt's spiritual, cultural and educational needs considered and pt agreeable to plan of care and goals.    Plan   7/1: Advance as per Week 3 protocol.    Cont OT to address above goals as per original POC.    Certification Period/Plan of care expiration: 6/15/2022 to 9/15/22.     Outpatient Occupational Therapy 2-3 times weekly for 10-12 weeks to include the following interventions: Paraffin, Fluidotherapy, Manual therapy/joint mobilizations, Modalities for pain management, US 3 mhz, Therapeutic exercises/activities., Strengthening, Orthotic Fabrication/Fit/Training, Edema Control, Scar Management, Electrical Modalities, Joint Protection and Energy Conservation.    LATESHA Rivers, T  Occupational Therapist, Certified Hand Therapist

## 2022-07-01 ENCOUNTER — CLINICAL SUPPORT (OUTPATIENT)
Dept: REHABILITATION | Facility: HOSPITAL | Age: 57
End: 2022-07-01
Attending: ORTHOPAEDIC SURGERY
Payer: COMMERCIAL

## 2022-07-01 ENCOUNTER — PATIENT MESSAGE (OUTPATIENT)
Dept: OBSTETRICS AND GYNECOLOGY | Facility: CLINIC | Age: 57
End: 2022-07-01
Payer: COMMERCIAL

## 2022-07-01 DIAGNOSIS — M25.641 DECREASED RANGE OF MOTION OF FINGER OF RIGHT HAND: ICD-10-CM

## 2022-07-01 DIAGNOSIS — M79.641 PAIN IN RIGHT HAND: Primary | ICD-10-CM

## 2022-07-01 DIAGNOSIS — Z74.1 REQUIRES ASSISTANCE WITH ACTIVITIES OF DAILY LIVING (ADL): ICD-10-CM

## 2022-07-01 DIAGNOSIS — M79.89 SWELLING OF RIGHT HAND: ICD-10-CM

## 2022-07-01 DIAGNOSIS — M25.631 DECREASED RANGE OF MOTION OF RIGHT WRIST: ICD-10-CM

## 2022-07-01 PROCEDURE — 97530 THERAPEUTIC ACTIVITIES: CPT | Mod: PN

## 2022-07-01 PROCEDURE — 97110 THERAPEUTIC EXERCISES: CPT | Mod: PN

## 2022-07-01 PROCEDURE — 97022 WHIRLPOOL THERAPY: CPT | Mod: PN

## 2022-07-06 ENCOUNTER — PATIENT MESSAGE (OUTPATIENT)
Dept: REHABILITATION | Facility: HOSPITAL | Age: 57
End: 2022-07-06
Payer: COMMERCIAL

## 2022-07-07 ENCOUNTER — DOCUMENTATION ONLY (OUTPATIENT)
Dept: REHABILITATION | Facility: HOSPITAL | Age: 57
End: 2022-07-07
Payer: COMMERCIAL

## 2022-07-07 ENCOUNTER — PATIENT MESSAGE (OUTPATIENT)
Dept: ORTHOPEDICS | Facility: CLINIC | Age: 57
End: 2022-07-07
Payer: COMMERCIAL

## 2022-07-07 NOTE — PLAN OF CARE
[facility] OUTPATIENT THERAPY AND WELLNESS   Discharge Note    Name: Vicky Brand  Clinic Number: [id number]    Therapy Diagnosis:        Encounter Diagnoses   Name Primary?    Right foot pain      Insertional Achilles tendinopathy      Peroneal tendinitis of right lower extremity      Plantar fasciitis Yes      Physician: Dwaine Jones DPM     Physician Orders: PT Eval and Treat   Medical Diagnosis from Referral: M79.671 (ICD-10-CM) - Right foot pain M76.60 (ICD-10-CM) - Insertional Achilles tendinopathy M76.71 (ICD-10-CM) - Peroneal tendinitis of right lower extremity   Evaluation Date: 4/13/2022        Date of Last visit: 4/13/2022  Total Visits Received: 1    ASSESSMENT      Discharge reason: Patient has not attended therapy since initial evaluation on 4/13/2022 with all future visits cancelled without rescheduling further follow-up visits.     Discharge FOTO Score: not met secondary to non-compliance     Goals:  not met secondary to non-compliance     PLAN   This patient is discharged from Physical Therapy      Polly Ryder, PT

## 2022-07-08 ENCOUNTER — TELEPHONE (OUTPATIENT)
Dept: ORTHOPEDICS | Facility: CLINIC | Age: 57
End: 2022-07-08
Payer: COMMERCIAL

## 2022-07-08 ENCOUNTER — CLINICAL SUPPORT (OUTPATIENT)
Dept: REHABILITATION | Facility: HOSPITAL | Age: 57
End: 2022-07-08
Attending: ORTHOPAEDIC SURGERY
Payer: COMMERCIAL

## 2022-07-08 ENCOUNTER — PATIENT MESSAGE (OUTPATIENT)
Dept: ORTHOPEDICS | Facility: CLINIC | Age: 57
End: 2022-07-08
Payer: COMMERCIAL

## 2022-07-08 DIAGNOSIS — M79.89 SWELLING OF RIGHT HAND: ICD-10-CM

## 2022-07-08 DIAGNOSIS — M25.631 DECREASED RANGE OF MOTION OF RIGHT WRIST: ICD-10-CM

## 2022-07-08 DIAGNOSIS — M25.641 DECREASED RANGE OF MOTION OF FINGER OF RIGHT HAND: ICD-10-CM

## 2022-07-08 DIAGNOSIS — M79.641 PAIN IN RIGHT HAND: Primary | ICD-10-CM

## 2022-07-08 DIAGNOSIS — Z74.1 REQUIRES ASSISTANCE WITH ACTIVITIES OF DAILY LIVING (ADL): ICD-10-CM

## 2022-07-08 PROCEDURE — 97140 MANUAL THERAPY 1/> REGIONS: CPT | Mod: PN

## 2022-07-08 PROCEDURE — 97110 THERAPEUTIC EXERCISES: CPT | Mod: PN

## 2022-07-08 PROCEDURE — 97018 PARAFFIN BATH THERAPY: CPT | Mod: PN

## 2022-07-08 NOTE — PROGRESS NOTES
"  Occupational Therapy Daily Treatment Note     Date: 7/8/2022  Name: Vicky Brand  Clinic Number: [id number]    Therapy Diagnosis:   Encounter Diagnoses   Name Primary?    Pain in right hand Yes    Decreased range of motion of finger of right hand     Decreased range of motion of right wrist     Swelling of right hand     Requires assistance with activities of daily living (ADL)      Physician: Petey Camarena*    Medical Diagnosis: M65.4 (ICD-10-CM) - De Quervain's tenosynovitis, right  Physician Orders:   Post Surgical? Yes   Eval and Treat Yes   Type of Therapy Hand Therapy (CHT)      Order comments: Patient is scheduled for Right DeQuervains release on 6/8/22. Please see patient within the first week postop for orthosis and start postop rehab.      Evaluation Date: 6/15/2022  Insurance Authorization Period Expiration: 6/2/23  Plan of Care from 6/15/2022 to 8/15/22   Surgery Date and Procedure: 6/8/22; PROCEDURE PERFORMED: 1) Right first dorsal compartment release  Date of Return to MD/PA: 7/21/22 6/23/22: A/P: Status post above, doing well  1) Continue with OT for postop rehab, progress per protocol as tolerated.  2) All sutures removed today. Wound care and signs of infection discussed.     Visit # / Visits authorized: 7 / Pending  Time In: 9:10 am   Time Out: 10:15 am  Total Appointment Time (timed & untimed codes): 65 minutes     Precautions:  Standard; Restrictions/precautions for po Week 4  DOS-6/8/22    Subjective     Pt reports: 7/7: Pt arrives with all HEP media and custom splint.  She reports comfort and good fit of splint and no problems with HEP. Left hand "feels normal". She has continued to delegate moderate to heavy I/ADLs to family to avoid left elbow pain and respect Right post-op precautions. Pt would like to be able to drive without wrist braces.    Response to previous treatment: Good AROM improvements with commitment to HEP, symptom-guided, no medication    Pain: " 0/10  Location: Right wrist/thumb, Left elbow    Objective     Observations: Periphery of the incision site is red. No other signs of infection.      EDEMA (Girth in cm)    Right/Left Right R   DATE IE-6/15/22 6/20/22 6/27/22   Wrist 14.8/13.9 14.8 (=) 14.7 (-.1)   Thumb P1 6.1/5.6 5.6 (-.5) nt      Wrist AROM    Right/Left Right R R   DATE IE-6/15/22 6/20/22 6/27/22 7/8/22   EXT 42/67 42 55 63   FLX 52/71 47 71 74   RD 2/7 4 7 7   UD  7/43 36 34 36   KING 103/188 129 (+26) 167 (+38) 180 (+13)      Thumb AROM in (Wrist neutral position)    Right Left Right Right R   DATE IE-6/15/22 6/15/22 6/20/22 6/27/22 7/8/22  Post-tx   MP e/f 0/19 0/70 0/47 49 53   IP e/f 20+/15 25+/72 20+/45 56 57   Radial Add/Abd -25/31 -22/44 -21/39 wnl/42 44   Palmar Add/Abd -20/32 0/43 -13/37 -9/35 wnl/35   KING 52 137 134 (+82) 173 (+39) 189 (+16)   Tip to DPC   (in cm) 5.0 1.0 2.5 (+2.5) 1.5 (+1.0) 1.0 (+.5)     FINGER AROM (measured in centimeters)  Tip to DPC Right/Left Right   DATE IE-6/15/22 6/20/22   Index  3.0/0 0   Long  2.0/0 0   Ring   2.0/0 0   Small   2.0/0 0      PULLED FORWARD FROM 4/27/22 Right PRE-OP EVAL (also Pre-Left Epicondylitis onset):   Strength: (AVELINA Dynamometer in psi.)     Right/Left Left   Rung II  IE-4/27/22 7/8/22*   Elbow flexed 45/40 29   Elbow extended  29   Elbow extended & f/a supinated  30   Elbow extended & f/a pronated  22   * no pain today with any position    Pinch Strength (Measured in psi)    Right/Left     IE-4/27/22   Key Pinch 9/13   3pt Pinch 9.0/8.5    2pt Pinch defer       DEXTERITY Tests   Right/Left   DATE TBA   Grooved Peg Timed Test        Treatment      Vicky received the following supervised modalities after being cleared for contradictions for 15 minutes:   LEFT: Fluidotherapy for promoting healing, reducing pain, desensitization and increasing tissue extensibility  RIGHT: Paraffin wax heat pre-tx for promoting healing, decreasing pain and increasing tissue extensibility  -  "defer-Fluidotherapy for promoting healing, reducing pain, desensitization and increasing tissue extensibility; tegaderm covering incision site     Vicky received the following direct contact modalities after being cleared for contraindications for 0 minutes:  - defer     Vicky received the following manual therapy techniques for 8 minutes:   - Scar massage w/dycem (A) provided in graduated Finkelstein's position.  - P/AAROM select wrist and thumb  - Contract/relax in composite thumb flex/opposition, wrist neutral  - defer-R/A girth; check out fit, comfort and effectiveness of size Medium 3/4 finger compression glove for RIGHT pain and edema mgmt  - defer-Elastic tape applied to LEFT common extensor tendon with space correction for promoting healing     Vicky received therapeutic exercises for 35 minutes including:  Wrist and forearm AROM and flexibility LEFT: 30" x 3 performed during final minutes of Fluidotherapy:  - Extrinsic extensor active stretch    RIGHT& LEFT: 3 x 30 sec holds each:  - [place] hand stretch with thumb relaxed (overlapping)  - Reverse [place] hand stretch    RIGHT: 3x30" hold each:  - Graded Finkelstein's   RIGHT Hand AROM and tendon gliding-defer 2 min each:  - kinetic  - small/med pompoms, one container, stored, rotatd and translated  - binder spring walks   Thumb AROM & gliding- defer 1x10 each:  - tape spool ladder on each digit, each CW & CCW    2 min each:  - DIP blocks over finger platter   Wrist/forearm strengthening - defer-Yellow Flexbar, 1 min each:  -smiles- right thumb free  -defer-frowns  -twists- right thumb free (right hand on top)  -defer- for Left-stabilization w/oscillations  -isometric supination- Left only  -isometric pronation- Left only     Postural strengthening -RED t-band, 2 min each for:  -Left and Right shld ER and scap retraction    - RED t-band, 1 min each:  -Left elbow flexion   -Right elbow flexion  -Left elbow extension  -Right elbow extension     LEFT " "strengthening Green t-putty Strengthening     Objective Measures 7/8: R/A Left common extensor tolerance  7/8: R/A AROM wrist and thumb   HEP 7/8: Green t-putty for LEFT only  6/27: [place] hand stretch added, thumbs relaxed  6/27: Red t-putty for LEFT only  6/20: Thumb pinky slides added informally  6/15: As initially set      Vicky received Self Care Instruction and Orthotic mgmt instructions for 0 minutes while participated in a concurrent discussion of evaluation findings and specific home care, including:  - education related to the Current Protocol Restriction/precautions and MD orders   - the anatomy and pathophysiology of diagnosis.  - instruction in activity modifications for goals of protecting healing tissues to include:  - Check out Right custom orthosis; good fit and comfort   - Pt instructed not to soak her right hand until fully healed or otherwise directed by MD.  - defer-considerations for wearing the LEFT OTS wrist support, donning large plastic glove over it for toileting until able to use her right hand again.  - patient reports now much easier to yasmine/doff since Modified with strap "tabs"; LEFT OTS wrist brace; good fit and comfort    Home Exercise Program and Home Care Resources/Education:     Education provided:   - See above  - See Self Care above  - HEP as set  - Education provided on all interventions performed during today's session   - Progress towards goals     Written Home Exercises Provided: yes.  Exercises were reviewed and Vicky was able to demonstrate them prior to the end of the session.  Vicky demonstrated good  understanding of the HEP provided.     See EMR under Patient Instructions for exercises provided prior visit. 6/27/22, 7/8/22       Assessment   7/8: Good tolerance for advancing flexibility activities and PREs for Right and Left UE.    Goals:   The following goals were discussed with the patient and patient is in agreement with them as to be addressed in the " treatment plan.     Goals:  LTG's (10-12 weeks):  1)  Decrease complaints of pain, bilaterally, to 2 out of 10 at worst to increase functional hand use for gardening, fitness program, & household ADLs.  2)  Right  and pinch strength to be >75% of the pre-operative measures (4/27/22) to demonstrate healing tissues and tolerance for fasteners, locks and latches.  3)  Right Wrist and thumb KING to >80% as compared to the Left hand for demonstrating healing tissues without adverse scar adhesions.  4)  Patient to score at 25% or less on Quick/DASH or FOTO to demonstrate improved perception of functional bilateral hand use and evidence near to prior level of function for ADLs with or without newly learning activity modifications or compensatory strategies.     STG's (6-8 weeks)  1)   Pt to report decreased episodes of pain in Both wrist/thumb and elbows during daytime activities w/or without newly learned activity modifications. MET  2)   Patient to be IND with Phase II of HEP and modalities for pain/edema managment. Progressing  3)   Increase Right wrist and thumb KING by >60 degrees to indicate effective tissue healing with improved tendon gliding. MET for thumb 6/20/2022. MET for wrist 6/27/22.  4)   Patient to be IND with Orthotic use, wear and care precautions. MET  5)   Pt to tolerate advancing PREs with self-graded intensity and effective symptom monitoring. Progressing    Pt would continue to benefit from skilled OT as per original POC     Vicky is progressing faster than expected towards her goals and there are no updates to goals at this time. Pt prognosis is Good    Pt will continue to benefit from skilled outpatient occupational therapy to address the deficits listed in the problem list on initial evaluation provide pt/family education and to maximize pt's level of independence in the home and community environment.     Pt's spiritual, cultural and educational needs considered and pt agreeable to plan of  care and goals.    Plan   7/7: Advance as per Week 4 protocol.    Cont OT to address above goals as per original POC.    Certification Period/Plan of care expiration: 6/15/2022 to 9/15/22.     Outpatient Occupational Therapy 2-3 times weekly for 10-12 weeks to include the following interventions: Paraffin, Fluidotherapy, Manual therapy/joint mobilizations, Modalities for pain management, US 3 mhz, Therapeutic exercises/activities., Strengthening, Orthotic Fabrication/Fit/Training, Edema Control, Scar Management, Electrical Modalities, Joint Protection and Energy Conservation.    LATESHA Rivers, T  Occupational Therapist, Certified Hand Therapist

## 2022-07-08 NOTE — PATIENT INSTRUCTIONS
[facility], OCCUPATIONAL [facility]APY  HOME EXERCISE PROGRAM     PERFORM EXERCISES 1 TO 3 SETS OF 10, 4-6 TIMES PER DAY; ALWAYS PAIN-FREE. DON'T LET THE PICTURES PRESSURE YOU TO PUSH THROUGH PAINFUL RANGES.

## 2022-07-10 NOTE — PROGRESS NOTES
"  Occupational Therapy Daily Treatment Note     Date: 7/11/2022  Name: Vicky Brand  Clinic Number: [id number]    Therapy Diagnosis:   Encounter Diagnoses   Name Primary?    Pain in right hand Yes    Decreased range of motion of finger of right hand     Decreased range of motion of right wrist     Swelling of right hand     Requires assistance with activities of daily living (ADL)      Physician: Petey Camarena*    Medical Diagnosis: M65.4 (ICD-10-CM) - De Quervain's tenosynovitis, right  Physician Orders:   Post Surgical? Yes   Eval and Treat Yes   Type of Therapy Hand Therapy (CHT)      Order comments: Patient is scheduled for Right DeQuervains release on 6/8/22. Please see patient within the first week postop for orthosis and start postop rehab.      Evaluation Date: 6/15/2022  Insurance Authorization Period Expiration: 6/2/23  Plan of Care from 6/15/2022 to 8/15/22   Surgery Date and Procedure: 6/8/22; PROCEDURE PERFORMED: 1) Right first dorsal compartment release  Date of Return to MD/PA: 7/21/22 6/23/22: A/P: Status post above, doing well  1) Continue with OT for postop rehab, progress per protocol as tolerated.  2) All sutures removed today. Wound care and signs of infection discussed.     Visit # / Visits authorized: 8 / Pending  Time In: 2:50 pm   Time Out: 3:40 pm  Total Appointment Time (timed & untimed codes): 50 minutes     Precautions:  Standard; Restrictions/precautions for po Week 4  DOS-6/8/22    Subjective     Pt reports: 7/7: Pt arrives with all HEP media and custom splint.  She reports comfort and good fit of splint and no problems with HEP. Left hand "feels normal". She has continued to delegate moderate to heavy I/ADLs to family to avoid left elbow pain and respect Right post-op precautions. Pt would like to be able to drive without wrist braces.    Response to previous treatment: Good AROM improvements with commitment to HEP, symptom-guided, no medication    Pain: " "0/10  Location: Right wrist/thumb, Left elbow    Objective     Observations: Periphery of the incision site is red. No other signs of infection.      EDEMA (Girth in cm)    Right/Left Right R   DATE IE-6/15/22 6/20/22 6/27/22   Wrist 14.8/13.9 14.8 (=) 14.7 (-.1)   Thumb P1 6.1/5.6 5.6 (-.5) nt      Wrist AROM    Right/Left Right R R   DATE IE-6/15/22 6/20/22 6/27/22 7/8/22   EXT 42/67 42 55 63   FLX 52/71 47 71 74   RD 2/7 4 7 7   UD  7/43 36 34 36   KING 103/188 129 (+26) 167 (+38) 180 (+13)      Thumb AROM in (Wrist neutral position)    Right Left Right Right R   DATE IE-6/15/22 6/15/22 6/20/22 6/27/22 7/8/22  Post-tx   MP e/f 0/19 0/70 0/47 49 53   IP e/f 20+/15 25+/72 20+/45 56 57   Radial Add/Abd -25/31 -22/44 -21/39 wnl/42 44   Palmar Add/Abd -20/32 0/43 -13/37 -9/35 wnl/35   KING 52 137 134 (+82) 173 (+39) 189 (+16)   Tip to DPC   (in cm) 5.0 1.0 2.5 (+2.5) 1.5 (+1.0) 1.0 (+.5)     FINGER AROM (measured in centimeters)  Tip to DPC Right/Left Right   DATE IE-6/15/22 6/20/22   Index  3.0/0 0   Long  2.0/0 0   Ring   2.0/0 0   Small   2.0/0 0      PULLED FORWARD FROM 4/27/22 Right PRE-OP EVAL (also Pre-Left Epicondylitis onset):   Strength: (AVELINA Dynamometer in psi.)     Right/Left Left   Rung II  IE-4/27/22 7/8/22*   Elbow flexed 45/40 29   Elbow extended  29   Elbow extended & f/a supinated  30   Elbow extended & f/a pronated  22   * no pain today with any position    Pinch Strength (Measured in psi)    Right/Left     IE-4/27/22   Key Pinch 9/13   3pt Pinch 9.0/8.5    2pt Pinch defer       DEXTERITY Tests   Right/Left   DATE 7/11/22   Grooved Peg Timed Test 1'05"/1'10"       Treatment      Vicky received the following supervised modalities after being cleared for contradictions for 15 minutes:   LEFT: Fluidotherapy for promoting healing, reducing pain, desensitization and increasing tissue extensibility  RIGHT: Fluidotherapy for promoting healing, reducing pain, desensitization and increasing tissue " "extensibility; tegaderm covering incision site     Vicky received the following direct contact modalities after being cleared for contraindications for 0 minutes:  - defer     Vicky received the following manual therapy techniques for 0 minutes:   - defer-Scar massage w/dycem (A) provided in graduated Finkelstein's position.  - defer-P/AAROM select wrist and thumb  - defer-Contract/relax in composite thumb flex/opposition, wrist neutral  - defer-R/A girth; check out fit, comfort and effectiveness of size Medium 3/4 finger compression glove for RIGHT pain and edema mgmt  - defer-Elastic tape applied to LEFT common extensor tendon with space correction for promoting healing     Vicky received therapeutic exercises for 35 minutes including:  Wrist and forearm AROM and flexibility LEFT: 30" x 3 performed during final minutes of Fluidotherapy:  - Extrinsic extensor active stretch    Defer to HEP-RIGHT& LEFT: 3 x 30 sec holds each:  - Waite hand stretch with thumb relaxed (overlapping)  - Reverse Waite hand stretch    RIGHT: 3x30" hold each:  - Graded Finkelstein's performed during final minutes of Fluidotherapy     RIGHT Hand AROM and tendon gliding- Grooved Pegs assessed    Defer-2 min each:  - kinetic  - small/med pompoms, one container, stored, rotatd and translated  - [employer] spring walks   Thumb AROM & gliding 1x10 each:  - tape spool ladder on RF & SF, each CW & CCW    2 min each:  - DIP blocks over finger platter   Wrist/forearm strengthening RED t-band, 2 min each for:  -Left and Right wrist extension w/shld ER and scap retraction    -Yellow Flexbar, 1 min each:  -smiles- right thumb free  -frowns - right thumb free  -twists- right thumb free (right hand on top)  -defer- for Left-stabilization w/oscillations  -isometric supination-1 min on Right, right thumb free but discontinued when pain presented  -defer-isometric pronation- Left only     Postural strengthening   - RED t-band, 1-2 min each:  -Left elbow " "flexion   -Right elbow flexion  -Left elbow extension  -Right elbow extension     LEFT strengthening Green t-putty Strengthening     Objective Measures 7/8: R/A Left common extensor tolerance  7/8: R/A AROM wrist and thumb   HEP 7/11: Red t-band for wrist extension isometrics  7/8: Green t-putty for LEFT only  6/27: [place] hand stretch added, thumbs relaxed  6/27: Red t-putty for LEFT only  6/20: Thumb pinky slides added informally  6/15: As initially set      Vicky received Self Care Instruction and Orthotic mgmt instructions for 0 minutes while participated in a concurrent discussion of evaluation findings and specific home care, including:  - education related to the Current Protocol Restriction/precautions and MD orders   - the anatomy and pathophysiology of diagnosis.  - instruction in activity modifications for goals of protecting healing tissues to include:  - Check out Right custom orthosis; good fit and comfort   - Pt instructed not to soak her right hand until fully healed or otherwise directed by MD.  - defer-considerations for wearing the LEFT OTS wrist support, donning large plastic glove over it for toileting until able to use her right hand again.  - patient reports now much easier to yasmine/doff since Modified with strap "tabs"; LEFT OTS wrist brace; good fit and comfort    Home Exercise Program and Home Care Resources/Education:     Education provided:   - See above  - See Self Care above  - HEP as set  - Education provided on all interventions performed during today's session   - Progress towards goals     Written Home Exercises Provided: yes.  Exercises were reviewed and Vicky was able to demonstrate them prior to the end of the session.  Vicky demonstrated good  understanding of the HEP provided.     See EMR under Patient Instructions for exercises provided prior visit. 6/27/22, 7/8/22, 7/11/22       Assessment   7/11: Good tolerance for advancing flexibility activities and PREs for Right " and Left UE.    Goals:   The following goals were discussed with the patient and patient is in agreement with them as to be addressed in the treatment plan.     Goals:  LTG's (10-12 weeks):  1)  Decrease complaints of pain, bilaterally, to 2 out of 10 at worst to increase functional hand use for gardening, fitness program, & household ADLs. Progressing  2)  Right  and pinch strength to be >75% of the pre-operative measures (4/27/22) to demonstrate healing tissues and tolerance for fasteners, locks and latches.  3)  Right Wrist and thumb KING to >80% as compared to the Left hand for demonstrating healing tissues without adverse scar adhesions.  4)  Patient to score at 25% or less on Quick/DASH or FOTO to demonstrate improved perception of functional bilateral hand use and evidence near to prior level of function for ADLs with or without newly learning activity modifications or compensatory strategies.     STG's (6-8 weeks)  1)   Pt to report decreased episodes of pain in Both wrist/thumb and elbows during daytime activities w/or without newly learned activity modifications. MET  2)   Patient to be IND with Phase II of HEP and modalities for pain/edema managment. Progressing  3)   Increase Right wrist and thumb KING by >60 degrees to indicate effective tissue healing with improved tendon gliding. MET for thumb 6/20/2022. MET for wrist 6/27/22.  4)   Patient to be IND with Orthotic use, wear and care precautions. MET  5)   Pt to tolerate advancing PREs with self-graded intensity and effective symptom monitoring. MET    Pt would continue to benefit from skilled OT as per original POC     Vicky is progressing faster than expected towards her goals and there are no updates to goals at this time. Pt prognosis is Good    Pt will continue to benefit from skilled outpatient occupational therapy to address the deficits listed in the problem list on initial evaluation provide pt/family education and to maximize pt's level  of independence in the home and community environment.     Pt's spiritual, cultural and educational needs considered and pt agreeable to plan of care and goals.    Plan   7/11: Advance as per Week 5 protocol.    Cont OT to address above goals as per original POC.    Certification Period/Plan of care expiration: 6/15/2022 to 9/15/22.     Outpatient Occupational Therapy 2-3 times weekly for 10-12 weeks to include the following interventions: Paraffin, Fluidotherapy, Manual therapy/joint mobilizations, Modalities for pain management, US 3 mhz, Therapeutic exercises/activities., Strengthening, Orthotic Fabrication/Fit/Training, Edema Control, Scar Management, Electrical Modalities, Joint Protection and Energy Conservation.    LATESHA Rivers, CURTIST  Occupational Therapist, Certified Hand Therapist

## 2022-07-11 ENCOUNTER — PATIENT MESSAGE (OUTPATIENT)
Dept: REHABILITATION | Facility: HOSPITAL | Age: 57
End: 2022-07-11

## 2022-07-11 ENCOUNTER — CLINICAL SUPPORT (OUTPATIENT)
Dept: REHABILITATION | Facility: HOSPITAL | Age: 57
End: 2022-07-11
Attending: ORTHOPAEDIC SURGERY
Payer: COMMERCIAL

## 2022-07-11 DIAGNOSIS — M25.631 DECREASED RANGE OF MOTION OF RIGHT WRIST: ICD-10-CM

## 2022-07-11 DIAGNOSIS — Z74.1 REQUIRES ASSISTANCE WITH ACTIVITIES OF DAILY LIVING (ADL): ICD-10-CM

## 2022-07-11 DIAGNOSIS — M79.89 SWELLING OF RIGHT HAND: ICD-10-CM

## 2022-07-11 DIAGNOSIS — M79.641 PAIN IN RIGHT HAND: Primary | ICD-10-CM

## 2022-07-11 DIAGNOSIS — M25.641 DECREASED RANGE OF MOTION OF FINGER OF RIGHT HAND: ICD-10-CM

## 2022-07-11 PROCEDURE — 97110 THERAPEUTIC EXERCISES: CPT | Mod: PN

## 2022-07-11 PROCEDURE — 97022 WHIRLPOOL THERAPY: CPT | Mod: PN

## 2022-07-11 NOTE — PATIENT INSTRUCTIONS
PERFORM STRENGTHENING EXERCISES EVERY OTHER DAY; AND ALWAYS PAIN-FREE:         FOR THE FOLLOWING.....PLACE THE LOOPS IN THE BAND ABOVE YOUR WRISTS; No HOLDING THE BAND IN YOUR HAND.  REMEMBER TO STAY SITTING AND WRAP THE BAND AROUND YOUR KNEE      BAND AROUND YOUR HANDS, AVOID THE THUMBS:      YOU CAN STAY SEATING AND PLACE THE BAND UNDER YOUR KNEE

## 2022-07-14 ENCOUNTER — OFFICE VISIT (OUTPATIENT)
Dept: ORTHOPEDICS | Facility: CLINIC | Age: 57
End: 2022-07-14
Payer: COMMERCIAL

## 2022-07-14 DIAGNOSIS — Z98.890 POST-OPERATIVE STATE: ICD-10-CM

## 2022-07-14 DIAGNOSIS — M65.4 DE QUERVAIN'S TENOSYNOVITIS, RIGHT: Primary | ICD-10-CM

## 2022-07-14 PROCEDURE — 1159F MED LIST DOCD IN RCRD: CPT | Mod: CPTII,S$GLB,, | Performed by: PHYSICIAN ASSISTANT

## 2022-07-14 PROCEDURE — 99024 POSTOP FOLLOW-UP VISIT: CPT | Mod: S$GLB,,, | Performed by: PHYSICIAN ASSISTANT

## 2022-07-14 PROCEDURE — 99999 PR PBB SHADOW E&M-EST. PATIENT-LVL II: ICD-10-PCS | Mod: PBBFAC,,, | Performed by: PHYSICIAN ASSISTANT

## 2022-07-14 PROCEDURE — 99999 PR PBB SHADOW E&M-EST. PATIENT-LVL II: CPT | Mod: PBBFAC,,, | Performed by: PHYSICIAN ASSISTANT

## 2022-07-14 PROCEDURE — 99024 PR POST-OP FOLLOW-UP VISIT: ICD-10-PCS | Mod: S$GLB,,, | Performed by: PHYSICIAN ASSISTANT

## 2022-07-14 PROCEDURE — 1159F PR MEDICATION LIST DOCUMENTED IN MEDICAL RECORD: ICD-10-PCS | Mod: CPTII,S$GLB,, | Performed by: PHYSICIAN ASSISTANT

## 2022-07-14 NOTE — PROGRESS NOTES
Dr. Wagner is the supervising physician for this encounter/patient    Vicky Brand presents for post-operative evaluation.  The patient is now 5 weeks s/p Right first dorsal compartment release with Dr. Wagner on 6/8/22.  Overall the patient reports doing well.  She reports 1/10 pain, denies any f/c/s and is not taking any medications for this. She does have some sensitivity to the incision site with adhesions present. She continues to wear a custom orthosis per OT. She is doing vigorous scar massage, but does not see any real improvement with the scar.    PE:    AA&O x 4.  NAD  HEENT:  NCAT, sclera nonicteric  Lungs:  Respirations are equal and unlabored.  CV:  2+ bilateral upper and lower extremity pulses.  MSK: The wound is well healed without any signs of infection, however significant adherence of the scar is noted with mild erythema consistent with inflammation.  Full wrist and hand motion, some soreness with Finklestein. SILT. DNVI.    A/P: Status post above, doing well  1) Continue with OT for postop rehab, wean out of brace, progress as tolerated.  2) continue with vigorous scar massage, voltaren gel massage, ice/heat.  3) F/U 4-6 weeks with Dr. Wagner.  4) Call with any questions/concerns in the interim      Jamie Russo-DiGeorge PA-C

## 2022-07-15 ENCOUNTER — PATIENT MESSAGE (OUTPATIENT)
Dept: REHABILITATION | Facility: HOSPITAL | Age: 57
End: 2022-07-15

## 2022-07-15 ENCOUNTER — CLINICAL SUPPORT (OUTPATIENT)
Dept: REHABILITATION | Facility: HOSPITAL | Age: 57
End: 2022-07-15
Attending: ORTHOPAEDIC SURGERY
Payer: COMMERCIAL

## 2022-07-15 DIAGNOSIS — M79.641 PAIN IN RIGHT HAND: Primary | ICD-10-CM

## 2022-07-15 DIAGNOSIS — M25.641 DECREASED RANGE OF MOTION OF FINGER OF RIGHT HAND: ICD-10-CM

## 2022-07-15 DIAGNOSIS — M25.631 DECREASED RANGE OF MOTION OF RIGHT WRIST: ICD-10-CM

## 2022-07-15 DIAGNOSIS — M79.89 SWELLING OF RIGHT HAND: ICD-10-CM

## 2022-07-15 DIAGNOSIS — Z74.1 REQUIRES ASSISTANCE WITH ACTIVITIES OF DAILY LIVING (ADL): ICD-10-CM

## 2022-07-15 PROCEDURE — 97140 MANUAL THERAPY 1/> REGIONS: CPT | Mod: PN

## 2022-07-15 PROCEDURE — 97022 WHIRLPOOL THERAPY: CPT | Mod: PN

## 2022-07-15 PROCEDURE — 97110 THERAPEUTIC EXERCISES: CPT | Mod: PN

## 2022-07-15 NOTE — PROGRESS NOTES
Occupational Therapy Daily Treatment Note     Date: 7/15/2022  Name: Vicky Brand  Clinic Number: [id number]    Therapy Diagnosis:   Encounter Diagnoses   Name Primary?    Pain in right hand Yes    Decreased range of motion of finger of right hand     Decreased range of motion of right wrist     Swelling of right hand     Requires assistance with activities of daily living (ADL)      Physician: Petey Camarena*    Medical Diagnosis: M65.4 (ICD-10-CM) - De Quervain's tenosynovitis, right  Physician Orders:   Post Surgical? Yes   Eval and Treat Yes   Type of Therapy Hand Therapy (CHT)      Order comments: Patient is scheduled for Right DeQuervains release on 6/8/22. Please see patient within the first week postop for orthosis and start postop rehab.      Evaluation Date: 6/15/2022  Insurance Authorization Period Expiration: 6/2/23  Plan of Care from 6/15/2022 to 8/15/22   Surgery Date and Procedure: 6/8/22; PROCEDURE PERFORMED: 1) Right first dorsal compartment release  Date of Return to MD/PA: 7/21/22 6/23/22: A/P: Status post above, doing well  1) Continue with OT for postop rehab, progress per protocol as tolerated.  2) All sutures removed today. Wound care and signs of infection discussed.     Visit # / Visits authorized: 9 / Pending  Time In: 4:00 pm   Time Out: 5:00 pm  Total Appointment Time (timed & untimed codes): 60 minutes     Precautions:  Standard; Restrictions/precautions for po Week 5  DOS-6/8/22    Subjective     Pt reports: 7/15: Pt arrives after PA follow up very concerned that she may need a subsequent surgery if the scar is continuing to have a dimple. She has since been using the Voltarin and massaging more aggressively. The scar is red and painful.    Response to previous treatment: Good AROM improvements with commitment to HEP, symptom-guided, no medication    Pain: 0/10  Location: Right wrist/thumb, Left elbow    Objective     Observations: Periphery of the incision  "site is red.    EDEMA (Girth in cm)    Right/Left Right R   DATE IE-6/15/22 6/20/22 6/27/22   Wrist 14.8/13.9 14.8 (=) 14.7 (-.1)   Thumb P1 6.1/5.6 5.6 (-.5) nt      Wrist AROM    Right/Left Right R R R   DATE IE-6/15/22 6/20/22 6/27/22 7/8/22 7/15/22   EXT 42/67 42 55 63 63   FLX 52/71 47 71 74 75   RD 2/7 4 7 7 7   UD  7/43 36 34 36 35   KING 103/188 129 (+26) 167 (+38) 180 (+13) 180 (=)      Thumb AROM in (Wrist neutral position)    Right Left Right Right R R   DATE IE-6/15/22 6/15/22 6/20/22 6/27/22 7/8/22  Post-tx 7/15/22   MP e/f 0/19 0/70 0/47 49 53 56   IP e/f 20+/15 25+/72 20+/45 56 57 74   Radial Add/Abd -25/31 -22/44 -21/39 wnl/42 44 45   Palmar Add/Abd -20/32 0/43 -13/37 -9/35 wnl/35 39   KING 52 137 134 (+82) 173 (+39) 189 (+16)    Tip to DPC   (in cm) 5.0 1.0 2.5 (+2.5) 1.5 (+1.0) 1.0 (+.5) 1.0 (=)     FINGER AROM (measured in centimeters)  Tip to DPC Right/Left Right   DATE IE-6/15/22 6/20/22   Index  3.0/0 0   Long  2.0/0 0   Ring   2.0/0 0   Small   2.0/0 0      PULLED FORWARD FROM 4/27/22 Right PRE-OP EVAL (also Pre-Left Epicondylitis onset):   Strength: (AVELINA Dynamometer in psi.)     Right/Left Left R/L   Rung II  IE-4/27/22 7/8/22* 7/15/22   Elbow flexed 45/40 29 45   Elbow extended  29 44**   Elbow extended & f/a supinated  30    Elbow extended & f/a pronated  22    * no pain today with any position  ** "a little pull" at the common extensor    Pinch Strength (Measured in psi)    Right/Left     IE-4/27/22   Key Pinch 9/13   3pt Pinch 9.0/8.5    2pt Pinch defer       DEXTERITY Tests   Right/Left   DATE 7/11/22   Grooved Peg Timed Test 1'05"/1'10"       Treatment      Vicky received the following supervised modalities after being cleared for contradictions for 15 minutes:   Defer-LEFT: Fluidotherapy for promoting healing, reducing pain, desensitization and increasing tissue extensibility  RIGHT: Fluidotherapy for promoting healing, reducing pain, desensitization and increasing tissue " "extensibility; tegaderm covering incision site     Vicky received the following direct contact modalities after being cleared for contraindications for 0 minutes:  - defer     Vicky received the following manual therapy techniques for 10 minutes:   - Scar massage in the periphery only today due to irritation.  - P/AAROM each wrist and thumb  - Contract/relax in composite thumb flex/opposition, wrist neutral  - defer-R/A girth; check out fit, comfort and effectiveness of size Medium 3/4 finger compression glove for RIGHT pain and edema mgmt  - defer-Elastic tape applied to LEFT common extensor tendon with space correction for promoting healing  - Elastic tape applied to Right incision scar; pt to remove before sleep tonite or sooner if itching/burning experienced     Vicky received therapeutic exercises for 35 minutes including:  Wrist and forearm AROM and flexibility Defer-LEFT: 30" x 3 performed during final minutes of Fluidotherapy:  - Extrinsic extensor active stretch    RIGHT& LEFT: 1 x 30 sec holds each:  - [place] hand stretch with thumb relaxed (overlapping)  - Reverse [place] hand stretch    RIGHT: 3x30" hold each:  - Graded Finkelstein's performed during final minutes of Fluidotherapy and thereafter during scar mobilization  - Extrinsic extensor active and passive stretch  - Extrinsic flexor stretch with extended hand, thumb free, in stance on tabletop     RIGHT Hand AROM and tendon gliding-defer 2 min each:  - kinetic  - small/med pompoms, one container, stored, rotatd and translated  - binder spring walks   Thumb AROM & gliding AAROM 1x10 each thumb range    Defer-1x10 each:  - tape spool ladder on RF & SF, each CW & CCW    2 min each:  - DIP blocks over finger platter   Wrist/forearm strengthening -Yellow Flexbar, 2 min each:  -smiles- right thumb free  -frowns - right thumb free  -defer-twists- right thumb free (right hand on top)  -defer- for Left-stabilization w/oscillations  -deferisometric " supination-1 min on Right, right thumb free -defer-isometric pronation- Left only     Postural strengthening Defer to HEP- RED t-band, 1-2 min each:  -Left elbow flexion   -Right elbow flexion  -Left elbow extension  -Right elbow extension     LEFT strengthening Defer to HEP-Green t-putty Strengthening     RIGHT strengthening Red t-putty Strengthening, 1-5 reps each:  - 10 sec sustained   - 3-jaw pinch  - lateral pinch  - PADs/DABs w/adduction log squeeze/pumps (scissors)  - PADs/DABs w/donut stretches (spreads-no thumb)  - Extrinsic extensors w/donut stretches with thumb  - extension rolls w/graded palm pressure     Objective Measures 7/15: R/A Left  tolerance  7/8: R/A AROM wrist and thumb   HEP as upgraded 7/15: Red t-putty for Right hand, Green for Left; flexibility for Right wrist  7/11: Red t-band for wrist extension isometrics  7/8: Green t-putty for LEFT only  6/27: [facility] hand stretch added, thumbs relaxed  6/27: Red t-putty for LEFT only  6/20: Thumb pinky slides added informally  6/15: As initially set      Vicky received Self Care Instruction and Orthotic mgmt instructions for 0 minutes while participated in a concurrent discussion of evaluation findings and specific home care, including:  - education related to the Current Protocol Restriction/precautions and MD orders   - the anatomy and pathophysiology of diagnosis.  - instruction in activity modifications for goals of protecting healing tissues to include:  - Check out Right custom orthosis; good fit and comfort; Wear schedule weaning; pt to remove for light ADLs and no longer wearing at night  - Scar mobilization - give the irritated area a chance to resolve, then resume use of the dycem and/or the Voltarin cream during gentler mobilizations  - defer-considerations for wearing the LEFT OTS wrist support, donning large plastic glove over it for toileting until able to use her right hand again.    Home Exercise Program and Home Care  Resources/Education:     Education provided:   - See above  - See Self Care above  - HEP as set  - Education provided on all interventions performed during today's session   - Progress towards goals     Written Home Exercises Provided: yes.  Exercises were reviewed and Vicky was able to demonstrate them prior to the end of the session.  Vicky demonstrated good  understanding of the HEP provided.     See EMR under Patient Instructions for exercises provided prior visit. 6/27/22, 7/8/22, 7/11/22, 7/15/22       Assessment   7/15: Good tolerance for advancing flexibility activities and PREs for Right and Left UE.    Goals:   The following goals were discussed with the patient and patient is in agreement with them as to be addressed in the treatment plan.     Goals:  LTG's (10-12 weeks):  1)  Decrease complaints of pain, bilaterally, to 2 out of 10 at worst to increase functional hand use for gardening, fitness program, & household ADLs. Progressing  2)  Right  and pinch strength to be >75% of the pre-operative measures (4/27/22) to demonstrate healing tissues and tolerance for fasteners, locks and latches.  3)  Right Wrist and thumb KING to >80% as compared to the Left hand for demonstrating healing tissues without adverse scar adhesions.  4)  Patient to score at 25% or less on Quick/DASH or FOTO to demonstrate improved perception of functional bilateral hand use and evidence near to prior level of function for ADLs with or without newly learning activity modifications or compensatory strategies.     STG's (6-8 weeks)  1)   Pt to report decreased episodes of pain in Both wrist/thumb and elbows during daytime activities w/or without newly learned activity modifications. MET  2)   Patient to be IND with Phase II of HEP and modalities for pain/edema managment. Progressing  3)   Increase Right wrist and thumb KING by >60 degrees to indicate effective tissue healing with improved tendon gliding. MET for thumb  6/20/2022. MET for wrist 6/27/22.  4)   Patient to be IND with Orthotic use, wear and care precautions. MET  5)   Pt to tolerate advancing PREs with self-graded intensity and effective symptom monitoring. MET    Pt would continue to benefit from skilled OT as per original POC     Vicky is progressing faster than expected towards her goals and there are no updates to goals at this time. Pt prognosis is Good    Pt will continue to benefit from skilled outpatient occupational therapy to address the deficits listed in the problem list on initial evaluation provide pt/family education and to maximize pt's level of independence in the home and community environment.     Pt's spiritual, cultural and educational needs considered and pt agreeable to plan of care and goals.    Plan   7/15: Advance as per Week 5 protocol.    Cont OT to address above goals as per original POC.    Certification Period/Plan of care expiration: 6/15/2022 to 9/15/22.     Outpatient Occupational Therapy 2-3 times weekly for 10-12 weeks to include the following interventions: Paraffin, Fluidotherapy, Manual therapy/joint mobilizations, Modalities for pain management, US 3 mhz, Therapeutic exercises/activities., Strengthening, Orthotic Fabrication/Fit/Training, Edema Control, Scar Management, Electrical Modalities, Joint Protection and Energy Conservation.    LATESHA Rivers, CURTIST  Occupational Therapist, Certified Hand Therapist

## 2022-07-15 NOTE — PATIENT INSTRUCTIONS
PERFORM STRETCH EXERCISES BEFORE YOU PERFORM THE ACTIVE RANGE OF MOTION EXERCISES:    HOLD FOR A COUNT OF 10 TO 30  3X/SESSION  3X/DAY                         [facility], OCCUPATIONAL THERAPY  HOME EXERCISE PROGRAM     PERFORM STRENGTHENING EXERCISES EVERY OTHER DAY AND ALWAYS PAIN-FREE                              You can do these using the log also:

## 2022-07-25 ENCOUNTER — CLINICAL SUPPORT (OUTPATIENT)
Dept: REHABILITATION | Facility: HOSPITAL | Age: 57
End: 2022-07-25
Attending: ORTHOPAEDIC SURGERY
Payer: COMMERCIAL

## 2022-07-25 DIAGNOSIS — M25.631 DECREASED RANGE OF MOTION OF RIGHT WRIST: ICD-10-CM

## 2022-07-25 DIAGNOSIS — M79.89 SWELLING OF RIGHT HAND: ICD-10-CM

## 2022-07-25 DIAGNOSIS — Z74.1 REQUIRES ASSISTANCE WITH ACTIVITIES OF DAILY LIVING (ADL): ICD-10-CM

## 2022-07-25 DIAGNOSIS — M25.641 DECREASED RANGE OF MOTION OF FINGER OF RIGHT HAND: ICD-10-CM

## 2022-07-25 DIAGNOSIS — M79.641 PAIN IN RIGHT HAND: Primary | ICD-10-CM

## 2022-07-25 PROCEDURE — 97110 THERAPEUTIC EXERCISES: CPT | Mod: PN

## 2022-07-25 PROCEDURE — 97022 WHIRLPOOL THERAPY: CPT | Mod: PN

## 2022-07-25 PROCEDURE — 97140 MANUAL THERAPY 1/> REGIONS: CPT | Mod: PN

## 2022-07-28 ENCOUNTER — PATIENT MESSAGE (OUTPATIENT)
Dept: OBSTETRICS AND GYNECOLOGY | Facility: CLINIC | Age: 57
End: 2022-07-28
Payer: COMMERCIAL

## 2022-07-28 ENCOUNTER — OFFICE VISIT (OUTPATIENT)
Dept: INTERNAL MEDICINE | Facility: CLINIC | Age: 57
End: 2022-07-28
Payer: COMMERCIAL

## 2022-07-28 VITALS
BODY MASS INDEX: 22.18 KG/M2 | TEMPERATURE: 98 F | HEART RATE: 72 BPM | WEIGHT: 138 LBS | HEIGHT: 66 IN | OXYGEN SATURATION: 99 % | SYSTOLIC BLOOD PRESSURE: 136 MMHG | RESPIRATION RATE: 18 BRPM | DIASTOLIC BLOOD PRESSURE: 96 MMHG

## 2022-07-28 DIAGNOSIS — B02.9 HERPES ZOSTER WITHOUT COMPLICATION: Primary | ICD-10-CM

## 2022-07-28 PROCEDURE — 1159F PR MEDICATION LIST DOCUMENTED IN MEDICAL RECORD: ICD-10-PCS | Mod: CPTII,S$GLB,, | Performed by: INTERNAL MEDICINE

## 2022-07-28 PROCEDURE — 1159F MED LIST DOCD IN RCRD: CPT | Mod: CPTII,S$GLB,, | Performed by: INTERNAL MEDICINE

## 2022-07-28 PROCEDURE — 99999 PR PBB SHADOW E&M-EST. PATIENT-LVL IV: CPT | Mod: PBBFAC,,, | Performed by: INTERNAL MEDICINE

## 2022-07-28 PROCEDURE — 3080F DIAST BP >= 90 MM HG: CPT | Mod: CPTII,S$GLB,, | Performed by: INTERNAL MEDICINE

## 2022-07-28 PROCEDURE — 3008F PR BODY MASS INDEX (BMI) DOCUMENTED: ICD-10-PCS | Mod: CPTII,S$GLB,, | Performed by: INTERNAL MEDICINE

## 2022-07-28 PROCEDURE — 3080F PR MOST RECENT DIASTOLIC BLOOD PRESSURE >= 90 MM HG: ICD-10-PCS | Mod: CPTII,S$GLB,, | Performed by: INTERNAL MEDICINE

## 2022-07-28 PROCEDURE — 1160F PR REVIEW ALL MEDS BY PRESCRIBER/CLIN PHARMACIST DOCUMENTED: ICD-10-PCS | Mod: CPTII,S$GLB,, | Performed by: INTERNAL MEDICINE

## 2022-07-28 PROCEDURE — 99213 PR OFFICE/OUTPT VISIT, EST, LEVL III, 20-29 MIN: ICD-10-PCS | Mod: S$GLB,,, | Performed by: INTERNAL MEDICINE

## 2022-07-28 PROCEDURE — 3075F PR MOST RECENT SYSTOLIC BLOOD PRESS GE 130-139MM HG: ICD-10-PCS | Mod: CPTII,S$GLB,, | Performed by: INTERNAL MEDICINE

## 2022-07-28 PROCEDURE — 1160F RVW MEDS BY RX/DR IN RCRD: CPT | Mod: CPTII,S$GLB,, | Performed by: INTERNAL MEDICINE

## 2022-07-28 PROCEDURE — 99999 PR PBB SHADOW E&M-EST. PATIENT-LVL IV: ICD-10-PCS | Mod: PBBFAC,,, | Performed by: INTERNAL MEDICINE

## 2022-07-28 PROCEDURE — 99213 OFFICE O/P EST LOW 20 MIN: CPT | Mod: S$GLB,,, | Performed by: INTERNAL MEDICINE

## 2022-07-28 PROCEDURE — 3075F SYST BP GE 130 - 139MM HG: CPT | Mod: CPTII,S$GLB,, | Performed by: INTERNAL MEDICINE

## 2022-07-28 PROCEDURE — 3008F BODY MASS INDEX DOCD: CPT | Mod: CPTII,S$GLB,, | Performed by: INTERNAL MEDICINE

## 2022-07-28 RX ORDER — VALACYCLOVIR HYDROCHLORIDE 1 G/1
1000 TABLET, FILM COATED ORAL 3 TIMES DAILY
Qty: 21 TABLET | Refills: 0 | Status: SHIPPED | OUTPATIENT
Start: 2022-07-28 | End: 2022-08-04 | Stop reason: SDUPTHER

## 2022-07-28 NOTE — PROGRESS NOTES
"Subjective:       Patient ID: Vicky Brand is a 56 y.o. female.    Chief Complaint: Rash (Buttocks area)    HPI    She reports rash on bilateral buttocks two weeks ago. Initially pruritic, then painful. Improved over time, then recurred yesterday. Has applied calamine lotion.   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 She is sexually active with one male partner ( of 31 years).   Denies fevers, chills, oral lesions, myalgias.   Denies new soap, laundry detergent, etc.   Reports increased stress this summer, had wrist surgery, in PT.  Reports rash starts as a bump w/ fluid then turns to a scab.  Reports a history of shingles two other times, all in different locations.    Review of Systems   Constitutional: Negative for chills and fever.   HENT: Negative for mouth sores, sore throat and trouble swallowing.    Gastrointestinal: Negative for diarrhea and vomiting.   Genitourinary: Negative for vaginal discharge.   Musculoskeletal: Negative for arthralgias and myalgias.   Skin: Positive for rash.       Objective:        Vitals:    07/28/22 0834   BP: (!) 136/96   BP Location: Right arm   Patient Position: Sitting   BP Method: Medium (Manual)   Pulse: 72   Resp: 18   Temp: 98.1 °F (36.7 °C)   TempSrc: Other (see comments)   SpO2: 99%   Weight: 62.6 kg (138 lb 0.1 oz)   Height: 5' 6" (1.676 m)       Body mass index is 22.28 kg/m².    Physical Exam  Constitutional:       General: She is not in acute distress.     Appearance: She is well-developed. She is not diaphoretic.   HENT:      Head: Normocephalic and atraumatic.   Pulmonary:      Effort: Pulmonary effort is normal. No respiratory distress.   Skin:     Findings: Rash present. Rash is vesicular (w/ scabs).   Neurological:      Mental Status: She is alert.   Psychiatric:         Behavior: Behavior normal.       I have reviewed the following:     Details / Date    [x]   Labs Most recent cmp    []   Micro     []   " Pathology     []   Imaging     []   Cardiology Procedures     [x]   Other        Assessment:     1. Herpes zoster without complication           Plan:          1. Herpes zoster without complication  - discussed management, precautions, etc. Questions answered. Info added to avs.   - valACYclovir (VALTREX) 1000 MG tablet; Take 1 tablet (1,000 mg total) by mouth 3 (three) times daily. for 7 days  Dispense: 21 tablet; Refill: 0      Unless there are intervening problems, Follow up if symptoms worsen or fail to improve.      Patient note was created using MModal Dictation.  Any errors in syntax or even information may not have been identified and edited on initial review prior to signing this note.    22 minutes total time spent on the encounter, which includes face to face time and non-face to face time preparing to see the patient (eg, review of tests), Obtaining and/or reviewing separately obtained history, Documenting clinical information in the electronic or other health record, Independently interpreting results (not separately reported) and communicating results to the patient/family/caregiver, or Care coordination (not separately reported).

## 2022-07-29 ENCOUNTER — PATIENT MESSAGE (OUTPATIENT)
Dept: INTERNAL MEDICINE | Facility: CLINIC | Age: 57
End: 2022-07-29
Payer: COMMERCIAL

## 2022-07-31 ENCOUNTER — PATIENT MESSAGE (OUTPATIENT)
Dept: REHABILITATION | Facility: HOSPITAL | Age: 57
End: 2022-07-31
Payer: COMMERCIAL

## 2022-07-31 ENCOUNTER — PATIENT MESSAGE (OUTPATIENT)
Dept: PRIMARY CARE CLINIC | Facility: CLINIC | Age: 57
End: 2022-07-31
Payer: COMMERCIAL

## 2022-08-04 ENCOUNTER — PATIENT MESSAGE (OUTPATIENT)
Dept: OBSTETRICS AND GYNECOLOGY | Facility: CLINIC | Age: 57
End: 2022-08-04

## 2022-08-04 ENCOUNTER — OFFICE VISIT (OUTPATIENT)
Dept: OBSTETRICS AND GYNECOLOGY | Facility: CLINIC | Age: 57
End: 2022-08-04
Payer: COMMERCIAL

## 2022-08-04 ENCOUNTER — PATIENT MESSAGE (OUTPATIENT)
Dept: REHABILITATION | Facility: HOSPITAL | Age: 57
End: 2022-08-04
Payer: COMMERCIAL

## 2022-08-04 VITALS
SYSTOLIC BLOOD PRESSURE: 144 MMHG | BODY MASS INDEX: 21.97 KG/M2 | DIASTOLIC BLOOD PRESSURE: 84 MMHG | HEIGHT: 66 IN | WEIGHT: 136.69 LBS

## 2022-08-04 DIAGNOSIS — N89.8 VAGINA ITCHING: ICD-10-CM

## 2022-08-04 DIAGNOSIS — B02.9 HERPES ZOSTER WITHOUT COMPLICATION: Primary | ICD-10-CM

## 2022-08-04 PROCEDURE — 99213 OFFICE O/P EST LOW 20 MIN: CPT | Mod: S$GLB,,, | Performed by: NURSE PRACTITIONER

## 2022-08-04 PROCEDURE — 99999 PR PBB SHADOW E&M-EST. PATIENT-LVL III: ICD-10-PCS | Mod: PBBFAC,,, | Performed by: NURSE PRACTITIONER

## 2022-08-04 PROCEDURE — 99213 PR OFFICE/OUTPT VISIT, EST, LEVL III, 20-29 MIN: ICD-10-PCS | Mod: S$GLB,,, | Performed by: NURSE PRACTITIONER

## 2022-08-04 PROCEDURE — 3077F PR MOST RECENT SYSTOLIC BLOOD PRESSURE >= 140 MM HG: ICD-10-PCS | Mod: CPTII,S$GLB,, | Performed by: NURSE PRACTITIONER

## 2022-08-04 PROCEDURE — 3008F BODY MASS INDEX DOCD: CPT | Mod: CPTII,S$GLB,, | Performed by: NURSE PRACTITIONER

## 2022-08-04 PROCEDURE — 99999 PR PBB SHADOW E&M-EST. PATIENT-LVL III: CPT | Mod: PBBFAC,,, | Performed by: NURSE PRACTITIONER

## 2022-08-04 PROCEDURE — 3079F DIAST BP 80-89 MM HG: CPT | Mod: CPTII,S$GLB,, | Performed by: NURSE PRACTITIONER

## 2022-08-04 PROCEDURE — 3079F PR MOST RECENT DIASTOLIC BLOOD PRESSURE 80-89 MM HG: ICD-10-PCS | Mod: CPTII,S$GLB,, | Performed by: NURSE PRACTITIONER

## 2022-08-04 PROCEDURE — 3077F SYST BP >= 140 MM HG: CPT | Mod: CPTII,S$GLB,, | Performed by: NURSE PRACTITIONER

## 2022-08-04 PROCEDURE — 3008F PR BODY MASS INDEX (BMI) DOCUMENTED: ICD-10-PCS | Mod: CPTII,S$GLB,, | Performed by: NURSE PRACTITIONER

## 2022-08-04 RX ORDER — ACYCLOVIR 50 MG/G
OINTMENT TOPICAL
Qty: 15 G | Refills: 11 | Status: SHIPPED | OUTPATIENT
Start: 2022-08-04 | End: 2022-09-19

## 2022-08-04 RX ORDER — CLOBETASOL PROPIONATE 0.5 MG/G
OINTMENT TOPICAL 2 TIMES DAILY
Qty: 30 G | Refills: 2 | Status: SHIPPED | OUTPATIENT
Start: 2022-08-04 | End: 2022-09-19

## 2022-08-04 RX ORDER — VALACYCLOVIR HYDROCHLORIDE 1 G/1
1000 TABLET, FILM COATED ORAL 3 TIMES DAILY
Qty: 21 TABLET | Refills: 3 | Status: SHIPPED | OUTPATIENT
Start: 2022-08-04 | End: 2022-09-19

## 2022-08-04 NOTE — PROGRESS NOTES
CC: vaginal itching, pain to  buttocks     HPI: Pt is a 56 y.o.  female who presents c/o vaginal external itching and recent shingles outbreak to her buttocks. She completed PO Valtrex for 1 week and area to buttocks remains painful and itching. Denies any vaginal lesions or DC.   She had wrist surgery over the summer and is still in PT. Reports since the surgery she has been under increased stress      ROS:  GENERAL: Feeling well overall. Denies fever or chills.   SKIN: Denies rash or lesions.   HEAD: Denies head injury or headache.   NODES: Denies enlarged lymph nodes.   CHEST: Denies chest pain or shortness of breath.   CARDIOVASCULAR: Denies palpitations or left sided chest pain.   ABDOMEN: No abdominal pain, constipation, diarrhea, nausea, vomiting or rectal bleeding.   URINARY: No dysuria, hematuria, or burning on urination.  REPRODUCTIVE: See HPI.   BREASTS: Denies pain, lumps, or nipple discharge.   HEMATOLOGIC: No easy bruisability or excessive bleeding.   MUSCULOSKELETAL: Denies joint pain or swelling.   NEUROLOGIC: Denies syncope or weakness.   PSYCHIATRIC: Denies depression, anxiety or mood swings.    PE:   APPEARANCE: Well nourished, well developed, White female in no acute distress.  VULVA: No lesions. Normal external female genitalia.  URETHRAL MEATUS: Normal size and location, no lesions, no prolapse.  URETHRA: No masses, tenderness, or prolapse.  VAGINA: atrophic. No lesions or lacerations noted. No abnormal discharge present. No odor present.   CERVIX: No lesions or discharge. No cervical motion tenderness.   UTERUS: Normal size, regular shape, mobile, non-tender.  ADNEXA: No tenderness. No fullness or masses palpated in the adnexal regions.   ANUS PERINEUM: + scabbed over lesions to bilateral buttocks       Diagnosis:  1. Herpes zoster without complication    2. Vagina itching        Plan:   Discussed shingles lesions appear to he healing   Valtrex refilled to start as needed if she has  reoccurrence   Acyclovir ointment-  twice daily as needed to area of shingles out break   Also can try OTC Aquaphor as a barrier for shingles   Clobetasol ointment for external vaginal itching     Orders Placed This Encounter    valACYclovir (VALTREX) 1000 MG tablet    acyclovir 5% (ZOVIRAX) 5 % ointment    clobetasol 0.05% (TEMOVATE) 0.05 % Oint         Follow-up PRN no resolution of symptoms.    Carmelina Salazar, DERRICKP-C

## 2022-08-04 NOTE — PATIENT INSTRUCTIONS
Diagnosis:  1. Herpes zoster without complication    2. Vagina itching        Plan:     Orders Placed This Encounter    valACYclovir (VALTREX) 1000 MG tablet    acyclovir 5% (ZOVIRAX) 5 % ointment    clobetasol 0.05% (TEMOVATE) 0.05 % Oint       Acyclovir ointment-  twice daily as needed to area of shingles out break   Clobetasol ointment for external vaginal itching   Also can try OTC Aquaphor as a barrier for shingles

## 2022-08-05 ENCOUNTER — PATIENT MESSAGE (OUTPATIENT)
Dept: REHABILITATION | Facility: HOSPITAL | Age: 57
End: 2022-08-05
Payer: COMMERCIAL

## 2022-08-08 ENCOUNTER — CLINICAL SUPPORT (OUTPATIENT)
Dept: REHABILITATION | Facility: HOSPITAL | Age: 57
End: 2022-08-08
Payer: COMMERCIAL

## 2022-08-08 DIAGNOSIS — M25.631 DECREASED RANGE OF MOTION OF RIGHT WRIST: ICD-10-CM

## 2022-08-08 DIAGNOSIS — Z74.1 REQUIRES ASSISTANCE WITH ACTIVITIES OF DAILY LIVING (ADL): ICD-10-CM

## 2022-08-08 DIAGNOSIS — M25.641 DECREASED RANGE OF MOTION OF FINGER OF RIGHT HAND: ICD-10-CM

## 2022-08-08 DIAGNOSIS — M79.89 SWELLING OF RIGHT HAND: ICD-10-CM

## 2022-08-08 DIAGNOSIS — M79.641 PAIN IN RIGHT HAND: Primary | ICD-10-CM

## 2022-08-08 PROCEDURE — 97022 WHIRLPOOL THERAPY: CPT | Mod: PN

## 2022-08-08 PROCEDURE — 97110 THERAPEUTIC EXERCISES: CPT | Mod: PN

## 2022-08-08 PROCEDURE — 97140 MANUAL THERAPY 1/> REGIONS: CPT | Mod: PN

## 2022-08-08 NOTE — PROGRESS NOTES
Occupational Therapy Daily Treatment Note     Date: 8/8/2022  Name: Vicky Brand  Clinic Number: [id number]    Therapy Diagnosis:   Encounter Diagnoses   Name Primary?    Pain in right hand Yes    Decreased range of motion of finger of right hand     Decreased range of motion of right wrist     Swelling of right hand     Requires assistance with activities of daily living (ADL)      Physician: Petey Camarena*    Medical Diagnosis: M65.4 (ICD-10-CM) - De Quervain's tenosynovitis, right  Physician Orders:   Post Surgical? Yes   Eval and Treat Yes   Type of Therapy Hand Therapy (CHT)      Order comments: Patient is scheduled for Right DeQuervains release on 6/8/22. Please see patient within the first week postop for orthosis and start postop rehab.      Evaluation Date: 6/15/2022  Insurance Authorization Period Expiration: 6/2/23  Plan of Care from 6/15/2022 to 8/15/22   Surgery Date and Procedure: 6/8/22; PROCEDURE PERFORMED: 1) Right first dorsal compartment release  Date of Return to MD/PA: 8/15/22       Visit # / Visits authorized: 11 / 50  Time In: 1000  Time Out: 1046  Total Billable Time: 46 minutes     Precautions:  Standard; Restrictions/precautions for po Week 9 as of 8/8 [place] pg 277  DOS-6/8/22    Subjective     Pt reports: Pt reports she's concerned about the scar adhesion. She reports good compliance with prescribed home exercise program. She feels like her overall wrist range of motion has improved. She reports residual weakness in the left upper extremity when carrying a load of towels. She reports talk with MD about another surgery to address the scar adhesion. She expressed wishing to try conservative management of the scar adhesion before another surgery.  Response to previous treatment: Good AROM improvements with commitment to HEP, symptom-guided, no medication    Pain: 3/10  Location: Right wrist/thumb    Objective     Observations: Scar adhesion noted. With right thumb  "flexion, scar adhesion noted to pull down.     EDEMA (Girth in cm)    Right/Left Right R R   DATE IE-6/15/22 6/20/22 6/27/22 7/25/22   Wrist 14.8/13.9 14.8 (=) 14.7 (-.1) 15.0/14.3   Thumb P1 6.1/5.6 5.6 (-.5) nt 5.3/5.6      Wrist AROM    Right/Left Right R R R R R   DATE IE-6/15/22 6/20/22 6/27/22 7/8/22 7/15/22 7/25/22  Post-tx 8/8/22  Pre-tx   EXT 42/67 42 55 63 63 63 63   FLX 52/71 47 71 74 75 77 75   RD 2/7 4 7 7 7 7 10   UD  7/43 36 34 36 35 38 38   KING 103/188 129 (+26) 167 (+38) 180 (+13) 180 (=) 185 (+5) 186 (+1)      Thumb AROM in (Wrist neutral position)    Right Left Right Right R R R R   DATE IE-6/15/22 6/15/22 6/20/22 6/27/22 7/8/22  Post-tx 7/15/22 7/25/22  Post-tx 8/8/22  Pre-tx   MP e/f 0/19 0/70 0/47 49 53 56 64 56   IP e/f 20+/15 25+/72 20+/45 56 57 74 73 63   Radial Add/Abd -25/31 -22/44 -21/39 wnl/42 44 45 50 60   Palmar Add/Abd -20/32 0/43 -13/37 -9/35 wnl/35 39 42 45   KING 52 137 134 (+82) 173 (+39) 189 (+16) 214 (+25) 229 (+15) 224 (-5)   Tip to DPC   (in cm) 5.0 1.0 2.5 (+2.5) 1.5 (+1.0) 1.0 (+.5) 1.0 (=) 1.0 (=)      FINGER AROM (measured in centimeters)  Tip to DPC Right/Left Right   DATE IE-6/15/22 6/20/22   Index  3.0/0 0   Long  2.0/0 0   Ring   2.0/0 0   Small   2.0/0 0      PULLED FORWARD FROM 4/27/22 Right PRE-OP EVAL (also Pre-Left Epicondylitis onset):   Strength: (AVELINA Dynamometer in psi.)     Right/Left Left L R/L R   Rung II  IE-4/27/22 7/8/22* 7/15/22 7/25/22 8/8/22   Elbow flexed 45/40 29 45 26/nt 25   Elbow extended  29 44**  32   Elbow extended & f/a supinated  30   30   Elbow extended & f/a pronated  22   30   * no pain today with any position  ** "a little pull" at the common extensor    Pinch Strength (Measured in psi)    Right/Left R/L R     IE-4/27/22 7/25/22 8/8/22   Key Pinch 9/13 9.5/11.5 10   3pt Pinch 9.0/8.5  8.0/8.5 9   2pt Pinch defer  3.5/7.0 9      DEXTERITY Tests   Right/Left   DATE 7/11/22   Grooved Peg Timed Test 1'05"/1'10"       Treatment " "     Vicky received the following supervised modalities after being cleared for contradictions for 8 minutes:   - RIGHT: Fluidotherapy for promoting healing, reducing pain, desensitization and increasing tissue extensibility        Vicky received the following manual therapy techniques for 25 minutes:   - Scar massage in neutral wrist using IASTM tool and dycem assist  - Suction to scar site graded from static hold to ulnar/radial deviation and to thumb flexion over wedge to minimize adhesion  - Passive range of motion and low load stretch to right thumb metacarpophalangeal joint and interphalangeal joint   - Mid carpus glides to right wrist        Vicky received therapeutic exercises for 13 minutes including:  Wrist and forearm active range of motion and flexibility RIGHT& LEFT: 1 x 30 sec holds each:  - [place] hand stretch with thumb relaxed (overlapping)  - Reverse [place] hand stretch    RIGHT: 3x30" hold each:  - Graded Finkelstein's and opposition with pinky slides performed during final minutes of Fluidotherapy        Wrist  1 min each with 2 lb:  - flexion  - extension  -ulnar/radial deviation   Digit    1 min with red digiflex   Functional pinching  1 min each with blue foam block  - lateral  - tripod  - pincer    - pom pom pick ups     Brown gripper 2nd rung   Pronation/supination   Strengthening with hammer 2 mins         Home Exercise Program and Home Care Resources/Education:     Education provided:   - Avoid activities requiring a sustained pinch in combination with wrist ulnar deviation and/or wrist flexion  - HEP as set  - Importance of performing scar massage at home with dycem to minimize adhesion  - Education provided on all interventions performed during today's session   - Progress towards goals     Written Home Exercises Provided: Patient instructed to cont prior HEP. Pt to use prior t-putty print-out  Exercises were reviewed and Vicky was able to demonstrate them prior to the end of " the session.  Vicky demonstrated good  understanding of the HEP provided.     See EMR under Patient Instructions for exercises provided prior visit. 6/27/22, 7/8/22, 7/11/22, 7/15/22, 7/25/22       Assessment     Pt tolerated OT session well today without exacerbation of symptoms. She shows slight improvements in  and pinch strength. She continues to demonstrate a scar adhesion - discussed that we will continue to focus on this in future sessions. Pt shows great compliance with home exercise program and good motivation to participate in therapy.     Goals:   The following goals were discussed with the patient and patient is in agreement with them as to be addressed in the treatment plan.     Goals:  LTG's (10-12 weeks):  1)  Decrease complaints of pain, bilaterally, to 2 out of 10 at worst to increase functional hand use for gardening, fitness program, & household ADLs. MET  2)  Right  and pinch strength to be >75% of the pre-operative measures (4/27/22) to demonstrate healing tissues and tolerance for fasteners, locks and latches. MET 7/25/2022  3)  Right Wrist and thumb KING to >80% as compared to the Left hand for demonstrating healing tissues without adverse scar adhesions. MET 7/25/2022  4)  Patient to score at 25% or less on Quick/DASH or FOTO to demonstrate improved perception of functional bilateral hand use and evidence near to prior level of function for ADLs with or without newly learning activity modifications or compensatory strategies. Ongoing     STG's (6-8 weeks)  1)   Pt to report decreased episodes of pain in Both wrist/thumb and elbows during daytime activities w/or without newly learned activity modifications. MET  2)   Patient to be IND with Phase II of HEP and modalities for pain/edema managment. MET  3)   Increase Right wrist and thumb KING by >60 degrees to indicate effective tissue healing with improved tendon gliding. MET for thumb 6/20/2022. MET for wrist 6/27/22.  4)   Patient to  be IND with Orthotic use, wear and care precautions. MET  5)   Pt to tolerate advancing PREs with self-graded intensity and effective symptom monitoring. MET    Pt would continue to benefit from skilled OT as per original POC     Vicky is progressing faster than expected towards her goals and there are no updates to goals at this time. Pt prognosis is Good    Pt will continue to benefit from skilled outpatient occupational therapy to address the deficits listed in the problem list on initial evaluation provide pt/family education and to maximize pt's level of independence in the home and community environment.     Pt's spiritual, cultural and educational needs considered and pt agreeable to plan of care and goals.    Plan   Advance progressive strengthening as tolerated. Continue to perform scar management including scar suction to minimize adhesion.     Cont OT to address above goals as per original POC.    Certification Period/Plan of care expiration: 6/15/2022 to 9/15/22.     Outpatient Occupational Therapy 2-3 times weekly for 10-12 weeks to include the following interventions: Paraffin, Fluidotherapy, Manual therapy/joint mobilizations, Modalities for pain management, US 3 mhz, Therapeutic exercises/activities., Strengthening, Orthotic Fabrication/Fit/Training, Edema Control, Scar Management, Electrical Modalities, Joint Protection and Energy Conservation.    LATESHA Harper

## 2022-08-09 ENCOUNTER — TELEPHONE (OUTPATIENT)
Dept: REHABILITATION | Facility: HOSPITAL | Age: 57
End: 2022-08-09
Payer: COMMERCIAL

## 2022-08-09 NOTE — TELEPHONE ENCOUNTER
Called pt to discuss/confirm appointment this Friday. Last visit she mentioned she did not have another appointment this week. Pt can't make it this Friday - she agreed to reschedule this Thursday 12:15.    LATESHA Harper

## 2022-08-11 ENCOUNTER — CLINICAL SUPPORT (OUTPATIENT)
Dept: REHABILITATION | Facility: HOSPITAL | Age: 57
End: 2022-08-11
Payer: COMMERCIAL

## 2022-08-11 DIAGNOSIS — M79.89 SWELLING OF RIGHT HAND: ICD-10-CM

## 2022-08-11 DIAGNOSIS — M25.641 DECREASED RANGE OF MOTION OF FINGER OF RIGHT HAND: ICD-10-CM

## 2022-08-11 DIAGNOSIS — M79.641 PAIN IN RIGHT HAND: Primary | ICD-10-CM

## 2022-08-11 DIAGNOSIS — M25.631 DECREASED RANGE OF MOTION OF RIGHT WRIST: ICD-10-CM

## 2022-08-11 DIAGNOSIS — Z74.1 REQUIRES ASSISTANCE WITH ACTIVITIES OF DAILY LIVING (ADL): ICD-10-CM

## 2022-08-11 PROCEDURE — 97140 MANUAL THERAPY 1/> REGIONS: CPT | Mod: PN

## 2022-08-11 PROCEDURE — 97022 WHIRLPOOL THERAPY: CPT | Mod: PN

## 2022-08-11 PROCEDURE — 97110 THERAPEUTIC EXERCISES: CPT | Mod: PN

## 2022-08-11 NOTE — PROGRESS NOTES
"  Occupational Therapy Daily Treatment Note     Date: 8/11/2022  Name: Vicky Brand  Clinic Number: [id number]    Therapy Diagnosis:   Encounter Diagnoses   Name Primary?    Pain in right hand Yes    Decreased range of motion of finger of right hand     Decreased range of motion of right wrist     Swelling of right hand     Requires assistance with activities of daily living (ADL)      Physician: Petey Camarena*    Medical Diagnosis: M65.4 (ICD-10-CM) - De Quervain's tenosynovitis, right  Physician Orders:   Post Surgical? Yes   Eval and Treat Yes   Type of Therapy Hand Therapy (CHT)      Order comments: Patient is scheduled for Right DeQuervains release on 6/8/22. Please see patient within the first week postop for orthosis and start postop rehab.      Evaluation Date: 6/15/2022  Insurance Authorization Period Expiration: 6/2/23  Plan of Care from 6/15/2022 to 8/15/22   Surgery Date and Procedure: 6/8/22; PROCEDURE PERFORMED: 1) Right first dorsal compartment release  Date of Return to MD/PA: 8/15/22       Visit # / Visits authorized: 12 / 50  Time In: 1215  Time Out: 1302  Total Billable Time: 47 minutes     Precautions:  Standard; Restrictions/precautions for po Week 9 as of 8/8 [facility] 277  DOS-6/8/22    Subjective     Pt reports: She reports "tightness" at wrist level. She feels she's gotten better, but she doesn't feel at her prior level of function.      Pt reports she's concerned about the scar adhesion. She reports good compliance with prescribed home exercise program. She feels like her overall wrist range of motion has improved. She reports residual weakness in the left upper extremity when carrying a load of towels. She reports talk with MD about another surgery to address the scar adhesion. She expressed wishing to try conservative management of the scar adhesion before another surgery.  Response to previous treatment: Good AROM improvements with commitment to HEP, " "symptom-guided, no medication        Pain: 3/10  Location: Right wrist/thumb    Objective     Observations: Scar adhesion noted. With right thumb flexion, scar adhesion noted to pull down.     EDEMA (Girth in cm)    Right/Left Right R R   DATE IE-6/15/22 6/20/22 6/27/22 7/25/22   Wrist 14.8/13.9 14.8 (=) 14.7 (-.1) 15.0/14.3   Thumb P1 6.1/5.6 5.6 (-.5) nt 5.3/5.6      Wrist AROM    Right/Left Right R R R R R   DATE IE-6/15/22 6/20/22 6/27/22 7/8/22 7/15/22 7/25/22  Post-tx 8/8/22  Pre-tx   EXT 42/67 42 55 63 63 63 63   FLX 52/71 47 71 74 75 77 75   RD 2/7 4 7 7 7 7 10   UD  7/43 36 34 36 35 38 38   KING 103/188 129 (+26) 167 (+38) 180 (+13) 180 (=) 185 (+5) 186 (+1)      Thumb AROM in (Wrist neutral position)    Right Left Right Right R R R R   DATE IE-6/15/22 6/15/22 6/20/22 6/27/22 7/8/22  Post-tx 7/15/22 7/25/22  Post-tx 8/8/22  Pre-tx   MP e/f 0/19 0/70 0/47 49 53 56 64 56   IP e/f 20+/15 25+/72 20+/45 56 57 74 73 63   Radial Add/Abd -25/31 -22/44 -21/39 wnl/42 44 45 50 60   Palmar Add/Abd -20/32 0/43 -13/37 -9/35 wnl/35 39 42 45   KING 52 137 134 (+82) 173 (+39) 189 (+16) 214 (+25) 229 (+15) 224 (-5)   Tip to DPC   (in cm) 5.0 1.0 2.5 (+2.5) 1.5 (+1.0) 1.0 (+.5) 1.0 (=) 1.0 (=)      FINGER AROM (measured in centimeters)  Tip to DPC Right/Left Right   DATE IE-6/15/22 6/20/22   Index  3.0/0 0   Long  2.0/0 0   Ring   2.0/0 0   Small   2.0/0 0      PULLED FORWARD FROM 4/27/22 Right PRE-OP EVAL (also Pre-Left Epicondylitis onset):   Strength: (AVELINA Dynamometer in psi.)     Right/Left Left L R/L R   Rung II  IE-4/27/22 7/8/22* 7/15/22 7/25/22 8/8/22   Elbow flexed 45/40 29 45 26/nt 25   Elbow extended  29 44**  32   Elbow extended & f/a supinated  30   30   Elbow extended & f/a pronated  22   30   * no pain today with any position  ** "a little pull" at the common extensor    Pinch Strength (Measured in psi)    Right/Left R/L R     IE-4/27/22 7/25/22 8/8/22   Key Pinch 9/13 9.5/11.5 10   3pt Pinch 9.0/8.5  " "8.0/8.5 9   2pt Pinch defer  3.5/7.0 9      DEXTERITY Tests   Right/Left   DATE 7/11/22   Grooved Peg Timed Test 1'05"/1'10"       Treatment      Vicky received the following supervised modalities after being cleared for contradictions for 8 minutes:   - Fluidotherapy for promoting healing, reducing pain, desensitization and increasing tissue extensibility        Vicky received the following manual therapy techniques for 25 minutes:   - Scar massage in neutral wrist using IASTM tool and dycem assist  - Suction to scar site graded from static hold to ulnar/radial deviation and to thumb flexion over wedge to minimize adhesion *Emphasis placed on this manual therapy today       Vicky received therapeutic exercises for 14 minutes including:  Wrist and forearm active range of motion and flexibility RIGHT& LEFT: 1 x 30 sec holds each:  - [place] hand stretch with thumb relaxed (overlapping)  - Reverse [place] hand stretch    RIGHT: 3x30" hold each:  - Graded Finkelstein's and opposition with pinky slides performed during final minutes of Fluidotherapy        Wrist  20 each with 3 lb over wedge:  - flexion  - extension  -ulnar/radial deviation   Digit    1 min with red digiflex    - pom pom pick ups      1 container pom poms Brown gripper 2nd rung    Ball squeezes 2 mins with digits 1, 4, and 5   Pronation/supination   Strengthening with hammer 2 mins         Home Exercise Program and Home Care Resources/Education:     Education provided:   - Avoid activities requiring a sustained pinch in combination with wrist ulnar deviation and/or wrist flexion  - HEP as set  - Importance of performing scar massage at home with dycem to minimize adhesion  - Education provided on all interventions performed during today's session   - Progress towards goals     Written Home Exercises Provided: Patient instructed to cont prior HEP. Pt to use prior t-putty print-out  Exercises were reviewed and Vicky was able to demonstrate them " prior to the end of the session.  Vicky demonstrated good  understanding of the HEP provided.     See EMR under Patient Instructions for exercises provided prior visit. 6/27/22, 7/8/22, 7/11/22, 7/15/22, 7/25/22       Assessment     Pt tolerated OT session well today without exacerbation of symptoms. Pt shows good compliance with home exercise program and motivation to participate in therapy.     Goals:   The following goals were discussed with the patient and patient is in agreement with them as to be addressed in the treatment plan.     Goals:  LTG's (10-12 weeks):  1)  Decrease complaints of pain, bilaterally, to 2 out of 10 at worst to increase functional hand use for gardening, fitness program, & household ADLs. MET  2)  Right  and pinch strength to be >75% of the pre-operative measures (4/27/22) to demonstrate healing tissues and tolerance for fasteners, locks and latches. MET 7/25/2022  3)  Right Wrist and thumb KING to >80% as compared to the Left hand for demonstrating healing tissues without adverse scar adhesions. MET 7/25/2022  4)  Patient to score at 25% or less on Quick/DASH or FOTO to demonstrate improved perception of functional bilateral hand use and evidence near to prior level of function for ADLs with or without newly learning activity modifications or compensatory strategies. Ongoing     STG's (6-8 weeks)  1)   Pt to report decreased episodes of pain in Both wrist/thumb and elbows during daytime activities w/or without newly learned activity modifications. MET  2)   Patient to be IND with Phase II of HEP and modalities for pain/edema managment. MET  3)   Increase Right wrist and thumb KING by >60 degrees to indicate effective tissue healing with improved tendon gliding. MET for thumb 6/20/2022. MET for wrist 6/27/22.  4)   Patient to be IND with Orthotic use, wear and care precautions. MET  5)   Pt to tolerate advancing PREs with self-graded intensity and effective symptom monitoring.  MET    Pt would continue to benefit from skilled OT as per original POC     Vicky is progressing faster than expected towards her goals and there are no updates to goals at this time. Pt prognosis is Good    Pt will continue to benefit from skilled outpatient occupational therapy to address the deficits listed in the problem list on initial evaluation provide pt/family education and to maximize pt's level of independence in the home and community environment.     Pt's spiritual, cultural and educational needs considered and pt agreeable to plan of care and goals.    Plan   Advance progressive strengthening as tolerated. Continue to perform scar management including scar suction to minimize adhesion. Cont OT to address above goals as per original POC.    Certification Period/Plan of care expiration: 6/15/2022 to 9/15/22.     Outpatient Occupational Therapy 2-3 times weekly for 10-12 weeks to include the following interventions: Paraffin, Fluidotherapy, Manual therapy/joint mobilizations, Modalities for pain management, US 3 mhz, Therapeutic exercises/activities., Strengthening, Orthotic Fabrication/Fit/Training, Edema Control, Scar Management, Electrical Modalities, Joint Protection and Energy Conservation.    LATESHA Harper

## 2022-08-14 NOTE — PROGRESS NOTES
"  Occupational Therapy Daily Treatment Note     Date: 8/15/2022  Name: Vicky Brand  Clinic Number: [id number]    Therapy Diagnosis:   Encounter Diagnoses   Name Primary?    Pain in right hand Yes    Decreased range of motion of finger of right hand     Decreased range of motion of right wrist     Swelling of right hand     Requires assistance with activities of daily living (ADL)      Physician: Petey Camarena*    Medical Diagnosis: M65.4 (ICD-10-CM) - De Quervain's tenosynovitis, right  Physician Orders:   Post Surgical? Yes   Eval and Treat Yes   Type of Therapy Hand Therapy (CHT)      Order comments: Patient is scheduled for Right DeQuervains release on 6/8/22. Please see patient within the first week postop for orthosis and start postop rehab.      Evaluation Date: 6/15/2022  Insurance Authorization Period Expiration: 6/2/23  Plan of Care from 6/15/2022 to 9/15/22   Surgery Date and Procedure: 6/8/22; PROCEDURE PERFORMED: 1) Right first dorsal compartment release  Date of Return to MD/PA: 8/15/22    Visit # / Visits authorized: 13/ 50  Time In: 10:00 am  Time Out: 11:00 am  Total Billable Time: 60 minutes     Precautions:  Standard; Restrictions/precautions for po Week 9  DOS-6/8/22    Subjective     Pt reports: 8/15: Pt explains "tight" and if she adds force she would produce a "3"/10 pain at the incision site. She states "I'm using it more but have some pain when I'm washing my hair". Wrist pain occurs with end range wrist extension over-pressure with weight bearing over extended hands. 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  Response to previous treatment: Good AROM improvements with commitment to HEP, symptom-guided, no medication        Pain: 3/10 at worst  Location: Right wrist/thumb    Objective     Observations: " "Scar dimpling noted without limitation to ROM.      EDEMA (Girth in cm)    Right/Left Right R R/L   DATE IE-6/15/22 6/20/22 6/27/22 7/25/22   Wrist 14.8/13.9 14.8 (=) 14.7 (-.1) 15.0/14.3   Thumb P1 6.1/5.6 5.6 (-.5) nt 5.3/5.6      Wrist AROM    Right/Left Right R R R R R   DATE IE-6/15/22 6/20/22 6/27/22 7/8/22 7/15/22 7/25/22  Post-tx 8/8/22  Pre-tx   EXT 42/67 42 55 63 63 63 63   FLX 52/71 47 71 74 75 77 75   RD 2/7 4 7 7 7 7 10   UD  7/43 36 34 36 35 38 38   KING 103/188 129 (+26) 167 (+38) 180 (+13) 180 (=) 185 (+5) 186 (+1)      Thumb AROM in (Wrist neutral position)    Right Left Right Right R R R R R   DATE IE-6/15/22 6/15/22 6/20/22 6/27/22 7/8/22  Post-tx 7/15/22 7/25/22  Post-tx 8/8/22  Pre-tx 8/15/22   MP e/f 0/19 0/70 0/47 49 53 56 64 56 56   IP e/f 20+/15 25+/72 20+/45 56 57 74 73 63 73   Radial Add/Abd -25/31 -22/44 -21/39 wnl/42 44 45 50 60 52   Palmar Add/Abd -20/32 0/43 -13/37 -9/35 wnl/35 39 42 45 52   KING 52 137 134 (+82) 173 (+39) 189 (+16) 214 (+25) 229 (+15) 224 (-5) 233 (+9)   Tip to DPC   (in cm) 5.0 1.0 2.5 (+2.5) 1.5 (+1.0) 1.0 (+.5) 1.0 (=) 1.0 (=) nt 1.0 (=)     FINGER AROM (measured in centimeters)  Tip to DPC Right/Left Right   DATE IE-6/15/22 6/20/22   Index  3.0/0 0   Long  2.0/0 0   Ring   2.0/0 0   Small   2.0/0 0      PULLED FORWARD FROM 4/27/22 Right PRE-OP EVAL (also Pre-Left Epicondylitis onset):   Strength: (AVELINA Dynamometer in psi.)     Right/Left Left L R/L R   Rung II  IE-4/27/22 7/8/22* 7/15/22 7/25/22 8/8/22   Elbow flexed 45/40 29 45 26/nt 25   Elbow extended  29 44**  32   Elbow extended & f/a supinated  30   30   Elbow extended & f/a pronated  22   30   * no pain today with any position  ** "a little pull" at the common extensor    Pinch Strength (Measured in psi)    Right/Left R/L R     IE-4/27/22 7/25/22 8/8/22   Key Pinch 9/13 9.5/11.5 10 (+.5)   3pt Pinch 9.0/8.5  8.0/8.5 9 (+1.0)   2pt Pinch defer  3.5/7.0 9 (+5.5)      DEXTERITY Tests   Right/Left   DATE " "7/11/22   Grooved Peg Timed Test 1'05"/1'10"       Treatment      Vicky received the following supervised modalities after being cleared for contradictions for 8 minutes:   - Fluidotherapy for promoting healing, reducing pain, desensitization and increasing tissue extensibility     Vicky received the following manual therapy techniques for 25 minutes:   - Scar massage in neutral wrist graduating to Finkelstein's position  - Suction to scar site graded from static hold to ulnar/radial deviation and to thumb flexion over wedge to minimize adhesion   - Mobilization with movement for Rad/Carp distraction concurrent with wrist extension overpresssure in stance over extended hands.  - IP and MCP mobilizations for increasing periarticular tissue lengths  - AA/PROM each thumb and wrist range for scar lengthening and improving joint ROM  - Contract/relax for extrinsic extensor tissue release  - Place/hold with manual resistance at end range composite thumb flexion/opposition    Vicky received therapeutic exercises for 20 minutes including:  Wrist and forearm AROM and flexibility RIGHT& LEFT: 1 x 30 sec holds each:  - [place] hand stretch with thumb relaxed (overlapping)  - Reverse [place] hand stretch     RIGHT: 3x30" hold each:  - Graded Finkelstein's performed during final minutes of Fluidotherapy and thereafter during scar mobilization  - Extrinsic extensor active and passive stretch  - Extrinsic flexor stretch with extended hand, in stance on tabletop      RIGHT Hand AROM and tendon gliding 2 min each:  - kinetic  - isospheres  - binder spring walks   Thumb AROM & gliding 1x10 performed during final minutes of Fluidotherapy     Defer-2 min each:  - DIP blocks over finger platter   Wrist/forearm strengthening-defer -Yellow Flexbar, 2 min each:  -smiles- right thumb free  -frowns - right thumb free  -defer-twists- right thumb free (right hand on top)  -defer- for Left-stabilization w/oscillations  -deferisometric " supination-1 min on Right, right thumb free -defer-isometric pronation- Left only      Postural strengthening Dual Cable Cross, 3#, bilaterally performed, 2 min each:  - Pull backs, 3 ways alternating for Scap retraction, shoulder/elbow extension, wrist extension, wrist flexion and wrist ulnar deviation  - Elbow flexion, 3 ways, alternating for each flexor, wrist flexion, wrist extension and wrist radial deviation    Defer to HEP- RED t-band      LEFT strengthening Defer to HEP-Green t-putty Strengthening      RIGHT strengthening 1x10 Thumb opposition scrapes using red t-putty    Defer to HEP-Red & Green t-putty Strengthening   Objective Measures 7/25: Assess baseline Right  and pinch  7/25: R/A post-tx AROM wrist and thumb   HEP as upgraded 8/15: Flexibility activities as set and upgraded. Wrist/elbow strengthening  7/25: Transition to using the green t-putty for the Right hand  7/15: Red t-putty for Right hand, Green for Left; flexibility for Right wrist  7/11: Red t-band for wrist extension isometrics  7/8: Green t-putty for LEFT only  6/27: [place] hand stretch added, thumbs relaxed  6/27: Red t-putty for LEFT only  6/20: Thumb pinky slides added informally  6/15: As initially set        Home Exercise Program and Home Care Resources/Education:     Education provided:   - HEP as upgraded  - Importance of performing scar massage at home with dycem to minimize adhesion  - Education provided on all interventions performed during today's session   - Progress towards goals     Written Home Exercises Provided: yes. Pt to use prior t-putty print-out  Exercises were reviewed and Vicky was able to demonstrate them prior to the end of the session.  Vicky demonstrated good  understanding of the HEP provided.     See EMR under Patient Instructions for exercises provided prior visit. 6/27/22, 7/8/22, 7/11/22, 7/15/22, 7/25/22, 8/15/22       Assessment   8/15: Pt is progressing with all areas as expected at week  9.    Goals:   The following goals were discussed with the patient and patient is in agreement with them as to be addressed in the treatment plan.     Goals:  LTG's (10-12 weeks):  1)  Decrease complaints of pain, bilaterally, to 2 out of 10 at worst to increase functional hand use for gardening, fitness program, & household ADLs. MET  2)  Right  and pinch strength to be >75% of the pre-operative measures (4/27/22) to demonstrate healing tissues and tolerance for fasteners, locks and latches. MET 7/25/2022  3)  Right Wrist and thumb KING to >80% as compared to the Left hand for demonstrating healing tissues without adverse scar adhesions. MET 7/25/2022  4)  Patient to score at 25% or less on Quick/DASH or FOTO to demonstrate improved perception of functional bilateral hand use and evidence near to prior level of function for ADLs with or without newly learning activity modifications or compensatory strategies. Progressing     STG's (6-8 weeks)  1)   Pt to report decreased episodes of pain in Both wrist/thumb and elbows during daytime activities w/or without newly learned activity modifications. MET  2)   Patient to be IND with Phase II of HEP and modalities for pain/edema managment. MET  3)   Increase Right wrist and thumb KING by >60 degrees to indicate effective tissue healing with improved tendon gliding. MET for thumb 6/20/2022. MET for wrist 6/27/22.  4)   Patient to be IND with Orthotic use, wear and care precautions. MET  5)   Pt to tolerate advancing PREs with self-graded intensity and effective symptom monitoring. MET    Pt would continue to benefit from skilled OT as per original POC     Vicky is progressing faster than expected towards her goals and there are no updates to goals at this time. Pt prognosis is Good    Pt will continue to benefit from skilled outpatient occupational therapy to address the deficits listed in the problem list on initial evaluation provide pt/family education and to  maximize pt's level of independence in the home and community environment.     Pt's spiritual, cultural and educational needs considered and pt agreeable to plan of care and goals.    Plan   8/15: Acknowledge any new MD/PA orders. Advance flexibility, dexterity retraining and PREs accordingly and as tolerated. Upgrade HEP accordingly.    Certification Period/Plan of care expiration: 6/15/2022 to 9/15/22.     Outpatient Occupational Therapy 2-3 times weekly for 10-12 weeks to include the following interventions: Paraffin, Fluidotherapy, Manual therapy/joint mobilizations, Modalities for pain management, US 3 mhz, Therapeutic exercises/activities., Strengthening, Orthotic Fabrication/Fit/Training, Edema Control, Scar Management, Electrical Modalities, Joint Protection and Energy Conservation.    LATESHA Rhoades, CURTIST  Occupational Therapist, Certified Hand Therapist

## 2022-08-15 ENCOUNTER — CLINICAL SUPPORT (OUTPATIENT)
Dept: REHABILITATION | Facility: HOSPITAL | Age: 57
End: 2022-08-15
Payer: COMMERCIAL

## 2022-08-15 ENCOUNTER — OFFICE VISIT (OUTPATIENT)
Dept: ORTHOPEDICS | Facility: CLINIC | Age: 57
End: 2022-08-15
Payer: COMMERCIAL

## 2022-08-15 VITALS — WEIGHT: 136 LBS | BODY MASS INDEX: 21.86 KG/M2 | HEIGHT: 66 IN

## 2022-08-15 DIAGNOSIS — M79.641 PAIN IN RIGHT HAND: Primary | ICD-10-CM

## 2022-08-15 DIAGNOSIS — M79.89 SWELLING OF RIGHT HAND: ICD-10-CM

## 2022-08-15 DIAGNOSIS — M25.641 DECREASED RANGE OF MOTION OF FINGER OF RIGHT HAND: ICD-10-CM

## 2022-08-15 DIAGNOSIS — M25.631 DECREASED RANGE OF MOTION OF RIGHT WRIST: ICD-10-CM

## 2022-08-15 DIAGNOSIS — Z74.1 REQUIRES ASSISTANCE WITH ACTIVITIES OF DAILY LIVING (ADL): ICD-10-CM

## 2022-08-15 DIAGNOSIS — M65.4 DE QUERVAIN'S TENOSYNOVITIS, RIGHT: Primary | ICD-10-CM

## 2022-08-15 PROCEDURE — 1159F PR MEDICATION LIST DOCUMENTED IN MEDICAL RECORD: ICD-10-PCS | Mod: CPTII,S$GLB,, | Performed by: ORTHOPAEDIC SURGERY

## 2022-08-15 PROCEDURE — 97140 MANUAL THERAPY 1/> REGIONS: CPT | Mod: PN

## 2022-08-15 PROCEDURE — 99024 PR POST-OP FOLLOW-UP VISIT: ICD-10-PCS | Mod: S$GLB,,, | Performed by: ORTHOPAEDIC SURGERY

## 2022-08-15 PROCEDURE — 1159F MED LIST DOCD IN RCRD: CPT | Mod: CPTII,S$GLB,, | Performed by: ORTHOPAEDIC SURGERY

## 2022-08-15 PROCEDURE — 99024 POSTOP FOLLOW-UP VISIT: CPT | Mod: S$GLB,,, | Performed by: ORTHOPAEDIC SURGERY

## 2022-08-15 PROCEDURE — 99999 PR PBB SHADOW E&M-EST. PATIENT-LVL III: CPT | Mod: PBBFAC,,, | Performed by: ORTHOPAEDIC SURGERY

## 2022-08-15 PROCEDURE — 3008F BODY MASS INDEX DOCD: CPT | Mod: CPTII,S$GLB,, | Performed by: ORTHOPAEDIC SURGERY

## 2022-08-15 PROCEDURE — 97022 WHIRLPOOL THERAPY: CPT | Mod: PN

## 2022-08-15 PROCEDURE — 3008F PR BODY MASS INDEX (BMI) DOCUMENTED: ICD-10-PCS | Mod: CPTII,S$GLB,, | Performed by: ORTHOPAEDIC SURGERY

## 2022-08-15 PROCEDURE — 99999 PR PBB SHADOW E&M-EST. PATIENT-LVL III: ICD-10-PCS | Mod: PBBFAC,,, | Performed by: ORTHOPAEDIC SURGERY

## 2022-08-15 PROCEDURE — 97530 THERAPEUTIC ACTIVITIES: CPT | Mod: PN

## 2022-08-15 NOTE — PROGRESS NOTES
Vicky Brand presents for post-operative evaluation.  The patient is now 2 mos months s/p right de Quervain release.  Overall the patient reports doing well.  The patient reports appropriate postoperative soreness with well controlled overall pain.  She reports that her preoperative pain is completely resolved.  The only lingering issue is some adherent scar tissue at the surgical site but otherwise she has no complaints and is very happy with her result thus far.    PE:    AA&O x 4.  NAD  HEENT:  NCAT, sclera nonicteric  Lungs:  Respirations are equal and unlabored.  CV:  2+ bilateral upper and lower extremity pulses.  MSK: The wound is healing well with no signs of erythema or warmth.  There is no drainage.  No clinical signs or symptoms of infection are present.  Right upper extremity neurovascular intact.  Full painless range of motion throughout.  Slightly adherent and dense scar tissue at the surgical site which is otherwise very well healed.      A/P: Status post above, doing well  1) Continue with full weight bearing as tolerated without restrictions.  Aggressive scar massage in therapy without restrictions.  Continue OT until no longer necessary.  2) F/U as needed/if the  3) Call with any questions/concerns in the interim     Please be aware that this note has been generated with the assistance of [facility] voice-to-text.  Please excuse any spelling or grammatical errors.

## 2022-08-15 NOTE — PATIENT INSTRUCTIONS
PERFORM STRETCH EXERCISES BEFORE YOU PERFORM THE ACTIVE RANGE OF MOTION EXERCISES:    HOLD FOR A COUNT OF 10 TO 30  3X/SESSION  3X/DAY                When performing these, gradually add your thumb flexion/opposition position:          [facility], OCCUPATIONAL THERAPY  HOME EXERCISE PROGRAM     PERFORM EXERCISES 1 TO 3 SETS OF 10, 4-6 TIMES PER DAY; ALWAYS PAIN-FREE. DON'T LET THE PICTURES PRESSURE YOU TO PUSH THROUGH PAINFUL RANGES.    PERFORM STRENGTHENING EXERCISES EVERY OTHER DAY; AND ALWAYS PAIN-FREE          Start with a 1# weight (16 oz canned good or dried good bag):

## 2022-08-19 ENCOUNTER — CLINICAL SUPPORT (OUTPATIENT)
Dept: REHABILITATION | Facility: HOSPITAL | Age: 57
End: 2022-08-19
Payer: COMMERCIAL

## 2022-08-19 DIAGNOSIS — M79.641 PAIN IN RIGHT HAND: Primary | ICD-10-CM

## 2022-08-19 DIAGNOSIS — M79.89 SWELLING OF RIGHT HAND: ICD-10-CM

## 2022-08-19 DIAGNOSIS — M25.641 DECREASED RANGE OF MOTION OF FINGER OF RIGHT HAND: ICD-10-CM

## 2022-08-19 DIAGNOSIS — M25.631 DECREASED RANGE OF MOTION OF RIGHT WRIST: ICD-10-CM

## 2022-08-19 DIAGNOSIS — Z74.1 REQUIRES ASSISTANCE WITH ACTIVITIES OF DAILY LIVING (ADL): ICD-10-CM

## 2022-08-19 PROCEDURE — 97110 THERAPEUTIC EXERCISES: CPT | Mod: PN

## 2022-08-19 PROCEDURE — 97022 WHIRLPOOL THERAPY: CPT | Mod: PN

## 2022-08-19 PROCEDURE — 97140 MANUAL THERAPY 1/> REGIONS: CPT | Mod: PN

## 2022-08-19 NOTE — PATIENT INSTRUCTIONS
Massage with dycem 2 minutes each:    Thumb flexion - move dycem towards body  Thumb extension - move dycem away from body  Move dycem side to side  Make circles with dycem, clockwise and counter clockwise

## 2022-08-19 NOTE — PROGRESS NOTES
"  Occupational Therapy Daily Treatment Note     Date: 8/19/2022  Name: Vicky Brand  Clinic Number: [id number]    Therapy Diagnosis:   Encounter Diagnoses   Name Primary?    Pain in right hand Yes    Decreased range of motion of finger of right hand     Decreased range of motion of right wrist     Swelling of right hand     Requires assistance with activities of daily living (ADL)      Physician: Petey Camarena*    Medical Diagnosis: M65.4 (ICD-10-CM) - De Quervain's tenosynovitis, right  Physician Orders:   Post Surgical? Yes   Eval and Treat Yes   Type of Therapy Hand Therapy (CHT)      Order comments: Patient is scheduled for Right DeQuervains release on 6/8/22. Please see patient within the first week postop for orthosis and start postop rehab.      Evaluation Date: 6/15/2022  Insurance Authorization Period Expiration: 6/2/23  Plan of Care from 6/15/2022 to 9/15/22   Surgery Date and Procedure: 6/8/22; PROCEDURE PERFORMED: 1) Right first dorsal compartment release  Date of Return to MD/PA: 8/15/22    Visit # / Visits authorized: 14 / 50  Time In: 0900  Time Out: 0946  Total Billable Time: 46 minutes     Precautions:  Standard; Restrictions/precautions for post op Week 10 as of 8/17/22  DOS-6/8/22    Subjective     Pt reports: Pt reports therapy has helped scar adhesion a lot. She reports some pain after using scar suction tool in therapy. "I'm getting stronger."   Response to previous treatment: Good AROM improvements with commitment to HEP, symptom-guided, no medication        Pain: 3/10 at worst  Location: Right wrist/thumb    Objective     Observations: Scar dimpling noted without limitation to ROM.  Scar adhesion visually appears to be improving.     EDEMA (Girth in cm)    Right/Left Right R R/L   DATE IE-6/15/22 6/20/22 6/27/22 7/25/22   Wrist 14.8/13.9 14.8 (=) 14.7 (-.1) 15.0/14.3   Thumb P1 6.1/5.6 5.6 (-.5) nt 5.3/5.6      Wrist AROM    Right/Left Right R R R R R   DATE IE-6/15/22 " "6/20/22 6/27/22 7/8/22 7/15/22 7/25/22  Post-tx 8/8/22  Pre-tx   EXT 42/67 42 55 63 63 63 63   FLX 52/71 47 71 74 75 77 75   RD 2/7 4 7 7 7 7 10   UD  7/43 36 34 36 35 38 38   KING 103/188 129 (+26) 167 (+38) 180 (+13) 180 (=) 185 (+5) 186 (+1)      Thumb AROM in (Wrist neutral position)    Right Left Right Right R R R R R   DATE IE-6/15/22 6/15/22 6/20/22 6/27/22 7/8/22  Post-tx 7/15/22 7/25/22  Post-tx 8/8/22  Pre-tx 8/15/22   MP e/f 0/19 0/70 0/47 49 53 56 64 56 56   IP e/f 20+/15 25+/72 20+/45 56 57 74 73 63 73   Radial Add/Abd -25/31 -22/44 -21/39 wnl/42 44 45 50 60 52   Palmar Add/Abd -20/32 0/43 -13/37 -9/35 wnl/35 39 42 45 52   KING 52 137 134 (+82) 173 (+39) 189 (+16) 214 (+25) 229 (+15) 224 (-5) 233 (+9)   Tip to DPC   (in cm) 5.0 1.0 2.5 (+2.5) 1.5 (+1.0) 1.0 (+.5) 1.0 (=) 1.0 (=) nt 1.0 (=)     FINGER AROM (measured in centimeters)  Tip to DPC Right/Left Right   DATE IE-6/15/22 6/20/22   Index  3.0/0 0   Long  2.0/0 0   Ring   2.0/0 0   Small   2.0/0 0      PULLED FORWARD FROM 4/27/22 Right PRE-OP EVAL (also Pre-Left Epicondylitis onset):   Strength: (AVELINA Dynamometer in psi.)     Right/Left Left L R/L R R   Rung II  IE-4/27/22 7/8/22* 7/15/22 7/25/22 8/8/22 8/19/22   Elbow flexed 45/40 29 45 26/nt 25 33   Elbow extended  29 44**  32 35   Elbow extended & f/a supinated  30   30 35   Elbow extended & f/a pronated  22   30 30   * no pain today with any position  ** "a little pull" at the common extensor    Pinch Strength (Measured in psi)    Right/Left R/L R R     IE-4/27/22 7/25/22 8/8/22 8/19/22   [place] Pinch 9/13 9.5/11.5 10 (+.5) 11   3pt Pinch 9.0/8.5  8.0/8.5 9 (+1.0) 10   2pt Pinch defer  3.5/7.0 9 (+5.5) 7      DEXTERITY Tests   Right/Left   DATE 7/11/22   Grooved Peg Timed Test 1'05"/1'10"       Treatment      Vicky received the following supervised modalities after being cleared for contradictions for 8 minutes:   - Fluidotherapy for promoting healing, reducing pain, desensitization and " "increasing tissue extensibility     Vicky received the following manual therapy techniques for 15 minutes:   - Scar massage in neutral wrist graduating to Finkelstein's position  - Suction to scar site graded from static hold to ulnar/radial deviation and to thumb flexion over wedge to minimize adhesion   - Scar massage with dycem: move dycem proximally with thumb flexion, move dycem distally with thumb extension    Vicky received therapeutic exercises for 23 minutes including:    Wrist and forearm AROM and flexibility RIGHT: 3x30" hold each:  - Graded Finkelstein's performed during final minutes of Fluidotherapy and thereafter during scar mobilization  - Extrinsic extensor active and passive stretch  - Extrinsic flexor stretch with extended hand, in stance on tabletop      Wrist strengthening  2lb over wedge, x20 each:  Flexion, extension, radial/ulnar deviation     /digit strenghtening Green digiflex 2 mins    Pom pom pick ups (1 container) with brown gripper 3rd rung     RIGHT Hand AROM and tendon gliding 2 min each:  - kinetic  - isospheres  - binder spring walks   Thumb AROM & gliding 1x10 performed during final minutes of Fluidotherapy     Defer-2 min each:  - DIP blocks over finger platter   Wrist/forearm strengthening-defer -Yellow Flexbar, 2 min each:  -smiles- right thumb free  -frowns - right thumb free      Postural strengthening Not done this visit:  Dual Cable Cross, 3#, bilaterally performed, 2 min each:  - Pull backs, 3 ways alternating for Scap retraction, shoulder/elbow extension, wrist extension, wrist flexion and wrist ulnar deviation  - Elbow flexion, 3 ways, alternating for each flexor, wrist flexion, wrist extension and wrist radial deviation    Defer to HEP- RED t-band      Objective Measures 7/25: Assess baseline Right  and pinch  7/25: R/A post-tx AROM wrist and thumb   HEP as upgraded 8/15: Flexibility activities as set and upgraded. Wrist/elbow strengthening  7/25: " Transition to using the green t-putty for the Right hand  7/15: Red t-putty for Right hand, Green for Left; flexibility for Right wrist  7/11: Red t-band for wrist extension isometrics  7/8: Green t-putty for LEFT only  6/27: [place] hand stretch added, thumbs relaxed  6/27: Red t-putty for LEFT only  6/20: Thumb pinky slides added informally  6/15: As initially set        Home Exercise Program and Home Care Resources/Education:     Education provided:   - Importance of performing scar massage at home with dycem to minimize adhesion  - Education provided on all interventions performed during today's session   - Progress towards goals     Written Home Exercises Provided: yes. Added instructions for scar massage techniques with dycem   Exercises were reviewed and Vicky was able to demonstrate them prior to the end of the session.  Vicky demonstrated good  understanding of the HEP provided.     See EMR under Patient Instructions for exercises provided 8/19/22. 6/27/22, 7/8/22, 7/11/22, 7/15/22, 7/25/22, 8/15/22       Assessment     Pt tolerated OT session well today. Re-assessed strength and pt demonstrates increased  and pinch strength. Wrist scar adhesion shows improvements.     Goals:   The following goals were discussed with the patient and patient is in agreement with them as to be addressed in the treatment plan.     Goals:  LTG's (10-12 weeks):  1)  Decrease complaints of pain, bilaterally, to 2 out of 10 at worst to increase functional hand use for gardening, fitness program, & household ADLs. MET  2)  Right  and pinch strength to be >75% of the pre-operative measures (4/27/22) to demonstrate healing tissues and tolerance for fasteners, locks and latches. MET 7/25/2022  3)  Right Wrist and thumb KING to >80% as compared to the Left hand for demonstrating healing tissues without adverse scar adhesions. MET 7/25/2022  4)  Patient to score at 25% or less on Quick/DASH or FOTO to demonstrate improved  perception of functional bilateral hand use and evidence near to prior level of function for ADLs with or without newly learning activity modifications or compensatory strategies. Progressing (Pt score 25.2% Quick DASH on 8/11).     STG's (6-8 weeks)  1)   Pt to report decreased episodes of pain in Both wrist/thumb and elbows during daytime activities w/or without newly learned activity modifications. MET  2)   Patient to be IND with Phase II of HEP and modalities for pain/edema managment. MET  3)   Increase Right wrist and thumb KING by >60 degrees to indicate effective tissue healing with improved tendon gliding. MET for thumb 6/20/2022. MET for wrist 6/27/22.  4)   Patient to be IND with Orthotic use, wear and care precautions. MET  5)   Pt to tolerate advancing PREs with self-graded intensity and effective symptom monitoring. MET    Pt would continue to benefit from skilled OT as per original POC     Vicky is progressing faster than expected towards her goals and there are no updates to goals at this time. Pt prognosis is Good    Pt will continue to benefit from skilled outpatient occupational therapy to address the deficits listed in the problem list on initial evaluation provide pt/family education and to maximize pt's level of independence in the home and community environment.     Pt's spiritual, cultural and educational needs considered and pt agreeable to plan of care and goals.    Plan     Continue scar management and progressive strengthening as tolerated. Discuss nearing discharge from OT.    Certification Period/Plan of care expiration: 6/15/2022 to 9/15/22.     Outpatient Occupational Therapy 2-3 times weekly for 10-12 weeks to include the following interventions: Paraffin, Fluidotherapy, Manual therapy/joint mobilizations, Modalities for pain management, US 3 mhz, Therapeutic exercises/activities., Strengthening, Orthotic Fabrication/Fit/Training, Edema Control, Scar Management, Electrical  Modalities, Joint Protection and Energy Conservation.      LATESHA Harper

## 2022-08-21 NOTE — PROGRESS NOTES
"  Occupational Therapy Daily Treatment Note     Date: 8/22/2022  Name: Vicky Brand  Clinic Number: [id number]    Therapy Diagnosis:   Encounter Diagnoses   Name Primary?    Pain in right hand Yes    Decreased range of motion of finger of right hand     Decreased range of motion of right wrist     Swelling of right hand     Requires assistance with activities of daily living (ADL)      Physician: Petey Camarena*    Medical Diagnosis: M65.4 (ICD-10-CM) - De Quervain's tenosynovitis, right  Physician Orders:   Post Surgical? Yes   Eval and Treat Yes   Type of Therapy Hand Therapy (CHT)      Order comments: Patient is scheduled for Right DeQuervains release on 6/8/22. Please see patient within the first week postop for orthosis and start postop rehab.      Evaluation Date: 6/15/2022  Insurance Authorization Period Expiration: 6/2/23  Plan of Care from 6/15/2022 to 9/15/22   Surgery Date and Procedure: 6/8/22; PROCEDURE PERFORMED: 1) Right first dorsal compartment release  Date of Return to MD/PA: 8/15/22    Visit # / Visits authorized: 15 / 50  Time In: 10:05  Time Out: 10:50  Total Billable Time: 45 minutes     Precautions:  Standard; Restrictions/precautions for post op Week 10  DOS-6/8/22    Subjective     Pt reports: 8/22: Pt explains "I wish my scar was completely pain-free". Pt states "my wrist is not completely healed from my original injury".  Response to previous treatment: Pt reports "slight discomfort around the scar, the skin after therapy". She explains " a little nerve pain with a tiny electric shock".  Functional Improvements: 8/22: Pt reports finding herself modifying housekeeping tasks (using her left hand more).      Pain: 3/10 at worst  Location: Right wrist/thumb    Objective     Observations: Scar dimpling noted without limitation to ROM. Scar adhesions normal for this areas of the wrist.    EDEMA (Girth in cm)    Right/Left Right R R/L R   DATE IE-6/15/22 6/20/22 6/27/22 " "7/25/22 8/22/22   Wrist 14.8/13.9 14.8 (=) 14.7 (-.1) 15.0/14.3 14.7 (-.3)   Thumb P1 6.1/5.6 5.6 (-.5) nt 5.3/5.6 5.4      Wrist AROM    Right/Left Right R R R R R   DATE IE-6/15/22 6/20/22 6/27/22 7/8/22 7/15/22 7/25/22  Post-tx 8/8/22  Pre-tx   EXT 42/67 42 55 63 63 63 63   FLX 52/71 47 71 74 75 77 75   RD 2/7 4 7 7 7 7 10   UD  7/43 36 34 36 35 38 38   KING 103/188 129 (+26) 167 (+38) 180 (+13) 180 (=) 185 (+5) 186 (+1)      Thumb AROM in (Wrist neutral position)    Right Left Right Right R R R R R R/L   DATE IE-6/15/22 6/15/22 6/20/22 6/27/22 7/8/22  Post-tx 7/15/22 7/25/22  Post-tx 8/8/22  Pre-tx 8/15/22 8/22/22   MP e/f 0/19 0/70 0/47 49 53 56 64 56 56 57/66   IP e/f 20+/15 25+/72 20+/45 56 57 74 73 63 73 76/72   Radial Add/Abd -25/31 -22/44 -21/39 wnl/42 44 45 50 60 52 50/52   Palmar Add/Abd -20/32 0/43 -13/37 -9/35 wnl/35 39 42 45 52 45/47   KING 52 137 134 (+82) 173 (+39) 189 (+16) 214 (+25) 229 (+15) 224 (-5) 233 (+9) 228/237   Tip to DPC   (in cm) 5.0 1.0 2.5 (+2.5) 1.5 (+1.0) 1.0 (+.5) 1.0 (=) 1.0 (=) nt 1.0 (=) 1.0/0     FINGER AROM (measured in centimeters)  Tip to DPC Right/Left Right   DATE IE-6/15/22 6/20/22   Index  3.0/0 0   Long  2.0/0 0   Ring   2.0/0 0   Small   2.0/0 0      PULLED FORWARD FROM 4/27/22 Right PRE-OP EVAL (also Pre-Left Epicondylitis onset):   Strength: (AVELINA Dynamometer in psi.)     Right/Left Left L R/L R R Left   Rung II  IE-4/27/22 7/8/22* 7/15/22 7/25/22 8/8/22 8/19/22 8/22/22   Elbow flexed 45/40 29 45 26/nt 25 33 35/50   Elbow extended  29 44**  32 35 nt/45   Elbow extended & f/a supinated  30   30 35 nt/50   Elbow extended & f/a pronated  22   30 30 nt/50   * no pain today with any position  ** "a little pull" at the common extensor    Pinch Strength (Measured in psi)    Right/Left R/L R R R     IE-4/27/22 7/25/22 8/8/22 8/19/22 8/22/22   [place] Pinch 9/13 9.5/11.5 10 (+.5) 11 11/13   3pt Pinch 9.0/8.5  8.0/8.5 9 (+1.0) 10 9/12   2pt Pinch defer  3.5/7.0 9 (+5.5) 7 " "6.5/9.0      DEXTERITY Tests   Right/Left   DATE 7/11/22   Grooved Peg Timed Test 1'05"/1'10"       Treatment      Vicky received the following supervised modalities after being cleared for contradictions for 8 minutes:   - defer-Fluidotherapy for promoting healing, reducing pain, desensitization and increasing tissue extensibility  - Paraffin wax heat pre-tx for promoting healing, decreasing pain and increasing tissue extensibility     Vicky received the following manual therapy techniques for 10 minutes:   - Scar massage in neutral wrist graduating to Finkelstein's position  - Suction to scar site graded from static hold to ulnar/radial deviation and to thumb flexion over wedge to minimize adhesion     Vicky received therapeutic exercises for 25 minutes including:    Wrist and forearm AROM and flexibility RIGHT: 3x30" hold each:  - Graded Finkelstein's post-heat and during scar mobilization  - Extrinsic extensor active and passive stretch  - Extrinsic flexor stretch with extended hand, in stance on tabletop      /digit strenghtening Blue t-putty Strengthening, 5-10 reps each:  - 10 sec sustained   - tip pinch  - 3-jaw pinch  - lateral pinch  - PADs/DABs w/adduction log squeeze/pumps (scissors)  - PADs/DABs w/donut stretches (spreads-no thumb)  - Extrinsic extensors w/donut stretches with thumb  - extension rolls w/graded palm pressure  - pancake presses for weight bearing progression     Thumb AROM & gliding 1x10 each 3 graduating positions of IPJ flexion blocks   Postural strengthening Defer to HEP- RED t-band      Objective Measures 8/22: R/A Right and Left  and pinch tolerance  8/22: R/A thumb AROM post-tx   HEP as upgraded 8/22: Blue t-putty  8/15: Flexibility activities as set and upgraded. Wrist/elbow strengthening  7/25: Transition to using the green t-putty for the Right hand  7/15: Red t-putty for Right hand, Green for Left; flexibility for Right wrist  7/11: Red t-band for wrist " extension isometrics  7/8: Green t-putty for LEFT only  6/27: [place] hand stretch added, thumbs relaxed  6/27: Red t-putty for LEFT only  6/20: Thumb pinky slides added informally  6/15: As initially set        Home Exercise Program and Home Care Resources/Education:     Education provided:   - trial of reduced scar massage, continuing the scar lengthening and tendon gliding, to determine if sensitivity is reduced  - Education provided on all interventions performed during today's session   - Progress towards goals     Written Home Exercises Provided: yes. Added instructions for scar massage techniques with dycem   Exercises were reviewed and Vicky was able to demonstrate them prior to the end of the session.  Vicky demonstrated good  understanding of the HEP provided.     See EMR under Patient Instructions for exercises provided 8/19/22. 6/27/22, 7/8/22, 7/11/22, 7/15/22, 7/25/22, 8/15/22, 8/22/22       Assessment     8/22: Left common extensor appears completely healed. All STG and LTGs have been met. Pt is concerned about the remaining symptoms. She was receptive to advancing PREs and upgrade HEP. She will reduce her scar massage to determine if this is the cause of the ongoing sensitivity.     Goals:   The following goals were discussed with the patient and patient is in agreement with them as to be addressed in the treatment plan.     Goals:  LTG's (10-12 weeks):  1)  Decrease complaints of pain, bilaterally, to 2 out of 10 at worst to increase functional hand use for gardening, fitness program, & household ADLs. MET  2)  Right  and pinch strength to be >75% of the pre-operative measures (4/27/22) to demonstrate healing tissues and tolerance for fasteners, locks and latches. MET 7/25/2022  3)  Right Wrist and thumb KING to >80% as compared to the Left hand for demonstrating healing tissues without adverse scar adhesions. MET 7/25/2022  4)  Patient to score at 25% or less on Quick/DASH or FOTO to  demonstrate improved perception of functional bilateral hand use and evidence near to prior level of function for ADLs with or without newly learning activity modifications or compensatory strategies. MET 8/22/2022    STG's (6-8 weeks)  1)   Pt to report decreased episodes of pain in Both wrist/thumb and elbows during daytime activities w/or without newly learned activity modifications. MET  2)   Patient to be IND with Phase II of HEP and modalities for pain/edema managment. MET  3)   Increase Right wrist and thumb KING by >60 degrees to indicate effective tissue healing with improved tendon gliding. MET for thumb 6/20/2022. MET for wrist 6/27/22.  4)   Patient to be IND with Orthotic use, wear and care precautions. MET  5)   Pt to tolerate advancing PREs with self-graded intensity and effective symptom monitoring. MET    Pt would continue to benefit from skilled OT as per original POC     Vicky is progressing faster than expected towards her goals and there are no updates to goals at this time. Pt prognosis is Good    Pt will continue to benefit from skilled outpatient occupational therapy to address the deficits listed in the problem list on initial evaluation provide pt/family education and to maximize pt's level of independence in the home and community environment.     Pt's spiritual, cultural and educational needs considered and pt agreeable to plan of care and goals.    Plan   8/22: OT recommends discharge after next visit.     LATESHA Rhoades, MARIELLA  Occupational Therapist, Certified Hand Therapist

## 2022-08-22 ENCOUNTER — CLINICAL SUPPORT (OUTPATIENT)
Dept: REHABILITATION | Facility: HOSPITAL | Age: 57
End: 2022-08-22
Payer: COMMERCIAL

## 2022-08-22 DIAGNOSIS — Z74.1 REQUIRES ASSISTANCE WITH ACTIVITIES OF DAILY LIVING (ADL): ICD-10-CM

## 2022-08-22 DIAGNOSIS — M25.641 DECREASED RANGE OF MOTION OF FINGER OF RIGHT HAND: ICD-10-CM

## 2022-08-22 DIAGNOSIS — M79.89 SWELLING OF RIGHT HAND: ICD-10-CM

## 2022-08-22 DIAGNOSIS — M79.641 PAIN IN RIGHT HAND: Primary | ICD-10-CM

## 2022-08-22 DIAGNOSIS — M25.631 DECREASED RANGE OF MOTION OF RIGHT WRIST: ICD-10-CM

## 2022-08-22 PROCEDURE — 97140 MANUAL THERAPY 1/> REGIONS: CPT | Mod: PN

## 2022-08-22 PROCEDURE — 97110 THERAPEUTIC EXERCISES: CPT | Mod: PN

## 2022-08-22 PROCEDURE — 97018 PARAFFIN BATH THERAPY: CPT | Mod: PN

## 2022-08-28 ENCOUNTER — PATIENT MESSAGE (OUTPATIENT)
Dept: OBSTETRICS AND GYNECOLOGY | Facility: CLINIC | Age: 57
End: 2022-08-28
Payer: COMMERCIAL

## 2022-08-29 NOTE — TELEPHONE ENCOUNTER
Patient states that she is ready to try the estradiol vaginal inserts and requesting that it be sent to her pharmacy.

## 2022-08-30 RX ORDER — ESTRADIOL 10 UG/1
10 INSERT VAGINAL
Qty: 8 TABLET | Refills: 12 | Status: SHIPPED | OUTPATIENT
Start: 2022-08-30 | End: 2022-09-19

## 2022-08-31 NOTE — PROGRESS NOTES
"  Occupational Therapy Discharge Summary and Daily Treatment Note     Date: 9/1/2022  Name: Vicky Brand  Clinic Number: [id number]    Therapy Diagnosis:   Encounter Diagnoses   Name Primary?    Pain in right hand Yes    Decreased range of motion of finger of right hand     Decreased range of motion of right wrist     Swelling of right hand     Requires assistance with activities of daily living (ADL)        Physician: Petey Camarena*    Medical Diagnosis: M65.4 (ICD-10-CM) - De Quervain's tenosynovitis, right  Physician Orders:   Post Surgical? Yes   Eval and Treat Yes   Type of Therapy Hand Therapy (CHT)      Order comments: Patient is scheduled for Right DeQuervains release on 6/8/22. Please see patient within the first week postop for orthosis and start postop rehab.      Evaluation Date: 6/15/2022  Insurance Authorization Period Expiration: 6/2/23  Plan of Care from 6/15/2022 to 9/15/22   Surgery Date and Procedure: 6/8/22; PROCEDURE PERFORMED: 1) Right first dorsal compartment release  Date of Return to MD/PA: 8/15/22    Visit # / Visits authorized: 16 / 50  Time In: 10:55 am  Time Out: 11:35  Total Billable Time: 40 minutes     Precautions:  Standard; Restrictions/precautions for post op Week 12+  DOS-6/8/22    Subjective     Pt reports: 9/1: Pt reports being "very intentional" when using her right UE.  Response to previous treatment: Every once in awhile she'll catch the radial wrist when reaching into her purse.  Functional Improvements: 8/22: Pt reports finding herself modifying housekeeping tasks (using her left hand more).      Pain: 1/10 at worst;   Location: Right wrist/thumb    Objective     Observations: Scar dimpling noted without limitation to ROM. Scar adhesions normal for this areas of the wrist.    EDEMA (Girth in cm)    Right/Left Right R R/L R R   DATE IE-6/15/22 6/20/22 6/27/22 7/25/22 8/22/22 9/1/22   Wrist 14.8/13.9 14.8 (=) 14.7 (-.1) 15.0/14.3 14.7 (-.3) 14.7 (=)   Thumb " "P1 6.1/5.6 5.6 (-.5) nt 5.3/5.6 5.4 5.5      Wrist AROM    Right/Left Right R R R R R R   DATE IE-6/15/22 6/20/22 6/27/22 7/8/22 7/15/22 7/25/22  Post-tx 8/8/22  Pre-tx 9/1/22   EXT 42/67 42 55 63 63 63 63 65   FLX 52/71 47 71 74 75 77 75 77   RD 2/7 4 7 7 7 7 10 9   UD  7/43 36 34 36 35 38 38 44   KING 103/188 129 (+26) 167 (+38) 180 (+13) 180 (=) 185 (+5) 186 (+1) 195 (+9)      Thumb AROM in (Wrist neutral position)    Right Left Right Right R R R R R R/L R   DATE IE-6/15/22 6/15/22 6/20/22 6/27/22 7/8/22  Post-tx 7/15/22 7/25/22  Post-tx 8/8/22  Pre-tx 8/15/22 8/22/22 9/1/22   MP e/f 0/19 0/70 0/47 49 53 56 64 56 56 57/66 58   IP e/f 20+/15 25+/72 20+/45 56 57 74 73 63 73 76/72 72   Radial Add/Abd -25/31 -22/44 -21/39 wnl/42 44 45 50 60 52 50/52 47   Palmar Add/Abd -20/32 0/43 -13/37 -9/35 wnl/35 39 42 45 52 45/47 43   KING 52 137 134 (+82) 173 (+39) 189 (+16) 214 (+25) 229 (+15) 224 (-5) 233 (+9) 228/237 220   Tip to DPC   (in cm) 5.0 1.0 2.5 (+2.5) 1.5 (+1.0) 1.0 (+.5) 1.0 (=) 1.0 (=) nt 1.0 (=) 1.0/0 0     FINGER AROM (measured in centimeters)  Tip to DPC Right/Left Right   DATE IE-6/15/22 6/20/22   Index  3.0/0 0   Long  2.0/0 0   Ring   2.0/0 0   Small   2.0/0 0      PULLED FORWARD FROM 4/27/22 Right PRE-OP EVAL (also Pre-Left Epicondylitis onset):   Strength: (AVELINA Dynamometer in psi.)     Right/Left Left L R/L R R Right/Left Right   Rung II  IE-4/27/22 7/8/22* 7/15/22 7/25/22 8/8/22 8/19/22 8/22/22 9/1/22   Elbow flexed 45/40 29 45 26/nt 25 33 35/50 38   Elbow extended  29 44**  32 35 nt/45    Elbow extended & f/a supinated  30   30 35 nt/50    Elbow extended & f/a pronated  22   30 30 nt/50    * no pain today with any position  ** "a little pull" at the common extensor    Pinch Strength (Measured in psi)    Right/Left R/L R R R/L R     IE-4/27/22 7/25/22 8/8/22 8/19/22 8/22/22 9/1/22   Key Pinch 9/13 9.5/11.5 10 (+.5) 11 11/13 11.5   3pt Pinch 9.0/8.5  8.0/8.5 9 (+1.0) 10 9/12 11   2pt Pinch defer  " "3.5/7.0 9 (+5.5) 7 6.5/9.0 8      DEXTERITY Tests   Right/Left   DATE 7/11/22   Grooved Peg Timed Test 1'05"/1'10"       Treatment      Vicky received the following supervised modalities after being cleared for contradictions for 8 minutes:   - defer-Fluidotherapy for promoting healing, reducing pain, desensitization and increasing tissue extensibility  - Paraffin wax heat pre-tx for promoting healing, decreasing pain and increasing tissue extensibility     Vicky received the following manual therapy techniques for 10 minutes:   - Scar massage in neutral wrist graduating to Finkelstein's position  - Suction to scar site graded from static hold to ulnar/radial deviation and to thumb flexion over wedge to minimize adhesion   - Thumb composite flexion with contract/relax; 1x10  - PROM Thumb PA and RA    Vicky received therapeutic exercises for 20 minutes including:    Wrist and forearm AROM and flexibility RIGHT: 1x30" hold each:  - Graded Finkelstein's post-heat and during scar mobilization  - Extrinsic extensor active and passive stretch  - Extrinsic flexor stretch with pray hands      /digit strenghtening Defer to HEP: Grey t-putty Strengthening, 5-10 reps each:     Postural strengthening Defer to HEP- RED t-band      Objective Measures 9/1: R/A Right and Left  and pinch tolerance  9/1: R/A thumb and wrist AROM pre-tx   HEP as upgraded 9/1: Grey t-putty for . Continue pinches and all other strengthening with Blue. Pt encouraged to continue flexibility activities        Home Exercise Program and Home Care Resources/Education:     Education provided:   - Upgraded HEP  - Education provided on all interventions performed during today's session   - Progress towards goals     Written Home Exercises Provided: yes. Added instructions for scar massage techniques with dycem   Exercises were reviewed and Vicky was able to demonstrate them prior to the end of the session.  Vicky demonstrated good  " understanding of the HEP provided.     See EMR under Patient Instructions for exercises provided  8/19/22 . 6/27/22, 7/8/22, 7/11/22, 7/15/22, 7/25/22, 8/15/22, 8/22/22, 9/1/22       Assessment   9/1: All goals are met    Goals:   The following goals were discussed with the patient and patient is in agreement with them as to be addressed in the treatment plan.     Goals:  LTG's (10-12 weeks):  1)  Decrease complaints of pain, bilaterally, to 2 out of 10 at worst to increase functional hand use for gardening, fitness program, & household ADLs. MET  2)  Right  and pinch strength to be >75% of the pre-operative measures (4/27/22) to demonstrate healing tissues and tolerance for fasteners, locks and latches. MET 7/25/2022  3)  Right Wrist and thumb KING to >80% as compared to the Left hand for demonstrating healing tissues without adverse scar adhesions. MET 7/25/2022  4)  Patient to score at 25% or less on Quick/DASH or FOTO to demonstrate improved perception of functional bilateral hand use and evidence near to prior level of function for ADLs with or without newly learning activity modifications or compensatory strategies. MET 8/22/2022    STG's (6-8 weeks)  1)   Pt to report decreased episodes of pain in Both wrist/thumb and elbows during daytime activities w/or without newly learned activity modifications. MET  2)   Patient to be IND with Phase II of HEP and modalities for pain/edema managment. MET  3)   Increase Right wrist and thumb KING by >60 degrees to indicate effective tissue healing with improved tendon gliding. MET for thumb 6/20/2022. MET for wrist 6/27/22.  4)   Patient to be IND with Orthotic use, wear and care precautions. MET  5)   Pt to tolerate advancing PREs with self-graded intensity and effective symptom monitoring. MET    Pt would continue to benefit from skilled OT as per original POC     Vicky is progressing faster than expected towards her goals and there are no updates to goals at this  time. Pt prognosis is Good    Pt will continue to benefit from skilled outpatient occupational therapy to address the deficits listed in the problem list on initial evaluation provide pt/family education and to maximize pt's level of independence in the home and community environment.     Pt's spiritual, cultural and educational needs considered and pt agreeable to plan of care and goals.    Plan   9/1: OT recommends discharge to upgraded HEP and follow up with surgeon prn.    LATESHA Rhoades, MARIELLA  Occupational Therapist, Certified Hand Therapist

## 2022-09-01 ENCOUNTER — CLINICAL SUPPORT (OUTPATIENT)
Dept: REHABILITATION | Facility: HOSPITAL | Age: 57
End: 2022-09-01
Attending: ORTHOPAEDIC SURGERY
Payer: COMMERCIAL

## 2022-09-01 DIAGNOSIS — Z74.1 REQUIRES ASSISTANCE WITH ACTIVITIES OF DAILY LIVING (ADL): ICD-10-CM

## 2022-09-01 DIAGNOSIS — M79.641 PAIN IN RIGHT HAND: Primary | ICD-10-CM

## 2022-09-01 DIAGNOSIS — M25.631 DECREASED RANGE OF MOTION OF RIGHT WRIST: ICD-10-CM

## 2022-09-01 DIAGNOSIS — M79.89 SWELLING OF RIGHT HAND: ICD-10-CM

## 2022-09-01 DIAGNOSIS — M25.641 DECREASED RANGE OF MOTION OF FINGER OF RIGHT HAND: ICD-10-CM

## 2022-09-01 PROCEDURE — 97140 MANUAL THERAPY 1/> REGIONS: CPT | Mod: PN

## 2022-09-01 PROCEDURE — 97018 PARAFFIN BATH THERAPY: CPT | Mod: PN

## 2022-09-01 PROCEDURE — 97110 THERAPEUTIC EXERCISES: CPT | Mod: PN

## 2022-09-02 ENCOUNTER — LAB VISIT (OUTPATIENT)
Dept: LAB | Facility: HOSPITAL | Age: 57
End: 2022-09-02
Attending: FAMILY MEDICINE
Payer: COMMERCIAL

## 2022-09-02 DIAGNOSIS — Z00.00 ROUTINE GENERAL MEDICAL EXAMINATION AT A HEALTH CARE FACILITY: ICD-10-CM

## 2022-09-02 LAB
ALBUMIN SERPL BCP-MCNC: 4.1 G/DL (ref 3.5–5.2)
ALP SERPL-CCNC: 50 U/L (ref 55–135)
ALT SERPL W/O P-5'-P-CCNC: 12 U/L (ref 10–44)
ANION GAP SERPL CALC-SCNC: 9 MMOL/L (ref 8–16)
AST SERPL-CCNC: 20 U/L (ref 10–40)
BASOPHILS # BLD AUTO: 0.05 K/UL (ref 0–0.2)
BASOPHILS NFR BLD: 1.2 % (ref 0–1.9)
BILIRUB SERPL-MCNC: 0.8 MG/DL (ref 0.1–1)
BUN SERPL-MCNC: 17 MG/DL (ref 6–20)
CALCIUM SERPL-MCNC: 9.7 MG/DL (ref 8.7–10.5)
CHLORIDE SERPL-SCNC: 103 MMOL/L (ref 95–110)
CHOLEST SERPL-MCNC: 172 MG/DL (ref 120–199)
CHOLEST/HDLC SERPL: 3.2 {RATIO} (ref 2–5)
CO2 SERPL-SCNC: 28 MMOL/L (ref 23–29)
CREAT SERPL-MCNC: 0.9 MG/DL (ref 0.5–1.4)
DIFFERENTIAL METHOD: ABNORMAL
EOSINOPHIL # BLD AUTO: 0.1 K/UL (ref 0–0.5)
EOSINOPHIL NFR BLD: 2.9 % (ref 0–8)
ERYTHROCYTE [DISTWIDTH] IN BLOOD BY AUTOMATED COUNT: 12.9 % (ref 11.5–14.5)
EST. GFR  (NO RACE VARIABLE): >60 ML/MIN/1.73 M^2
GLUCOSE SERPL-MCNC: 94 MG/DL (ref 70–110)
HCT VFR BLD AUTO: 40.8 % (ref 37–48.5)
HDLC SERPL-MCNC: 53 MG/DL (ref 40–75)
HDLC SERPL: 30.8 % (ref 20–50)
HGB BLD-MCNC: 13 G/DL (ref 12–16)
IMM GRANULOCYTES # BLD AUTO: 0.01 K/UL (ref 0–0.04)
IMM GRANULOCYTES NFR BLD AUTO: 0.2 % (ref 0–0.5)
LDLC SERPL CALC-MCNC: 99.4 MG/DL (ref 63–159)
LYMPHOCYTES # BLD AUTO: 1.7 K/UL (ref 1–4.8)
LYMPHOCYTES NFR BLD: 42.3 % (ref 18–48)
MCH RBC QN AUTO: 30.2 PG (ref 27–31)
MCHC RBC AUTO-ENTMCNC: 31.9 G/DL (ref 32–36)
MCV RBC AUTO: 95 FL (ref 82–98)
MONOCYTES # BLD AUTO: 0.4 K/UL (ref 0.3–1)
MONOCYTES NFR BLD: 9.8 % (ref 4–15)
NEUTROPHILS # BLD AUTO: 1.8 K/UL (ref 1.8–7.7)
NEUTROPHILS NFR BLD: 43.6 % (ref 38–73)
NONHDLC SERPL-MCNC: 119 MG/DL
NRBC BLD-RTO: 0 /100 WBC
PLATELET # BLD AUTO: 217 K/UL (ref 150–450)
PMV BLD AUTO: 11.5 FL (ref 9.2–12.9)
POTASSIUM SERPL-SCNC: 5.3 MMOL/L (ref 3.5–5.1)
PROT SERPL-MCNC: 7.8 G/DL (ref 6–8.4)
RBC # BLD AUTO: 4.31 M/UL (ref 4–5.4)
SODIUM SERPL-SCNC: 140 MMOL/L (ref 136–145)
TRIGL SERPL-MCNC: 98 MG/DL (ref 30–150)
WBC # BLD AUTO: 4.07 K/UL (ref 3.9–12.7)

## 2022-09-02 PROCEDURE — 85025 COMPLETE CBC W/AUTO DIFF WBC: CPT | Performed by: FAMILY MEDICINE

## 2022-09-02 PROCEDURE — 80061 LIPID PANEL: CPT | Performed by: FAMILY MEDICINE

## 2022-09-02 PROCEDURE — 36415 COLL VENOUS BLD VENIPUNCTURE: CPT | Mod: PN | Performed by: FAMILY MEDICINE

## 2022-09-02 PROCEDURE — 80053 COMPREHEN METABOLIC PANEL: CPT | Performed by: FAMILY MEDICINE

## 2022-09-06 ENCOUNTER — TELEPHONE (OUTPATIENT)
Dept: OBSTETRICS AND GYNECOLOGY | Facility: CLINIC | Age: 57
End: 2022-09-06
Payer: COMMERCIAL

## 2022-09-06 DIAGNOSIS — N95.2 VAGINAL ATROPHY: Primary | ICD-10-CM

## 2022-09-06 NOTE — TELEPHONE ENCOUNTER
"Patient is requesting an estrogen prescription does not wish to use vagifem or estrace.     "No. Estradiol cream cones in a tube with a applicator that allows patient to put the  applicator on end of tube of cream and squeeze cream into applicator and then insert into vagina. Dr. Patton and I discussed that Id try lower than a gram when we last talked about it after she did a d and c last December to check on vaginal bleeding that started after I used the vaginal cream insert estradiol."  "

## 2022-09-07 NOTE — TELEPHONE ENCOUNTER
I called the patient to discuss her concerns  There was NA. I  left a message  I am not clear as to what your message was asking  Please let me know when is  a good time to call back?  Alyce KRAUS

## 2022-09-07 NOTE — PROGRESS NOTES
[facility] Note    Chief Complaint    Health check    History of Present Illness      Vicky Brand is a 56 y.o. female who presents today for physical examination. She is current on her labs. Mammogram is done outside of [facility]. She will supply us with copy of mammogram. Colonoscopy due in 2027. She recently had right wrist surgery in which she has healed from. She also had a foot injury earlier this year in which she has healed from. She discussed being on CPAP at night and tolerating it well. She does wake occasionally and does not know why. 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 We discussed medication to help with sleep/relaxation in case this is anxiety she is feeling. I suggested Prozac at a low dose. She will think about this. Otherwise she is feeling well.  She denies any SOB, chest pain, N/V, unintentional weight loss, loss of appetite, fatigue, diarrhea, constipation. She is active daily and remains independent with ADL's.     Problem List Items Addressed This Visit          Other    Paradoxical insomnia    Overview     prior to menses, amitryptiline 2.5 mg qhs helps          Other Visit Diagnoses       Routine general medical examination at a health care facility    -  Primary          Review of Systems   Constitutional: Negative.    HENT: Negative.     Eyes: Negative.    Respiratory: Negative.     Cardiovascular: Negative.    Gastrointestinal: Negative.    Genitourinary: Negative.    Musculoskeletal: Negative.    Skin: Negative.    Neurological: Negative.    Endo/Heme/Allergies: Negative.    Psychiatric/Behavioral: Negative.        Past Medical History:  Past Medical History:   Diagnosis Date    Dyspareunia in female 7/27/2021    Osphena, pelvic floor tx 2021, GYN [facility]    Eye abnormalities     early yellowing of lens, precursor to cataracts, Dr Webster,  wear glasses at all times    Fracture of fifth toe, left, closed, with delayed  healing, subsequent encounter 8/21/2018    Intramural leiomyoma of uterus 5/22/2019    Irregular, dysmenorrhea    Nocturia 6/17/2021    Ocular migraine     vision fuzzy x 3 in 2013, metallic shapes in her vision, Dr Webster,  resolved after 30 min , no pain    CALEB (obstructive sleep apnea)     Lysenko, CPAP start 7/21    Paradoxical insomnia 10/30/2017    prior to menses, previously took amitryptiline 2.5 mg prn  qhs     Posterior vitreous detachment of left eye 5/22/2019    Flashes of light, ocular migranes, Ophthalmologist Dr Orourke, retinal specialist Dr Soni, rec avoid exercise until she follows up 5/2019    Shoulder pain, left 2007    LEFT, after fall, better with PT at [facility]       Past Surgical History:  Past Surgical History:   Procedure Laterality Date    COLONOSCOPY N/A 6/9/2017    Procedure: COLONOSCOPY;  Surgeon: Davy Prince MD;  [facility]  Service: Endoscopy;  Laterality: N/A;    DE QUERVAIN'S RELEASE Right 6/8/2022    Procedure: RELEASE, HAND, FOR DEQUERVAIN'S TENOSYNOVITIS - RIGHT;  Surgeon: Devan Wagner MD;  Location: [facility] OR;  Service: Orthopedics;  Laterality: Right;    DILATION AND CURETTAGE OF UTERUS      ENDOMETRIAL ABLATION N/A 2017    HYSTEROSCOPY WITH DILATION AND CURETTAGE OF UTERUS N/A 1/19/2022    Procedure: HYSTEROSCOPY, WITH DILATION AND CURETTAGE OF UTERUS;  Surgeon: Jannette Jarrell MD;  Location: [facility] OR;  Service: OB/GYN;  Laterality: N/A;    TUBAL LIGATION         Family History:  family history includes Diabetes in her father; Hypertension in her mother; Ovarian cancer in her maternal grandmother; Pacemaker/defibrilator in her mother; Stroke (age of onset: 49) in her father.   Family history was reviewed with patient.     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,      Social Determinants of Health     Financial Resource Strain: Low Risk     Difficulty of Paying Living Expenses: Not hard at all   Food Insecurity: No Food Insecurity    Worried About Running Out of Food in the Last Year: Never true    Ran Out of Food in the Last Year: Never true   Transportation Needs: No Transportation Needs    Lack of Transportation (Medical): No    Lack of Transportation (Non-Medical): No   Physical Activity: Insufficiently Active    Days of Exercise per Week: 4 days    Minutes of Exercise per Session: 30 min   Stress: Stress Concern Present    Feeling of Stress : To some extent   Social Connections: Unknown    Frequency of Communication with Friends and Family: Three times a week    Frequency of Social Gatherings with Friends and Family: Once a week    Active Member of Clubs or Organizations: Yes    Attends Club or Organization Meetings: More than 4 times per year    Marital Status:    Housing Stability: Low Risk     Unable to Pay for Housing in the Last Year: No    Number of Places Lived in the Last Year: 1    Unstable Housing in the Last Year: No         Medications:  Outpatient Encounter Medications as of 9/19/2022   Medication Sig Dispense Refill    calcium carbonate (OS-GISSELL) 600 mg calcium (1,500 mg) Tab Take 600 mg by mouth once.      estradioL (ESTRACE) 0.01 % (0.1 mg/gram) vaginal cream Place 1 g vaginally twice a week. 42.5 g 1    multivitamin capsule Take 1 capsule by mouth once daily.      [DISCONTINUED] clobetasol 0.05% (TEMOVATE) 0.05 % Oint Apply topically 2 (two) times daily. 30 g 2    [DISCONTINUED] acyclovir 5% (ZOVIRAX) 5 % ointment Apply thin layer to affected area bid X 7 15 g 11    [DISCONTINUED] diclofenac sodium (VOLTAREN) 1 % Gel  "Apply 2 g topically 4 (four) times daily. Apply to affected area up to 4 times per day PRN pain 100 g 4    [DISCONTINUED] estradioL (VAGIFEM) 10 mcg Tab Place 1 tablet (10 mcg total) vaginally twice a week. 8 tablet 12    [DISCONTINUED] valACYclovir (VALTREX) 1000 MG tablet Take 1 tablet (1,000 mg total) by mouth 3 (three) times daily. for 7 days 21 tablet 3     Facility-Administered Encounter Medications as of 9/19/2022   Medication Dose Route Frequency Provider Last Rate Last Admin    celecoxib capsule 400 mg  400 mg Oral Once Candelario Noe MD        fentaNYL 50 mcg/mL injection  mcg   mcg Intravenous PRN Candelario Noe MD        LIDOcaine (PF) 10 mg/ml (1%) injection 10 mg  1 mL Intradermal Once Candelario Noe MD        midazolam (VERSED) 1 mg/mL injection 0.5-4 mg  0.5-4 mg Intravenous PRN Candelario Noe MD   2 mg at 06/08/22 0633       Allergies:  Review of patient's allergies indicates:   Allergen Reactions    Sulfa (sulfonamide antibiotics) Rash    Cetirizine      Other reaction(s): Unable to Function  Other reaction(s): Unable to Function    Erythromycin Nausea And Vomiting     Other reaction(s): Nausea    Voltaren [diclofenac sodium] Diarrhea    Zyrtec-d  [cetirizine-pseudoephedrine]      Other reaction(s): Inability to focus/function       Health Maintenance:  Health Maintenance   Topic Date Due    Hepatitis C Screening  Never done    Mammogram  10/29/2022    Lipid Panel  09/02/2027    TETANUS VACCINE  07/27/2031     Health Maintenance Topics with due status: Not Due       Topic Last Completion Date    Colorectal Cancer Screening 06/09/2017    TETANUS VACCINE 07/27/2021    Cervical Cancer Screening 09/16/2021    Lipid Panel 09/02/2022       Physical Exam      Vital Signs  Temp: 98.2 °F (36.8 °C)  Pulse: 79  Resp: 18  SpO2: 99 %  BP: (!) 122/90  BP Location: Right arm  Patient Position: Sitting  Pain Score: 0-No pain  Height and Weight  Height: 5' 6" (167.6 cm)  Weight: " 62.3 kg (137 lb 5.6 oz)  BSA (Calculated - sq m): 1.7 sq meters  BMI (Calculated): 22.2  Weight in (lb) to have BMI = 25: 154.6]    Physical Exam  Constitutional:       Appearance: Normal appearance. She is normal weight.   HENT:      Head: Normocephalic and atraumatic.      Right Ear: Tympanic membrane, ear canal and external ear normal.      Left Ear: Tympanic membrane, ear canal and external ear normal.      Nose: Nose normal.      Mouth/Throat:      Mouth: Mucous membranes are moist.      Pharynx: Oropharynx is clear.   Eyes:      Extraocular Movements: Extraocular movements intact.      Conjunctiva/sclera: Conjunctivae normal.      Pupils: Pupils are equal, round, and reactive to light.   Cardiovascular:      Rate and Rhythm: Normal rate and regular rhythm.      Pulses: Normal pulses.      Heart sounds: Normal heart sounds.   Pulmonary:      Effort: Pulmonary effort is normal.      Breath sounds: Normal breath sounds.   Abdominal:      General: Abdomen is flat. Bowel sounds are normal.      Palpations: Abdomen is soft.   Musculoskeletal:         General: Normal range of motion.      Cervical back: Normal range of motion and neck supple.   Skin:     General: Skin is warm and dry.      Capillary Refill: Capillary refill takes less than 2 seconds.   Neurological:      General: No focal deficit present.      Mental Status: She is alert and oriented to person, place, and time. Mental status is at baseline.   Psychiatric:         Mood and Affect: Mood normal.         Behavior: Behavior normal.         Thought Content: Thought content normal.         Judgment: Judgment normal.        Laboratory:  CBC:  Recent Labs   Lab 07/16/20  0743 07/21/21  0755 09/02/22  0805   WBC 3.59 L 3.72 L 4.07   RBC 4.75 4.50 4.31   Hemoglobin 13.5 13.1 13.0   Hematocrit 44.7 41.1 40.8   Platelets 212 211 217   MCV 94 91 95   MCH 28.4 29.1 30.2   MCHC 30.2 L 31.9 L 31.9 L     CMP:  Recent Labs   Lab 07/16/20  0743 07/21/21  0755  09/02/22  0805   Glucose 88 88 94   Calcium 9.9 9.7 9.7   Albumin 4.2 3.8 4.1   Total Protein 7.4 7.6 7.8   Sodium 141 140 140   Potassium 5.4 H 4.2 5.3 H   CO2 31 H 30 H 28   Chloride 104 104 103   BUN 14 15 17   Alkaline Phosphatase 48 L 45 L 50 L   ALT 11 10 12   AST 24 23 20   Total Bilirubin 0.9 0.8 0.8     URINALYSIS:       LIPIDS:  Recent Labs   Lab 07/16/20  0743 07/21/21  0755 09/02/22  0805   HDL 62 52 53   Cholesterol 162 162 172   Triglycerides 52 83 98   LDL Cholesterol 89.6 93.4 99.4   HDL/Cholesterol Ratio 38.3 32.1 30.8   Non-HDL Cholesterol 100 110 119   Total Cholesterol/HDL Ratio 2.6 3.1 3.2     TSH:      A1C:        Radiology:        Assessment/Plan     Vicky Brand is a 56 y.o.female with:    Routine general medical examination at a health care facility    Paradoxical insomnia  - Discussed Prozac with patient. She will think about adding this to medication regiment for possible anxiety.    As above, continue current medications and maintain follow up with specialists.  Return to clinic as needed.    I spent 31 minutes on the day of this encounter for preparing, evaluating, examining, treating, and discussing plan of care with this patient.  Greater than 50% of this time was spent face to face with patient.  All questions were answered to patient's satisfaction.          Karen L Spencer, NP-C [facility]

## 2022-09-11 ENCOUNTER — PATIENT MESSAGE (OUTPATIENT)
Dept: OBSTETRICS AND GYNECOLOGY | Facility: CLINIC | Age: 57
End: 2022-09-11
Payer: COMMERCIAL

## 2022-09-13 RX ORDER — ESTRADIOL 0.1 MG/G
1 CREAM VAGINAL
Qty: 42.5 G | Refills: 1 | Status: SHIPPED | OUTPATIENT
Start: 2022-09-13 | End: 2022-11-23 | Stop reason: SDUPTHER

## 2022-09-13 NOTE — TELEPHONE ENCOUNTER
"Per Patient: "I don't think I have communicated well; I'll try to explain. Last year, Dr. Jarrell prescribed Estradiol Vagainal Cream USP 0.01%. Each gram contained 0.1 mg estradiol in nonliquefying base. It came in a 1 1/2 ounce tube. Using the applicator, I took out a 1 gram dose and inserted every day. The good results to the vagina and surrounding tissue amazed me. However, after 4 weeks of use, I had mood swings, uterine cramping, and blood flow as though I was menstruating. I had D and C to check, and I was normal. At that time, we discussed how in the future I should try 1/2 a gram of cream every other day. The prescription I picked up was for Estradiol Vaginal Inserts, 10mcg...each applicator contains a pill. Can I get the insertable cream again?"  "

## 2022-09-14 ENCOUNTER — PATIENT MESSAGE (OUTPATIENT)
Dept: SLEEP MEDICINE | Facility: CLINIC | Age: 57
End: 2022-09-14
Payer: COMMERCIAL

## 2022-09-19 ENCOUNTER — PATIENT MESSAGE (OUTPATIENT)
Dept: OBSTETRICS AND GYNECOLOGY | Facility: CLINIC | Age: 57
End: 2022-09-19
Payer: COMMERCIAL

## 2022-09-19 ENCOUNTER — OFFICE VISIT (OUTPATIENT)
Dept: PRIMARY CARE CLINIC | Facility: CLINIC | Age: 57
End: 2022-09-19
Payer: COMMERCIAL

## 2022-09-19 VITALS
BODY MASS INDEX: 22.08 KG/M2 | WEIGHT: 137.38 LBS | SYSTOLIC BLOOD PRESSURE: 122 MMHG | HEIGHT: 66 IN | RESPIRATION RATE: 18 BRPM | DIASTOLIC BLOOD PRESSURE: 90 MMHG | HEART RATE: 79 BPM | OXYGEN SATURATION: 99 % | TEMPERATURE: 98 F

## 2022-09-19 DIAGNOSIS — Z00.00 ROUTINE GENERAL MEDICAL EXAMINATION AT A HEALTH CARE FACILITY: Primary | ICD-10-CM

## 2022-09-19 DIAGNOSIS — F51.03 PARADOXICAL INSOMNIA: ICD-10-CM

## 2022-09-19 PROCEDURE — 3008F BODY MASS INDEX DOCD: CPT | Mod: CPTII,S$GLB,, | Performed by: NURSE PRACTITIONER

## 2022-09-19 PROCEDURE — 99214 OFFICE O/P EST MOD 30 MIN: CPT | Mod: S$GLB,,, | Performed by: NURSE PRACTITIONER

## 2022-09-19 PROCEDURE — 1160F RVW MEDS BY RX/DR IN RCRD: CPT | Mod: CPTII,S$GLB,, | Performed by: NURSE PRACTITIONER

## 2022-09-19 PROCEDURE — 3008F PR BODY MASS INDEX (BMI) DOCUMENTED: ICD-10-PCS | Mod: CPTII,S$GLB,, | Performed by: NURSE PRACTITIONER

## 2022-09-19 PROCEDURE — 1159F MED LIST DOCD IN RCRD: CPT | Mod: CPTII,S$GLB,, | Performed by: NURSE PRACTITIONER

## 2022-09-19 PROCEDURE — 3080F DIAST BP >= 90 MM HG: CPT | Mod: CPTII,S$GLB,, | Performed by: NURSE PRACTITIONER

## 2022-09-19 PROCEDURE — 3074F SYST BP LT 130 MM HG: CPT | Mod: CPTII,S$GLB,, | Performed by: NURSE PRACTITIONER

## 2022-09-19 PROCEDURE — 99214 PR OFFICE/OUTPT VISIT, EST, LEVL IV, 30-39 MIN: ICD-10-PCS | Mod: S$GLB,,, | Performed by: NURSE PRACTITIONER

## 2022-09-19 PROCEDURE — 3080F PR MOST RECENT DIASTOLIC BLOOD PRESSURE >= 90 MM HG: ICD-10-PCS | Mod: CPTII,S$GLB,, | Performed by: NURSE PRACTITIONER

## 2022-09-19 PROCEDURE — 99999 PR PBB SHADOW E&M-EST. PATIENT-LVL V: CPT | Mod: PBBFAC,,, | Performed by: NURSE PRACTITIONER

## 2022-09-19 PROCEDURE — 1159F PR MEDICATION LIST DOCUMENTED IN MEDICAL RECORD: ICD-10-PCS | Mod: CPTII,S$GLB,, | Performed by: NURSE PRACTITIONER

## 2022-09-19 PROCEDURE — 1160F PR REVIEW ALL MEDS BY PRESCRIBER/CLIN PHARMACIST DOCUMENTED: ICD-10-PCS | Mod: CPTII,S$GLB,, | Performed by: NURSE PRACTITIONER

## 2022-09-19 PROCEDURE — 3074F PR MOST RECENT SYSTOLIC BLOOD PRESSURE < 130 MM HG: ICD-10-PCS | Mod: CPTII,S$GLB,, | Performed by: NURSE PRACTITIONER

## 2022-09-19 PROCEDURE — 99999 PR PBB SHADOW E&M-EST. PATIENT-LVL V: ICD-10-PCS | Mod: PBBFAC,,, | Performed by: NURSE PRACTITIONER

## 2022-09-20 ENCOUNTER — PATIENT MESSAGE (OUTPATIENT)
Dept: PRIMARY CARE CLINIC | Facility: CLINIC | Age: 57
End: 2022-09-20
Payer: COMMERCIAL

## 2022-09-21 ENCOUNTER — LAB VISIT (OUTPATIENT)
Dept: LAB | Facility: HOSPITAL | Age: 57
End: 2022-09-21
Attending: DERMATOLOGY
Payer: COMMERCIAL

## 2022-09-21 ENCOUNTER — OFFICE VISIT (OUTPATIENT)
Dept: DERMATOLOGY | Facility: CLINIC | Age: 57
End: 2022-09-21
Payer: COMMERCIAL

## 2022-09-21 ENCOUNTER — PATIENT MESSAGE (OUTPATIENT)
Dept: PRIMARY CARE CLINIC | Facility: CLINIC | Age: 57
End: 2022-09-21
Payer: COMMERCIAL

## 2022-09-21 DIAGNOSIS — R20.8 BURNING SENSATION OF SKIN: ICD-10-CM

## 2022-09-21 DIAGNOSIS — R21 RASH: ICD-10-CM

## 2022-09-21 DIAGNOSIS — L29.9 ITCH: ICD-10-CM

## 2022-09-21 DIAGNOSIS — R21 RASH: Primary | ICD-10-CM

## 2022-09-21 LAB
FERRITIN SERPL-MCNC: 55 NG/ML (ref 20–300)
HBV SURFACE AG SERPL QL IA: NORMAL
HCV AB SERPL QL IA: NORMAL
IRON SERPL-MCNC: 70 UG/DL (ref 30–160)
SATURATED IRON: 19 % (ref 20–50)
TOTAL IRON BINDING CAPACITY: 367 UG/DL (ref 250–450)
TRANSFERRIN SERPL-MCNC: 248 MG/DL (ref 200–375)
VIT B12 SERPL-MCNC: 1043 PG/ML (ref 210–950)

## 2022-09-21 PROCEDURE — 84630 ASSAY OF ZINC: CPT | Performed by: DERMATOLOGY

## 2022-09-21 PROCEDURE — 86060 ANTISTREPTOLYSIN O TITER: CPT | Performed by: DERMATOLOGY

## 2022-09-21 PROCEDURE — 87340 HEPATITIS B SURFACE AG IA: CPT | Performed by: DERMATOLOGY

## 2022-09-21 PROCEDURE — 83516 IMMUNOASSAY NONANTIBODY: CPT | Performed by: DERMATOLOGY

## 2022-09-21 PROCEDURE — 82607 VITAMIN B-12: CPT | Performed by: DERMATOLOGY

## 2022-09-21 PROCEDURE — 99999 PR PBB SHADOW E&M-EST. PATIENT-LVL III: ICD-10-PCS | Mod: PBBFAC,,, | Performed by: DERMATOLOGY

## 2022-09-21 PROCEDURE — 1159F PR MEDICATION LIST DOCUMENTED IN MEDICAL RECORD: ICD-10-PCS | Mod: CPTII,S$GLB,, | Performed by: DERMATOLOGY

## 2022-09-21 PROCEDURE — 99204 OFFICE O/P NEW MOD 45 MIN: CPT | Mod: S$GLB,,, | Performed by: DERMATOLOGY

## 2022-09-21 PROCEDURE — 86038 ANTINUCLEAR ANTIBODIES: CPT | Performed by: DERMATOLOGY

## 2022-09-21 PROCEDURE — 1159F MED LIST DOCD IN RCRD: CPT | Mod: CPTII,S$GLB,, | Performed by: DERMATOLOGY

## 2022-09-21 PROCEDURE — 99204 PR OFFICE/OUTPT VISIT, NEW, LEVL IV, 45-59 MIN: ICD-10-PCS | Mod: S$GLB,,, | Performed by: DERMATOLOGY

## 2022-09-21 PROCEDURE — 86803 HEPATITIS C AB TEST: CPT | Performed by: DERMATOLOGY

## 2022-09-21 PROCEDURE — 82728 ASSAY OF FERRITIN: CPT | Performed by: DERMATOLOGY

## 2022-09-21 PROCEDURE — 86235 NUCLEAR ANTIGEN ANTIBODY: CPT | Mod: 59 | Performed by: DERMATOLOGY

## 2022-09-21 PROCEDURE — 99999 PR PBB SHADOW E&M-EST. PATIENT-LVL III: CPT | Mod: PBBFAC,,, | Performed by: DERMATOLOGY

## 2022-09-21 PROCEDURE — 82784 ASSAY IGA/IGD/IGG/IGM EACH: CPT | Performed by: DERMATOLOGY

## 2022-09-21 PROCEDURE — 86039 ANTINUCLEAR ANTIBODIES (ANA): CPT | Performed by: DERMATOLOGY

## 2022-09-21 PROCEDURE — 84466 ASSAY OF TRANSFERRIN: CPT | Performed by: DERMATOLOGY

## 2022-09-21 NOTE — PROGRESS NOTES
Subjective:       Patient ID:  Vicky Brand is a 56 y.o. female who presents for   Chief Complaint   Patient presents with    Rash     Buttock X2mos, irritated , TX clobetasol cream      Rash - Initial  Affected locations: right buttock and left buttock  Duration: 2 months  Signs / symptoms: irritated, inflamed, tender and pain  Severity: mild to moderate  Timing: recurrent  Relieving factors/Treatments tried: OTC moisturizer      Review of Systems   Constitutional: Negative.    HENT: Negative.     Respiratory: Negative.     Musculoskeletal: Negative.    Skin:  Positive for rash.      Objective:    Physical Exam   Constitutional: She appears well-developed and well-nourished.   Neurological: She is alert and oriented to person, place, and time.   Psychiatric: She has a normal mood and affect.   Skin:               Diagram Legend     Erythematous scaling macule/papule c/w actinic keratosis       Vascular papule c/w angioma      Pigmented verrucoid papule/plaque c/w seborrheic keratosis      Yellow umbilicated papule c/w sebaceous hyperplasia      Irregularly shaped tan macule c/w lentigo     1-2 mm smooth white papules consistent with Milia      Movable subcutaneous cyst with punctum c/w epidermal inclusion cyst      Subcutaneous movable cyst c/w pilar cyst      Firm pink to brown papule c/w dermatofibroma      Pedunculated fleshy papule(s) c/w skin tag(s)      Evenly pigmented macule c/w junctional nevus     Mildly variegated pigmented, slightly irregular-bordered macule c/w mildly atypical nevus      Flesh colored to evenly pigmented papule c/w intradermal nevus       Pink pearly papule/plaque c/w basal cell carcinoma      Erythematous hyperkeratotic cursted plaque c/w SCC      Surgical scar with no sign of skin cancer recurrence      Open and closed comedones      Inflammatory papules and pustules      Verrucoid papule consistent consistent with wart     Erythematous eczematous patches and plaques      Dystrophic onycholytic nail with subungual debris c/w onychomycosis     Umbilicated papule    Erythematous-base heme-crusted tan verrucoid plaque consistent with inflamed seborrheic keratosis     Erythematous Silvery Scaling Plaque c/w Psoriasis     See annotation      Assessment / Plan:        Rash  -     Zinc; Future; Expected date: 09/21/2022  -     Streptococcal Antibodes (ASO Titer); Future; Expected date: 09/21/2022  -     Celiac Disease Panel; Future; Expected date: 09/21/2022  Suspect DH vs psoriasis.  Discussed with patient the etiology and pathogenesis of the disease or skin lesion(s) and possible treatments and aggravators.    Reviewed with patient different treatment options and associated risks.  Discussed with patient the need for laboratory work up for further evaluation.  Discussed that such investigation may not be helpful.  Gluten free diet trial until fu appt.  Prn pt's clob bid.  Discussed with patient the risks of topical and injectable steroids, including, but not limited, to atrophy, rosacea, acne, glaucoma, cataracts, adrenal suppression, striae.  Do not touch eyes with medication.  Warned that celiac labs could be neg even with DH.  No hot water bathing reviewed.    Itch  -     Zinc; Future; Expected date: 09/21/2022  -     Streptococcal Antibodes (ASO Titer); Future; Expected date: 09/21/2022  -     Ferritin; Future; Expected date: 09/21/2022  -     Iron and TIBC; Future; Expected date: 09/21/2022  -     SAMANTHA; Future; Expected date: 09/21/2022  -     Hepatitis C Antibody; Future; Expected date: 09/21/2022  -     Hepatitis B Surface Antigen; Future; Expected date: 09/21/2022  -     Vitamin B12; Future; Expected date: 09/21/2022  -     Celiac Disease Panel; Future; Expected date: 09/21/2022  Discussed with patient the need for laboratory work up for further evaluation.  Discussed that such investigation may not be helpful.    Burning sensation of skin  -     Zinc; Future; Expected date:  09/21/2022  -     Streptococcal Antibodes (ASO Titer); Future; Expected date: 09/21/2022  -     Ferritin; Future; Expected date: 09/21/2022  -     Iron and TIBC; Future; Expected date: 09/21/2022  -     SAMANTHA; Future; Expected date: 09/21/2022  -     Hepatitis C Antibody; Future; Expected date: 09/21/2022  -     Hepatitis B Surface Antigen; Future; Expected date: 09/21/2022  -     Vitamin B12; Future; Expected date: 09/21/2022  -     Celiac Disease Panel; Future; Expected date: 09/21/2022  Discussed with patient the need for laboratory work up for further evaluation.  Discussed that such investigation may not be helpful.  Discussed with patient the etiology and pathogenesis of the disease or skin lesion(s) and possible treatments and aggravators.    Watch for paresthesia with psoriasis?  Poss but not as likely.  Burning and sensitivity cw DH.         Follow up in about 3 weeks (around 10/12/2022).

## 2022-09-22 ENCOUNTER — PATIENT MESSAGE (OUTPATIENT)
Dept: DERMATOLOGY | Facility: CLINIC | Age: 57
End: 2022-09-22
Payer: COMMERCIAL

## 2022-09-22 DIAGNOSIS — R20.8 BURNING SENSATION OF SKIN: Primary | ICD-10-CM

## 2022-09-22 LAB
ANA PATTERN 1: NORMAL
ANA SER QL IF: POSITIVE
ANA TITR SER IF: NORMAL {TITER}

## 2022-09-22 RX ORDER — BETAMETHASONE DIPROPIONATE 0.5 MG/G
CREAM TOPICAL 2 TIMES DAILY
Qty: 45 G | Refills: 0 | Status: SHIPPED | OUTPATIENT
Start: 2022-09-22 | End: 2022-09-29

## 2022-09-23 ENCOUNTER — PATIENT MESSAGE (OUTPATIENT)
Dept: DERMATOLOGY | Facility: CLINIC | Age: 57
End: 2022-09-23
Payer: COMMERCIAL

## 2022-09-23 DIAGNOSIS — M25.50 MULTIPLE JOINT PAIN: ICD-10-CM

## 2022-09-23 DIAGNOSIS — R76.8 ANA POSITIVE: Primary | ICD-10-CM

## 2022-09-26 ENCOUNTER — TELEPHONE (OUTPATIENT)
Dept: DERMATOLOGY | Facility: CLINIC | Age: 57
End: 2022-09-26
Payer: COMMERCIAL

## 2022-09-26 ENCOUNTER — PATIENT MESSAGE (OUTPATIENT)
Dept: RHEUMATOLOGY | Facility: CLINIC | Age: 57
End: 2022-09-26
Payer: COMMERCIAL

## 2022-09-26 ENCOUNTER — PATIENT MESSAGE (OUTPATIENT)
Dept: DERMATOLOGY | Facility: CLINIC | Age: 57
End: 2022-09-26
Payer: COMMERCIAL

## 2022-09-26 DIAGNOSIS — R76.8 ANA POSITIVE: Primary | ICD-10-CM

## 2022-09-26 LAB
ASO AB SERPL-ACNC: <14 IU/ML
GLIADIN PEPTIDE IGA SER-ACNC: 154 UNITS
GLIADIN PEPTIDE IGG SER-ACNC: 89 UNITS
IGA SERPL-MCNC: 265 MG/DL (ref 70–400)
TTG IGA SER-ACNC: 157 UNITS
TTG IGG SER-ACNC: 12 UNITS
ZINC SERPL-MCNC: 78 UG/DL (ref 60–130)

## 2022-09-26 NOTE — TELEPHONE ENCOUNTER
Spoke with pt regarding lab results. Pt stated to send her the info through the portal.    RD        ----- Message from Maura Watts LPN sent at 9/26/2022  4:06 PM CDT -----  Regarding: FW: test results    ----- Message -----  From: Berna Gant MA  Sent: 9/26/2022  11:42 AM CDT  To: [facility] Staff  Subject: test results                                     Pt is requesting a call back in regards to getting her test results. Please call pt to assist her with getting results.            Confirmed Contact below:  Contact Name:Vicky Brand  Phone Number: [phone]

## 2022-09-26 NOTE — TELEPHONE ENCOUNTER
Reviewed lab results with pt. Informed her that she should see a rheumatologist for high JUSTINO. Gave her the phone number to schedule         ----- Message from Lamonte Infante MD sent at 9/26/2022 10:16 AM CDT -----  Pt needs to see rheum for high justino lupus titer.  May be affecting her symptoms.    ----- Message -----  From: [employer] [facility]  Sent: 9/21/2022   5:25 PM CDT  To: Lamonte Infante MD

## 2022-09-27 ENCOUNTER — TELEPHONE (OUTPATIENT)
Dept: SPORTS MEDICINE | Facility: CLINIC | Age: 57
End: 2022-09-27
Payer: COMMERCIAL

## 2022-09-27 DIAGNOSIS — Z71.3 NUTRITIONAL COUNSELING: Primary | ICD-10-CM

## 2022-09-27 LAB
ANTI SM ANTIBODY: 0.17 RATIO (ref 0–0.99)
ANTI SM/RNP ANTIBODY: 0.18 RATIO (ref 0–0.99)
ANTI-SM INTERPRETATION: NEGATIVE
ANTI-SM/RNP INTERPRETATION: NEGATIVE
ANTI-SSA ANTIBODY: 5.23 RATIO (ref 0–0.99)
ANTI-SSA INTERPRETATION: POSITIVE
ANTI-SSB ANTIBODY: 1.17 RATIO (ref 0–0.99)
ANTI-SSB INTERPRETATION: POSITIVE
DSDNA AB SER-ACNC: ABNORMAL [IU]/ML

## 2022-09-28 ENCOUNTER — OFFICE VISIT (OUTPATIENT)
Dept: RHEUMATOLOGY | Facility: CLINIC | Age: 57
End: 2022-09-28
Payer: COMMERCIAL

## 2022-09-28 ENCOUNTER — LAB VISIT (OUTPATIENT)
Dept: LAB | Facility: HOSPITAL | Age: 57
End: 2022-09-28
Attending: INTERNAL MEDICINE
Payer: COMMERCIAL

## 2022-09-28 VITALS
BODY MASS INDEX: 21.55 KG/M2 | HEIGHT: 66 IN | DIASTOLIC BLOOD PRESSURE: 82 MMHG | HEART RATE: 83 BPM | WEIGHT: 134.06 LBS | SYSTOLIC BLOOD PRESSURE: 150 MMHG

## 2022-09-28 DIAGNOSIS — R76.8 ANA POSITIVE: ICD-10-CM

## 2022-09-28 DIAGNOSIS — L13.0 DERMATITIS HERPETIFORMIS: ICD-10-CM

## 2022-09-28 DIAGNOSIS — M35.09 SJOGREN'S SYNDROME WITH OTHER ORGAN INVOLVEMENT: ICD-10-CM

## 2022-09-28 DIAGNOSIS — R76.8 ANA POSITIVE: Primary | ICD-10-CM

## 2022-09-28 PROBLEM — M35.00 SJOGREN'S DISEASE: Status: ACTIVE | Noted: 2022-09-28

## 2022-09-28 LAB
AMYLASE SERPL-CCNC: 130 U/L (ref 20–110)
C3 SERPL-MCNC: 106 MG/DL (ref 50–180)
C4 SERPL-MCNC: 17 MG/DL (ref 11–44)
CRP SERPL-MCNC: 0.8 MG/L (ref 0–8.2)
ERYTHROCYTE [SEDIMENTATION RATE] IN BLOOD BY PHOTOMETRIC METHOD: 10 MM/HR (ref 0–36)

## 2022-09-28 PROCEDURE — 86334 IMMUNOFIX E-PHORESIS SERUM: CPT | Mod: 26,,, | Performed by: PATHOLOGY

## 2022-09-28 PROCEDURE — 84165 PATHOLOGIST INTERPRETATION SPE: ICD-10-PCS | Mod: 26,,, | Performed by: PATHOLOGY

## 2022-09-28 PROCEDURE — 82955 ASSAY OF G6PD ENZYME: CPT | Performed by: INTERNAL MEDICINE

## 2022-09-28 PROCEDURE — 3077F PR MOST RECENT SYSTOLIC BLOOD PRESSURE >= 140 MM HG: ICD-10-PCS | Mod: CPTII,S$GLB,, | Performed by: INTERNAL MEDICINE

## 2022-09-28 PROCEDURE — 1160F RVW MEDS BY RX/DR IN RCRD: CPT | Mod: CPTII,S$GLB,, | Performed by: INTERNAL MEDICINE

## 2022-09-28 PROCEDURE — 99204 OFFICE O/P NEW MOD 45 MIN: CPT | Mod: S$GLB,,, | Performed by: INTERNAL MEDICINE

## 2022-09-28 PROCEDURE — 1159F MED LIST DOCD IN RCRD: CPT | Mod: CPTII,S$GLB,, | Performed by: INTERNAL MEDICINE

## 2022-09-28 PROCEDURE — 99999 PR PBB SHADOW E&M-EST. PATIENT-LVL III: CPT | Mod: PBBFAC,,, | Performed by: INTERNAL MEDICINE

## 2022-09-28 PROCEDURE — 86140 C-REACTIVE PROTEIN: CPT | Performed by: INTERNAL MEDICINE

## 2022-09-28 PROCEDURE — 1159F PR MEDICATION LIST DOCUMENTED IN MEDICAL RECORD: ICD-10-PCS | Mod: CPTII,S$GLB,, | Performed by: INTERNAL MEDICINE

## 2022-09-28 PROCEDURE — 85652 RBC SED RATE AUTOMATED: CPT | Performed by: INTERNAL MEDICINE

## 2022-09-28 PROCEDURE — 86160 COMPLEMENT ANTIGEN: CPT | Mod: 59 | Performed by: INTERNAL MEDICINE

## 2022-09-28 PROCEDURE — 84165 PROTEIN E-PHORESIS SERUM: CPT | Performed by: INTERNAL MEDICINE

## 2022-09-28 PROCEDURE — 3008F BODY MASS INDEX DOCD: CPT | Mod: CPTII,S$GLB,, | Performed by: INTERNAL MEDICINE

## 2022-09-28 PROCEDURE — 3079F PR MOST RECENT DIASTOLIC BLOOD PRESSURE 80-89 MM HG: ICD-10-PCS | Mod: CPTII,S$GLB,, | Performed by: INTERNAL MEDICINE

## 2022-09-28 PROCEDURE — 3077F SYST BP >= 140 MM HG: CPT | Mod: CPTII,S$GLB,, | Performed by: INTERNAL MEDICINE

## 2022-09-28 PROCEDURE — 82595 ASSAY OF CRYOGLOBULIN: CPT | Performed by: INTERNAL MEDICINE

## 2022-09-28 PROCEDURE — 3079F DIAST BP 80-89 MM HG: CPT | Mod: CPTII,S$GLB,, | Performed by: INTERNAL MEDICINE

## 2022-09-28 PROCEDURE — 36415 COLL VENOUS BLD VENIPUNCTURE: CPT | Performed by: INTERNAL MEDICINE

## 2022-09-28 PROCEDURE — 84165 PROTEIN E-PHORESIS SERUM: CPT | Mod: 26,,, | Performed by: PATHOLOGY

## 2022-09-28 PROCEDURE — 82150 ASSAY OF AMYLASE: CPT | Performed by: INTERNAL MEDICINE

## 2022-09-28 PROCEDURE — 99204 PR OFFICE/OUTPT VISIT, NEW, LEVL IV, 45-59 MIN: ICD-10-PCS | Mod: S$GLB,,, | Performed by: INTERNAL MEDICINE

## 2022-09-28 PROCEDURE — 86334 PATHOLOGIST INTERPRETATION IFE: ICD-10-PCS | Mod: 26,,, | Performed by: PATHOLOGY

## 2022-09-28 PROCEDURE — 3008F PR BODY MASS INDEX (BMI) DOCUMENTED: ICD-10-PCS | Mod: CPTII,S$GLB,, | Performed by: INTERNAL MEDICINE

## 2022-09-28 PROCEDURE — 99999 PR PBB SHADOW E&M-EST. PATIENT-LVL III: ICD-10-PCS | Mod: PBBFAC,,, | Performed by: INTERNAL MEDICINE

## 2022-09-28 PROCEDURE — 86334 IMMUNOFIX E-PHORESIS SERUM: CPT | Performed by: INTERNAL MEDICINE

## 2022-09-28 PROCEDURE — 1160F PR REVIEW ALL MEDS BY PRESCRIBER/CLIN PHARMACIST DOCUMENTED: ICD-10-PCS | Mod: CPTII,S$GLB,, | Performed by: INTERNAL MEDICINE

## 2022-09-28 PROCEDURE — 86160 COMPLEMENT ANTIGEN: CPT | Performed by: INTERNAL MEDICINE

## 2022-09-28 NOTE — PROGRESS NOTES
RHEUMATOLOGY CLINIC INITIAL VISIT    Reason for consult:-  POSITIVE SAMANTHA.  RECENTLY DIAGNOSED CELIAC DISEASE.  Chief complaints, HPI, ROS, EXAM, Assessment & Plans:-  Vicky Chen aClvin a 56 y.o. pleasant female comes in with newly diagnosed positive SAMANTHA.  Acute onset rash in the gluteal region, elbows and inter gluteal cleft diagnosed as dermatitis herpetiformis by our dermatologist.  He did a workup for underlying celiac which came back positive along with high titer positive SAMANTHA, positive SSA and SSB antibody.  Referred here for positive SAMANTHA.  He advised her to try celiac diet-gluten free diet before considering dapsone therapy.  No photosensitive rash.  No chronic sicca syndrome.  History of Raynaud's but no gangrene or fingertip ulcer.  Rheumatological review of system negative otherwise.  Physical examination shows no synovitis, dactylitis, enthesitis or effusion.  Nail fold capillary architecture normal.  Cold hands and feet.  No fingertip scars or ulcers.  No gangrene.  Good oral pooling of saliva.  Nontender parotids.  No lymphadenopathy in the cervical region.  1. SAMANTHA positive    2. Dermatitis herpetiformis    3. Sjogren's syndrome with other organ involvement      Problem List Items Addressed This Visit       SAMANTHA positive - Primary    Relevant Orders    Protein Electrophoresis, Serum    Immunofixation Electrophoresis    C3 Complement    C4 Complement    Sedimentation rate    C-Reactive Protein    Amylase    Cryoglobulin    G6PD,Quantitative    Dermatitis herpetiformis    Relevant Orders    Protein Electrophoresis, Serum    Immunofixation Electrophoresis    C3 Complement    C4 Complement    Sedimentation rate    C-Reactive Protein    Amylase    Cryoglobulin    G6PD,Quantitative    Sjogren's disease    Relevant Orders    Protein Electrophoresis, Serum    Immunofixation Electrophoresis    C3 Complement    C4 Complement    Sedimentation rate    C-Reactive Protein    Amylase    Cryoglobulin     G6PD,Quantitative       High titer positive SAMANTHA with positive SSA and SSB along with chronic vaginal dryness highly suggestive of Sjogren syndrome.  No indication to start immunosuppressive therapy for Sjogren's at this time since she does not have significant disease manifestations other than vaginal dryness which improves with estrogen cream.  If she develops any worsening sicca syndrome, consider starting Plaquenil along with salivary substitutes.  Check activity markers for Sjogren's to look for any risk of lymphoma.  Newly diagnosed celiac disease with antibody positivity and new onset dermatitis herpetiformis.  Advised by dermatologist to avoid gluten.  Would recommend starting dapsone if gluten restriction fails to improve dermatitis herpetiformis lesions.  Treatment for dermatologist.  Scheduled to consult gastroenterologist to look for any underlying celiac enteropathy.  No history of prior chronic diarrhea but has diarrhea since avoiding gluten recently.  No history of malabsorption.    # Follow up in about 4 months (around 1/28/2023).      Past Medical History:   Diagnosis Date    SAMANTHA positive     Dyspareunia in female 07/27/2021    Osphena, pelvic floor tx 2021, [facility]    Eye abnormalities     early yellowing of lens, precursor to cataracts, Dr Webster,  wear glasses at all times    Fracture of fifth toe, left, closed, with delayed healing, subsequent encounter 08/21/2018    Intramural leiomyoma of uterus 05/22/2019    Irregular, dysmenorrhea    Nocturia 06/17/2021    Ocular migraine     vision fuzzy x 3 in 2013, metallic shapes in her vision, Dr Webster,  resolved after 30 min , no pain    CALEB (obstructive sleep apnea)     Lysenko, CPAP start 7/21    Paradoxical insomnia 10/30/2017    prior to menses, previously took amitryptiline 2.5 mg prn  qhs     Posterior vitreous detachment of left eye 05/22/2019    Flashes of light, ocular migranes, Ophthalmologist Dr Orourke, retinal specialist Dr Soni, rec  avoid exercise until she follows up 5/2019    Shoulder pain, left 2007    LEFT, after fall, better with PT at [facility]       Past Surgical History:   Procedure Laterality Date    COLONOSCOPY N/A 6/9/2017    Procedure: COLONOSCOPY;  Surgeon: Davy Prince MD;  [facility]  Service: Endoscopy;  Laterality: N/A;    DE QUERVAIN'S RELEASE Right 6/8/2022    Procedure: RELEASE, HAND, FOR DEQUERVAIN'S TENOSYNOVITIS - RIGHT;  Surgeon: Devan Wagner MD;  [facility];  Service: Orthopedics;  Laterality: Right;    DILATION AND CURETTAGE OF UTERUS      ENDOMETRIAL ABLATION N/A 2017    HYSTEROSCOPY WITH DILATION AND CURETTAGE OF UTERUS N/A 1/19/2022    Procedure: HYSTEROSCOPY, WITH DILATION AND CURETTAGE OF UTERUS;  Surgeon: Jannette Jarrell MD;  Location: [facility] OR;  Service: OB/GYN;  Laterality: N/A;    TUBAL LIGATION          Social History     Tobacco Use    Smoking status: Never    Smokeless tobacco: Never   Substance Use Topics    Alcohol use: No    Drug use: No       Family History   Problem Relation Age of Onset    Diabetes Father     Stroke Father 49    Hypertension Mother     Pacemaker/defibrilator Mother     Ovarian cancer Maternal Grandmother     Melanoma Neg Hx     Breast cancer Neg Hx     Colon cancer Neg Hx        Review of patient's allergies indicates:   Allergen Reactions    Sulfa (sulfonamide antibiotics) Rash    Cetirizine      Other reaction(s): Unable to Function  Other reaction(s): Unable to Function    Erythromycin Nausea And Vomiting     Other reaction(s): Nausea    Voltaren [diclofenac sodium] Diarrhea    Zyrtec-d  [cetirizine-pseudoephedrine]      Other reaction(s): Inability to focus/function       Medication List with Changes/Refills   Current Medications    BETAMETHASONE DIPROPIONATE 0.05 % CREAM    Apply topically 2 (two) times daily. Prn itch or burning on skin    CALCIUM CARBONATE (OS-GISSELL) 600 MG CALCIUM (1,500 MG) TAB    Take 600 mg by mouth once. gluten  free    ESTRADIOL (ESTRACE) 0.01 % (0.1 MG/GRAM) VAGINAL CREAM    Place 1 g vaginally twice a week.    MULTIVITAMIN CAPSULE    Take 1 capsule by mouth once daily. Gluten free         Thank you for allowing me to participate in the care ofKailashnalini April Frady.    Disclaimer: This note was prepared using voice recognition system and is likely to have sound alike errors and is not proof read.  Please call me with any questions.

## 2022-09-29 ENCOUNTER — PATIENT MESSAGE (OUTPATIENT)
Dept: RHEUMATOLOGY | Facility: CLINIC | Age: 57
End: 2022-09-29
Payer: COMMERCIAL

## 2022-09-29 ENCOUNTER — PATIENT MESSAGE (OUTPATIENT)
Dept: GASTROENTEROLOGY | Facility: CLINIC | Age: 57
End: 2022-09-29
Payer: COMMERCIAL

## 2022-09-29 ENCOUNTER — PATIENT MESSAGE (OUTPATIENT)
Dept: PRIMARY CARE CLINIC | Facility: CLINIC | Age: 57
End: 2022-09-29
Payer: COMMERCIAL

## 2022-09-29 ENCOUNTER — OFFICE VISIT (OUTPATIENT)
Dept: PRIMARY CARE CLINIC | Facility: CLINIC | Age: 57
End: 2022-09-29
Payer: COMMERCIAL

## 2022-09-29 DIAGNOSIS — G47.33 OSA (OBSTRUCTIVE SLEEP APNEA): ICD-10-CM

## 2022-09-29 DIAGNOSIS — F43.29 STRESS AND ADJUSTMENT REACTION: Primary | ICD-10-CM

## 2022-09-29 DIAGNOSIS — R76.8 ANA POSITIVE: ICD-10-CM

## 2022-09-29 DIAGNOSIS — R00.2 PALPITATIONS: ICD-10-CM

## 2022-09-29 DIAGNOSIS — F51.03 PARADOXICAL INSOMNIA: ICD-10-CM

## 2022-09-29 DIAGNOSIS — K90.0 CELIAC DISEASE: ICD-10-CM

## 2022-09-29 LAB
ALBUMIN SERPL ELPH-MCNC: 4.26 G/DL (ref 3.35–5.55)
ALPHA1 GLOB SERPL ELPH-MCNC: 0.25 G/DL (ref 0.17–0.41)
ALPHA2 GLOB SERPL ELPH-MCNC: 0.69 G/DL (ref 0.43–0.99)
B-GLOBULIN SERPL ELPH-MCNC: 0.74 G/DL (ref 0.5–1.1)
GAMMA GLOB SERPL ELPH-MCNC: 1.77 G/DL (ref 0.67–1.58)
INTERPRETATION SERPL IFE-IMP: NORMAL
PATHOLOGIST INTERPRETATION IFE: NORMAL
PATHOLOGIST INTERPRETATION SPE: NORMAL
PROT SERPL-MCNC: 7.7 G/DL (ref 6–8.4)

## 2022-09-29 PROCEDURE — 1160F RVW MEDS BY RX/DR IN RCRD: CPT | Mod: CPTII,95,, | Performed by: FAMILY MEDICINE

## 2022-09-29 PROCEDURE — 1159F PR MEDICATION LIST DOCUMENTED IN MEDICAL RECORD: ICD-10-PCS | Mod: CPTII,95,, | Performed by: FAMILY MEDICINE

## 2022-09-29 PROCEDURE — 99213 PR OFFICE/OUTPT VISIT, EST, LEVL III, 20-29 MIN: ICD-10-PCS | Mod: 95,,, | Performed by: FAMILY MEDICINE

## 2022-09-29 PROCEDURE — 99213 OFFICE O/P EST LOW 20 MIN: CPT | Mod: 95,,, | Performed by: FAMILY MEDICINE

## 2022-09-29 PROCEDURE — 1159F MED LIST DOCD IN RCRD: CPT | Mod: CPTII,95,, | Performed by: FAMILY MEDICINE

## 2022-09-29 PROCEDURE — 1160F PR REVIEW ALL MEDS BY PRESCRIBER/CLIN PHARMACIST DOCUMENTED: ICD-10-PCS | Mod: CPTII,95,, | Performed by: FAMILY MEDICINE

## 2022-09-29 RX ORDER — AMITRIPTYLINE HYDROCHLORIDE 10 MG/1
10 TABLET, FILM COATED ORAL NIGHTLY
Qty: 90 TABLET | Refills: 0 | Status: ON HOLD | OUTPATIENT
Start: 2022-09-29 | End: 2022-11-11 | Stop reason: HOSPADM

## 2022-09-29 NOTE — PROGRESS NOTES
"Subjective:      Patient ID: Vicky Brand is a 56 y.o. female.    Chief Complaint: No chief complaint on file.    Audio Only Telehealth Visit     The patient location is: home  The chief complaint leading to consultation is: anxiety over new dx  Visit type: Virtual visit with audio only (telephone)  Total time spent with patient: 11     The reason for the audio only service rather than synchronous audio and video virtual visit was related to technical difficulties or patient preference/necessity.     Each patient to whom I provide medical services by telemedicine is:  (1) informed of the relationship between the physician and patient and the respective role of any other health care provider with respect to management of the patient; and (2) notified that they may decline to receive medical services by telemedicine and may withdraw from such care at any time. Patient verbally consented to receive this service via voice-only telephone call.       HPI: anxiety     She saw an outside [facility] D[facility]atologist for a  buttock rash tx with steroid cr, some other providers dx with shingles. Last week, she was evalauted by [facility] & labs revealed hi B12, hi SAMANTHA.     Saw Rheumatologist in [place] this week. Dx with Celiac & to avoid all gluten for 3 weeks & follow up w Dr Infante outside of this. Also sjogren's syndrome labs nml. Performed more lab tests. He did tell her that some of these lab markers may put her at higher risk of lymphoma in the future.     Anxiety is out the roof. I have strong support system & speak w a therapist regularly, strong Islam family. I'm having difficulty sleeping, Heart racing uncontrollably    11/2017 we tried [id number] (only took one script worth), tried  amitryptiline =- 2.5 mg helped, 10 mg "knocked me out", evening primrose         This service was not originating from a related E/M service provided within the previous 7 days nor will  to an E/M service or procedure " within the next 24 hours or my soonest available appointment.  Prevailing standard of care was able to be met in this audio-only visit.       Current Outpatient Medications   Medication Instructions    amitriptyline (ELAVIL) 10 mg, Oral, Nightly    calcium carbonate (OS-GISSELL) 600 mg, Oral, Once, gluten free    estradioL (ESTRACE) 1 g, Vaginal, Twice weekly    multivitamin capsule 1 capsule, Oral, Daily, Gluten free       No results found for: HGBA1C  No results found for: MICALBCREAT  Lab Results   Component Value Date    LDLCALC 99.4 09/02/2022    LDLCALC 93.4 07/21/2021    CHOL 172 09/02/2022    HDL 53 09/02/2022    TRIG 98 09/02/2022       Lab Results   Component Value Date     09/02/2022    K 5.3 (H) 09/02/2022     09/02/2022    CO2 28 09/02/2022    GLU 94 09/02/2022    BUN 17 09/02/2022    CREATININE 0.9 09/02/2022    CALCIUM 9.7 09/02/2022    PROT 7.8 09/02/2022    ALBUMIN 4.1 09/02/2022    BILITOT 0.8 09/02/2022    ALKPHOS 50 (L) 09/02/2022    AST 20 09/02/2022    ALT 12 09/02/2022    ANIONGAP 9 09/02/2022    ESTGFRAFRICA >60.0 07/21/2021    EGFRNONAA >60.0 07/21/2021    WBC 4.07 09/02/2022    HGB 13.0 09/02/2022    HGB 13.1 07/21/2021    HCT 40.8 09/02/2022    MCV 95 09/02/2022     09/02/2022    TSH 1.731 05/02/2017    HEPCAB Non-reactive 09/21/2022       Lab Results   Component Value Date    .30 01/21/2020    PROGESTERONE 0.2 01/21/2020    ESTRADIOL <10 (A) 01/21/2020    CEQMIMUR42 1043 (H) 09/21/2022    FERRITIN 55 09/21/2022    IRON 70 09/21/2022    TRANSFERRIN 248 09/21/2022    TIBC 367 09/21/2022    FESATURATED 19 (L) 09/21/2022    ZINC 78 09/21/2022         Past Medical History:   Diagnosis Date    SAMANTHA positive     Celiac disease 9/29/2022    Dyspareunia in female 07/27/2021    Osphena, pelvic floor tx 2021, [facility]    Eye abnormalities     early yellowing of lens, precursor to cataracts, Dr Webster,  wear glasses at all times    Fracture of fifth toe, left, closed, with  delayed healing, subsequent encounter 08/21/2018    Intramural leiomyoma of uterus 05/22/2019    Irregular, dysmenorrhea    Nocturia 06/17/2021    Ocular migraine     vision fuzzy x 3 in 2013, metallic shapes in her vision, Dr Webster,  resolved after 30 min , no pain    CALBE (obstructive sleep apnea)     Lysenko, CPAP start 7/21    Palpitations 9/29/2022    Paradoxical insomnia 10/30/2017    prior to menses, previously took amitryptiline 2.5 mg prn  qhs     Posterior vitreous detachment of left eye 05/22/2019    Flashes of light, ocular migranes, Ophthalmologist Dr Orourke, retinal specialist Dr Soni, rec avoid exercise until she follows up 5/2019    Shoulder pain, left 2007    LEFT, after fall, better with PT at [facility]    Stress and adjustment reaction 9/29/2022     Past Surgical History:   Procedure Laterality Date    COLONOSCOPY N/A 6/9/2017    Procedure: COLONOSCOPY;  Surgeon: Davy Prince MD;  Location: [address]);  Service: Endoscopy;  Laterality: N/A;    DE QUERVAIN'S RELEASE Right 6/8/2022    Procedure: RELEASE, HAND, FOR DEQUERVAIN'S TENOSYNOVITIS - RIGHT;  Surgeon: Devan Wagner MD;  Location: [facility];  Service: Orthopedics;  Laterality: Right;    DILATION AND CURETTAGE OF UTERUS      ENDOMETRIAL ABLATION N/A 2017    HYSTEROSCOPY WITH DILATION AND CURETTAGE OF UTERUS N/A 1/19/2022    Procedure: HYSTEROSCOPY, WITH DILATION AND CURETTAGE OF UTERUS;  Surgeon: Jannette Jarrell MD;  Location: [facility];  Service: OB/GYN;  Laterality: N/A;    TUBAL LIGATION       Social History     Social History Narrative    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  Objective:   Physical Exam  Assessment:     1. Stress and adjustment reaction    2. Palpitations    3. Celiac disease    4. Paradoxical insomnia    5. CALEB (obstructive sleep apnea)    6. SAMANTHA positive      Plan:     Orders Placed This Encounter    amitriptyline (ELAVIL) 10 MG tablet     Will try 1/4 tab nightly  Let me know if sx persist or change  Continue CPAP  Follow w Rheum  AUDIO    There are no Patient Instructions on file for this visit.

## 2022-09-30 LAB — G6PD RBC-CCNT: 10.4 U/G HB (ref 8–11.9)

## 2022-10-03 ENCOUNTER — PATIENT MESSAGE (OUTPATIENT)
Dept: RHEUMATOLOGY | Facility: CLINIC | Age: 57
End: 2022-10-03
Payer: COMMERCIAL

## 2022-10-04 ENCOUNTER — OFFICE VISIT (OUTPATIENT)
Dept: GASTROENTEROLOGY | Facility: CLINIC | Age: 57
End: 2022-10-04
Payer: COMMERCIAL

## 2022-10-04 VITALS
DIASTOLIC BLOOD PRESSURE: 81 MMHG | SYSTOLIC BLOOD PRESSURE: 141 MMHG | BODY MASS INDEX: 21.08 KG/M2 | WEIGHT: 131.19 LBS | HEART RATE: 87 BPM | HEIGHT: 66 IN

## 2022-10-04 DIAGNOSIS — R89.4 ABNORMAL CELIAC ANTIBODY PANEL: Primary | ICD-10-CM

## 2022-10-04 PROCEDURE — 99999 PR PBB SHADOW E&M-EST. PATIENT-LVL III: CPT | Mod: PBBFAC,,, | Performed by: INTERNAL MEDICINE

## 2022-10-04 PROCEDURE — 3077F PR MOST RECENT SYSTOLIC BLOOD PRESSURE >= 140 MM HG: ICD-10-PCS | Mod: CPTII,S$GLB,, | Performed by: INTERNAL MEDICINE

## 2022-10-04 PROCEDURE — 1159F PR MEDICATION LIST DOCUMENTED IN MEDICAL RECORD: ICD-10-PCS | Mod: CPTII,S$GLB,, | Performed by: INTERNAL MEDICINE

## 2022-10-04 PROCEDURE — 3008F BODY MASS INDEX DOCD: CPT | Mod: CPTII,S$GLB,, | Performed by: INTERNAL MEDICINE

## 2022-10-04 PROCEDURE — 3008F PR BODY MASS INDEX (BMI) DOCUMENTED: ICD-10-PCS | Mod: CPTII,S$GLB,, | Performed by: INTERNAL MEDICINE

## 2022-10-04 PROCEDURE — 99204 PR OFFICE/OUTPT VISIT, NEW, LEVL IV, 45-59 MIN: ICD-10-PCS | Mod: S$GLB,,, | Performed by: INTERNAL MEDICINE

## 2022-10-04 PROCEDURE — 3079F PR MOST RECENT DIASTOLIC BLOOD PRESSURE 80-89 MM HG: ICD-10-PCS | Mod: CPTII,S$GLB,, | Performed by: INTERNAL MEDICINE

## 2022-10-04 PROCEDURE — 3079F DIAST BP 80-89 MM HG: CPT | Mod: CPTII,S$GLB,, | Performed by: INTERNAL MEDICINE

## 2022-10-04 PROCEDURE — 99204 OFFICE O/P NEW MOD 45 MIN: CPT | Mod: S$GLB,,, | Performed by: INTERNAL MEDICINE

## 2022-10-04 PROCEDURE — 99999 PR PBB SHADOW E&M-EST. PATIENT-LVL III: ICD-10-PCS | Mod: PBBFAC,,, | Performed by: INTERNAL MEDICINE

## 2022-10-04 PROCEDURE — 3077F SYST BP >= 140 MM HG: CPT | Mod: CPTII,S$GLB,, | Performed by: INTERNAL MEDICINE

## 2022-10-04 PROCEDURE — 1159F MED LIST DOCD IN RCRD: CPT | Mod: CPTII,S$GLB,, | Performed by: INTERNAL MEDICINE

## 2022-10-04 NOTE — PROGRESS NOTES
Reason for visit: Celiac disease    HPI:  is a 56 year old lady with suspected Celiac disease based on elevated Celiac antibodies. Initially presented to dermatology for rash involving the right buttock, left buttock, elbows and inter gluteal cleft for about 2 months. It was irritated, inflamed, tender and painful. Based on suspicion for DH labs were drawn. Currently she is on gluten free diet for the past 2 weeks. Has been noticing some diarrhea now.     Latest Reference Range & Units 09/21/22 09:41   Antigliadin Ab IgG <20 UNITS 89 (H)   Antigliadin Abs, IgA <20 UNITS 154 (H)   TTG IgA <20 UNITS 157 (H)   TTG IgG <20 UNITS 12   Immunoglobulin A (IgA) 70 - 400 mg/dL 265     Denies any dysphagia, odynophagia, nausea, vomiting, heartburn or acid regurgitation. No abdominal pains, changes in bowel pattern, blood/ mucus in stool or unintentional weight loss. No melena or maroon stools. No recent changes in diet or medications. No family history of Celiac disease or GI malignancy. Sister with UC. No regular NSAIDs, alcohol or tobacco use. No recent antibiotic use, travels or sick contacts. No prior history of C.diff. Colonoscopy was done (2017) at age 51 yrs for screening.     Past medical, surgical, social and family history reviewed in epic    Medication allergies reviewed in epic    Review of systems:    Constitutional:  No fever, no chills, no weight loss, appetite is normal  Eyes:  No visual changes or red eyes  ENT:  No odynophagia or hoarseness of voice  Cardiovascular:  No angina or palpitation  Respiratory:  No shortness breath or wheezing  Genitourinary:  No dysuria or frequency  Musculoskeletal:  No myalgias; has left shoulder pains  Skin:  No pruritus or eczema  Neurologic:Has headaches, no seizures; has insomnia  Psychiatric:  No anxiety depression  Gastrointestinal:  See HPI    Physical exam:  Vitals see epic, awake, alert, oriented x3 in no acute distress    Neck:  Supple, no carotid bruit, no  cervical adenopathy  Abdomen:  Flat, soft, nontender, nondistended, no masses palpable, no hepatosplenomegaly detected, bowel sounds are normal, no ascites clinically detectable  Eyes:  Conjunctivae anicteric, not injected  ENT:  Oral mucosa moist  Cardiovascular:  S1, S2 normal, no murmurs, no gallops, no abdominal bruits heard  Respiratory:  Bilateral air entry equal, no rhonchi, no crackles, normal effort  Skin:  No palmar erythema or spider angiomata; elbow rash and buttock rash noted  Neurologic:  No asterixis or tremors  Psychiatric:  Affect appropriate, proper judgment, proper insight, oriented to place and time  Lower extremities:  No pedal edema    Recent labs, imaging and endoscopy reports reviewed.      Impression: Elevated Celiac antibody titers.    Recommendations:     Schedule EGD for small bowel biopsies  Dietician appointment for gluten free diet.

## 2022-10-05 ENCOUNTER — OFFICE VISIT (OUTPATIENT)
Dept: ALLERGY | Facility: CLINIC | Age: 57
End: 2022-10-05
Payer: COMMERCIAL

## 2022-10-05 VITALS — HEART RATE: 86 BPM | BODY MASS INDEX: 21.04 KG/M2 | WEIGHT: 130.94 LBS | OXYGEN SATURATION: 97 % | HEIGHT: 66 IN

## 2022-10-05 DIAGNOSIS — L13.0 DERMATITIS HERPETIFORMIS: ICD-10-CM

## 2022-10-05 DIAGNOSIS — R89.4 ABNORMAL CELIAC ANTIBODY PANEL: Primary | ICD-10-CM

## 2022-10-05 PROCEDURE — 1159F MED LIST DOCD IN RCRD: CPT | Mod: CPTII,S$GLB,, | Performed by: ALLERGY & IMMUNOLOGY

## 2022-10-05 PROCEDURE — 3008F PR BODY MASS INDEX (BMI) DOCUMENTED: ICD-10-PCS | Mod: CPTII,S$GLB,, | Performed by: ALLERGY & IMMUNOLOGY

## 2022-10-05 PROCEDURE — 99213 PR OFFICE/OUTPT VISIT, EST, LEVL III, 20-29 MIN: ICD-10-PCS | Mod: S$GLB,,, | Performed by: ALLERGY & IMMUNOLOGY

## 2022-10-05 PROCEDURE — 99999 PR PBB SHADOW E&M-EST. PATIENT-LVL III: ICD-10-PCS | Mod: PBBFAC,,, | Performed by: ALLERGY & IMMUNOLOGY

## 2022-10-05 PROCEDURE — 1159F PR MEDICATION LIST DOCUMENTED IN MEDICAL RECORD: ICD-10-PCS | Mod: CPTII,S$GLB,, | Performed by: ALLERGY & IMMUNOLOGY

## 2022-10-05 PROCEDURE — 99213 OFFICE O/P EST LOW 20 MIN: CPT | Mod: S$GLB,,, | Performed by: ALLERGY & IMMUNOLOGY

## 2022-10-05 PROCEDURE — 3008F BODY MASS INDEX DOCD: CPT | Mod: CPTII,S$GLB,, | Performed by: ALLERGY & IMMUNOLOGY

## 2022-10-05 PROCEDURE — 99999 PR PBB SHADOW E&M-EST. PATIENT-LVL III: CPT | Mod: PBBFAC,,, | Performed by: ALLERGY & IMMUNOLOGY

## 2022-10-05 NOTE — PROGRESS NOTES
Subjective:       Patient ID: Vicky Brand is a 56 y.o. female.    Chief Complaint:  Rash (Itchy, red on bottom)      HPI    Pt presents to discuss wether recurrent rash on buttocks, elbows, gluteal cleft is hives. When present, rash stays in same place for several days. No relief w oral antihistamines or topical steroids. Initially presented to dermatology and celiac antibodies were drawn b/c of suspicion of dermatitis herpetiformis. Celiac abs are positive. Has seen GI. Plans upcoming EGD and dietician appt for gluten free diet.   Has been gluten free x 10 days now. No sig change in rash.  Denies hx of atopy    Past Medical History:   Diagnosis Date    SAMANTHA positive     Celiac disease 09/29/2022    Dyspareunia in female 07/27/2021    Osphena, pelvic floor tx 2021, [facility]    Eye abnormalities     early yellowing of lens, precursor to cataracts, Dr Webster,  wear glasses at all times    Fracture of fifth toe, left, closed, with delayed healing, subsequent encounter 08/21/2018    Intramural leiomyoma of uterus 05/22/2019    Irregular, dysmenorrhea    Nocturia 06/17/2021    Ocular migraine     vision fuzzy x 3 in 2013, metallic shapes in her vision, Dr Webster,  resolved after 30 min , no pain    CALEB (obstructive sleep apnea)     Lysenko, CPAP start 7/21    Palpitations 09/29/2022    Paradoxical insomnia 10/30/2017    prior to menses, previously took amitryptiline 2.5 mg prn  qhs     Posterior vitreous detachment of left eye 05/22/2019    Flashes of light, ocular migranes, Ophthalmologist Dr Orourke, retinal specialist Dr Soni, rec avoid exercise until she follows up 5/2019    Shoulder pain, left 2007    LEFT, after fall, better with PT at [facility]    Stress and adjustment reaction 09/29/2022    Urticaria        Family History   Problem Relation Age of Onset    Diabetes Father     Stroke Father 49    Hypertension Mother     Pacemaker/defibrilator Mother     Ovarian cancer Maternal  Grandmother     Melanoma Neg Hx     Breast cancer Neg Hx     Colon cancer Neg Hx          Review of Systems   Constitutional:  Negative for activity change, fatigue and fever.   HENT:  Negative for congestion, postnasal drip, rhinorrhea, sinus pressure and sneezing.    Eyes:  Negative for discharge, redness and itching.   Respiratory:  Negative for cough, shortness of breath and wheezing.    Cardiovascular:  Negative for chest pain.   Gastrointestinal:  Positive for diarrhea (occasional). Negative for nausea and vomiting.   Genitourinary:  Negative for dysuria.   Musculoskeletal:  Negative for arthralgias and joint swelling.   Skin:  Positive for rash.   Neurological:  Negative for headaches.   Hematological:  Does not bruise/bleed easily.   Psychiatric/Behavioral:  Negative for behavioral problems and sleep disturbance.       Objective:   Physical Exam  Vitals and nursing note reviewed.   Constitutional:       General: She is not in acute distress.     Appearance: She is well-developed.   HENT:      Head: Normocephalic.      Right Ear: Tympanic membrane and external ear normal.      Left Ear: Tympanic membrane and external ear normal.      Nose: No septal deviation, mucosal edema or rhinorrhea.      Right Sinus: No maxillary sinus tenderness or frontal sinus tenderness.      Left Sinus: No maxillary sinus tenderness or frontal sinus tenderness.      Mouth/Throat:      Pharynx: Uvula midline. No uvula swelling.   Eyes:      General:         Right eye: No discharge.         Left eye: No discharge.      Conjunctiva/sclera: Conjunctivae normal.   Cardiovascular:      Rate and Rhythm: Normal rate and regular rhythm.   Pulmonary:      Effort: Pulmonary effort is normal. No respiratory distress.      Breath sounds: Normal breath sounds. No wheezing.   Abdominal:      General: Bowel sounds are normal.      Palpations: Abdomen is soft.      Tenderness: There is no abdominal tenderness.   Musculoskeletal:         General: No  tenderness. Normal range of motion.      Cervical back: Normal range of motion and neck supple.   Lymphadenopathy:      Cervical: No cervical adenopathy.   Skin:     General: Skin is warm.      Findings: Rash (~3 inch erythematous patch on L buttock) present. No erythema.   Neurological:      Mental Status: She is alert and oriented to person, place, and time.   Psychiatric:         Behavior: Behavior normal.         Thought Content: Thought content normal.         Judgment: Judgment normal.         IgE   Date Value Ref Range Status   02/05/2020 <35 0 - 100 IU/mL Final     Eos #   Date Value Ref Range Status   09/02/2022 0.1 0.0 - 0.5 K/uL Final   07/21/2021 0.1 0.0 - 0.5 K/uL Final   07/16/2020 0.1 0.0 - 0.5 K/uL Final     Eosinophil %   Date Value Ref Range Status   09/02/2022 2.9 0.0 - 8.0 % Final   07/21/2021 1.6 0.0 - 8.0 % Final   07/16/2020 1.9 0.0 - 8.0 % Final     IgE   Date Value Ref Range Status   02/05/2020 <35 0 - 100 IU/mL Final   01/31/2020 <35 0 - 100 IU/mL Final     Pneumococcal titers:  No results found for this or any previous visit.       Assessment:       1. Abnormal celiac antibody panel    2. Dermatitis herpetiformis         Plan:       Vicky was seen today for rash.    Diagnoses and all orders for this visit:    Abnormal celiac antibody panel    Dermatitis herpetiformis      Counseled that lesions, tisha w positive celiac ab panel, are more c/w DH than urticaria. Hx and exam today inconsistent w urticaria.  Keep sched fu w GI for EGD. Verify w GI if should be on or off gluten-free diet at time of EGD.    Fu w allergy prn

## 2022-10-08 ENCOUNTER — PATIENT MESSAGE (OUTPATIENT)
Dept: RHEUMATOLOGY | Facility: CLINIC | Age: 57
End: 2022-10-08
Payer: COMMERCIAL

## 2022-10-10 ENCOUNTER — NUTRITION (OUTPATIENT)
Dept: GASTROENTEROLOGY | Facility: CLINIC | Age: 57
End: 2022-10-10

## 2022-10-10 DIAGNOSIS — K90.0 CELIAC DISEASE: Primary | ICD-10-CM

## 2022-10-10 LAB — CRYOGLOB SER QL: NORMAL

## 2022-10-10 PROCEDURE — 99999 PR PBB SHADOW E&M-EST. PATIENT-LVL II: CPT | Mod: PBBFAC,,,

## 2022-10-10 PROCEDURE — 99999 PR PBB SHADOW E&M-EST. PATIENT-LVL II: ICD-10-PCS | Mod: PBBFAC,,,

## 2022-10-12 ENCOUNTER — PATIENT MESSAGE (OUTPATIENT)
Dept: GASTROENTEROLOGY | Facility: CLINIC | Age: 57
End: 2022-10-12
Payer: COMMERCIAL

## 2022-10-13 ENCOUNTER — OFFICE VISIT (OUTPATIENT)
Dept: SLEEP MEDICINE | Facility: CLINIC | Age: 57
End: 2022-10-13
Payer: COMMERCIAL

## 2022-10-13 DIAGNOSIS — G47.33 OSA (OBSTRUCTIVE SLEEP APNEA): Primary | ICD-10-CM

## 2022-10-13 DIAGNOSIS — G47.00 INSOMNIA, UNSPECIFIED TYPE: ICD-10-CM

## 2022-10-13 PROCEDURE — 1159F PR MEDICATION LIST DOCUMENTED IN MEDICAL RECORD: ICD-10-PCS | Mod: CPTII,95,, | Performed by: PSYCHIATRY & NEUROLOGY

## 2022-10-13 PROCEDURE — 1159F MED LIST DOCD IN RCRD: CPT | Mod: CPTII,95,, | Performed by: PSYCHIATRY & NEUROLOGY

## 2022-10-13 PROCEDURE — 1160F PR REVIEW ALL MEDS BY PRESCRIBER/CLIN PHARMACIST DOCUMENTED: ICD-10-PCS | Mod: CPTII,95,, | Performed by: PSYCHIATRY & NEUROLOGY

## 2022-10-13 PROCEDURE — 99214 OFFICE O/P EST MOD 30 MIN: CPT | Mod: 95,,, | Performed by: PSYCHIATRY & NEUROLOGY

## 2022-10-13 PROCEDURE — 99214 PR OFFICE/OUTPT VISIT, EST, LEVL IV, 30-39 MIN: ICD-10-PCS | Mod: 95,,, | Performed by: PSYCHIATRY & NEUROLOGY

## 2022-10-13 PROCEDURE — 1160F RVW MEDS BY RX/DR IN RCRD: CPT | Mod: CPTII,95,, | Performed by: PSYCHIATRY & NEUROLOGY

## 2022-10-13 NOTE — PROGRESS NOTES
The patient location is: home  The chief complaint leading to consultation is: sleep disorder  Visit type: audiovisual  Total time spent with patient: 30 min  Each patient to whom he or she provides medical services by telemedicine is:  (1) informed of the relationship between the physician and patient and the respective role of any other health care provider with respect to management of the patient; and (2) notified that he or she may decline to receive medical services by telemedicine and may withdraw from such care at any time.          EPWORTH SLEEPINESS SCALE TOTAL SCORE  10/12/2022 9/15/2021 7/7/2021 6/28/2021   Total score 0 3 4 3       Vicky Brand is a 56 y.o. female seen today for CPAP follow up. Last seen on 9/21/2021.    The patient reports improved sleep continuity and daytime sleepiness on PAP. ESS today is 8/24.  Reports break through snoring.  No  dry mouth.  No aerophagia or air hunger. Denies occasional mask leaks and discomfort. Using a under the nose-connect on the top  mask.    At times awakens when the pressure goes down to 7.   That happens every once a weeks if that.     Also had wrist surgery and elbow procedure since her last visit - could not use the machine during that time.  She was also diagnosed with Celiac disease since that time - the stress of it may have interfered with her sleep.   Started doing more mediatation and exerciese since that time which helps her sleep naturally.      Still feeling rested after uisng CPAP; AVOIDS NAPS    Resmed 4-10; 90cm  6-7 cm H2O; likes her under the nose mask    Medications: took Elavil for sleep in the past for sleep (very small dose); no longer taking it at the moment.     Compliance Report  Compliance  Payor Standard  Usage 09/13/2022 - 10/12/2022  Usage days 30/30 days (100%)  >= 4 hours 27 days (90%)  < 4 hours 3 days (10%)  Usage hours 195 hours 14 minutes  Average usage (total days) 6 hours 30 minutes  Average usage (days used)  6 hours 30 minutes  Median usage (days used) 6 hours 49 minutes  Total used hours (value since last reset - 10/12/2022) 2,427 hours  AirSense 10 AutoSet  Serial number [id number]  Mode AutoSet  Min Pressure 4 cmH2O  Max Pressure 10 cmH2O  EPR Fulltime  EPR level 3  Response Standard  Therapy  Pressure - cmH2O Median: 5.4 95th percentile: 6.9 Maximum: 8.0  Leaks - L/min Median: 0.3 95th percentile: 6.1 Maximum: 12.4  Events per hour AI: 0.6 HI: 0.1 AHI: 0.7  Apnea Index Central: 0.4 Obstructive: 0.2 Unknown: 0.0  RERA Index 0.0  Cheyne-Otto respiration (average duration per night) 0 minutes (0%)  Usage - hours        _______          Medications pertinent to sleep disorders: Taking 10 mg Hydroxyzine PRN for sleep initiation.   Previously tried medications:    DME:   SLEEP STUDIES: 2021: Severe sleep disordered breathing (AHI=40) is  noted based on a 4% hypopnea desaturation criteria, with indications of respiratory control instability. The patient slept  supine 51% of the night based on valid recording time of 6.37 hours. When considering more subtle measures of sleep  disordered breathing, the overall respiratory disturbance index is severe(RDI=53) based on a 1% hypopnea desaturation  criteria with confirmation by surrogate arousal indicators. The apneas/hypopneas are accompanied by minimal oxygen  desaturation (percent time below 90% SpO2: 0.1%, Min SpO2: 88.7%).    PHYSICAL EXAM:  There were no vitals taken for this visit.  W/D, W/N well groomed    HST AHI 40/low sat 80% (severe side and back)    ASSESSMENT  CALEB severe, ready to begin PAP  Insomnia, unspecified    PLAN:  Continue APAP at 4- 10 cm   Continue cognitive behavior approach to Insomnia management.     We discussed potential treatment options, which could include continuous positive airway pressure (CPAP-defintive), mandibular advancement splint by dentist, or referral for surgical consideration. Discussed etiology of CALEB and potential ramifications  of untreated CALEB, including heart disease, hypertension, cognitive difficulties, stroke, and diabetes.

## 2022-10-14 ENCOUNTER — CLINICAL SUPPORT (OUTPATIENT)
Dept: NUTRITION | Facility: CLINIC | Age: 57
End: 2022-10-14
Payer: COMMERCIAL

## 2022-10-14 DIAGNOSIS — Z71.3 NUTRITIONAL COUNSELING: Primary | ICD-10-CM

## 2022-10-14 PROCEDURE — 97802 MEDICAL NUTRITION INDIV IN: CPT | Mod: 95,,, | Performed by: DIETITIAN, REGISTERED

## 2022-10-14 PROCEDURE — 97802 PR MED NUTR THER, 1ST, INDIV, EA 15 MIN: ICD-10-PCS | Mod: 95,,, | Performed by: DIETITIAN, REGISTERED

## 2022-10-19 ENCOUNTER — PATIENT MESSAGE (OUTPATIENT)
Dept: DERMATOLOGY | Facility: CLINIC | Age: 57
End: 2022-10-19
Payer: COMMERCIAL

## 2022-10-19 ENCOUNTER — PATIENT MESSAGE (OUTPATIENT)
Dept: OBSTETRICS AND GYNECOLOGY | Facility: CLINIC | Age: 57
End: 2022-10-19
Payer: COMMERCIAL

## 2022-10-19 DIAGNOSIS — Z12.31 BREAST CANCER SCREENING BY MAMMOGRAM: Primary | ICD-10-CM

## 2022-10-20 ENCOUNTER — PATIENT MESSAGE (OUTPATIENT)
Dept: ALLERGY | Facility: CLINIC | Age: 57
End: 2022-10-20
Payer: COMMERCIAL

## 2022-10-24 NOTE — PROGRESS NOTES
"The patient location is:Pt home  The chief complaint leading to consultation is: newly diagnosis Celiacs    Visit type: audiovisual    Face to Face time with patient: 1.5 hours  120 minutes of total time spent on the encounter, which includes face to face time and non-face to face time preparing to see the patient (eg, review of tests), Obtaining and/or reviewing separately obtained history, Documenting clinical information in the electronic or other health record, Independently interpreting results (not separately reported) and communicating results to the patient/family/caregiver, or Care coordination (not separately reported).         Each patient to whom he or she provides medical services by telemedicine is:  (1) informed of the relationship between the physician and patient and the respective role of any other health care provider with respect to management of the patient; and (2) notified that he or she may decline to receive medical services by telemedicine and may withdraw from such care at any time.    Notes:       Nutrition Assessment for Initial Medical Nutrition Therapy      Reason for MNT visit: Pt in for education and nutrition counseling regarding  celiacs .       ASSESSMENT    Age: 56 y.o.  Wt:   Wt Readings from Last 1 Encounters:   10/05/22 59.4 kg (130 lb 15.3 oz)     Ht:   Ht Readings from Last 1 Encounters:   10/05/22 5' 6" (1.676 m)     BMI:   BMI Readings from Last 1 Encounters:   10/05/22 21.14 kg/m²       Clinical signs/symptoms: celiac's    Medical History:   Past Medical History:   Diagnosis Date    SAMANTHA positive     Celiac disease 09/29/2022    Dyspareunia in female 07/27/2021    Osphena, pelvic floor tx 2021, [facility]    Eye abnormalities     early yellowing of lens, precursor to cataracts, Dr Webster,  wear glasses at all times    Fracture of fifth toe, left, closed, with delayed healing, subsequent encounter 08/21/2018    Intramural leiomyoma of uterus 05/22/2019    Irregular, " dysmenorrhea    Nocturia 06/17/2021    Ocular migraine     vision fuzzy x 3 in 2013, metallic shapes in her vision, Dr Webster,  resolved after 30 min , no pain    CALEB (obstructive sleep apnea)     Lysenko, CPAP start 7/21    Palpitations 09/29/2022    Paradoxical insomnia 10/30/2017    prior to menses, previously took amitryptiline 2.5 mg prn  qhs     Posterior vitreous detachment of left eye 05/22/2019    Flashes of light, ocular migranes, Ophthalmologist Dr Orourke, retinal specialist Dr Soni, rec avoid exercise until she follows up 5/2019    Shoulder pain, left 2007    LEFT, after fall, better with PT at [facility]    Stress and adjustment reaction 09/29/2022    Urticaria      Problem List             Resolved    Status post hysteroscopy         Paradoxical insomnia         Irregular menses         Intramural leiomyoma of uterus         Nocturia         CALEB (obstructive sleep apnea)         Dyspareunia in female         Lack of coordination         Muscle weakness         Myalgia of pelvic floor         Wrist sprain         Plantar fasciitis         Pain in left elbow         Pain aggravated by activities of daily living         De Quervain's tenosynovitis, right         Pain in right hand         Decreased range of motion of finger of right hand         Decreased range of motion of right wrist         Swelling of right hand         Requires assistance with activities of daily living (ADL)         SAMANTHA positive         Dermatitis herpetiformis         Sjogren's syndrome without extraglandular involvement         Celiac disease         Palpitations         Stress and adjustment reaction            Medications:   Current Outpatient Medications:     amitriptyline (ELAVIL) 10 MG tablet, Take 1 tablet (10 mg total) by mouth every evening., Disp: 90 tablet, Rfl: 0    calcium carbonate (OS-GISSELL) 600 mg calcium (1,500 mg) Tab, Take 600 mg by mouth once. gluten free, Disp: , Rfl:     estradioL (ESTRACE) 0.01  % (0.1 mg/gram) vaginal cream, Place 1 g vaginally twice a week., Disp: 42.5 g, Rfl: 1    multivitamin capsule, Take 1 capsule by mouth once daily. Gluten free, Disp: , Rfl:     UNABLE TO FIND, once daily. Iron 28 mg taken daily, Disp: , Rfl:     Supplements: MVI, calcium, iron    Food allergies  intolerances:     Labs:  Reviewed   No results found for: HGBA1C  Cholesterol   Date Value Ref Range Status   09/02/2022 172 120 - 199 mg/dL Final     Comment:     The National Cholesterol Education Program (NCEP) has set the  following guidelines (reference ranges) for Cholesterol:  Optimal.....................<200 mg/dL  Borderline High.............200-239 mg/dL  High........................> or = 240 mg/dL     07/21/2021 162 120 - 199 mg/dL Final     Comment:     The National Cholesterol Education Program (NCEP) has set the  following guidelines (reference ranges) for Cholesterol:  Optimal.....................<200 mg/dL  Borderline High.............200-239 mg/dL  High........................> or = 240 mg/dL     07/16/2020 162 120 - 199 mg/dL Final     Comment:     The National Cholesterol Education Program (NCEP) has set the  following guidelines (reference ranges) for Cholesterol:  Optimal.....................<200 mg/dL  Borderline High.............200-239 mg/dL  High........................> or = 240 mg/dL       Triglycerides   Date Value Ref Range Status   09/02/2022 98 30 - 150 mg/dL Final     Comment:     The National Cholesterol Education Program (NCEP) has set the  following guidelines (reference values) for triglycerides:  Normal......................<150 mg/dL  Borderline High.............150-199 mg/dL  High........................200-499 mg/dL     07/21/2021 83 30 - 150 mg/dL Final     Comment:     The National Cholesterol Education Program (NCEP) has set the  following guidelines (reference values) for triglycerides:  Normal......................<150 mg/dL  Borderline High.............150-199  mg/dL  High........................200-499 mg/dL     07/16/2020 52 30 - 150 mg/dL Final     Comment:     The National Cholesterol Education Program (NCEP) has set the  following guidelines (reference values) for triglycerides:  Normal......................<150 mg/dL  Borderline High.............150-199 mg/dL  High........................200-499 mg/dL       HDL   Date Value Ref Range Status   09/02/2022 53 40 - 75 mg/dL Final     Comment:     The National Cholesterol Education Program (NCEP) has set the  following guidelines (reference values) for HDL Cholesterol:  Low...............<40 mg/dL  Optimal...........>60 mg/dL     07/21/2021 52 40 - 75 mg/dL Final     Comment:     The National Cholesterol Education Program (NCEP) has set the  following guidelines (reference values) for HDL Cholesterol:  Low...............<40 mg/dL  Optimal...........>60 mg/dL     07/16/2020 62 40 - 75 mg/dL Final     Comment:     The National Cholesterol Education Program (NCEP) has set the  following guidelines (reference values) for HDL Cholesterol:  Low...............<40 mg/dL  Optimal...........>60 mg/dL       LDL Cholesterol   Date Value Ref Range Status   09/02/2022 99.4 63.0 - 159.0 mg/dL Final     Comment:     The National Cholesterol Education Program (NCEP) has set the  following guidelines (reference values) for LDL Cholesterol:  Optimal.......................<130 mg/dL  Borderline High...............130-159 mg/dL  High..........................160-189 mg/dL  Very High.....................>190 mg/dL     07/21/2021 93.4 63.0 - 159.0 mg/dL Final     Comment:     The National Cholesterol Education Program (NCEP) has set the  following guidelines (reference values) for LDL Cholesterol:  Optimal.......................<130 mg/dL  Borderline High...............130-159 mg/dL  High..........................160-189 mg/dL  Very High.....................>190 mg/dL     07/16/2020 89.6 63.0 - 159.0 mg/dL Final     Comment:     The National  Cholesterol Education Program (NCEP) has set the  following guidelines (reference values) for LDL Cholesterol:  Optimal.......................<130 mg/dL  Borderline High...............130-159 mg/dL  High..........................160-189 mg/dL  Very High.....................>190 mg/dL       HDL/Cholesterol Ratio   Date Value Ref Range Status   09/02/2022 30.8 20.0 - 50.0 % Final   07/21/2021 32.1 20.0 - 50.0 % Final   07/16/2020 38.3 20.0 - 50.0 % Final     Non-HDL Cholesterol   Date Value Ref Range Status   09/02/2022 119 mg/dL Final     Comment:     Risk category and Non-HDL cholesterol goals:  Coronary heart disease (CHD)or equivalent (10-year risk of CHD >20%):  Non-HDL cholesterol goal     <130 mg/dL  Two or more CHD risk factors and 10-year risk of CHD <= 20%:  Non-HDL cholesterol goal     <160 mg/dL  0 to 1 CHD risk factor:  Non-HDL cholesterol goal     <190 mg/dL     07/21/2021 110 mg/dL Final     Comment:     Risk category and Non-HDL cholesterol goals:  Coronary heart disease (CHD)or equivalent (10-year risk of CHD >20%):  Non-HDL cholesterol goal     <130 mg/dL  Two or more CHD risk factors and 10-year risk of CHD <= 20%:  Non-HDL cholesterol goal     <160 mg/dL  0 to 1 CHD risk factor:  Non-HDL cholesterol goal     <190 mg/dL     07/16/2020 100 mg/dL Final     Comment:     Risk category and Non-HDL cholesterol goals:  Coronary heart disease (CHD)or equivalent (10-year risk of CHD >20%):  Non-HDL cholesterol goal     <130 mg/dL  Two or more CHD risk factors and 10-year risk of CHD <= 20%:  Non-HDL cholesterol goal     <160 mg/dL  0 to 1 CHD risk factor:  Non-HDL cholesterol goal     <190 mg/dL       Vitamin B-12   Date Value Ref Range Status   09/21/2022 1043 (H) 210 - 950 pg/mL Final     TSH   Date Value Ref Range Status   05/02/2017 1.731 0.400 - 4.000 uIU/mL Final         Typical day recall: Reviewed and noted during consultation. Pt has done a lot of great research on her own. Answered questions,  reviewed her typical day and discussed practical recommendations for products and recipe ideas. Pt lost 50 lbs in 2005.    Beverages: water    Dining out:  Not dining out currently    Alcohol: nonsmoker, no alcohol    Lifestyle Influences  Support System: Self, family    Meal preparation/shopping: self, spouse    Current Activity Level: 30 minutes on elliptical or walking daily    Patient motivation, anticipated barriers, expected compliance: Patient is motivated and has verbalized understanding and intent to comply    DIAGNOSIS   Problem: Celiac's disease  Etiology: RT body's inability to breakdown gluten  Signs/Symptoms: blood work    INTERVENTION  Nutrition prescription: estimated energy requirements:   Calories: 1500  Protein:  g  Carbohydrates: 130-145 g  Focus on plant-based, heart healthy fats  Total Fat: 50-60 g  Baseline for fluids: 65 fl ounces + sweat loss    Recommendations & Goals:  Patient goals and recommendations are tailored to the specific patient's needs, readiness to change, lifestyle, culture, skills, resources, & abilities. Strategies to help achieve these nutrition-related goals were discussed which can include but are not limited to SMART goal setting & mindful eating.     Aim for a minimum of 7 hours sleep   Exercise 60 minutes most days  Eat breakfast within 1-2 hours of waking up  Try not to skip any meals or snacks, not going more than 3-4 hours without eating.   At each meal and snack, try to include a source of fiber + lean protein + healthy fat.     Written Materials Provided  These resources are intended to assist the patient in making it easier to choose recommended options when eating out & to identify better-for-you brands at the grocery store:    Meal Planning Guide with recommendations discussed along with portion sizes and a customized meal plan   Eat Fit aj card as a reminder to download the aj to ones smart phone which provides: current Eat Fit partners, approved  menu options, food labels for carb counting, & recipes   On-the-Go Food Guide  Brand Specific Eat Fit Grocery Product list   RD contact information- for patient to contact regarding any questions, needs, and/or concerns that may arise     MONITORING & EVALUATION    Communicated with healthcare provider    Documented plan for referral to appropriate agency/healthcare provider as needed    Comprehension: good     Motivation to change: high    Follow-up: Yes, in 6-8 weeks    Counseling time: 2 Hours

## 2022-10-24 NOTE — PATIENT INSTRUCTIONS
Name:  Vicky Brand  Date:  10/14/2022      Recommended Daily Energy Requirements:   1500 calories    grams  protein   130-145 grams carbohydrates   50-60 grams fat  Focus on plant-based fats  65 fluid oz per day    No more than 20 grams added sugar per day      Nutrition Tips & Goals:     Aim to eat or drink within 1-2 hours of waking; especially to contain a protein source  Frequent fueling: Aim for small meal or snack every 3 or so hours   Metabolism, energy, curb cravings  Emphasis on lean protein + fiber-rich carb (veg, fruit or whole grain) + plant-based fat   If/when choose grains and starches, choose 100% whole grains + fiber-rich starches such as legumes, quinoa, whole wheat pasta, sweet potatoes, 100% whole grain bread, etc.   Nutrition bars + snacks: Look for products with <7 grams added sugar and 7+ grams protein (Think Wesley #7 for aisle products)    Note: One serving (15-20 grams carbs)  =  1 cup fruit, cow's milk or plain yogurt,   ½ cup cooked grains, ½ cup starchy veggies (corn, peas, potatoes), 1 slice bread      Hydration: Drink at least ½ of your body weight in ounces of fluids daily + addition 16-24 ounces per pound of sweat lost via exercise.  [Big[employer]ams aj for water reminder]  Exercise: Aim for 60 minutes most days or aim to obtain 10,000 steps per day throughout work day - ideally starting day with 30-45 minutes of exercise  Aim for a minimum of 7-8 hours sleep nightly  Keep a daily food record       Restaurant Tips:        Pick 2 out of the 5 Rule:  Instead of eating bread (or tortilla chips), an appetizer, alcoholic drink and dessert, choose just one to have with your entrée  Focus on lean proteins: Refer to lean protein page in meal plan guidebook      Select items grilled, baked, broiled, braised, poached or roasted      Avoid crispy, crunchy, breaded, paneed or stuffed items      Avoid items that are cream based, au gratin or buttered      Order  sauces, dressings and gravies on the side. This way you can add 1-2 Tbsp yourself  instead of the dish being 'drowned' in the sauce  = portion control + saving calories while still enjoying the dish      Watch out for high calorie salad additives à   A better-for-you salad consists of unlimited non-starchy veggies, lean protein and 2-3 salad additions, each additive being ~2 Tbsp (See below in notes for examples)       Hold the starchy side dish - request extra non-starchy vegetables instead      Order vegetables steamed or stir-fried instead of sautéed to avoid excess oils. Ask for olive oil on the side and drizzle over veggies instead      Don't drink your calories: avoid sugary soft drinks, juices and mixers  Note: even 100% fruit juice contains the same sugar as a soft drink     [facility] Eat Fit SHAKEEL à Designed to take the guesswork out of dining out healthfully, to make the healthy choice the easy choice  www.eatfitnola.com  Eat Fit Cookbook  [facility] Eat Fit aj à Free aj for [place] Coffee Companys  Waitr and [employer] Eats offer Eat Fit options  Provides a list of all Eat Fit restaurants & dishes by location  Lists what dishes are Eat Fit at each restaurant  Lists the nutrition facts for each Eat Fit dish  Provides 200 + Eat Fit approved recipes  Grocery shopping guides + community wellness resources    -Salad additives: Pick 2-3, each being ~2 Tbsp:  Dressing  Cheese  Nuts  Gonzalez  Dried fruit  Avocado  Guacamole  Eggs    How to make a healthy smoothie:  Choose a protein source:  At least 6 oz Plain Greek yogurt or 2% cottage cheese  Examples of plant-based protein powders:  Elizabeth Tobinga  Garden of Life RAW  100% whey protein powder  2 scoops Vital Proteins Collagen Peptides  Add a piece of fruit -or- 1 cup chopped fresh or frozen unsweetened fruit  Add any non-starchy veggies  Choose a fat source (1-2 Tbsp)  Nut Butter (except Nutella)  ½ Avocado   Avocado oil   Extra Virgin Olive Oil  Choose a  liquid:  Unsweetened almond milk  hemp milk  cashew milk  coconut milk  [facility] lactose free milk (skim, 1% or 2%) -or- Organic Valley ULTRA reduced fat milk (lactose free)  Water   Healthy add-ins for extra nutritional boost:  1-2 Tbsp Flaxseeds or Chago seeds  Add ice and blend!   To step it up a notch, use frozen PLAIN cauliflower florets in place of ice to provide more nutrients    What may cause inflammation/flare ups in our body/decrease our Energy?  White/refined carbohydrates  Sugar  Fried food  Alcohol      Sample Meal Plan:    Breakfast: 1-2 servings of carbs = 30-40 grams + at least 15 grams lean protein/heart healthy fat  1 slice 100% whole grain bread (Ex: Oseas)+ ½  small avocado + 1 egg  1 slice 100% whole grain bread + 1-2 Tbsp PB/nut butter  100% whole wheat English muffin + 2 eggs with a sprinkle of cheese on top   ½ to 1 cup plain cooked oatmeal + 1-2 Tbsp PB stirred in + 1 Tbsp flaxseed  1 packet weight control oatmeal + ½ -1 scoop protein powder  [employer] [place] GF Oatmeal with Flax & Chago (grab and go oatmeal cup)  Evol frozen breakfast sandwich  Omelet: 2 eggs + sprinkle of cheese + ¼- ½ avocado + non-starchy veggies + fruit  High protein, fiber rich cereals: Nutritious living hi-lo, kasha go lean, Natures path optimum, special K protein    Snack: 1 serving of carbs = 15-20 grams + at least 10-15 grams lean protein/heart healthy fat  Needed if going >3-4 hours between meals (See below snack for options)    Lunch: 1-2 servings of carbs =20-40 grams = ½ cup to 1 cup cooked starch + palm size thickness lean protein + non-starchy veggies  See picture below in notes as a guide  Refer to meal plan guide book for list of lean proteins, whole grain carbohydrates and non-starchy veggies      Snack: 1 serving of carbs = 15-20 grams + at least 10-15 grams lean protein/heart healthy fat  Fruit of choice (1 piece or size of a tennis ball, i.e. orange, pear, peach, etc or 1 cup melon/berries) +  protein/healthy fat:  Nuts (~ ¼ cup)  Nut butter (1-2 Tablespoons)  Cheese (reduced fat, light, part-skim, 2% cheese options)  Jerky (see grocery list for recommended brands)  hard boiled egg/s (1 yolk)   Veggies + ½ cup chicken or tuna salad  Flavored Greek Yogurt (no added sugar): Dannon oikos triple zero, Chobani Less Sugar, Two Good,   [place] unsweetened no sugar added 10 gram protein yogurt (plant based yogurt option)  Plain Greek yogurt + Truvia or Swerve (for sweetness) + flavor ( ¾ cup fruit, 2 Tbsp granola, ¼ - ½ cup Kashi Go Lean, extracts, etc)  Protein bar (less than 7 grams added sugar; 10-20 grams protein; ~ 150-200 calories)  Nature Valley Protein Chewy Bar  Powercrunch  Oatmega  Rx  Quest  Kind PROTEIN  EPIC bar  Ready to Drink Protein Drink (less than 7 grams sugar + 20-30 grams protein)   Iconic  Premier  Orgain  1 serving of Beanito chips + 1, 100-calorie wholly guacamole packet or 1/4 cup shredded cheese  [place]: 1-2 slices 100% whole grain bread + smear of foss + 3 oz lean protein (refer to meal plan guide book)  [place] balance break  Any breakfast can be a snack    Dinner: Limit carbs for dinner  Aim for palm size/thickness lean protein + at least 1 cup non-starchy veggies + small amount of heart healthy fats  Kabobs, stuffed bell peppers with no rice, Cauliflower 'rice' stir francis  Carb swaps:  House Foods Tofu Shirataki noodles (found in produce section of grocery stores)  Spaghetti Squash  Hearts of palm 'noodles'  Cauliflower rice  Broccoli Rice  Zoodles  Beet Zoodles  Crepini mini egg white thins (protein egg wrap)                   PRODUCE  [] All fresh fruit   [] All fresh vegetables   [] All fresh herbs  [] All herb purees + pastes  [] Pre-spiralized vegetable noodles   [] Steam-In-The-Bag begetables  [] Riced cauliflower  [] Jicama sticks  [] Love Beets  all varieties  [] Wholly Guacamole  all varieties  [] Hummus  all varieties, chickpea + vegetable  [] Suzi Sánchez  "noodles    [] Tofu  all varieties  [] Tempeh  all varieties    PROTEIN  CHICKEN   [] Boneless, skinless breasts  [] Boneless, skinless thighs  [] Ground chicken breast, at least 93% lean  [] Chicken breast cutlet  [] Aidell's  Chicken Sausage + Chicken Meatballs    TURKEY   [] Turkey breast tenderloin   [] Ground turkey breast, at least 93% lean  [] Sudhakar Naturals  Turkey Sausage    BEEF  [] Tenderloin  [] Sirloin  [] Top Loin  [] Flank Steak  [] Round Steak  [] Filet  [] Lean ground beef, at least 93% lean + grass-fed preferable    PORK  [] Tenderloin  [] Pork Chop  [] Center Cut  [] Rupert Naturals  No-Sugar Gonzalez    BISON  [] [place]  90 - 95% lean    SEAFOOD  [] All fresh fish + seafood; locally sourced when possible  [] Smoked salmon    HEAT + EAT ENTREES   [] Ángel's Natural Foods  Chicken, Pork, Beef  [] Sergo  "All Natural" Grilled Chicken Breast + Strips, all varieties    SAUCES SPREADS + DIPS  [] Bitchin Sauce  Original, Chipotle, Cilantro Sullivan  [] Sandra's Kitchen  Tzatziki Yogurt Dip, Babaganoush, Hummus  [] Wholly Guacamole  all varieties  [] Hummus  all varieties  [] [place] Gringo Salsa  all varieties  [] Mrs. Claudia's Salsa  all varieties  [] Stubb's All Natural BBQ Sauce  [] Primal Kitchen  Meza, Ketchup, BBQ Sauce  [] Primal Kitchen Pasta Sauce  Roasted Garlic, Tomato Basil, no-dairy Vodka Sauce  [] Sal & Aspen's  HeartSmart Pasta Sauce    DAIRY/DAIRY SUBSTITUTES/EGGS  EGGS   [] All eggs  cage-free, pasture-raise preferable  [] Crepini  egg wraps  [] Vital Farms  Pasture-Raised Egg Bites  [] JUST Egg [vegan]     CREAMERS   [] Califia  Better Half, original + vanilla unsweetened  [] NutPods  all varieties    MILK   [] Horizon Organic  all varieties except chocolate  [] Organic Valley  all varieties except chocolate  [] Organic Valley  ultra-filtered, reduced fat milk     PLANT_BASED MILK ALTERNATIVES  [] All unsweetened almond milks  original, vanilla + " chocolate  [] Ripple  unsweetened   [] Milkadamia  original +_ vanilla, unsweetened   [] Forager  original + vanilla, unsweetened   [] Silk Organic  soy milk, unsweetened  [] Oatly  unsweetened  [] Califia  regular + protein-fortified oat milk, unsweetened     CHEESES  [] Regular or reduced fat cheeses  [] BelGioso  Fresh Mozzarella Snack Packs, Parmesan Power-full Snack   [] Goat cheese  [] Fresh mozzarella  [] String cheese  all varieties  [] Lynette Cottage Cheese  [] Aaliyah's Cultured Cottage Cheese  [] Kamilla Life 'Just Like Mozzarella'  plant-based shreds and other varieties  [] Parmela Creamery  plant-based shredded cheese    YOGURT  [] Fage  2% low-fat, plain  [] Siggi's  plain, vanilla  [] Chobani Greek  nonfat + whole milk yogurt, plain   [] Chobani Less Sugar  all flavored varieties   [] Oikos Greek  nonfat, plain  [] Two Good  all varieties   [] [facility] Provisions  plain  [] [place] Organic  low-fat + nonfat, plain  [] [place] Farm  goat milk yogurt, plain  [] Kefit  unsweetened, plain  [] Forager  Greek style unsweetened, plain [dairy-free]  [] [place]  unsweetened Greek style, plain [dairy-free]  [] [facility]  almond milk yogurt, vanilla or plain, unsweetened [dairy-free]    FREEZER SECTION  FROZEN VEGGIES  [] All plain frozen veggies + greens [e.g. broccoli, brussels, carrots, okra, mushrooms, zucchini, yellow squash, butternut squash, kale, spinach, maximus greens]  [] Riced veggies [e.g. cauliflower, broccoli, butternut squash]  [] Edamame  all varieties  [] Green Giant  [] Veggie Spirals  [] Marinated Veggies [e.g. eggplant, peppers, zucchini]  [] Simply Steam [place] Sprouts  [] Birds Eye  [] Power Blend Italian Style  lentils, broccoli, kale, zucchini  []  [place] Sprouts & Carrots  [] Oven Roasters Broccoli & Cauliflower  [] [place] Blend  [] Tattooed   [] Green Bean Blend  [] Farmer's Market Ratatouille  [] Butter Balsamic Glazed Vegetables  []  Riced Cauliflower & Quinoa Mediterranean Style  [] Ida's Good Life  Southern Style Greens [sauteed kale + onion]    FROZEN FRUITS  [] All unsweetened frozen fruits  all varieties  [] Dole Fruits & Veggie Blends  Berries 'n Kale  [] Dole Mix-ins  Triple Berry     FROZEN ENTREES  [] The Good Kitchen meals  all varieties [ e.g. Chili Lime Chicken Over Riced Cauliflower]  [] Premium Paleo  Not Luis Antonio Momma's Meatloaf  [] Primal Kitchen  Chicken Pesto + Steak Fajitas w/ Peppers & Onions  [] Eating Well Frozen Entrees  Butter Chicken Masala, Steak Carne Asada, Creamy Pesto Chicken, Chicken + Wild Rice Stroganoff, Yellow Scherer Chicken, Sun-dried Tomato Chicken, Chicken Lo Mein  [] Realgood Entree Bowls  Arabic Inspired Beef Bowl over Riced Cauliflower, Chicken Burrito Bowl   [] Great Karma Coconut Scherer  [] Melody's  [place] Tiny with Black Beans, Vegetable Lasagna  [] Kashi Mayan [place] Bake  [] Healthy Choice  Simply Steamers Chicken Fried Rice  [] Basil Pesto Chicken & Leobardo Style Pork Power Bowls  [] Tattooed   Enchilada Bowl  [] Lakeisha Farms  Spicy Black Bean Burgers    FROZEN PIZZAS  [] Cauli'flour Foods  Cauliflower Pizza Crusts  [] Outer Aisle  Cauliflower Crust  [] Melody's  Veggie Crust Cheese Pizza  [] Quest Pizza     VEGETARIAN PRODUCTS  [] Beyond Meat  ground 'meat' + grilled 'chicken' strips  [] Tofurkey  Original Italian Sausage + Original Tempeh  [] Gardein  Beefless Ground + Meatless Meatballs  [] Lakeisha Farms Grillers  Original Burger, Crumbles, Meatballs    ICE CREAMS + FROZEN DESSERTS  [] Halo Top  regular + keto series, pops  [] Rebel  ice cream + dessert sandwiches  [] Enlightened  ice cream + bars  [] Nightfood  ice cream  [] Realgood  ice cream  [] Arctic Zero Fit  frozen pint  [] The Frozen Farmer  sorbets  [] Wholly Rollies  Protein Balls, all varieties  [] Dream Pops  Coconut Latte    FROZEN BREAKFASTS  [] Realgood  Breakfast Sandwiches on Cauliflower  Cheesy Bread  [] Rebel  ice cream + dessert sandwiches  [] Enlightened  ice cream + bars  [] Nightfood  ice cream  [] Realgood  ice cream  [] Arctic Zero Fit  frozen pint  [] The Frozen Farmer  sorbets  [] Wholly Rollies  Protein Balls, all varieties  [] Dream Pops  Coconut Latte    BREADS/BUNS/WRAPS  [] Oseas Bread: All Types - In Freezer Section   [] Flat Out Light Wraps - All Varieties   [] Flat Out Protein Up Carb Down Flat Bread   [] Kontos Whole Wheat Pocket Ramila   [] Justin SHAKEEL 100% Whole Wheat Tortillas   [] LaTortilla Factory Tortillas - Smart & Delicious; 50 or 80-calorie   [] Nature's Own 100% Whole Wheat Bread   [] Orowheat Healthful - 100% Whole Wheat Slice Bread and [place] Thins   [] Orowheat Healthful - Whole Wheat Nuts & Grain Bread; Flax & Seed Bread   [] Pepperidge Farm Natural Whole Grain 15 Bread   [] [place] Farm Natural Whole Grain English Muffin - 100% Whole Wheat   [] [place] Very Thin 100% Whole Wheat   [] Cyndi Welsh 45 Calories and Delightful   [] Juan C' 100% Whole Wheat Thin-Sliced Bagels and English Muffins   [] Western Bagel: Perfect 10     GLUTEN FREE  [] Pritesh's Gluten Free Bread   [] Cherry Tree Bakehouse 7-Grain Gluten Free Bread     LEGUME PASTA   [] Explore Asian Organic Black Bean Spaghetti   [] Modern Table   [] Tolerant Foods       NUT BUTTERS & JELLIES    NUT BUTTERS   [] Better'n Chocolate: Coconut Chocolate Peanut Butter Spread   [] Better'n Peanut Butter - All Types   [] Earth Balance Coconut and Peanut Spread   [] Jay's Nut Coin   [] MaraNatha: All Natural Roasted Cashew Butter - Gardiner or Creamy   [] MaraNatha: Roasted Peanut Butter   [] Nuts 'N More Peanut Coin - All Flavors   [] PB2 Powder - Original or Chocolate   [] Skippy Natural - Creamy, Super Chunk   [] Smart Balance Peanut Butter - Gard[place] or Creamy   [] Peanut Butter & Company:   [] Smooth , Crunch Time, The Heat Is On, Old Fashioned Smooth, Mighty Nut- Powdered Peanut Butter,  Squeeze Pack   [] Smucker's Natural Peanut Butter - [place] or Creamy   [] Sunbutter Nut Butter   [] Wild Friends Protein Peanut Butter/[place] o Butter - Vanilla or Chocolate     JELLIES  o Polaner's All Fruit   o Clearly Organic Best Choice: Strawberry Fruit Spread       SNACKS    BARS  [] Kashi Bars - Chewy or Crunchy; Honey [place] o Flax or Peanut Butter   [] KIND Bars - 5 Grams of Sugar or Less   [] KIND Protein Bars - Strong and KIND   [] Nature Valley Protein Bar - All Varieties   [] Nature Valley Roasted Nut Crunch - [place] Crunch; Peanut Crunch   [] [facility] Simple Nut Bar - Roasted Peanut & Honey   [] [facility] Simple Nut Bar - [place], Cashew & Sea Salt   [] [facility] Nut [place] Bar - Salted Caramel Peanut   [] Think Thin Protein Bars   [] Quest Bars, Power Crunch Bars, Pure Protein Bars     BEEF JERKY - NITRATE FREE   [] Game On   [] Grass Run Farms   [] Krave   [] Ostrim   [] Perky Jerky   [] Primal Strips Meatless Vegan Jerky   [] [place]     CHIPS   [] Beanitos Chips   [] Fruit Crisps - e.g. Brother's-All-Natural, Bare Fruit, Yoga Chips   [] Bina's Soy Crisps: 1.3 ounce bag   [] Quest Protein Chips   [] Wasa Whole Wheat Crisp Bread     CRACKERS  [] Sarah's Gone Crackers   [] Nabisco Triscuit: Regular and Thin Crisp Crackers   [] Vans Say Cheese Crackers (G-F)     POPCORN/NUTS   [] Sammy Jeff's Smart Pop Popcorn - Single Serving   [] 100-Calorie Pack of Nuts - All Varieties     PROTEIN POWDERS & DRINKS  []  Protein -  Whey Protein Powder   [] Garden of Life Raw Protein Powder   [] Iconic Ready-To-Drink Protein Drink   [] Amor One Protein Powder   [] VegaSport Protein Powder     SALSA/HUMMUS/DIPS   [] Eat Well Embrace Life: Zestcelestina Castorenaraniurka Carrot o Hummus   [] Pre-Portioned Guacamole Packs   [] Rip's   [] Tostitos Restaurant Style Salsa       SOUPS   [] Melody's Organic Soups - Lentil, Vegetable, Split Pea, Low-Sodium     CANNED GOODS   [] 100% Pure Pumpkin   []  BlueRunner Creole Cream-Style Red Beans or Navy Beans   [] Cajun Power Chicken Gumbo Base   [] Chicken of the Sea [place]   [] [place] Fresh Cut Sliced Beets   [] Hormel Breast of Chicken in Water   [] LeSuer Tender Baby Whole Carrots   [] Maríaldianne Tabasco [place] Starter   [] StarKist: Chunk Lite Tuna in Water, Gourmet Select Pouches   [] StarKist: [place] Tuna Fillets   [] Trappey's: Kidney, Butter, Hartmann, Black Eye, Field, and Black Beans   [] DARRYL Zuleta's Turnip Greens or Sincere Spinach     CONDIMENTS/ SAUCES/SPREADS/ SPICES  [] Zay Cho's Magic Seasonings - Regular or Salt Free   [] Cecilia Zhu's Sauces - All Flavors   [] Laughing Cow Light - All Flavors   [] Dash Salt-Free Marinade - All Flavors   [] Tyler & Aspen's: Heart Smart Pasta Sauces   [] Tabasco     SALAD DRESSINGS  [] Mirian's Naturals: Lite Honey Mustard   [] Joss's Own: Lighten Up Salad Dressing - All Varieties   [] OPA Greek Yogurt Dressings - Ranch, Blue o Cheese, Caesar, Feta Dill     SWEETENERS  [] Sweet Arion Sweetener   [] Swerve   [] Truvia     BEVERAGES  [] Coconut Water   [] Crystal Light PURE - All Flavors   [] Honest Tea: Just Green Tea, Unsweetened   [] Kombucha Tea   [] [place]   [] [facility] Bloody Sarah Mix   [] Metromint - Zero-Sugar; All Natural Flavored   [] Niles - Plain or Flavored   [] Spindriff Hamel   [] Steaz - Zero-Sugar, All-Natural, Sparkling Tea   [] Tea Bags: Any Brand - e.g. Eden, Yogi, Tazzo, Celestial   [] V8 100% Vegetable Juice   [] Vitamin Water Zero   [] Water   [] Zevia - Stevia Sweetened Soft Drink     BEER/AMOL/LIQUORS  []Jung's Premier Light 64 Calories   [] Bud Select - 55 Calories   [] [facility] Bloody Sarah Mix   [] Royal Genuine Draft - 64 Calories   [] Red or White Wine - All Varieties     CEREALS: HOT/COLD   [] Mindy's Puffin's Original Cereal  [] Richard's Mill Oat Bran Hot Cereal - Cracked Wheat, Multi-Grain  [] Kashi GoLean Cereal  [] Kashi GoLean Hot Cereal packets  - Vanilla; Honey Cinnamon  [] Teresa's Special K Protein Cereal  [] Lea's Steel Cut Beninese Oatmeal  [] Nature's Path Smart Bran  [] Confucianist Instant Oatmeal packet, Original  [] Confucianist Old Fashioned Confucianist Oats  [] Uncle Willie's Whole Wheat & Flaxseed Original Cereal

## 2022-10-25 ENCOUNTER — PATIENT MESSAGE (OUTPATIENT)
Dept: RHEUMATOLOGY | Facility: CLINIC | Age: 57
End: 2022-10-25
Payer: COMMERCIAL

## 2022-10-26 ENCOUNTER — PATIENT MESSAGE (OUTPATIENT)
Dept: ENDOSCOPY | Facility: HOSPITAL | Age: 57
End: 2022-10-26

## 2022-10-26 ENCOUNTER — CLINICAL SUPPORT (OUTPATIENT)
Dept: ENDOSCOPY | Facility: HOSPITAL | Age: 57
End: 2022-10-26
Attending: INTERNAL MEDICINE
Payer: COMMERCIAL

## 2022-10-26 VITALS — HEIGHT: 66 IN | WEIGHT: 130 LBS | BODY MASS INDEX: 20.89 KG/M2

## 2022-10-26 DIAGNOSIS — R89.4 ABNORMAL CELIAC ANTIBODY PANEL: ICD-10-CM

## 2022-10-27 ENCOUNTER — OFFICE VISIT (OUTPATIENT)
Dept: DERMATOLOGY | Facility: CLINIC | Age: 57
End: 2022-10-27
Payer: COMMERCIAL

## 2022-10-27 DIAGNOSIS — K90.0 CELIAC DISEASE: ICD-10-CM

## 2022-10-27 DIAGNOSIS — L13.0 DERMATITIS HERPETIFORMIS: ICD-10-CM

## 2022-10-27 DIAGNOSIS — R76.8 ANA POSITIVE: Primary | ICD-10-CM

## 2022-10-27 DIAGNOSIS — R20.8 BURNING SENSATION OF SKIN: ICD-10-CM

## 2022-10-27 DIAGNOSIS — Z88.2 ALLERGY TO SULFA DRUGS: ICD-10-CM

## 2022-10-27 PROCEDURE — 99215 PR OFFICE/OUTPT VISIT, EST, LEVL V, 40-54 MIN: ICD-10-PCS | Mod: 95,,, | Performed by: DERMATOLOGY

## 2022-10-27 PROCEDURE — 1159F PR MEDICATION LIST DOCUMENTED IN MEDICAL RECORD: ICD-10-PCS | Mod: CPTII,95,, | Performed by: DERMATOLOGY

## 2022-10-27 PROCEDURE — 99215 OFFICE O/P EST HI 40 MIN: CPT | Mod: 95,,, | Performed by: DERMATOLOGY

## 2022-10-27 PROCEDURE — 1159F MED LIST DOCD IN RCRD: CPT | Mod: CPTII,95,, | Performed by: DERMATOLOGY

## 2022-10-27 PROCEDURE — 1160F RVW MEDS BY RX/DR IN RCRD: CPT | Mod: CPTII,95,, | Performed by: DERMATOLOGY

## 2022-10-27 PROCEDURE — 1160F PR REVIEW ALL MEDS BY PRESCRIBER/CLIN PHARMACIST DOCUMENTED: ICD-10-PCS | Mod: CPTII,95,, | Performed by: DERMATOLOGY

## 2022-10-27 RX ORDER — DAPSONE 50 MG/G
GEL TOPICAL 2 TIMES DAILY
Qty: 60 G | Refills: 0 | Status: SHIPPED | OUTPATIENT
Start: 2022-10-27 | End: 2023-02-13 | Stop reason: SDUPTHER

## 2022-10-27 NOTE — PROGRESS NOTES
The patient location is: home  The chief complaint leading to consultation is: rash/burning    Visit type: audiovisual    Face to Face time with patient: 11 m  16 minutes of total time spent on the encounter, which includes face to face time and non-face to face time preparing to see the patient (eg, review of tests), Obtaining and/or reviewing separately obtained history, Documenting clinical information in the electronic or other health record, Independently interpreting results (not separately reported) and communicating results to the patient/family/caregiver, or Care coordination (not separately reported).         Each patient to whom he or she provides medical services by telemedicine is:  (1) informed of the relationship between the physician and patient and the respective role of any other health care provider with respect to management of the patient; and (2) notified that he or she may decline to receive medical services by telemedicine and may withdraw from such care at any time.    Notes:     Subjective:       Patient ID:  Vicky Brand is a 56 y.o. female who presents for No chief complaint on file.    Pt avoiding gluten but still has eruptions of the butt.  Using otc lidocaine.        Review of Systems   Constitutional:  Negative for fever.   HENT:  Negative for sore throat.    Respiratory:  Negative for cough.    Skin:  Positive for rash.      Objective:    Physical Exam   Constitutional: She appears well-developed and well-nourished.   Eyes: No conjunctival no injection.   Neurological: She is alert and oriented to person, place, and time.   Psychiatric: She has a normal mood and affect.   Skin:   Areas Examined (abnormalities noted in diagram):   Head / Face Inspection Performed       Diagram Legend     Erythematous scaling macule/papule c/w actinic keratosis       Vascular papule c/w angioma      Pigmented verrucoid papule/plaque c/w seborrheic keratosis      Yellow umbilicated papule  c/w sebaceous hyperplasia      Irregularly shaped tan macule c/w lentigo     1-2 mm smooth white papules consistent with Milia      Movable subcutaneous cyst with punctum c/w epidermal inclusion cyst      Subcutaneous movable cyst c/w pilar cyst      Firm pink to brown papule c/w dermatofibroma      Pedunculated fleshy papule(s) c/w skin tag(s)      Evenly pigmented macule c/w junctional nevus     Mildly variegated pigmented, slightly irregular-bordered macule c/w mildly atypical nevus      Flesh colored to evenly pigmented papule c/w intradermal nevus       Pink pearly papule/plaque c/w basal cell carcinoma      Erythematous hyperkeratotic cursted plaque c/w SCC      Surgical scar with no sign of skin cancer recurrence      Open and closed comedones      Inflammatory papules and pustules      Verrucoid papule consistent consistent with wart     Erythematous eczematous patches and plaques     Dystrophic onycholytic nail with subungual debris c/w onychomycosis     Umbilicated papule    Erythematous-base heme-crusted tan verrucoid plaque consistent with inflamed seborrheic keratosis     Erythematous Silvery Scaling Plaque c/w Psoriasis     See annotation        Assessment / Plan:        SAMANTHA positive  Saw rheum.  Poss sjogrens.    Burning sensation of skin  Cont lidocaine otc for now.  Trial of aczone.  Can try over the counter Sarna or capsaicin cream carefully.  Can also try lidocaine topically.    Dermatitis herpetiformis  -     dapsone (ACZONE) 5 % topical gel; Apply topically 2 (two) times daily. For buttocks prn.  Stop if any worsening rash or irritation  Dispense: 60 g; Refill: 0  Discussed with patient the etiology and pathogenesis of the disease or skin lesion(s) and possible treatments and aggravators.    Reviewed with patient different treatment options and associated risks.  Trial of aczone but watch for irr or worsening rash as sulfa allergic but may not cross rxn with skin.  Pt unable to do dapsone due to  sulfa all.  Can start niacinamide 500 mg 2-3 x day and msm 500 mg 2-3 x day.  Turmeric - start with 500mg every day and increase to 1 g every day (may cause GI upset)- 100x more anti-inflammatory if mixed with black pepper or with fatty food  Consider trental, colchicine, doxy.  Pt wishes to research these before initiation.    Celiac disease  Seeing gi for virk and scopes.  [facility] and or records and notes reviewed and considered for their impact on our clinical decision making today.  Pt avoiding gluten.  Be careful even in restaurants.    Allergy to sulfa drugs  No dapsone orally!         Follow up if symptoms worsen or fail to improve.

## 2022-10-27 NOTE — PATIENT INSTRUCTIONS
Can start niacinamide 500 mg 2-3 x day and msm 500 mg 2-3 x day.  Turmeric - start with 500mg every day and increase to 1 g every day (may cause GI upset)- 100x more anti-inflammatory if mixed with black pepper or with fatty food    Consider trental, colchicine, doxycycline stack?    Can try over the counter Sarna or capsaicin cream carefully.  Can also try lidocaine topically.

## 2022-10-28 ENCOUNTER — PATIENT MESSAGE (OUTPATIENT)
Dept: DERMATOLOGY | Facility: CLINIC | Age: 57
End: 2022-10-28
Payer: COMMERCIAL

## 2022-10-28 ENCOUNTER — TELEPHONE (OUTPATIENT)
Dept: ENDOSCOPY | Facility: HOSPITAL | Age: 57
End: 2022-10-28
Payer: COMMERCIAL

## 2022-10-29 ENCOUNTER — PATIENT MESSAGE (OUTPATIENT)
Dept: RHEUMATOLOGY | Facility: CLINIC | Age: 57
End: 2022-10-29
Payer: COMMERCIAL

## 2022-10-29 ENCOUNTER — PATIENT MESSAGE (OUTPATIENT)
Dept: ENDOSCOPY | Facility: HOSPITAL | Age: 57
End: 2022-10-29
Payer: COMMERCIAL

## 2022-10-29 ENCOUNTER — PATIENT MESSAGE (OUTPATIENT)
Dept: PRIMARY CARE CLINIC | Facility: CLINIC | Age: 57
End: 2022-10-29
Payer: COMMERCIAL

## 2022-11-01 ENCOUNTER — PATIENT MESSAGE (OUTPATIENT)
Dept: ENDOSCOPY | Facility: HOSPITAL | Age: 57
End: 2022-11-01
Payer: COMMERCIAL

## 2022-11-01 DIAGNOSIS — K90.0 CELIAC DISEASE: Primary | ICD-10-CM

## 2022-11-02 ENCOUNTER — TELEPHONE (OUTPATIENT)
Dept: ENDOSCOPY | Facility: HOSPITAL | Age: 57
End: 2022-11-02
Payer: COMMERCIAL

## 2022-11-02 ENCOUNTER — HOSPITAL ENCOUNTER (OUTPATIENT)
Dept: RADIOLOGY | Facility: HOSPITAL | Age: 57
Discharge: HOME | End: 2022-11-02
Attending: INTERNAL MEDICINE
Payer: COMMERCIAL

## 2022-11-02 DIAGNOSIS — K90.0 CELIAC DISEASE: ICD-10-CM

## 2022-11-02 PROCEDURE — 77080 DXA BONE DENSITY AXIAL: CPT | Mod: 26,,, | Performed by: RADIOLOGY

## 2022-11-02 PROCEDURE — 77080 DXA BONE DENSITY AXIAL: CPT | Mod: TC

## 2022-11-02 PROCEDURE — 77080 DEXA BONE DENSITY SPINE HIP: ICD-10-PCS | Mod: 26,,, | Performed by: RADIOLOGY

## 2022-11-02 NOTE — TELEPHONE ENCOUNTER
----- Message from José Luis Irizarry MD sent at 11/2/2022 10:18 AM CDT -----  Bone density test reveals Osteopenia. Continue Calcium daily. Awaiting Vitamin D levels and other labs.

## 2022-11-02 NOTE — PROGRESS NOTES
Bone density test reveals Osteopenia. Continue Calcium daily. Awaiting Vitamin D levels and other labs.

## 2022-11-03 ENCOUNTER — OFFICE VISIT (OUTPATIENT)
Dept: DERMATOLOGY | Facility: CLINIC | Age: 57
End: 2022-11-03
Payer: COMMERCIAL

## 2022-11-03 DIAGNOSIS — L30.9 DERMATITIS: Primary | ICD-10-CM

## 2022-11-03 PROCEDURE — 1159F PR MEDICATION LIST DOCUMENTED IN MEDICAL RECORD: ICD-10-PCS | Mod: CPTII,S$GLB,, | Performed by: DERMATOLOGY

## 2022-11-03 PROCEDURE — 1159F MED LIST DOCD IN RCRD: CPT | Mod: CPTII,S$GLB,, | Performed by: DERMATOLOGY

## 2022-11-03 PROCEDURE — 1160F RVW MEDS BY RX/DR IN RCRD: CPT | Mod: CPTII,S$GLB,, | Performed by: DERMATOLOGY

## 2022-11-03 PROCEDURE — 1160F PR REVIEW ALL MEDS BY PRESCRIBER/CLIN PHARMACIST DOCUMENTED: ICD-10-PCS | Mod: CPTII,S$GLB,, | Performed by: DERMATOLOGY

## 2022-11-03 PROCEDURE — 99214 PR OFFICE/OUTPT VISIT, EST, LEVL IV, 30-39 MIN: ICD-10-PCS | Mod: S$GLB,,, | Performed by: DERMATOLOGY

## 2022-11-03 PROCEDURE — 99214 OFFICE O/P EST MOD 30 MIN: CPT | Mod: S$GLB,,, | Performed by: DERMATOLOGY

## 2022-11-03 RX ORDER — TRIAMCINOLONE ACETONIDE 0.25 MG/G
OINTMENT TOPICAL
Qty: 15 G | Refills: 1 | Status: SHIPPED | OUTPATIENT
Start: 2022-11-03

## 2022-11-03 NOTE — PROGRESS NOTES
Patient Information  Name: Vicky Brand  : 1965  MRN: [id number]     Referring Physician:  No ref. provider found   Primary Care Physician:  Lia Cristina MD   Date of Visit: 2022      Subjective:     History of Present lllness:    Vicky Brand is a 56 y.o. female with hx of celiac dz and dermatitis herpetiformis who presents with a chief complaint of rash.  Location: anus  Duration: 3 months, much worse over past 2 weeks  Signs/Symptoms: irritated, small fissures, little blood when wiping  Relieving factors/Prior treatments: Renae's butt paste, Aquaphor, Cerave SA cream, avoiding gluten; no wet wipes    Clinical documentation obtained by nursing staff reviewed.    Review of Systems   Gastrointestinal:  Positive for diarrhea.   Genitourinary:  Negative for genital sores.   Skin:  Negative for itching and rash.     Objective:   Physical Exam   Constitutional: She appears well-developed and well-nourished. No distress.   Neurological: She is alert and oriented to person, place, and time. She is not disoriented.   Psychiatric: She has a normal mood and affect.   Skin:   Areas Examined (abnormalities noted in diagram):   Genitals / Buttocks / Groin Inspection Performed          Diagram Legend     Erythematous scaling macule/papule c/w actinic keratosis       Vascular papule c/w angioma      Pigmented verrucoid papule/plaque c/w seborrheic keratosis      Yellow umbilicated papule c/w sebaceous hyperplasia      Irregularly shaped tan macule c/w lentigo     1-2 mm smooth white papules consistent with Milia      Movable subcutaneous cyst with punctum c/w epidermal inclusion cyst      Subcutaneous movable cyst c/w pilar cyst      Firm pink to brown papule c/w dermatofibroma      Pedunculated fleshy papule(s) c/w skin tag(s)      Evenly pigmented macule c/w junctional nevus     Mildly variegated pigmented, slightly irregular-bordered macule c/w mildly atypical nevus      Flesh colored  to evenly pigmented papule c/w intradermal nevus       Pink pearly papule/plaque c/w basal cell carcinoma      Erythematous hyperkeratotic cursted plaque c/w SCC      Surgical scar with no sign of skin cancer recurrence      Open and closed comedones      Inflammatory papules and pustules      Verrucoid papule consistent consistent with wart     Erythematous eczematous patches and plaques     Dystrophic onycholytic nail with subungual debris c/w onychomycosis     Umbilicated papule    Erythematous-base heme-crusted tan verrucoid plaque consistent with inflamed seborrheic keratosis     Erythematous Silvery Scaling Plaque c/w Psoriasis     See annotation    No images are attached to the encounter or orders placed in the encounter.      [] Data reviewed  [] Prior external notes reviewed  [] Independent review of test  [] Management discussed with another provider  [] Independent historian    Assessment / Plan:        Dermatitis  - new problem with uncertain prognosis  Differential diagnosis includes DH vs ICD vs ACD.  Will start with trial of topical steroid. Can also use plain Vaseline or a zinc oxide ointment, such as Triple Paste cream, as needed. Would avoid Aquaphor and Renae's due to potential allergens.  -     triamcinolone acetonide 0.025% (KENALOG) 0.025 % Oint; Apply to affected areas of face BID prn irritation. Do not use for longer than 2 weeks in a row.  Dispense: 15 g; Refill: 1  Side effects from the overuse of topical steroids include thinning of skin, easy tearing/bruising of skin, stretch marks, spider veins, etc. Use the topical steroid no more than 2 days per week if used long-term and/or take breaks from the topical steroid, especially if any of the above side effects are noticed.    Follow up in about 2 months (around 1/3/2023).      Rocio Finch MD, FAAD  [facility]

## 2022-11-04 ENCOUNTER — PATIENT MESSAGE (OUTPATIENT)
Dept: ADMINISTRATIVE | Facility: HOSPITAL | Age: 57
End: 2022-11-04
Payer: COMMERCIAL

## 2022-11-04 ENCOUNTER — PATIENT OUTREACH (OUTPATIENT)
Dept: ADMINISTRATIVE | Facility: HOSPITAL | Age: 57
End: 2022-11-04
Payer: COMMERCIAL

## 2022-11-04 ENCOUNTER — PATIENT MESSAGE (OUTPATIENT)
Dept: ENDOSCOPY | Facility: HOSPITAL | Age: 57
End: 2022-11-04
Payer: COMMERCIAL

## 2022-11-05 ENCOUNTER — PATIENT MESSAGE (OUTPATIENT)
Dept: DERMATOLOGY | Facility: CLINIC | Age: 57
End: 2022-11-05
Payer: COMMERCIAL

## 2022-11-06 VITALS — WEIGHT: 131.88 LBS | BODY MASS INDEX: 21.29 KG/M2

## 2022-11-06 NOTE — PROGRESS NOTES
GI NUTRITIONAL ASSESSMENT  Referring Provider: Dr. José Luis Perry April Brand is a 56 y.o. female referred regarding the following problems:Celiac disease dx 9/29/22 and diet implemented at that time .      Medical/Surgical History  Pertinent Social History:  Social History     Tobacco Use    Smoking status: Never    Smokeless tobacco: Never    Tobacco comments:     None   Substance Use Topics    Alcohol use: No    Drug use: No        Previous Medical History:  Past Medical History:   Diagnosis Date    SAMANTHA positive     Celiac disease 09/29/2022    Dyspareunia in female 07/27/2021    Osphena, pelvic floor tx 2021, GYN [facility]    Eye abnormalities     early yellowing of lens, precursor to cataracts, Dr Webster,  wear glasses at all times    Fracture of fifth toe, left, closed, with delayed healing, subsequent encounter 08/21/2018    Intramural leiomyoma of uterus 05/22/2019    Irregular, dysmenorrhea    Nocturia 06/17/2021    Ocular migraine     vision fuzzy x 3 in 2013, metallic shapes in her vision, Dr Webster,  resolved after 30 min , no pain    CALEB (obstructive sleep apnea)     Lysenko, CPAP start 7/21    Palpitations 09/29/2022    Paradoxical insomnia 10/30/2017    prior to menses, previously took amitryptiline 2.5 mg prn  qhs     Posterior vitreous detachment of left eye 05/22/2019    Flashes of light, ocular migranes, Ophthalmologist Dr Orourke, retinal specialist Dr Soni, rec avoid exercise until she follows up 5/2019    Shoulder pain, left 2007    LEFT, after fall, better with PT at [facility]    Stress and adjustment reaction 09/29/2022    Urticaria        Previous Surgical History:  Past Surgical History:   Procedure Laterality Date    COLONOSCOPY N/A 06/09/2017    Procedure: COLONOSCOPY;  Surgeon: Davy Prince MD;  Location: [facility] ([address] [place] [address]);  Service: Endoscopy;  Laterality: N/A;    DE QUERVAIN'S RELEASE Right 06/08/2022    Procedure: RELEASE, HAND, FOR DEQUERVAIN'S  "TENOSYNOVITIS - RIGHT;  Surgeon: Devan Wagner MD;  Location: [facility] OR;  Service: Orthopedics;  Laterality: Right;    DILATION AND CURETTAGE OF UTERUS      ENDOMETRIAL ABLATION N/A 2017    HYSTEROSCOPY WITH DILATION AND CURETTAGE OF UTERUS N/A 01/19/2022    Procedure: HYSTEROSCOPY, WITH DILATION AND CURETTAGE OF UTERUS;  Surgeon: Jannette Jarrell MD;  Location: [facility] OR;  Service: OB/GYN;  Laterality: N/A;    TUBAL LIGATION        Anthropometric Data Usual Weight: 135#  has decreased 10 pounds over last 5 months   Body mass index is 21.29 kg/m².  Estimated body mass index is 21.29 kg/m² as calculated from the following:    Height as of 10/5/22: 5' 6" (1.676 m).    Weight as of this encounter: 59.8 kg (131 lb 14.4 oz).  Ht Readings from Last 1 Encounters:   10/26/22 5' 6" (1.676 m)       Weight History:  Wt Readings from Last 12 Encounters:   10/26/22 59 kg (130 lb)   10/10/22 59.8 kg (131 lb 14.4 oz)   10/05/22 59.4 kg (130 lb 15.3 oz)   10/04/22 59.5 kg (131 lb 2.8 oz)   09/28/22 60.8 kg (134 lb 0.6 oz)   09/19/22 62.3 kg (137 lb 5.6 oz)   08/15/22 61.7 kg (136 lb)   08/04/22 62 kg (136 lb 11 oz)   07/28/22 62.6 kg (138 lb 0.1 oz)   06/08/22 63.5 kg (140 lb)   06/07/22 63.5 kg (140 lb)   05/24/22 63.9 kg (140 lb 14 oz)   ]  BMI: Body mass index is 21.29 kg/m².     Meds  and Biochemical Data   Labs  No components found for: VITD  Glucose   Date Value Ref Range Status   09/02/2022 94 70 - 110 mg/dL Final   07/21/2021 88 70 - 110 mg/dL Final     BUN   Date Value Ref Range Status   09/02/2022 17 6 - 20 mg/dL Final   07/21/2021 15 6 - 20 mg/dL Final     Creatinine   Date Value Ref Range Status   09/02/2022 0.9 0.5 - 1.4 mg/dL Final   07/21/2021 1.0 0.5 - 1.4 mg/dL Final     Sodium   Date Value Ref Range Status   09/02/2022 140 136 - 145 mmol/L Final   07/21/2021 140 136 - 145 mmol/L Final       No results found for: PHOS  Calcium   Date Value Ref Range Status   09/02/2022 9.7 8.7 - 10.5 mg/dL Final   07/21/2021 9.7 8.7 " - 10.5 mg/dL Final     No results found for: PREALBUMIN  Total Protein   Date Value Ref Range Status   09/02/2022 7.8 6.0 - 8.4 g/dL Final   07/21/2021 7.6 6.0 - 8.4 g/dL Final     Cholesterol   Date Value Ref Range Status   09/02/2022 172 120 - 199 mg/dL Final     Comment:     The National Cholesterol Education Program (NCEP) has set the  following guidelines (reference ranges) for Cholesterol:  Optimal.....................<200 mg/dL  Borderline High.............200-239 mg/dL  High........................> or = 240 mg/dL     07/21/2021 162 120 - 199 mg/dL Final     Comment:     The National Cholesterol Education Program (NCEP) has set the  following guidelines (reference ranges) for Cholesterol:  Optimal.....................<200 mg/dL  Borderline High.............200-239 mg/dL  High........................> or = 240 mg/dL       No results found for: HGBA1C  Hemoglobin   Date Value Ref Range Status   09/02/2022 13.0 12.0 - 16.0 g/dL Final   07/21/2021 13.1 12.0 - 16.0 g/dL Final     Hematocrit   Date Value Ref Range Status   09/02/2022 40.8 37.0 - 48.5 % Final   07/21/2021 41.1 37.0 - 48.5 % Final     Iron   Date Value Ref Range Status   09/21/2022 70 30 - 160 ug/dL Final     No components found for: FROLATE  No results found for: [id number]  WBC   Date Value Ref Range Status   09/02/2022 4.07 3.90 - 12.70 K/uL Final   07/21/2021 3.72 (L) 3.90 - 12.70 K/uL Final     WBC   Date Value Ref Range Status   09/02/2022 4.07 3.90 - 12.70 K/uL Final   07/21/2021 3.72 (L) 3.90 - 12.70 K/uL Final     Current Outpatient Medications   Medication Sig    amitriptyline (ELAVIL) 10 MG tablet Take 1 tablet (10 mg total) by mouth every evening.    calcium carbonate (OS-GISSELL) 600 mg calcium (1,500 mg) Tab Take 600 mg by mouth once. gluten free    dapsone (ACZONE) 5 % topical gel Apply topically 2 (two) times daily. For buttocks prn.  Stop if any worsening rash or irritation    estradioL (ESTRACE) 0.01 % (0.1 mg/gram) vaginal cream  Place 1 g vaginally twice a week.    multivitamin capsule Take 1 capsule by mouth once daily. Gluten free    triamcinolone acetonide 0.025% (KENALOG) 0.025 % Oint Apply to affected areas of face BID prn irritation. Do not use for longer than 2 weeks in a row.    UNABLE TO FIND once daily. Iron 28 mg taken daily     No current facility-administered medications for this visit.     Lab Results   Component Value Date    TSH 1.195 11/02/2022     Lab Results   Component Value Date    FERRITIN 55 09/21/2022     @RESUFAST(NA,K,CL,CO2,GLU,BUN,CREATININE,  Lab Results   Component Value Date    CRP 0.8 09/28/2022     Lab Results   Component Value Date    GLU 94 09/02/2022    K 5.3 (H) 09/02/2022    CHOL 172 09/02/2022    HDL 53 09/02/2022    TRIG 98 09/02/2022    ALBUMIN 4.1 09/02/2022    CALCIUM 9.7 09/02/2022      Latest Reference Range & Units 09/21/22 09:41   Antigliadin Ab IgG <20 UNITS 89 (H)   Antigliadin Abs, IgA <20 UNITS 154 (H)   TTG IgA <20 UNITS 157 (H)   TTG IgG <20 UNITS 12   Immunoglobulin A (IgA) 70 - 400 mg/dL 265        Vitamin/Supplements/Herbs: Gluten free MVI with Calcium and Iron       Allergies:  Review of patient's allergies indicates:   Allergen Reactions    Sulfa (sulfonamide antibiotics) Rash    Cetirizine      Other reaction(s): Unable to Function  Other reaction(s): Unable to Function    Erythromycin Nausea And Vomiting     Other reaction(s): Nausea    Voltaren [diclofenac sodium] Diarrhea    Zyrtec-d  [cetirizine-pseudoephedrine]      Other reaction(s): Inability to focus/function    Gluten Rash     Celiac's disease      Dietary Data  Problematic Foods: no food tolerances since starting Gluten free diet   Changes in meal patterns : elimination of all gluten source/no dining out   Meal patterns: 3 meals daily  Breakfast: Toast/egg/Turkey sausage /fruit/yogurt   Lunch: Tuna, broccoli , cauliflower with cheese, sliced GF bread; fruit   Supper: Lentils, cheese, GF bread  Fruit: multiple servings a  day  Vegetables: increased volume  Meal preparation/shopping: self  Nutrition beverages: mainly drinks water and Sjogrens causes dry mouth   Dining out: no longer eats out due to concerns about cross contamination   Supplements/ MVI: MVI with iron and calcium     Calorie  needs : 1400 russel @1.2 X msj  Protein Needs;47 grams @.8grams/kg   Fluid needs: 1500@ 25cc/kg    Nutrition Diagnosis      Primary Problem: Altered GI function   Etiology: Related to Celiac   Signs/symptoms: As evidenced by abnormal celiac panel and patient report of loose stools          Secondary Concern:  Anemia due to deficiency of folate or iron     Secondary Concern:  Osteoporosis related to deficiency of Vit D and Calcium     Secondary Concern: lactose intolerance  Related to degradation of intestinal villi         Nutrition Intervention  Intervention :   Gluten Free diet   Education regarding cross contamination  Supplementation with Iron, Calcium, Fat soluble vitamins and B vitamins including Folate, B6 and B12   Label reading for sources of gluten   Use of alternative grains in diet     Patient understands material presented as evidenced by the following:  Able to identify which grains must be avoided   Able to identify common foods which contain wheat, barley and rye ( ex reg bread products, pasta, breakfast cereals )  Able to identify grains allowed on a gluten free nutrition prescription (rice, corn, buckwheat)   Able to read a food label and identify sources of gluten ( ex malt)   Identify sources for obtaining gluten free products   Explain cross contamination and steps to take to avoid such at home   Identify resources to determine non-food items that may be sources of gluten ( ex supplements and medications)   Know where to look for additional information on celiac disease ( support groups, online resources, recipe books)     Monitoring   The eating patterns of persons with celiac disease should be assessed for the following  nutrients:  Iron   Calcium   Fiber   B-vitamins (thiamin, riboflavin, niacin, and folate)  Many gluten-free grain foods are made from refined flours and/or starches, and most of these products are not enriched with B-vitamins and iron. As a result, a gluten-free nutrition prescription may be lacking in B-vitamins, iron, and fiber, especially if there is heavy reliance in the meal plan on foods of low nutrient density. For more information, see B-vitamins, Iron, and Fiber.  In addition, some persons with celiac disease have secondary lactose intolerance. As a result, the calcium intake of these individuals should be monitored    Nutrition Education :   Mrwtac-Dm-Ndahbg-Disease-Nutrition-Therapy.pdf  > Celiac Disease Label Reading Tips - Nutrition Care Manual    Microsoft Word - 2020 DIGID Gluten Resources (pr2go.com.[employer]ote[employer])    https://www.[facility].org/-/media/files/[facility]/centers-and-departments/digestive-disease-center/celiac-center/_bhb-ocwwxtjekv-ocifdx-disease-feb-2016.pdf.pdf    https://pr2go.com.[employer]/[email or web address]/UploadedImages/[employer]/Documents/DIGID/2021_DIGID_Gluten_Intro_to_Gluten_Free_Diet.pdf.pdf      https://pr2go.com.[employer]/[email or web address]/UploadedImages/[employer]/Documents/DIGID/2021_DIGID_Gluten_Cross_Contact.pdf.pdf    https://pr2go.com.[employer]/[email or web address]/UploadedImages/[employer]/Documents/DIGID/2021_DIGID_Label_Reading.pdf.pdf  Grocery Lists  https://www.CPUsage.[employer]peutics/special-diets/gluten-free/gluten-free-grocery-list.    https://joybauer.com/celiac/food-list/.    https://www.celiac.com/articles.html/safe-gluten-free-food-list-safe-ingredients-r181/.        M = Nutrition Monitoring   Indicator 1. PO intake/weight   Indicator 2. Diet adherence /tolerance      E= Nutrition Evaluation  Goal 1. PO intake stays consistent  and patient weight remains stable    Goal 2. Patient adherence to prescribed diet modifications to support medical care of condition .        Patient and/or family comprehend instructions: yes  Outcome: Verbalizes understanding  Barriers: none identified  Consultation Time: 60  Minutes  F/U: Per referring provider   Communication provided to care team via Epic     Diagnoses:  No diagnosis found.

## 2022-11-07 ENCOUNTER — TELEPHONE (OUTPATIENT)
Dept: ENDOSCOPY | Facility: HOSPITAL | Age: 57
End: 2022-11-07
Payer: COMMERCIAL

## 2022-11-07 ENCOUNTER — PATIENT MESSAGE (OUTPATIENT)
Dept: ENDOSCOPY | Facility: HOSPITAL | Age: 57
End: 2022-11-07
Payer: COMMERCIAL

## 2022-11-08 ENCOUNTER — CLINICAL SUPPORT (OUTPATIENT)
Dept: PRIMARY CARE CLINIC | Facility: CLINIC | Age: 57
End: 2022-11-08
Payer: COMMERCIAL

## 2022-11-08 VITALS
DIASTOLIC BLOOD PRESSURE: 74 MMHG | SYSTOLIC BLOOD PRESSURE: 122 MMHG | OXYGEN SATURATION: 99 % | WEIGHT: 127.88 LBS | HEART RATE: 80 BPM | BODY MASS INDEX: 20.64 KG/M2

## 2022-11-08 DIAGNOSIS — R00.2 PALPITATIONS: Primary | ICD-10-CM

## 2022-11-08 PROCEDURE — 99999 PR PBB SHADOW E&M-EST. PATIENT-LVL II: ICD-10-PCS | Mod: PBBFAC,,,

## 2022-11-08 PROCEDURE — 99999 PR PBB SHADOW E&M-EST. PATIENT-LVL II: CPT | Mod: PBBFAC,,,

## 2022-11-08 NOTE — PROGRESS NOTES
Patient here for nurse visit for BP check. 122/74, patient is on no medications. Patient has anal pain at this time.

## 2022-11-11 ENCOUNTER — ANESTHESIA (OUTPATIENT)
Dept: ENDOSCOPY | Facility: HOSPITAL | Age: 57
End: 2022-11-11
Payer: COMMERCIAL

## 2022-11-11 ENCOUNTER — ANESTHESIA EVENT (OUTPATIENT)
Dept: ENDOSCOPY | Facility: HOSPITAL | Age: 57
End: 2022-11-11
Payer: COMMERCIAL

## 2022-11-11 ENCOUNTER — HOSPITAL ENCOUNTER (OUTPATIENT)
Facility: HOSPITAL | Age: 57
Discharge: HOME | End: 2022-11-11
Attending: INTERNAL MEDICINE | Admitting: INTERNAL MEDICINE
Payer: COMMERCIAL

## 2022-11-11 VITALS
HEIGHT: 66 IN | OXYGEN SATURATION: 99 % | SYSTOLIC BLOOD PRESSURE: 116 MMHG | DIASTOLIC BLOOD PRESSURE: 69 MMHG | RESPIRATION RATE: 16 BRPM | WEIGHT: 125.25 LBS | BODY MASS INDEX: 20.13 KG/M2 | HEART RATE: 55 BPM | TEMPERATURE: 98 F

## 2022-11-11 DIAGNOSIS — L13.0 DERMATITIS HERPETIFORMIS: Primary | ICD-10-CM

## 2022-11-11 DIAGNOSIS — K90.0 CELIAC DISEASE: ICD-10-CM

## 2022-11-11 PROCEDURE — 88305 TISSUE EXAM BY PATHOLOGIST: CPT | Mod: 26,,, | Performed by: STUDENT IN AN ORGANIZED HEALTH CARE EDUCATION/TRAINING PROGRAM

## 2022-11-11 PROCEDURE — 25000003 PHARM REV CODE 250: Performed by: NURSE ANESTHETIST, CERTIFIED REGISTERED

## 2022-11-11 PROCEDURE — 43239 PR EGD, FLEX, W/BIOPSY, SGL/MULTI: ICD-10-PCS | Mod: ,,, | Performed by: INTERNAL MEDICINE

## 2022-11-11 PROCEDURE — 88342 IMHCHEM/IMCYTCHM 1ST ANTB: CPT | Mod: 26,,, | Performed by: STUDENT IN AN ORGANIZED HEALTH CARE EDUCATION/TRAINING PROGRAM

## 2022-11-11 PROCEDURE — 88305 TISSUE EXAM BY PATHOLOGIST: ICD-10-PCS | Mod: 26,,, | Performed by: STUDENT IN AN ORGANIZED HEALTH CARE EDUCATION/TRAINING PROGRAM

## 2022-11-11 PROCEDURE — 88342 CHG IMMUNOCYTOCHEMISTRY: ICD-10-PCS | Mod: 26,,, | Performed by: STUDENT IN AN ORGANIZED HEALTH CARE EDUCATION/TRAINING PROGRAM

## 2022-11-11 PROCEDURE — E9220 PRA ENDO ANESTHESIA: ICD-10-PCS | Mod: ,,, | Performed by: NURSE ANESTHETIST, CERTIFIED REGISTERED

## 2022-11-11 PROCEDURE — 88305 TISSUE EXAM BY PATHOLOGIST: CPT | Mod: 59 | Performed by: STUDENT IN AN ORGANIZED HEALTH CARE EDUCATION/TRAINING PROGRAM

## 2022-11-11 PROCEDURE — 88342 IMHCHEM/IMCYTCHM 1ST ANTB: CPT | Performed by: STUDENT IN AN ORGANIZED HEALTH CARE EDUCATION/TRAINING PROGRAM

## 2022-11-11 PROCEDURE — 43239 EGD BIOPSY SINGLE/MULTIPLE: CPT | Performed by: INTERNAL MEDICINE

## 2022-11-11 PROCEDURE — 43239 EGD BIOPSY SINGLE/MULTIPLE: CPT | Mod: ,,, | Performed by: INTERNAL MEDICINE

## 2022-11-11 PROCEDURE — 37000009 HC ANESTHESIA EA ADD 15 MINS: Performed by: INTERNAL MEDICINE

## 2022-11-11 PROCEDURE — 37000008 HC ANESTHESIA 1ST 15 MINUTES: Performed by: INTERNAL MEDICINE

## 2022-11-11 PROCEDURE — 63600175 PHARM REV CODE 636 W HCPCS: Performed by: NURSE ANESTHETIST, CERTIFIED REGISTERED

## 2022-11-11 PROCEDURE — E9220 PRA ENDO ANESTHESIA: HCPCS | Mod: ,,, | Performed by: NURSE ANESTHETIST, CERTIFIED REGISTERED

## 2022-11-11 PROCEDURE — 27201012 HC FORCEPS, HOT/COLD, DISP: Performed by: INTERNAL MEDICINE

## 2022-11-11 RX ORDER — PROPOFOL 10 MG/ML
VIAL (ML) INTRAVENOUS
Status: DISCONTINUED | OUTPATIENT
Start: 2022-11-11 | End: 2022-11-11

## 2022-11-11 RX ORDER — PROPOFOL 10 MG/ML
VIAL (ML) INTRAVENOUS CONTINUOUS PRN
Status: DISCONTINUED | OUTPATIENT
Start: 2022-11-11 | End: 2022-11-11

## 2022-11-11 RX ORDER — SODIUM CHLORIDE 9 MG/ML
INJECTION, SOLUTION INTRAVENOUS CONTINUOUS
Status: DISCONTINUED | OUTPATIENT
Start: 2022-11-11 | End: 2022-11-11 | Stop reason: HOSPADM

## 2022-11-11 RX ORDER — LIDOCAINE HYDROCHLORIDE 20 MG/ML
INJECTION INTRAVENOUS
Status: DISCONTINUED | OUTPATIENT
Start: 2022-11-11 | End: 2022-11-11

## 2022-11-11 RX ADMIN — SODIUM CHLORIDE: 0.9 INJECTION, SOLUTION INTRAVENOUS at 07:11

## 2022-11-11 RX ADMIN — LIDOCAINE HYDROCHLORIDE 50 MG: 20 INJECTION INTRAVENOUS at 08:11

## 2022-11-11 RX ADMIN — PROPOFOL 80 MG: 10 INJECTION, EMULSION INTRAVENOUS at 08:11

## 2022-11-11 RX ADMIN — Medication 150 MCG/KG/MIN: at 08:11

## 2022-11-11 NOTE — TRANSFER OF CARE
"Anesthesia Transfer of Care Note    Patient: Vicky Brand    Procedure(s) Performed: Procedure(s) (LRB):  EGD (ESOPHAGOGASTRODUODENOSCOPY) (N/A)    Patient location: PACU    Anesthesia Type: general    Transport from OR: Transported from OR on room air with adequate spontaneous ventilation    Post pain: adequate analgesia    Post assessment: no apparent anesthetic complications and tolerated procedure well    Post vital signs: stable    Level of consciousness: sedated and responds to stimulation    Nausea/Vomiting: no nausea/vomiting    Complications: none    Transfer of care protocol was followed      Last vitals:   Visit Vitals  /56(BP Location: Left arm, Patient Position: Lying)   Pulse 78   Temp 97.7   Resp 18   Ht 5' 6" (1.676 m)   Wt 56.8 kg (125 lb 3.5 oz)   SpO2 98%   Breastfeeding No   BMI 20.21 kg/m²     "

## 2022-11-11 NOTE — ANESTHESIA PREPROCEDURE EVALUATION
11/11/2022  Vicky Brand is a 57 y.o., female.      Patient Name: Vicky Brand  YOB: 1965  MRN: [id number]  [facility]: [id number]      Code Status: Prior   Date of Procedure: 11/11/2022  Anesthesia: Choice Procedure: Procedure(s) (LRB):  EGD (ESOPHAGOGASTRODUODENOSCOPY) (N/A)  Pre-Operative Diagnosis: Abnormal celiac antibody panel [R89.4]  Proceduralist: Surgeon(s) and Role:     * José Luis Irizarry MD - Primary        SUBJECTIVE:   Vicky Brand is a 57 y.o. female who  has a past medical history of SAMANTHA positive, Celiac disease (09/29/2022), Dyspareunia in female (07/27/2021), Eye abnormalities, Fracture of fifth toe, left, closed, with delayed healing, subsequent encounter (08/21/2018), Intramural leiomyoma of uterus (05/22/2019), Nocturia (06/17/2021), Ocular migraine, CALEB (obstructive sleep apnea), Palpitations (09/29/2022), Paradoxical insomnia (10/30/2017), Posterior vitreous detachment of left eye (05/22/2019), Shoulder pain, left (2007), Stress and adjustment reaction (09/29/2022), and Urticaria. No notes on file    ALLERGIES:     Review of patient's allergies indicates:   Allergen Reactions    Sulfa (sulfonamide antibiotics) Rash    Cetirizine      Other reaction(s): Unable to Function  Other reaction(s): Unable to Function    Erythromycin Nausea And Vomiting     Other reaction(s): Nausea    Voltaren [diclofenac sodium] Diarrhea    Zyrtec-d  [cetirizine-pseudoephedrine]      Other reaction(s): Inability to focus/function    Gluten Rash     Celiac's disease     MEDICATIONS:     Current Facility-Administered Medications   Medication Dose Route Frequency Provider Last Rate Last Admin    0.9%  NaCl infusion   Intravenous Continuous José Luis Irizarry MD              History:     Patient Active Problem List   Diagnosis    Status post hysteroscopy     Paradoxical insomnia    Irregular menses    Intramural leiomyoma of uterus    Nocturia    CALEB (obstructive sleep apnea)    Dyspareunia in female    Lack of coordination    Muscle weakness    Myalgia of pelvic floor    Wrist sprain    Plantar fasciitis    Pain in left elbow    Pain aggravated by activities of daily living    De Quervain's tenosynovitis, right    Pain in right hand    Decreased range of motion of finger of right hand    Decreased range of motion of right wrist    Swelling of right hand    Requires assistance with activities of daily living (ADL)    SAMANTHA positive    Dermatitis herpetiformis    Sjogren's syndrome without extraglandular involvement    Celiac disease    Palpitations    Stress and adjustment reaction     Surgical History:    has a past surgical history that includes Dilation and curettage of uterus; Tubal ligation; Colonoscopy (N/A, 06/09/2017); Endometrial ablation (N/A, 2017); Hysteroscopy with dilation and curettage of uterus (N/A, 01/19/2022); and De Quervain's release (Right, 06/08/2022).   Social History:    reports being sexually active and has had partner(s) who are male. She reports using the following method of birth control/protection: See Surgical Hx.  reports that she has never smoked. She has never used smokeless tobacco. She reports that she does not drink alcohol and does not use drugs.       Pre-op Assessment    I have reviewed the Patient Summary Reports.     I have reviewed the Nursing Notes. I have reviewed the NPO Status.   I have reviewed the Medications.     Review of Systems  Anesthesia Hx:  No problems with previous Anesthesia  Denies Family Hx of Anesthesia complications.   Denies Personal Hx of Anesthesia complications.   Hematology/Oncology:  Hematology Normal        Cardiovascular:  Cardiovascular Normal     Pulmonary:   Sleep Apnea    Hepatic/GI:   Bowel Prep.    Musculoskeletal:  Musculoskeletal Normal    Neurological:  Neurology Normal     Dermatological:  Skin Normal        Physical Exam  General: Cooperative, Alert and Oriented    Airway:  Mallampati: II   Mouth Opening: Normal  TM Distance: Normal  Tongue: Normal  Neck ROM: Normal ROM    Dental:  Intact        Anesthesia Plan  Type of Anesthesia, risks & benefits discussed:    Anesthesia Type: Gen Natural Airway  Intra-op Monitoring Plan: Standard ASA Monitors  Induction:  IV  Informed Consent: Informed consent signed with the Patient and all parties understand the risks and agree with anesthesia plan.  All questions answered.   ASA Score: 2  Day of Surgery Review of History & Physical: H&P Update referred to the surgeon/provider.I have interviewed and examined the patient. I have reviewed the patient's H&P dated: There are no significant changes.     Ready For Surgery From Anesthesia Perspective.     .

## 2022-11-11 NOTE — ANESTHESIA POSTPROCEDURE EVALUATION
Anesthesia Post Evaluation    Patient: Vicky Brand    Procedure(s) Performed: Procedure(s) (LRB):  EGD (ESOPHAGOGASTRODUODENOSCOPY) (N/A)    Final Anesthesia Type: general      Patient location during evaluation: PACU  Patient participation: Yes- Able to Participate  Level of consciousness: awake and alert  Post-procedure vital signs: reviewed and stable  Pain management: adequate  Airway patency: patent    PONV status at discharge: No PONV  Anesthetic complications: no      Cardiovascular status: blood pressure returned to baseline  Respiratory status: unassisted  Follow-up not needed.          Vitals Value Taken Time   /74 11/11/22 0829   Temp 36.7 °C (98 °F) 11/11/22 0818   Pulse 60 11/11/22 0829   Resp 16 11/11/22 0829   SpO2 99 % 11/11/22 0829         No case tracking events are documented in the log.      Pain/Alan Score: Alan Score: 10 (11/11/2022  8:19 AM)

## 2022-11-11 NOTE — [employer]ATION PATIENT INSTRUCTIONS
Discharge Summary/Instructions after an Endoscopic Procedure  Patient Name: Vicky Brand  Patient MRN: [id number]  Patient YOB: 1965 Friday, November 11, 2022  José Luis Irizarry MD  Dear patient,  As a result of recent federal legislation (The Federal Cures Act), you may   receive lab or pathology results from your procedure in your [facility]   account before your physician is able to contact you. Your physician or   their representative will relay the results to you with their   recommendations at their soonest availability.  Thank you,  RESTRICTIONS:  During your procedure today, you received medications for sedation.  These   medications may affect your judgment, balance and coordination.  Therefore,   for 24 hours, you have the following restrictions:   - DO NOT drive a car, operate machinery, make legal/financial decisions,   sign important papers or drink alcohol.    ACTIVITY:  Today: no heavy lifting, straining or running due to procedural   sedation/anesthesia.  The following day: return to full activity including work.  DIET:  Eat and drink normally unless instructed otherwise.     TREATMENT FOR COMMON SIDE EFFECTS:  - Mild abdominal pain, nausea, belching, bloating or excessive gas:  rest,   eat lightly and use a heating pad.  - Sore Throat: treat with throat lozenges and/or gargle with warm salt   water.  - Because air was used during the procedure, expelling large amounts of air   from your rectum or belching is normal.  - If a bowel prep was taken, you may not have a bowel movement for 1-3 days.    This is normal.  SYMPTOMS TO WATCH FOR AND REPORT TO YOUR PHYSICIAN:  1. Abdominal pain or bloating, other than gas cramps.  2. Chest pain.  3. Back pain.  4. Signs of infection such as: chills or fever occurring within 24 hours   after the procedure.  5. Rectal bleeding, which would show as bright red, maroon, or black stools.   (A tablespoon of blood from the rectum is not serious, especially  if   hemorrhoids are present.)  6. Vomiting.  7. Weakness or dizziness.  GO DIRECTLY TO THE NEAREST EMERGENCY ROOM IF YOU HAVE ANY OF THE FOLLOWING:      Difficulty breathing              Chills and/or fever over 101 F   Persistent vomiting and/or vomiting blood   Severe abdominal pain   Severe chest pain   Black, tarry stools   Bleeding- more than one tablespoon   Any other symptom or condition that you feel may need urgent attention  Your doctor recommends these additional instructions:  If any biopsies were taken, your doctors clinic will contact you in 1 to 2   weeks with any results.  - Patient has a contact number available for emergencies.  The signs and   symptoms of potential delayed complications were discussed with the   patient.  Return to normal activities tomorrow.  Written discharge   instructions were provided to the patient.   - Discharge patient to home.   - Resume previous diet.   - Continue present medications.   - Await pathology results.   For questions, problems or results please call your physician - José Luis Irizarry MD at Work:  [phone].  [facility] [place] [facility], EMERGENCY ROOM PHONE NUMBER: [phone]  IF A COMPLICATION OR EMERGENCY SITUATION ARISES AND YOU ARE UNABLE TO REACH   YOUR PHYSICIAN - GO DIRECTLY TO THE EMERGENCY ROOM.  José Luis Irizarry MD  11/11/2022 8:14:05 AM  This report has been verified and signed electronically.  Dear patient,  As a result of recent federal legislation (The Federal Cures Act), you may   receive lab or pathology results from your procedure in your [facility]   account before your physician is able to contact you. Your physician or   their representative will relay the results to you with their   recommendations at their soonest availability.  Thank you,  [employer]ATION

## 2022-11-11 NOTE — H&P
Short Stay Endoscopy History and Physical    PCP - Lia Cristina MD    Procedure - EGD  Sedation: GA  ASA - per anesthesia  Mallampati - per anesthesia  History of Anesthesia problems - no  Family history Anesthesia problems -  no     HPI:  This is a 57 y.o. female here for evaluation of : Dermatitis Herpetiformis, Celiac disease    Reflux - no  Dysphagia - no  Abdominal pain - no  Diarrhea - no    ROS:  Constitutional: No fevers, chills, No weight loss  ENT: No allergies  CV: No chest pain  Pulm: No cough, No shortness of breath  Ophtho: No vision changes  GI: see HPI  Medical History:  has a past medical history of SAMANTHA positive, Celiac disease (09/29/2022), Dyspareunia in female (07/27/2021), Eye abnormalities, Fracture of fifth toe, left, closed, with delayed healing, subsequent encounter (08/21/2018), Intramural leiomyoma of uterus (05/22/2019), Nocturia (06/17/2021), Ocular migraine, CALEB (obstructive sleep apnea), Palpitations (09/29/2022), Paradoxical insomnia (10/30/2017), Posterior vitreous detachment of left eye (05/22/2019), Shoulder pain, left (2007), Stress and adjustment reaction (09/29/2022), and Urticaria.    Surgical History:  has a past surgical history that includes Dilation and curettage of uterus; Tubal ligation; Colonoscopy (N/A, 06/09/2017); Endometrial ablation (N/A, 2017); Hysteroscopy with dilation and curettage of uterus (N/A, 01/19/2022); and De Quervain's release (Right, 06/08/2022).    Family History: family history includes Diabetes in her father; Hypertension in her mother; Ovarian cancer in her maternal grandmother; Pacemaker/defibrilator in her mother; Stroke (age of onset: 49) in her father.. Otherwise no colon cancer, inflammatory bowel disease, or GI malignancies.    Social History:  reports that she has never smoked. She has never used smokeless tobacco. She reports that she does not drink alcohol and does not use drugs.    Review of patient's allergies indicates:   Allergen  Reactions    Sulfa (sulfonamide antibiotics) Rash    Cetirizine      Other reaction(s): Unable to Function  Other reaction(s): Unable to Function    Erythromycin Nausea And Vomiting     Other reaction(s): Nausea    Voltaren [diclofenac sodium] Diarrhea    Zyrtec-d  [cetirizine-pseudoephedrine]      Other reaction(s): Inability to focus/function    Gluten Rash     Celiac's disease       Medications:   Medications Prior to Admission   Medication Sig Dispense Refill Last Dose    amitriptyline (ELAVIL) 10 MG tablet Take 1 tablet (10 mg total) by mouth every evening. 90 tablet 0 Past Month    calcium carbonate (OS-GISSELL) 600 mg calcium (1,500 mg) Tab Take 600 mg by mouth once. gluten free   11/10/2022    estradioL (ESTRACE) 0.01 % (0.1 mg/gram) vaginal cream Place 1 g vaginally twice a week. 42.5 g 1 Past Week    multivitamin capsule Take 1 capsule by mouth once daily. Gluten free   11/10/2022    triamcinolone acetonide 0.025% (KENALOG) 0.025 % Oint Apply to affected areas of face BID prn irritation. Do not use for longer than 2 weeks in a row. 15 g 1 Past Week    dapsone (ACZONE) 5 % topical gel Apply topically 2 (two) times daily. For buttocks prn.  Stop if any worsening rash or irritation 60 g 0     UNABLE TO FIND once daily. Iron 28 mg taken daily          Objective Findings:    Vital Signs: Per nursing notes.    Physical Exam:  General Appearance: Well appearing in no acute distress  Head:   Normocephalic, without obvious abnormality  Eyes:    No scleral icterus  Airway: Open  Neck: No restriction in mobility  Lungs: CTA bilaterally in anterior and posterior fields, no wheezes, no crackles.  Heart:  Regular rate and rhythm, S1, S2 normal, no murmurs heard  Abdomen: Soft, non tender, non distended      Labs:  Lab Results   Component Value Date    WBC 4.07 09/02/2022    HGB 13.0 09/02/2022    HCT 40.8 09/02/2022     09/02/2022    CHOL 172 09/02/2022    TRIG 98 09/02/2022    HDL 53 09/02/2022    ALT 12 09/02/2022     AST 20 09/02/2022     09/02/2022    K 5.3 (H) 09/02/2022     09/02/2022    CREATININE 0.9 09/02/2022    BUN 17 09/02/2022    CO2 28 09/02/2022    TSH 1.195 11/02/2022         I have explained the risks and benefits of endoscopy procedures to the patient including but not limited to bleeding, perforation, infection, and death.    Thank you so much for allowing me to participate in the care of Vicky Irizarry MD

## 2022-11-17 ENCOUNTER — PATIENT MESSAGE (OUTPATIENT)
Dept: GASTROENTEROLOGY | Facility: CLINIC | Age: 57
End: 2022-11-17
Payer: COMMERCIAL

## 2022-11-18 ENCOUNTER — PATIENT MESSAGE (OUTPATIENT)
Dept: GASTROENTEROLOGY | Facility: CLINIC | Age: 57
End: 2022-11-18
Payer: COMMERCIAL

## 2022-11-18 ENCOUNTER — CLINICAL SUPPORT (OUTPATIENT)
Dept: NUTRITION | Facility: CLINIC | Age: 57
End: 2022-11-18
Payer: COMMERCIAL

## 2022-11-18 ENCOUNTER — PATIENT MESSAGE (OUTPATIENT)
Dept: RHEUMATOLOGY | Facility: CLINIC | Age: 57
End: 2022-11-18
Payer: COMMERCIAL

## 2022-11-18 ENCOUNTER — PATIENT MESSAGE (OUTPATIENT)
Dept: ENDOSCOPY | Facility: HOSPITAL | Age: 57
End: 2022-11-18
Payer: COMMERCIAL

## 2022-11-18 ENCOUNTER — TELEPHONE (OUTPATIENT)
Dept: ENDOSCOPY | Facility: HOSPITAL | Age: 57
End: 2022-11-18
Payer: COMMERCIAL

## 2022-11-18 DIAGNOSIS — K90.0 CELIAC DISEASE: Primary | ICD-10-CM

## 2022-11-18 PROCEDURE — 97803 PR MED NUTR THER, SUBSQ, INDIV, EA 15 MIN: ICD-10-PCS | Mod: 95,,, | Performed by: DIETITIAN, REGISTERED

## 2022-11-18 PROCEDURE — 97803 MED NUTRITION INDIV SUBSEQ: CPT | Mod: 95,,, | Performed by: DIETITIAN, REGISTERED

## 2022-11-18 NOTE — TELEPHONE ENCOUNTER
----- Message from José Luis Irizarry MD sent at 11/18/2022  3:22 PM CST -----  Biopsies confirm Celiac disease.Stay 100% Gluten free.

## 2022-11-19 ENCOUNTER — PATIENT MESSAGE (OUTPATIENT)
Dept: GASTROENTEROLOGY | Facility: CLINIC | Age: 57
End: 2022-11-19
Payer: COMMERCIAL

## 2022-11-20 LAB
COMMENT: NORMAL
FINAL PATHOLOGIC DIAGNOSIS: NORMAL
GROSS: NORMAL
Lab: NORMAL
MICROSCOPIC EXAM: NORMAL
SUPPLEMENTAL DIAGNOSIS: NORMAL

## 2022-11-21 ENCOUNTER — PATIENT MESSAGE (OUTPATIENT)
Dept: NUTRITION | Facility: CLINIC | Age: 57
End: 2022-11-21
Payer: COMMERCIAL

## 2022-11-21 NOTE — PROGRESS NOTES
"The patient location is:Pt home  The chief complaint leading to consultation is: newly diagnosis Celiacs    Visit type: audiovisual    Face to Face time with patient: 45 minutes  60 minutes of total time spent on the encounter, which includes face to face time and non-face to face time preparing to see the patient (eg, review of tests), Obtaining and/or reviewing separately obtained history, Documenting clinical information in the electronic or other health record, Independently interpreting results (not separately reported) and communicating results to the patient/family/caregiver, or Care coordination (not separately reported).         Each patient to whom he or she provides medical services by telemedicine is:  (1) informed of the relationship between the physician and patient and the respective role of any other health care provider with respect to management of the patient; and (2) notified that he or she may decline to receive medical services by telemedicine and may withdraw from such care at any time.    Notes:       Nutrition Assessment for Follow up Medical Nutrition Therapy      Reason for MNT visit: Pt in for education and nutrition counseling regarding  celiacs .       ASSESSMENT    Age: 57 y.o.  Wt:   Wt Readings from Last 1 Encounters:   11/11/22 56.8 kg (125 lb 3.5 oz)     Ht:   Ht Readings from Last 1 Encounters:   11/11/22 5' 6" (1.676 m)     BMI:   BMI Readings from Last 1 Encounters:   11/11/22 20.21 kg/m²       Clinical signs/symptoms: celiac's    Medical History:   Past Medical History:   Diagnosis Date    SAMANTHA positive     Celiac disease 09/29/2022    Dyspareunia in female 07/27/2021    Osphena, pelvic floor tx 2021, [facility]    Eye abnormalities     early yellowing of lens, precursor to cataracts, Dr Webster,  wear glasses at all times    Fracture of fifth toe, left, closed, with delayed healing, subsequent encounter 08/21/2018    Intramural leiomyoma of uterus 05/22/2019    Irregular, " dysmenorrhea    Nocturia 06/17/2021    Ocular migraine     vision fuzzy x 3 in 2013, metallic shapes in her vision, Dr Webster,  resolved after 30 min , no pain    CALEB (obstructive sleep apnea)     Lysenko, CPAP start 7/21    Palpitations 09/29/2022    Paradoxical insomnia 10/30/2017    prior to menses, previously took amitryptiline 2.5 mg prn  qhs     Posterior vitreous detachment of left eye 05/22/2019    Flashes of light, ocular migranes, Ophthalmologist Dr Orourke, retinal specialist Dr Soni, rec avoid exercise until she follows up 5/2019    Shoulder pain, left 2007    LEFT, after fall, better with PT at [facility]    Stress and adjustment reaction 09/29/2022    Urticaria      Problem List             Resolved    Status post hysteroscopy         Paradoxical insomnia         Irregular menses         Intramural leiomyoma of uterus         Nocturia         ACLEB (obstructive sleep apnea)         Dyspareunia in female         Lack of coordination         Muscle weakness         Myalgia of pelvic floor         Wrist sprain         Plantar fasciitis         Pain in left elbow         Pain aggravated by activities of daily living         De Quervain's tenosynovitis, right         Pain in right hand         Decreased range of motion of finger of right hand         Decreased range of motion of right wrist         Swelling of right hand         Requires assistance with activities of daily living (ADL)         SAMANTHA positive         Dermatitis herpetiformis         Sjogren's syndrome without extraglandular involvement         Celiac disease         Palpitations         Stress and adjustment reaction            Medications:   Current Outpatient Medications:     calcium carbonate (OS-GISSELL) 600 mg calcium (1,500 mg) Tab, Take 600 mg by mouth once. gluten free, Disp: , Rfl:     dapsone (ACZONE) 5 % topical gel, Apply topically 2 (two) times daily. For buttocks prn.  Stop if any worsening rash or irritation, Disp: 60  g, Rfl: 0    estradioL (ESTRACE) 0.01 % (0.1 mg/gram) vaginal cream, Place 1 g vaginally twice a week., Disp: 42.5 g, Rfl: 1    multivitamin capsule, Take 1 capsule by mouth once daily. Gluten free, Disp: , Rfl:     triamcinolone acetonide 0.025% (KENALOG) 0.025 % Oint, Apply to affected areas of face BID prn irritation. Do not use for longer than 2 weeks in a row., Disp: 15 g, Rfl: 1    UNABLE TO FIND, once daily. Iron 28 mg taken daily, Disp: , Rfl:     Supplements: MVI, calcium, iron    Food allergies  intolerances:     Labs:  Reviewed   No results found for: HGBA1C  Cholesterol   Date Value Ref Range Status   09/02/2022 172 120 - 199 mg/dL Final     Comment:     The National Cholesterol Education Program (NCEP) has set the  following guidelines (reference ranges) for Cholesterol:  Optimal.....................<200 mg/dL  Borderline High.............200-239 mg/dL  High........................> or = 240 mg/dL     07/21/2021 162 120 - 199 mg/dL Final     Comment:     The National Cholesterol Education Program (NCEP) has set the  following guidelines (reference ranges) for Cholesterol:  Optimal.....................<200 mg/dL  Borderline High.............200-239 mg/dL  High........................> or = 240 mg/dL     07/16/2020 162 120 - 199 mg/dL Final     Comment:     The National Cholesterol Education Program (NCEP) has set the  following guidelines (reference ranges) for Cholesterol:  Optimal.....................<200 mg/dL  Borderline High.............200-239 mg/dL  High........................> or = 240 mg/dL       Triglycerides   Date Value Ref Range Status   09/02/2022 98 30 - 150 mg/dL Final     Comment:     The National Cholesterol Education Program (NCEP) has set the  following guidelines (reference values) for triglycerides:  Normal......................<150 mg/dL  Borderline High.............150-199 mg/dL  High........................200-499 mg/dL     07/21/2021 83 30 - 150 mg/dL Final     Comment:      The National Cholesterol Education Program (NCEP) has set the  following guidelines (reference values) for triglycerides:  Normal......................<150 mg/dL  Borderline High.............150-199 mg/dL  High........................200-499 mg/dL     07/16/2020 52 30 - 150 mg/dL Final     Comment:     The National Cholesterol Education Program (NCEP) has set the  following guidelines (reference values) for triglycerides:  Normal......................<150 mg/dL  Borderline High.............150-199 mg/dL  High........................200-499 mg/dL       HDL   Date Value Ref Range Status   09/02/2022 53 40 - 75 mg/dL Final     Comment:     The National Cholesterol Education Program (NCEP) has set the  following guidelines (reference values) for HDL Cholesterol:  Low...............<40 mg/dL  Optimal...........>60 mg/dL     07/21/2021 52 40 - 75 mg/dL Final     Comment:     The National Cholesterol Education Program (NCEP) has set the  following guidelines (reference values) for HDL Cholesterol:  Low...............<40 mg/dL  Optimal...........>60 mg/dL     07/16/2020 62 40 - 75 mg/dL Final     Comment:     The National Cholesterol Education Program (NCEP) has set the  following guidelines (reference values) for HDL Cholesterol:  Low...............<40 mg/dL  Optimal...........>60 mg/dL       LDL Cholesterol   Date Value Ref Range Status   09/02/2022 99.4 63.0 - 159.0 mg/dL Final     Comment:     The National Cholesterol Education Program (NCEP) has set the  following guidelines (reference values) for LDL Cholesterol:  Optimal.......................<130 mg/dL  Borderline High...............130-159 mg/dL  High..........................160-189 mg/dL  Very High.....................>190 mg/dL     07/21/2021 93.4 63.0 - 159.0 mg/dL Final     Comment:     The National Cholesterol Education Program (NCEP) has set the  following guidelines (reference values) for LDL Cholesterol:  Optimal.......................<130  mg/dL  Borderline High...............130-159 mg/dL  High..........................160-189 mg/dL  Very High.....................>190 mg/dL     07/16/2020 89.6 63.0 - 159.0 mg/dL Final     Comment:     The National Cholesterol Education Program (NCEP) has set the  following guidelines (reference values) for LDL Cholesterol:  Optimal.......................<130 mg/dL  Borderline High...............130-159 mg/dL  High..........................160-189 mg/dL  Very High.....................>190 mg/dL       HDL/Cholesterol Ratio   Date Value Ref Range Status   09/02/2022 30.8 20.0 - 50.0 % Final   07/21/2021 32.1 20.0 - 50.0 % Final   07/16/2020 38.3 20.0 - 50.0 % Final     Non-HDL Cholesterol   Date Value Ref Range Status   09/02/2022 119 mg/dL Final     Comment:     Risk category and Non-HDL cholesterol goals:  Coronary heart disease (CHD)or equivalent (10-year risk of CHD >20%):  Non-HDL cholesterol goal     <130 mg/dL  Two or more CHD risk factors and 10-year risk of CHD <= 20%:  Non-HDL cholesterol goal     <160 mg/dL  0 to 1 CHD risk factor:  Non-HDL cholesterol goal     <190 mg/dL     07/21/2021 110 mg/dL Final     Comment:     Risk category and Non-HDL cholesterol goals:  Coronary heart disease (CHD)or equivalent (10-year risk of CHD >20%):  Non-HDL cholesterol goal     <130 mg/dL  Two or more CHD risk factors and 10-year risk of CHD <= 20%:  Non-HDL cholesterol goal     <160 mg/dL  0 to 1 CHD risk factor:  Non-HDL cholesterol goal     <190 mg/dL     07/16/2020 100 mg/dL Final     Comment:     Risk category and Non-HDL cholesterol goals:  Coronary heart disease (CHD)or equivalent (10-year risk of CHD >20%):  Non-HDL cholesterol goal     <130 mg/dL  Two or more CHD risk factors and 10-year risk of CHD <= 20%:  Non-HDL cholesterol goal     <160 mg/dL  0 to 1 CHD risk factor:  Non-HDL cholesterol goal     <190 mg/dL       Vitamin B-12   Date Value Ref Range Status   09/21/2022 1043 (H) 210 - 950 pg/mL Final     TSH    Date Value Ref Range Status   11/02/2022 1.195 0.400 - 4.000 uIU/mL Final         Typical day recall: Pt is doing everything in her power to remove gluten from her diet. She is concerned that since removing gluten she has had diarrhea and stomach pain and is still dealing with a rash on her bottom. Pt is diligent. Discussed food items to help bulk up stool, such as raquel seeds and flax seeds.     Reviewed and noted during consultation. Pt has done a lot of great research on her own. Answered questions, reviewed her typical day and discussed practical recommendations for products and recipe ideas. Pt lost 50 lbs in 2005.    Beverages: water    Dining out:  Not dining out currently    Alcohol: nonsmoker, no alcohol    Lifestyle Influences  Support System: Self, family    Meal preparation/shopping: self, spouse    Current Activity Level: 30 minutes on elliptical or walking daily    Patient motivation, anticipated barriers, expected compliance: Patient is motivated and has verbalized understanding and intent to comply    DIAGNOSIS   Problem: Celiac's disease  Etiology: RT body's inability to breakdown gluten  Signs/Symptoms: blood work    INTERVENTION  Nutrition prescription: estimated energy requirements:   Calories: 1500  Protein:  g  Carbohydrates: 130-145 g  Focus on plant-based, heart healthy fats  Total Fat: 50-60 g  Baseline for fluids: 65 fl ounces + sweat loss    Recommendations & Goals:  Patient goals and recommendations are tailored to the specific patient's needs, readiness to change, lifestyle, culture, skills, resources, & abilities. Strategies to help achieve these nutrition-related goals were discussed which can include but are not limited to SMART goal setting & mindful eating.     Aim for a minimum of 7 hours sleep   Exercise 60 minutes most days  Eat breakfast within 1-2 hours of waking up  Try not to skip any meals or snacks, not going more than 3-4 hours without eating.   At each meal and  snack, try to include a source of fiber + lean protein + healthy fat.     Written Materials Provided  These resources are intended to assist the patient in making it easier to choose recommended options when eating out & to identify better-for-you brands at the grocery store:    Meal Planning Guide with recommendations discussed along with portion sizes and a customized meal plan   Eat Fit aj card as a reminder to download the aj to ones smart phone which provides: current Eat Fit partners, approved menu options, food labels for carb counting, & recipes   On-the-Go Food Guide  Brand Specific Eat Fit Grocery Product list   RD contact information- for patient to contact regarding any questions, needs, and/or concerns that may arise     MONITORING & EVALUATION    Communicated with healthcare provider    Documented plan for referral to appropriate agency/healthcare provider as needed    Comprehension: good     Motivation to change: high    Follow-up: Yes, in 6-8 weeks    Counseling time: 1 Hours

## 2022-11-22 ENCOUNTER — PATIENT MESSAGE (OUTPATIENT)
Dept: PRIMARY CARE CLINIC | Facility: CLINIC | Age: 57
End: 2022-11-22
Payer: COMMERCIAL

## 2022-11-23 ENCOUNTER — PATIENT MESSAGE (OUTPATIENT)
Dept: GASTROENTEROLOGY | Facility: CLINIC | Age: 57
End: 2022-11-23
Payer: COMMERCIAL

## 2022-11-23 ENCOUNTER — OFFICE VISIT (OUTPATIENT)
Dept: OBSTETRICS AND GYNECOLOGY | Facility: CLINIC | Age: 57
End: 2022-11-23
Payer: COMMERCIAL

## 2022-11-23 VITALS
SYSTOLIC BLOOD PRESSURE: 122 MMHG | DIASTOLIC BLOOD PRESSURE: 76 MMHG | HEIGHT: 66 IN | WEIGHT: 129.88 LBS | BODY MASS INDEX: 20.87 KG/M2

## 2022-11-23 DIAGNOSIS — Z01.419 WELL WOMAN EXAM: Primary | ICD-10-CM

## 2022-11-23 DIAGNOSIS — N95.2 VAGINAL ATROPHY: ICD-10-CM

## 2022-11-23 DIAGNOSIS — R19.7 DIARRHEA, UNSPECIFIED TYPE: Primary | ICD-10-CM

## 2022-11-23 PROBLEM — A04.8 HELICOBACTER PYLORI INFECTION: Status: ACTIVE | Noted: 2022-11-23

## 2022-11-23 PROCEDURE — 99396 PR PREVENTIVE VISIT,EST,40-64: ICD-10-PCS | Mod: S$GLB,,, | Performed by: STUDENT IN AN ORGANIZED HEALTH CARE EDUCATION/TRAINING PROGRAM

## 2022-11-23 PROCEDURE — 99999 PR PBB SHADOW E&M-EST. PATIENT-LVL III: CPT | Mod: PBBFAC,,, | Performed by: STUDENT IN AN ORGANIZED HEALTH CARE EDUCATION/TRAINING PROGRAM

## 2022-11-23 PROCEDURE — 3008F PR BODY MASS INDEX (BMI) DOCUMENTED: ICD-10-PCS | Mod: CPTII,S$GLB,, | Performed by: STUDENT IN AN ORGANIZED HEALTH CARE EDUCATION/TRAINING PROGRAM

## 2022-11-23 PROCEDURE — 3078F DIAST BP <80 MM HG: CPT | Mod: CPTII,S$GLB,, | Performed by: STUDENT IN AN ORGANIZED HEALTH CARE EDUCATION/TRAINING PROGRAM

## 2022-11-23 PROCEDURE — 3078F PR MOST RECENT DIASTOLIC BLOOD PRESSURE < 80 MM HG: ICD-10-PCS | Mod: CPTII,S$GLB,, | Performed by: STUDENT IN AN ORGANIZED HEALTH CARE EDUCATION/TRAINING PROGRAM

## 2022-11-23 PROCEDURE — 99396 PREV VISIT EST AGE 40-64: CPT | Mod: S$GLB,,, | Performed by: STUDENT IN AN ORGANIZED HEALTH CARE EDUCATION/TRAINING PROGRAM

## 2022-11-23 PROCEDURE — 3074F PR MOST RECENT SYSTOLIC BLOOD PRESSURE < 130 MM HG: ICD-10-PCS | Mod: CPTII,S$GLB,, | Performed by: STUDENT IN AN ORGANIZED HEALTH CARE EDUCATION/TRAINING PROGRAM

## 2022-11-23 PROCEDURE — 3074F SYST BP LT 130 MM HG: CPT | Mod: CPTII,S$GLB,, | Performed by: STUDENT IN AN ORGANIZED HEALTH CARE EDUCATION/TRAINING PROGRAM

## 2022-11-23 PROCEDURE — 99999 PR PBB SHADOW E&M-EST. PATIENT-LVL III: ICD-10-PCS | Mod: PBBFAC,,, | Performed by: STUDENT IN AN ORGANIZED HEALTH CARE EDUCATION/TRAINING PROGRAM

## 2022-11-23 PROCEDURE — 1159F PR MEDICATION LIST DOCUMENTED IN MEDICAL RECORD: ICD-10-PCS | Mod: CPTII,S$GLB,, | Performed by: STUDENT IN AN ORGANIZED HEALTH CARE EDUCATION/TRAINING PROGRAM

## 2022-11-23 PROCEDURE — 1159F MED LIST DOCD IN RCRD: CPT | Mod: CPTII,S$GLB,, | Performed by: STUDENT IN AN ORGANIZED HEALTH CARE EDUCATION/TRAINING PROGRAM

## 2022-11-23 PROCEDURE — 3008F BODY MASS INDEX DOCD: CPT | Mod: CPTII,S$GLB,, | Performed by: STUDENT IN AN ORGANIZED HEALTH CARE EDUCATION/TRAINING PROGRAM

## 2022-11-23 RX ORDER — ESTRADIOL 0.1 MG/G
1 CREAM VAGINAL
Qty: 42.5 G | Refills: 1 | Status: SHIPPED | OUTPATIENT
Start: 2022-11-24 | End: 2023-12-12

## 2022-11-23 RX ORDER — LIDOCAINE HYDROCHLORIDE 20 MG/ML
JELLY TOPICAL
Qty: 30 ML | Refills: 1 | Status: SHIPPED | OUTPATIENT
Start: 2022-11-23 | End: 2023-11-23

## 2022-11-23 NOTE — PROGRESS NOTES
History & Physical  Gynecology      SUBJECTIVE:     Chief Complaint: Well Woman       History of Present Illness:    Vicky Brand is a 57 y.o.  who presents for annual physical exam.   She was recently diagnosed with celiac disease and is adjusting to changes in diet. Has a painful rash on her buttocks that diagnosed as several different things before a dermatologist recommended testing for Celiac. She states it is not itchy but is painful, especially sitting down. She also reports feeling very emotional lately. Curious if hormonal imbalance could be playing a role.     She is postmenopausal. Had ablation and BTL when she was 48. Had episode of PMB last year with benign D&C pathology. No bleeding since D&C. Had hot flashes years ago but does not have them any more. Has tried oral estrogen therapy but did not notice that it helped very much. Has seen pelvic health PT in the past, was given dilators. Has not been using those lately.   She is sexually active with her , although not of late due to the rash on her bottom. States was told that dapsone is only treatment and that she is hesitant to use this based on possible side effects.   Using topical estrogen twice weekly for dryness. Would like refill today.   No vaginal discharge, odor, or itching.    She denies urinary incontinence, dysuria, frequency. .   She denies history of abnormal pap smear.   Last pap was  and was NILM.     She has no family history of breast or colon cancer.   Last mammogram was 2021 and was benign.   She has a family history of ovarian [place] in [facility] [place]   Colonoscopy done in 2017.    She does not and has never used tobacco products.  She does not drink alcohol .   She does not use recreational or other drugs.   She lives with her  and reports she feels safe in her home.       Review of patient's allergies indicates:   Allergen Reactions    Sulfa (sulfonamide antibiotics) Rash    Cetirizine      Other  reaction(s): Unable to Function  Other reaction(s): Unable to Function    Erythromycin Nausea And Vomiting     Other reaction(s): Nausea    Voltaren [diclofenac sodium] Diarrhea    Zyrtec-d  [cetirizine-pseudoephedrine]      Other reaction(s): Inability to focus/function    Gluten Rash     Celiac's disease       Past Medical History:   Diagnosis Date    SAMANTHA positive     Celiac disease 09/29/2022    Dyspareunia in female 07/27/2021    Osphena, pelvic floor tx 2021, [facility]    Eye abnormalities     early yellowing of lens, precursor to cataracts, Dr Webster,  wear glasses at all times    Fracture of fifth toe, left, closed, with delayed healing, subsequent encounter 08/21/2018    Helicobacter pylori infection     tx GI Dr José Luis Irizarry 2022    Intramural leiomyoma of uterus 05/22/2019    Irregular, dysmenorrhea    Nocturia 06/17/2021    Ocular migraine     vision fuzzy x 3 in 2013, metallic shapes in her vision, Dr Webster,  resolved after 30 min , no pain    CALEB (obstructive sleep apnea)     Lysenko, CPAP start 7/21    Palpitations 09/29/2022    Paradoxical insomnia 10/30/2017    prior to menses, previously took amitryptiline 2.5 mg prn  qhs     Posterior vitreous detachment of left eye 05/22/2019    Flashes of light, ocular migranes, Ophthalmologist Dr Orourke, retinal specialist Dr Soni, rec avoid exercise until she follows up 5/2019    Shoulder pain, left 2007    LEFT, after fall, better with PT at [facility]    Stress and adjustment reaction 09/29/2022    Urticaria      Past Surgical History:   Procedure Laterality Date    COLONOSCOPY N/A 06/09/2017    Procedure: COLONOSCOPY;  Surgeon: Davy Prince MD;  Location: [facility] ENDO ([address] [place] [address]);  Service: Endoscopy;  Laterality: N/A;    DE QUERVAIN'S RELEASE Right 06/08/2022    Procedure: RELEASE, HAND, FOR DEQUERVAIN'S TENOSYNOVITIS - RIGHT;  Surgeon: Devan Wagner MD;  Location: [facility] OR;  Service: Orthopedics;  Laterality: Right;    DILATION AND  CURETTAGE OF UTERUS      ENDOMETRIAL ABLATION N/A     ESOPHAGOGASTRODUODENOSCOPY N/A 2022    Procedure: EGD (ESOPHAGOGASTRODUODENOSCOPY);  Surgeon: José Luis Irizarry MD;  Location: [address]);  Service: Endoscopy;  Laterality: N/A;  Fully vaccinated/ inst portal-RB    HYSTEROSCOPY WITH DILATION AND CURETTAGE OF UTERUS N/A 2022    Procedure: HYSTEROSCOPY, WITH DILATION AND CURETTAGE OF UTERUS;  Surgeon: Jannette Jarrell MD;  Location: [facility];  Service: OB/GYN;  Laterality: N/A;    TUBAL LIGATION       OB History          1    Para        Term   0            AB   1    Living             SAB   1    IAB        Ectopic        Multiple        Live Births                   Family History   Problem Relation Age of Onset    Diabetes Father     Stroke Father 49    Hypertension Mother     Pacemaker/defibrilator Mother     Ovarian cancer Maternal Grandmother     Melanoma Neg Hx     Breast cancer Neg Hx     Colon cancer Neg Hx      Social History     Tobacco Use    Smoking status: Never    Smokeless tobacco: Never    Tobacco comments:     None   Substance Use Topics    Alcohol use: No    Drug use: No       Current Outpatient Medications   Medication Sig    calcium carbonate (OS-GISSELL) 600 mg calcium (1,500 mg) Tab Take 600 mg by mouth once. gluten free    dapsone (ACZONE) 5 % topical gel Apply topically 2 (two) times daily. For buttocks prn.  Stop if any worsening rash or irritation    estradioL (ESTRACE) 0.01 % (0.1 mg/gram) vaginal cream Place 1 g vaginally twice a week.    LIDOcaine HCL 2% (XYLOCAINE) 2 % jelly Apply to affected area daily prn    multivitamin capsule Take 1 capsule by mouth once daily. Gluten free    triamcinolone acetonide 0.025% (KENALOG) 0.025 % Oint Apply to affected areas of face BID prn irritation. Do not use for longer than 2 weeks in a row.    UNABLE TO FIND once daily. Iron 28 mg taken daily     No current facility-administered medications for this visit.          Review of Systems:  Review of Systems   Constitutional:  Negative for chills and fever.   Respiratory:  Negative for shortness of breath.    Cardiovascular:  Negative for chest pain.   Gastrointestinal:  Negative for abdominal pain, constipation, diarrhea, nausea and vomiting.   Endocrine: Negative for hot flashes.   Genitourinary:  Positive for vaginal dryness. Negative for dysuria, pelvic pain, vaginal bleeding, vaginal discharge and vaginal odor.   Integumentary:  Positive for rash. Negative for breast mass, nipple discharge and breast skin changes.   Neurological:  Negative for headaches.   Breast: Negative for mass, nipple discharge and skin changes     OBJECTIVE:     Physical Exam:  Physical Exam  Vitals reviewed. Exam conducted with a chaperone present.   Constitutional:       General: She is not in acute distress.     Appearance: She is well-developed. She is not ill-appearing, toxic-appearing or diaphoretic.   HENT:      Head: Normocephalic and atraumatic.   Eyes:      Conjunctiva/sclera: Conjunctivae normal.   Cardiovascular:      Rate and Rhythm: Normal rate.   Pulmonary:      Effort: Pulmonary effort is normal. No respiratory distress.   Chest:   Breasts:     Right: Normal. No swelling, bleeding, inverted nipple, mass, nipple discharge, skin change or tenderness.      Left: Normal. No swelling, bleeding, inverted nipple, mass, nipple discharge, skin change or tenderness.   Abdominal:      Palpations: Abdomen is soft. There is no mass.      Tenderness: There is no abdominal tenderness. There is no guarding or rebound.   Genitourinary:     General: Normal vulva.      Vagina: Normal. No vaginal discharge or bleeding.      Cervix: No cervical motion tenderness.      Uterus: Not enlarged and not tender.       Adnexa:         Right: No mass, tenderness or fullness.          Left: No mass, tenderness or fullness.            Comments: Very sensitive rash   Musculoskeletal:         General: Normal range  of motion.      Cervical back: Normal range of motion.   Skin:     General: Skin is warm and dry.   Neurological:      Mental Status: She is alert.   Psychiatric:         Mood and Affect: Mood normal.         Behavior: Behavior normal.         ASSESSMENT:       ICD-10-CM ICD-9-CM    1. Well woman exam  Z01.419 V72.31              Plan:      -- Pap up to date.  -- MMG scheduled.  -- Colonoscopy up to date.  -- Rash consistent with dermatitis herpetiformis.  -- Vaginal estrogen refilled.  -- Discussed limited utility of hormone panel. Suspect emotional lability more related to recent diagnosis and associated changes.   -- RTC in 1 year for annual.      Rosalia Covington MD

## 2022-11-26 ENCOUNTER — PATIENT MESSAGE (OUTPATIENT)
Dept: GASTROENTEROLOGY | Facility: CLINIC | Age: 57
End: 2022-11-26
Payer: COMMERCIAL

## 2022-11-26 ENCOUNTER — PATIENT MESSAGE (OUTPATIENT)
Dept: SURGERY | Facility: CLINIC | Age: 57
End: 2022-11-26
Payer: COMMERCIAL

## 2022-11-28 ENCOUNTER — PATIENT MESSAGE (OUTPATIENT)
Dept: OBSTETRICS AND GYNECOLOGY | Facility: CLINIC | Age: 57
End: 2022-11-28
Payer: COMMERCIAL

## 2022-11-28 ENCOUNTER — TELEPHONE (OUTPATIENT)
Dept: PRIMARY CARE CLINIC | Facility: CLINIC | Age: 57
End: 2022-11-28
Payer: COMMERCIAL

## 2022-11-28 ENCOUNTER — PATIENT MESSAGE (OUTPATIENT)
Dept: DERMATOLOGY | Facility: CLINIC | Age: 57
End: 2022-11-28
Payer: COMMERCIAL

## 2022-11-28 NOTE — TELEPHONE ENCOUNTER
Pt requesting follow up labs for Celiac disease. She was told by her GI Dr that her primary care DR should order the blood work.

## 2022-11-28 NOTE — TELEPHONE ENCOUNTER
----- Message from Thuy Michelle sent at 11/28/2022  1:27 PM CST -----  Regarding: lab order  Contact: patient [phone]  2nd request.    Patient is requesting follow up order for labs for Celiac  disease.     Please call and advise

## 2022-11-28 NOTE — TELEPHONE ENCOUNTER
Spoke with patio. They do not have gel, only ointment, per Dr. Covington, she can have ointment.     Called patient and informed  Pt verbalized understanding

## 2022-11-29 ENCOUNTER — PATIENT MESSAGE (OUTPATIENT)
Dept: GASTROENTEROLOGY | Facility: CLINIC | Age: 57
End: 2022-11-29
Payer: COMMERCIAL

## 2022-11-29 ENCOUNTER — PATIENT MESSAGE (OUTPATIENT)
Dept: NUTRITION | Facility: CLINIC | Age: 57
End: 2022-11-29
Payer: COMMERCIAL

## 2022-11-29 ENCOUNTER — LAB VISIT (OUTPATIENT)
Dept: LAB | Facility: HOSPITAL | Age: 57
End: 2022-11-29
Attending: INTERNAL MEDICINE
Payer: COMMERCIAL

## 2022-11-29 DIAGNOSIS — K90.0 CELIAC DISEASE: Primary | ICD-10-CM

## 2022-11-29 DIAGNOSIS — R19.7 DIARRHEA, UNSPECIFIED TYPE: ICD-10-CM

## 2022-11-29 PROCEDURE — 87046 STOOL CULTR AEROBIC BACT EA: CPT | Mod: 59 | Performed by: INTERNAL MEDICINE

## 2022-11-29 PROCEDURE — 87427 SHIGA-LIKE TOXIN AG IA: CPT | Mod: 59 | Performed by: INTERNAL MEDICINE

## 2022-11-29 PROCEDURE — 87045 FECES CULTURE AEROBIC BACT: CPT | Performed by: INTERNAL MEDICINE

## 2022-11-29 PROCEDURE — 87177 OVA AND PARASITES SMEARS: CPT | Performed by: INTERNAL MEDICINE

## 2022-11-29 PROCEDURE — 82653 EL-1 FECAL QUANTITATIVE: CPT | Performed by: INTERNAL MEDICINE

## 2022-11-29 PROCEDURE — 87328 CRYPTOSPORIDIUM AG IA: CPT | Performed by: INTERNAL MEDICINE

## 2022-11-29 PROCEDURE — 87209 SMEAR COMPLEX STAIN: CPT | Performed by: INTERNAL MEDICINE

## 2022-11-30 ENCOUNTER — PATIENT MESSAGE (OUTPATIENT)
Dept: GASTROENTEROLOGY | Facility: CLINIC | Age: 57
End: 2022-11-30
Payer: COMMERCIAL

## 2022-11-30 LAB
CRYPTOSP AG STL QL IA: NEGATIVE
E COLI SXT1 STL QL IA: NEGATIVE
E COLI SXT2 STL QL IA: NEGATIVE
G LAMBLIA AG STL QL IA: NEGATIVE
O+P STL MICRO: NORMAL

## 2022-12-02 ENCOUNTER — PATIENT MESSAGE (OUTPATIENT)
Dept: INTERNAL MEDICINE | Facility: CLINIC | Age: 57
End: 2022-12-02
Payer: COMMERCIAL

## 2022-12-02 LAB
BACTERIA STL CULT: NORMAL
ELASTASE 1, FECAL: 304 MCG/G

## 2022-12-05 ENCOUNTER — TELEPHONE (OUTPATIENT)
Dept: ENDOSCOPY | Facility: HOSPITAL | Age: 57
End: 2022-12-05
Payer: COMMERCIAL

## 2022-12-05 ENCOUNTER — PATIENT MESSAGE (OUTPATIENT)
Dept: ENDOSCOPY | Facility: HOSPITAL | Age: 57
End: 2022-12-05
Payer: COMMERCIAL

## 2022-12-06 NOTE — TELEPHONE ENCOUNTER
----- Message from José Luis Irizarry MD sent at 12/5/2022  9:29 AM CST -----  Stool tests are all normal.

## 2022-12-08 ENCOUNTER — OFFICE VISIT (OUTPATIENT)
Dept: DERMATOLOGY | Facility: CLINIC | Age: 57
End: 2022-12-08
Payer: COMMERCIAL

## 2022-12-08 DIAGNOSIS — L13.0 DERMATITIS HERPETIFORMIS: Primary | ICD-10-CM

## 2022-12-08 DIAGNOSIS — L81.9 POSTINFLAMMATORY PIGMENTARY CHANGES: ICD-10-CM

## 2022-12-08 PROCEDURE — 99213 PR OFFICE/OUTPT VISIT, EST, LEVL III, 20-29 MIN: ICD-10-PCS | Mod: S$GLB,,, | Performed by: DERMATOLOGY

## 2022-12-08 PROCEDURE — 1159F PR MEDICATION LIST DOCUMENTED IN MEDICAL RECORD: ICD-10-PCS | Mod: CPTII,S$GLB,, | Performed by: DERMATOLOGY

## 2022-12-08 PROCEDURE — 1159F MED LIST DOCD IN RCRD: CPT | Mod: CPTII,S$GLB,, | Performed by: DERMATOLOGY

## 2022-12-08 PROCEDURE — 99213 OFFICE O/P EST LOW 20 MIN: CPT | Mod: S$GLB,,, | Performed by: DERMATOLOGY

## 2022-12-08 PROCEDURE — 1160F RVW MEDS BY RX/DR IN RCRD: CPT | Mod: CPTII,S$GLB,, | Performed by: DERMATOLOGY

## 2022-12-08 PROCEDURE — 1160F PR REVIEW ALL MEDS BY PRESCRIBER/CLIN PHARMACIST DOCUMENTED: ICD-10-PCS | Mod: CPTII,S$GLB,, | Performed by: DERMATOLOGY

## 2022-12-08 NOTE — PROGRESS NOTES
Patient Information  Name: Vicky Brand  : 1965  MRN: [id number]     Referring Physician:  No ref. provider found   Primary Care Physician:  Lia Cristina MD   Date of Visit: 2022      Subjective:     History of Present lllness:    Vicky Brand is a 57 y.o. female with celiac disease who presents with a chief complaint of DH.  Location: elbow, buttock  Signs/Symptoms: today is a good day, she states that she applied steroid once then noticed blood once she wiped so she did not apply again  Symptom course: unchanged  Current treatment:  triamcinolone acetonide 0.025% ointment- used once then stopped due to steroid making condition worse by causing bleeding; has avoided gluten for 3 months    Patient was last seen: 11/3/2022.  Prior notes by myself reviewed.   Clinical documentation obtained by nursing staff reviewed.    Review of Systems    Objective:   Physical Exam   Constitutional: She appears well-developed and well-nourished. No distress.   Neurological: She is alert and oriented to person, place, and time. She is not disoriented.   Psychiatric: She has a normal mood and affect.   Skin:   Areas Examined (abnormalities noted in diagram):   Genitals / Buttocks / Groin Inspection Performed          Diagram Legend     Erythematous scaling macule/papule c/w actinic keratosis       Vascular papule c/w angioma      Pigmented verrucoid papule/plaque c/w seborrheic keratosis      Yellow umbilicated papule c/w sebaceous hyperplasia      Irregularly shaped tan macule c/w lentigo     1-2 mm smooth white papules consistent with Milia      Movable subcutaneous cyst with punctum c/w epidermal inclusion cyst      Subcutaneous movable cyst c/w pilar cyst      Firm pink to brown papule c/w dermatofibroma      Pedunculated fleshy papule(s) c/w skin tag(s)      Evenly pigmented macule c/w junctional nevus     Mildly variegated pigmented, slightly irregular-bordered macule c/w mildly atypical  nevus      Flesh colored to evenly pigmented papule c/w intradermal nevus       Pink pearly papule/plaque c/w basal cell carcinoma      Erythematous hyperkeratotic cursted plaque c/w SCC      Surgical scar with no sign of skin cancer recurrence      Open and closed comedones      Inflammatory papules and pustules      Verrucoid papule consistent consistent with wart     Erythematous eczematous patches and plaques     Dystrophic onycholytic nail with subungual debris c/w onychomycosis     Umbilicated papule    Erythematous-base heme-crusted tan verrucoid plaque consistent with inflamed seborrheic keratosis     Erythematous Silvery Scaling Plaque c/w Psoriasis     See annotation    No images are attached to the encounter or orders placed in the encounter.      [] Data reviewed  [] Prior external notes reviewed  [] Independent review of test  [] Management discussed with another provider  [] Independent historian    Assessment / Plan:        Dermatitis herpetiformis   - stable and chronic  Currently well-controlled. Continue gluten avoidance.   Rec'd use of Rx topical dapsone if she experiences small flares.    Postinflammatory pigmentary changes  This is a normal discoloration of the skin after an inflammatory process has resolved. It may take months to years to fade.    Follow up if symptoms worsen or fail to improve.      Rocio Finch MD, FAAD  [facility]

## 2022-12-12 ENCOUNTER — HOSPITAL ENCOUNTER (OUTPATIENT)
Dept: RADIOLOGY | Facility: HOSPITAL | Age: 57
Discharge: HOME | End: 2022-12-12
Attending: OBSTETRICS & GYNECOLOGY
Payer: COMMERCIAL

## 2022-12-12 VITALS — BODY MASS INDEX: 20.73 KG/M2 | WEIGHT: 129 LBS | HEIGHT: 66 IN

## 2022-12-12 DIAGNOSIS — Z12.31 BREAST CANCER SCREENING BY MAMMOGRAM: ICD-10-CM

## 2022-12-12 PROCEDURE — 77063 BREAST TOMOSYNTHESIS BI: CPT | Mod: TC,PO

## 2022-12-12 PROCEDURE — 77063 BREAST TOMOSYNTHESIS BI: CPT | Mod: 26,,, | Performed by: RADIOLOGY

## 2022-12-12 PROCEDURE — 77067 SCR MAMMO BI INCL CAD: CPT | Mod: 26,,, | Performed by: RADIOLOGY

## 2022-12-12 PROCEDURE — 77063 MAMMO DIGITAL SCREENING BILAT WITH TOMO: ICD-10-PCS | Mod: 26,,, | Performed by: RADIOLOGY

## 2022-12-12 PROCEDURE — 77067 MAMMO DIGITAL SCREENING BILAT WITH TOMO: ICD-10-PCS | Mod: 26,,, | Performed by: RADIOLOGY

## 2022-12-12 PROCEDURE — 77067 SCR MAMMO BI INCL CAD: CPT | Mod: TC,PO

## 2022-12-13 ENCOUNTER — PATIENT MESSAGE (OUTPATIENT)
Dept: DERMATOLOGY | Facility: CLINIC | Age: 57
End: 2022-12-13
Payer: COMMERCIAL

## 2022-12-13 ENCOUNTER — PATIENT MESSAGE (OUTPATIENT)
Dept: INTERNAL MEDICINE | Facility: CLINIC | Age: 57
End: 2022-12-13
Payer: COMMERCIAL

## 2022-12-14 NOTE — TELEPHONE ENCOUNTER
Pt states she has Celiac disease and blood markers for Sjogren's Syndrome and want to know can you manage her life long complications,please advise

## 2022-12-15 ENCOUNTER — PATIENT MESSAGE (OUTPATIENT)
Dept: DERMATOLOGY | Facility: CLINIC | Age: 57
End: 2022-12-15

## 2022-12-15 ENCOUNTER — OFFICE VISIT (OUTPATIENT)
Dept: DERMATOLOGY | Facility: CLINIC | Age: 57
End: 2022-12-15
Payer: COMMERCIAL

## 2022-12-15 DIAGNOSIS — L13.0 DERMATITIS HERPETIFORMIS: Primary | ICD-10-CM

## 2022-12-15 PROCEDURE — 1160F PR REVIEW ALL MEDS BY PRESCRIBER/CLIN PHARMACIST DOCUMENTED: ICD-10-PCS | Mod: CPTII,S$GLB,, | Performed by: DERMATOLOGY

## 2022-12-15 PROCEDURE — 1159F PR MEDICATION LIST DOCUMENTED IN MEDICAL RECORD: ICD-10-PCS | Mod: CPTII,S$GLB,, | Performed by: DERMATOLOGY

## 2022-12-15 PROCEDURE — 99214 PR OFFICE/OUTPT VISIT, EST, LEVL IV, 30-39 MIN: ICD-10-PCS | Mod: S$GLB,,, | Performed by: DERMATOLOGY

## 2022-12-15 PROCEDURE — 1160F RVW MEDS BY RX/DR IN RCRD: CPT | Mod: CPTII,S$GLB,, | Performed by: DERMATOLOGY

## 2022-12-15 PROCEDURE — 1159F MED LIST DOCD IN RCRD: CPT | Mod: CPTII,S$GLB,, | Performed by: DERMATOLOGY

## 2022-12-15 PROCEDURE — 99214 OFFICE O/P EST MOD 30 MIN: CPT | Mod: S$GLB,,, | Performed by: DERMATOLOGY

## 2022-12-15 RX ORDER — CLOBETASOL PROPIONATE 0.5 MG/G
CREAM TOPICAL
Qty: 60 G | Refills: 2 | Status: SHIPPED | OUTPATIENT
Start: 2022-12-15

## 2022-12-15 NOTE — PROGRESS NOTES
Patient Information  Name: Vicky Brand  : 1965  MRN: [id number]     Referring Physician:  No ref. provider found   Primary Care Physician:  Lia Cristina MD   Date of Visit: 12/15/2022      Subjective:     History of Present lllness:    Vicky Brand is a 57 y.o. female who presents with a chief complaint of flare up on elbows and buttocks.    Diagnosis: dermatitis herpetiformis  Location: elbows and buttocks  Signs/Symptoms: fluid-filled blisters, painful burning, interferes with her sleep, painful to sit  Symptom course: worsening  Current treatment: triamcinolone 0.025% ointment, dapsone gel, and lidocaine cream    Patient was last seen: 2022.  Prior notes by myself reviewed.   Clinical documentation obtained by nursing staff reviewed.    Review of Systems   Constitutional:  Negative for fever, chills, fatigue and malaise.   HENT:  Negative for sore throat, rhinorrhea, mouth sores, lip swelling, tongue swelling and headaches.    Eyes:  Negative for itching, eye watering and eye irritation.   Respiratory:  Negative for shortness of breath.    Gastrointestinal:  Negative for nausea, vomiting, abdominal pain and diarrhea.   Genitourinary:  Negative for genital sores and genital itching.   Musculoskeletal:  Negative for arthralgias.   Skin:  Positive for rash. Negative for itching and sun sensitivity.   Neurological:  Negative for focal weakness, numbness and headaches.     Objective:   Physical Exam   Constitutional: She appears well-developed and well-nourished. No distress.   Neurological: She is alert and oriented to person, place, and time. She is not disoriented.   Psychiatric: She has a normal mood and affect.   Skin:   Areas Examined (abnormalities noted in diagram):   Genitals / Buttocks / Groin Inspection Performed  RUE Inspected  LUE Inspection Performed          Diagram Legend     Erythematous scaling macule/papule c/w actinic keratosis       Vascular papule c/w  angioma      Pigmented verrucoid papule/plaque c/w seborrheic keratosis      Yellow umbilicated papule c/w sebaceous hyperplasia      Irregularly shaped tan macule c/w lentigo     1-2 mm smooth white papules consistent with Milia      Movable subcutaneous cyst with punctum c/w epidermal inclusion cyst      Subcutaneous movable cyst c/w pilar cyst      Firm pink to brown papule c/w dermatofibroma      Pedunculated fleshy papule(s) c/w skin tag(s)      Evenly pigmented macule c/w junctional nevus     Mildly variegated pigmented, slightly irregular-bordered macule c/w mildly atypical nevus      Flesh colored to evenly pigmented papule c/w intradermal nevus       Pink pearly papule/plaque c/w basal cell carcinoma      Erythematous hyperkeratotic cursted plaque c/w SCC      Surgical scar with no sign of skin cancer recurrence      Open and closed comedones      Inflammatory papules and pustules      Verrucoid papule consistent consistent with wart     Erythematous eczematous patches and plaques     Dystrophic onycholytic nail with subungual debris c/w onychomycosis     Umbilicated papule    Erythematous-base heme-crusted tan verrucoid plaque consistent with inflamed seborrheic keratosis     Erythematous Silvery Scaling Plaque c/w Psoriasis     See annotation    No images are attached to the encounter or orders placed in the encounter.      [] Data reviewed  [] Prior external notes reviewed  [] Independent review of test  [] Management discussed with another provider  [] Independent historian    Assessment / Plan:        Dermatitis herpetiformis  - chronic problem with exacerbation/progression  Discussed patient's diagnosis, prognosis, and treatment options.  Discussed benefits and risks of oral dapsone, including but not limited to a decrease in blood counts (may feel weak, dizzy, lethargic, have headaches), nausea, peripheral motor neuropathy, hepatitis, severe rash, and need for lab monitoring.   Will start with trial  of potent topical steroid.  -     clobetasoL (TEMOVATE) 0.05 % cream; Apply to affected areas of body BID prn rash.  Dispense: 60 g; Refill: 2  Side effects from the overuse of topical steroids include thinning of skin, easy tearing/bruising of skin, stretch marks, spider veins, etc. Use the topical steroid no more than 2 days per week if used long-term and/or take breaks from the topical steroid, especially if any of the above side effects are noticed.    Follow up in about 3 months (around 3/15/2023) for follow up, or sooner if symptoms worsening or not improving.      Rocio Finch MD, FAAD  [facility]

## 2023-01-03 ENCOUNTER — PATIENT MESSAGE (OUTPATIENT)
Dept: INTERNAL MEDICINE | Facility: CLINIC | Age: 58
End: 2023-01-03
Payer: COMMERCIAL

## 2023-01-05 ENCOUNTER — OFFICE VISIT (OUTPATIENT)
Dept: RHEUMATOLOGY | Facility: CLINIC | Age: 58
End: 2023-01-05
Payer: COMMERCIAL

## 2023-01-05 VITALS
HEART RATE: 73 BPM | RESPIRATION RATE: 20 BRPM | DIASTOLIC BLOOD PRESSURE: 75 MMHG | HEIGHT: 66 IN | SYSTOLIC BLOOD PRESSURE: 119 MMHG | BODY MASS INDEX: 20.41 KG/M2 | WEIGHT: 127 LBS | OXYGEN SATURATION: 99 %

## 2023-01-05 DIAGNOSIS — M35.00 SJOGREN SYNDROME, UNSPECIFIED: Primary | ICD-10-CM

## 2023-01-05 DIAGNOSIS — K90.0 CELIAC DISEASE: ICD-10-CM

## 2023-01-05 DIAGNOSIS — Z71.89 COUNSELING AND COORDINATION OF CARE: ICD-10-CM

## 2023-01-05 PROCEDURE — 3008F PR BODY MASS INDEX (BMI) DOCUMENTED: ICD-10-PCS | Mod: CPTII,S$GLB,, | Performed by: INTERNAL MEDICINE

## 2023-01-05 PROCEDURE — 99999 PR PBB SHADOW E&M-EST. PATIENT-LVL III: ICD-10-PCS | Mod: PBBFAC,,, | Performed by: INTERNAL MEDICINE

## 2023-01-05 PROCEDURE — 3078F DIAST BP <80 MM HG: CPT | Mod: CPTII,S$GLB,, | Performed by: INTERNAL MEDICINE

## 2023-01-05 PROCEDURE — 3078F PR MOST RECENT DIASTOLIC BLOOD PRESSURE < 80 MM HG: ICD-10-PCS | Mod: CPTII,S$GLB,, | Performed by: INTERNAL MEDICINE

## 2023-01-05 PROCEDURE — 3008F BODY MASS INDEX DOCD: CPT | Mod: CPTII,S$GLB,, | Performed by: INTERNAL MEDICINE

## 2023-01-05 PROCEDURE — 3074F PR MOST RECENT SYSTOLIC BLOOD PRESSURE < 130 MM HG: ICD-10-PCS | Mod: CPTII,S$GLB,, | Performed by: INTERNAL MEDICINE

## 2023-01-05 PROCEDURE — 3074F SYST BP LT 130 MM HG: CPT | Mod: CPTII,S$GLB,, | Performed by: INTERNAL MEDICINE

## 2023-01-05 PROCEDURE — 99215 OFFICE O/P EST HI 40 MIN: CPT | Mod: S$GLB,,, | Performed by: INTERNAL MEDICINE

## 2023-01-05 PROCEDURE — 99999 PR PBB SHADOW E&M-EST. PATIENT-LVL III: CPT | Mod: PBBFAC,,, | Performed by: INTERNAL MEDICINE

## 2023-01-05 PROCEDURE — 99215 PR OFFICE/OUTPT VISIT, EST, LEVL V, 40-54 MIN: ICD-10-PCS | Mod: S$GLB,,, | Performed by: INTERNAL MEDICINE

## 2023-01-05 RX ORDER — SODIUM FLUORIDE 6 MG/ML
PASTE, DENTIFRICE DENTAL
COMMUNITY

## 2023-01-05 NOTE — PROGRESS NOTES
RHEUMATOLOGY OUTPATIENT CLINIC NOTE    1/5/2023    Attending Rheumatologist: Petey Bejarano  Primary Care Provider: Lia Cristina MD   Physician Requesting Consultation: Lia Cristina MD  [address] [address]  Chief Complaint/Reason For Consultation:  No chief complaint on file.      Subjective:       HPI  Vicky Brand is a 57 y.o. White female with medical history noted below who presents for evaluation of SjS.     Patient transferring care from [facility]. Last seen by Dr. SUAREZ on 9/28/22.   She presents to establish care with me as this is closer to home. She notes she was doing well until around July when a rash presented in the elbows and gluteal region, eventually gets diagnosed with Celiac, since has implemented Gluten Free Diet. She also notes a diagnosis of SjS. Saw her dentis and upcoming EYE. She does not endorse dry eyes or mouth. She does report some vaginal dryness. Otherwise is stable. Occasional joint pain in left elbow from prior injury. Miscarriage x1.  No Alopecia, Oral/Nasal Ulcers, Rash, Photosensitivity, Dry Eyes, Dry Mouth, Pleuritis/serositis, Arthritis, Easy Bruising, LAD, Raynaud's, Blood clots, GERD, Skin tightening, SOB, chest pain, fever, fatigue.     Review of Systems   Constitutional:  Negative for chills, fatigue, fever and unexpected weight change.   HENT:  Negative for mouth sores and trouble swallowing.    Eyes:  Negative for redness and eye dryness.   Respiratory:  Negative for cough and shortness of breath.    Cardiovascular:  Negative for chest pain.   Gastrointestinal:  Negative for abdominal distention, constipation, diarrhea, nausea and vomiting.   Genitourinary:  Positive for vaginal dryness. Negative for dysuria and genital sores.   Musculoskeletal:  Negative for arthralgias, back pain, gait problem, joint swelling, leg pain, myalgias, neck pain, neck stiffness and joint deformity.   Integumentary:  Negative for  rash.   Neurological:  Negative for weakness, numbness and headaches.   Hematological:  Negative for adenopathy. Does not bruise/bleed easily.   Psychiatric/Behavioral:  Negative for confusion, decreased concentration and sleep disturbance. The patient is not nervous/anxious.    All other systems reviewed and are negative.     Chronic comorbid conditions affecting medical decision making today:  Past Medical History:   Diagnosis Date    SAMANTHA positive     Celiac disease 09/29/2022    Dyspareunia in female 07/27/2021    Osphena, pelvic floor tx 2021, G[facility]    Eye abnormalities     early yellowing of lens, precursor to cataracts, Dr Webster,  wear glasses at all times    Fracture of fifth toe, left, closed, with delayed healing, subsequent encounter 08/21/2018    Helicobacter pylori infection     tx GI Dr José Luis Irizarry 2022    Intramural leiomyoma of uterus 05/22/2019    Irregular, dysmenorrhea    Nocturia 06/17/2021    Ocular migraine     vision fuzzy x 3 in 2013, metallic shapes in her vision, Dr Webster,  resolved after 30 min , no pain    CALEB (obstructive sleep apnea)     Lysenko, CPAP start 7/21    Palpitations 09/29/2022    Paradoxical insomnia 10/30/2017    prior to menses, previously took amitryptiline 2.5 mg prn  qhs     Posterior vitreous detachment of left eye 05/22/2019    Flashes of light, ocular migranes, Ophthalmologist Dr Orourke, retinal specialist Dr Soni, rec avoid exercise until she follows up 5/2019    Shoulder pain, left 2007    LEFT, after fall, better with PT at [facility]    Stress and adjustment reaction 09/29/2022    Urticaria      Past Surgical History:   Procedure Laterality Date    COLONOSCOPY N/A 06/09/2017    Procedure: COLONOSCOPY;  Surgeon: Davy Prince MD;  Location: [facility] ([address] [place] [address]);  Service: Endoscopy;  Laterality: N/A;    DE QUERVAIN'S RELEASE Right 06/08/2022    Procedure: RELEASE, HAND, FOR DEQUERVAIN'S TENOSYNOVITIS - RIGHT;  Surgeon: Devan aWgner MD;   Location: [facility] OR;  Service: Orthopedics;  Laterality: Right;    DILATION AND CURETTAGE OF UTERUS      ENDOMETRIAL ABLATION N/A 2017    ESOPHAGOGASTRODUODENOSCOPY N/A 11/11/2022    Procedure: EGD (ESOPHAGOGASTRODUODENOSCOPY);  Surgeon: José Luis Irizarry MD;  Location: [facility] ENDO (4TH FLR);  Service: Endoscopy;  Laterality: N/A;  Fully vaccinated/ inst portal-RB    HYSTEROSCOPY WITH DILATION AND CURETTAGE OF UTERUS N/A 01/19/2022    Procedure: HYSTEROSCOPY, WITH DILATION AND CURETTAGE OF UTERUS;  Surgeon: Jannette Jarrell MD;  Location: [facility] OR;  Service: OB/GYN;  Laterality: N/A;    TUBAL LIGATION       Family History   Problem Relation Age of Onset    Diabetes Father     Stroke Father 49    Hypertension Mother     Pacemaker/defibrilator Mother     Ovarian cancer Maternal Grandmother     Melanoma Neg Hx     Breast cancer Neg Hx     Colon cancer Neg Hx      Social History     Substance and Sexual Activity   Alcohol Use No     Social History     Tobacco Use   Smoking Status Never   Smokeless Tobacco Never   Tobacco Comments    None     Social History     Substance and Sexual Activity   Drug Use No       Current Outpatient Medications:     calcium carbonate (OS-GISSELL) 600 mg calcium (1,500 mg) Tab, Take 600 mg by mouth once. gluten free, Disp: , Rfl:     clobetasoL (TEMOVATE) 0.05 % cream, Apply to affected areas of body BID prn rash., Disp: 60 g, Rfl: 2    dapsone (ACZONE) 5 % topical gel, Apply topically 2 (two) times daily. For buttocks prn.  Stop if any worsening rash or irritation, Disp: 60 g, Rfl: 0    estradioL (ESTRACE) 0.01 % (0.1 mg/gram) vaginal cream, Place 1 g vaginally twice a week., Disp: 42.5 g, Rfl: 1    fluoride, sodium, (PREVIDENT 5000 BOOSTER PLUS) 1.1 % Pste, Place onto teeth., Disp: , Rfl:     LIDOcaine 5 % Gel, Apply 1 application topically 3 (three) times daily as needed (Pain)., Disp: 30 g, Rfl: 2    multivitamin capsule, Take 1 capsule by mouth once daily. Gluten free, Disp: , Rfl:     LIDOcaine  HCL 2% (XYLOCAINE) 2 % jelly, Apply to affected area daily prn (Patient not taking: Reported on 12/15/2022), Disp: 30 mL, Rfl: 1    triamcinolone acetonide 0.025% (KENALOG) 0.025 % Oint, Apply to affected areas of face BID prn irritation. Do not use for longer than 2 weeks in a row. (Patient not taking: Reported on 12/15/2022), Disp: 15 g, Rfl: 1    UNABLE TO FIND, once daily. Iron 28 mg taken daily, Disp: , Rfl:      Objective:         Vitals:    01/05/23 0925   BP: 119/75   Pulse: 73   Resp: 20     Physical Exam  Can make fist, no synovitis  Left elbow with slight discoloration from prior CSI   Knee crepitus  Negative ankle/MTP  Good salivary pooling     Reviewed old and all outside pertinent medical records available.    All lab results personally reviewed and interpreted by me.  Lab Results   Component Value Date    WBC 4.66 11/29/2022    HGB 13.3 11/29/2022    HCT 42.8 11/29/2022    MCV 95 11/29/2022    MCH 29.4 11/29/2022    MCHC 31.1 (L) 11/29/2022    RDW 12.9 11/29/2022     11/29/2022    MPV 11.4 11/29/2022       Lab Results   Component Value Date     11/29/2022    K 4.6 11/29/2022     11/29/2022    CO2 30 (H) 11/29/2022     11/29/2022    BUN 15 11/29/2022    CALCIUM 9.7 11/29/2022    PROT 7.8 11/29/2022    ALBUMIN 4.2 11/29/2022    BILITOT 0.6 11/29/2022    AST 24 11/29/2022    ALKPHOS 48 (L) 11/29/2022    ALT 12 11/29/2022       No results found for: COLORU, APPEARANCEUA, SPECGRAV, PHUR, PHUA, PROTEINUA, GLUCOSEU, KETONESU, BLOODU, LEUKOCYTESUR, NITRITE, UROBILINOGEN    Lab Results   Component Value Date    CRP 0.8 09/28/2022       Lab Results   Component Value Date    SEDRATE 10 09/28/2022       Lab Results   Component Value Date    SEDRATE 10 09/28/2022       No components found for: 25OHVITDTOT, 24FUPATI6, 03FNZQCK5, METHODNOTE    No results found for: URICACID    No components found for: TSPOTTB        Imaging:  All imaging reviewed and independently interpreted by me.          ASSESSMENT / PLAN:     Vicky Brand is a 57 y.o. White female with:      1. Sjogren syndrome, unspecified  - +SAMANTHA/SSA/SSB, Vaginal Dryness  - discussed diagnosis and management  - had labs in September 2022  - discussed association with lymphoma and symptoms to monitor for  - advised to follow up with Dental, EYE and GYN   - oral hydration, artificial tear use and Vaginal Lubrication recommended  - reassurance   - Ambulatory referral/consult to Rheumatology    2. Celiac disease  - continue Gluten Free diet  - reassurance   - Ambulatory referral/consult to Rheumatology    3. Other specified counseling  - over 10 minutes spent regarding below topics:  - Immunization counseling done.  - Weight loss counseling done.  - Nutrition and exercise counseling.  - Limitation of alcohol consumption.  - Regular exercise:  Aerobic and resistance.  - Medication counseling provided.      No follow-ups on file.    Method of contact with patient concerns: [facility]    Disclaimer:  This note is prepared using voice recognition software and as such is likely to have errors and has not been proof read. Please contact me for questions.     Time spent: 40 minutes in face to face discussion concerning diagnosis, prognosis, review of lab and test results, benefits of treatment as well as management of disease, counseling of patient and coordination of care between various health care providers.  Greater than half the time spent was used for coordination of care and counseling of patient.    Petey Bejarano M.D.  Rheumatology Department   [facility]

## 2023-01-09 ENCOUNTER — PATIENT MESSAGE (OUTPATIENT)
Dept: RHEUMATOLOGY | Facility: CLINIC | Age: 58
End: 2023-01-09
Payer: COMMERCIAL

## 2023-01-09 ENCOUNTER — PATIENT MESSAGE (OUTPATIENT)
Dept: PRIMARY CARE CLINIC | Facility: CLINIC | Age: 58
End: 2023-01-09
Payer: COMMERCIAL

## 2023-01-09 ENCOUNTER — PATIENT MESSAGE (OUTPATIENT)
Dept: INTERNAL MEDICINE | Facility: CLINIC | Age: 58
End: 2023-01-09
Payer: COMMERCIAL

## 2023-01-09 ENCOUNTER — PATIENT MESSAGE (OUTPATIENT)
Dept: GASTROENTEROLOGY | Facility: CLINIC | Age: 58
End: 2023-01-09
Payer: COMMERCIAL

## 2023-01-09 ENCOUNTER — OFFICE VISIT (OUTPATIENT)
Dept: URGENT CARE | Facility: CLINIC | Age: 58
End: 2023-01-09
Payer: COMMERCIAL

## 2023-01-09 VITALS
SYSTOLIC BLOOD PRESSURE: 109 MMHG | RESPIRATION RATE: 20 BRPM | OXYGEN SATURATION: 97 % | HEIGHT: 66 IN | DIASTOLIC BLOOD PRESSURE: 68 MMHG | WEIGHT: 127 LBS | TEMPERATURE: 98 F | HEART RATE: 98 BPM | BODY MASS INDEX: 20.41 KG/M2

## 2023-01-09 DIAGNOSIS — U07.1 COVID: ICD-10-CM

## 2023-01-09 DIAGNOSIS — M35.00 SJOGREN'S SYNDROME WITHOUT EXTRAGLANDULAR INVOLVEMENT: ICD-10-CM

## 2023-01-09 DIAGNOSIS — J06.9 VIRAL URI: ICD-10-CM

## 2023-01-09 DIAGNOSIS — K90.0 CELIAC DISEASE: ICD-10-CM

## 2023-01-09 DIAGNOSIS — Z11.59 SCREENING FOR VIRAL DISEASE: Primary | ICD-10-CM

## 2023-01-09 LAB
CTP QC/QA: YES
SARS-COV-2 AG RESP QL IA.RAPID: NEGATIVE

## 2023-01-09 PROCEDURE — 3008F PR BODY MASS INDEX (BMI) DOCUMENTED: ICD-10-PCS | Mod: CPTII,S$GLB,, | Performed by: FAMILY MEDICINE

## 2023-01-09 PROCEDURE — 87811 SARS-COV-2 COVID19 W/OPTIC: CPT | Mod: QW,S$GLB,, | Performed by: FAMILY MEDICINE

## 2023-01-09 PROCEDURE — 1160F RVW MEDS BY RX/DR IN RCRD: CPT | Mod: CPTII,S$GLB,, | Performed by: FAMILY MEDICINE

## 2023-01-09 PROCEDURE — 99214 OFFICE O/P EST MOD 30 MIN: CPT | Mod: S$GLB,,, | Performed by: FAMILY MEDICINE

## 2023-01-09 PROCEDURE — 87811 SARS CORONAVIRUS 2 ANTIGEN POCT, MANUAL READ: ICD-10-PCS | Mod: QW,S$GLB,, | Performed by: FAMILY MEDICINE

## 2023-01-09 PROCEDURE — 1159F PR MEDICATION LIST DOCUMENTED IN MEDICAL RECORD: ICD-10-PCS | Mod: CPTII,S$GLB,, | Performed by: FAMILY MEDICINE

## 2023-01-09 PROCEDURE — 3078F DIAST BP <80 MM HG: CPT | Mod: CPTII,S$GLB,, | Performed by: FAMILY MEDICINE

## 2023-01-09 PROCEDURE — 1160F PR REVIEW ALL MEDS BY PRESCRIBER/CLIN PHARMACIST DOCUMENTED: ICD-10-PCS | Mod: CPTII,S$GLB,, | Performed by: FAMILY MEDICINE

## 2023-01-09 PROCEDURE — 3008F BODY MASS INDEX DOCD: CPT | Mod: CPTII,S$GLB,, | Performed by: FAMILY MEDICINE

## 2023-01-09 PROCEDURE — 3074F PR MOST RECENT SYSTOLIC BLOOD PRESSURE < 130 MM HG: ICD-10-PCS | Mod: CPTII,S$GLB,, | Performed by: FAMILY MEDICINE

## 2023-01-09 PROCEDURE — 99214 PR OFFICE/OUTPT VISIT, EST, LEVL IV, 30-39 MIN: ICD-10-PCS | Mod: S$GLB,,, | Performed by: FAMILY MEDICINE

## 2023-01-09 PROCEDURE — 3074F SYST BP LT 130 MM HG: CPT | Mod: CPTII,S$GLB,, | Performed by: FAMILY MEDICINE

## 2023-01-09 PROCEDURE — 3078F PR MOST RECENT DIASTOLIC BLOOD PRESSURE < 80 MM HG: ICD-10-PCS | Mod: CPTII,S$GLB,, | Performed by: FAMILY MEDICINE

## 2023-01-09 PROCEDURE — 1159F MED LIST DOCD IN RCRD: CPT | Mod: CPTII,S$GLB,, | Performed by: FAMILY MEDICINE

## 2023-01-09 NOTE — PATIENT INSTRUCTIONS
Below are suggestions for symptomatic relief of your upper respiratory symptoms:              -Salt water gargles to soothe throat pain.              -Chloroseptic spray and Cepacol lozenges also help to numb throat pain.              -Warm herbal teas with honey/lemon/dc can help soothe sore throat and hoarseness              -Nasal saline spray reduces inflammation and dryness.              -Warm face compresses to help with facial sinus pain/pressure.              -Humidifiers and steam can help with nasal dryness and congestion              -Vicks vapor rub at night for chest congestion.              -Flonase OTC or Nasacort OTC for nasal congestion and post-nasal drip. Ok to use twice daily for the first week, then reduce to once daily after symptoms have begun to improve.              -Afrin is a nasal spray that can give immediate relief of nasal congestion but you cannot use this medication for more than 3 days              -Simple foods like chicken noodle soup.              - Mucinex for congestion or Mucinex DM for cough during the day time. Delsym helps with coughing at night. Mucinex-D if you have sinus pressure/sinus pain or chest congestion. (caution if history of high blood pressure or palpitations). You must increase your water intake when using expectorants (Mucinex).             -Claritin/Allegra/Xyzal should help with allergies.  -If you DO NOT have Hypertension or any history of palpitations, it is ok to take over the counter Sudafed or Mucinex D or Allegra-D or Claritin-D or Zyrtec-D.  -If you do take one of the above, it is ok to combine that with plain over the counter Mucinex or Allegra or Claritin or Zyrtec. If, for example, you are taking Zyrtec -D, you can combine that with Mucinex, but not Mucinex-D.  If you are taking Mucinex-D, you can combine that with plain Allegra or Claritin or Zyrtec.   -If you DO have Hypertension or palpitations, it is safe to take Coricidin HBP for relief  of sinus symptoms.     AVOID any medication for which you have an allergy    Your test was POSITIVE for COVID-19 (coronavirus).       Please isolate yourself at home.  You may leave home and/or return to work once the following conditions are met:    If you were not hospitalized and are not moderately to severely immunocompromised:   More than 5 days since symptoms first appeared AND  More than 24 hours fever free without medications AND  Symptoms are improving  Continue to wear a mask around others for 5 additional days.    If you were hospitalized OR are moderately to severely immunocompromised:  More than 20 days since symptoms first appeared  More than 24 hours fever free without medications  Symptoms have improved    If you had no symptoms but tested positive:  More than 5 days since the date of the first positive test (20 days if moderately to severely immunocompromised). If you develop symptoms, then use the guidelines above.  Continue to wear a mask around others for 5 additional days.      Contact Tracing    As one of the next steps, you will receive a call or text from the [place] Department of Health ([facility]) COVID-19 Tracing Team. See the contact information below so you know not to ignore the health departments call. It is important that you contact them back immediately so they can help.      Contact Tracer Number:  [phone]  Caller ID for most carriers: [facility]     What is contact tracing?  Contact tracing is a process that helps identify everyone who has been in close contact with an infected person. Contact tracers let those people know they may have been exposed and guide them on next steps. Confidentiality is important for everyone; no one will be told who may have exposed them to the virus.  Your involvement is important. The more we know about where and how this virus is spreading, the better chance we have at stopping it from spreading further.  What does exposure mean?  Exposure  means you have been within 6 feet for more than 15 minutes with a person who has or had COVID-19.  What kind of questions do the contact tracers ask?  A contact tracer will confirm your basic contact information including name, address, phone number, and next of kin, as well as asking about any symptoms you may have had. Theyll also ask you how you think you may have gotten sick, such as places where you may have been exposed to the virus, and people you were with. Those names will never be shared with anyone outside of that call, and will only be used to help trace and stop the spread of the virus.   I have privacy concerns. How will the state use my information?  Your privacy about your health is important. All calls are completed using call centers that use the appropriate health privacy protection measures (HIPAA compliance), meaning that your patient information is safe. No one will ever ask you any questions related to immigration status. Your health comes first.   Do I have to participate?  You do not have to participate, but we strongly encourage you to. Contact tracing can help us catch and control new outbreaks as theyre developing to keep your friends and family safe.   What if I dont hear from anyone?  If you dont receive a call within 24 hours, you can call the number above right away to inquire about your status. That line is open from 8:00 am - 8:00 p.m., 7 days a week.  Contact tracing saves lives! Together, we have the power to beat this virus and keep our loved ones and neighbors safe.    For more information see CDC link below.      https://www.cdc.gov/coronavirus/2019-ncov/hcp/guidance-prevent-spread.html#precautions        Sources:  CDC, [place] [facility]         Seek immediate care in the emergency room in the event of severe abdominal pain, chest pain, respiratory distress, fever unresponsive to antipyretic, dehydration, loss of consciousness, seizure.

## 2023-01-09 NOTE — PROGRESS NOTES
"Subjective:       Patient ID: Vicky Brand is a 57 y.o. female.    Chief Complaint: Cough    57 year old female presents today with a HA, brain fog, nasal congestion, chills, fatigue, sore throat  Home positive COVID test yesterday. She is concerned of what she can take since she has Celiac Disease. COVID vaccinated. Symptoms started 01/08/2023. Treatments at home include nothing    Cough  This is a new problem. The current episode started yesterday. The problem has been gradually worsening. The cough is Non-productive. Associated symptoms include chills, headaches, nasal congestion, postnasal drip, rhinorrhea and a sore throat. She has tried nothing for the symptoms. There is no history of asthma, bronchiectasis, bronchitis, COPD, emphysema, environmental allergies or pneumonia.     Constitution: Positive for chills.   HENT:  Positive for postnasal drip and sore throat.    Respiratory:  Negative for cough.    Allergic/Immunologic: Negative for environmental allergies.   Neurological:  Positive for headaches.     Objective:      Vitals:    01/09/23 0926   BP: 109/68   Pulse: 98   Resp: 20   Temp: 98.1 °F (36.7 °C)   SpO2: 97%   Weight: 57.6 kg (127 lb)   Height: 5' 6" (1.676 m)      Physical Exam   Constitutional: She is oriented to person, place, and time. She appears well-developed. She is cooperative.  Non-toxic appearance. She appears ill. No distress.   HENT:   Head: Normocephalic and atraumatic.   Ears:   Right Ear: Hearing, tympanic membrane, external ear and ear canal normal.   Left Ear: Hearing, tympanic membrane, external ear and ear canal normal.   Nose: Congestion present. No mucosal edema, rhinorrhea or nasal deformity. No epistaxis. Right sinus exhibits no maxillary sinus tenderness and no frontal sinus tenderness. Left sinus exhibits no maxillary sinus tenderness and no frontal sinus tenderness.   Mouth/Throat: Uvula is midline and mucous membranes are normal. No trismus in the jaw. Normal " dentition. No uvula swelling. Posterior oropharyngeal erythema present. No oropharyngeal exudate or posterior oropharyngeal edema.   Eyes: Conjunctivae and lids are normal. No scleral icterus.   Neck: Trachea normal and phonation normal. Neck supple. No edema present. No erythema present. No neck rigidity present.   Cardiovascular: Normal rate, regular rhythm, normal heart sounds and normal pulses.   Pulmonary/Chest: Effort normal and breath sounds normal. No respiratory distress. She has no decreased breath sounds. She has no rhonchi.   Abdominal: Normal appearance and bowel sounds are normal. Soft.   Musculoskeletal:         General: No deformity.   Neurological: no focal deficit. She is alert and oriented to person, place, and time. She exhibits normal muscle tone.   Skin: Skin is warm, intact, not diaphoretic and not pale. Capillary refill takes less than 2 seconds.   Psychiatric: Her speech is normal and behavior is normal. Judgment and thought content normal.   Nursing note and vitals reviewed.      Results for orders placed or performed in visit on 01/09/23   SARS Coronavirus 2 Antigen, POCT Manual Read   Result Value Ref Range    SARS Coronavirus 2 Antigen Negative Negative     Acceptable Yes       Assessment:       1. Screening for viral disease    2. COVID    3. Viral URI    4. Sjogren's syndrome without extraglandular involvement    5. Celiac disease          Plan:         Screening for viral disease  -     SARS Coronavirus 2 Antigen, POCT Manual Read    2. COVID  Comments:  positive at home- will treat as positive  Orders:  -     nirmatrelvir-ritonavir 300 mg (150 mg x 2)-100 mg copackaged tablets (EUA); Take 3 tablets by mouth 2 (two) times daily for 5 days. Each dose contains 2 nirmatrelvir (pink tablets) and 1 ritonavir (white tablet). Take all 3 tablets together  Dispense: 30 tablet; Refill: 0    3. Viral URI  Supportive measure    4. Sjogren's syndrome without extraglandular  involvement  5. Celiac disease  Stable. Managed by her specialists. Risks and benefits of Paxlovid discussed. Reasonably likely side effects discussed. No medical contraindication to Paxlovid. Patient at higher risk then general population from COVID related complications.    Patient Instructions   Below are suggestions for symptomatic relief of your upper respiratory symptoms:              -Salt water gargles to soothe throat pain.              -Chloroseptic spray and Cepacol lozenges also help to numb throat pain.              -Warm herbal teas with honey/lemon/dc can help soothe sore throat and hoarseness              -Nasal saline spray reduces inflammation and dryness.              -Warm face compresses to help with facial sinus pain/pressure.              -Humidifiers and steam can help with nasal dryness and congestion              -Vicks vapor rub at night for chest congestion.              -Flonase OTC or Nasacort OTC for nasal congestion and post-nasal drip. Ok to use twice daily for the first week, then reduce to once daily after symptoms have begun to improve.              -Afrin is a nasal spray that can give immediate relief of nasal congestion but you cannot use this medication for more than 3 days              -Simple foods like chicken noodle soup.              - Mucinex for congestion or Mucinex DM for cough during the day time. Delsym helps with coughing at night. Mucinex-D if you have sinus pressure/sinus pain or chest congestion. (caution if history of high blood pressure or palpitations). You must increase your water intake when using expectorants (Mucinex).             -Claritin/Allegra/Xyzal should help with allergies.  -If you DO NOT have Hypertension or any history of palpitations, it is ok to take over the counter Sudafed or Mucinex D or Allegra-D or Claritin-D or Zyrtec-D.  -If you do take one of the above, it is ok to combine that with plain over the counter Mucinex or Allegra or  Claritin or Zyrtec. If, for example, you are taking Zyrtec -D, you can combine that with Mucinex, but not Mucinex-D.  If you are taking Mucinex-D, you can combine that with plain Allegra or Claritin or Zyrtec.   -If you DO have Hypertension or palpitations, it is safe to take Coricidin HBP for relief of sinus symptoms.     AVOID any medication for which you have an allergy    Your test was POSITIVE for COVID-19 (coronavirus).       Please isolate yourself at home.  You may leave home and/or return to work once the following conditions are met:    If you were not hospitalized and are not moderately to severely immunocompromised:   More than 5 days since symptoms first appeared AND  More than 24 hours fever free without medications AND  Symptoms are improving  Continue to wear a mask around others for 5 additional days.    If you were hospitalized OR are moderately to severely immunocompromised:  More than 20 days since symptoms first appeared  More than 24 hours fever free without medications  Symptoms have improved    If you had no symptoms but tested positive:  More than 5 days since the date of the first positive test (20 days if moderately to severely immunocompromised). If you develop symptoms, then use the guidelines above.  Continue to wear a mask around others for 5 additional days.      Contact Tracing    As one of the next steps, you will receive a call or text from the [place] Department [facility] ([facility]) COVID-19 Tracing Team. See the contact information below so you know not to ignore the health departments call. It is important that you contact them back immediately so they can help.      Contact Tracer Number:  [phone]  Caller ID for most carriers: [place] [facility]     What is contact tracing?  Contact tracing is a process that helps identify everyone who has been in close contact with an infected person. Contact tracers let those people know they may have been exposed and guide them on next  steps. Confidentiality is important for everyone; no one will be told who may have exposed them to the virus.  Your involvement is important. The more we know about where and how this virus is spreading, the better chance we have at stopping it from spreading further.  What does exposure mean?  Exposure means you have been within 6 feet for more than 15 minutes with a person who has or had COVID-19.  What kind of questions do the contact tracers ask?  A contact tracer will confirm your basic contact information including name, address, phone number, and next of kin, as well as asking about any symptoms you may have had. Theyll also ask you how you think you may have gotten sick, such as places where you may have been exposed to the virus, and people you were with. Those names will never be shared with anyone outside of that call, and will only be used to help trace and stop the spread of the virus.   I have privacy concerns. How will the state use my information?  Your privacy about your health is important. All calls are completed using call centers that use the appropriate health privacy protection measures (HIPAA compliance), meaning that your patient information is safe. No one will ever ask you any questions related to immigration status. Your health comes first.   Do I have to participate?  You do not have to participate, but we strongly encourage you to. Contact tracing can help us catch and control new outbreaks as theyre developing to keep your friends and family safe.   What if I dont hear from anyone?  If you dont receive a call within 24 hours, you can call the number above right away to inquire about your status. That line is open from 8:00 am - 8:00 p.m., 7 days a week.  Contact tracing saves lives! Together, we have the power to beat this virus and keep our loved ones and neighbors safe.    For more information see CDC link below.       https://www.cdc.gov/coronavirus/2019-ncov/hcp/guidance-prevent-spread.html#precautions        Sources:  [facility], [place] [facility]         Seek immediate care in the emergency room in the event of severe abdominal pain, chest pain, respiratory distress, fever unresponsive to antipyretic, dehydration, loss of consciousness, seizure.

## 2023-01-10 ENCOUNTER — PATIENT MESSAGE (OUTPATIENT)
Dept: OTOLARYNGOLOGY | Facility: CLINIC | Age: 58
End: 2023-01-10
Payer: COMMERCIAL

## 2023-01-23 ENCOUNTER — PATIENT MESSAGE (OUTPATIENT)
Dept: INTERNAL MEDICINE | Facility: CLINIC | Age: 58
End: 2023-01-23
Payer: COMMERCIAL

## 2023-02-13 ENCOUNTER — OFFICE VISIT (OUTPATIENT)
Dept: DERMATOLOGY | Facility: CLINIC | Age: 58
End: 2023-02-13
Payer: COMMERCIAL

## 2023-02-13 DIAGNOSIS — L13.0 DERMATITIS HERPETIFORMIS: Primary | ICD-10-CM

## 2023-02-13 DIAGNOSIS — D23.9 ANGIOKERATOMA: ICD-10-CM

## 2023-02-13 DIAGNOSIS — R20.3 HYPERESTHESIA: ICD-10-CM

## 2023-02-13 PROCEDURE — 99213 PR OFFICE/OUTPT VISIT, EST, LEVL III, 20-29 MIN: ICD-10-PCS | Mod: S$GLB,,, | Performed by: DERMATOLOGY

## 2023-02-13 PROCEDURE — 99213 OFFICE O/P EST LOW 20 MIN: CPT | Mod: S$GLB,,, | Performed by: DERMATOLOGY

## 2023-02-13 PROCEDURE — 1159F PR MEDICATION LIST DOCUMENTED IN MEDICAL RECORD: ICD-10-PCS | Mod: CPTII,S$GLB,, | Performed by: DERMATOLOGY

## 2023-02-13 PROCEDURE — 1159F MED LIST DOCD IN RCRD: CPT | Mod: CPTII,S$GLB,, | Performed by: DERMATOLOGY

## 2023-02-13 PROCEDURE — 1160F PR REVIEW ALL MEDS BY PRESCRIBER/CLIN PHARMACIST DOCUMENTED: ICD-10-PCS | Mod: CPTII,S$GLB,, | Performed by: DERMATOLOGY

## 2023-02-13 PROCEDURE — 1160F RVW MEDS BY RX/DR IN RCRD: CPT | Mod: CPTII,S$GLB,, | Performed by: DERMATOLOGY

## 2023-02-13 RX ORDER — LIDOCAINE 50 MG/G
OINTMENT TOPICAL
Qty: 1 EACH | Refills: 2 | Status: SHIPPED | OUTPATIENT
Start: 2023-02-13

## 2023-02-13 RX ORDER — DAPSONE 50 MG/G
GEL TOPICAL
Qty: 60 G | Refills: 3 | Status: SHIPPED | OUTPATIENT
Start: 2023-02-13

## 2023-02-13 NOTE — PROGRESS NOTES
Patient Information  Name: Vicky Brand  : 1965  MRN: [id number]     Referring Physician:  No ref. provider found   Primary Care Physician:  Lia Cristina MD   Date of Visit: 2023      Subjective:     History of Present lllness:    Vicky Brand is a 57 y.o. female who presents with a chief complaint of rash.    Diagnosis: Dermatitis herpetiformis  Location: mostly on R elbow, sometimes on buttocks  Signs/Symptoms: doing much better now, flares are only irritating now, no longer as painful  Symptom course: improving  Current treatment: Dapsone 5%, Clobetasol 0.05% cream, Lidocaine 5% ointment     She also has 2 dark spots on labia that she would like checked.    Patient was last seen: 12/15/2022.  Prior notes by myself reviewed.   Clinical documentation obtained by nursing staff reviewed.    Review of Systems    Objective:   Physical Exam   Constitutional: She appears well-developed and well-nourished. No distress.   Genitourinary:         Neurological: She is alert and oriented to person, place, and time. She is not disoriented.   Psychiatric: She has a normal mood and affect.   Skin:   Areas Examined (abnormalities noted in diagram):   Genitals / Buttocks / Groin Inspection Performed  RUE Inspected  LUE Inspection Performed          Diagram Legend     Erythematous scaling macule/papule c/w actinic keratosis       Vascular papule c/w angioma      Pigmented verrucoid papule/plaque c/w seborrheic keratosis      Yellow umbilicated papule c/w sebaceous hyperplasia      Irregularly shaped tan macule c/w lentigo     1-2 mm smooth white papules consistent with Milia      Movable subcutaneous cyst with punctum c/w epidermal inclusion cyst      Subcutaneous movable cyst c/w pilar cyst      Firm pink to brown papule c/w dermatofibroma      Pedunculated fleshy papule(s) c/w skin tag(s)      Evenly pigmented macule c/w junctional nevus     Mildly variegated pigmented, slightly  irregular-bordered macule c/w mildly atypical nevus      Flesh colored to evenly pigmented papule c/w intradermal nevus       Pink pearly papule/plaque c/w basal cell carcinoma      Erythematous hyperkeratotic cursted plaque c/w SCC      Surgical scar with no sign of skin cancer recurrence      Open and closed comedones      Inflammatory papules and pustules      Verrucoid papule consistent consistent with wart     Erythematous eczematous patches and plaques     Dystrophic onycholytic nail with subungual debris c/w onychomycosis     Umbilicated papule    Erythematous-base heme-crusted tan verrucoid plaque consistent with inflamed seborrheic keratosis     Erythematous Silvery Scaling Plaque c/w Psoriasis     See annotation    No images are attached to the encounter or orders placed in the encounter.      [] Data reviewed  [] Prior external notes reviewed  [] Independent review of test  [] Management discussed with another provider  [] Independent historian    Assessment / Plan:        Dermatitis herpetiformis   - stable and chronic  -     dapsone (ACZONE) 5 % topical gel; Apply to elbows and buttocks prn flares.  Dispense: 60 g; Refill: 3  Continue Clobetasol 0.05% cream prn flares.    Hyperesthesia  -     LIDOcaine (XYLOCAINE) 5 % Oint ointment; Apply to elbows and buttocks prn pain  Dispense: 1 each; Refill: 2    Angiokeratoma  Reassurance was given to the patient. No treatment is necessary.    Follow up in about 1 year (around 2/13/2024) for follow up, or sooner if symptoms worsening or not improving.      Rocio Finch MD, FAAD  [facility]

## 2023-02-15 ENCOUNTER — LAB VISIT (OUTPATIENT)
Dept: LAB | Facility: HOSPITAL | Age: 58
End: 2023-02-15
Attending: INTERNAL MEDICINE
Payer: COMMERCIAL

## 2023-02-15 ENCOUNTER — OFFICE VISIT (OUTPATIENT)
Dept: GASTROENTEROLOGY | Facility: CLINIC | Age: 58
End: 2023-02-15
Payer: COMMERCIAL

## 2023-02-15 DIAGNOSIS — K90.0 CELIAC DISEASE: Primary | ICD-10-CM

## 2023-02-15 DIAGNOSIS — K90.0 CELIAC DISEASE: ICD-10-CM

## 2023-02-15 PROCEDURE — 99214 OFFICE O/P EST MOD 30 MIN: CPT | Mod: 95,,, | Performed by: INTERNAL MEDICINE

## 2023-02-15 PROCEDURE — 86364 TISS TRNSGLTMNASE EA IG CLAS: CPT | Mod: 59 | Performed by: INTERNAL MEDICINE

## 2023-02-15 PROCEDURE — 99214 PR OFFICE/OUTPT VISIT, EST, LEVL IV, 30-39 MIN: ICD-10-PCS | Mod: 95,,, | Performed by: INTERNAL MEDICINE

## 2023-02-15 PROCEDURE — 36415 COLL VENOUS BLD VENIPUNCTURE: CPT | Performed by: INTERNAL MEDICINE

## 2023-02-15 PROCEDURE — 83516 IMMUNOASSAY NONANTIBODY: CPT | Performed by: INTERNAL MEDICINE

## 2023-02-15 NOTE — PROGRESS NOTES
The patient location is: Home  The chief complaint leading to consultation is: Celiac disease    Visit type: audiovisual    Face to Face time with patient: 30 minutes of total time spent on the encounter, which includes face to face time and non-face to face time preparing to see the patient (eg, review of tests), Obtaining and/or reviewing separately obtained history, Documenting clinical information in the electronic or other health record, Independently interpreting results (not separately reported) and communicating results to the patient/family/caregiver, or Care coordination (not separately reported).         Each patient to whom he or she provides medical services by telemedicine is:  (1) informed of the relationship between the physician and patient and the respective role of any other health care provider with respect to management of the patient; and (2) notified that he or she may decline to receive medical services by telemedicine and may withdraw from such care at any time.    Notes:  is a 57 year old lady with  Celiac disease based on elevated Celiac antibodies and biopsy of small bowel. Biopsies revealed duodenal mucosa with intraepithelial lymphocytosis, crypt hyperplasia, and   mild to moderate villous blunting (Marsh IIIA/IIIB). Initially presented to dermatology for rash involving the right buttock, left buttock, elbows and inter gluteal cleft for about 2 months. It was irritated, inflamed, tender and painful. Based on suspicion for DH labs were drawn. Currently she is on gluten free diet for the past 2 weeks. Has been noticing some diarrhea now.       Latest Reference Range & Units 09/21/22 09:41   Antigliadin Ab IgG <20 UNITS 89 (H)   Antigliadin Abs, IgA <20 UNITS 154 (H)   TTG IgA <20 UNITS 157 (H)   TTG IgG <20 UNITS 12   Immunoglobulin A (IgA) 70 - 400 mg/dL 265      Denies any dysphagia, odynophagia, nausea, vomiting, heartburn or acid regurgitation. No abdominal pains, changes in  bowel pattern, blood/ mucus in stool or unintentional weight loss. No melena or maroon stools. No recent changes in diet or medications. No family history of Celiac disease or GI malignancy. Sister with UC. No regular NSAIDs, alcohol or tobacco use. No recent antibiotic use, travels or sick contacts. No prior history of C.diff. Colonoscopy was done (2017) at age 51 yrs for screening.     Continues to have DH, improving with gluten free diet. Has been gluten free for the past 5 months. DXA scan shows osteopenia. Taking calcium supplement. No issues with dairy products.Has regular BMs. I have asked her to contact  to see if she is up to date on Pneumococcal vaccine.     Past medical, surgical, social and family history reviewed in epic     Medication allergies reviewed in epic     Review of systems:     Constitutional:  No fever, no chills, no weight loss, appetite is normal  Eyes:  No visual changes or red eyes  ENT:  No odynophagia or hoarseness of voice  Cardiovascular:  No angina or palpitation  Respiratory:  No shortness breath or wheezing  Genitourinary:  No dysuria or frequency  Musculoskeletal:  No myalgias; has left shoulder pains  Skin:  No pruritus or eczema  Neurologic:Has headaches, no seizures; has insomnia  Psychiatric:  No anxiety depression  Gastrointestinal:  See HPI     Physical exam:  Virtual visit   Recent labs, imaging and endoscopy reports reviewed.        Impression: Celiac disease with Marsh IIIA/IIIB. Currently gluten free for 5 months.     Recommendations:      Continue gluten free diet.  Celiac antibody levels.   Follow up visit in Sept 2023. Will repeat celiac antibody tests and repeat EGD.

## 2023-02-20 ENCOUNTER — PATIENT MESSAGE (OUTPATIENT)
Dept: ENDOSCOPY | Facility: HOSPITAL | Age: 58
End: 2023-02-20
Payer: COMMERCIAL

## 2023-02-20 ENCOUNTER — TELEPHONE (OUTPATIENT)
Dept: ENDOSCOPY | Facility: HOSPITAL | Age: 58
End: 2023-02-20
Payer: COMMERCIAL

## 2023-02-20 LAB
GLIADIN PEPTIDE IGA SER-ACNC: 4.8 U/ML
GLIADIN PEPTIDE IGG SER-ACNC: 26 U/ML
IGA SERPL-MCNC: 255 MG/DL (ref 70–400)
TTG IGA SER-ACNC: 4.2 U/ML
TTG IGG SER-ACNC: 1.3 U/ML

## 2023-02-20 NOTE — TELEPHONE ENCOUNTER
----- Message from José Luis Irizarry MD sent at 2/20/2023  1:02 PM CST -----  The Celiac labs are significantly better. Continue Gluten free diet.

## 2023-02-22 ENCOUNTER — PATIENT MESSAGE (OUTPATIENT)
Dept: PRIMARY CARE CLINIC | Facility: CLINIC | Age: 58
End: 2023-02-22
Payer: COMMERCIAL

## 2023-02-22 NOTE — PROGRESS NOTES
[facility] Note    Chief Complaint    No chief complaint on file.      History of Present Illness      Vicky Brand is a 57 y.o. female who presents via virtual visit today for No chief complaint on file.        Ms. Brand is a very pleasant 56 y/o female known to me and an established patient of Dr. Cristina. She presents via virtual visit to discuss her recent anxiety issues after learning she is allergic to gluten. She was diagnosed with celiac disease by her dermatologist after she developed a painful rash on her buttocks. She has been converting all of her diet to gluten free. She has had two panic attacks since then. We discussed starting Prozac in September and patient wanted to think about taking this medication. She is now ready to begin this medication. She will be seeing a therapist this week. She denies any SOB, chest pain, N/V, unintentional weight loss, loss of appetite, fatigue, diarrhea, constipation. She is active daily and remains independent with ADL's.        Review of Systems   Constitutional: Negative.    HENT: Negative.     Eyes: Negative.    Respiratory: Negative.     Cardiovascular: Negative.    Gastrointestinal: Negative.    Genitourinary: Negative.    Musculoskeletal: Negative.    Skin: Negative.    Neurological: Negative.    Endo/Heme/Allergies: Negative.    Psychiatric/Behavioral:  The patient is nervous/anxious.    All 12 systems otherwise negative.     Family History:  family history includes Diabetes in her father; Hypertension in her mother; Ovarian cancer in her maternal grandmother; Pacemaker/defibrilator in her mother; Stroke (age of onset: 49) in her father.   Family history was reviewed with patient.     Medications:  Outpatient Encounter Medications as of 2/23/2023   Medication Sig Dispense Refill    calcium carbonate (OS-GISSELL) 600 mg calcium (1,500 mg) Tab Take 600 mg by mouth once. gluten free      clobetasoL (TEMOVATE) 0.05 % cream Apply to affected  areas of body BID prn rash. 60 g 2    dapsone (ACZONE) 5 % topical gel Apply to elbows and buttocks prn flares. 60 g 3    estradioL (ESTRACE) 0.01 % (0.1 mg/gram) vaginal cream Place 1 g vaginally twice a week. 42.5 g 1    fluoride, sodium, (PREVIDENT 5000) 1.1 % Pste Place onto teeth.      FLUoxetine 10 MG capsule Take 1 capsule (10 mg total) by mouth once daily. 30 capsule 3    LIDOcaine (XYLOCAINE) 5 % Oint ointment Apply to elbows and buttocks prn pain 1 each 2    LIDOcaine HCL 2% (XYLOCAINE) 2 % jelly Apply to affected area daily prn 30 mL 1    multivitamin capsule Take 1 capsule by mouth once daily. Gluten free      triamcinolone acetonide 0.025% (KENALOG) 0.025 % Oint Apply to affected areas of face BID prn irritation. Do not use for longer than 2 weeks in a row. 15 g 1    UNABLE TO FIND once daily. Iron 28 mg taken daily      [DISCONTINUED] diclofenac sodium (VOLTAREN) 1 % Gel Apply 2 g topically 4 (four) times daily. Apply to affected area up to 4 times per day PRN pain 100 g 4     No facility-administered encounter medications on file as of 2/23/2023.       Allergies:  Review of patient's allergies indicates:   Allergen Reactions    Sulfa (sulfonamide antibiotics) Rash    Cetirizine      Other reaction(s): Unable to Function  Other reaction(s): Unable to Function    Erythromycin Nausea And Vomiting     Other reaction(s): Nausea    Voltaren [diclofenac sodium] Diarrhea    Zyrtec-d  [cetirizine-pseudoephedrine]      Other reaction(s): Inability to focus/function    Gluten Rash     Celiac's disease       Health Maintenance:  Health Maintenance   Topic Date Due    Mammogram  12/12/2023    Lipid Panel  09/02/2027    TETANUS VACCINE  07/27/2031    Hepatitis C Screening  Completed     Health Maintenance Topics with due status: Not Due       Topic Last Completion Date    Colorectal Cancer Screening 06/09/2017    TETANUS VACCINE 07/27/2021    Cervical Cancer Screening 09/16/2021    Lipid Panel 09/02/2022     Mammogram 12/12/2022       Physical Exam     Assessment/Plan     Vicky Brand is a 57 y.o.female with:    Anxiety  -     FLUoxetine 10 MG capsule; Take 1 capsule (10 mg total) by mouth once daily.  Dispense: 30 capsule; Refill: 3        As above, continue current medications and maintain follow up with specialists.  Return to clinic as needed.    I spent 25 minutes on the day of this virtual encounter for preparing, evaluating, treating, and discussing plan of care with this patient.  Greater than 50% of this time was spent face to face via virtual visit with patient.  All questions were answered to patient's satisfaction.            Karen L Spencer, NP-C [facility]                Answers submitted by the patient for this visit:  Review of Systems Questionnaire (Submitted on 2/22/2023)  activity change: No  unexpected weight change: No  neck pain: No  hearing loss: No  rhinorrhea: No  trouble swallowing: No  eye discharge: No  visual disturbance: No  chest tightness: No  wheezing: No  chest pain: No  palpitations: No  blood in stool: No  constipation: No  vomiting: No  diarrhea: No  polydipsia: No  polyuria: No  difficulty urinating: No  hematuria: No  menstrual problem: No  dysuria: No  joint swelling: No  arthralgias: No  headaches: No  weakness: No  confusion: No  dysphoric mood: Yes

## 2023-02-23 ENCOUNTER — OFFICE VISIT (OUTPATIENT)
Dept: PRIMARY CARE CLINIC | Facility: CLINIC | Age: 58
End: 2023-02-23
Payer: COMMERCIAL

## 2023-02-23 DIAGNOSIS — F41.9 ANXIETY: Primary | ICD-10-CM

## 2023-02-23 PROCEDURE — 1160F RVW MEDS BY RX/DR IN RCRD: CPT | Mod: CPTII,95,, | Performed by: NURSE PRACTITIONER

## 2023-02-23 PROCEDURE — 99214 PR OFFICE/OUTPT VISIT, EST, LEVL IV, 30-39 MIN: ICD-10-PCS | Mod: 95,,, | Performed by: NURSE PRACTITIONER

## 2023-02-23 PROCEDURE — 99214 OFFICE O/P EST MOD 30 MIN: CPT | Mod: 95,,, | Performed by: NURSE PRACTITIONER

## 2023-02-23 PROCEDURE — 1160F PR REVIEW ALL MEDS BY PRESCRIBER/CLIN PHARMACIST DOCUMENTED: ICD-10-PCS | Mod: CPTII,95,, | Performed by: NURSE PRACTITIONER

## 2023-02-23 PROCEDURE — 1159F PR MEDICATION LIST DOCUMENTED IN MEDICAL RECORD: ICD-10-PCS | Mod: CPTII,95,, | Performed by: NURSE PRACTITIONER

## 2023-02-23 PROCEDURE — 1159F MED LIST DOCD IN RCRD: CPT | Mod: CPTII,95,, | Performed by: NURSE PRACTITIONER

## 2023-02-23 RX ORDER — FLUOXETINE 10 MG/1
10 CAPSULE ORAL DAILY
Qty: 30 CAPSULE | Refills: 3 | Status: SHIPPED | OUTPATIENT
Start: 2023-02-23 | End: 2024-02-23

## 2023-02-24 ENCOUNTER — PATIENT MESSAGE (OUTPATIENT)
Dept: GASTROENTEROLOGY | Facility: CLINIC | Age: 58
End: 2023-02-24
Payer: COMMERCIAL

## 2023-02-27 ENCOUNTER — PATIENT MESSAGE (OUTPATIENT)
Dept: PRIMARY CARE CLINIC | Facility: CLINIC | Age: 58
End: 2023-02-27
Payer: COMMERCIAL

## 2023-03-11 ENCOUNTER — PATIENT MESSAGE (OUTPATIENT)
Dept: GASTROENTEROLOGY | Facility: CLINIC | Age: 58
End: 2023-03-11
Payer: COMMERCIAL

## 2023-03-13 ENCOUNTER — PATIENT MESSAGE (OUTPATIENT)
Dept: GASTROENTEROLOGY | Facility: CLINIC | Age: 58
End: 2023-03-13
Payer: COMMERCIAL

## 2023-03-15 ENCOUNTER — CLINICAL SUPPORT (OUTPATIENT)
Dept: GASTROENTEROLOGY | Facility: CLINIC | Age: 58
End: 2023-03-15
Payer: COMMERCIAL

## 2023-03-15 DIAGNOSIS — K90.0 CELIAC DISEASE: Primary | ICD-10-CM

## 2023-03-15 DIAGNOSIS — L13.0 DERMATITIS HERPETIFORMIS: ICD-10-CM

## 2023-03-16 ENCOUNTER — TELEPHONE (OUTPATIENT)
Dept: GASTROENTEROLOGY | Facility: CLINIC | Age: 58
End: 2023-03-16
Payer: COMMERCIAL

## 2023-03-16 NOTE — TELEPHONE ENCOUNTER
Return call and spoke with patient. Inform of Dr. Winters next available schedule. Patient will call the office back.

## 2023-03-16 NOTE — TELEPHONE ENCOUNTER
----- Message from Karla Lizarraga sent at 3/15/2023  2:37 PM CDT -----  Regarding: Appt  Contact: [phone]  Pt requesting appt for the following  celiac.... Please call and adv @ [phone]

## 2023-03-16 NOTE — PROGRESS NOTES
GI NUTRITIONAL ASSESSMENT  Referring Provider: Dr. José Luis Irizarry   Reason for Visit:  is a 57 y.o. female referred regarding the following problems:  Chief Complaint   Patient presents with    Celiac Disease     Patient with questions regarding commercial Gluten -free products due to awareness of worsening rash with ingestion of products ; questions about appropriate flours in preparing foods from scratch at home      Medical/Surgical History  Pertinent Social History:  Lives with : who consumes gluten-free foods prepared by patient   Works as teacher - research writing   Social History     Tobacco Use    Smoking status: Never    Smokeless tobacco: Never    Tobacco comments:     None   Substance Use Topics    Alcohol use: No    Drug use: No        Previous Medical History:  Past Medical History:   Diagnosis Date    SAMANTHA positive     Celiac disease 09/29/2022    Dyspareunia in female 07/27/2021    Osphena, pelvic floor tx 2021, GYN [facility]    Eye abnormalities     early yellowing of lens, precursor to cataracts, Dr Webster,  wear glasses at all times    Fracture of fifth toe, left, closed, with delayed healing, subsequent encounter 08/21/2018    Helicobacter pylori infection     tx GI Dr José Luis Irizarry 2022    Intramural leiomyoma of uterus 05/22/2019    Irregular, dysmenorrhea    Nocturia 06/17/2021    Ocular migraine     vision fuzzy x 3 in 2013, metallic shapes in her vision, Dr Webster,  resolved after 30 min , no pain    CALEB (obstructive sleep apnea)     Lysenko, CPAP start 7/21    Palpitations 09/29/2022    Paradoxical insomnia 10/30/2017    prior to menses, previously took amitryptiline 2.5 mg prn  qhs     Posterior vitreous detachment of left eye 05/22/2019    Flashes of light, ocular migranes, Ophthalmologist Dr Orourke, retinal specialist Dr Soni, rec avoid exercise until she follows up 5/2019    Shoulder pain, left 2007    LEFT, after fall, better with PT at [facility]    Stress and  "adjustment reaction 09/29/2022    Urticaria        Previous Surgical History:  Past Surgical History:   Procedure Laterality Date    COLONOSCOPY N/A 06/09/2017    Procedure: COLONOSCOPY;  Surgeon: Davy Prince MD;  Location: [facility] ([address] [place] [address]);  Service: Endoscopy;  Laterality: N/A;    DE QUERVAIN'S RELEASE Right 06/08/2022    Procedure: RELEASE, HAND, FOR DEQUERVAIN'S TENOSYNOVITIS - RIGHT;  Surgeon: Devan Wagner MD;  Location: [facility] OR;  Service: Orthopedics;  Laterality: Right;    DILATION AND CURETTAGE OF UTERUS      ENDOMETRIAL ABLATION N/A 2017    ESOPHAGOGASTRODUODENOSCOPY N/A 11/11/2022    Procedure: EGD (ESOPHAGOGASTRODUODENOSCOPY);  Surgeon: José Luis Irizarry MD;  Location: [address]);  Service: Endoscopy;  Laterality: N/A;  Fully vaccinated/ inst portal-RB    HYSTEROSCOPY WITH DILATION AND CURETTAGE OF UTERUS N/A 01/19/2022    Procedure: HYSTEROSCOPY, WITH DILATION AND CURETTAGE OF UTERUS;  Surgeon: Jannette Jarrell MD;  Location: [facility] OR;  Service: OB/GYN;  Laterality: N/A;    TUBAL LIGATION        Anthropometric Data Usual Weight:   has decreased 10 pounds over last 6 months   Estimated body mass index is 20.5 kg/m² as calculated from the following:    Height as of 1/9/23: 5' 6" (1.676 m).    Weight as of 1/9/23: 57.6 kg (127 lb).  Ht Readings from Last 1 Encounters:   01/09/23 5' 6" (1.676 m)     Weight History:  Wt Readings from Last 12 Encounters:   01/09/23 57.6 kg (127 lb)   01/05/23 57.6 kg (127 lb)   12/12/22 58.5 kg (129 lb)   11/23/22 58.9 kg (129 lb 13.6 oz)   11/11/22 56.8 kg (125 lb 3.5 oz)   11/08/22 58 kg (127 lb 13.9 oz)   10/26/22 59 kg (130 lb)   10/10/22 59.8 kg (131 lb 14.4 oz)   10/05/22 59.4 kg (130 lb 15.3 oz)   10/04/22 59.5 kg (131 lb 2.8 oz)   09/28/22 60.8 kg (134 lb 0.6 oz)   09/19/22 62.3 kg (137 lb 5.6 oz)      Meds  and Biochemical Data   Labs  2/20/2023 12:01 PM - [facility]    Component Value Flag Ref Range Units Status   Antigliadin Abs, IgA " 4.8   <7.0 U/mL Final   Comment:   INTERPRETATION: Negative   Antigliadin Ab IgG 26.0   High   <7.0 U/mL Final   Comment:   INTERPRETATION: Positive   TTG IgA 4.2   <7.0 U/mL Final   Comment:   INTERPRETATION: Negative   TTG IgG 1.3   <7.0 U/mL Final   Comment:   INTERPRETATION: Negative   Immunoglobulin A (IgA) 255   70 - 400 mg/dL Final   Comment:   Test performed at [facility],   [address], [place] [id number]     [phone]   Estela Lisa MD, PhD - Medical Director             2/18/2023  4:29 AM - [facility]    Component Value Flag Ref Range Units Status   Immunoglobulin A (IgA) 255   70 - 400 mg/dL Final       Iron   Date Value Ref Range Status   11/29/2022 75 30 - 160 ug/dL Final   09/21/2022 70 30 - 160 ug/dL Final     No components found for: FROLATE  No results found for: [id number]  WBC   Date Value Ref Range Status   11/29/2022 4.66 3.90 - 12.70 K/uL Final   09/02/2022 4.07 3.90 - 12.70 K/uL Final     WBC   Date Value Ref Range Status   11/29/2022 4.66 3.90 - 12.70 K/uL Final   09/02/2022 4.07 3.90 - 12.70 K/uL Final     Current Outpatient Medications   Medication Sig    calcium carbonate (OS-GISSELL) 600 mg calcium (1,500 mg) Tab Take 600 mg by mouth once. gluten free    clobetasoL (TEMOVATE) 0.05 % cream Apply to affected areas of body BID prn rash.    dapsone (ACZONE) 5 % topical gel Apply to elbows and buttocks prn flares.    estradioL (ESTRACE) 0.01 % (0.1 mg/gram) vaginal cream Place 1 g vaginally twice a week.    fluoride, sodium, (PREVIDENT 5000) 1.1 % Pste Place onto teeth.    FLUoxetine 10 MG capsule Take 1 capsule (10 mg total) by mouth once daily.    LIDOcaine (XYLOCAINE) 5 % Oint ointment Apply to elbows and buttocks prn pain    LIDOcaine HCL 2% (XYLOCAINE) 2 % jelly Apply to affected area daily prn    multivitamin capsule Take 1 capsule by mouth once daily. Gluten free    pneumoc 20-darlene conj-dip cr,PF, (PREVNAR 20, PF,) 0.5 mL Syrg injection Inject  into the muscle.    triamcinolone acetonide 0.025% (KENALOG) 0.025 % Oint Apply to affected areas of face BID prn irritation. Do not use for longer than 2 weeks in a row.    UNABLE TO FIND once daily. Iron 28 mg taken daily     No current facility-administered medications for this visit.     Lab Results   Component Value Date    TSH 1.180 11/29/2022     Lab Results   Component Value Date    FERRITIN 61 11/29/2022     @RESUFAST(NA,K,CL,CO2,GLU,BUN,CREATININE,  Lab Results   Component Value Date    CRP 0.8 09/28/2022     Lab Results   Component Value Date     03/30/2023    K 5.1 03/30/2023    CHOL 172 09/02/2022    HDL 53 09/02/2022    TRIG 98 09/02/2022    ALBUMIN 4.0 03/30/2023    CALCIUM 9.9 03/30/2023     Assessment of Lab Values:   2/20/2023 12:01 PM - [facility]    Component Value Flag Ref Range Units Status   Antigliadin Abs, IgA 4.8   <7.0 U/mL Final   Comment:   INTERPRETATION: Negative   Antigliadin Ab IgG 26.0   High   <7.0 U/mL Final   Comment:   INTERPRETATION: Positive   TTG IgA 4.2   <7.0 U/mL Final   Comment:   INTERPRETATION: Negative   TTG IgG 1.3   <7.0 U/mL Final   Comment:   INTERPRETATION: Negative   Immunoglobulin A (IgA) 255   70 - 400 mg/dL Final   Comment:   Test performed at [facility],   [address] [place]  [address]     [phone]   Estela Lisa MD, PhD - Medical Director             2/18/2023  4:29 AM - [facility]    Component Value Flag Ref Range Units Status   Immunoglobulin A (IgA) 255   70 - 400 mg/dL Final   Comment:   Test performed at [facility],   [address] [place]  [address]     [phone]   Estela Lisa MD, PhD - Medical Director               Pertinent Medications Reviewed:     Vitamin/Supplements/Herbs:     Potential Food Drug Interactions Addressed:     Allergies:  Review of patient's allergies indicates:   Allergen Reactions    Sulfa (sulfonamide antibiotics) Rash    Cetirizine       Other reaction(s): Unable to Function  Other reaction(s): Unable to Function    Erythromycin Nausea And Vomiting     Other reaction(s): Nausea    Voltaren [diclofenac sodium] Diarrhea    Zyrtec-d  [cetirizine-pseudoephedrine]      Other reaction(s): Inability to focus/function    Gluten Rash     Celiac's disease        Dietary Data  Problematic Foods: commercially prepared foods seem to trigger bloating   Changes in meal patterns :   Meal patterns: Using GF Bread, Bars,Snacks  Fruit: Variety   Vegetables: increased volume  Meal preparation/shopping: self  Nutrition beverages: mainly drinks water 8 oz , Skim Milk 3 cups   Dining out: avoids   Nutrition  Prescription:   Energy Needs:   (25 - 30 kcal/kg)3019-8645  calories with mild exercise   Protein Needs:  (.8gm Protein/kg) 50 grams   Fluid Needs:   (1 mL/calorie)3136-1495      Nutrition Diagnosis      Problem: Altered GI function related to Celiac    As evidenced by  small bowel biopsy and positive tissue transglutaminase autoantibodies   Secondary Concern:  Anemia due to deficiency of folate or iron   Secondary Concern:  Osteoporosis related to deficiency of Vit D and Calcium   Secondary Concern: lactose intolerance- patient reports good tolerance    I Intervention  Nutrition Education :   Labelling https://www.fda.gov/food/nutrition-education-resources-materials/gluten-and-food-labeling#:~:text=The%20rule%20specifies%2C%20among%20other,using%20scientifically%20validated%20analytical%20methods.  https://www.[employer]/biomarkers/celiac-disease-panel#:~:text=What%20is%20a%20high%20level,tTG%20IgG%3A%206.4%20U%2FmL  Materials Provided :List of Gluten free alternative flours provided https://www.healthline.com/nutrition/gluten-free-flours     M = Nutrition Monitoring   Indicator 1. /weight   Indicator 2. Diet /tolerance/adherence      E= Nutrition Evaluation  Goal 1. PO intake stays consistent and patient weight remains stable    Goal 2. Patient adherence to  prescribed diet modifications to support medical care of condition .        Patient and/or family comprehend instructions: yes  Outcome: Verbalizes understanding  Barriers none identified and readiness to learn  Consultation Time: 30  Minutes  F/U: Per referring provider   Communication routed  to referring provider via Epic     Diagnoses:  1. Celiac disease    2. Dermatitis herpetiformis

## 2023-03-21 NOTE — PROGRESS NOTES
Spoke with . She has been noticing epigastric abdominal pain in the evening upon reclining in the evening. Occasional acid regurgitation with acidic avelar in the mouth. Will try Famotidine 20 mg before supper or at bedtime for 2 weeks.

## 2023-03-27 ENCOUNTER — PATIENT MESSAGE (OUTPATIENT)
Dept: GASTROENTEROLOGY | Facility: CLINIC | Age: 58
End: 2023-03-27
Payer: COMMERCIAL

## 2023-03-29 DIAGNOSIS — R10.10 UPPER ABDOMINAL PAIN: Primary | ICD-10-CM

## 2023-03-29 NOTE — PROGRESS NOTES
Continued upper abdominal pain despite taking Pepcid daily. Will schedule US abdomen, CMP and Lipase levels.

## 2023-03-30 ENCOUNTER — LAB VISIT (OUTPATIENT)
Dept: LAB | Facility: HOSPITAL | Age: 58
End: 2023-03-30
Attending: INTERNAL MEDICINE
Payer: COMMERCIAL

## 2023-03-30 ENCOUNTER — HOSPITAL ENCOUNTER (OUTPATIENT)
Dept: RADIOLOGY | Facility: HOSPITAL | Age: 58
Discharge: HOME | End: 2023-03-30
Attending: INTERNAL MEDICINE
Payer: COMMERCIAL

## 2023-03-30 ENCOUNTER — PATIENT MESSAGE (OUTPATIENT)
Dept: GASTROENTEROLOGY | Facility: CLINIC | Age: 58
End: 2023-03-30
Payer: COMMERCIAL

## 2023-03-30 DIAGNOSIS — R10.10 UPPER ABDOMINAL PAIN: ICD-10-CM

## 2023-03-30 DIAGNOSIS — K76.9 LIVER DISEASE, UNSPECIFIED: Primary | ICD-10-CM

## 2023-03-30 LAB
ALBUMIN SERPL BCP-MCNC: 4 G/DL (ref 3.5–5.2)
ALP SERPL-CCNC: 47 U/L (ref 55–135)
ALT SERPL W/O P-5'-P-CCNC: 10 U/L (ref 10–44)
ANION GAP SERPL CALC-SCNC: 5 MMOL/L (ref 8–16)
AST SERPL-CCNC: 18 U/L (ref 10–40)
BILIRUB SERPL-MCNC: 0.5 MG/DL (ref 0.1–1)
BUN SERPL-MCNC: 17 MG/DL (ref 6–20)
CALCIUM SERPL-MCNC: 9.9 MG/DL (ref 8.7–10.5)
CHLORIDE SERPL-SCNC: 101 MMOL/L (ref 95–110)
CO2 SERPL-SCNC: 32 MMOL/L (ref 23–29)
CREAT SERPL-MCNC: 1 MG/DL (ref 0.5–1.4)
EST. GFR  (NO RACE VARIABLE): >60 ML/MIN/1.73 M^2
GLUCOSE SERPL-MCNC: 106 MG/DL (ref 70–110)
LIPASE SERPL-CCNC: 63 U/L (ref 4–60)
POTASSIUM SERPL-SCNC: 5.1 MMOL/L (ref 3.5–5.1)
PROT SERPL-MCNC: 7.7 G/DL (ref 6–8.4)
SODIUM SERPL-SCNC: 138 MMOL/L (ref 136–145)

## 2023-03-30 PROCEDURE — 76700 US ABDOMEN COMPLETE: ICD-10-PCS | Mod: 26,,, | Performed by: STUDENT IN AN ORGANIZED HEALTH CARE EDUCATION/TRAINING PROGRAM

## 2023-03-30 PROCEDURE — 76700 US EXAM ABDOM COMPLETE: CPT | Mod: 26,,, | Performed by: STUDENT IN AN ORGANIZED HEALTH CARE EDUCATION/TRAINING PROGRAM

## 2023-03-30 PROCEDURE — 80053 COMPREHEN METABOLIC PANEL: CPT | Performed by: INTERNAL MEDICINE

## 2023-03-30 PROCEDURE — 83690 ASSAY OF LIPASE: CPT | Performed by: INTERNAL MEDICINE

## 2023-03-30 PROCEDURE — 36415 COLL VENOUS BLD VENIPUNCTURE: CPT | Mod: PO | Performed by: INTERNAL MEDICINE

## 2023-03-30 PROCEDURE — 76700 US EXAM ABDOM COMPLETE: CPT | Mod: TC

## 2023-03-30 NOTE — PROGRESS NOTES
Spoke with  today. US reveals two liver lesions ?hemangioma). Small gall stones. Will schedule MRI to further define the liver lesions. Will schedule labs (CMP and Lipase) today as well.

## 2023-03-31 ENCOUNTER — TELEPHONE (OUTPATIENT)
Dept: GASTROENTEROLOGY | Facility: CLINIC | Age: 58
End: 2023-03-31
Payer: COMMERCIAL

## 2023-03-31 ENCOUNTER — HOSPITAL ENCOUNTER (OUTPATIENT)
Dept: RADIOLOGY | Facility: HOSPITAL | Age: 58
Discharge: HOME | End: 2023-03-31
Attending: INTERNAL MEDICINE
Payer: COMMERCIAL

## 2023-03-31 ENCOUNTER — PATIENT MESSAGE (OUTPATIENT)
Dept: GASTROENTEROLOGY | Facility: CLINIC | Age: 58
End: 2023-03-31
Payer: COMMERCIAL

## 2023-03-31 DIAGNOSIS — K76.9 LIVER DISEASE, UNSPECIFIED: ICD-10-CM

## 2023-03-31 PROCEDURE — 25500020 PHARM REV CODE 255: Performed by: INTERNAL MEDICINE

## 2023-03-31 PROCEDURE — A9585 GADOBUTROL INJECTION: HCPCS | Performed by: INTERNAL MEDICINE

## 2023-03-31 PROCEDURE — 74183 MRI ABD W/O CNTR FLWD CNTR: CPT | Mod: TC

## 2023-03-31 PROCEDURE — 74183 MRI ABD W/O CNTR FLWD CNTR: CPT | Mod: 26,,, | Performed by: RADIOLOGY

## 2023-03-31 PROCEDURE — 74183 MRI ABDOMEN W WO CONTRAST: ICD-10-PCS | Mod: 26,,, | Performed by: RADIOLOGY

## 2023-03-31 RX ORDER — GADOBUTROL 604.72 MG/ML
10 INJECTION INTRAVENOUS
Status: COMPLETED | OUTPATIENT
Start: 2023-03-31 | End: 2023-03-31

## 2023-03-31 RX ADMIN — GADOBUTROL 10 ML: 604.72 INJECTION INTRAVENOUS at 03:03

## 2023-03-31 NOTE — TELEPHONE ENCOUNTER
----- Message from José Luis Irizarry MD sent at 3/31/2023  9:21 AM CDT -----  Please let  know the blood work looks fine. Nothing to worry. Maintain a low fat diet as much as possible.

## 2023-04-18 ENCOUNTER — PATIENT MESSAGE (OUTPATIENT)
Dept: GASTROENTEROLOGY | Facility: CLINIC | Age: 58
End: 2023-04-18
Payer: COMMERCIAL

## 2023-04-19 ENCOUNTER — PATIENT MESSAGE (OUTPATIENT)
Dept: PRIMARY CARE CLINIC | Facility: CLINIC | Age: 58
End: 2023-04-19
Payer: COMMERCIAL

## 2023-04-19 DIAGNOSIS — K90.0 CELIAC DISEASE: Primary | ICD-10-CM

## 2023-04-20 ENCOUNTER — TELEPHONE (OUTPATIENT)
Dept: PRIMARY CARE CLINIC | Facility: CLINIC | Age: 58
End: 2023-04-20
Payer: COMMERCIAL

## 2023-04-20 NOTE — TELEPHONE ENCOUNTER
Dr. Witners does not have any appointments at this time. Will it be okay to see another GI Specialist?

## 2023-04-20 NOTE — TELEPHONE ENCOUNTER
----- Message from Mohini Escamilla sent at 4/20/2023  2:12 PM CDT -----  Please see message from GI staff below:     Is there another provider she recommends?   ----- Message -----  From: Raciel Tapia MA  Sent: 4/20/2023   2:02 PM CDT  To: Mohini Escamilla    Hi,     Dr. Winters does not have any appointments at this time. Will it be okay to see another GI Specialist?   ----- Message -----  From: Mohini Escamilla  Sent: 4/20/2023   1:59 PM CDT  To: [facility] Staff    Dr.Tara Cristina has placed a referral for the patient to be seen in Gastroenterology specifically with Dr. Umer Winters. Please assist with scheduling.    Celiac disease [K90.0]           Slow, feeding

## 2023-04-27 ENCOUNTER — PATIENT MESSAGE (OUTPATIENT)
Dept: PRIMARY CARE CLINIC | Facility: CLINIC | Age: 58
End: 2023-04-27
Payer: COMMERCIAL

## 2023-04-28 ENCOUNTER — PATIENT MESSAGE (OUTPATIENT)
Dept: PRIMARY CARE CLINIC | Facility: CLINIC | Age: 58
End: 2023-04-28

## 2023-05-31 ENCOUNTER — OFFICE VISIT (OUTPATIENT)
Dept: PRIMARY CARE CLINIC | Facility: CLINIC | Age: 58
End: 2023-05-31
Payer: COMMERCIAL

## 2023-05-31 DIAGNOSIS — F41.9 ANXIETY: Primary | ICD-10-CM

## 2023-05-31 PROCEDURE — 1159F PR MEDICATION LIST DOCUMENTED IN MEDICAL RECORD: ICD-10-PCS | Mod: CPTII,95,, | Performed by: NURSE PRACTITIONER

## 2023-05-31 PROCEDURE — 1160F RVW MEDS BY RX/DR IN RCRD: CPT | Mod: CPTII,95,, | Performed by: NURSE PRACTITIONER

## 2023-05-31 PROCEDURE — 99213 PR OFFICE/OUTPT VISIT, EST, LEVL III, 20-29 MIN: ICD-10-PCS | Mod: 95,,, | Performed by: NURSE PRACTITIONER

## 2023-05-31 PROCEDURE — 1160F PR REVIEW ALL MEDS BY PRESCRIBER/CLIN PHARMACIST DOCUMENTED: ICD-10-PCS | Mod: CPTII,95,, | Performed by: NURSE PRACTITIONER

## 2023-05-31 PROCEDURE — 99213 OFFICE O/P EST LOW 20 MIN: CPT | Mod: 95,,, | Performed by: NURSE PRACTITIONER

## 2023-05-31 PROCEDURE — 1159F MED LIST DOCD IN RCRD: CPT | Mod: CPTII,95,, | Performed by: NURSE PRACTITIONER

## 2023-05-31 RX ORDER — FLUOXETINE 10 MG/1
10 CAPSULE ORAL DAILY
Qty: 30 CAPSULE | Refills: 11 | Status: SHIPPED | OUTPATIENT
Start: 2023-05-31 | End: 2023-12-12

## 2023-05-31 NOTE — PROGRESS NOTES
[facility] Note    Chief Complaint      Chief Complaint   Patient presents with    Medication Refill    Anxiety       History of Present Illness      Vicky Brand is a 57 y.o. female who presents today for   Chief Complaint   Patient presents with    Medication Refill    Anxiety         Ms. Brand reports doing very well and able to sleep at night while on prozac. She is requesting refills.       Review of Systems   All 12 systems otherwise negative.     Family History:  family history includes Diabetes in her father; Hypertension in her mother; Ovarian cancer in her maternal grandmother; Pacemaker/defibrilator in her mother; Stroke (age of onset: 49) in her father.   Family history was reviewed with patient.     Medications:  Outpatient Encounter Medications as of 5/31/2023   Medication Sig Dispense Refill    calcium carbonate (OS-GISSELL) 600 mg calcium (1,500 mg) Tab Take 600 mg by mouth once. gluten free      clobetasoL (TEMOVATE) 0.05 % cream Apply to affected areas of body BID prn rash. 60 g 2    dapsone (ACZONE) 5 % topical gel Apply to elbows and buttocks prn flares. 60 g 3    estradioL (ESTRACE) 0.01 % (0.1 mg/gram) vaginal cream Place 1 g vaginally twice a week. 42.5 g 1    fluoride, sodium, (PREVIDENT 5000) 1.1 % Pste Place onto teeth.      FLUoxetine 10 MG capsule Take 1 capsule (10 mg total) by mouth once daily. 30 capsule 3    FLUoxetine 10 MG capsule Take 1 capsule (10 mg total) by mouth once daily. 30 capsule 11    LIDOcaine (XYLOCAINE) 5 % Oint ointment Apply to elbows and buttocks prn pain 1 each 2    LIDOcaine HCL 2% (XYLOCAINE) 2 % jelly Apply to affected area daily prn 30 mL 1    multivitamin capsule Take 1 capsule by mouth once daily. Gluten free      pneumoc 20-darlene conj-dip cr,PF, (PREVNAR 20, PF,) 0.5 mL Syrg injection Inject into the muscle. 0.5 mL 0    triamcinolone acetonide 0.025% (KENALOG) 0.025 % Oint Apply to affected areas of face BID prn irritation. Do not use  for longer than 2 weeks in a row. 15 g 1    UNABLE TO FIND once daily. Iron 28 mg taken daily      [DISCONTINUED] diclofenac sodium (VOLTAREN) 1 % Gel Apply 2 g topically 4 (four) times daily. Apply to affected area up to 4 times per day PRN pain 100 g 4     No facility-administered encounter medications on file as of 5/31/2023.       Allergies:  Review of patient's allergies indicates:   Allergen Reactions    Sulfa (sulfonamide antibiotics) Rash    Cetirizine      Other reaction(s): Unable to Function  Other reaction(s): Unable to Function    Erythromycin Nausea And Vomiting     Other reaction(s): Nausea    Voltaren [diclofenac sodium] Diarrhea    Zyrtec-d  [cetirizine-pseudoephedrine]      Other reaction(s): Inability to focus/function    Gluten Rash     Celiac's disease       Health Maintenance:  Health Maintenance   Topic Date Due    Mammogram  12/12/2023    Lipid Panel  09/02/2027    TETANUS VACCINE  07/27/2031    Hepatitis C Screening  Completed     Health Maintenance Topics with due status: Not Due       Topic Last Completion Date    Colorectal Cancer Screening 06/09/2017    TETANUS VACCINE 07/27/2021    Cervical Cancer Screening 09/16/2021    Lipid Panel 09/02/2022    Mammogram 12/12/2022       Physical Exam   Answers submitted by the patient for this visit:  Review of Systems Questionnaire (Submitted on 5/30/2023)  activity change: No  unexpected weight change: No  neck pain: No  hearing loss: No  rhinorrhea: No  trouble swallowing: No  eye discharge: No  visual disturbance: No  chest tightness: No  wheezing: No  chest pain: No  palpitations: No  blood in stool: No  constipation: No  vomiting: No  diarrhea: No  polydipsia: No  polyuria: No  difficulty urinating: No  hematuria: No  menstrual problem: No  dysuria: No  joint swelling: No  arthralgias: No  headaches: No  weakness: No  confusion: No  dysphoric mood: No    Assessment/Plan     Vicky Brand is a 57 y.o.female with:    Anxiety  -      FLUoxetine 10 MG capsule; Take 1 capsule (10 mg total) by mouth once daily.  Dispense: 30 capsule; Refill: 11        As above, continue current medications and maintain follow up with specialists.  Return to clinic as needed.    Greater than 50% of this time was spent face to face via virtual visit with patient.  All questions were answered to patient's satisfaction.          Indy Gayle, NP-C  [facility]

## 2023-06-20 ENCOUNTER — OFFICE VISIT (OUTPATIENT)
Dept: DERMATOLOGY | Facility: CLINIC | Age: 58
End: 2023-06-20
Payer: COMMERCIAL

## 2023-06-20 DIAGNOSIS — R19.7 DIARRHEA, UNSPECIFIED TYPE: ICD-10-CM

## 2023-06-20 DIAGNOSIS — K90.0 CELIAC DISEASE: ICD-10-CM

## 2023-06-20 DIAGNOSIS — L13.0 DERMATITIS HERPETIFORMIS: Primary | ICD-10-CM

## 2023-06-20 PROCEDURE — 99214 PR OFFICE/OUTPT VISIT, EST, LEVL IV, 30-39 MIN: ICD-10-PCS | Mod: S$GLB,,, | Performed by: DERMATOLOGY

## 2023-06-20 PROCEDURE — 99999 PR PBB SHADOW E&M-EST. PATIENT-LVL III: CPT | Mod: PBBFAC,,, | Performed by: DERMATOLOGY

## 2023-06-20 PROCEDURE — 1159F MED LIST DOCD IN RCRD: CPT | Mod: CPTII,S$GLB,, | Performed by: DERMATOLOGY

## 2023-06-20 PROCEDURE — 1159F PR MEDICATION LIST DOCUMENTED IN MEDICAL RECORD: ICD-10-PCS | Mod: CPTII,S$GLB,, | Performed by: DERMATOLOGY

## 2023-06-20 PROCEDURE — 1160F RVW MEDS BY RX/DR IN RCRD: CPT | Mod: CPTII,S$GLB,, | Performed by: DERMATOLOGY

## 2023-06-20 PROCEDURE — 99999 PR PBB SHADOW E&M-EST. PATIENT-LVL III: ICD-10-PCS | Mod: PBBFAC,,, | Performed by: DERMATOLOGY

## 2023-06-20 PROCEDURE — 1160F PR REVIEW ALL MEDS BY PRESCRIBER/CLIN PHARMACIST DOCUMENTED: ICD-10-PCS | Mod: CPTII,S$GLB,, | Performed by: DERMATOLOGY

## 2023-06-20 PROCEDURE — 99214 OFFICE O/P EST MOD 30 MIN: CPT | Mod: S$GLB,,, | Performed by: DERMATOLOGY

## 2023-06-20 NOTE — PROGRESS NOTES
Subjective:      Patient ID:  Vicky Brand is a 57 y.o. female who presents for   Chief Complaint   Patient presents with    Rash     Follow up     Rash - Follow-up  Symptom course: improving  Affected locations: left buttock, right buttock, right elbow, left elbow and groin  Signs / symptoms: pain  Severity: mild to moderate    Review of Systems   Constitutional:  Negative for fever and chills.   HENT:  Negative for sore throat.    Respiratory:  Negative for cough.    Skin:  Positive for rash.     Objective:   Physical Exam   Constitutional: She appears well-developed and well-nourished.   Neurological: She is alert and oriented to person, place, and time.   Psychiatric: She has a normal mood and affect.   Skin:             Diagram Legend     Erythematous scaling macule/papule c/w actinic keratosis       Vascular papule c/w angioma      Pigmented verrucoid papule/plaque c/w seborrheic keratosis      Yellow umbilicated papule c/w sebaceous hyperplasia      Irregularly shaped tan macule c/w lentigo     1-2 mm smooth white papules consistent with Milia      Movable subcutaneous cyst with punctum c/w epidermal inclusion cyst      Subcutaneous movable cyst c/w pilar cyst      Firm pink to brown papule c/w dermatofibroma      Pedunculated fleshy papule(s) c/w skin tag(s)      Evenly pigmented macule c/w junctional nevus     Mildly variegated pigmented, slightly irregular-bordered macule c/w mildly atypical nevus      Flesh colored to evenly pigmented papule c/w intradermal nevus       Pink pearly papule/plaque c/w basal cell carcinoma      Erythematous hyperkeratotic cursted plaque c/w SCC      Surgical scar with no sign of skin cancer recurrence      Open and closed comedones      Inflammatory papules and pustules      Verrucoid papule consistent consistent with wart     Erythematous eczematous patches and plaques     Dystrophic onycholytic nail with subungual debris c/w onychomycosis     Umbilicated  papule    Erythematous-base heme-crusted tan verrucoid plaque consistent with inflamed seborrheic keratosis     Erythematous Silvery Scaling Plaque c/w Psoriasis     See annotation  Elbows wo rash.      Assessment / Plan:        Dermatitis herpetiformis  Cont clob bid prn flare.  Move dapsone gel to qd-bid reg to chronic areas to prevent.  Pt doesn't want dapsone oral.  Consider colchicine in the future.  Pt can call in for this.  Not excited about doxy chronic.    Start with 1 daily and if no loose stools, try 1 in the morning and 0.5 in the evening.  If no issues with this, can go to 1 pill twice a day.    Retrial of:  Can start niacinamide 500 mg 2-3 x day and msm 500 mg 2-3 x day. Turmeric - start with 500mg every day and increase to 1 g every day (may cause GI upset)- 100x more anti-inflammatory if mixed with black pepper or with fatty food  Overall much better post stopping gluten.    Celiac disease  Pt avoiding gluten.    Diarrhea, unspecified type  Test above vits one at a time to find out which one for diarrhea and avoid that one.  Patient and or guardian to monitor this area/lesion or these areas/lesions for changes or worsening or darkening (for moles and freckles).  Patient and or guardian to contact us if any changes are noted for such.             Follow up if symptoms worsen or fail to improve.

## 2023-06-30 ENCOUNTER — PATIENT MESSAGE (OUTPATIENT)
Dept: DERMATOLOGY | Facility: CLINIC | Age: 58
End: 2023-06-30
Payer: COMMERCIAL

## 2023-07-02 ENCOUNTER — PATIENT MESSAGE (OUTPATIENT)
Dept: GASTROENTEROLOGY | Facility: CLINIC | Age: 58
End: 2023-07-02
Payer: COMMERCIAL

## 2023-07-05 ENCOUNTER — OFFICE VISIT (OUTPATIENT)
Dept: GASTROENTEROLOGY | Facility: CLINIC | Age: 58
End: 2023-07-05
Payer: COMMERCIAL

## 2023-07-05 VITALS — WEIGHT: 120 LBS | BODY MASS INDEX: 19.29 KG/M2 | HEIGHT: 66 IN

## 2023-07-05 DIAGNOSIS — Z87.2: Primary | ICD-10-CM

## 2023-07-05 DIAGNOSIS — K90.0 CELIAC DISEASE: ICD-10-CM

## 2023-07-05 DIAGNOSIS — L13.0 DERMATITIS HERPETIFORMIS: Primary | ICD-10-CM

## 2023-07-05 PROCEDURE — 1159F PR MEDICATION LIST DOCUMENTED IN MEDICAL RECORD: ICD-10-PCS | Mod: CPTII,95,, | Performed by: INTERNAL MEDICINE

## 2023-07-05 PROCEDURE — 1160F RVW MEDS BY RX/DR IN RCRD: CPT | Mod: CPTII,95,, | Performed by: INTERNAL MEDICINE

## 2023-07-05 PROCEDURE — 1159F MED LIST DOCD IN RCRD: CPT | Mod: CPTII,95,, | Performed by: INTERNAL MEDICINE

## 2023-07-05 PROCEDURE — 3008F PR BODY MASS INDEX (BMI) DOCUMENTED: ICD-10-PCS | Mod: CPTII,95,, | Performed by: INTERNAL MEDICINE

## 2023-07-05 PROCEDURE — 3008F BODY MASS INDEX DOCD: CPT | Mod: CPTII,95,, | Performed by: INTERNAL MEDICINE

## 2023-07-05 PROCEDURE — 1160F PR REVIEW ALL MEDS BY PRESCRIBER/CLIN PHARMACIST DOCUMENTED: ICD-10-PCS | Mod: CPTII,95,, | Performed by: INTERNAL MEDICINE

## 2023-07-05 PROCEDURE — 99215 PR OFFICE/OUTPT VISIT, EST, LEVL V, 40-54 MIN: ICD-10-PCS | Mod: 95,,, | Performed by: INTERNAL MEDICINE

## 2023-07-05 PROCEDURE — 99215 OFFICE O/P EST HI 40 MIN: CPT | Mod: 95,,, | Performed by: INTERNAL MEDICINE

## 2023-07-05 RX ORDER — COLCHICINE 0.6 MG/1
0.6 TABLET ORAL 2 TIMES DAILY
Qty: 60 TABLET | Refills: 1 | Status: SHIPPED | OUTPATIENT
Start: 2023-07-05 | End: 2023-07-27 | Stop reason: SDUPTHER

## 2023-07-05 NOTE — PROGRESS NOTES
The patient location is:  At home  The chief complaint leading to consultation is:  Celiac sprue    Visit type: audiovisual    Face to Face time with patient: 40 minutes of total time spent on the encounter, which includes face to face time and non-face to face time preparing to see the patient (eg, review of tests), Obtaining and/or reviewing separately obtained history, Documenting clinical information in the electronic or other health record, Independently interpreting results (not separately reported) and communicating results to the patient/family/caregiver, or Care coordination (not separately reported).       Each patient to whom he or she provides medical services by telemedicine is:  (1) informed of the relationship between the physician and patient and the respective role of any other health care provider with respect to management of the patient; and (2) notified that he or she may decline to receive medical services by telemedicine and may withdraw from such care at any time.    Notes:           [facility] Consultation Note    Reason for Consult:  The primary encounter diagnosis was History of herpetiformis dermatitis. A diagnosis of Celiac disease was also pertinent to this visit.    PCP:   Lia Cristina   [address] / [place] [id number]    Referring MD:  Lia Cristina Md  [address] [address]    Initial History of Present Illness (HPI):  This is a 57 y.o. female here for evaluation of celiac sprue.  Patient said it took several doctors in quite some time to make a diagnosis but she is been diagnosed with celiac sprue based on positive serology and small-bowel biopsies consistent with celiac sprue she also has a dermatological manifestation called dermatitis herpetiformis followed by a dermatologist she said she was informed that she did not need a biopsy of it she has seen dietitian she is on a very strict gluten free diet in fact she just got  back from [facility] celiac sprue Cruise which unfortunately she picked up COVID she still in quarantine she does have a little fatigued from that but otherwise doing really well her bowel movements are usually 1 or 2 a day well-formed her follow-up TTG IgA has come down to normal range she knows to be on a gluten free diet for for life which she is doing a great job doing she knows that she should get blood work probably every 6 months just to check that she is not inadvertently getting gluten in her diet recommend follow-up EGD to check for mucosal healing in about 2 years to 3 years from her last EGD which would be November 2024 which would be 2 years.  Her screening colonoscopy is up-to-date with colon rectal surgeons she is not due for another screening and till 10 years from her last which would be around June of 2027.  She is average risk for colon rectal cancer by personal and family history nobody with esophageal or stomach cancer nobody with small-bowel cancer nobody with pancreatitis or pancreatic cancer nobody with colon cancer or advanced colon adenomas polyps no FAP no attenuated FAP no MA P no Kerr syndrome nobody in the family with celiac sprue or inflammatory bowel disease.  She has no biological children.  Her weight has been stable on her gluten free diet.    Abdominal pain - no  Reflux - no  Dysphagia - no   Bowel habits - normal  GI bleeding - none  NSAID usage - none    Interval HPI 07/05/2023:  The patient's last visit with me was on Visit date not found.      ROS:  Constitutional: No fevers, chills, No weight loss, little bit of fatigue since being diagnosed with COVID which she picked up on a rule [facility] celiac sprue cruise which they offer a few times a year.  She said it was extremely helpful.  ENT:  No heartburn no dysphagia no odynophagia no hoarseness  CV: No chest pain, no palpitation  Pulm: No cough, No shortness of breath, no wheezing  Ophtho: No vision changes  GI: see  HPI  Derm:  Dermatitis herpetiformis followed by dermatology  Heme: No lymphadenopathy, No easy bruising  MSK: No significant arthritis  : No dysuria, No hematuria  Endo: No hot or cold intolerance  Neuro: No syncope, No seizure, no strokes  Psych: No uncontrolled anxiety, No uncontrolled depression    Medical History:  has a past medical history of SAMANTHA positive, Celiac disease (09/29/2022), Dyspareunia in female (07/27/2021), Eye abnormalities, Fracture of fifth toe, left, closed, with delayed healing, subsequent encounter (08/21/2018), Helicobacter pylori infection, Intramural leiomyoma of uterus (05/22/2019), Nocturia (06/17/2021), Ocular migraine, CALEB (obstructive sleep apnea), Palpitations (09/29/2022), Paradoxical insomnia (10/30/2017), Posterior vitreous detachment of left eye (05/22/2019), Shoulder pain, left (2007), Stress and adjustment reaction (09/29/2022), and Urticaria.    Surgical History:  has a past surgical history that includes Dilation and curettage of uterus; Tubal ligation; Colonoscopy (N/A, 06/09/2017); Endometrial ablation (N/A, 2017); Hysteroscopy with dilation and curettage of uterus (N/A, 01/19/2022); De Quervain's release (Right, 06/08/2022); and Esophagogastroduodenoscopy (N/A, 11/11/2022).    Family History: family history includes Diabetes in her father; Hypertension in her mother; Liver disease in her sister; Ovarian cancer in her maternal grandmother; Pacemaker/defibrilator in her mother; Stroke (age of onset: 49) in her father..     Social History:  reports that she has never smoked. She has never used smokeless tobacco. She reports that she does not drink alcohol and does not use drugs.    Review of patient's allergies indicates:   Allergen Reactions    Sulfa (sulfonamide antibiotics) Rash    Cetirizine      Other reaction(s): Unable to Function  Other reaction(s): Unable to Function    Erythromycin Nausea And Vomiting     Other reaction(s): Nausea    Voltaren [diclofenac sodium]  "Diarrhea    Zyrtec-d  [cetirizine-pseudoephedrine]      Other reaction(s): Inability to focus/function    Gluten Rash     Celiac's disease       Medication List with Changes/Refills   Current Medications    CALCIUM CARBONATE (OS-GISSELL) 600 MG CALCIUM (1,500 MG) TAB    Take 600 mg by mouth once. gluten free    CLOBETASOL (TEMOVATE) 0.05 % CREAM    Apply to affected areas of body BID prn rash.    COLCHICINE (COLCRYS) 0.6 MG TABLET    Take 1 tablet (0.6 mg total) by mouth 2 (two) times daily. Start with 1 daily and if no loose stools, try 1 in the morning and 0.5 in the evening.  If no issues with this, can go to 1 pill twice a day.    DAPSONE (ACZONE) 5 % TOPICAL GEL    Apply to elbows and buttocks prn flares.    ESTRADIOL (ESTRACE) 0.01 % (0.1 MG/GRAM) VAGINAL CREAM    Place 1 g vaginally twice a week.    FLUORIDE, SODIUM, (PREVIDENT 5000) 1.1 % PSTE    Place onto teeth.    FLUOXETINE 10 MG CAPSULE    Take 1 capsule (10 mg total) by mouth once daily.    FLUOXETINE 10 MG CAPSULE    Take 1 capsule (10 mg total) by mouth once daily.    LIDOCAINE (XYLOCAINE) 5 % OINT OINTMENT    Apply to elbows and buttocks prn pain    LIDOCAINE HCL 2% (XYLOCAINE) 2 % JELLY    Apply to affected area daily prn    MULTIVITAMIN CAPSULE    Take 1 capsule by mouth once daily. Gluten free    PNEUMOC 20-PAMELA CONJ-DIP CR,PF, (PREVNAR 20, PF,) 0.5 ML SYRG INJECTION    Inject into the muscle.    TRIAMCINOLONE ACETONIDE 0.025% (KENALOG) 0.025 % OINT    Apply to affected areas of face BID prn irritation. Do not use for longer than 2 weeks in a row.    UNABLE TO FIND    once daily. Iron 28 mg taken daily         Objective Findings:    Vital Signs:  Ht 5' 6" (1.676 m)   Wt 54.4 kg (120 lb)   BMI 19.37 kg/m²   Body mass index is 19.37 kg/m².    Physical Exam:  Telemedicine video visit  General Appearance: Well appearing in no acute distress  Neurologic:  Alert and oriented x4  Psychiatric:  Normal speech mentation and affect    Labs:  Lab Results "   Component Value Date    WBC 4.66 11/29/2022    HGB 13.3 11/29/2022    HCT 42.8 11/29/2022     11/29/2022    CHOL 172 09/02/2022    TRIG 98 09/02/2022    HDL 53 09/02/2022    ALT 10 03/30/2023    AST 18 03/30/2023     03/30/2023    K 5.1 03/30/2023     03/30/2023    CREATININE 1.0 03/30/2023    BUN 17 03/30/2023    CO2 32 (H) 03/30/2023    TSH 1.180 11/29/2022             Medical Decision Making:  Lab work reviewed  Prior EGD colonoscopy images and path personally reviewed by myself  Lab work talk given  Timing a repeat EGD talk given  The Olympus scope CF-LW038M ([id number]) was                         introduced through the anus and advanced to the                         terminal ileum. The colonoscopy was performed                         without difficulty. The patient tolerated the                         procedure well. The quality of the bowel                         preparation was good. The terminal ileum, ileocecal                         valve, appendiceal orifice, and rectum were                         photographed.   Findings:        A few small-mouthed diverticula were found in the sigmoid colon.        The exam was otherwise without abnormality.   Impression:           - Diverticulosis in the sigmoid colon.                         - The examination was otherwise normal.                         - No specimens collected.   Recommendation:       - Discharge patient to home.                         - High fiber diet.                         - Continue present medications.                         - Patient has a contact number available for                         emergencies. The signs and symptoms of potential                         delayed complications were discussed with the                         patient. Return to normal activities tomorrow.                         Written discharge instructions were provided to the                         patient.                         -  Repeat colonoscopy in 10 years for screening                         purposes.   Attending Participation:        I personally performed the entire procedure.   Davy Prince MD   6/9/2017   The Olympus scope GIF-                          ([id number]) was introduced through the mouth, and                          advanced to the second part of duodenum. The upper                          GI endoscopy was accomplished without difficulty.                          The patient tolerated the procedure well.   Findings:        The examined esophagus was normal.        The Z-line was regular and was found 38 cm from the incisors.        The entire examined stomach was normal. Biopsies were taken with a        cold forceps for histology. Estimated blood loss was minimal.        The examined duodenum was normal. Biopsies for histology were taken        with a cold forceps for evaluation of celiac disease. Estimated        blood loss was minimal.   Impression:            - Normal Study, stomach and duodenum biopsied.   Recommendation:        - Patient has a contact number available for                          emergencies. The signs and symptoms of potential                          delayed complications were discussed with the                          patient. Return to normal activities tomorrow.                          Written discharge instructions were provided to                          the patient.                          - Discharge patient to home.                          - Resume previous diet.                          - Continue present medications.                          - Await pathology results.   Attending Participation:        -   José Luis Irizarry MD   11/11/2022        1. Small bowel, biopsy:   - Duodenal mucosa with intraepithelial lymphocytosis, crypt hyperplasia, and   mild to moderate villous blunting (see comment)   - Negative for dysplasia or carcinoma   Comment:   The clinical history of rash  with diarrhea and elevations in antigliadin and   tissue transglutaminase antibodies is noted. In conjunction with the   serologic findings, the overall histologic features are compatible with   gluten sensitive enteropathy (celiac disease), and correspond with a Robbins   type IIIA/IIIB lesion. Clinical and endoscopic correlation is recommended.   2. Stomach, biopsy:   - Mild chronic inactive gastritis with features suggestive of reactive   gastropathy   - Immunohistochemistry for Helicobacter pylori pending; results to follow in   a supplemental report   - Negative for intestinal metaplasia   - Negative for dysplasia or carcinoma  VC      Comment: Interp By Jefe William M.D., Signed on 11/20/2022      Assessment:  1. History of herpetiformis dermatitis    2. Celiac disease    3. Recent COVID-19 infection     Recommendations:  Gluten free diet for life  Recommend lab work every 6 months orders placed for 08/23/2023 8:00 a.m. fasting lab work  [facility] [place] [facility]  Recommend repeat EGD 2 years from her last to check small-bowel mucosal healing which would be around November 2024  Patient at average risk for colon rectal cancer in a screening program colonoscopy is up-to-date with CRS they have recommended next surveillance colonoscopy in June of 2027  Recommend patient return to GI clinic 6 months which would be January 2024 for follow-up for celiac sprue    Follow up in about 6 months (around 1/5/2024).      Order summary:  Orders Placed This Encounter    Folate    Vitamin D    Vitamin B12    CBC Auto Differential    Comprehensive Metabolic Panel    Iron and TIBC    Lipase    Ferritin    TISSUE TRANSGLUTAMINASE (TTG), IGA    IgA    TSH         Thank you so much for allowing me to participate in the care of Vicky Chen Sunday Winters MD    DISCLAIMER: This note was prepared with [employer] voice recognition transcription software. Garbled syntax, mangled or inadvertent pronouns, and other bizarre  constructions may be attributed to that software system. While efforts were made to correct any mistakes made by this voice recognition program, some errors and/or omissions may remain in the note that were missed when the note was originally created.

## 2023-07-05 NOTE — Clinical Note
Claudia please schedule Vicky for 12 hour fasting blood work on 08/23/2023 at 8:00 a.m. at [facility] location orders placed  Please schedule her for follow-up telemedicine video visit with me in 6 months for follow-up  Thank you

## 2023-07-07 ENCOUNTER — HOSPITAL ENCOUNTER (OUTPATIENT)
Dept: RADIOLOGY | Facility: HOSPITAL | Age: 58
Discharge: HOME | End: 2023-07-07
Attending: NURSE PRACTITIONER
Payer: COMMERCIAL

## 2023-07-07 ENCOUNTER — OFFICE VISIT (OUTPATIENT)
Dept: PRIMARY CARE CLINIC | Facility: CLINIC | Age: 58
End: 2023-07-07
Payer: COMMERCIAL

## 2023-07-07 VITALS
DIASTOLIC BLOOD PRESSURE: 64 MMHG | SYSTOLIC BLOOD PRESSURE: 110 MMHG | BODY MASS INDEX: 19.77 KG/M2 | HEART RATE: 65 BPM | OXYGEN SATURATION: 100 % | HEIGHT: 66 IN | WEIGHT: 123 LBS

## 2023-07-07 DIAGNOSIS — R07.81 RIB PAIN: Primary | ICD-10-CM

## 2023-07-07 DIAGNOSIS — M25.512 ACUTE PAIN OF LEFT SHOULDER: ICD-10-CM
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
## 2023-07-07 DIAGNOSIS — R07.81 RIB PAIN ON LEFT SIDE: ICD-10-CM

## 2023-07-07 DIAGNOSIS — R07.81 RIB PAIN: ICD-10-CM

## 2023-07-07 PROCEDURE — 73030 X-RAY EXAM OF SHOULDER: CPT | Mod: TC,PN,LT

## 2023-07-07 PROCEDURE — 1160F PR REVIEW ALL MEDS BY PRESCRIBER/CLIN PHARMACIST DOCUMENTED: ICD-10-PCS | Mod: CPTII,S$GLB,, | Performed by: NURSE PRACTITIONER

## 2023-07-07 PROCEDURE — 3074F PR MOST RECENT SYSTOLIC BLOOD PRESSURE < 130 MM HG: ICD-10-PCS | Mod: CPTII,S$GLB,, | Performed by: NURSE PRACTITIONER

## 2023-07-07 PROCEDURE — 72070 X-RAY EXAM THORAC SPINE 2VWS: CPT | Mod: TC,PN

## 2023-07-07 PROCEDURE — 1160F RVW MEDS BY RX/DR IN RCRD: CPT | Mod: CPTII,S$GLB,, | Performed by: NURSE PRACTITIONER

## 2023-07-07 PROCEDURE — 71100 X-RAY EXAM RIBS UNI 2 VIEWS: CPT | Mod: TC,PN,LT

## 2023-07-07 PROCEDURE — 99214 OFFICE O/P EST MOD 30 MIN: CPT | Mod: S$GLB,,, | Performed by: NURSE PRACTITIONER

## 2023-07-07 PROCEDURE — 71100 XR RIBS 2 VIEW LEFT: ICD-10-PCS | Mod: 26,LT,, | Performed by: RADIOLOGY

## 2023-07-07 PROCEDURE — 3078F PR MOST RECENT DIASTOLIC BLOOD PRESSURE < 80 MM HG: ICD-10-PCS | Mod: CPTII,S$GLB,, | Performed by: NURSE PRACTITIONER

## 2023-07-07 PROCEDURE — 3074F SYST BP LT 130 MM HG: CPT | Mod: CPTII,S$GLB,, | Performed by: NURSE PRACTITIONER

## 2023-07-07 PROCEDURE — 1159F MED LIST DOCD IN RCRD: CPT | Mod: CPTII,S$GLB,, | Performed by: NURSE PRACTITIONER

## 2023-07-07 PROCEDURE — 72070 XR THORACIC SPINE AP LATERAL: ICD-10-PCS | Mod: 26,,, | Performed by: RADIOLOGY

## 2023-07-07 PROCEDURE — 99999 PR PBB SHADOW E&M-EST. PATIENT-LVL V: CPT | Mod: PBBFAC,,, | Performed by: NURSE PRACTITIONER

## 2023-07-07 PROCEDURE — 99999 PR PBB SHADOW E&M-EST. PATIENT-LVL V: ICD-10-PCS | Mod: PBBFAC,,, | Performed by: NURSE PRACTITIONER

## 2023-07-07 PROCEDURE — 73030 XR SHOULDER COMPLETE 2 OR MORE VIEWS LEFT: ICD-10-PCS | Mod: 26,LT,, | Performed by: RADIOLOGY

## 2023-07-07 PROCEDURE — 3008F BODY MASS INDEX DOCD: CPT | Mod: CPTII,S$GLB,, | Performed by: NURSE PRACTITIONER

## 2023-07-07 PROCEDURE — 73030 X-RAY EXAM OF SHOULDER: CPT | Mod: 26,LT,, | Performed by: RADIOLOGY

## 2023-07-07 PROCEDURE — 71100 X-RAY EXAM RIBS UNI 2 VIEWS: CPT | Mod: 26,LT,, | Performed by: RADIOLOGY

## 2023-07-07 PROCEDURE — 1159F PR MEDICATION LIST DOCUMENTED IN MEDICAL RECORD: ICD-10-PCS | Mod: CPTII,S$GLB,, | Performed by: NURSE PRACTITIONER

## 2023-07-07 PROCEDURE — 3078F DIAST BP <80 MM HG: CPT | Mod: CPTII,S$GLB,, | Performed by: NURSE PRACTITIONER

## 2023-07-07 PROCEDURE — 99214 PR OFFICE/OUTPT VISIT, EST, LEVL IV, 30-39 MIN: ICD-10-PCS | Mod: S$GLB,,, | Performed by: NURSE PRACTITIONER

## 2023-07-07 PROCEDURE — 3008F PR BODY MASS INDEX (BMI) DOCUMENTED: ICD-10-PCS | Mod: CPTII,S$GLB,, | Performed by: NURSE PRACTITIONER

## 2023-07-07 PROCEDURE — 72070 X-RAY EXAM THORAC SPINE 2VWS: CPT | Mod: 26,,, | Performed by: RADIOLOGY

## 2023-07-07 RX ORDER — CYCLOBENZAPRINE HCL 10 MG
10 TABLET ORAL 3 TIMES DAILY PRN
Qty: 60 TABLET | Refills: 0 | Status: SHIPPED | OUTPATIENT
Start: 2023-07-07 | End: 2023-07-27

## 2023-07-07 NOTE — PROGRESS NOTES
[facility] Note    Chief Complaint      Chief Complaint   Patient presents with    Chest Pain    Shoulder Injury    Shoulder Pain       History of Present Illness      Vicky Brand is a 57 y.o. female who presents today for   Chief Complaint   Patient presents with    Chest Pain    Shoulder Injury    Shoulder Pain         Patient is known to me. She was on vacation last week. She reports falling out of a hammock and landing on her left shoulder, back. She did not seek medical attention after the incident because she felt fine and did not experience pain. However, 5 days ago she started experiencing left rib pain, left shoulder and arm pain. There is bruising to her LUE that began 5 days after incident. Currently not taking any medication for treatment of symptoms.       Review of Systems   Musculoskeletal:  Positive for back pain, falls and joint pain.        + left back pain  + left rib pain  + left shoulder pain   Skin:         + Bruising to LUE   All 12 systems otherwise negative.     Family History:  family history includes Diabetes in her father; Hypertension in her mother; Liver disease in her sister; Ovarian cancer in her maternal grandmother; Pacemaker/defibrilator in her mother; Stroke (age of onset: 49) in her father.   Family history was reviewed with patient.     Medications:  Outpatient Encounter Medications as of 7/7/2023   Medication Sig Dispense Refill    calcium carbonate (OS-GISSELL) 600 mg calcium (1,500 mg) Tab Take 600 mg by mouth once. gluten free      clobetasoL (TEMOVATE) 0.05 % cream Apply to affected areas of body BID prn rash. 60 g 2    colchicine (COLCRYS) 0.6 mg tablet Take 1 tablet (0.6 mg total) by mouth 2 (two) times daily. Start with 1 daily and if no loose stools, try 1 in the morning and 0.5 in the evening.  If no issues with this, can go to 1 pill twice a day. 60 tablet 1    dapsone (ACZONE) 5 % topical gel Apply to elbows and buttocks prn flares. 60 g 3     fluoride, sodium, (PREVIDENT 5000) 1.1 % Pste Place onto teeth.      FLUoxetine 10 MG capsule Take 1 capsule (10 mg total) by mouth once daily. 30 capsule 11    LIDOcaine (XYLOCAINE) 5 % Oint ointment Apply to elbows and buttocks prn pain 1 each 2    LIDOcaine HCL 2% (XYLOCAINE) 2 % jelly Apply to affected area daily prn 30 mL 1    multivitamin capsule Take 1 capsule by mouth once daily. Gluten free      triamcinolone acetonide 0.025% (KENALOG) 0.025 % Oint Apply to affected areas of face BID prn irritation. Do not use for longer than 2 weeks in a row. 15 g 1    cyclobenzaprine (FLEXERIL) 10 MG tablet Take 1 tablet (10 mg total) by mouth 3 (three) times daily as needed for Muscle spasms. 60 tablet 0    estradioL (ESTRACE) 0.01 % (0.1 mg/gram) vaginal cream Place 1 g vaginally twice a week. (Patient not taking: Reported on 7/7/2023) 42.5 g 1    FLUoxetine 10 MG capsule Take 1 capsule (10 mg total) by mouth once daily. (Patient not taking: Reported on 7/7/2023) 30 capsule 3    pneumoc 20-darlene conj-dip cr,PF, (PREVNAR 20, PF,) 0.5 mL Syrg injection Inject into the muscle. (Patient not taking: Reported on 7/7/2023) 0.5 mL 0    UNABLE TO FIND once daily. Iron 28 mg taken daily      [DISCONTINUED] diclofenac sodium (VOLTAREN) 1 % Gel Apply 2 g topically 4 (four) times daily. Apply to affected area up to 4 times per day PRN pain 100 g 4     No facility-administered encounter medications on file as of 7/7/2023.       Allergies:  Review of patient's allergies indicates:   Allergen Reactions    Sulfa (sulfonamide antibiotics) Rash    Cetirizine      Other reaction(s): Unable to Function  Other reaction(s): Unable to Function    Erythromycin Nausea And Vomiting     Other reaction(s): Nausea    Voltaren [diclofenac sodium] Diarrhea    Zyrtec-d  [cetirizine-pseudoephedrine]      Other reaction(s): Inability to focus/function    Gluten Rash     Celiac's disease       Health Maintenance:  Health Maintenance   Topic Date Due     "Mammogram  12/12/2023    Lipid Panel  09/02/2027    TETANUS VACCINE  07/27/2031    Hepatitis C Screening  Completed     Health Maintenance Topics with due status: Not Due       Topic Last Completion Date    Colorectal Cancer Screening 06/09/2017    TETANUS VACCINE 07/27/2021    Cervical Cancer Screening 09/16/2021    Lipid Panel 09/02/2022    Influenza Vaccine 09/19/2022    Mammogram 12/12/2022       Physical Exam      Vital Signs  Pulse: 65  SpO2: 100 %  BP: 110/64  BP Location: Right arm  Patient Position: Sitting  Pain Score:   5  Height and Weight  Height: 5' 6" (167.6 cm)  Weight: 55.8 kg (123 lb 0.3 oz)  BSA (Calculated - sq m): 1.61 sq meters  BMI (Calculated): 19.9  Weight in (lb) to have BMI = 25: 154.6]    Physical Exam  Vitals reviewed.   Constitutional:       Appearance: Normal appearance. She is normal weight.   HENT:      Head: Normocephalic and atraumatic.      Nose: Nose normal.      Mouth/Throat:      Mouth: Mucous membranes are moist.      Pharynx: Oropharynx is clear.   Eyes:      Extraocular Movements: Extraocular movements intact.      Conjunctiva/sclera: Conjunctivae normal.      Pupils: Pupils are equal, round, and reactive to light.   Cardiovascular:      Rate and Rhythm: Normal rate and regular rhythm.      Pulses: Normal pulses.      Heart sounds: Normal heart sounds.   Pulmonary:      Effort: Pulmonary effort is normal.      Breath sounds: Normal breath sounds.   Musculoskeletal:         General: Tenderness present.      Cervical back: Normal range of motion and neck supple.      Comments: + left shoulder tenderness  + left rib tenderness     Skin:     General: Skin is warm and dry.      Capillary Refill: Capillary refill takes less than 2 seconds.      Findings: Bruising present.      Comments: + bruising to LUE   Neurological:      General: No focal deficit present.      Mental Status: She is alert and oriented to person, place, and time. Mental status is at baseline.   Psychiatric:    "      Mood and Affect: Mood normal.         Behavior: Behavior normal.         Thought Content: Thought content normal.         Judgment: Judgment normal.          Assessment/Plan     Vicky Brand is a 57 y.o.female with:    Rib pain  -     X-Ray Ribs 2 View Left; Future; Expected date: 07/07/2023  -     cyclobenzaprine (FLEXERIL) 10 MG tablet; Take 1 tablet (10 mg total) by mouth 3 (three) times daily as needed for Muscle spasms.  Dispense: 60 tablet; Refill: 0    Fall, initial encounter  -     X-Ray Ribs 2 View Left; Future; Expected date: 07/07/2023    Rib pain on left side  -     X-Ray Thoracic Spine AP Lateral; Future; Expected date: 07/07/2023    Acute pain of left shoulder  -     X-Ray Shoulder 2 or More Views Left; Future; Expected date: 07/07/2023  -     cyclobenzaprine (FLEXERIL) 10 MG tablet; Take 1 tablet (10 mg total) by mouth 3 (three) times daily as needed for Muscle spasms.  Dispense: 60 tablet; Refill: 0        As above, continue current medications and maintain follow up with specialists.  Return to clinic as needed.    Greater than 50% of visit was spent face to face with patient.  All questions were answered to patient's satisfaction.            Karen L Spencer, NP-C [facility]

## 2023-07-13 ENCOUNTER — PATIENT MESSAGE (OUTPATIENT)
Dept: DERMATOLOGY | Facility: CLINIC | Age: 58
End: 2023-07-13
Payer: COMMERCIAL

## 2023-07-27 ENCOUNTER — OFFICE VISIT (OUTPATIENT)
Dept: DERMATOLOGY | Facility: CLINIC | Age: 58
End: 2023-07-27
Payer: COMMERCIAL

## 2023-07-27 DIAGNOSIS — L13.0 DERMATITIS HERPETIFORMIS: Primary | ICD-10-CM
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
## 2023-07-27 DIAGNOSIS — L90.5 SCAR: ICD-10-CM

## 2023-07-27 DIAGNOSIS — K90.0 CELIAC DISEASE: ICD-10-CM

## 2023-07-27 PROCEDURE — 99999 PR PBB SHADOW E&M-EST. PATIENT-LVL III: ICD-10-PCS | Mod: PBBFAC,,, | Performed by: DERMATOLOGY

## 2023-07-27 PROCEDURE — 99214 OFFICE O/P EST MOD 30 MIN: CPT | Mod: S$GLB,,, | Performed by: DERMATOLOGY

## 2023-07-27 PROCEDURE — 99214 PR OFFICE/OUTPT VISIT, EST, LEVL IV, 30-39 MIN: ICD-10-PCS | Mod: S$GLB,,, | Performed by: DERMATOLOGY

## 2023-07-27 PROCEDURE — 1159F PR MEDICATION LIST DOCUMENTED IN MEDICAL RECORD: ICD-10-PCS | Mod: CPTII,S$GLB,, | Performed by: DERMATOLOGY

## 2023-07-27 PROCEDURE — 1159F MED LIST DOCD IN RCRD: CPT | Mod: CPTII,S$GLB,, | Performed by: DERMATOLOGY

## 2023-07-27 PROCEDURE — 99999 PR PBB SHADOW E&M-EST. PATIENT-LVL III: CPT | Mod: PBBFAC,,, | Performed by: DERMATOLOGY

## 2023-07-27 RX ORDER — COLCHICINE 0.6 MG/1
0.6 TABLET ORAL 2 TIMES DAILY
Qty: 60 TABLET | Refills: 1 | Status: SHIPPED | OUTPATIENT
Start: 2023-07-27 | End: 2023-12-12

## 2023-07-27 NOTE — PROGRESS NOTES
Subjective:      Patient ID:  Vicky Brand is a 57 y.o. female who presents for   Chief Complaint   Patient presents with    Rash     Follow up     Rash - Follow-up  Symptom course: worsening and improving  Affected locations: back, left buttock and left upper leg  Signs / symptoms: redness and inflamed (Change in color)  Severity: mild to moderate      Review of Systems   Constitutional:  Negative for fever and chills.   HENT:  Negative for sore throat.    Respiratory:  Negative for cough.    Gastrointestinal:  Positive for diarrhea.   Skin:  Positive for rash.     Objective:   Physical Exam   Constitutional: She appears well-developed and well-nourished.   Neurological: She is alert and oriented to person, place, and time.   Psychiatric: She has a normal mood and affect.   Skin:             Diagram Legend     Erythematous scaling macule/papule c/w actinic keratosis       Vascular papule c/w angioma      Pigmented verrucoid papule/plaque c/w seborrheic keratosis      Yellow umbilicated papule c/w sebaceous hyperplasia      Irregularly shaped tan macule c/w lentigo     1-2 mm smooth white papules consistent with Milia      Movable subcutaneous cyst with punctum c/w epidermal inclusion cyst      Subcutaneous movable cyst c/w pilar cyst      Firm pink to brown papule c/w dermatofibroma      Pedunculated fleshy papule(s) c/w skin tag(s)      Evenly pigmented macule c/w junctional nevus     Mildly variegated pigmented, slightly irregular-bordered macule c/w mildly atypical nevus      Flesh colored to evenly pigmented papule c/w intradermal nevus       Pink pearly papule/plaque c/w basal cell carcinoma      Erythematous hyperkeratotic cursted plaque c/w SCC      Surgical scar with no sign of skin cancer recurrence      Open and closed comedones      Inflammatory papules and pustules      Verrucoid papule consistent consistent with wart     Erythematous eczematous patches and plaques     Dystrophic  onycholytic nail with subungual debris c/w onychomycosis     Umbilicated papule    Erythematous-base heme-crusted tan verrucoid plaque consistent with inflamed seborrheic keratosis     Erythematous Silvery Scaling Plaque c/w Psoriasis     See annotation            Assessment / Plan:        Dermatitis herpetiformis  -     colchicine (COLCRYS) 0.6 mg tablet; Take 1 tablet (0.6 mg total) by mouth 2 (two) times daily. Start with 1 daily and if no loose stools, try 1 in the morning and 0.5 in the evening.  If no issues with this, can go to 1 pill twice a day.  Dispense: 60 tablet; Refill: 1  Pt to cont colch qd.  Gets diarrhea with bid.  If pt wants dapsone, will need g6pd.  Dapsone risks reviewed.  Patient needs baseline CBC, Chem 20, and G6PD.  Watch for low hemoglobin and numbness.  Condition is stable.  We will continue present management.    Celiac disease  Pt avoiding gluten.    Insect bite, unspecified site, initial encounter  Discussed with patient that clinical presentation and lesions are consistent with insect bites.  Insect bites can cause itching and persistent lesions for weeks due to a person's sensitivity and immune system.  Spots can appear in a delayed fashion out of sync with each other over weeks.  Other members of the household may not have any signs.  We cannot tell what type of insect is causing the problem, and the patient may need to review their household environment for bed bugs, ants, fleas, gnats, et cetera.  Patient and or guardian to monitor this area/lesion or these areas/lesions for changes or worsening or darkening (for moles and freckles).  Patient and or guardian to contact us if any changes are noted for such.  Pt reports recent flea exposure with relative's dog.  Classic linear pattern of the l thigh.    Scar  Discussed with the patient the risk of color scars, erythema, or hyperpigmentation that could take months to resolve.             Follow up in about 6 months (around  1/27/2024).

## 2023-08-03 NOTE — TELEPHONE ENCOUNTER
Called to confirm appt for EMBX on 5/7/218 with Dr. Jarrell. No answer. Left message requesting call back at [phone].   Wound Care: [facility]

## 2023-08-23 ENCOUNTER — LAB VISIT (OUTPATIENT)
Dept: LAB | Facility: HOSPITAL | Age: 58
End: 2023-08-23
Attending: INTERNAL MEDICINE
Payer: COMMERCIAL

## 2023-08-23 DIAGNOSIS — K90.0 CELIAC DISEASE: ICD-10-CM

## 2023-08-23 DIAGNOSIS — Z87.2: ICD-10-CM

## 2023-08-23 LAB
25(OH)D3+25(OH)D2 SERPL-MCNC: 63 NG/ML (ref 30–96)
ALBUMIN SERPL BCP-MCNC: 3.9 G/DL (ref 3.5–5.2)
ALP SERPL-CCNC: 52 U/L (ref 55–135)
ALT SERPL W/O P-5'-P-CCNC: 15 U/L (ref 10–44)
ANION GAP SERPL CALC-SCNC: 5 MMOL/L (ref 8–16)
AST SERPL-CCNC: 22 U/L (ref 10–40)
BASOPHILS # BLD AUTO: 0.02 K/UL (ref 0–0.2)
BASOPHILS NFR BLD: 0.5 % (ref 0–1.9)
BILIRUB SERPL-MCNC: 0.6 MG/DL (ref 0.1–1)
BUN SERPL-MCNC: 20 MG/DL (ref 6–20)
CALCIUM SERPL-MCNC: 9.8 MG/DL (ref 8.7–10.5)
CHLORIDE SERPL-SCNC: 106 MMOL/L (ref 95–110)
CO2 SERPL-SCNC: 29 MMOL/L (ref 23–29)
CREAT SERPL-MCNC: 0.9 MG/DL (ref 0.5–1.4)
DIFFERENTIAL METHOD: ABNORMAL
EOSINOPHIL # BLD AUTO: 0.1 K/UL (ref 0–0.5)
EOSINOPHIL NFR BLD: 1.5 % (ref 0–8)
ERYTHROCYTE [DISTWIDTH] IN BLOOD BY AUTOMATED COUNT: 13 % (ref 11.5–14.5)
EST. GFR  (NO RACE VARIABLE): >60 ML/MIN/1.73 M^2
FERRITIN SERPL-MCNC: 106 NG/ML (ref 20–300)
FOLATE SERPL-MCNC: 16.8 NG/ML (ref 4–24)
GLUCOSE SERPL-MCNC: 91 MG/DL (ref 70–110)
HCT VFR BLD AUTO: 41.6 % (ref 37–48.5)
HGB BLD-MCNC: 12.7 G/DL (ref 12–16)
IGA SERPL-MCNC: 239 MG/DL (ref 40–350)
IMM GRANULOCYTES # BLD AUTO: 0 K/UL (ref 0–0.04)
IMM GRANULOCYTES NFR BLD AUTO: 0 % (ref 0–0.5)
IRON SERPL-MCNC: 120 UG/DL (ref 30–160)
LIPASE SERPL-CCNC: 59 U/L (ref 4–60)
LYMPHOCYTES # BLD AUTO: 1.8 K/UL (ref 1–4.8)
LYMPHOCYTES NFR BLD: 45.1 % (ref 18–48)
MCH RBC QN AUTO: 29.1 PG (ref 27–31)
MCHC RBC AUTO-ENTMCNC: 30.5 G/DL (ref 32–36)
MCV RBC AUTO: 95 FL (ref 82–98)
MONOCYTES # BLD AUTO: 0.3 K/UL (ref 0.3–1)
MONOCYTES NFR BLD: 8 % (ref 4–15)
NEUTROPHILS # BLD AUTO: 1.7 K/UL (ref 1.8–7.7)
NEUTROPHILS NFR BLD: 44.9 % (ref 38–73)
NRBC BLD-RTO: 0 /100 WBC
PLATELET # BLD AUTO: 210 K/UL (ref 150–450)
PMV BLD AUTO: 11.3 FL (ref 9.2–12.9)
POTASSIUM SERPL-SCNC: 4.8 MMOL/L (ref 3.5–5.1)
PROT SERPL-MCNC: 7.4 G/DL (ref 6–8.4)
RBC # BLD AUTO: 4.36 M/UL (ref 4–5.4)
SATURATED IRON: 41 % (ref 20–50)
SODIUM SERPL-SCNC: 140 MMOL/L (ref 136–145)
TOTAL IRON BINDING CAPACITY: 295 UG/DL (ref 250–450)
TRANSFERRIN SERPL-MCNC: 199 MG/DL (ref 200–375)
TSH SERPL DL<=0.005 MIU/L-ACNC: 1.78 UIU/ML (ref 0.4–4)
VIT B12 SERPL-MCNC: 1208 PG/ML (ref 210–950)
WBC # BLD AUTO: 3.88 K/UL (ref 3.9–12.7)

## 2023-08-23 PROCEDURE — 36415 COLL VENOUS BLD VENIPUNCTURE: CPT | Mod: PO | Performed by: INTERNAL MEDICINE

## 2023-08-23 PROCEDURE — 80053 COMPREHEN METABOLIC PANEL: CPT | Performed by: INTERNAL MEDICINE

## 2023-08-23 PROCEDURE — 82746 ASSAY OF FOLIC ACID SERUM: CPT | Performed by: INTERNAL MEDICINE

## 2023-08-23 PROCEDURE — 83540 ASSAY OF IRON: CPT | Performed by: INTERNAL MEDICINE

## 2023-08-23 PROCEDURE — 83690 ASSAY OF LIPASE: CPT | Performed by: INTERNAL MEDICINE

## 2023-08-23 PROCEDURE — 84443 ASSAY THYROID STIM HORMONE: CPT | Performed by: INTERNAL MEDICINE

## 2023-08-23 PROCEDURE — 82728 ASSAY OF FERRITIN: CPT | Performed by: INTERNAL MEDICINE

## 2023-08-23 PROCEDURE — 86364 TISS TRNSGLTMNASE EA IG CLAS: CPT | Performed by: INTERNAL MEDICINE

## 2023-08-23 PROCEDURE — 82784 ASSAY IGA/IGD/IGG/IGM EACH: CPT | Performed by: INTERNAL MEDICINE

## 2023-08-23 PROCEDURE — 84466 ASSAY OF TRANSFERRIN: CPT | Performed by: INTERNAL MEDICINE

## 2023-08-23 PROCEDURE — 82306 VITAMIN D 25 HYDROXY: CPT | Performed by: INTERNAL MEDICINE

## 2023-08-23 PROCEDURE — 85025 COMPLETE CBC W/AUTO DIFF WBC: CPT | Performed by: INTERNAL MEDICINE

## 2023-08-23 PROCEDURE — 82607 VITAMIN B-12: CPT | Performed by: INTERNAL MEDICINE

## 2023-08-25 LAB — TTG IGA SER-ACNC: 1.6 U/ML

## 2023-08-29 ENCOUNTER — TELEPHONE (OUTPATIENT)
Dept: PRIMARY CARE CLINIC | Facility: CLINIC | Age: 58
End: 2023-08-29
Payer: COMMERCIAL

## 2023-08-29 ENCOUNTER — TELEPHONE (OUTPATIENT)
Dept: SLEEP MEDICINE | Facility: CLINIC | Age: 58
End: 2023-08-29
Payer: COMMERCIAL

## 2023-08-29 NOTE — TELEPHONE ENCOUNTER
----- Message from Mariely Lopez sent at 8/29/2023  8:21 AM CDT -----  Contact: Pt [phone]  type: Lab    Caller is requesting to schedule their Lab appointment prior to annual appointment.  Order is not listed in EPIC.  Please enter order and contact patient to schedule.    Thank you

## 2023-08-29 NOTE — TELEPHONE ENCOUNTER
----- Message from Britta Prince sent at 8/29/2023  8:28 AM CDT -----  Contact: Patient  Type:  Patient Call          Who Called: Patient         Does the patient know what this is regarding?: Requesting a call back to have a appt scheduled for her yearly check up ; please advise           Would the patient rather a call back or a response via [facility]? Call           Best Call Back Number:[phone]             Additional Information:

## 2023-09-08 ENCOUNTER — PATIENT MESSAGE (OUTPATIENT)
Dept: DERMATOLOGY | Facility: CLINIC | Age: 58
End: 2023-09-08
Payer: COMMERCIAL

## 2023-09-11 NOTE — PROGRESS NOTES
[facility] Note    Chief Complaint      Chief Complaint   Patient presents with    Annual Exam       History of Present Illness      Vicky Brand is a 57 y.o. female who presents today for   Chief Complaint   Patient presents with    Annual Exam         Patient is known to me who presents to clinic today for her annual medical exam.  When I last saw outpatient patient was 1st diagnosed with celiac disease.  She had much anxiety over the disease process and preparing her life for it.  I started her on fluoxetine to help decrease anxiety.  Patient reports that fluoxetine help greatly but she has since weaned off of it.  We will start the medication today.  Her last dose was taken 3 days ago.  Prior to that she was taking intermittently every 3-4 days.  She does report insomnia at times.  We discussed starting trazodone for insomnia.  Patient agrees with this plan of care.  She reports feeling well today.  She remains active daily.  There are no complaints at this time.         Review of Systems   All 12 systems otherwise negative.       Family History:  family history includes Diabetes in her father; Hypertension in her mother; Liver disease in her sister; Ovarian cancer in her maternal grandmother; Pacemaker/defibrilator in her mother; Stroke (age of onset: 49) in her father.   Family history was reviewed with patient.     Medications:  Outpatient Encounter Medications as of 9/25/2023   Medication Sig Dispense Refill    calcium carbonate (OS-GISSELL) 600 mg calcium (1,500 mg) Tab Take 600 mg by mouth once. gluten free      clobetasoL (TEMOVATE) 0.05 % cream Apply to affected areas of body BID prn rash. 60 g 2    colchicine (COLCRYS) 0.6 mg tablet Take 1 tablet (0.6 mg total) by mouth 2 (two) times daily. Start with 1 daily and if no loose stools, try 1 in the morning and 0.5 in the evening.  If no issues with this, can go to 1 pill twice a day. 60 tablet 1    dapsone (ACZONE) 5 % topical gel  Apply to elbows and buttocks prn flares. 60 g 3    fluoride, sodium, (PREVIDENT 5000) 1.1 % Pste Place onto teeth.      FLUoxetine 10 MG capsule Take 1 capsule (10 mg total) by mouth once daily. 30 capsule 3    FLUoxetine 10 MG capsule Take 1 capsule (10 mg total) by mouth once daily. 30 capsule 11    LIDOcaine (XYLOCAINE) 5 % Oint ointment Apply to elbows and buttocks prn pain 1 each 2    LIDOcaine HCL 2% (XYLOCAINE) 2 % jelly Apply to affected area daily prn 30 mL 1    multivitamin capsule Take 1 capsule by mouth once daily. Gluten free      azithromycin (Z-OBI) 250 MG tablet Take 250 mg by mouth.      brompheniramine-pseudoeph-DM (BROMFED DM) 2-30-10 mg/5 mL Syrp Take by mouth.      estradioL (ESTRACE) 0.01 % (0.1 mg/gram) vaginal cream Place 1 g vaginally twice a week. (Patient not taking: Reported on 9/25/2023) 42.5 g 1    pneumoc 20-darlene conj-dip cr,PF, (PREVNAR 20, PF,) 0.5 mL Syrg injection Inject into the muscle. (Patient not taking: Reported on 9/25/2023) 0.5 mL 0    traZODone (DESYREL) 50 MG tablet Take 1 tablet (50 mg total) by mouth every evening. 30 tablet 2    triamcinolone acetonide 0.025% (KENALOG) 0.025 % Oint Apply to affected areas of face BID prn irritation. Do not use for longer than 2 weeks in a row. (Patient not taking: Reported on 9/25/2023) 15 g 1    UNABLE TO FIND once daily. Iron 28 mg taken daily      [DISCONTINUED] diclofenac sodium (VOLTAREN) 1 % Gel Apply 2 g topically 4 (four) times daily. Apply to affected area up to 4 times per day PRN pain 100 g 4     No facility-administered encounter medications on file as of 9/25/2023.       Allergies:  Review of patient's allergies indicates:   Allergen Reactions    Sulfa (sulfonamide antibiotics) Rash    Cetirizine      Other reaction(s): Unable to Function  Other reaction(s): Unable to Function    Erythromycin Nausea And Vomiting     Other reaction(s): Nausea    Voltaren [diclofenac sodium] Diarrhea    Zyrtec-d  [cetirizine-pseudoephedrine]   "    Other reaction(s): Inability to focus/function    Gluten Rash     Celiac's disease       Health Maintenance:  Health Maintenance   Topic Date Due    Mammogram  12/12/2023    Shingles Vaccine (1 of 2) 09/25/2023 (Originally 11/11/2016)    Colorectal Cancer Screening  06/09/2027    Lipid Panel  09/02/2027    TETANUS VACCINE  07/27/2031    Hepatitis C Screening  Completed     Health Maintenance Topics with due status: Not Due       Topic Last Completion Date    Colorectal Cancer Screening 06/09/2017    TETANUS VACCINE 07/27/2021    Cervical Cancer Screening 09/16/2021    Lipid Panel 09/02/2022       Physical Exam      Vital Signs  Temp: 98.1 °F (36.7 °C)  Temp Source: Oral  Pulse: 65  SpO2: 98 %  BP: 106/76  Pain Score: 0-No pain  Height and Weight  Height: 5' 6" (167.6 cm)  Weight: 57.4 kg (126 lb 8.7 oz)  BSA (Calculated - sq m): 1.63 sq meters  BMI (Calculated): 20.4  Weight in (lb) to have BMI = 25: 154.6]    Physical Exam  Vitals reviewed.   Constitutional:       Appearance: Normal appearance. She is normal weight.   HENT:      Head: Normocephalic and atraumatic.      Nose: Nose normal.      Mouth/Throat:      Mouth: Mucous membranes are moist.      Pharynx: Oropharynx is clear.   Eyes:      Extraocular Movements: Extraocular movements intact.      Conjunctiva/sclera: Conjunctivae normal.      Pupils: Pupils are equal, round, and reactive to light.   Cardiovascular:      Rate and Rhythm: Normal rate and regular rhythm.      Pulses: Normal pulses.      Heart sounds: Normal heart sounds.   Pulmonary:      Effort: Pulmonary effort is normal.      Breath sounds: Normal breath sounds.   Musculoskeletal:         General: Normal range of motion.      Cervical back: Normal range of motion and neck supple.   Skin:     General: Skin is warm and dry.      Capillary Refill: Capillary refill takes less than 2 seconds.   Neurological:      General: No focal deficit present.      Mental Status: She is alert and oriented to " person, place, and time. Mental status is at baseline.   Psychiatric:         Mood and Affect: Mood normal.         Behavior: Behavior normal.         Thought Content: Thought content normal.         Judgment: Judgment normal.            Assessment/Plan     Vicky Brand is a 57 y.o.female with:    Annual physical exam  -     HEMOGLOBIN A1C; Future; Expected date: 09/25/2023  -     LIPID PANEL; Future; Expected date: 09/25/2023    Encounter for screening for HIV  -     HIV 1/2 Ag/Ab (4th Gen); Future; Expected date: 09/25/2023    Insomnia, unspecified type  -     traZODone (DESYREL) 50 MG tablet; Take 1 tablet (50 mg total) by mouth every evening.  Dispense: 30 tablet; Refill: 2        As above, continue current medications and maintain follow up with specialists.  Return to clinic as needed.    Greater than 50% of visit was spent face to face with patient.  All questions were answered to patient's satisfaction.          Karen L Spencer, NP-C [facility]

## 2023-09-25 ENCOUNTER — OFFICE VISIT (OUTPATIENT)
Dept: PRIMARY CARE CLINIC | Facility: CLINIC | Age: 58
End: 2023-09-25
Payer: COMMERCIAL

## 2023-09-25 VITALS
WEIGHT: 126.56 LBS | SYSTOLIC BLOOD PRESSURE: 106 MMHG | HEART RATE: 65 BPM | HEIGHT: 66 IN | OXYGEN SATURATION: 98 % | TEMPERATURE: 98 F | BODY MASS INDEX: 20.34 KG/M2 | DIASTOLIC BLOOD PRESSURE: 76 MMHG

## 2023-09-25 DIAGNOSIS — G47.00 INSOMNIA, UNSPECIFIED TYPE: ICD-10-CM

## 2023-09-25 DIAGNOSIS — Z00.00 ANNUAL PHYSICAL EXAM: Primary | ICD-10-CM

## 2023-09-25 DIAGNOSIS — Z11.4 ENCOUNTER FOR SCREENING FOR HIV: ICD-10-CM

## 2023-09-25 PROCEDURE — 99999 PR PBB SHADOW E&M-EST. PATIENT-LVL V: ICD-10-PCS | Mod: PBBFAC,,, | Performed by: NURSE PRACTITIONER

## 2023-09-25 PROCEDURE — 3008F BODY MASS INDEX DOCD: CPT | Mod: CPTII,S$GLB,, | Performed by: NURSE PRACTITIONER

## 2023-09-25 PROCEDURE — 99396 PR PREVENTIVE VISIT,EST,40-64: ICD-10-PCS | Mod: S$GLB,,, | Performed by: NURSE PRACTITIONER

## 2023-09-25 PROCEDURE — 1160F RVW MEDS BY RX/DR IN RCRD: CPT | Mod: CPTII,S$GLB,, | Performed by: NURSE PRACTITIONER

## 2023-09-25 PROCEDURE — 99999 PR PBB SHADOW E&M-EST. PATIENT-LVL V: CPT | Mod: PBBFAC,,, | Performed by: NURSE PRACTITIONER

## 2023-09-25 PROCEDURE — 1159F MED LIST DOCD IN RCRD: CPT | Mod: CPTII,S$GLB,, | Performed by: NURSE PRACTITIONER

## 2023-09-25 PROCEDURE — 3074F PR MOST RECENT SYSTOLIC BLOOD PRESSURE < 130 MM HG: ICD-10-PCS | Mod: CPTII,S$GLB,, | Performed by: NURSE PRACTITIONER

## 2023-09-25 PROCEDURE — 3078F PR MOST RECENT DIASTOLIC BLOOD PRESSURE < 80 MM HG: ICD-10-PCS | Mod: CPTII,S$GLB,, | Performed by: NURSE PRACTITIONER

## 2023-09-25 PROCEDURE — 3008F PR BODY MASS INDEX (BMI) DOCUMENTED: ICD-10-PCS | Mod: CPTII,S$GLB,, | Performed by: NURSE PRACTITIONER

## 2023-09-25 PROCEDURE — 99396 PREV VISIT EST AGE 40-64: CPT | Mod: S$GLB,,, | Performed by: NURSE PRACTITIONER

## 2023-09-25 PROCEDURE — 3078F DIAST BP <80 MM HG: CPT | Mod: CPTII,S$GLB,, | Performed by: NURSE PRACTITIONER

## 2023-09-25 PROCEDURE — 3074F SYST BP LT 130 MM HG: CPT | Mod: CPTII,S$GLB,, | Performed by: NURSE PRACTITIONER

## 2023-09-25 PROCEDURE — 1159F PR MEDICATION LIST DOCUMENTED IN MEDICAL RECORD: ICD-10-PCS | Mod: CPTII,S$GLB,, | Performed by: NURSE PRACTITIONER

## 2023-09-25 PROCEDURE — 1160F PR REVIEW ALL MEDS BY PRESCRIBER/CLIN PHARMACIST DOCUMENTED: ICD-10-PCS | Mod: CPTII,S$GLB,, | Performed by: NURSE PRACTITIONER

## 2023-09-25 RX ORDER — TRAZODONE HYDROCHLORIDE 50 MG/1
50 TABLET ORAL NIGHTLY
Qty: 30 TABLET | Refills: 2 | Status: SHIPPED | OUTPATIENT
Start: 2023-09-25 | End: 2023-12-12

## 2023-09-25 RX ORDER — AZITHROMYCIN 250 MG/1
250 TABLET, FILM COATED ORAL
COMMUNITY
Start: 2023-07-02 | End: 2023-12-12

## 2023-09-25 RX ORDER — BROMPHENIRAMINE MALEATE, PSEUDOEPHEDRINE HYDROCHLORIDE, AND DEXTROMETHORPHAN HYDROBROMIDE 2; 30; 10 MG/5ML; MG/5ML; MG/5ML
SYRUP ORAL
COMMUNITY
Start: 2023-07-02 | End: 2023-12-12

## 2023-10-02 ENCOUNTER — LAB VISIT (OUTPATIENT)
Dept: LAB | Facility: HOSPITAL | Age: 58
End: 2023-10-02
Attending: NURSE PRACTITIONER
Payer: COMMERCIAL

## 2023-10-02 DIAGNOSIS — Z00.00 ANNUAL PHYSICAL EXAM: ICD-10-CM

## 2023-10-02 DIAGNOSIS — Z11.4 ENCOUNTER FOR SCREENING FOR HIV: ICD-10-CM

## 2023-10-02 LAB
CHOLEST SERPL-MCNC: 192 MG/DL (ref 120–199)
CHOLEST/HDLC SERPL: 3.4 {RATIO} (ref 2–5)
ESTIMATED AVG GLUCOSE: 108 MG/DL (ref 68–131)
HBA1C MFR BLD: 5.4 % (ref 4–5.6)
HDLC SERPL-MCNC: 56 MG/DL (ref 40–75)
HDLC SERPL: 29.2 % (ref 20–50)
HIV 1+2 AB+HIV1 P24 AG SERPL QL IA: NORMAL
LDLC SERPL CALC-MCNC: 122 MG/DL (ref 63–159)
NONHDLC SERPL-MCNC: 136 MG/DL
TRIGL SERPL-MCNC: 70 MG/DL (ref 30–150)

## 2023-10-02 PROCEDURE — 83036 HEMOGLOBIN GLYCOSYLATED A1C: CPT | Performed by: NURSE PRACTITIONER

## 2023-10-02 PROCEDURE — 36415 COLL VENOUS BLD VENIPUNCTURE: CPT | Mod: PN | Performed by: NURSE PRACTITIONER

## 2023-10-02 PROCEDURE — 80061 LIPID PANEL: CPT | Performed by: NURSE PRACTITIONER

## 2023-10-02 PROCEDURE — 87389 HIV-1 AG W/HIV-1&-2 AB AG IA: CPT | Performed by: NURSE PRACTITIONER

## 2023-10-12 ENCOUNTER — PATIENT MESSAGE (OUTPATIENT)
Dept: PRIMARY CARE CLINIC | Facility: CLINIC | Age: 58
End: 2023-10-12
Payer: COMMERCIAL

## 2023-10-19 ENCOUNTER — OFFICE VISIT (OUTPATIENT)
Dept: SLEEP MEDICINE | Facility: CLINIC | Age: 58
End: 2023-10-19
Payer: COMMERCIAL

## 2023-10-19 DIAGNOSIS — G47.33 OSA (OBSTRUCTIVE SLEEP APNEA): Primary | ICD-10-CM

## 2023-10-19 DIAGNOSIS — G47.00 INSOMNIA, UNSPECIFIED TYPE: Primary | ICD-10-CM

## 2023-10-19 PROCEDURE — 99214 OFFICE O/P EST MOD 30 MIN: CPT | Mod: 95,,, | Performed by: PSYCHIATRY & NEUROLOGY

## 2023-10-19 PROCEDURE — 3044F PR MOST RECENT HEMOGLOBIN A1C LEVEL <7.0%: ICD-10-PCS | Mod: CPTII,95,, | Performed by: PSYCHIATRY & NEUROLOGY

## 2023-10-19 PROCEDURE — 99214 PR OFFICE/OUTPT VISIT, EST, LEVL IV, 30-39 MIN: ICD-10-PCS | Mod: 95,,, | Performed by: PSYCHIATRY & NEUROLOGY

## 2023-10-19 PROCEDURE — 3044F HG A1C LEVEL LT 7.0%: CPT | Mod: CPTII,95,, | Performed by: PSYCHIATRY & NEUROLOGY

## 2023-10-19 RX ORDER — AMITRIPTYLINE HYDROCHLORIDE 10 MG/1
10 TABLET, FILM COATED ORAL NIGHTLY PRN
Qty: 30 TABLET | Refills: 11 | Status: SHIPPED | OUTPATIENT
Start: 2023-10-19 | End: 2024-10-18

## 2023-10-19 NOTE — PROGRESS NOTES
The patient location is: home  The chief complaint leading to consultation is: sleep disorder  Visit type: audiovisual  Total time spent with patient: 30 min  Each patient to whom he or she provides medical services by telemedicine is:  (1) informed of the relationship between the physician and patient and the respective role of any other health care provider with respect to management of the patient; and (2) notified that he or she may decline to receive medical services by telemedicine and may withdraw from such care at any time.                10/18/2023     8:42 PM 10/12/2022    10:08 AM 9/15/2021     8:27 AM 7/7/2021     2:05 PM 6/28/2021    12:27 PM   EPWORTH SLEEPINESS SCALE TOTAL SCORE    Total score 2 0 3 4 3       Vicky Brand is a 57 y.o. female seen today for CALEB and Insomnia follow up. Last seen on 10/13/2022.    The patietn reports ongoing difficulty with falling and staying asleep despite compliant CPAP use.   Trazodone 50 mg - whole pill worked - but drowsy in AM; tried taking a small dose - not effective  Evening primrose - helps on and off. Melatonin - did not work.  Maintains good sleep hygine; her bedroom is dark and quiet. [employer] and caffeine in the afternoon.   As for CALEB, the patient is doing well on CPAP. The patient reports improved sleep continuity and daytime sleepiness on PAP. ESS today is 8/24.  Denies break through snoring.  No  dry mouth.  No aerophagia or air hunger. Denies occasional mask leaks and discomfort.        Medications: took Elavil for sleep in the past for sleep (very small dose); no longer taking it at the moment.     Compliance Report  Compliance  Payor Standard  Usage 09/13/2022 - 10/12/2022  Usage days 30/30 days (100%)  >= 4 hours 27 days (90%)  < 4 hours 3 days (10%)  Usage hours 195 hours 14 minutes  Average usage (total days) 6 hours 30 minutes  Average usage (days used) 6 hours 30 minutes  Median usage (days used) 6 hours 49 minutes  Total  used hours (value since last reset - 10/12/2022) 2,427 hours  AirSense 10 AutoSet  Serial number [id number]  Mode AutoSet  Min Pressure 4 cmH2O  Max Pressure 10 cmH2O  EPR Fulltime  EPR level 3  Response Standard  Therapy  Pressure - cmH2O Median: 5.4 95th percentile: 6.9 Maximum: 8.0  Leaks - L/min Median: 0.3 95th percentile: 6.1 Maximum: 12.4  Events per hour AI: 0.6 HI: 0.1 AHI: 0.7  Apnea Index Central: 0.4 Obstructive: 0.2 Unknown: 0.0  RERA Index 0.0  Cheyne-Otto respiration (average duration per night) 0 minutes (0%)  Usage - hours        _______          Medications pertinent to sleep disorders: vening primrose - did not help.  Melatonin - did not work.  Previously tried medications:Trazodone 50 mg - whole pill worked - but drowsy in AM; tried taking a small dose - not effective Hydroxyzine - ws drowsy in AM. Elavil 10 mg - used to 1/4     DME:   SLEEP STUDIES: 2021: Severe sleep disordered breathing (AHI=40) is  noted based on a 4% hypopnea desaturation criteria, with indications of respiratory control instability. The patient slept  supine 51% of the night based on valid recording time of 6.37 hours. When considering more subtle measures of sleep  disordered breathing, the overall respiratory disturbance index is severe(RDI=53) based on a 1% hypopnea desaturation  criteria with confirmation by surrogate arousal indicators. The apneas/hypopneas are accompanied by minimal oxygen  desaturation (percent time below 90% SpO2: 0.1%, Min SpO2: 88.7%).    PHYSICAL EXAM:  There were no vitals taken for this visit.  W/D, W/N well groomed    HST AHI 40/low sat 80% (severe side and back)    ASSESSMENT  CALEB severe, compliant, good AHI control on CPAP; benefiting from therapy  Insomnia, unspecified. The sleep remains interrupted despite compliant CPAP use. FAiled Trazodone (extreme drowsiness at a minimal dose). Recalls Elavil being effective in the pat.      PLAN:  Continue APAP at 4- 10 cm   Continue cognitive  behavior approach to Insomnia management.   Will Start Elavil low dose 10 mg  Sleep hygiene brochure was provide tot he patietn.     We discussed potential treatment options, which could include continuous positive airway pressure (CPAP-defintive), mandibular advancement splint by dentist, or referral for surgical consideration. Discussed etiology of CALEB and potential ramifications of untreated CALEB, including heart disease, hypertension, cognitive difficulties, stroke, and diabetes.

## 2023-10-20 NOTE — PATIENT INSTRUCTIONS
SLEEP LAB ([facility]) will contact you to schedulethe sleep study. Their number is [phone] (ext 2). Please call them if you do not hear from them in 2 weeks from now.  The [facility] is located on 7th floor of the [facility] (for home and in lab studies); [facility] is located in [facility] ( in lab studies only).    SLEEP CLINIC (my assistant) will call you when the sleep study results are ready or I will message you through the portal with the results as we have discussed - if you have not heard from us by 2 weeks from the date of the study, or you can use [facility] to contact me.    Our clinic phone number is [phone] (ext 1)       You are advised to abstain from driving should you feel sleepy or drowsy.

## 2023-11-09 ENCOUNTER — PATIENT MESSAGE (OUTPATIENT)
Dept: GASTROENTEROLOGY | Facility: CLINIC | Age: 58
End: 2023-11-09
Payer: COMMERCIAL

## 2023-12-04 ENCOUNTER — TELEPHONE (OUTPATIENT)
Dept: RHEUMATOLOGY | Facility: CLINIC | Age: 58
End: 2023-12-04
Payer: COMMERCIAL

## 2023-12-04 NOTE — TELEPHONE ENCOUNTER
"----- Message from Audra Shanks sent at 12/4/2023  9:38 AM CST -----  Consult/Advisory:      Name Of Caller: Self    Contact Preference:  [phone]      What is the nature of the call?:  stated that she is due her 12 month f/u appt with the provider, No dates In epic.       Additional Notes:  "Thank you for all that you do for our patients"          "

## 2023-12-12 ENCOUNTER — OFFICE VISIT (OUTPATIENT)
Dept: PRIMARY CARE CLINIC | Facility: CLINIC | Age: 58
End: 2023-12-12
Payer: COMMERCIAL

## 2023-12-12 VITALS
WEIGHT: 127.88 LBS | HEART RATE: 92 BPM | DIASTOLIC BLOOD PRESSURE: 74 MMHG | HEIGHT: 66 IN | BODY MASS INDEX: 20.55 KG/M2 | SYSTOLIC BLOOD PRESSURE: 108 MMHG | OXYGEN SATURATION: 99 %

## 2023-12-12 DIAGNOSIS — J04.0 LARYNGITIS: ICD-10-CM

## 2023-12-12 DIAGNOSIS — D70.9 NEUTROPENIA, UNSPECIFIED TYPE: ICD-10-CM

## 2023-12-12 DIAGNOSIS — J04.0 ACUTE LARYNGITIS: Primary | ICD-10-CM

## 2023-12-12 LAB
CTP QC/QA: YES
CTP QC/QA: YES
S PYO RRNA THROAT QL PROBE: NEGATIVE
SARS-COV-2 RDRP RESP QL NAA+PROBE: NEGATIVE

## 2023-12-12 PROCEDURE — 3044F HG A1C LEVEL LT 7.0%: CPT | Mod: CPTII,S$GLB,, | Performed by: FAMILY MEDICINE

## 2023-12-12 PROCEDURE — 87880 STREP A ASSAY W/OPTIC: CPT | Mod: QW,S$GLB,, | Performed by: FAMILY MEDICINE

## 2023-12-12 PROCEDURE — 99999 PR PBB SHADOW E&M-EST. PATIENT-LVL IV: ICD-10-PCS | Mod: PBBFAC,,, | Performed by: FAMILY MEDICINE

## 2023-12-12 PROCEDURE — 99214 OFFICE O/P EST MOD 30 MIN: CPT | Mod: S$GLB,,, | Performed by: FAMILY MEDICINE

## 2023-12-12 PROCEDURE — 1159F PR MEDICATION LIST DOCUMENTED IN MEDICAL RECORD: ICD-10-PCS | Mod: CPTII,S$GLB,, | Performed by: FAMILY MEDICINE

## 2023-12-12 PROCEDURE — 1160F PR REVIEW ALL MEDS BY PRESCRIBER/CLIN PHARMACIST DOCUMENTED: ICD-10-PCS | Mod: CPTII,S$GLB,, | Performed by: FAMILY MEDICINE

## 2023-12-12 PROCEDURE — 3008F BODY MASS INDEX DOCD: CPT | Mod: CPTII,S$GLB,, | Performed by: FAMILY MEDICINE

## 2023-12-12 PROCEDURE — 3074F SYST BP LT 130 MM HG: CPT | Mod: CPTII,S$GLB,, | Performed by: FAMILY MEDICINE

## 2023-12-12 PROCEDURE — 87880 POCT RAPID STREP A: ICD-10-PCS | Mod: QW,S$GLB,, | Performed by: FAMILY MEDICINE

## 2023-12-12 PROCEDURE — 99999 PR PBB SHADOW E&M-EST. PATIENT-LVL IV: CPT | Mod: PBBFAC,,, | Performed by: FAMILY MEDICINE

## 2023-12-12 PROCEDURE — 1159F MED LIST DOCD IN RCRD: CPT | Mod: CPTII,S$GLB,, | Performed by: FAMILY MEDICINE

## 2023-12-12 PROCEDURE — 3008F PR BODY MASS INDEX (BMI) DOCUMENTED: ICD-10-PCS | Mod: CPTII,S$GLB,, | Performed by: FAMILY MEDICINE

## 2023-12-12 PROCEDURE — 3074F PR MOST RECENT SYSTOLIC BLOOD PRESSURE < 130 MM HG: ICD-10-PCS | Mod: CPTII,S$GLB,, | Performed by: FAMILY MEDICINE

## 2023-12-12 PROCEDURE — 99214 PR OFFICE/OUTPT VISIT, EST, LEVL IV, 30-39 MIN: ICD-10-PCS | Mod: S$GLB,,, | Performed by: FAMILY MEDICINE

## 2023-12-12 PROCEDURE — 87635: ICD-10-PCS | Mod: QW,S$GLB,, | Performed by: FAMILY MEDICINE

## 2023-12-12 PROCEDURE — 87635 SARS-COV-2 COVID-19 AMP PRB: CPT | Mod: QW,S$GLB,, | Performed by: FAMILY MEDICINE

## 2023-12-12 PROCEDURE — 3044F PR MOST RECENT HEMOGLOBIN A1C LEVEL <7.0%: ICD-10-PCS | Mod: CPTII,S$GLB,, | Performed by: FAMILY MEDICINE

## 2023-12-12 PROCEDURE — 1160F RVW MEDS BY RX/DR IN RCRD: CPT | Mod: CPTII,S$GLB,, | Performed by: FAMILY MEDICINE

## 2023-12-12 PROCEDURE — 87070 CULTURE OTHR SPECIMN AEROBIC: CPT | Performed by: FAMILY MEDICINE

## 2023-12-12 PROCEDURE — 3078F PR MOST RECENT DIASTOLIC BLOOD PRESSURE < 80 MM HG: ICD-10-PCS | Mod: CPTII,S$GLB,, | Performed by: FAMILY MEDICINE

## 2023-12-12 PROCEDURE — 3078F DIAST BP <80 MM HG: CPT | Mod: CPTII,S$GLB,, | Performed by: FAMILY MEDICINE

## 2023-12-12 RX ORDER — PROMETHAZINE HYDROCHLORIDE AND DEXTROMETHORPHAN HYDROBROMIDE 6.25; 15 MG/5ML; MG/5ML
5 SYRUP ORAL NIGHTLY PRN
Qty: 118 ML | Refills: 0 | Status: SHIPPED | OUTPATIENT
Start: 2023-12-12 | End: 2023-12-22

## 2023-12-12 NOTE — PATIENT INSTRUCTIONS
Hydrate, rest, Mucinex Expectorant as directed for thinning out the mucus; or MucinexDM if with significant cough and mucus.  Zyrtec, Xyzal, Allegra or Claritin. (Would lean towards Allegra which may be a bit more effective than claritin and will not cause sedation as Xyzal or Zyrtec may.)  Nasal saline spray such as [place] brand as needed.  Flonase or Nasonex nasal spray after the nasal saline.  Warm salt water gargles and warm liquids may help soothe a sore throat better than cool liquids.  Tylenol as directed as needed for fever, body aches, headaches.   All are OTC  Most of all, stay well-hydrated.

## 2023-12-13 ENCOUNTER — OFFICE VISIT (OUTPATIENT)
Dept: OBSTETRICS AND GYNECOLOGY | Facility: CLINIC | Age: 58
End: 2023-12-13
Payer: COMMERCIAL

## 2023-12-13 ENCOUNTER — HOSPITAL ENCOUNTER (OUTPATIENT)
Dept: RADIOLOGY | Facility: OTHER | Age: 58
Discharge: HOME | End: 2023-12-13
Attending: OBSTETRICS & GYNECOLOGY
Payer: COMMERCIAL

## 2023-12-13 VITALS
HEIGHT: 66 IN | BODY MASS INDEX: 20.76 KG/M2 | DIASTOLIC BLOOD PRESSURE: 96 MMHG | SYSTOLIC BLOOD PRESSURE: 136 MMHG | WEIGHT: 129.19 LBS

## 2023-12-13 DIAGNOSIS — Z78.0 POSTMENOPAUSAL: ICD-10-CM

## 2023-12-13 DIAGNOSIS — Z12.31 ENCOUNTER FOR SCREENING MAMMOGRAM FOR BREAST CANCER: ICD-10-CM

## 2023-12-13 DIAGNOSIS — N95.2 VAGINAL ATROPHY: ICD-10-CM

## 2023-12-13 DIAGNOSIS — Z01.419 ENCOUNTER FOR GYNECOLOGICAL EXAMINATION WITHOUT ABNORMAL FINDING: Primary | ICD-10-CM

## 2023-12-13 DIAGNOSIS — N94.10 DYSPAREUNIA IN FEMALE: ICD-10-CM

## 2023-12-13 PROBLEM — D70.9 NEUTROPENIA, UNSPECIFIED TYPE: Status: ACTIVE | Noted: 2023-12-13

## 2023-12-13 PROCEDURE — 3080F DIAST BP >= 90 MM HG: CPT | Mod: CPTII,S$GLB,, | Performed by: OBSTETRICS & GYNECOLOGY

## 2023-12-13 PROCEDURE — 3044F PR MOST RECENT HEMOGLOBIN A1C LEVEL <7.0%: ICD-10-PCS | Mod: CPTII,S$GLB,, | Performed by: OBSTETRICS & GYNECOLOGY

## 2023-12-13 PROCEDURE — 99396 PR PREVENTIVE VISIT,EST,40-64: ICD-10-PCS | Mod: S$GLB,,, | Performed by: OBSTETRICS & GYNECOLOGY

## 2023-12-13 PROCEDURE — 99999 PR PBB SHADOW E&M-EST. PATIENT-LVL IV: ICD-10-PCS | Mod: PBBFAC,,, | Performed by: OBSTETRICS & GYNECOLOGY

## 2023-12-13 PROCEDURE — 3080F PR MOST RECENT DIASTOLIC BLOOD PRESSURE >= 90 MM HG: ICD-10-PCS | Mod: CPTII,S$GLB,, | Performed by: OBSTETRICS & GYNECOLOGY

## 2023-12-13 PROCEDURE — 88175 CYTOPATH C/V AUTO FLUID REDO: CPT | Performed by: OBSTETRICS & GYNECOLOGY

## 2023-12-13 PROCEDURE — 1159F PR MEDICATION LIST DOCUMENTED IN MEDICAL RECORD: ICD-10-PCS | Mod: CPTII,S$GLB,, | Performed by: OBSTETRICS & GYNECOLOGY

## 2023-12-13 PROCEDURE — 77063 BREAST TOMOSYNTHESIS BI: CPT | Mod: 26,,, | Performed by: RADIOLOGY

## 2023-12-13 PROCEDURE — 77067 SCR MAMMO BI INCL CAD: CPT | Mod: 26,,, | Performed by: RADIOLOGY

## 2023-12-13 PROCEDURE — 77067 SCR MAMMO BI INCL CAD: CPT | Mod: TC

## 2023-12-13 PROCEDURE — 1159F MED LIST DOCD IN RCRD: CPT | Mod: CPTII,S$GLB,, | Performed by: OBSTETRICS & GYNECOLOGY

## 2023-12-13 PROCEDURE — 3008F BODY MASS INDEX DOCD: CPT | Mod: CPTII,S$GLB,, | Performed by: OBSTETRICS & GYNECOLOGY

## 2023-12-13 PROCEDURE — 77063 MAMMO DIGITAL SCREENING BILAT WITH TOMO: ICD-10-PCS | Mod: 26,,, | Performed by: RADIOLOGY

## 2023-12-13 PROCEDURE — 99999 PR PBB SHADOW E&M-EST. PATIENT-LVL IV: CPT | Mod: PBBFAC,,, | Performed by: OBSTETRICS & GYNECOLOGY

## 2023-12-13 PROCEDURE — 3075F PR MOST RECENT SYSTOLIC BLOOD PRESS GE 130-139MM HG: ICD-10-PCS | Mod: CPTII,S$GLB,, | Performed by: OBSTETRICS & GYNECOLOGY

## 2023-12-13 PROCEDURE — 3008F PR BODY MASS INDEX (BMI) DOCUMENTED: ICD-10-PCS | Mod: CPTII,S$GLB,, | Performed by: OBSTETRICS & GYNECOLOGY

## 2023-12-13 PROCEDURE — 3075F SYST BP GE 130 - 139MM HG: CPT | Mod: CPTII,S$GLB,, | Performed by: OBSTETRICS & GYNECOLOGY

## 2023-12-13 PROCEDURE — 3044F HG A1C LEVEL LT 7.0%: CPT | Mod: CPTII,S$GLB,, | Performed by: OBSTETRICS & GYNECOLOGY

## 2023-12-13 PROCEDURE — 77067 MAMMO DIGITAL SCREENING BILAT WITH TOMO: ICD-10-PCS | Mod: 26,,, | Performed by: RADIOLOGY

## 2023-12-13 PROCEDURE — 99396 PREV VISIT EST AGE 40-64: CPT | Mod: S$GLB,,, | Performed by: OBSTETRICS & GYNECOLOGY

## 2023-12-13 RX ORDER — ESTRADIOL 0.1 MG/G
1 CREAM VAGINAL
Qty: 42.5 G | Refills: 3 | Status: SHIPPED | OUTPATIENT
Start: 2023-12-14 | End: 2024-12-13

## 2023-12-13 NOTE — PROGRESS NOTES
"    /74 (BP Location: Right arm, Patient Position: Sitting, BP Method: Medium (Manual))   Pulse 92   Ht 5' 6" (1.676 m)   Wt 58 kg (127 lb 13.9 oz)   SpO2 99%   BMI 20.64 kg/m²       ===========    Chief Complaint: Laryngitis          Sore Throat   This is a new problem. The current episode started today. The problem has been rapidly worsening. Neither side of throat is experiencing more pain than the other. There has been no fever. The pain is mild. Associated symptoms include a hoarse voice. Pertinent negatives include no abdominal pain, congestion, coughing, diarrhea, drooling, ear discharge, ear pain, headaches, plugged ear sensation, neck pain, shortness of breath, stridor, swollen glands, trouble swallowing or vomiting. She has had no exposure to strep or mono. She has tried cool liquids for the symptoms. The treatment provided no relief.       Vicky Brand is a 58 y.o. female     here for    Sore throat.  She comes in with note card because she can not speak secondary to laryngitis being so severe she reports on the notes.  Not much cough.  No fevers or chills.  No nausea vomiting diarrhea.  Hydrating well.  Taking some over-the-counter medications with minimal relief.        Patient queried and denies any further complaints      Patient Active Problem List   Diagnosis    Status post hysteroscopy    Paradoxical insomnia    Irregular menses    Intramural leiomyoma of uterus    Nocturia    CALEB (obstructive sleep apnea)    Dyspareunia in female    Lack of coordination    Muscle weakness    Myalgia of pelvic floor    Wrist sprain    Plantar fasciitis    Pain in left elbow    Pain aggravated by activities of daily living    De Quervain's tenosynovitis, right    Pain in right hand    Decreased range of motion of finger of right hand    Decreased range of motion of right wrist    Swelling of right hand    Requires assistance with activities of daily living (ADL)    SAMANTHA positive    Dermatitis " herpetiformis    Sjogren's syndrome without extraglandular involvement    Celiac disease    Palpitations    Stress and adjustment reaction    Helicobacter pylori infection    Neutropenia, unspecified type       SURGICAL AND MEDICAL HISTORY: updated and reviewed.  Past Surgical History:   Procedure Laterality Date    COLONOSCOPY N/A 06/09/2017    Procedure: COLONOSCOPY;  Surgeon: Davy Prince MD;  Location: [facility] (4TH FLR);  Service: Endoscopy;  Laterality: N/A;    DE QUERVAIN'S RELEASE Right 06/08/2022    Procedure: RELEASE, HAND, FOR DEQUERVAIN'S TENOSYNOVITIS - RIGHT;  Surgeon: Devan Wagner MD;  Location: [facility];  Service: Orthopedics;  Laterality: Right;    DILATION AND CURETTAGE OF UTERUS      ENDOMETRIAL ABLATION N/A 2017    ESOPHAGOGASTRODUODENOSCOPY N/A 11/11/2022    Procedure: EGD (ESOPHAGOGASTRODUODENOSCOPY);  Surgeon: José Luis Irizarry MD;  [address]  Service: Endoscopy;  Laterality: N/A;  Fully vaccinated/ inst portal-RB    HYSTEROSCOPY WITH DILATION AND CURETTAGE OF UTERUS N/A 01/19/2022    Procedure: HYSTEROSCOPY, WITH DILATION AND CURETTAGE OF UTERUS;  Surgeon: Jannette Jarrell MD;  Location: [facility] OR;  Service: OB/GYN;  Laterality: N/A;    TUBAL LIGATION       ALLERGIES updated and reviewed.  Review of patient's allergies indicates:   Allergen Reactions    Sulfa (sulfonamide antibiotics) Rash    Cetirizine      Other reaction(s): Unable to Function  Other reaction(s): Unable to Function    Erythromycin Nausea And Vomiting     Other reaction(s): Nausea    Voltaren [diclofenac sodium] Diarrhea    Zyrtec-d  [cetirizine-pseudoephedrine]      Other reaction(s): Inability to focus/function    Gluten Rash     Celiac's disease       CURRENT OUTPATIENT MEDICATIONS updated and reviewed    Current Outpatient Medications:     amitriptyline (ELAVIL) 10 MG tablet, Take 1 tablet (10 mg total) by mouth nightly as needed for Insomnia (insomnia)., Disp: 30 tablet, Rfl: 11    calcium  carbonate (OS-GISSELL) 600 mg calcium (1,500 mg) Tab, Take 600 mg by mouth once. gluten free, Disp: , Rfl:     clobetasoL (TEMOVATE) 0.05 % cream, Apply to affected areas of body BID prn rash., Disp: 60 g, Rfl: 2    dapsone (ACZONE) 5 % topical gel, Apply to elbows and buttocks prn flares., Disp: 60 g, Rfl: 3    estradioL (ESTRACE) 0.01 % (0.1 mg/gram) vaginal cream, Place 1 g vaginally twice a week., Disp: 42.5 g, Rfl: 1    fluoride, sodium, (PREVIDENT 5000) 1.1 % Pste, Place onto teeth., Disp: , Rfl:     FLUoxetine 10 MG capsule, Take 1 capsule (10 mg total) by mouth once daily., Disp: 30 capsule, Rfl: 3    LIDOcaine (XYLOCAINE) 5 % Oint ointment, Apply to elbows and buttocks prn pain, Disp: 1 each, Rfl: 2    multivitamin capsule, Take 1 capsule by mouth once daily. Gluten free, Disp: , Rfl:     triamcinolone acetonide 0.025% (KENALOG) 0.025 % Oint, Apply to affected areas of face BID prn irritation. Do not use for longer than 2 weeks in a row., Disp: 15 g, Rfl: 1    promethazine-dextromethorphan (PROMETHAZINE-DM) 6.25-15 mg/5 mL Syrp, Take 5 mLs by mouth nightly as needed (cough)., Disp: 118 mL, Rfl: 0    Review of Systems   Constitutional:  Negative for activity change, appetite change, chills, diaphoresis, fatigue, fever and unexpected weight change.   HENT:  Positive for hoarse voice and sore throat. Negative for congestion, drooling, ear discharge, ear pain, facial swelling, hearing loss, nosebleeds, postnasal drip, rhinorrhea, sinus pressure, sneezing, tinnitus, trouble swallowing and voice change.    Eyes:  Negative for photophobia, pain, discharge, redness, itching and visual disturbance.   Respiratory:  Negative for cough, chest tightness, shortness of breath, wheezing and stridor.    Cardiovascular:  Negative for chest pain, palpitations and leg swelling.   Gastrointestinal:  Negative for abdominal distention, abdominal pain, anal bleeding, blood in stool, constipation, diarrhea, nausea, rectal pain and  "vomiting.   Endocrine: Negative for cold intolerance, heat intolerance, polydipsia, polyphagia and polyuria.   Genitourinary:  Negative for difficulty urinating, dysuria and flank pain.   Musculoskeletal:  Negative for arthralgias, back pain, joint swelling, myalgias and neck pain.   Skin:  Negative for rash.   Neurological:  Negative for dizziness, tremors, seizures, syncope, speech difficulty, weakness, light-headedness, numbness and headaches.   Psychiatric/Behavioral:  Negative for behavioral problems, confusion, decreased concentration, dysphoric mood, sleep disturbance and suicidal ideas. The patient is not nervous/anxious and is not hyperactive.        /74 (BP Location: Right arm, Patient Position: Sitting, BP Method: Medium (Manual))   Pulse 92   Ht 5' 6" (1.676 m)   Wt 58 kg (127 lb 13.9 oz)   SpO2 99%   BMI 20.64 kg/m²   Physical Exam  Vitals and nursing note reviewed.   Constitutional:       General: She is not in acute distress.     Appearance: Normal appearance. She is well-developed. She is not ill-appearing or toxic-appearing.   HENT:      Head: Normocephalic and atraumatic.      Right Ear: Tympanic membrane, ear canal and external ear normal.      Left Ear: Tympanic membrane, ear canal and external ear normal.      Nose: Nose normal.      Mouth/Throat:      Lips: Pink.      Mouth: Mucous membranes are moist.      Pharynx: Posterior oropharyngeal erythema present. No oropharyngeal exudate.   Eyes:      General: No scleral icterus.        Right eye: No discharge.         Left eye: No discharge.      Extraocular Movements: Extraocular movements intact.      Conjunctiva/sclera: Conjunctivae normal.   Cardiovascular:      Rate and Rhythm: Normal rate and regular rhythm.      Pulses: Normal pulses.      Heart sounds: Normal heart sounds. No murmur heard.  Pulmonary:      Effort: Pulmonary effort is normal. No respiratory distress.      Breath sounds: Normal breath sounds. No wheezing or rales. "   Musculoskeletal:      Cervical back: Normal range of motion and neck supple. No rigidity or tenderness.   Lymphadenopathy:      Cervical: No cervical adenopathy.   Skin:     General: Skin is warm and dry.   Neurological:      Mental Status: She is alert. Mental status is at baseline.   Psychiatric:         Mood and Affect: Mood normal.         Behavior: Behavior normal. Behavior is cooperative.         ASSESSMENT/PLAN    1. Acute laryngitis    2. Laryngitis  -     POCT COVID-19 Rapid Screening  -     Cancel: POCT Influenza A/B  -     POCT Rapid Strep A  -     CULTURE, RESPIRATORY  - THROAT    Hydrate, rest, Mucinex Expectorant as directed for thinning out the mucus; or MucinexDM if with significant cough and mucus.  Zyrtec, Xyzal, Allegra or Claritin. (Would lean towards Allegra which may be a bit more effective than claritin and will not cause sedation as Xyzal or Zyrtec may.)  Nasal saline spray such as Edson brand as needed.  Flonase or Nasonex nasal spray after the nasal saline.  Warm salt water gargles and warm liquids may help soothe a sore throat better than cool liquids.  Ibuprofenas directed as needed for fever, body aches, headaches and to decrease swelling of the vocal cords etcetera to help with pain.  All are OTC  Most of all, stay well-hydrated.    Other orders  -     promethazine-dextromethorphan (PROMETHAZINE-DM) 6.25-15 mg/5 mL Syrp; Take 5 mLs by mouth nightly as needed (cough).  Dispense: 118 mL; Refill: 0    Rapid tests negative.  Patient updated.  We did not have influenza tests in the clinic.  Symptoms did not seem consistent with influenza anyway    3. Neutropenia, unspecified type    Stable.  Monitor.  Follow-up with PCP at least annually.    Most recent some lab results reviewed with patient.  Any new prescription medications gone over in detail including reason for taking the medication, most common possible side effects and possible costs, etcetera.    Chronic conditions updated. Other  than changes or additions as above, cont current medications and maintain follow-up with specialists if indicated.     Candelario Handy MD  A dictation device was used to produce this document. Use of such devices sometimes results in grammatical errors or replacement of words that sound similarly.

## 2023-12-13 NOTE — PROGRESS NOTES
CC: Well woman exam    Vicky Brand is a 58 y.o. female  presents for a well woman exam.  LMP: No LMP recorded (lmp unknown). Patient has had an ablation..  She has vaginal atrophy and would like to continue with estrace cream and pelvic floor PT    Past Medical History:   Diagnosis Date    SAMANTHA positive     Celiac disease 2022    Dyspareunia in female 2021    Osphena, pelvic floor tx , [facility]    Eye abnormalities     early yellowing of lens, precursor to cataracts, Dr Webster,  wear glasses at all times    Fracture of fifth toe, left, closed, with delayed healing, subsequent encounter 2018    Helicobacter pylori infection     tx GI Dr José Luis Irizarry     Intramural leiomyoma of uterus 2019    Irregular, dysmenorrhea    Nocturia 2021    Ocular migraine     vision fuzzy x 3 in , metallic shapes in her vision, Dr Webster,  resolved after 30 min , no pain    CALEB (obstructive sleep apnea)     Lysenko, CPAP start     Palpitations 2022    Paradoxical insomnia 10/30/2017    prior to menses, previously took amitryptiline 2.5 mg prn  qhs     Posterior vitreous detachment of left eye 2019    Flashes of light, ocular migranes, Ophthalmologist Dr Orourke, retinal specialist Dr Soni, rec avoid exercise until she follows up 2019    Shoulder pain, left     LEFT, after fall, better with PT at [facility]    Stress and adjustment reaction 2022    Urticaria      Past Surgical History:   Procedure Laterality Date    COLONOSCOPY N/A 2017    Procedure: COLONOSCOPY;  Surgeon: Davy Prince MD;  Location: [facility] ENDO ([address] [place] [address]);  Service: Endoscopy;  Laterality: N/A;    DE QUERVAIN'S RELEASE Right 2022    Procedure: RELEASE, HAND, FOR DEQUERVAIN'S TENOSYNOVITIS - RIGHT;  Surgeon: Devan Wagner MD;  Location: [facility] OR;  Service: Orthopedics;  Laterality: Right;    DILATION AND CURETTAGE OF UTERUS      ENDOMETRIAL ABLATION N/A  2017    ESOPHAGOGASTRODUODENOSCOPY N/A 11/11/2022    Procedure: EGD (ESOPHAGOGASTRODUODENOSCOPY);  Surgeon: José Luis Irizarry MD;  Location: [address]);  Service: Endoscopy;  Laterality: N/A;  Fully vaccinated/ inst portal-RB    HYSTEROSCOPY WITH DILATION AND CURETTAGE OF UTERUS N/A 01/19/2022    Procedure: HYSTEROSCOPY, WITH DILATION AND CURETTAGE OF UTERUS;  Surgeon: Jannette Jarrell MD;  Location: [facility];  Service: OB/GYN;  Laterality: N/A;    TUBAL LIGATION       Social History     Socioeconomic History    Marital status:    Tobacco Use    Smoking status: Never    Smokeless tobacco: Never    Tobacco comments:     None   Substance and Sexual Activity    Alcohol use: No    Drug use: No    Sexual activity: Not Currently     Partners: Male     Birth control/protection: See Surgical Hx   Other Topics Concern    Are you pregnant or think you may be? 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     No kids     Social Determinants of Health     Financial Resource Strain: Low Risk  (12/11/2023)    Overall Financial Resource Strain (CARDIA)     Difficulty of Paying Living Expenses: Not hard at all   Food Insecurity: No Food Insecurity (12/11/2023)    Hunger Vital Sign     Worried About Running Out of Food in the Last Year: Never true     Ran Out of Food in the Last Year: Never true   Transportation Needs: No Transportation Needs (12/11/2023)    PRAPARE - Transportation     Lack of Transportation (Medical): No     Lack of Transportation (Non-Medical): No   Physical Activity: Insufficiently Active (12/11/2023)    Exercise Vital Sign     Days of Exercise per Week: 4 days     Minutes of Exercise per Session: 20 min   Stress: No Stress Concern Present (12/11/2023)    [employer] - Occupational Stress Questionnaire     Feeling of Stress : Only a little   Recent  Concern: Stress - Stress Concern Present (2023)    [employer] - Occupational Stress Questionnaire     Feeling of Stress : To some extent   Social Connections: Unknown (2023)    Social Connection and Isolation Panel [NHANES]     Frequency of Communication with Friends and Family: Once a week     Frequency of Social Gatherings with Friends and Family: More than three times a week     Active Member of Clubs or Organizations: Yes     Attends Club or Organization Meetings: More than 4 times per year     Marital Status:    Housing Stability: Low Risk  (2023)    Housing Stability Vital Sign     Unable to Pay for Housing in the Last Year: No     Number of Places Lived in the Last Year: 1     Unstable Housing in the Last Year: No   Recent Concern: Housing Stability - High Risk (2023)    Housing Stability Vital Sign     Unable to Pay for Housing in the Last Year: Yes     Number of Places Lived in the Last Year: 1     Unstable Housing in the Last Year: No     Family History   Problem Relation Age of Onset    Hypertension Mother     Pacemaker/defibrilator Mother     Diabetes Father     Stroke Father 49    Liver disease Sister         maybe PSC?    Ovarian cancer Maternal Grandmother     Melanoma Neg Hx     Breast cancer Neg Hx     Colon cancer Neg Hx     Celiac disease Neg Hx     Cirrhosis Neg Hx     Colon polyps Neg Hx     Cystic fibrosis Neg Hx     Crohn's disease Neg Hx     Esophageal cancer Neg Hx     Inflammatory bowel disease Neg Hx     Hemochromatosis Neg Hx     Irritable bowel syndrome Neg Hx     Liver cancer Neg Hx     Rectal cancer Neg Hx     Stomach cancer Neg Hx     Ulcerative colitis Neg Hx     Pancreatitis Neg Hx     Pancreatic cancer Neg Hx     Irizarry's esophagus Neg Hx     Uterine cancer Neg Hx     Bladder Cancer Neg Hx     Kidney cancer Neg Hx      OB History          1    Para        Term   0            AB   1    Living             SAB  "  1    IAB        Ectopic        Multiple        Live Births                     BP (!) 136/96   Ht 5' 6" (1.676 m)   Wt 58.6 kg (129 lb 3 oz)   LMP  (LMP Unknown)   BMI 20.85 kg/m²       ROS:    ROS:  GENERAL: Denies weight gain or weight loss. Feeling well overall.   SKIN: Denies rash or lesions.   HEAD: Denies head injury or headache.   NODES: Denies enlarged lymph nodes.   CHEST: Denies chest pain or shortness of breath.   CARDIOVASCULAR: Denies palpitations or left sided chest pain.   ABDOMEN: No abdominal pain, constipation, diarrhea, nausea, vomiting or rectal bleeding.   URINARY: No frequency, dysuria, hematuria, or burning on urination.  REPRODUCTIVE: See HPI.   BREASTS: The patient performs breast self-examination and denies pain, lumps, or nipple discharge.   HEMATOLOGIC: No easy bruisability or excessive bleeding.   MUSCULOSKELETAL: Denies joint pain or swelling.   NEUROLOGIC: Denies syncope or weakness.   PSYCHIATRIC: Denies depression, anxiety or mood swings.    PHYSICAL EXAM:    APPEARANCE: Well nourished, well developed, in no acute distress.  AFFECT: WNL, alert and oriented x 3  SKIN: No acne or hirsutism  NECK: Neck symmetric without masses or thyromegaly  NODES: No inguinal, cervical, axillary, or femoral lymph node enlargement  CHEST: Good respiratory effect  ABDOMEN: Soft.  No tenderness or masses.  No hepatosplenomegaly.  No hernias.  BREASTS: Symmetrical, no skin changes or visible lesions.  No palpable masses, nipple discharge bilaterally.  PELVIC: atrophic external genitalia without lesions.  Normal hair distribution.  Adequate perineal body, normal urethral meatus.  Vagina dry  and well rugated without lesions or discharge.  Cervix pink, without lesions, discharge or tenderness.  No significant cystocele or rectocele.  Bimanual exam shows  atropy and the uterus is normal size, regular, mobile and nontender.  Adnexa without masses or tenderness.    RECTAL: Rectovaginal exam confirms " above with normal sphincter tone, no masses.  EXTREMITIES: No edema.      ICD-10-CM ICD-9-CM    1. Encounter for gynecological examination without abnormal finding  Z01.419 V72.31 Liquid-Based Pap Smear, Screening      2. Postmenopausal  Z78.0 V49.81       3. Encounter for screening mammogram for breast cancer  Z12.31 V76.12 Mammo Digital Screening Bilat w/ Tim      Mammo Digital Screening Bilat w/ Tim      4. Vaginal atrophy  N95.2 627.3 estradioL (ESTRACE) 0.01 % (0.1 mg/gram) vaginal cream      Ambulatory referral/consult to Physical/Occupational Therapy      5. Dyspareunia in female  N94.10 625.0 Ambulatory referral/consult to Physical/Occupational Therapy            Patient was counseled today on A.C.S. Pap guidelines and recommendations for yearly pelvic exams, mammograms and monthly self breast exams; to see her PCP for other health maintenance.     Follow up in about 1 year (around 12/13/2024), or if symptoms worsen or fail to improve.

## 2023-12-14 LAB — BACTERIA THROAT CULT: NORMAL

## 2023-12-29 LAB
FINAL PATHOLOGIC DIAGNOSIS: NORMAL
Lab: NORMAL

## 2024-01-22 ENCOUNTER — OFFICE VISIT (OUTPATIENT)
Dept: GASTROENTEROLOGY | Facility: CLINIC | Age: 59
End: 2024-01-22
Payer: COMMERCIAL

## 2024-01-22 ENCOUNTER — PATIENT MESSAGE (OUTPATIENT)
Dept: GASTROENTEROLOGY | Facility: CLINIC | Age: 59
End: 2024-01-22

## 2024-01-22 DIAGNOSIS — M85.80 OSTEOPENIA, UNSPECIFIED LOCATION: ICD-10-CM

## 2024-01-22 DIAGNOSIS — K90.0 CELIAC SPRUE: Primary | ICD-10-CM

## 2024-01-22 PROCEDURE — 99213 OFFICE O/P EST LOW 20 MIN: CPT | Mod: 95,,, | Performed by: INTERNAL MEDICINE

## 2024-01-22 NOTE — PROGRESS NOTES
The patient location is:  At home  The chief complaint leading to consultation is:  Celiac sprue, osteopenia    Visit type: audiovisual    Face to Face time with patient: 20 minutes of total time spent on the encounter, which includes face to face time and non-face to face time preparing to see the patient (eg, review of tests), Obtaining and/or reviewing separately obtained history, Documenting clinical information in the electronic or other health record, Independently interpreting results (not separately reported) and communicating results to the patient/family/caregiver, or Care coordination (not separately reported).         Each patient to whom he or she provides medical services by telemedicine is:  (1) informed of the relationship between the physician and patient and the respective role of any other health care provider with respect to management of the patient; and (2) notified that he or she may decline to receive medical services by telemedicine and may withdraw from such care at any time.    Notes:          [facility] Consultation Note    Reason for Consult:  The primary encounter diagnosis was Celiac sprue. A diagnosis of Osteopenia, unspecified location was also pertinent to this visit.    PCP:   Lia Cristina       Referring MD:  No referring provider defined for this encounter.    Initial History of Present Illness (HPI):  This is a 58 y.o. female here for follow-up evaluation of celiac sprue.  Patient said it took several doctors in quite some time to make a diagnosis but she is been diagnosed with celiac sprue based on positive serology and small-bowel biopsies consistent with celiac sprue she also has a dermatological manifestation called dermatitis herpetiformis followed by a dermatologist she said she was informed that she did not need a biopsy of it she has seen dietitian she is on a very strict gluten free diet in fact she just got back from [facility] celiac sprue  Cruise which unfortunately she picked up COVID she still in quarantine she does have a little fatigued from that but otherwise doing really well her bowel movements are usually 1 or 2 a day well-formed her follow-up TTG IgA has come down to normal range she knows to be on a gluten free diet for for life which she is doing a great job doing she knows that she should get blood work probably every 6 months just to check that she is not inadvertently getting gluten in her diet recommend follow-up EGD to check for mucosal healing in about 2 years to 3 years from her last EGD which would be November 2024 which would be 2 years.  Her screening colonoscopy is up-to-date with colon rectal surgeons she is not due for another screening and till 10 years from her last which would be around June of 2027.  She is average risk for colon rectal cancer by personal and family history nobody with esophageal or stomach cancer nobody with small-bowel cancer nobody with pancreatitis or pancreatic cancer nobody with colon cancer or advanced colon adenomas polyps no FAP no attenuated FAP no MA P no Kerr syndrome nobody in the family with celiac sprue or inflammatory bowel disease.  She has no biological children.  Her weight has been stable on her gluten free diet.  Patient is feeling good on a gluten free diet saw a dietitian no complaints whatsoever she would like a follow-up EGD around November 2024 for follow-up her bone densities up-to-date mild osteopenia no family history of any GI malignancies.     Abdominal pain - no  Reflux - no  Dysphagia - no   Bowel habits - normal  GI bleeding - none  NSAID usage - none     Interval HPI 07/05/2023:  The patient's last visit with me was on Visit date not found.        ROS:  Constitutional: No fevers, chills, No weight loss, little bit of fatigue since being diagnosed with COVID which she picked up on a [employer] celiac sprue cruise which they offer a few times a year.  She said it was  extremely helpful.  ENT:  No heartburn no dysphagia no odynophagia no hoarseness  CV: No chest pain, no palpitation  Pulm: No cough, No shortness of breath, no wheezing  Ophtho: No vision changes  GI: see HPI  Derm:  Dermatitis herpetiformis followed by dermatology  Heme: No lymphadenopathy, No easy bruising  MSK: No significant arthritis  : No dysuria, No hematuria  Endo: No hot or cold intolerance  Neuro: No syncope, No seizure, no strokes  Psych: No uncontrolled anxiety, No uncontrolled depression    Interval HPI 01/22/2024:  The patient's last visit with me was on 7/5/2023.    Medical History:  has a past medical history of SAMANTHA positive, Celiac disease (09/29/2022), Dyspareunia in female (07/27/2021), Eye abnormalities, Fracture of fifth toe, left, closed, with delayed healing, subsequent encounter (08/21/2018), Helicobacter pylori infection, Intramural leiomyoma of uterus (05/22/2019), Nocturia (06/17/2021), Ocular migraine, CALEB (obstructive sleep apnea), Palpitations (09/29/2022), Paradoxical insomnia (10/30/2017), Posterior vitreous detachment of left eye (05/22/2019), Shoulder pain, left (2007), Stress and adjustment reaction (09/29/2022), and Urticaria.    Surgical History:  has a past surgical history that includes Dilation and curettage of uterus; Tubal ligation; Colonoscopy (N/A, 06/09/2017); Endometrial ablation (N/A, 2017); Hysteroscopy with dilation and curettage of uterus (N/A, 01/19/2022); De Quervain's release (Right, 06/08/2022); and Esophagogastroduodenoscopy (N/A, 11/11/2022).    Family History: family history includes Diabetes in her father; Hypertension in her mother; Liver disease in her sister; Ovarian cancer in her maternal grandmother; Pacemaker/defibrilator in her mother; Stroke (age of onset: 49) in her father..     Social History:  reports that she has never smoked. She has never used smokeless tobacco. She reports that she does not drink alcohol and does not use drugs.    Review of  patient's allergies indicates:   Allergen Reactions    Sulfa (sulfonamide antibiotics) Rash    Cetirizine      Other reaction(s): Unable to Function  Other reaction(s): Unable to Function    Erythromycin Nausea And Vomiting     Other reaction(s): Nausea    Voltaren [diclofenac sodium] Diarrhea    Zyrtec-d  [cetirizine-pseudoephedrine]      Other reaction(s): Inability to focus/function    Gluten Rash     Celiac's disease       Medication List with Changes/Refills   Current Medications    AMITRIPTYLINE (ELAVIL) 10 MG TABLET    Take 1 tablet (10 mg total) by mouth nightly as needed for Insomnia (insomnia).    CALCIUM CARBONATE (OS-GISSELL) 600 MG CALCIUM (1,500 MG) TAB    Take 600 mg by mouth once. gluten free    CLOBETASOL (TEMOVATE) 0.05 % CREAM    Apply to affected areas of body BID prn rash.    DAPSONE (ACZONE) 5 % TOPICAL GEL    Apply to elbows and buttocks prn flares.    ESTRADIOL (ESTRACE) 0.01 % (0.1 MG/GRAM) VAGINAL CREAM    Place 1 g vaginally twice a week.    FLUORIDE, SODIUM, (PREVIDENT 5000) 1.1 % PSTE    Place onto teeth.    FLUOXETINE 10 MG CAPSULE    Take 1 capsule (10 mg total) by mouth once daily.    LIDOCAINE (XYLOCAINE) 5 % OINT OINTMENT    Apply to elbows and buttocks prn pain    MULTIVITAMIN CAPSULE    Take 1 capsule by mouth once daily. Gluten free    TRIAMCINOLONE ACETONIDE 0.025% (KENALOG) 0.025 % OINT    Apply to affected areas of face BID prn irritation. Do not use for longer than 2 weeks in a row.         Objective Findings:    Vital Signs:  There were no vitals taken for this visit.  There is no height or weight on file to calculate BMI.  Patient is 5 ft 6 in tall 129 lb    Physical Exam:  Telemedicine video visit  General Appearance: Well appearing in no acute distress  Neurologic:  Alert and oriented x4  Psychiatric:  Normal speech mentation and affect    Labs:  Lab Results   Component Value Date    WBC 3.88 (L) 08/23/2023    HGB 12.7 08/23/2023    HCT 41.6 08/23/2023     08/23/2023  "   CHOL 192 10/02/2023    TRIG 70 10/02/2023    HDL 56 10/02/2023    ALT 15 2023    AST 22 2023     2023    K 4.8 2023     2023    CREATININE 0.9 2023    BUN 20 2023    CO2 29 2023    TSH 1.780 2023    HGBA1C 5.4 10/02/2023       Medical Decision Making:  Procedure: EGD    Diagnosis:  Celiac sprue check mucosal healing in 2024    Procedure Timin-12 weeks    *If within 4 weeks selected, please janusz as high priority*    *If greater than 12 weeks, please select "5-12 weeks" and delay sending until 3 months prior to requested date*    Provider: Myself    Location: [address]    Additional Scheduling Information:  2024    Prep Specifications:Standard prep    Is the patient taking a GLP-1 Agonist:no    Have you attached a patient to this message: yes       Assessment:  1. Celiac sprue    2. Osteopenia, unspecified location         Recommendations:  1. Continue gluten free diet for life  2. Follow-up EGD in 2024 for 2 your follow-up mucosal biopsies of the duodenal check for mucosal healing  3. Will schedule patient 12 hour fasting blood work 2024 7:30 a.m. at [facility] location per her request.  4. Follow-up bone density in 2024  5. Colonoscopy up-to-date with colon rectal surgery due in 2027 for colon rectal cancer by personal and family history  6. Return GI clinic once yearly     Follow up in about 1 year (around 2025).      Order summary:  Orders Placed This Encounter    DXA Bone Density Axial Skeleton 1 or more sites    CBC Auto Differential    Iron and TIBC    Ferritin    Vitamin D    Vitamin B12    Comprehensive Metabolic Panel    TISSUE TRANSGLUTAMINASE (TTG), IGA    IgA    Folate         Thank you so much for allowing me to participate in the care of Vicky Winters MD    DISCLAIMER: This note was prepared with MModal voice recognition transcription " software. Garbled syntax, mangled or inadvertent pronouns, and other bizarre constructions may be attributed to that software system. While efforts were made to correct any mistakes made by this voice recognition program, some errors and/or omissions may remain in the note that were missed when the note was originally created.

## 2024-01-22 NOTE — Clinical Note
Lizet please schedule Vicky for 12 hour fasting blood work at 7:30 a.m. on March 8, 2024 which is a Friday at [facility] location.  Orders placed  Please schedule patient bone density in December 2024  Referral placed for EGD follow-up in December 2024  Return to GI clinic 1 year January 2025

## 2024-01-22 NOTE — PATIENT INSTRUCTIONS
"Procedure: EGD    Diagnosis:  Celiac sprue check mucosal healing in 2024    Procedure Timin-12 weeks    *If within 4 weeks selected, please janusz as high priority*    *If greater than 12 weeks, please select "5-12 weeks" and delay sending until 3 months prior to requested date*    Provider: Myself    Location: [address]    Additional Scheduling Information:  2024    Prep Specifications:Standard prep    Is the patient taking a GLP-1 Agonist:no    Have you attached a patient to this message: yes   "

## 2024-03-08 ENCOUNTER — LAB VISIT (OUTPATIENT)
Dept: LAB | Facility: HOSPITAL | Age: 59
End: 2024-03-08
Attending: INTERNAL MEDICINE
Payer: COMMERCIAL

## 2024-03-08 DIAGNOSIS — M85.80 OSTEOPENIA, UNSPECIFIED LOCATION: ICD-10-CM

## 2024-03-08 DIAGNOSIS — K90.0 CELIAC SPRUE: ICD-10-CM

## 2024-03-08 LAB
25(OH)D3+25(OH)D2 SERPL-MCNC: 58 NG/ML (ref 30–96)
ALBUMIN SERPL BCP-MCNC: 3.9 G/DL (ref 3.5–5.2)
ALP SERPL-CCNC: 64 U/L (ref 55–135)
ALT SERPL W/O P-5'-P-CCNC: 28 U/L (ref 10–44)
ANION GAP SERPL CALC-SCNC: 7 MMOL/L (ref 8–16)
AST SERPL-CCNC: 38 U/L (ref 10–40)
BASOPHILS # BLD AUTO: 0.03 K/UL (ref 0–0.2)
BASOPHILS NFR BLD: 0.8 % (ref 0–1.9)
BILIRUB SERPL-MCNC: 0.6 MG/DL (ref 0.1–1)
BUN SERPL-MCNC: 19 MG/DL (ref 6–20)
CALCIUM SERPL-MCNC: 9.8 MG/DL (ref 8.7–10.5)
CHLORIDE SERPL-SCNC: 106 MMOL/L (ref 95–110)
CO2 SERPL-SCNC: 28 MMOL/L (ref 23–29)
CREAT SERPL-MCNC: 0.9 MG/DL (ref 0.5–1.4)
DIFFERENTIAL METHOD BLD: ABNORMAL
EOSINOPHIL # BLD AUTO: 0.1 K/UL (ref 0–0.5)
EOSINOPHIL NFR BLD: 1.9 % (ref 0–8)
ERYTHROCYTE [DISTWIDTH] IN BLOOD BY AUTOMATED COUNT: 12.8 % (ref 11.5–14.5)
EST. GFR  (NO RACE VARIABLE): >60 ML/MIN/1.73 M^2
FERRITIN SERPL-MCNC: 98 NG/ML (ref 20–300)
FOLATE SERPL-MCNC: 14.9 NG/ML (ref 4–24)
GLUCOSE SERPL-MCNC: 90 MG/DL (ref 70–110)
HCT VFR BLD AUTO: 44.1 % (ref 37–48.5)
HGB BLD-MCNC: 13.5 G/DL (ref 12–16)
IGA SERPL-MCNC: 260 MG/DL (ref 40–350)
IMM GRANULOCYTES # BLD AUTO: 0 K/UL (ref 0–0.04)
IMM GRANULOCYTES NFR BLD AUTO: 0 % (ref 0–0.5)
IRON SERPL-MCNC: 95 UG/DL (ref 30–160)
LYMPHOCYTES # BLD AUTO: 1.8 K/UL (ref 1–4.8)
LYMPHOCYTES NFR BLD: 47.6 % (ref 18–48)
MCH RBC QN AUTO: 28.6 PG (ref 27–31)
MCHC RBC AUTO-ENTMCNC: 30.6 G/DL (ref 32–36)
MCV RBC AUTO: 93 FL (ref 82–98)
MONOCYTES # BLD AUTO: 0.5 K/UL (ref 0.3–1)
MONOCYTES NFR BLD: 12.7 % (ref 4–15)
NEUTROPHILS # BLD AUTO: 1.4 K/UL (ref 1.8–7.7)
NEUTROPHILS NFR BLD: 37 % (ref 38–73)
NRBC BLD-RTO: 0 /100 WBC
PLATELET # BLD AUTO: 210 K/UL (ref 150–450)
PMV BLD AUTO: 11 FL (ref 9.2–12.9)
POTASSIUM SERPL-SCNC: 5 MMOL/L (ref 3.5–5.1)
PROT SERPL-MCNC: 8.1 G/DL (ref 6–8.4)
RBC # BLD AUTO: 4.72 M/UL (ref 4–5.4)
SATURATED IRON: 27 % (ref 20–50)
SODIUM SERPL-SCNC: 141 MMOL/L (ref 136–145)
TOTAL IRON BINDING CAPACITY: 357 UG/DL (ref 250–450)
TRANSFERRIN SERPL-MCNC: 241 MG/DL (ref 200–375)
VIT B12 SERPL-MCNC: 1555 PG/ML (ref 210–950)
WBC # BLD AUTO: 3.7 K/UL (ref 3.9–12.7)

## 2024-03-08 PROCEDURE — 82784 ASSAY IGA/IGD/IGG/IGM EACH: CPT | Performed by: INTERNAL MEDICINE

## 2024-03-08 PROCEDURE — 80053 COMPREHEN METABOLIC PANEL: CPT | Performed by: INTERNAL MEDICINE

## 2024-03-08 PROCEDURE — 82607 VITAMIN B-12: CPT | Performed by: INTERNAL MEDICINE

## 2024-03-08 PROCEDURE — 82306 VITAMIN D 25 HYDROXY: CPT | Performed by: INTERNAL MEDICINE

## 2024-03-08 PROCEDURE — 82728 ASSAY OF FERRITIN: CPT | Performed by: INTERNAL MEDICINE

## 2024-03-08 PROCEDURE — 82746 ASSAY OF FOLIC ACID SERUM: CPT | Performed by: INTERNAL MEDICINE

## 2024-03-08 PROCEDURE — 85025 COMPLETE CBC W/AUTO DIFF WBC: CPT | Performed by: INTERNAL MEDICINE

## 2024-03-08 PROCEDURE — 86364 TISS TRNSGLTMNASE EA IG CLAS: CPT | Performed by: INTERNAL MEDICINE

## 2024-03-08 PROCEDURE — 83540 ASSAY OF IRON: CPT | Performed by: INTERNAL MEDICINE

## 2024-03-11 ENCOUNTER — PATIENT MESSAGE (OUTPATIENT)
Dept: GASTROENTEROLOGY | Facility: CLINIC | Age: 59
End: 2024-03-11
Payer: COMMERCIAL

## 2024-03-11 LAB — TTG IGA SER-ACNC: 2.7 U/ML

## 2024-04-13 NOTE — PROGRESS NOTES
Vicky your vitamin B12 level is elevated this is most likely due to taking a vitamin B12 supplement or multivitamin that has B12 in it if your not doing that please let me know if your doing it you may want to reduce it somewhat

## 2024-06-27 NOTE — PROGRESS NOTES
"[facility] Note    Chief Complaint      Chief Complaint   Patient presents with    Cerumen Impaction    Cough    Sinusitis     X 4 days       History of Present Illness      Vicky Brand is a 58 y.o. female who presents today for   Chief Complaint   Patient presents with    Cerumen Impaction    Cough    Sinusitis     X 4 days         Patient is known to me.  She presents to clinic today for "clogged" left ear.  She denies any drainage to this year.  She will be going camping with her  tomorrow in [place] for a few days.  She denies any shortness a breath or chest pain this time.    Otalgia   There is pain in the left ear. This is a new problem. The current episode started in the past 7 days. The problem occurs every few hours. The problem has been unchanged. There has been no fever. The fever has been present for Less than 1 day. The pain is at a severity of 1/10. The pain is mild. Associated symptoms include coughing and rhinorrhea. Pertinent negatives include no abdominal pain, diarrhea, drainage, ear discharge, headaches, hearing loss, neck pain, rash, sore throat or vomiting. She has tried ear drops for the symptoms. The treatment provided no relief. There is no history of a chronic ear infection, hearing loss or a tympanostomy tube.        Review of Systems   HENT:  Positive for ear pain and rhinorrhea. Negative for ear discharge, hearing loss and sore throat.    Respiratory:  Positive for cough.    Gastrointestinal:  Negative for abdominal pain, diarrhea and vomiting.   Musculoskeletal:  Negative for neck pain.   Skin:  Negative for rash.   Neurological:  Negative for headaches.   All 12 systems otherwise negative.       Family History:  family history includes Diabetes in her father; Hypertension in her mother; Liver disease in her sister; Ovarian cancer in her maternal grandmother; Pacemaker/defibrilator in her mother; Stroke (age of onset: 49) in her father.   Family " history was reviewed with patient.     Medications:  Outpatient Encounter Medications as of 6/28/2024   Medication Sig Dispense Refill    amitriptyline (ELAVIL) 10 MG tablet Take 1 tablet (10 mg total) by mouth nightly as needed for Insomnia (insomnia). 30 tablet 11    calcium carbonate (OS-GISSELL) 600 mg calcium (1,500 mg) Tab Take 600 mg by mouth once. gluten free      fluoride, sodium, (PREVIDENT 5000) 1.1 % Pste Place onto teeth.      cetirizine (ZYRTEC) 10 MG tablet Take 1 tablet (10 mg total) by mouth once daily. 30 tablet 11    estradioL (ESTRACE) 0.01 % (0.1 mg/gram) vaginal cream Place 1 g vaginally twice a week. (Patient not taking: Reported on 6/28/2024) 42.5 g 3    predniSONE (DELTASONE) 20 MG tablet Take 1 tablet (20 mg total) by mouth once daily. 5 tablet 0    [DISCONTINUED] diclofenac sodium (VOLTAREN) 1 % Gel Apply 2 g topically 4 (four) times daily. Apply to affected area up to 4 times per day PRN pain 100 g 4     No facility-administered encounter medications on file as of 6/28/2024.       Allergies:  Review of patient's allergies indicates:   Allergen Reactions    Sulfa (sulfonamide antibiotics) Rash    Cetirizine      Other reaction(s): Unable to Function  Other reaction(s): Unable to Function    Erythromycin Nausea And Vomiting     Other reaction(s): Nausea    Voltaren [diclofenac sodium] Diarrhea    Zyrtec-d  [cetirizine-pseudoephedrine]      Other reaction(s): Inability to focus/function    Gluten Rash     Celiac's disease       Health Maintenance:  Health Maintenance   Topic Date Due    Mammogram  12/13/2024    Colorectal Cancer Screening  06/09/2027    Lipid Panel  10/02/2028    TETANUS VACCINE  07/27/2031    Hepatitis C Screening  Completed    Shingles Vaccine  Completed     Health Maintenance Topics with due status: Not Due       Topic Last Completion Date    Colorectal Cancer Screening 06/09/2017    TETANUS VACCINE 07/27/2021    Lipid Panel 10/02/2023    Cervical Cancer Screening 12/13/2023  "   Mammogram 12/13/2023       Physical Exam      Vital Signs  Pulse: 61  SpO2: 100 %  BP: 110/78  BP Location: Right arm  Patient Position: Sitting  Pain Score: 0-No pain  Height and Weight  Height: 5' 6" (167.6 cm)  Weight: 59.3 kg (130 lb 11.7 oz)  BSA (Calculated - sq m): 1.66 sq meters  BMI (Calculated): 21.1  Weight in (lb) to have BMI = 25: 154.6]    Physical Exam  Vitals reviewed.   Constitutional:       Appearance: Normal appearance. She is normal weight.   HENT:      Head: Normocephalic and atraumatic.      Right Ear: Tympanic membrane, ear canal and external ear normal.      Left Ear: Tympanic membrane, ear canal and external ear normal.      Nose: Nose normal.      Mouth/Throat:      Mouth: Mucous membranes are moist.      Pharynx: Oropharynx is clear.   Eyes:      Extraocular Movements: Extraocular movements intact.      Conjunctiva/sclera: Conjunctivae normal.      Pupils: Pupils are equal, round, and reactive to light.   Cardiovascular:      Rate and Rhythm: Normal rate and regular rhythm.      Pulses: Normal pulses.      Heart sounds: Normal heart sounds.   Pulmonary:      Effort: Pulmonary effort is normal.      Breath sounds: Normal breath sounds.   Musculoskeletal:         General: Normal range of motion.      Cervical back: Normal range of motion and neck supple.   Skin:     General: Skin is warm and dry.      Capillary Refill: Capillary refill takes less than 2 seconds.   Neurological:      General: No focal deficit present.      Mental Status: She is alert and oriented to person, place, and time. Mental status is at baseline.   Psychiatric:         Mood and Affect: Mood normal.         Behavior: Behavior normal.         Thought Content: Thought content normal.         Judgment: Judgment normal.            Assessment/Plan     Vicky Brand is a 58 y.o.female with:    Seasonal allergies  -     cetirizine (ZYRTEC) 10 MG tablet; Take 1 tablet (10 mg total) by mouth once daily.  Dispense: " 30 tablet; Refill: 11    Ear congestion, left  -     predniSONE (DELTASONE) 20 MG tablet; Take 1 tablet (20 mg total) by mouth once daily.  Dispense: 5 tablet; Refill: 0        As above, continue current medications and maintain follow up with specialists.  Return to clinic as needed.    Greater than 50% of visit was spent face to face with patient.  All questions were answered to patient's satisfaction.          Karen L Spencer, NP-C [facility]

## 2024-06-28 ENCOUNTER — OFFICE VISIT (OUTPATIENT)
Dept: PRIMARY CARE CLINIC | Facility: CLINIC | Age: 59
End: 2024-06-28
Payer: COMMERCIAL

## 2024-06-28 VITALS
DIASTOLIC BLOOD PRESSURE: 78 MMHG | HEIGHT: 66 IN | SYSTOLIC BLOOD PRESSURE: 110 MMHG | OXYGEN SATURATION: 100 % | WEIGHT: 130.75 LBS | BODY MASS INDEX: 21.01 KG/M2 | HEART RATE: 61 BPM

## 2024-06-28 DIAGNOSIS — H93.8X2 EAR CONGESTION, LEFT: ICD-10-CM

## 2024-06-28 DIAGNOSIS — J30.2 SEASONAL ALLERGIES: Primary | ICD-10-CM

## 2024-06-28 PROCEDURE — 99999 PR PBB SHADOW E&M-EST. PATIENT-LVL IV: CPT | Mod: PBBFAC,,, | Performed by: NURSE PRACTITIONER

## 2024-06-28 RX ORDER — CETIRIZINE HYDROCHLORIDE 10 MG/1
10 TABLET ORAL DAILY
Qty: 30 TABLET | Refills: 11 | Status: SHIPPED | OUTPATIENT
Start: 2024-06-28 | End: 2025-06-28

## 2024-06-28 RX ORDER — PREDNISONE 20 MG/1
20 TABLET ORAL DAILY
Qty: 5 TABLET | Refills: 0 | Status: SHIPPED | OUTPATIENT
Start: 2024-06-28

## 2024-07-01 ENCOUNTER — PATIENT MESSAGE (OUTPATIENT)
Dept: PRIMARY CARE CLINIC | Facility: CLINIC | Age: 59
End: 2024-07-01
Payer: COMMERCIAL

## 2024-07-01 DIAGNOSIS — R09.81 SINUS CONGESTION: Primary | ICD-10-CM

## 2024-07-08 ENCOUNTER — HOSPITAL ENCOUNTER (OUTPATIENT)
Dept: RADIOLOGY | Facility: HOSPITAL | Age: 59
Discharge: HOME | End: 2024-07-08
Attending: OTOLARYNGOLOGY
Payer: COMMERCIAL

## 2024-07-08 ENCOUNTER — CLINICAL SUPPORT (OUTPATIENT)
Dept: OTOLARYNGOLOGY | Facility: CLINIC | Age: 59
End: 2024-07-08
Payer: COMMERCIAL

## 2024-07-08 ENCOUNTER — OFFICE VISIT (OUTPATIENT)
Dept: OTOLARYNGOLOGY | Facility: CLINIC | Age: 59
End: 2024-07-08
Payer: COMMERCIAL

## 2024-07-08 VITALS
WEIGHT: 130.94 LBS | DIASTOLIC BLOOD PRESSURE: 83 MMHG | HEART RATE: 85 BPM | SYSTOLIC BLOOD PRESSURE: 128 MMHG | BODY MASS INDEX: 21.14 KG/M2

## 2024-07-08 DIAGNOSIS — J01.80 OTHER SUBACUTE SINUSITIS: Primary | ICD-10-CM

## 2024-07-08 DIAGNOSIS — R09.81 SINUS CONGESTION: ICD-10-CM

## 2024-07-08 DIAGNOSIS — R05.1 ACUTE COUGH: ICD-10-CM

## 2024-07-08 DIAGNOSIS — H69.93 ETD (EUSTACHIAN TUBE DYSFUNCTION), BILATERAL: Primary | ICD-10-CM

## 2024-07-08 DIAGNOSIS — H69.93 ETD (EUSTACHIAN TUBE DYSFUNCTION), BILATERAL: ICD-10-CM

## 2024-07-08 PROCEDURE — 3008F BODY MASS INDEX DOCD: CPT | Mod: CPTII,S$GLB,, | Performed by: OTOLARYNGOLOGY

## 2024-07-08 PROCEDURE — 99204 OFFICE O/P NEW MOD 45 MIN: CPT | Mod: S$GLB,,, | Performed by: OTOLARYNGOLOGY

## 2024-07-08 PROCEDURE — 99999 PR PBB SHADOW E&M-EST. PATIENT-LVL I: CPT | Mod: PBBFAC,,, | Performed by: PHYSICIAN ASSISTANT

## 2024-07-08 PROCEDURE — 92567 TYMPANOMETRY: CPT | Mod: S$GLB,,, | Performed by: PHYSICIAN ASSISTANT

## 2024-07-08 PROCEDURE — 3079F DIAST BP 80-89 MM HG: CPT | Mod: CPTII,S$GLB,, | Performed by: OTOLARYNGOLOGY

## 2024-07-08 PROCEDURE — 1160F RVW MEDS BY RX/DR IN RCRD: CPT | Mod: CPTII,S$GLB,, | Performed by: OTOLARYNGOLOGY

## 2024-07-08 PROCEDURE — 99999 PR PBB SHADOW E&M-EST. PATIENT-LVL IV: CPT | Mod: PBBFAC,,, | Performed by: OTOLARYNGOLOGY

## 2024-07-08 PROCEDURE — 1159F MED LIST DOCD IN RCRD: CPT | Mod: CPTII,S$GLB,, | Performed by: OTOLARYNGOLOGY

## 2024-07-08 PROCEDURE — 71046 X-RAY EXAM CHEST 2 VIEWS: CPT | Mod: 26,,, | Performed by: RADIOLOGY

## 2024-07-08 PROCEDURE — 3074F SYST BP LT 130 MM HG: CPT | Mod: CPTII,S$GLB,, | Performed by: OTOLARYNGOLOGY

## 2024-07-08 PROCEDURE — 71046 X-RAY EXAM CHEST 2 VIEWS: CPT | Mod: TC,FY

## 2024-07-08 RX ORDER — PROMETHAZINE HYDROCHLORIDE AND DEXTROMETHORPHAN HYDROBROMIDE 6.25; 15 MG/5ML; MG/5ML
5 SYRUP ORAL EVERY 4 HOURS PRN
Qty: 118 ML | Refills: 0 | Status: SHIPPED | OUTPATIENT
Start: 2024-07-08

## 2024-07-08 RX ORDER — BROMPHENIRAMINE MALEATE, PSEUDOEPHEDRINE HYDROCHLORIDE, AND DEXTROMETHORPHAN HYDROBROMIDE 2; 30; 10 MG/5ML; MG/5ML; MG/5ML
5 SYRUP ORAL EVERY 8 HOURS PRN
COMMUNITY
Start: 2024-07-02

## 2024-07-08 RX ORDER — AMOXICILLIN AND CLAVULANATE POTASSIUM 500; 125 MG/1; MG/1
1 TABLET, FILM COATED ORAL EVERY 12 HOURS
COMMUNITY
Start: 2024-07-02

## 2024-07-08 RX ORDER — LEVOFLOXACIN 500 MG/1
500 TABLET, FILM COATED ORAL DAILY
Qty: 10 TABLET | Refills: 0 | Status: SHIPPED | OUTPATIENT
Start: 2024-07-08 | End: 2024-07-18

## 2024-07-08 RX ORDER — METHYLPREDNISOLONE 4 MG/1
TABLET ORAL
Qty: 1 EACH | Refills: 0 | Status: SHIPPED | OUTPATIENT
Start: 2024-07-08

## 2024-07-08 NOTE — PROGRESS NOTES
Chief Complaint   Patient presents with    Cough     X2 weeks     Ear Fullness     Left ear   .    HPI:     Vicky Brand is a 58 y.o. female who presents for evaluation 2 week history of left ear fullness. She reports that she has productive cough for this time period as well.  She reports that she began with the ear symptoms- she was given prednisone initially.  Symtpoms did not improve and she was out of town in [place] and saw urgent care provider who prescribed Augmentin 7 day course, bromfed, and flonase. She feels that the ear symptoms have not improved.  She does not feel that the cough has improved with this regimen.         Past Medical History:   Diagnosis Date    SAMANTHA positive     Celiac disease 09/29/2022    Dyspareunia in female 07/27/2021    Osphena, pelvic floor tx 2021, [facility]    Eye abnormalities     early yellowing of lens, precursor to cataracts, Dr Webster,  wear glasses at all times    Fracture of fifth toe, left, closed, with delayed healing, subsequent encounter 08/21/2018    Helicobacter pylori infection     tx GI Dr José Luis Irizarry 2022    Intramural leiomyoma of uterus 05/22/2019    Irregular, dysmenorrhea    Nocturia 06/17/2021    Ocular migraine     vision fuzzy x 3 in 2013, metallic shapes in her vision, Dr Webster,  resolved after 30 min , no pain    CALEB (obstructive sleep apnea)     Lysenko, CPAP start 7/21    Palpitations 09/29/2022    Paradoxical insomnia 10/30/2017    prior to menses, previously took amitryptiline 2.5 mg prn  qhs     Posterior vitreous detachment of left eye 05/22/2019    Flashes of light, ocular migranes, Ophthalmologist Dr Orourke, retinal specialist Dr Soni, rec avoid exercise until she follows up 5/2019    Shoulder pain, left 2007    LEFT, after fall, better with PT at [facility]    Stress and adjustment reaction 09/29/2022    Urticaria      Social History     Socioeconomic History    Marital status:    Tobacco Use     Smoking status: Never    Smokeless tobacco: Never    Tobacco comments:     None   Substance and Sexual Activity    Alcohol use: No    Drug use: No    Sexual activity: Not Currently     Partners: Male     Birth control/protection: See Surgical Hx   Other Topics Concern    Are you pregnant or think you may be? 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     No kids     Social Determinants of Health     Financial Resource Strain: Low Risk  (1/22/2024)    Overall Financial Resource Strain (CARDIA)     Difficulty of Paying Living Expenses: Not hard at all   Food Insecurity: No Food Insecurity (1/22/2024)    Hunger Vital Sign     Worried About Running Out of Food in the Last Year: Never true     Ran Out of Food in the Last Year: Never true   Transportation Needs: No Transportation Needs (1/22/2024)    PRAPARE - Transportation     Lack of Transportation (Medical): No     Lack of Transportation (Non-Medical): No   Physical Activity: Insufficiently Active (1/22/2024)    Exercise Vital Sign     Days of Exercise per Week: 4 days     Minutes of Exercise per Session: 20 min   Stress: No Stress Concern Present (1/22/2024)    [employer] [place] [employer] - Occupational Stress Questionnaire     Feeling of Stress : Only a little   Housing Stability: Low Risk  (1/22/2024)    Housing Stability Vital Sign     Unable to Pay for Housing in the Last Year: No     Number of Places Lived in the Last Year: 1     Unstable Housing in the Last Year: No     Past Surgical History:   Procedure Laterality Date    COLONOSCOPY N/A 06/09/2017    Procedure: COLONOSCOPY;  Surgeon: Davy Prince MD;  Location: [facility] ENDO [address];  Service: Endoscopy;  Laterality: N/A;    DE QUERVAIN'S RELEASE Right 06/08/2022    Procedure: RELEASE, HAND, FOR DEQUERVAIN'S TENOSYNOVITIS - RIGHT;  Surgeon: Devan Wagner MD;  Location: [facility]  OR;  Service: Orthopedics;  Laterality: Right;    DILATION AND CURETTAGE OF UTERUS      ENDOMETRIAL ABLATION N/A 2017    ESOPHAGOGASTRODUODENOSCOPY N/A 11/11/2022    Procedure: EGD (ESOPHAGOGASTRODUODENOSCOPY);  Surgeon: José Luis Irizarry MD;  Location: [address]);  Service: Endoscopy;  Laterality: N/A;  Fully vaccinated/ inst portal-RB    HYSTEROSCOPY WITH DILATION AND CURETTAGE OF UTERUS N/A 01/19/2022    Procedure: HYSTEROSCOPY, WITH DILATION AND CURETTAGE OF UTERUS;  Surgeon: Jannette Jarrell MD;  Location: [facility] OR;  Service: OB/GYN;  Laterality: N/A;    TUBAL LIGATION       Family History   Problem Relation Name Age of Onset    Hypertension Mother      Pacemaker/defibrilator Mother      Diabetes Father      Stroke Father  49    Liver disease Sister Alis         maybe PSC?    Ovarian cancer Maternal Grandmother      Melanoma Neg Hx      Breast cancer Neg Hx      Colon cancer Neg Hx      Celiac disease Neg Hx      Cirrhosis Neg Hx      Colon polyps Neg Hx      Cystic fibrosis Neg Hx      Crohn's disease Neg Hx      Esophageal cancer Neg Hx      Inflammatory bowel disease Neg Hx      Hemochromatosis Neg Hx      Irritable bowel syndrome Neg Hx      Liver cancer Neg Hx      Rectal cancer Neg Hx      Stomach cancer Neg Hx      Ulcerative colitis Neg Hx      Pancreatitis Neg Hx      Pancreatic cancer Neg Hx      Irizarry's esophagus Neg Hx      Uterine cancer Neg Hx      Bladder Cancer Neg Hx      Kidney cancer Neg Hx             Review of Systems  General: negative for chills, fever or weight loss  Psychological: negative for mood changes or depression  Ophthalmic: negative for blurry vision, photophobia or eye pain  ENT: see HPI  Respiratory: no cough, shortness of breath, or wheezing  Cardiovascular: no chest pain or dyspnea on exertion  Gastrointestinal: no abdominal pain, change in bowel habits, or black/ bloody stools  Musculoskeletal: negative for gait disturbance or muscular weakness  Neurological:  no syncope or seizures; no ataxia  Dermatological: negative for puritis,  rash and jaundice  Hematologic/lymphatic: no easy bruising, no new lumps or bumps      Physical Exam:    Vitals:    07/08/24 1343   BP: 128/83   Pulse: 85       Constitutional: Well appearing / communicating without difficutly.  NAD.  Eyes: EOM I Bilaterally  Head/Face: Normocephalic.  Negative paranasal sinus pressure/tenderness.  Salivary glands WNL.  House Brackmann I Bilaterally.    Right Ear: Auricle normal appearance. External Auditory Canal within normal limits,TM w/o masses/lesions/perforations. TM mobility noted.   Left Ear: Auricle normal appearance. External Auditory Canal WNL,TM w/o masses/lesions/perforations. TM mobility noted.  Nose: No gross nasal septal deviation. Inferior Turbinates 3+ bilaterally. No septal perforation. No masses/lesions. External nasal skin appears normal without masses/lesions.  Oral Cavity: Gingiva/lips within normal limits.  Dentition/gingiva healthy appearing. Mucus membranes moist. Floor of mouth soft, no masses palpated. Oral Tongue mobile. Hard Palate appears normal.    Oropharynx: Base of tongue appears normal. No masses/lesions noted. Tonsillar fossa/pharyngeal wall without lesions. Posterior oropharynx WNL.  Soft palate without masses. Midline uvula.   Neck/Lymphatic: No LAD I-VI bilaterally.  No thyromegaly.  No masses noted on exam.      Tympanometry findings interpreted by me. Findings showing  type C tympanogram in the left ear and type A tympanogram in the right ear were discussed with the patient.     Assessment:    ICD-10-CM ICD-9-CM    1. Other subacute sinusitis  J01.80 461.8       2. Sinus congestion  R09.81 478.19 Ambulatory referral/consult to ENT      3. Acute cough  R05.1 786.2 X-Ray Chest PA And Lateral      4. ETD (Eustachian tube dysfunction), bilateral  H69.93 381.81         The primary encounter diagnosis was Other subacute sinusitis. Diagnoses of Sinus congestion, Acute cough,  and ETD (Eustachian tube dysfunction), bilateral were also pertinent to this visit.      Plan:  Orders Placed This Encounter   Procedures    X-Ray Chest PA And Lateral     Continue Flonase 2 sprays per nostril daily  Start levaquin 500mg PO daily  Start promethazine DM prn cough  Start medrol dose epi  Start sudafed for 5 days then transition to Claritin 10mg tablet nightly  Start autoinsufflation exercises in 3 days      Aurora Mcpherson MD

## 2024-07-08 NOTE — PATIENT INSTRUCTIONS
Continue Flonase 2 sprays per nostril daily  Start levaquin 500mg PO daily  Start promethazine DM prn cough  Start medrol dose epi  Start sudafed for 5 days then transition to Claritin 10mg tablet nightly  Start autoinsufflation exercises in 3 days

## 2024-07-08 NOTE — PROGRESS NOTES
Vikcy Brand, a 58 y.o. female, was seen today in the clinic for tympanometry testing.  Patients main complaint was Left ear fullness.      Tympanometry revealed Type As in the right ear and Type C in the left ear.     Recommendations:  Otologic evaluation  Follow up audiometric testing as needed  Hearing protection when in noise

## 2024-07-09 ENCOUNTER — TELEPHONE (OUTPATIENT)
Dept: OTOLARYNGOLOGY | Facility: CLINIC | Age: 59
End: 2024-07-09
Payer: COMMERCIAL

## 2024-07-09 NOTE — TELEPHONE ENCOUNTER
----- Message from Aurora Mcpherson MD sent at 7/9/2024  8:08 AM CDT -----  Please notify pt that chest x ray is clear.  ----- Message -----  From: [place], Rad Results In  Sent: 7/8/2024   2:59 PM CDT  To: Aurora Mcpherson MD

## 2024-07-22 ENCOUNTER — TELEPHONE (OUTPATIENT)
Dept: GASTROENTEROLOGY | Facility: CLINIC | Age: 59
End: 2024-07-22
Payer: COMMERCIAL

## 2024-07-22 NOTE — TELEPHONE ENCOUNTER
----- Message from Gerald Almonte MA sent at 7/22/2024 12:13 PM CDT -----  Contact: [phone]  Pt called                  Pt is requesting a call back to speak with staff to get an Biopsy endoscopy scheduled in November if possible.

## 2024-07-22 NOTE — TELEPHONE ENCOUNTER
Contacted the patient to schedule an endoscopy procedure(s) 7/22/24. The patient did not answer the call and left a voice message requesting a call back.

## 2024-07-22 NOTE — TELEPHONE ENCOUNTER
"Procedure: EGD     Diagnosis:  Celiac sprue check mucosal healing in 2024     Procedure Timin-12 weeks     *If within 4 weeks selected, please janusz as high priority*     *If greater than 12 weeks, please select "5-12 weeks" and delay sending until 3 months prior to requested date*     Provider: Myself     Location: [address]     Additional Scheduling Information:  2024     Prep Specifications:Standard prep     Is the patient taking a GLP-1 Agonist:no     Have you attached a patient to this message: yes    Electronically signed by Umer Winters MD at 2024  5:24 PM   "

## 2024-07-31 ENCOUNTER — PATIENT MESSAGE (OUTPATIENT)
Dept: OBSTETRICS AND GYNECOLOGY | Facility: CLINIC | Age: 59
End: 2024-07-31
Payer: COMMERCIAL

## 2024-08-19 ENCOUNTER — PATIENT MESSAGE (OUTPATIENT)
Dept: GASTROENTEROLOGY | Facility: CLINIC | Age: 59
End: 2024-08-19
Payer: COMMERCIAL

## 2024-08-19 ENCOUNTER — TELEPHONE (OUTPATIENT)
Dept: ENDOSCOPY | Facility: HOSPITAL | Age: 59
End: 2024-08-19
Payer: COMMERCIAL

## 2024-08-19 NOTE — TELEPHONE ENCOUNTER
"Procedure: EGD     Diagnosis:  Celiac sprue check mucosal healing in 2024     Procedure Timin-12 weeks     *If within 4 weeks selected, please janusz as high priority*     *If greater than 12 weeks, please select "5-12 weeks" and delay sending until 3 months prior to requested date*     Provider: Myself     Location: [address]     Additional Scheduling Information:  2024     Prep Specifications:Standard prep     Is the patient taking a GLP-1 Agonist:no     Have you attached a patient to this message: yes    Electronically signed by Uemr Winters MD at 2024  5:24 PM   "

## 2024-08-19 NOTE — TELEPHONE ENCOUNTER
Ma spoke with pt , Pt was informed the schedule for December is not open yet, pt states she will call back every two weeks for the opening of December schedule

## 2024-08-20 ENCOUNTER — TELEPHONE (OUTPATIENT)
Dept: ENDOSCOPY | Facility: HOSPITAL | Age: 59
End: 2024-08-20
Payer: COMMERCIAL

## 2024-08-20 VITALS — WEIGHT: 130 LBS | BODY MASS INDEX: 20.89 KG/M2 | HEIGHT: 66 IN

## 2024-08-20 DIAGNOSIS — K90.0 CELIAC SPRUE: Primary | ICD-10-CM

## 2024-08-20 NOTE — TELEPHONE ENCOUNTER
Umer Winters MD   to Rose Marie Mayo MA       8/19/24  5:03 PM  Rose Marie that is fine by me but I am not sure what I am doing On November 26 do have orders for that procedure she would like thank you

## 2024-08-20 NOTE — TELEPHONE ENCOUNTER
"Hi Dr. Winters ,   No I don't need an order this is the order   Procedure: EGD     Diagnosis:  Celiac sprue check mucosal healing in 2024     Procedure Timin-12 weeks     *If within 4 weeks selected, please janusz as high priority*     *If greater than 12 weeks, please select "5-12 weeks" and delay sending until 3 months prior to requested date*     Provider: Myself     Location: [address]     Additional Scheduling Information:  2024     Prep Specifications:Standard prep     Is the patient taking a GLP-1 Agonist:no     Have you attached a patient to this message: yes    Electronically signed by Umer Winters MD at 2024  5:24 PM   "

## 2024-08-20 NOTE — TELEPHONE ENCOUNTER
Spoke to patient to schedule procedure(s) Upper Endoscopy (EGD)       Physician to perform procedure(s) Dr. TEO Winters  Date of Procedure (s) 11/26/24  Arrival Time 6:30 AM  Time of Procedure(s) 7:30 AM   Location of Procedure(s) [address]  Type of Rx Prep sent to patient: N/A  Instructions provided to patient via [facility]    Patient was informed on the following information and verbalized understanding. Screening questionnaire reviewed with patient and complete. If procedure requires anesthesia, a responsible adult needs to be present to accompany the patient home, patient cannot drive after receiving anesthesia.. Appointment details are tentative, especially check-in time. Patient will receive a prep-op call 7 days prior to confirm check-in time for procedure. If applicable the patient should contact their pharmacy to verify Rx for procedure prep is ready for pick-up. Patient was advised to call the scheduling department at [phone] if pharmacy states no Rx is available. Patient was advised to call the endoscopy scheduling department if any questions or concerns arise.      [facility] Scheduling Department

## 2024-08-20 NOTE — TELEPHONE ENCOUNTER
" 24  1:02 PM  Note  Procedure: EGD     Diagnosis:  Celiac sprue check mucosal healing in 2024     Procedure Timin-12 weeks     *If within 4 weeks selected, please janusz as high priority*     *If greater than 12 weeks, please select "5-12 weeks" and delay sending until 3 months prior to requested date*     Provider: Myself     Location: [address]     Additional Scheduling Information:  2024     Prep Specifications:Standard prep     Is the patient taking a GLP-1 Agonist:no     Have you attached a patient to this message: yes    Electronically signed by Umer Winters MD at 2024  5:24 PM         "

## 2024-09-29 ENCOUNTER — PATIENT MESSAGE (OUTPATIENT)
Dept: GASTROENTEROLOGY | Facility: CLINIC | Age: 59
End: 2024-09-29
Payer: COMMERCIAL

## 2024-10-01 DIAGNOSIS — K90.0 CELIAC DISEASE: Primary | ICD-10-CM

## 2024-10-13 ENCOUNTER — PATIENT MESSAGE (OUTPATIENT)
Dept: PRIMARY CARE CLINIC | Facility: CLINIC | Age: 59
End: 2024-10-13
Payer: COMMERCIAL

## 2024-10-13 DIAGNOSIS — Z00.00 ANNUAL PHYSICAL EXAM: Primary | ICD-10-CM

## 2024-10-16 ENCOUNTER — LAB VISIT (OUTPATIENT)
Dept: LAB | Facility: HOSPITAL | Age: 59
End: 2024-10-16
Attending: INTERNAL MEDICINE
Payer: COMMERCIAL

## 2024-10-16 DIAGNOSIS — Z00.00 ANNUAL PHYSICAL EXAM: ICD-10-CM

## 2024-10-16 DIAGNOSIS — K90.0 CELIAC DISEASE: ICD-10-CM

## 2024-10-16 LAB
25(OH)D3+25(OH)D2 SERPL-MCNC: 47 NG/ML (ref 30–96)
ALBUMIN SERPL BCP-MCNC: 3.9 G/DL (ref 3.5–5.2)
ALP SERPL-CCNC: 52 U/L (ref 55–135)
ALT SERPL W/O P-5'-P-CCNC: 8 U/L (ref 10–44)
ANION GAP SERPL CALC-SCNC: 8 MMOL/L (ref 8–16)
AST SERPL-CCNC: 20 U/L (ref 10–40)
BILIRUB SERPL-MCNC: 0.7 MG/DL (ref 0.1–1)
BUN SERPL-MCNC: 17 MG/DL (ref 6–20)
CALCIUM SERPL-MCNC: 9.8 MG/DL (ref 8.7–10.5)
CHLORIDE SERPL-SCNC: 106 MMOL/L (ref 95–110)
CHOLEST SERPL-MCNC: 177 MG/DL (ref 120–199)
CHOLEST/HDLC SERPL: 3.4 {RATIO} (ref 2–5)
CO2 SERPL-SCNC: 28 MMOL/L (ref 23–29)
CREAT SERPL-MCNC: 0.9 MG/DL (ref 0.5–1.4)
ERYTHROCYTE [DISTWIDTH] IN BLOOD BY AUTOMATED COUNT: 12.3 % (ref 11.5–14.5)
EST. GFR  (NO RACE VARIABLE): >60 ML/MIN/1.73 M^2
FOLATE SERPL-MCNC: 6.8 NG/ML (ref 4–24)
GLUCOSE SERPL-MCNC: 95 MG/DL (ref 70–110)
HCT VFR BLD AUTO: 41.6 % (ref 37–48.5)
HDLC SERPL-MCNC: 52 MG/DL (ref 40–75)
HDLC SERPL: 29.4 % (ref 20–50)
HGB BLD-MCNC: 12.9 G/DL (ref 12–16)
IGA SERPL-MCNC: 246 MG/DL (ref 40–350)
LDLC SERPL CALC-MCNC: 110.4 MG/DL (ref 63–159)
MCH RBC QN AUTO: 28.9 PG (ref 27–31)
MCHC RBC AUTO-ENTMCNC: 31 G/DL (ref 32–36)
MCV RBC AUTO: 93 FL (ref 82–98)
NONHDLC SERPL-MCNC: 125 MG/DL
PLATELET # BLD AUTO: 198 K/UL (ref 150–450)
PMV BLD AUTO: 11.5 FL (ref 9.2–12.9)
POTASSIUM SERPL-SCNC: 4.5 MMOL/L (ref 3.5–5.1)
PROT SERPL-MCNC: 7.8 G/DL (ref 6–8.4)
RBC # BLD AUTO: 4.47 M/UL (ref 4–5.4)
SODIUM SERPL-SCNC: 142 MMOL/L (ref 136–145)
TRIGL SERPL-MCNC: 73 MG/DL (ref 30–150)
VIT B12 SERPL-MCNC: 616 PG/ML (ref 210–950)
WBC # BLD AUTO: 3.53 K/UL (ref 3.9–12.7)

## 2024-10-16 PROCEDURE — 85027 COMPLETE CBC AUTOMATED: CPT | Performed by: FAMILY MEDICINE

## 2024-10-16 PROCEDURE — 80061 LIPID PANEL: CPT | Performed by: FAMILY MEDICINE

## 2024-10-16 PROCEDURE — 82306 VITAMIN D 25 HYDROXY: CPT | Performed by: INTERNAL MEDICINE

## 2024-10-16 PROCEDURE — 82784 ASSAY IGA/IGD/IGG/IGM EACH: CPT | Performed by: INTERNAL MEDICINE

## 2024-10-16 PROCEDURE — 86364 TISS TRNSGLTMNASE EA IG CLAS: CPT | Performed by: INTERNAL MEDICINE

## 2024-10-16 PROCEDURE — 82746 ASSAY OF FOLIC ACID SERUM: CPT | Performed by: INTERNAL MEDICINE

## 2024-10-16 PROCEDURE — 36415 COLL VENOUS BLD VENIPUNCTURE: CPT | Mod: PO | Performed by: INTERNAL MEDICINE

## 2024-10-16 PROCEDURE — 82607 VITAMIN B-12: CPT | Performed by: INTERNAL MEDICINE

## 2024-10-16 PROCEDURE — 80053 COMPREHEN METABOLIC PANEL: CPT | Performed by: FAMILY MEDICINE

## 2024-10-21 LAB — TTG IGA SER-ACNC: 1.5 U/ML

## 2024-10-23 ENCOUNTER — OFFICE VISIT (OUTPATIENT)
Dept: PODIATRY | Facility: CLINIC | Age: 59
End: 2024-10-23
Payer: COMMERCIAL

## 2024-10-23 DIAGNOSIS — L60.3 NAIL DYSTROPHY: Primary | ICD-10-CM

## 2024-10-23 PROCEDURE — 1159F MED LIST DOCD IN RCRD: CPT | Mod: CPTII,S$GLB,, | Performed by: STUDENT IN AN ORGANIZED HEALTH CARE EDUCATION/TRAINING PROGRAM

## 2024-10-23 PROCEDURE — 99213 OFFICE O/P EST LOW 20 MIN: CPT | Mod: S$GLB,,, | Performed by: STUDENT IN AN ORGANIZED HEALTH CARE EDUCATION/TRAINING PROGRAM

## 2024-10-23 PROCEDURE — 99999 PR PBB SHADOW E&M-EST. PATIENT-LVL III: CPT | Mod: PBBFAC,,, | Performed by: STUDENT IN AN ORGANIZED HEALTH CARE EDUCATION/TRAINING PROGRAM

## 2024-10-23 NOTE — PROGRESS NOTES
Subjective:     Patient    Vicky Brand is a 58 y.o. female.    Problem    Last seen by Dr Jones in 2022 for foot pain. Presents now for left 1st toenail lateral border which has rapidly incurvated in the past 2 months, not currently painful but she is concerned about it becoming ingrown because her mom has similar issues.     History    History obtained from patient and review of medical records.     Past Medical History:   Diagnosis Date    SAMANTHA positive     Celiac disease 09/29/2022    Dyspareunia in female 07/27/2021    Osphena, pelvic floor tx 2021, GYN [facility]    Eye abnormalities     early yellowing of lens, precursor to cataracts, Dr Webster,  wear glasses at all times    Fracture of fifth toe, left, closed, with delayed healing, subsequent encounter 08/21/2018    Helicobacter pylori infection     tx GI Dr José Luis Irizarry 2022    Intramural leiomyoma of uterus 05/22/2019    Irregular, dysmenorrhea    Nocturia 06/17/2021    Ocular migraine     vision fuzzy x 3 in 2013, metallic shapes in her vision, Dr Webster,  resolved after 30 min , no pain    CALEB (obstructive sleep apnea)     Lysenko, CPAP start 7/21    Palpitations 09/29/2022    Paradoxical insomnia 10/30/2017    prior to menses, previously took amitryptiline 2.5 mg prn  qhs     Posterior vitreous detachment of left eye 05/22/2019    Flashes of light, ocular migranes, Ophthalmologist Dr Orourke, retinal specialist Dr Soni, rec avoid exercise until she follows up 5/2019    Shoulder pain, left 2007    LEFT, after fall, better with PT at [facility]    Stress and adjustment reaction 09/29/2022    Urticaria        Past Surgical History:   Procedure Laterality Date    COLONOSCOPY N/A 06/09/2017    Procedure: COLONOSCOPY;  Surgeon: Davy Prince MD;  Location: [facility] ([address] [place] [address]);  Service: Endoscopy;  Laterality: N/A;    DE QUERVAIN'S RELEASE Right 06/08/2022    Procedure: RELEASE, HAND, FOR DEQUERVAIN'S TENOSYNOVITIS - RIGHT;   Surgeon: Devan Wagner MD;  Location: [facility] OR;  Service: Orthopedics;  Laterality: Right;    DILATION AND CURETTAGE OF UTERUS      ENDOMETRIAL ABLATION N/A 2017    ESOPHAGOGASTRODUODENOSCOPY N/A 11/11/2022    Procedure: EGD (ESOPHAGOGASTRODUODENOSCOPY);  Surgeon: José Luis Irizarry MD;  Location: [facility] (4TH FLR);  Service: Endoscopy;  Laterality: N/A;  Fully vaccinated/ inst portal-RB    HYSTEROSCOPY WITH DILATION AND CURETTAGE OF UTERUS N/A 01/19/2022    Procedure: HYSTEROSCOPY, WITH DILATION AND CURETTAGE OF UTERUS;  Surgeon: Jannette Jarrell MD;  Location: [facility] OR;  Service: OB/GYN;  Laterality: N/A;    TUBAL LIGATION          Objective:     Vitals  Wt Readings from Last 1 Encounters:   08/20/24 59 kg (130 lb)     Temp Readings from Last 1 Encounters:   09/25/23 98.1 °F (36.7 °C) (Oral)     BP Readings from Last 1 Encounters:   07/08/24 128/83     Pulse Readings from Last 1 Encounters:   07/08/24 85       Dermatological Exam    Skin:  Pedal hair growth, skin color, and skin texture normal on right  Pedal hair growth, skin color, and skin texture normal on left    Nails:  All nails normal in length, thickness, color except left 1st toenail lateral border incurvated, not tender    Vascular Exam    Arteries:  Posterior tibial artery palpable on right  Dorsalis pedis artery palpable on right  Posterior tibial artery palpable on left  Dorsalis pedis artery palpable on left    Veins:  Superficial veins unremarkable on right  Superficial veins unremarkable on left    Swelling:  None on right  None on left    Neurological Exam    [place] touch test:  6/6 sites sensed, light touch intact     Musculoskeletal Exam    Footwear:  Sandal on right  Sandal on left    Gait Exam:   Ambulatory Status: Ambulatory  Gait: Normal  Assistive Devices: None    Foot Progression Angle:  Normal on right  Normal on left    Right Lower Extremity Additional Findings:  Right foot and ankle function, strength, and range of motion  unremarkable except as noted above.     Left Lower Extremity Additional Findings:  Left foot and ankle function, strength, and range of motion unremarkable except as noted above.    Imaging and Other Tests    Imaging:  Independently reviewed and interpreted imaging, findings are as follows: N/A     Assessment:     Encounter Diagnosis   Name Primary?    Nail dystrophy Yes        Plan:     I counseled the patient on her conditions, their implications and medical management.    Left 1st toenail lateral border dystrophy   -Slant back procedure performed as a courtesy, no apparent fungal changes.   -No treatment needed at this time, if ingrown nail develops then consider slant back vs partial nail avulsion.     Return to clinic PRN.

## 2024-10-24 ENCOUNTER — OFFICE VISIT (OUTPATIENT)
Dept: PRIMARY CARE CLINIC | Facility: CLINIC | Age: 59
End: 2024-10-24
Payer: COMMERCIAL

## 2024-10-24 VITALS
HEIGHT: 66 IN | SYSTOLIC BLOOD PRESSURE: 126 MMHG | TEMPERATURE: 98 F | BODY MASS INDEX: 21.11 KG/M2 | RESPIRATION RATE: 20 BRPM | HEART RATE: 83 BPM | DIASTOLIC BLOOD PRESSURE: 78 MMHG | OXYGEN SATURATION: 99 % | WEIGHT: 131.38 LBS

## 2024-10-24 DIAGNOSIS — Z00.00 ROUTINE GENERAL MEDICAL EXAMINATION AT A HEALTH CARE FACILITY: Primary | ICD-10-CM

## 2024-10-24 DIAGNOSIS — G47.33 OSA (OBSTRUCTIVE SLEEP APNEA): ICD-10-CM

## 2024-10-24 DIAGNOSIS — K90.0 CELIAC DISEASE: ICD-10-CM

## 2024-10-24 PROCEDURE — 1160F RVW MEDS BY RX/DR IN RCRD: CPT | Mod: CPTII,S$GLB,, | Performed by: FAMILY MEDICINE

## 2024-10-24 PROCEDURE — 3078F DIAST BP <80 MM HG: CPT | Mod: CPTII,S$GLB,, | Performed by: FAMILY MEDICINE

## 2024-10-24 PROCEDURE — 1159F MED LIST DOCD IN RCRD: CPT | Mod: CPTII,S$GLB,, | Performed by: FAMILY MEDICINE

## 2024-10-24 PROCEDURE — 3074F SYST BP LT 130 MM HG: CPT | Mod: CPTII,S$GLB,, | Performed by: FAMILY MEDICINE

## 2024-10-24 PROCEDURE — 3008F BODY MASS INDEX DOCD: CPT | Mod: CPTII,S$GLB,, | Performed by: FAMILY MEDICINE

## 2024-10-24 PROCEDURE — 99396 PREV VISIT EST AGE 40-64: CPT | Mod: S$GLB,,, | Performed by: FAMILY MEDICINE

## 2024-10-24 PROCEDURE — 99999 PR PBB SHADOW E&M-EST. PATIENT-LVL III: CPT | Mod: PBBFAC,,, | Performed by: FAMILY MEDICINE

## 2024-10-24 NOTE — PROGRESS NOTES
Assessment:     1. Routine general medical examination at a health care facility    2. Celiac disease    3. CALEB (obstructive sleep apnea)      Plan:     Routine general medical examination at a health care facility  Follow with GYN for female health & cancer prevention  Move more, High fiber, good fat (avocado, olive oil, nuts) foods  Eat more food grown from the earth (picked from trees or out of the ground)  Colon cancer screening at 45 yo  Follow up yearly with LABS ONE WEEK PRIOR so we can discuss at your visit      Celiac disease  Dx 2018 when she had ongoing Dematitis Herpetiformus, much better with Gluten free,  follow w Dr Kowalski    CALEB (obstructive sleep apnea)  stable, continue CPAP , follow w Saima          HPI: Vicky Brand is a 58 y.o. female with is here today for general exam.     History of Present Illness    CHIEF COMPLAINT:  Patient presents today for annual follow-up.    CELIAC DISEASE:  She was diagnosed with celiac disease two years ago. Recent labs with Dr. Winters showed low celiac numbers, indicating good control. She adheres to a strict gluten-free diet as the primary treatment. An endoscopy is scheduled in November to assess the condition of her villi. She has a history of dermatitis herpetiformis, which resolved 18 months after eliminating gluten from her diet. She denies current abdominal problems. She reports a family history of celiac disease.    SLEEP APNEA:  She continues to use a CPAP mask for management of ongoing sleep apnea. She has an upcoming appointment with Dr. Landaverde next month for follow-up.    LAB RESULTS:  She reports feeling healthy. Lab results show no diabetes. Cholesterol has improved, with LDL decreasing from 122 to 110 since last year. Liver function tests are normal. She has a slightly low white blood cell count, which has been a consistent finding in the past. She is not anemic.    LIFESTYLE AND SOCIAL HISTORY:  She is attempting to  "incorporate exercise into her routine. She is  and reports a positive relationship with her .      ROS:  ROS as indicated in HPI.         Denies chest pain, shortness of breath    Current Outpatient Medications   Medication Instructions    amitriptyline (ELAVIL) 10 mg, Oral, Nightly PRN    calcium carbonate (OS-GISSELL) 600 mg, Once    fluoride, sodium, (PREVIDENT 5000) 1.1 % Pste Place onto teeth.       Lab Results   Component Value Date    HGBA1C 5.4 10/02/2023     No results found for: "MICALBCREAT"  Lab Results   Component Value Date    LDLCALC 110.4 10/16/2024    LDLCALC 122.0 10/02/2023    CHOL 177 10/16/2024    HDL 52 10/16/2024    TRIG 73 10/16/2024       Lab Results   Component Value Date     10/16/2024    K 4.5 10/16/2024     10/16/2024    CO2 28 10/16/2024    GLU 95 10/16/2024    BUN 17 10/16/2024    CREATININE 0.9 10/16/2024    CALCIUM 9.8 10/16/2024    PROT 7.8 10/16/2024    ALBUMIN 3.9 10/16/2024    BILITOT 0.7 10/16/2024    ALKPHOS 52 (L) 10/16/2024    AST 20 10/16/2024    ALT 8 (L) 10/16/2024    ANIONGAP 8 10/16/2024    ESTGFRAFRICA >60.0 07/21/2021    EGFRNONAA >60.0 07/21/2021    WBC 3.53 (L) 10/16/2024    HGB 12.9 10/16/2024    HGB 13.5 03/08/2024    HCT 41.6 10/16/2024    MCV 93 10/16/2024     10/16/2024    TSH 1.780 08/23/2023    HEPCAB Non-reactive 09/21/2022       Lab Results   Component Value Date    .30 01/21/2020    PROGESTERONE 0.2 01/21/2020    ESTRADIOL <10 (A) 01/21/2020    HKAOJBVY35EN 47 10/16/2024    SDHRAOKO23YJ 58 03/08/2024    HWDIGZKH08 616 10/16/2024    FERRITIN 98 03/08/2024    IRON 95 03/08/2024    TRANSFERRIN 241 03/08/2024    TIBC 357 03/08/2024    FESATURATED 27 03/08/2024    ZINC 78 09/21/2022         Past Medical History:   Diagnosis Date    SAMANTHA positive     Celiac disease 09/29/2022    Dyspareunia in female 07/27/2021    Osphena, pelvic floor tx 2021, GYN [facility]    Eye abnormalities     early yellowing of lens, precursor to cataracts, " Dr Webster,  wear glasses at all times    Fracture of fifth toe, left, closed, with delayed healing, subsequent encounter 08/21/2018    Helicobacter pylori infection     tx GI Dr José Luis Irizarry 2022    Intramural leiomyoma of uterus 05/22/2019    Irregular, dysmenorrhea    Nocturia 06/17/2021    Ocular migraine     vision fuzzy x 3 in 2013, metallic shapes in her vision, Dr Webster,  resolved after 30 min , no pain    CALEB (obstructive sleep apnea)     Lysenko, CPAP start 7/21    Palpitations 09/29/2022    Paradoxical insomnia 10/30/2017    prior to menses, previously took amitryptiline 2.5 mg prn  qhs     Posterior vitreous detachment of left eye 05/22/2019    Flashes of light, ocular migranes, Ophthalmologist Dr Orourke, retinal specialist Dr Soni, rec avoid exercise until she follows up 5/2019    Shoulder pain, left 2007    LEFT, after fall, better with PT at [facility]    Stress and adjustment reaction 09/29/2022    Urticaria      Past Surgical History:   Procedure Laterality Date    COLONOSCOPY N/A 06/09/2017    Procedure: COLONOSCOPY;  Surgeon: Davy Prince MD;  Location: [address]);  Service: Endoscopy;  Laterality: N/A;    DE QUERVAIN'S RELEASE Right 06/08/2022    Procedure: RELEASE, HAND, FOR DEQUERVAIN'S TENOSYNOVITIS - RIGHT;  Surgeon: Devan Wagner MD;  Location: [facility];  Service: Orthopedics;  Laterality: Right;    DILATION AND CURETTAGE OF UTERUS      ENDOMETRIAL ABLATION N/A 2017    ESOPHAGOGASTRODUODENOSCOPY N/A 11/11/2022    Procedure: EGD (ESOPHAGOGASTRODUODENOSCOPY);  Surgeon: José Luis Irizarry MD;  Location: [address]);  Service: Endoscopy;  Laterality: N/A;  Fully vaccinated/ inst portal-RB    HYSTEROSCOPY WITH DILATION AND CURETTAGE OF UTERUS N/A 01/19/2022    Procedure: HYSTEROSCOPY, WITH DILATION AND CURETTAGE OF UTERUS;  Surgeon: Jannette Jarrell MD;  Location: [facility];  Service: OB/GYN;  Laterality: N/A;    TUBAL LIGATION       Vitals:    10/24/24 1315  "  BP: 126/78   Pulse: 83   Resp: 20   Temp: 98.1 °F (36.7 °C)   TempSrc: Oral   SpO2: 99%   Weight: 59.6 kg (131 lb 6.3 oz)   Height: 5' 6" (1.676 m)     Wt Readings from Last 5 Encounters:   10/24/24 59.6 kg (131 lb 6.3 oz)   08/20/24 59 kg (130 lb)   07/08/24 59.4 kg (130 lb 15.3 oz)   06/28/24 59.3 kg (130 lb 11.7 oz)   12/13/23 58.6 kg (129 lb 3 oz)     Objective:   Physical Exam  Constitutional:       Appearance: She is well-developed.   Eyes:      Pupils: Pupils are equal, round, and reactive to light.   Cardiovascular:      Rate and Rhythm: Normal rate and regular rhythm.      Heart sounds: Normal heart sounds. No murmur heard.  Pulmonary:      Effort: Pulmonary effort is normal.      Breath sounds: Normal breath sounds. No wheezing.   Abdominal:      General: Bowel sounds are normal. There is no distension.      Palpations: Abdomen is soft. There is no mass.      Tenderness: There is no abdominal tenderness. There is no guarding or rebound.   Musculoskeletal:      Cervical back: Neck supple.   Skin:     General: Skin is warm and dry.   Neurological:      Mental Status: She is alert.   Psychiatric:         Behavior: Behavior normal.                                     "

## 2024-10-24 NOTE — ASSESSMENT & PLAN NOTE
Dx 2018 when she had ongoing Dematitis Herpetiformus, much better with Gluten free,  follow w Dr Kowalski

## 2024-10-30 NOTE — PROGRESS NOTES
"  Occupational Therapy Daily Treatment Note     Date: 7/25/2022  Name: Vicky Brand  Clinic Number: [id number]    Therapy Diagnosis:   Encounter Diagnoses   Name Primary?    Pain in right hand Yes    Decreased range of motion of finger of right hand     Decreased range of motion of right wrist     Swelling of right hand     Requires assistance with activities of daily living (ADL)      Physician: Petey Camarena*    Medical Diagnosis: M65.4 (ICD-10-CM) - De Quervain's tenosynovitis, right  Physician Orders:   Post Surgical? Yes   Eval and Treat Yes   Type of Therapy Hand Therapy (CHT)      Order comments: Patient is scheduled for Right DeQuervains release on 6/8/22. Please see patient within the first week postop for orthosis and start postop rehab.      Evaluation Date: 6/15/2022  Insurance Authorization Period Expiration: 6/2/23  Plan of Care from 6/15/2022 to 8/15/22   Surgery Date and Procedure: 6/8/22; PROCEDURE PERFORMED: 1) Right first dorsal compartment release  Date of Return to MD/PA: 7/21/22 6/23/22: A/P: Status post above, doing well  1) Continue with OT for postop rehab, progress per protocol as tolerated.  2) All sutures removed today. Wound care and signs of infection discussed.     Visit # / Visits authorized: 10/ Pending  Time In: 10:00 am   Time Out: 11:00 am  Total Billable Time: 45 minutes     Precautions:  Standard; Restrictions/precautions for po Week 6  DOS-6/8/22    Subjective     Pt reports: 7/25: Pt states "it feels like a rubber band around my wrist". She had discontinued wearing the compression glove when not wearing the brace. Pt confirms HEP follow through with red t-putty in the right and green in the left. She was committed to her flexibility also. Pt explains feeling unable to perform the scar massage "effectively" because there was a scab where the blister had formed from the aggressive massage from a week ago. She had no c/o pain with scar massage using the " rachael for (A) this session. She has no pain with Finkelstein's stretch and denies any left elbow pain.  7/15: Pt arrives after PA follow up very concerned that she may need a subsequent surgery if the scar is continuing to have a dimple. She has since been using the Voltarin and massaging more aggressively. The scar is red and painful.    Response to previous treatment: Good AROM improvements with commitment to HEP, symptom-guided, no medication    Pain: 0/10  Location: Right wrist/thumb, Left elbow    Objective     Observations: Periphery of the incision site is red.    EDEMA (Girth in cm)    Right/Left Right R R   DATE IE-6/15/22 6/20/22 6/27/22 7/25/22   Wrist 14.8/13.9 14.8 (=) 14.7 (-.1) 15.0/14.3   Thumb P1 6.1/5.6 5.6 (-.5) nt 5.3/5.6      Wrist AROM    Right/Left Right R R R R   DATE IE-6/15/22 6/20/22 6/27/22 7/8/22 7/15/22 7/25/22  Post-tx   EXT 42/67 42 55 63 63 63   FLX 52/71 47 71 74 75 77   RD 2/7 4 7 7 7 7   UD  7/43 36 34 36 35 38   KING 103/188 129 (+26) 167 (+38) 180 (+13) 180 (=) 185 (+5)      Thumb AROM in (Wrist neutral position)    Right Left Right Right R R R   DATE IE-6/15/22 6/15/22 6/20/22 6/27/22 7/8/22  Post-tx 7/15/22 7/25/22  Post-tx   MP e/f 0/19 0/70 0/47 49 53 56 64   IP e/f 20+/15 25+/72 20+/45 56 57 74 73   Radial Add/Abd -25/31 -22/44 -21/39 wnl/42 44 45 50   Palmar Add/Abd -20/32 0/43 -13/37 -9/35 wnl/35 39 42   KING 52 137 134 (+82) 173 (+39) 189 (+16) 214 (+25) 229 (+15)   Tip to DPC   (in cm) 5.0 1.0 2.5 (+2.5) 1.5 (+1.0) 1.0 (+.5) 1.0 (=) 1.0 (=)     FINGER AROM (measured in centimeters)  Tip to DPC Right/Left Right   DATE IE-6/15/22 6/20/22   Index  3.0/0 0   Long  2.0/0 0   Ring   2.0/0 0   Small   2.0/0 0      PULLED FORWARD FROM 4/27/22 Right PRE-OP EVAL (also Pre-Left Epicondylitis onset):   Strength: (AVELINA Dynamometer in psi.)     Right/Left Left L R/L   Rung II  IE-4/27/22 7/8/22* 7/15/22 7/25/22   Elbow flexed 45/40 29 45 26/nt   Elbow extended  29 44**    Elbow  "extended & f/a supinated  30     Elbow extended & f/a pronated  22     * no pain today with any position  ** "a little pull" at the common extensor    Pinch Strength (Measured in psi)    Right/Left R/L     IE-4/27/22 7/25/22   Key Pinch 9/13 9.5/11.5   3pt Pinch 9.0/8.5  8.0/8.5   2pt Pinch defer  3.5/7.0      DEXTERITY Tests   Right/Left   DATE 7/11/22   Grooved Peg Timed Test 1'05"/1'10"       Treatment      Vicky received the following supervised modalities after being cleared for contradictions for 15 minutes:   RIGHT: Fluidotherapy for promoting healing, reducing pain, desensitization and increasing tissue extensibility; tegaderm covering incision site     Vicky received the following direct contact modalities after being cleared for contraindications for 0 minutes:  - defer     Vicky received the following manual therapy techniques for 10 minutes:   - Scar massage in neutral wrist to full Finkelstein's position progression using dycem assist  - P/AAROM each wrist and thumb  - Contract/relax in composite thumb flex/opposition, wrist neutral and in UD position  - R/A girth; encouraged to resume wearing the size Medium 3/4 finger compression glove for RIGHT pain and edema mgmt  - defer-Elastic tape applied to Right incision scar; pt to remove before sleep tonite or sooner if itching/burning experienced     Vicky received therapeutic exercises for 25 minutes including:  Wrist and forearm AROM and flexibility RIGHT& LEFT: 1 x 30 sec holds each:  - [place] hand stretch with thumb relaxed (overlapping)  - Reverse [place] hand stretch    RIGHT: 3x30" hold each:  - Graded Finkelstein's performed during final minutes of Fluidotherapy and thereafter during scar mobilization  - Extrinsic extensor active and passive stretch  - defer-Extrinsic flexor stretch with extended hand, thumb free, in stance on tabletop     RIGHT Hand AROM and tendon gliding-defer 2 min each:  - kinetic  - small/med pompoms, one container, " [FreeTextEntry1] : Ms Kirti Villagran is a 78-year-old woman seen in the office today in the company of her daughter/home care attendant, Marbella.  She is here for an encounter regarding her continuing active problem trigeminal neuralgia which was she was diagnosed with several years ago.  She was last seen in the office nearly 2 years ago in January 2021.  She did discontinue her oxycarbamazepine about 7 months ago and has had daily pain in the left side of her face shooting  Patient continues under the care of pain management for chronic neck and back issues.  Today : Today I had the pleasure of seeing Ms Villagran in our office for follow up.  The patients previous history and physical findings have been reviewed.    Ms Villagran remains under our care for chronic left sided trigeminal neuralgia. At her last visit she reported worsening pain and resumed her  regimen of Oxcarbazepine 150mg 3 tabs BID and completed a Medrol dose pack. Today she reports sinus issues and widespread pain due to fibromyalgia and was advised to speak with her PCP and Rheumatologist. She does report improvement in her trigeminal pain and denies adverse side effects from her medication. Patient aware of all potential risks, benefits and side effects of the medications discussed and was advised to refrain from aggressive tooth brushing hair brushing or hot / cold foods. She denies vision changes, syncope, HA, nausea, vomiting, dizziness or other new or progressive neurologic symptoms.   stored, rotatd and translated  - binder spring walks   Thumb AROM & gliding 1x10 performed during final minutes of Fluidotherapy    Defer-2 min each:  - DIP blocks over finger platter   Wrist/forearm strengthening-defer -Yellow Flexbar, 2 min each:  -smiles- right thumb free  -frowns - right thumb free  -defer-twists- right thumb free (right hand on top)  -defer- for Left-stabilization w/oscillations  -deferisometric supination-1 min on Right, right thumb free -defer-isometric pronation- Left only     Postural strengthening Defer to HEP- RED t-band, 1-2 min each:  -Left elbow flexion   -Right elbow flexion  -Left elbow extension  -Right elbow extension     LEFT strengthening Defer to HEP-Green t-putty Strengthening     RIGHT strengthening Defer to HEP-Red & Green t-putty Strengthening     Yellow resistance pin, 1 container each:  - 3-jaw pinch  - lateral pinch   Objective Measures 7/25: Assess baseline Right  and pinch  7/25: R/A post-tx AROM wrist and thumb   HEP as upgraded 7/25: Transition to using the green t-putty for the Right hand  7/15: Red t-putty for Right hand, Green for Left; flexibility for Right wrist  7/11: Red t-band for wrist extension isometrics  7/8: Green t-putty for LEFT only  6/27: [place] hand stretch added, thumbs relaxed  6/27: Red t-putty for LEFT only  6/20: Thumb pinky slides added informally  6/15: As initially set        Home Exercise Program and Home Care Resources/Education:     Education provided:   - See above  - HEP as set  - Education provided on all interventions performed during today's session   - Progress towards goals     Written Home Exercises Provided: yes. Pt to use prior t-putty print-out  Exercises were reviewed and Vicky was able to demonstrate them prior to the end of the session.  Vicky demonstrated good  understanding of the HEP provided.     See EMR under Patient Instructions for exercises provided prior visit. 6/27/22, 7/8/22, 7/11/22, 7/15/22, 7/25/22        Assessment   7/25: Improvements in all areas. Goals are being met sooner than expected. Good tolerance for advancing flexibility activities and PREs for Right and Left UE.    Goals:   The following goals were discussed with the patient and patient is in agreement with them as to be addressed in the treatment plan.     Goals:  LTG's (10-12 weeks):  1)  Decrease complaints of pain, bilaterally, to 2 out of 10 at worst to increase functional hand use for gardening, fitness program, & household ADLs. MET  2)  Right  and pinch strength to be >75% of the pre-operative measures (4/27/22) to demonstrate healing tissues and tolerance for fasteners, locks and latches. MET 7/25/2022  3)  Right Wrist and thumb KING to >80% as compared to the Left hand for demonstrating healing tissues without adverse scar adhesions. MET 7/25/2022  4)  Patient to score at 25% or less on Quick/DASH or FOTO to demonstrate improved perception of functional bilateral hand use and evidence near to prior level of function for ADLs with or without newly learning activity modifications or compensatory strategies.     STG's (6-8 weeks)  1)   Pt to report decreased episodes of pain in Both wrist/thumb and elbows during daytime activities w/or without newly learned activity modifications. MET  2)   Patient to be IND with Phase II of HEP and modalities for pain/edema managment. MET  3)   Increase Right wrist and thumb KING by >60 degrees to indicate effective tissue healing with improved tendon gliding. MET for thumb 6/20/2022. MET for wrist 6/27/22.  4)   Patient to be IND with Orthotic use, wear and care precautions. MET  5)   Pt to tolerate advancing PREs with self-graded intensity and effective symptom monitoring. MET    Pt would continue to benefit from skilled OT as per original POC     Vicky is progressing faster than expected towards her goals and there are no updates to goals at this time. Pt prognosis is Good    Pt will continue to  benefit from skilled outpatient occupational therapy to address the deficits listed in the problem list on initial evaluation provide pt/family education and to maximize pt's level of independence in the home and community environment.     Pt's spiritual, cultural and educational needs considered and pt agreeable to plan of care and goals.    Plan   7/25: Advance PREs as tolerated. Discuss discharge planning    Cont OT to address above goals as per original POC.    Certification Period/Plan of care expiration: 6/15/2022 to 9/15/22.     Outpatient Occupational Therapy 2-3 times weekly for 10-12 weeks to include the following interventions: Paraffin, Fluidotherapy, Manual therapy/joint mobilizations, Modalities for pain management, US 3 mhz, Therapeutic exercises/activities., Strengthening, Orthotic Fabrication/Fit/Training, Edema Control, Scar Management, Electrical Modalities, Joint Protection and Energy Conservation.    LATESHA Rivers, T  Occupational Therapist, Certified Hand Therapist

## 2024-11-07 ENCOUNTER — OFFICE VISIT (OUTPATIENT)
Dept: SLEEP MEDICINE | Facility: CLINIC | Age: 59
End: 2024-11-07
Attending: PSYCHIATRY & NEUROLOGY
Payer: COMMERCIAL

## 2024-11-07 DIAGNOSIS — G47.30 SLEEP APNEA, UNSPECIFIED TYPE: Primary | ICD-10-CM

## 2024-11-07 DIAGNOSIS — G47.00 INSOMNIA, UNSPECIFIED TYPE: ICD-10-CM

## 2024-11-07 PROCEDURE — 99214 OFFICE O/P EST MOD 30 MIN: CPT | Mod: 95,,, | Performed by: PSYCHIATRY & NEUROLOGY

## 2024-11-07 RX ORDER — AMITRIPTYLINE HYDROCHLORIDE 10 MG/1
10 TABLET, FILM COATED ORAL NIGHTLY PRN
Qty: 30 TABLET | Refills: 11 | Status: SHIPPED | OUTPATIENT
Start: 2024-11-07 | End: 2025-11-07

## 2024-11-07 NOTE — PROGRESS NOTES
The patient location is: home  The chief complaint leading to consultation is: sleep disorder  Visit type: audiovisual  Total time spent with patient: 30 min  Each patient to whom he or she provides medical services by telemedicine is:  (1) informed of the relationship between the physician and patient and the respective role of any other health care provider with respect to management of the patient; and (2) notified that he or she may decline to receive medical services by telemedicine and may withdraw from such care at any time.            Taking Elavil 1/2 every night and melatonin and Evening Primrose.  Wants to increase Elavil to 10 mg (full pill to prolong her sleep.  CAn taking Melatonin earlier.   Happy with humnidity   Very rarre to wake up from pressure too high  No condensation  Using under the nose mask  Benefiting from CPAP use in terms of sleep continuity and daytime sleepiness.   Compliant. Nocturia improved from 4 times per night to almost 0-1;  Still curious about other treament optiosn - but does not want Inpire.  Will lower top pressurewhen I have access          INTERVAL HISTORY:    10/19/2023:   Vicky Brand is a 58 y.o. female seen today for CALEB and Insomnia follow up. Last seen on 10/13/2022.    The patietn reports ongoing difficulty with falling and staying asleep despite compliant CPAP use.   Trazodone 50 mg - whole pill worked - but drowsy in AM; tried taking a small dose - not effective  Evening primrose - helps on and off. Melatonin - did not work.  Maintains good sleep hygine; her bedroom is dark and quiet. [employer]va[employer] electronics and caffeine in the afternoon.   As for CALBE, the patient is doing well on CPAP. The patient reports improved sleep continuity and daytime sleepiness on PAP. ESS today is 8/24.  Denies break through snoring.  No  dry mouth.  No aerophagia or air hunger. Denies occasional mask leaks and discomfort.        Medications: took Elavil for sleep in the past for  sleep (very small dose); no longer taking it at the moment.     Compliance Report  Compliance  Payor Standard  Usage 09/13/2022 - 10/12/2022  Usage days 30/30 days (100%)  >= 4 hours 27 days (90%)  < 4 hours 3 days (10%)  Usage hours 195 hours 14 minutes  Average usage (total days) 6 hours 30 minutes  Average usage (days used) 6 hours 30 minutes  Median usage (days used) 6 hours 49 minutes  Total used hours (value since last reset - 10/12/2022) 2,427 hours  AirSense 10 AutoSet  Serial number [id number]  Mode AutoSet  Min Pressure 4 cmH2O  Max Pressure 10 cmH2O  EPR Fulltime  EPR level 3  Response Standard  Therapy  Pressure - cmH2O Median: 5.4 95th percentile: 6.9 Maximum: 8.0  Leaks - L/min Median: 0.3 95th percentile: 6.1 Maximum: 12.4  Events per hour AI: 0.6 HI: 0.1 AHI: 0.7  Apnea Index Central: 0.4 Obstructive: 0.2 Unknown: 0.0  RERA Index 0.0  Cheyne-Otto respiration (average duration per night) 0 minutes (0%)  Usage - hours        _______      Medications pertinent to sleep disorders: vening primrose - did not help.  Melatonin - did not work.  Previously tried medications:Trazodone 50 mg - whole pill worked - but drowsy in AM; tried taking a small dose - not effective Hydroxyzine - ws drowsy in AM. Elavil 10 mg - used to 1/4     DME:   SLEEP STUDIES: 2021: Severe sleep disordered breathing (AHI=40) is  noted based on a 4% hypopnea desaturation criteria, with indications of respiratory control instability. The patient slept  supine 51% of the night based on valid recording time of 6.37 hours. When considering more subtle measures of sleep  disordered breathing, the overall respiratory disturbance index is severe(RDI=53) based on a 1% hypopnea desaturation  criteria with confirmation by surrogate arousal indicators. The apneas/hypopneas are accompanied by minimal oxygen  desaturation (percent time below 90% SpO2: 0.1%, Min SpO2: 88.7%).    PHYSICAL EXAM:  There were no vitals taken for this visit.  W/D, W/N  well groomed    HST AHI 40/low sat 80% (severe side and back)    ASSESSMENT  CALEB severe, compliant, good AHI control on CPAP; benefiting from therapy, but looking for additional treatment options to alternate with CPAP. Interested in EPAP/optipillows as she does not suffer from nasal congestions. Would like to get tested by HST with nasal pillows in place, or with OA (oral appliance) for snoring in place (but much less enthusiastic Rre: OA, would really want to try Optipillows first.)  2. Insomnia, unspecified. The sleep remains interrupted despite compliant CPAP use. FAiled Trazodone (extreme drowsiness at a minimal dose). Recalls Elavil being effective in the pat.      PLAN:  Continue APAP at 4- 10 cm; I will review download when [facility] portal is back on track.   Continue cognitive behavior approach to Insomnia management.   She will now take full Elavil 10 mg  1instead of 5 mg   Sleep hygiene brochure was provide tot he patietn.   She will try EPAP, and given her CALEB severity, would like to be tested for CALEB severity with EPAP in place to see if her very severe AHI normalizes on EPAP treatment. The only way I can offer her to do it, is with the Watchpat HST available on the [place].     We discussed potential treatment options, which could include continuous positive airway pressure (CPAP-defintive), mandibular advancement splint by dentist, or referral for surgical consideration. Discussed etiology of CALEB and potential ramifications of untreated CALEB, including heart disease, hypertension, cognitive difficulties, stroke, and diabetes.

## 2024-11-07 NOTE — Clinical Note
Hi Tach,  Just the heads up - I'm referring this patient to you for Watchpat testing while on EPAP, once insurance clears her HST.  Thank you,  Diamond

## 2024-11-08 ENCOUNTER — PATIENT MESSAGE (OUTPATIENT)
Dept: SLEEP MEDICINE | Facility: CLINIC | Age: 59
End: 2024-11-08
Payer: COMMERCIAL

## 2024-11-10 ENCOUNTER — PATIENT MESSAGE (OUTPATIENT)
Dept: GASTROENTEROLOGY | Facility: CLINIC | Age: 59
End: 2024-11-10
Payer: COMMERCIAL

## 2024-11-12 ENCOUNTER — TELEPHONE (OUTPATIENT)
Dept: ENDOSCOPY | Facility: HOSPITAL | Age: 59
End: 2024-11-12
Payer: COMMERCIAL

## 2024-11-21 ENCOUNTER — OFFICE VISIT (OUTPATIENT)
Dept: INTERNAL MEDICINE | Facility: CLINIC | Age: 59
End: 2024-11-21
Payer: COMMERCIAL

## 2024-11-21 VITALS
DIASTOLIC BLOOD PRESSURE: 80 MMHG | HEIGHT: 66 IN | WEIGHT: 129.19 LBS | TEMPERATURE: 99 F | BODY MASS INDEX: 20.76 KG/M2 | SYSTOLIC BLOOD PRESSURE: 122 MMHG | OXYGEN SATURATION: 99 % | HEART RATE: 97 BPM

## 2024-11-21 DIAGNOSIS — J02.9 SORE THROAT: Primary | ICD-10-CM

## 2024-11-21 LAB
CTP QC/QA: YES
CTP QC/QA: YES
FLUAV AG NPH QL: NEGATIVE
FLUBV AG NPH QL: NEGATIVE
SARS-COV-2 RDRP RESP QL NAA+PROBE: NEGATIVE

## 2024-11-21 PROCEDURE — 99999 PR PBB SHADOW E&M-EST. PATIENT-LVL IV: CPT | Mod: PBBFAC,,, | Performed by: FAMILY MEDICINE

## 2024-11-21 RX ORDER — AZELASTINE 1 MG/ML
1 SPRAY, METERED NASAL 2 TIMES DAILY
Qty: 30 ML | Refills: 0 | Status: SHIPPED | OUTPATIENT
Start: 2024-11-21 | End: 2025-11-21

## 2024-11-21 RX ORDER — PSEUDOEPHEDRINE HCL 120 MG/1
120 TABLET, FILM COATED, EXTENDED RELEASE ORAL 2 TIMES DAILY
Qty: 20 TABLET | Refills: 0 | Status: SHIPPED | OUTPATIENT
Start: 2024-11-21 | End: 2024-12-01

## 2024-11-21 NOTE — PROGRESS NOTES
"Subjective:     Patient ID: Vicky Brand is a 59 y.o. female.   Chief Complaint: Nasal Congestion (yesterday), Cough, Fatigue, Headache, and Sore Throat    HPI:  History of Present Illness    CHIEF COMPLAINT:  Patient presents today with flu-like symptoms.    HISTORY OF PRESENT ILLNESS:  She reports flu-like symptoms that started yesterday, including significant fatigue, nasal congestion, and voice changes described as her "mean teacher voice." She experiences post-nasal drip and had a productive cough with mucus early this morning, but no other productive coughs since. She denies stomach symptoms and sinus pain but reports throat discomfort. 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    MEDICATIONS:  She has taken OTC cold remedies including zinc lozenges, grated dc in tea with honey and lemon, and a dollar store sinus and allergy medication. She reports tolerating Mucinex well in the past. She took Zyrtec despite a known allergy, experiencing significant drowsiness and confusion.    UPCOMING PROCEDURES:  She has an endoscopy scheduled for next Tuesday, contingent on a negative COVID test.        Review of Systems   Constitutional:  Positive for fatigue. Negative for chills and fever.   HENT:  Positive for nasal congestion, postnasal drip, rhinorrhea, sinus pressure/congestion, sore throat and voice change. Negative for ear pain, sneezing and trouble swallowing.    Respiratory:  Positive for cough. Negative for shortness of breath and wheezing.    Cardiovascular:  Negative for chest pain.   Gastrointestinal:  Negative for abdominal pain and change in bowel habit.   Neurological:  Positive for headaches.   Psychiatric/Behavioral:  Positive for confusion.           Objective:      Vitals:    11/21/24 1504   BP: 122/80   BP Location: Left arm   Patient Position: Sitting   Pulse: 97   Temp: 98.5 °F (36.9 " "°C)   TempSrc: Oral   SpO2: 99%   Weight: 58.6 kg (129 lb 3 oz)   Height: 5' 6" (1.676 m)      Physical Exam  Vitals reviewed.   Constitutional:       General: She is not in acute distress.     Appearance: Normal appearance. She is not ill-appearing.   HENT:      Head: Normocephalic and atraumatic.      Right Ear: Ear canal and external ear normal. A middle ear effusion is present. Tympanic membrane is not erythematous or bulging.      Left Ear: Ear canal and external ear normal. A middle ear effusion is present. Tympanic membrane is not erythematous or bulging.      Nose: Nose normal.      Right Sinus: No maxillary sinus tenderness or frontal sinus tenderness.      Left Sinus: No maxillary sinus tenderness or frontal sinus tenderness.      Mouth/Throat:      Pharynx: Oropharynx is clear. Uvula midline. Posterior oropharyngeal erythema and postnasal drip present. No pharyngeal swelling, oropharyngeal exudate or uvula swelling.      Tonsils: No tonsillar exudate or tonsillar abscesses.   Cardiovascular:      Rate and Rhythm: Normal rate and regular rhythm.      Pulses: Normal pulses.      Heart sounds: Normal heart sounds. No murmur heard.     No friction rub. No gallop.   Pulmonary:      Effort: No respiratory distress.      Breath sounds: Normal breath sounds. No stridor. No wheezing, rhonchi or rales.   Musculoskeletal:      Cervical back: Normal range of motion.   Neurological:      General: No focal deficit present.      Mental Status: She is alert and oriented to person, place, and time. Mental status is at baseline.   Psychiatric:         Mood and Affect: Mood normal.         Behavior: Behavior normal.         Thought Content: Thought content normal.         Judgment: Judgment normal.           Assessment/Plan:             ICD-10-CM ICD-9-CM   1. Sore throat  J02.9 462     Orders Placed This Encounter   Procedures    POCT Influenza A/B    POCT COVID-19 Rapid Screening       Assessment & Plan    - Patient " presenting with upper respiratory symptoms, likely viral in nature given exposure to illnesses in work environment  - COVID and flu tests negative  - Initiated symptomatic management considering viral etiology  - Will reassess need for antibiotics if no improvement by Monday    PLAN SUMMARY:  - COVID and flu tests performed (both negative)  - Started Astelin nasal spray for drainage  - Started Sudafed for congestion relief  - Patient to update provider on condition through encounter portal  - Contact office by Monday midday if symptoms not improved    ACUTE UPPER RESPIRATORY INFECTION:  - Instructed on proper nasal spray technique: use opposite hand for opposite nostril, aim spray along cheekbone while looking downward for deeper penetration.  - Advised avoidance of anti-histamines given her strong reaction to them  - Started Sudafed for congestion relief.  - Started Astelin nasal spray  - Continue OTC management with Mucinex as it has been tolerated before  - COVID test performed (result: negative).  - Flu test performed (result: negative).    FOLLOW-UP:  - Patient to monitor symptoms and report if no improvement by Monday midday.              No follow-ups on file.     Lionel Ramsey MD, FAAFP  Family Medicine Physician  [facility]  11/21/2024      This note was generated with the assistance of ambient listening technology. Verbal consent was obtained by the patient and accompanying visitor(s) for the recording of patient appointment to facilitate this note. I attest to having reviewed and edited the generated note for accuracy, though some syntax or spelling errors may persist. Please contact the author of this note for any clarification.

## 2024-11-22 ENCOUNTER — PATIENT MESSAGE (OUTPATIENT)
Dept: GASTROENTEROLOGY | Facility: CLINIC | Age: 59
End: 2024-11-22
Payer: COMMERCIAL

## 2024-11-25 ENCOUNTER — PATIENT MESSAGE (OUTPATIENT)
Dept: SLEEP MEDICINE | Facility: CLINIC | Age: 59
End: 2024-11-25
Payer: COMMERCIAL

## 2024-11-25 ENCOUNTER — PATIENT MESSAGE (OUTPATIENT)
Dept: GASTROENTEROLOGY | Facility: CLINIC | Age: 59
End: 2024-11-25
Payer: COMMERCIAL

## 2024-11-26 ENCOUNTER — ANESTHESIA EVENT (OUTPATIENT)
Dept: ENDOSCOPY | Facility: HOSPITAL | Age: 59
End: 2024-11-26
Payer: COMMERCIAL

## 2024-11-26 ENCOUNTER — ANESTHESIA (OUTPATIENT)
Dept: ENDOSCOPY | Facility: HOSPITAL | Age: 59
End: 2024-11-26
Payer: COMMERCIAL

## 2024-11-26 ENCOUNTER — HOSPITAL ENCOUNTER (OUTPATIENT)
Facility: HOSPITAL | Age: 59
Discharge: HOME | End: 2024-11-26
Attending: INTERNAL MEDICINE | Admitting: INTERNAL MEDICINE
Payer: COMMERCIAL

## 2024-11-26 VITALS
RESPIRATION RATE: 16 BRPM | HEART RATE: 76 BPM | HEIGHT: 66 IN | SYSTOLIC BLOOD PRESSURE: 129 MMHG | DIASTOLIC BLOOD PRESSURE: 75 MMHG | BODY MASS INDEX: 20.89 KG/M2 | TEMPERATURE: 98 F | WEIGHT: 130 LBS | OXYGEN SATURATION: 100 %

## 2024-11-26 DIAGNOSIS — K90.0 CELIAC DISEASE/SPRUE: ICD-10-CM

## 2024-11-26 PROCEDURE — 43239 EGD BIOPSY SINGLE/MULTIPLE: CPT | Performed by: INTERNAL MEDICINE

## 2024-11-26 PROCEDURE — 27201012 HC FORCEPS, HOT/COLD, DISP: Performed by: INTERNAL MEDICINE

## 2024-11-26 PROCEDURE — 25000003 PHARM REV CODE 250

## 2024-11-26 PROCEDURE — A4216 STERILE WATER/SALINE, 10 ML: HCPCS

## 2024-11-26 PROCEDURE — 43239 EGD BIOPSY SINGLE/MULTIPLE: CPT | Mod: ,,, | Performed by: INTERNAL MEDICINE

## 2024-11-26 PROCEDURE — 88305 TISSUE EXAM BY PATHOLOGIST: CPT | Performed by: PATHOLOGY

## 2024-11-26 PROCEDURE — 37000008 HC ANESTHESIA 1ST 15 MINUTES: Performed by: INTERNAL MEDICINE

## 2024-11-26 PROCEDURE — 37000009 HC ANESTHESIA EA ADD 15 MINS: Performed by: INTERNAL MEDICINE

## 2024-11-26 PROCEDURE — 63600175 PHARM REV CODE 636 W HCPCS

## 2024-11-26 PROCEDURE — 88305 TISSUE EXAM BY PATHOLOGIST: CPT | Mod: 26,,, | Performed by: PATHOLOGY

## 2024-11-26 RX ORDER — SODIUM CHLORIDE 0.9 % (FLUSH) 0.9 %
SYRINGE (ML) INJECTION
Status: DISCONTINUED | OUTPATIENT
Start: 2024-11-26 | End: 2024-11-26

## 2024-11-26 RX ORDER — LIDOCAINE HYDROCHLORIDE 20 MG/ML
INJECTION INTRAVENOUS
Status: DISCONTINUED | OUTPATIENT
Start: 2024-11-26 | End: 2024-11-26

## 2024-11-26 RX ORDER — PROPOFOL 10 MG/ML
VIAL (ML) INTRAVENOUS CONTINUOUS PRN
Status: DISCONTINUED | OUTPATIENT
Start: 2024-11-26 | End: 2024-11-26

## 2024-11-26 RX ORDER — SODIUM CHLORIDE 9 MG/ML
INJECTION, SOLUTION INTRAVENOUS CONTINUOUS
Status: DISCONTINUED | OUTPATIENT
Start: 2024-11-26 | End: 2024-11-26 | Stop reason: HOSPADM

## 2024-11-26 RX ADMIN — PROPOFOL 200 MCG/KG/MIN: 10 INJECTION, EMULSION INTRAVENOUS at 01:11

## 2024-11-26 RX ADMIN — SODIUM CHLORIDE 20 ML: 9 INJECTION, SOLUTION INTRAMUSCULAR; INTRAVENOUS; SUBCUTANEOUS at 01:11

## 2024-11-26 RX ADMIN — PROPOFOL 80 MG: 10 INJECTION, EMULSION INTRAVENOUS at 01:11

## 2024-11-26 RX ADMIN — SODIUM CHLORIDE 10 ML: 9 INJECTION, SOLUTION INTRAMUSCULAR; INTRAVENOUS; SUBCUTANEOUS at 01:11

## 2024-11-26 RX ADMIN — GLYCOPYRROLATE 0.1 MG: 0.2 INJECTION, SOLUTION INTRAMUSCULAR; INTRAVENOUS at 01:11

## 2024-11-26 RX ADMIN — LIDOCAINE HYDROCHLORIDE 100 MG: 20 INJECTION INTRAVENOUS at 01:11

## 2024-11-26 NOTE — PROVATION PATIENT INSTRUCTIONS
Discharge Summary/Instructions after an Endoscopic Procedure  Patient Name: Vicky Brand  Patient MRN: [id number]  Patient YOB: 1965 Tuesday, November 26, 2024  Umer Winters MD  Dear patient,  As a result of recent federal legislation (The Federal Cures Act), you may   receive lab or pathology results from your procedure in your [facility]   account before your physician is able to contact you. Your physician or   their representative will relay the results to you with their   recommendations at their soonest availability.  Thank you,  RESTRICTIONS:  During your procedure today, you received medications for sedation.  These   medications may affect your judgment, balance and coordination.  Therefore,   for 24 hours, you have the following restrictions:   - DO NOT drive a car, operate machinery, make legal/financial decisions,   sign important papers or drink alcohol.    ACTIVITY:  Today: no heavy lifting, straining or running due to procedural   sedation/anesthesia.  The following day: return to full activity including work.  DIET:  Eat and drink normally unless instructed otherwise.     TREATMENT FOR COMMON SIDE EFFECTS:  - Mild abdominal pain, nausea, belching, bloating or excessive gas:  rest,   eat lightly and use a heating pad.  - Sore Throat: treat with throat lozenges and/or gargle with warm salt   water.  - Because air was used during the procedure, expelling large amounts of air   from your rectum or belching is normal.  - If a bowel prep was taken, you may not have a bowel movement for 1-3 days.    This is normal.  SYMPTOMS TO WATCH FOR AND REPORT TO YOUR PHYSICIAN:  1. Abdominal pain or bloating, other than gas cramps.  2. Chest pain.  3. Back pain.  4. Signs of infection such as: chills or fever occurring within 24 hours   after the procedure.  5. Rectal bleeding, which would show as bright red, maroon, or black stools.   (A tablespoon of blood from the rectum is not serious, especially  if   hemorrhoids are present.)  6. Vomiting.  7. Weakness or dizziness.  GO DIRECTLY TO THE NEAREST EMERGENCY ROOM IF YOU HAVE ANY OF THE FOLLOWING:      Difficulty breathing              Chills and/or fever over 101 F   Persistent vomiting and/or vomiting blood   Severe abdominal pain   Severe chest pain   Black, tarry stools   Bleeding- more than one tablespoon   Any other symptom or condition that you feel may need urgent attention  Your doctor recommends these additional instructions:  If any biopsies were taken, your doctors clinic will contact you in 1 to 2   weeks with any results.  - Discharge patient to home.   - Await pathology results.   - Telephone endoscopist for pathology results in 3 weeks.   - Return to GI clinic at the next available appointment.   - Return to primary care physician.   - Gluten free diet indefinitely.   - Repeat upper endoscopy in 3 years for surveillance.   - The findings and recommendations were discussed with the patient.  For questions, problems or results please call your physician - Umer Winters MD at Work:  [phone].  [facility], EMERGENCY ROOM PHONE NUMBER: [phone]  IF A COMPLICATION OR EMERGENCY SITUATION ARISES AND YOU ARE UNABLE TO REACH   YOUR PHYSICIAN - GO DIRECTLY TO THE EMERGENCY ROOM.  Umer Winters MD  11/26/2024 1:42:28 PM  This report has been verified and signed electronically.  Dear patient,  As a result of recent federal legislation (The Federal Cures Act), you may   receive lab or pathology results from your procedure in your [facility]   account before your physician is able to contact you. Your physician or   their representative will relay the results to you with their   recommendations at their soonest availability.  Thank you,  PROVATION

## 2024-11-26 NOTE — ANESTHESIA POSTPROCEDURE EVALUATION
Anesthesia Post Evaluation    Patient: Vicky Brand    Procedure(s) Performed: Procedure(s) (LRB):  EGD (ESOPHAGOGASTRODUODENOSCOPY) (N/A)    Final Anesthesia Type: general      Patient location during evaluation: GI PACU  Patient participation: Yes- Able to Participate  Level of consciousness: awake and alert  Post-procedure vital signs: reviewed and stable  Pain management: adequate  Airway patency: patent  CALEB mitigation strategies: Multimodal analgesia, Preoperative use of mandibular advancement devices or oral appliances and Intraoperative administration of CPAP, nasopharyngeal airway, or oral appliance during sedation  PONV status at discharge: No PONV  Anesthetic complications: no      Cardiovascular status: blood pressure returned to baseline, hemodynamically stable and stable  Respiratory status: unassisted and spontaneous ventilation  Hydration status: euvolemic  Follow-up not needed.              Vitals Value Taken Time   /75 11/26/24 1418   Temp 36.4 °C (97.5 °F) 11/26/24 1344   Pulse 76 11/26/24 1418   Resp 16 11/26/24 1418   SpO2 100 % 11/26/24 1418         Event Time   Out of Recovery 14:19:19         Pain/Alan Score: Alan Score: 10 (11/26/2024  2:08 PM)

## 2024-11-26 NOTE — TRANSFER OF CARE
"Anesthesia Transfer of Care Note    Patient: Vicky Brand    Procedure(s) Performed: Procedure(s) (LRB):  EGD (ESOPHAGOGASTRODUODENOSCOPY) (N/A)    Patient location: GI    Anesthesia Type: general    Transport from OR: Transported from OR on room air with adequate spontaneous ventilation    Post pain: adequate analgesia    Post assessment: no apparent anesthetic complications    Post vital signs: stable    Level of consciousness: awake, alert and oriented    Nausea/Vomiting: no nausea/vomiting    Complications: none    Transfer of care protocol was followed      Last vitals: Visit Vitals  BP (!) 140/81 (BP Location: Left arm, Patient Position: Lying)   Pulse 73   Temp 36.7 °C (98.1 °F) (Tympanic)   Resp 16   Ht 5' 6" (1.676 m)   Wt 59 kg (130 lb)   SpO2 100%   Breastfeeding No   BMI 20.98 kg/m²     "

## 2024-11-26 NOTE — ANESTHESIA PREPROCEDURE EVALUATION
11/26/2024  Vicky Brand is a 59 y.o., female.    Past Medical History:   Diagnosis Date    SAMANTHA positive     Celiac disease 09/29/2022    Dyspareunia in female 07/27/2021    Osphena, pelvic floor tx 2021, GYN [facility]    Eye abnormalities     early yellowing of lens, precursor to cataracts, Dr Webster,  wear glasses at all times    Fracture of fifth toe, left, closed, with delayed healing, subsequent encounter 08/21/2018    Helicobacter pylori infection     tx GI Dr José Luis Irizarry 2022    Intramural leiomyoma of uterus 05/22/2019    Irregular, dysmenorrhea    Nocturia 06/17/2021    Ocular migraine     vision fuzzy x 3 in 2013, metallic shapes in her vision, Dr Webster,  resolved after 30 min , no pain    CALEB (obstructive sleep apnea)     Lysenko, CPAP start 7/21    Palpitations 09/29/2022    Paradoxical insomnia 10/30/2017    prior to menses, previously took amitryptiline 2.5 mg prn  qhs     Posterior vitreous detachment of left eye 05/22/2019    Flashes of light, ocular migranes, Ophthalmologist Dr Orourke, retinal specialist Dr Soni, rec avoid exercise until she follows up 5/2019    Shoulder pain, left 2007    LEFT, after fall, better with PT at [facility] [place]    Stress and adjustment reaction 09/29/2022    Urticaria       Past Surgical History:   Procedure Laterality Date    COLONOSCOPY N/A 06/09/2017    Procedure: COLONOSCOPY;  Surgeon: Davy Prince MD;  Location: [address]);  Service: Endoscopy;  Laterality: N/A;    DE QUERVAIN'S RELEASE Right 06/08/2022    Procedure: RELEASE, HAND, FOR DEQUERVAIN'S TENOSYNOVITIS - RIGHT;  Surgeon: Devan Wagner MD;  Location: [facility];  Service: Orthopedics;  Laterality: Right;    DILATION AND CURETTAGE OF UTERUS      ENDOMETRIAL ABLATION N/A 2017    ESOPHAGOGASTRODUODENOSCOPY N/A 11/11/2022    Procedure: EGD (ESOPHAGOGASTRODUODENOSCOPY);   Surgeon: José Luis Irizarry MD;  Location: [facility] ENDO (4TH FLR);  Service: Endoscopy;  Laterality: N/A;  Fully vaccinated/ inst portal-RB    HYSTEROSCOPY WITH DILATION AND CURETTAGE OF UTERUS N/A 01/19/2022    Procedure: HYSTEROSCOPY, WITH DILATION AND CURETTAGE OF UTERUS;  Surgeon: Jannette Jarrell MD;  Location: [facility];  Service: OB/GYN;  Laterality: N/A;    TUBAL LIGATION        Patient Active Problem List   Diagnosis    Routine general medical examination at a health care facility    Status post hysteroscopy    Paradoxical insomnia    Irregular menses    Intramural leiomyoma of uterus    Nocturia    CALEB (obstructive sleep apnea)    Dyspareunia in female    Lack of coordination    Muscle weakness    Myalgia of pelvic floor    Wrist sprain    Plantar fasciitis    Pain in left elbow    Pain aggravated by activities of daily living    De Quervain's tenosynovitis, right    Pain in right hand    Decreased range of motion of finger of right hand    Decreased range of motion of right wrist    Swelling of right hand    Requires assistance with activities of daily living (ADL)    SAMANTHA positive    Dermatitis herpetiformis    Sjogren's syndrome without extraglandular involvement    Celiac disease    Palpitations    Stress and adjustment reaction    Helicobacter pylori infection    Neutropenia, unspecified type      No results found. However, due to the size of the patient record, not all encounters were searched. Please check Results Review for a complete set of results.     No echocardiogram results found for the past 12 months     Pre-op Assessment    I have reviewed the Patient Summary Reports.    I have reviewed the NPO Status.   I have reviewed the Medications.     Review of Systems  Anesthesia Hx:  No problems with previous Anesthesia             Denies Family Hx of Anesthesia complications.    Denies Personal Hx of Anesthesia complications.                    Social:  Non-Smoker, Social Alcohol Use        Hematology/Oncology:  Hematology Normal   Oncology Normal                                   EENT/Dental:  EENT/Dental Normal           Cardiovascular:  Cardiovascular Normal Exercise tolerance: good                 ECG has been reviewed.                            Pulmonary:        Sleep Apnea     Obstructive Sleep Apnea (CALEB).      Education provided regarding risk of obstructive sleep apnea            Renal/:  Renal/ Normal                 Hepatic/GI:  Hepatic/GI Normal       Celiac ds  Hx of H pylori             Musculoskeletal:  Musculoskeletal Normal    Muscle weakness            Neurological:      Headaches      Dx of Headaches                           Endocrine:  Endocrine Normal            Dermatological:  Skin Normal    Psych:  Psychiatric History   Stress and adjustment issues               Physical Exam  General: Cooperative, Alert, Oriented and Well nourished    Airway:  Mallampati: II / I  Mouth Opening: Normal  TM Distance: Normal  Tongue: Normal  Neck ROM: Normal ROM    Dental:  Intact    Chest/Lungs:  Clear to auscultation, Normal Respiratory Rate    Heart:  Rate: Normal  Rhythm: Regular Rhythm  Sounds: Normal    Abdomen:  Normal, Soft, Nontender        Anesthesia Plan  Type of Anesthesia, risks & benefits discussed:    Anesthesia Type: Gen Natural Airway  Intra-op Monitoring Plan: Standard ASA Monitors  Induction:  IV  Airway Plan: Direct  Informed Consent: Informed consent signed with the Patient and all parties understand the risks and agree with anesthesia plan.  All questions answered. Patient consented to blood products? No  ASA Score: 2  Day of Surgery Review of History & Physical: H&P Update referred to the surgeon/provider.    Ready For Surgery From Anesthesia Perspective.     .

## 2024-11-26 NOTE — H&P
[facility]  History & Physical    Subjective:      Chief Complaint/Reason for Admission:     Procedure: EGD     Diagnosis:  Celiac sprue check mucosal healing   Vicky Brand is a 59 y.o. female.    Past Medical History:   Diagnosis Date    SAMANTHA positive     Celiac disease 09/29/2022    Dyspareunia in female 07/27/2021    Osphena, pelvic floor tx 2021, GYN [facility]    Eye abnormalities     early yellowing of lens, precursor to cataracts, Dr Webster,  wear glasses at all times    Fracture of fifth toe, left, closed, with delayed healing, subsequent encounter 08/21/2018    Helicobacter pylori infection     tx GI Dr José Luis Irizarry 2022    Intramural leiomyoma of uterus 05/22/2019    Irregular, dysmenorrhea    Nocturia 06/17/2021    Ocular migraine     vision fuzzy x 3 in 2013, metallic shapes in her vision, Dr Webster,  resolved after 30 min , no pain    CALEB (obstructive sleep apnea)     Lysenko, CPAP start 7/21    Palpitations 09/29/2022    Paradoxical insomnia 10/30/2017    prior to menses, previously took amitryptiline 2.5 mg prn  qhs     Posterior vitreous detachment of left eye 05/22/2019    Flashes of light, ocular migranes, Ophthalmologist Dr Orourke, retinal specialist Dr Soni, rec avoid exercise until she follows up 5/2019    Shoulder pain, left 2007    LEFT, after fall, better with PT at [facility] [place] [facility] [place]    Stress and adjustment reaction 09/29/2022    Urticaria      Past Surgical History:   Procedure Laterality Date    COLONOSCOPY N/A 06/09/2017    Procedure: COLONOSCOPY;  Surgeon: Davy Prince MD;  Location: [address]);  Service: Endoscopy;  Laterality: N/A;    DE QUERVAIN'S RELEASE Right 06/08/2022    Procedure: RELEASE, HAND, FOR DEQUERVAIN'S TENOSYNOVITIS - RIGHT;  Surgeon: Devan Wagner MD;  Location: [facility] OR;  Service: Orthopedics;  Laterality: Right;    DILATION AND CURETTAGE OF UTERUS      ENDOMETRIAL ABLATION N/A 2017    ESOPHAGOGASTRODUODENOSCOPY N/A  11/11/2022    Procedure: EGD (ESOPHAGOGASTRODUODENOSCOPY);  Surgeon: José Luis Irizarry MD;  Location: [address]);  Service: Endoscopy;  Laterality: N/A;  Fully vaccinated/ inst portal-RB    HYSTEROSCOPY WITH DILATION AND CURETTAGE OF UTERUS N/A 01/19/2022    Procedure: HYSTEROSCOPY, WITH DILATION AND CURETTAGE OF UTERUS;  Surgeon: Jannette Jarrell MD;  Location: [facility];  Service: OB/GYN;  Laterality: N/A;    TUBAL LIGATION       Social History     Tobacco Use    Smoking status: Never    Smokeless tobacco: Never    Tobacco comments:     None   Substance Use Topics    Alcohol use: No    Drug use: No       PTA Medications   Medication Sig    calcium carbonate (OS-GISSELL) 600 mg calcium (1,500 mg) Tab Take 600 mg by mouth once daily. gluten free    amitriptyline (ELAVIL) 10 MG tablet Take 1 tablet (10 mg total) by mouth nightly as needed for Insomnia (insomnia).    azelastine (ASTELIN) 137 mcg (0.1 %) nasal spray 1 spray (137 mcg total) by Nasal route 2 (two) times daily.    COVID-19 (COMIRNATY 2024-25, 12Y UP,,PF,) 30 mcg/0.3 mL IM vaccine (>/= 13 yo) Inject into the muscle.    fluoride, sodium, (PREVIDENT 5000) 1.1 % Pste Place onto teeth.    pseudoephedrine (SUDAFED) 120 mg 12 hr tablet Take 1 tablet (120 mg total) by mouth 2 (two) times daily. for 10 days     Review of patient's allergies indicates:   Allergen Reactions    Sulfa (sulfonamide antibiotics) Rash    Cetirizine      Other reaction(s): Unable to Function  Other reaction(s): Unable to Function    Erythromycin Nausea And Vomiting     Other reaction(s): Nausea    Voltaren [diclofenac sodium] Diarrhea    Zyrtec-d  [cetirizine-pseudoephedrine]      Other reaction(s): Inability to focus/function    Gluten Rash     Celiac's disease        Review of Systems   Constitutional:  Negative for fever.       Objective:      Vital Signs (Most Recent)  Temp: 98.1 °F (36.7 °C) (11/26/24 1227)  Pulse: 73 (11/26/24 1227)  Resp: 16 (11/26/24 1227)  BP: (!) 140/81  (11/26/24 1227)  SpO2: 100 % (11/26/24 1227)    Vital Signs Range (Last 24H):  Temp:  [98.1 °F (36.7 °C)]   Pulse:  [73]   Resp:  [16]   BP: (140)/(81)   SpO2:  [100 %]     Physical Exam  Cardiovascular:      Rate and Rhythm: Normal rate.   Pulmonary:      Effort: Pulmonary effort is normal.   Neurological:      Mental Status: She is alert and oriented to person, place, and time.           Assessment:        Procedure: EGD     Diagnosis:  Celiac sprue check mucosal healing     Plan:        Procedure: EGD     Diagnosis:  Celiac sprue check mucosal healing

## 2024-11-29 ENCOUNTER — PATIENT MESSAGE (OUTPATIENT)
Dept: NUTRITION | Facility: CLINIC | Age: 59
End: 2024-11-29
Payer: COMMERCIAL

## 2024-11-29 LAB
FINAL PATHOLOGIC DIAGNOSIS: NORMAL
GROSS: NORMAL
Lab: NORMAL

## 2024-12-01 ENCOUNTER — PATIENT MESSAGE (OUTPATIENT)
Dept: GASTROENTEROLOGY | Facility: CLINIC | Age: 59
End: 2024-12-01
Payer: COMMERCIAL

## 2024-12-01 NOTE — PROGRESS NOTES
Vicky your EGD pathology results look good keep up the great work with a gluten free diet.     Diagnosis 1. Duodenum, biopsy:Duodenal mucosa with patchy mild intraepithelial lymphocytosis.  Villous architecture is preserved.  Compatible with Marsh 0 lesion.    2. Stomach, biopsy:  Gastric antral and oxyntic mucosa with chronic inactive gastritis.  Negative for Helicobacter pylori.  Comment: Interp By Chuckie Elmore M.D., Signed on 11/29/2024         This biopsy result indicates a positive response to the gluten-free diet in a patient with celiac [id number]. The findings are consistent with Robbins Type 0 classification, which suggests that the intestinal mucosa has largely recovered.   Specifically:  Patchy mild intraepithelial lymphocytosis: This indicates a slight increase in lymphocytes within the epithelial layer, which is common in patients on a gluten-free diet.    Preserved villous architecture: This means the finger-like projections (villi) in the small intestine have maintained their normal structure, indicating significant healing.    Robbins 0 lesion: This classification suggests normal intestinal mucosa, with celiac disease being highly unlikely in an untreated patient.    For a patient who has been on a gluten-free diet for over 2 years, these findings are generally positive, indicating good adherence to the diet and substantial mucosal recovery.     However, the presence of mild intraepithelial lymphocytosis might suggest minimal gluten exposure, possibly due to inadvertent cross-contamination in the diet.

## 2024-12-02 ENCOUNTER — HOSPITAL ENCOUNTER (OUTPATIENT)
Dept: SLEEP MEDICINE | Facility: HOSPITAL | Age: 59
Discharge: HOME | End: 2024-12-02
Attending: PSYCHIATRY & NEUROLOGY
Payer: COMMERCIAL

## 2024-12-02 ENCOUNTER — HOSPITAL ENCOUNTER (OUTPATIENT)
Dept: RADIOLOGY | Facility: CLINIC | Age: 59
Discharge: HOME | End: 2024-12-02
Attending: INTERNAL MEDICINE
Payer: COMMERCIAL

## 2024-12-02 DIAGNOSIS — G47.30 SLEEP APNEA, UNSPECIFIED TYPE: ICD-10-CM

## 2024-12-02 DIAGNOSIS — K90.0 CELIAC SPRUE: ICD-10-CM

## 2024-12-02 DIAGNOSIS — M85.80 OSTEOPENIA, UNSPECIFIED LOCATION: ICD-10-CM

## 2024-12-02 PROCEDURE — 77080 DXA BONE DENSITY AXIAL: CPT | Mod: 26,,, | Performed by: INTERNAL MEDICINE

## 2024-12-02 PROCEDURE — 95800 SLP STDY UNATTENDED: CPT

## 2024-12-02 PROCEDURE — 77080 DXA BONE DENSITY AXIAL: CPT | Mod: TC

## 2024-12-02 NOTE — PATIENT INSTRUCTIONS
Your sleep study will be scored and interpreted by one of our physicians who are board certified in sleep medicine.  Within two weeks the results will be sent to the physician who referred you. Your physician should then contact you to go over the results, along with any recommendations. If you do not hear from your physician within two weeks, please call them.     Please use the Watch Pat twice. One night with out the opti pillow, and the other night with it on.

## 2024-12-02 NOTE — PROGRESS NOTES
The patient ID was verified.  She was instructed on how to turn the Home Sleep Testing device on and off, how to apply the sensors. She was encouraged to sleep on supine position and must have 6 hours of sleep. The patient was instructed not to get the device wet and return it back to us. All questions were answered prior to patient leaving. She was provided the after visit summary.   Instructed to use the Watch Pat one night with out Opti pillow (1st night), and second night using her opti pillow. Patient verbalize understanding.

## 2024-12-03 ENCOUNTER — TELEPHONE (OUTPATIENT)
Dept: SLEEP MEDICINE | Facility: HOSPITAL | Age: 59
End: 2024-12-03
Payer: COMMERCIAL

## 2024-12-03 ENCOUNTER — PATIENT MESSAGE (OUTPATIENT)
Dept: SLEEP MEDICINE | Facility: CLINIC | Age: 59
End: 2024-12-03
Payer: COMMERCIAL

## 2024-12-03 NOTE — PROGRESS NOTES
The HSAT device was uploaded this morning by the patient (Watch Pat). The recorded sleep data noted to be adequate and the patient did not report issue with the device during the study. This is the first study out of 2. It was verified with the patient via phone that she did not used her opti pillow device last night. The provider was made aware.

## 2024-12-09 ENCOUNTER — TELEPHONE (OUTPATIENT)
Dept: SLEEP MEDICINE | Facility: HOSPITAL | Age: 59
End: 2024-12-09
Payer: COMMERCIAL

## 2024-12-12 ENCOUNTER — PATIENT MESSAGE (OUTPATIENT)
Dept: SLEEP MEDICINE | Facility: CLINIC | Age: 59
End: 2024-12-12
Payer: COMMERCIAL

## 2024-12-13 ENCOUNTER — TELEPHONE (OUTPATIENT)
Dept: SLEEP MEDICINE | Facility: HOSPITAL | Age: 59
End: 2024-12-13
Payer: COMMERCIAL

## 2024-12-16 ENCOUNTER — HOSPITAL ENCOUNTER (OUTPATIENT)
Dept: RADIOLOGY | Facility: HOSPITAL | Age: 59
Discharge: HOME | End: 2024-12-16
Attending: OBSTETRICS & GYNECOLOGY
Payer: COMMERCIAL

## 2024-12-16 ENCOUNTER — OFFICE VISIT (OUTPATIENT)
Dept: OBSTETRICS AND GYNECOLOGY | Facility: CLINIC | Age: 59
End: 2024-12-16
Payer: COMMERCIAL

## 2024-12-16 ENCOUNTER — PATIENT MESSAGE (OUTPATIENT)
Dept: SLEEP MEDICINE | Facility: CLINIC | Age: 59
End: 2024-12-16
Payer: COMMERCIAL

## 2024-12-16 ENCOUNTER — PATIENT MESSAGE (OUTPATIENT)
Dept: ADMINISTRATIVE | Facility: HOSPITAL | Age: 59
End: 2024-12-16
Payer: COMMERCIAL

## 2024-12-16 VITALS
SYSTOLIC BLOOD PRESSURE: 112 MMHG | WEIGHT: 131.81 LBS | BODY MASS INDEX: 21.18 KG/M2 | DIASTOLIC BLOOD PRESSURE: 70 MMHG | HEIGHT: 66 IN

## 2024-12-16 DIAGNOSIS — N95.2 VAGINAL ATROPHY: ICD-10-CM

## 2024-12-16 DIAGNOSIS — N39.41 URGE INCONTINENCE: ICD-10-CM

## 2024-12-16 DIAGNOSIS — Z12.31 ENCOUNTER FOR SCREENING MAMMOGRAM FOR BREAST CANCER: ICD-10-CM

## 2024-12-16 DIAGNOSIS — Z01.419 ENCOUNTER FOR GYNECOLOGICAL EXAMINATION WITHOUT ABNORMAL FINDING: Primary | ICD-10-CM

## 2024-12-16 PROCEDURE — 3078F DIAST BP <80 MM HG: CPT | Mod: CPTII,S$GLB,, | Performed by: OBSTETRICS & GYNECOLOGY

## 2024-12-16 PROCEDURE — 77067 SCR MAMMO BI INCL CAD: CPT | Mod: 26,,, | Performed by: RADIOLOGY

## 2024-12-16 PROCEDURE — 99999 PR PBB SHADOW E&M-EST. PATIENT-LVL III: CPT | Mod: PBBFAC,,, | Performed by: OBSTETRICS & GYNECOLOGY

## 2024-12-16 PROCEDURE — 3008F BODY MASS INDEX DOCD: CPT | Mod: CPTII,S$GLB,, | Performed by: OBSTETRICS & GYNECOLOGY

## 2024-12-16 PROCEDURE — 3074F SYST BP LT 130 MM HG: CPT | Mod: CPTII,S$GLB,, | Performed by: OBSTETRICS & GYNECOLOGY

## 2024-12-16 PROCEDURE — 1159F MED LIST DOCD IN RCRD: CPT | Mod: CPTII,S$GLB,, | Performed by: OBSTETRICS & GYNECOLOGY

## 2024-12-16 PROCEDURE — 1160F RVW MEDS BY RX/DR IN RCRD: CPT | Mod: CPTII,S$GLB,, | Performed by: OBSTETRICS & GYNECOLOGY

## 2024-12-16 PROCEDURE — 77063 BREAST TOMOSYNTHESIS BI: CPT | Mod: 26,,, | Performed by: RADIOLOGY

## 2024-12-16 PROCEDURE — 99396 PREV VISIT EST AGE 40-64: CPT | Mod: S$GLB,,, | Performed by: OBSTETRICS & GYNECOLOGY

## 2024-12-16 PROCEDURE — 77063 BREAST TOMOSYNTHESIS BI: CPT | Mod: TC,PO

## 2024-12-16 RX ORDER — ESTRADIOL 0.1 MG/G
1 CREAM VAGINAL
Qty: 42.5 G | Refills: 3 | Status: SHIPPED | OUTPATIENT
Start: 2024-12-16 | End: 2025-12-16

## 2024-12-16 NOTE — PROGRESS NOTES
[facility]    Vicky Brand is a 59 y.o. female  presents for a well woman exam.  LMP: No LMP recorded (lmp unknown). Patient is postmenopausal..      Past Medical History:   Diagnosis Date    SAMANTHA positive     Celiac disease 2022    Dyspareunia in female 2021    Osphena, pelvic floor tx , GYN [facility]    Eye abnormalities     early yellowing of lens, precursor to cataracts, Dr Webster,  wear glasses at all times    Fracture of fifth toe, left, closed, with delayed healing, subsequent encounter 2018    Helicobacter pylori infection     tx GI Dr José Luis Irizarry     Intramural leiomyoma of uterus 2019    Irregular, dysmenorrhea    Nocturia 2021    Ocular migraine     vision fuzzy x 3 in , metallic shapes in her vision, Dr Webster,  resolved after 30 min , no pain    CALEB (obstructive sleep apnea)     Lysenko, CPAP start     Palpitations 2022    Paradoxical insomnia 10/30/2017    prior to menses, previously took amitryptiline 2.5 mg prn  qhs     Posterior vitreous detachment of left eye 2019    Flashes of light, ocular migranes, Ophthalmologist Dr Orourke, retinal specialist Dr Soni, rec avoid exercise until she follows up 2019    Shoulder pain, left     LEFT, after fall, better with PT at [facility] [place]    Stress and adjustment reaction 2022    Urticaria      Past Surgical History:   Procedure Laterality Date    COLONOSCOPY N/A 2017    Procedure: COLONOSCOPY;  Surgeon: Davy Prince MD;  [facility] [place] [address]);  Service: Endoscopy;  Laterality: N/A;    DE QUERVAIN'S RELEASE Right 2022    Procedure: RELEASE, HAND, FOR DEQUERVAIN'S TENOSYNOVITIS - RIGHT;  Surgeon: Devan Wagner MD;  Location: [facility] OR;  Service: Orthopedics;  Laterality: Right;    DILATION AND CURETTAGE OF UTERUS      ENDOMETRIAL ABLATION N/A     ESOPHAGOGASTRODUODENOSCOPY N/A 2022    Procedure: EGD  (ESOPHAGOGASTRODUODENOSCOPY);  Surgeon: José Luis Irizarry MD;  Location: [facility] ENDO (4TH FLR);  Service: Endoscopy;  Laterality: N/A;  Fully vaccinated/ inst portal-RB    ESOPHAGOGASTRODUODENOSCOPY N/A 11/26/2024    Procedure: EGD (ESOPHAGOGASTRODUODENOSCOPY);  Surgeon: Umer Winters MD;  Location: [facility] ENDO (4TH FLR);  Service: Endoscopy;  Laterality: N/A;  Referred by Dr. Winters, 11/26/24, portal -ml  11/12-lvm notifying pt of new arrival time (215pm), updated instructions sent to portal-KPvt  11/19-lvm for pre call-tb  11/21-LVM for pre call-JM  11/21 pt confirmed.cf    HYSTEROSCOPY WITH DILATION AND CURETTAGE OF UTERUS N/A 01/19/2022    Procedure: HYSTEROSCOPY, WITH DILATION AND CURETTAGE OF UTERUS;  Surgeon: Jannette Jarrell MD;  Location: [facility];  Service: OB/GYN;  Laterality: N/A;    TUBAL LIGATION       Social History     Socioeconomic History    Marital status:    Tobacco Use    Smoking status: Never    Smokeless tobacco: Never    Tobacco comments:     None   Substance and Sexual Activity    Alcohol use: No    Drug use: No    Sexual activity: Not Currently     Partners: Male     Birth control/protection: See Surgical Hx   Other Topics Concern    Are you pregnant or think you may be? 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     No kids     Social Drivers of Health     Financial Resource Strain: Low Risk  (1/22/2024)    Overall Financial Resource Strain (CARDIA)     Difficulty of Paying Living Expenses: Not hard at all   Food Insecurity: No Food Insecurity (1/22/2024)    Hunger Vital Sign     Worried About Running Out of Food in the Last Year: Never true     Ran Out of Food in the Last Year: Never true   Transportation Needs: No Transportation Needs (1/22/2024)    PRAPARE - Transportation     Lack of Transportation (Medical): No     Lack of Transportation (Non-Medical): No  "  Physical Activity: Insufficiently Active (2024)    Exercise Vital Sign     Days of Exercise per Week: 4 days     Minutes of Exercise per Session: 20 min   Stress: No Stress Concern Present (2024)    [employer] - Occupational Stress Questionnaire     Feeling of Stress : Only a little   Housing Stability: Low Risk  (2024)    Housing Stability Vital Sign     Unable to Pay for Housing in the Last Year: No     Number of Places Lived in the Last Year: 1     Unstable Housing in the Last Year: No     Family History   Problem Relation Name Age of Onset    Hypertension Mother      Pacemaker/defibrilator Mother      Diabetes Father      Stroke Father  49    Liver disease Sister Alis         maybe PSC?    Ovarian cancer Maternal Grandmother      Melanoma Neg Hx      Breast cancer Neg Hx      Colon cancer Neg Hx      Celiac disease Neg Hx      Cirrhosis Neg Hx      Colon polyps Neg Hx      Cystic fibrosis Neg Hx      Crohn's disease Neg Hx      Esophageal cancer Neg Hx      Inflammatory bowel disease Neg Hx      Hemochromatosis Neg Hx      Irritable bowel syndrome Neg Hx      Liver cancer Neg Hx      Rectal cancer Neg Hx      Stomach cancer Neg Hx      Ulcerative colitis Neg Hx      Pancreatitis Neg Hx      Pancreatic cancer Neg Hx      Irizarry's esophagus Neg Hx      Uterine cancer Neg Hx      Bladder Cancer Neg Hx      Kidney cancer Neg Hx       OB History          1    Para        Term   0            AB   1    Living             SAB   1    IAB        Ectopic        Multiple        Live Births                     /70   Ht 5' 6" (1.676 m)   Wt 59.8 kg (131 lb 13.4 oz)   LMP  (LMP Unknown)   BMI 21.28 kg/m²       ROS:    ROS:  GENERAL: Denies weight gain or weight loss. Feeling well overall.   SKIN: Denies rash or lesions.   HEAD: Denies head injury or headache.   NODES: Denies enlarged lymph nodes.   CHEST: Denies chest pain or shortness of breath. "   CARDIOVASCULAR: Denies palpitations or left sided chest pain.   ABDOMEN: No abdominal pain, constipation, diarrhea, nausea, vomiting or rectal bleeding.   URINARY: No frequency, dysuria, hematuria, or burning on urination.  REPRODUCTIVE: See HPI.   BREASTS: The patient performs breast self-examination and denies pain, lumps, or nipple discharge.   HEMATOLOGIC: No easy bruisability or excessive bleeding.   MUSCULOSKELETAL: Denies joint pain or swelling.   NEUROLOGIC: Denies syncope or weakness.   PSYCHIATRIC: Denies depression, anxiety or mood swings.    PHYSICAL EXAM:    APPEARANCE: Well nourished, well developed, in no acute distress.  AFFECT: WNL, alert and oriented x 3  SKIN: No acne or hirsutism  NECK: Neck symmetric without masses or thyromegaly  NODES: No inguinal, cervical, axillary, or femoral lymph node enlargement  CHEST: Good respiratory effect  ABDOMEN: Soft.  No tenderness or masses.  No hepatosplenomegaly.  No hernias.  BREASTS: Symmetrical, no skin changes or visible lesions.  No palpable masses, nipple discharge bilaterally.  PELVIC: Normal external genitalia without lesions.  Normal hair distribution.  Adequate perineal body, normal urethral meatus.  Vagina moist and well rugated without lesions or discharge.  Cervix pink, without lesions, discharge or tenderness.  No significant cystocele or rectocele.  Bimanual exam shows uterus to be normal size, regular, mobile and nontender.  Adnexa without masses or tenderness.    RECTAL: Rectovaginal exam confirms above with normal sphincter tone, no masses.  EXTREMITIES: No edema.    Diagnosis      ICD-10-CM ICD-9-CM    1. Encounter for gynecological examination without abnormal finding  Z01.419 V72.31       2. Urge incontinence  N39.41 788.31       3. Vaginal atrophy  N95.2 627.3 estradioL (ESTRACE) 0.01 % (0.1 mg/gram) vaginal cream      Ambulatory referral/consult to Physical/Occupational Therapy        [facility]      Patient was counseled  today on A.C.S. Pap guidelines and recommendations for yearly pelvic exams, mammograms and monthly self breast exams; to see her PCP for other health maintenance.       Follow up if symptoms worsen or fail to improve.

## 2024-12-17 ENCOUNTER — PATIENT MESSAGE (OUTPATIENT)
Dept: GASTROENTEROLOGY | Facility: CLINIC | Age: 59
End: 2024-12-17
Payer: COMMERCIAL

## 2024-12-19 ENCOUNTER — PATIENT MESSAGE (OUTPATIENT)
Dept: SLEEP MEDICINE | Facility: CLINIC | Age: 59
End: 2024-12-19
Payer: COMMERCIAL

## 2024-12-19 ENCOUNTER — TELEPHONE (OUTPATIENT)
Dept: SLEEP MEDICINE | Facility: HOSPITAL | Age: 59
End: 2024-12-19
Payer: COMMERCIAL

## 2024-12-20 ENCOUNTER — TELEPHONE (OUTPATIENT)
Dept: SLEEP MEDICINE | Facility: HOSPITAL | Age: 59
End: 2024-12-20
Payer: COMMERCIAL

## 2024-12-21 ENCOUNTER — PATIENT MESSAGE (OUTPATIENT)
Dept: OBSTETRICS AND GYNECOLOGY | Facility: CLINIC | Age: 59
End: 2024-12-21
Payer: COMMERCIAL

## 2024-12-23 ENCOUNTER — HOSPITAL ENCOUNTER (OUTPATIENT)
Dept: RADIOLOGY | Facility: HOSPITAL | Age: 59
Discharge: HOME | End: 2024-12-23
Attending: OBSTETRICS & GYNECOLOGY
Payer: COMMERCIAL

## 2024-12-23 ENCOUNTER — TELEPHONE (OUTPATIENT)
Dept: RADIOLOGY | Facility: HOSPITAL | Age: 59
End: 2024-12-23
Payer: COMMERCIAL

## 2024-12-23 ENCOUNTER — PATIENT MESSAGE (OUTPATIENT)
Dept: GASTROENTEROLOGY | Facility: CLINIC | Age: 59
End: 2024-12-23
Payer: COMMERCIAL

## 2024-12-23 DIAGNOSIS — R92.8 ABNORMAL FINDING ON BREAST IMAGING: ICD-10-CM

## 2024-12-23 DIAGNOSIS — R19.7 DIARRHEA IN ADULT PATIENT: Primary | ICD-10-CM

## 2024-12-23 PROCEDURE — 77065 DX MAMMO INCL CAD UNI: CPT | Mod: TC,LT

## 2024-12-23 PROCEDURE — 77061 BREAST TOMOSYNTHESIS UNI: CPT | Mod: 26,LT,, | Performed by: RADIOLOGY

## 2024-12-23 PROCEDURE — 77065 DX MAMMO INCL CAD UNI: CPT | Mod: 26,LT,, | Performed by: RADIOLOGY

## 2024-12-23 NOTE — TELEPHONE ENCOUNTER
Spoke with patient and explained mammogram findings.Patient expressed understanding of results. Patient scheduled abnormal mammogram follow up appointment at [facility] [place] on 12/23/2024.

## 2024-12-23 NOTE — TELEPHONE ENCOUNTER
----- Message from Giselle Johns sent at 12/23/2024  8:00 AM CST -----  Regarding: Appt Access  Contact: [phone]  Patient calling regarding Appointment Access (message) for #Scheduling Request     Appt Type: Mammo Digital Diagnostic Left with Tim    Date/Time Preference: first available     Caller Name: Patient     Contact Preference: [phone]

## 2024-12-24 ENCOUNTER — LAB VISIT (OUTPATIENT)
Dept: LAB | Facility: HOSPITAL | Age: 59
End: 2024-12-24
Attending: INTERNAL MEDICINE
Payer: COMMERCIAL

## 2024-12-24 ENCOUNTER — TELEPHONE (OUTPATIENT)
Dept: ENDOSCOPY | Facility: HOSPITAL | Age: 59
End: 2024-12-24
Payer: COMMERCIAL

## 2024-12-24 DIAGNOSIS — Z12.11 SPECIAL SCREENING FOR MALIGNANT NEOPLASMS, COLON: ICD-10-CM

## 2024-12-24 DIAGNOSIS — R19.7 DIARRHEA, UNSPECIFIED TYPE: Primary | ICD-10-CM

## 2024-12-24 DIAGNOSIS — R19.7 DIARRHEA IN ADULT PATIENT: ICD-10-CM

## 2024-12-24 DIAGNOSIS — R19.4 CHANGE IN BOWEL HABITS: ICD-10-CM

## 2024-12-24 LAB
C DIFF GDH STL QL: NEGATIVE
C DIFF TOX A+B STL QL IA: NEGATIVE

## 2024-12-24 PROCEDURE — 87177 OVA AND PARASITES SMEARS: CPT | Performed by: INTERNAL MEDICINE

## 2024-12-24 PROCEDURE — 82653 EL-1 FECAL QUANTITATIVE: CPT | Performed by: INTERNAL MEDICINE

## 2024-12-24 PROCEDURE — 87798 DETECT AGENT NOS DNA AMP: CPT | Performed by: INTERNAL MEDICINE

## 2024-12-24 PROCEDURE — 87449 NOS EACH ORGANISM AG IA: CPT | Performed by: INTERNAL MEDICINE

## 2024-12-24 PROCEDURE — 87046 STOOL CULTR AEROBIC BACT EA: CPT | Performed by: INTERNAL MEDICINE

## 2024-12-24 PROCEDURE — 87427 SHIGA-LIKE TOXIN AG IA: CPT | Performed by: INTERNAL MEDICINE

## 2024-12-24 PROCEDURE — 87449 NOS EACH ORGANISM AG IA: CPT | Mod: 91 | Performed by: INTERNAL MEDICINE

## 2024-12-24 PROCEDURE — 87329 GIARDIA AG IA: CPT | Performed by: INTERNAL MEDICINE

## 2024-12-24 PROCEDURE — 87207 SMEAR SPECIAL STAIN: CPT | Performed by: INTERNAL MEDICINE

## 2024-12-24 PROCEDURE — 82705 FATS/LIPIDS FECES QUAL: CPT | Performed by: INTERNAL MEDICINE

## 2024-12-24 PROCEDURE — 87045 FECES CULTURE AEROBIC BACT: CPT | Performed by: INTERNAL MEDICINE

## 2024-12-24 RX ORDER — SODIUM, POTASSIUM,MAG SULFATES 17.5-3.13G
1 SOLUTION, RECONSTITUTED, ORAL ORAL DAILY
Qty: 1 KIT | Refills: 0 | Status: SHIPPED | OUTPATIENT
Start: 2024-12-24 | End: 2024-12-26

## 2024-12-24 NOTE — TELEPHONE ENCOUNTER
"Umer Winters MD  [facility]  Caller: Unspecified (Yesterday,  4:59 PM)  Procedure: Colonoscopy    Diagnosis: Diarrhea, change in bowel have    Procedure Timing: Within 4 weeks    *If within 4 weeks selected, please janusz as high priority*    *If greater than 12 weeks, please select "5-12 weeks" and delay sending until 3 months prior to requested date*    Location: Any Site    Additional Scheduling Information: No scheduling concerns    Prep Specifications:Standard prep    Is the patient taking a GLP-1 Agonist:no    Have you attached a patient to this message: yes  "

## 2024-12-24 NOTE — TELEPHONE ENCOUNTER
Referral for procedure from [facility]      Spoke to patient to schedule procedure(s) Colonoscopy       Physician to perform procedure(s) Dr. TEO Winters  Date of Procedure (s) 01/29/25  Arrival Time 09:00 AM  Time of Procedure(s) 10:00 AM   Location of Procedure(s) [place] 4th Floor  Type of Rx Prep sent to patient: Suprep  Instructions provided to patient via [facility]    Patient was informed on the following information and verbalized understanding. Screening questionnaire reviewed with patient and complete. If procedure requires anesthesia, a responsible adult needs to be present to accompany the patient home, patient cannot drive after receiving anesthesia. Appointment details are tentative, especially check-in time. Patient will receive a prep-op call 7 days prior to confirm check-in time for procedure. If applicable the patient should contact their pharmacy to verify Rx for procedure prep is ready for pick-up. Patient was advised to call the scheduling department at [phone] if pharmacy states no Rx is available. Patient was advised to call the endoscopy scheduling department if any questions or concerns arise.       Endoscopy Scheduling Department

## 2024-12-24 NOTE — TELEPHONE ENCOUNTER
"----- Message from Clemente Trotter sent at 12/24/2024  1:54 PM CST -----    ----- Message -----  From: Natalie Soto  Sent: 12/24/2024   9:53 AM CST  To: Rose Marie Mayo MA      ----- Message -----  From: Umer Winters MD  Sent: 12/23/2024   4:59 PM CST  To: [facility]    Procedure: Colonoscopy    Diagnosis: Diarrhea, change in bowel have    Procedure Timing: Within 4 weeks     #If within 4 weeks selected, please janusz as high priority#    #If greater than 12 weeks, please select "5-12 weeks" and delay sending until 3 months prior to requested date#     Location: Any Site    Additional Scheduling Information: No scheduling concerns    Prep Specifications:Standard prep    Is the patient taking a GLP-1 Agonist:no    Have you attached a patient to this message: yes  "

## 2024-12-25 LAB
E COLI SXT1 STL QL IA: NEGATIVE
E COLI SXT2 STL QL IA: NEGATIVE

## 2024-12-26 ENCOUNTER — PATIENT MESSAGE (OUTPATIENT)
Dept: SLEEP MEDICINE | Facility: CLINIC | Age: 59
End: 2024-12-26

## 2024-12-26 LAB
CRYPTOSP AG STL QL IA: NEGATIVE
G LAMBLIA AG STL QL IA: NEGATIVE

## 2024-12-27 ENCOUNTER — TELEPHONE (OUTPATIENT)
Dept: OPTOMETRY | Facility: CLINIC | Age: 59
End: 2024-12-27
Payer: COMMERCIAL

## 2024-12-27 LAB
BACTERIA STL CULT: NORMAL
CYCLOSPORA STL SAFRANIN STN: NORMAL
FAT STL QL: NORMAL
NEUTRAL FAT STL QL: NORMAL

## 2024-12-28 ENCOUNTER — PATIENT MESSAGE (OUTPATIENT)
Dept: GASTROENTEROLOGY | Facility: CLINIC | Age: 59
End: 2024-12-28
Payer: COMMERCIAL

## 2024-12-28 LAB
ENCEPHALITOZOON BIENEUSI: NEGATIVE
ENCEPHALITOZOON SPECIES: NEGATIVE
MICROSPORIDIA SPECIMEN SOURCE: NORMAL

## 2024-12-30 ENCOUNTER — OFFICE VISIT (OUTPATIENT)
Dept: OPTOMETRY | Facility: CLINIC | Age: 59
End: 2024-12-30
Payer: COMMERCIAL

## 2024-12-30 DIAGNOSIS — H52.4 PRESBYOPIA: ICD-10-CM

## 2024-12-30 DIAGNOSIS — R76.8 ANA POSITIVE: ICD-10-CM

## 2024-12-30 DIAGNOSIS — M35.00 SJOGREN'S SYNDROME WITHOUT EXTRAGLANDULAR INVOLVEMENT: Primary | ICD-10-CM

## 2024-12-30 DIAGNOSIS — H52.13 MYOPIA, BILATERAL: ICD-10-CM

## 2024-12-30 DIAGNOSIS — G47.33 OSA (OBSTRUCTIVE SLEEP APNEA): ICD-10-CM

## 2024-12-30 LAB — ELASTASE 1, FECAL: <40 MCG/G

## 2024-12-30 PROCEDURE — 92015 DETERMINE REFRACTIVE STATE: CPT | Mod: S$GLB,,, | Performed by: OPTOMETRIST

## 2024-12-30 PROCEDURE — 1159F MED LIST DOCD IN RCRD: CPT | Mod: CPTII,S$GLB,, | Performed by: OPTOMETRIST

## 2024-12-30 PROCEDURE — 99999 PR PBB SHADOW E&M-EST. PATIENT-LVL II: CPT | Mod: PBBFAC,,, | Performed by: OPTOMETRIST

## 2024-12-30 PROCEDURE — 92004 COMPRE OPH EXAM NEW PT 1/>: CPT | Mod: S$GLB,,, | Performed by: OPTOMETRIST

## 2024-12-30 NOTE — TELEPHONE ENCOUNTER
Contacted the patient to schedule an endoscopy procedure(s) 12/30/24. The patient did not answer the call and left a voice message requesting a call back.

## 2024-12-30 NOTE — PROGRESS NOTES
HPI    REGGIE: +1yr    CC: Pt is here today for a routine eye exam. Pt states that her vision has   been stable since her last exam.      (-)Dryness  (-)Burning  (-)Itchiness  (-)Tearing  (+)Flashes  (+)Floaters   (-)Photophobia  (-)Eye Pain      Diabetic: no    Contact Lens: no    Eye Meds: none    Ocular History: none    PD: 59.0    Last edited by Madelyn Aburto MA on 12/30/2024  1:38 PM.            Assessment /Plan     For exam results, see Encounter Report.    Sjogren's syndrome without extraglandular involvement  CALEB (obstructive sleep apnea)  SAMANTHA positive    Myopia, bilateral  Presbyopia   Rx specs    Good overall ocular health, monitor yearly

## 2024-12-31 ENCOUNTER — TELEPHONE (OUTPATIENT)
Dept: ENDOSCOPY | Facility: HOSPITAL | Age: 59
End: 2024-12-31
Payer: COMMERCIAL

## 2024-12-31 ENCOUNTER — OFFICE VISIT (OUTPATIENT)
Dept: DERMATOLOGY | Facility: CLINIC | Age: 59
End: 2024-12-31
Payer: COMMERCIAL

## 2024-12-31 DIAGNOSIS — L82.0 INFLAMED SEBORRHEIC KERATOSIS: ICD-10-CM

## 2024-12-31 DIAGNOSIS — D48.5 NEOPLASM OF UNCERTAIN BEHAVIOR OF SKIN: Primary | ICD-10-CM

## 2024-12-31 LAB — O+P STL MICRO: NORMAL

## 2024-12-31 PROCEDURE — 99499 UNLISTED E&M SERVICE: CPT | Mod: S$GLB,,, | Performed by: DERMATOLOGY

## 2024-12-31 PROCEDURE — 88305 TISSUE EXAM BY PATHOLOGIST: CPT | Performed by: DERMATOLOGY

## 2024-12-31 PROCEDURE — 11102 TANGNTL BX SKIN SINGLE LES: CPT | Mod: XS,S$GLB,, | Performed by: DERMATOLOGY

## 2024-12-31 PROCEDURE — 17110 DESTRUCTION B9 LES UP TO 14: CPT | Mod: S$GLB,,, | Performed by: DERMATOLOGY

## 2024-12-31 NOTE — TELEPHONE ENCOUNTER
Pt. Called back from phone call yesterday to reschedule. New instr. Sent for Colonoscopy to portal. EC

## 2024-12-31 NOTE — PATIENT INSTRUCTIONS
Biopsy Wound Care Instructions    Leave the bandage on for 24 hours without getting it wet.   Clean the area once a day with a gentle soap and water, then pat dry and apply Vaseline and a bandaid.  The site should be kept moist with Vaseline at all times to improve healing. Reapply a thick coating as needed. Do not let the site air out or form a scab, as this will delay healing and worsen scarring.  If any bleeding or oozing occurs once you return home, apply firm pressure to the area for 30 minutes straight without peeking. If bleeding continues, call the office immediately.  Please message us via [facility], call us at [phone], or return to the office at any sign of increasing redness, swelling, tenderness, pain, heat, yellow drainage/discharge, or continued bleeding.      Receiving Your Pathology Results    Your pathology results will be released to you on [facility] at the same time that Dr. Finch receives them.   Dr. Finch will then message you with her interpretation of the results and/or with the plan going forward.  If you do not use [facility] or if your pathology results require more of an explanation, you will receive your results via a phone call.  If 2 weeks go by and you have not received your results, please message us via [facility] or call us at [phone] to inform us.

## 2024-12-31 NOTE — PROGRESS NOTES
Patient Information  Name: Vicky Brand  : 1965  MRN: [id number]     Referring Physician:  No ref. provider found   Primary Care Physician:  Lia Cristina MD   Date of Visit: 24      Subjective:     History of Present lllness:    Vicky Brand is a 59 y.o. female who presents with a chief complaint of spot and lesion.    Spot  Location: midline neck  Duration: years  Signs/Symptoms: present for most of life but 3 months ago it grew a little and over the past two weeks it became itchy and devolved a scab, she does not remember scratching or picking at this spot  Exacerbating factors: none  Relieving factors/Prior treatments: none    Lesion  Location: left back, under bra  Duration: months  Signs/Symptoms: rough spot that is itching/irritated by clothing/bra  Exacerbating factors: friction  Relieving factors/Prior treatments: previously treated with cryo--hurt and did not fully fall off    Patient was last seen: 2023.  Prior notes by myself reviewed.   Clinical documentation obtained by nursing staff reviewed.    Review of Systems    Objective:   Physical Exam   Constitutional: She appears well-developed and well-nourished. No distress.   Neurological: She is alert and oriented to person, place, and time. She is not disoriented.   Psychiatric: She has a normal mood and affect.   Skin:   Areas Examined (abnormalities noted in diagram):   Neck Inspection Performed  Back Inspection Performed                 Diagram Legend     Erythematous scaling macule/papule c/w actinic keratosis       Vascular papule c/w angioma      Pigmented verrucoid papule/plaque c/w seborrheic keratosis      Yellow umbilicated papule c/w sebaceous hyperplasia      Irregularly shaped tan macule c/w lentigo     1-2 mm smooth white papules consistent with Milia      Movable subcutaneous cyst with punctum c/w epidermal inclusion cyst      Subcutaneous movable cyst c/w pilar cyst      Firm pink to brown  papule c/w dermatofibroma      Pedunculated fleshy papule(s) c/w skin tag(s)      Evenly pigmented macule c/w junctional nevus     Mildly variegated pigmented, slightly irregular-bordered macule c/w mildly atypical nevus      Flesh colored to evenly pigmented papule c/w intradermal nevus       Pink pearly papule/plaque c/w basal cell carcinoma      Erythematous hyperkeratotic cursted plaque c/w SCC      Surgical scar with no sign of skin cancer recurrence      Open and closed comedones      Inflammatory papules and pustules      Verrucoid papule consistent consistent with wart     Erythematous eczematous patches and plaques     Dystrophic onycholytic nail with subungual debris c/w onychomycosis     Umbilicated papule    Erythematous-base heme-crusted tan verrucoid plaque consistent with inflamed seborrheic keratosis     Erythematous Silvery Scaling Plaque c/w Psoriasis     See annotation          [] Data reviewed  [] Prior external notes reviewed  [] Independent review of test  [] Management discussed with another provider  [] Independent historian    Assessment / Plan:      Pathology Orders:       Normal Orders This Visit    Specimen to Pathology, Dermatology     Questions:    Procedure Type: Dermatology and skin neoplasms    Number of Specimens: 1    ------------------------: -------------------------    Spec 1 Procedure: Biopsy    Spec 1 Clinical Impression: r/o  irritated nevus vs dysplastic nevus    Spec 1 Source: midline anterior neck    Release to patient:           Neoplasm of uncertain behavior of skin  -     Specimen to Pathology, Dermatology    Shave biopsy procedure note:  Risk, benefits, and alternatives of biopsy are discussed with the patient, including risk of infection, scar, recurrence, and need for additional treatment of site. The patient agrees to the procedure by verbal consent. The area is marked and prepped with alcohol.  Approximately 1 mL of lidocaine 1% with epinephrine is used for local  anesthesia. A sharp blade is used to remove the lesion. The specimen is sent for pathology. Hemostasis is obtained with aluminum chloride and/or monopolar hyfrecation if needed. The area is then dressed and bandaged. The patient tolerated the procedure well without adverse event. Written instructions on wound care were given and were reviewed with the patient, who is to call for any signs of bleeding or infection. The patient will be notified of the pathology results.    Inflamed seborrheic keratosis [L82.0]  Destruction procedure note:  Risk, benefits, and alternatives of destruction via hyfrecation are discussed with the patient, including risk of hypo- or hyperpigmentation, scar, infection, recurrence, and need for additional treatment of site. Verbal consent obtained from patient.   The areas are cleansed with alcohol. Lidocaine 1% with epinephrine is injected beneath the larger lesion(s). 1 lesion(s) lightly hyfrecated (on low 1.5) and curetted to remove visible lesion(s). The patient is advised to keep the site(s) covered to minimize tenderness and to promote healing. Instructions on wound care are given to the patient, who is to call for any bleeding or signs of infection, such as redness or purulent discharge.      Follow up dependent on pathology results.      Rocio Finch MD, FAAD  [facility]

## 2025-01-03 ENCOUNTER — TELEPHONE (OUTPATIENT)
Dept: GASTROENTEROLOGY | Facility: CLINIC | Age: 60
End: 2025-01-03
Payer: COMMERCIAL

## 2025-01-03 ENCOUNTER — TELEPHONE (OUTPATIENT)
Dept: DERMATOLOGY | Facility: CLINIC | Age: 60
End: 2025-01-03
Payer: COMMERCIAL

## 2025-01-03 ENCOUNTER — PATIENT MESSAGE (OUTPATIENT)
Dept: GASTROENTEROLOGY | Facility: CLINIC | Age: 60
End: 2025-01-03
Payer: COMMERCIAL

## 2025-01-03 DIAGNOSIS — R19.5 LOW FECAL ELASTASE LEVEL: Primary | ICD-10-CM

## 2025-01-03 LAB
FINAL PATHOLOGIC DIAGNOSIS: NORMAL
GROSS: NORMAL
Lab: NORMAL
MICROSCOPIC EXAM: NORMAL

## 2025-01-03 NOTE — PROGRESS NOTES
Lizet could you schedule Vicky for follow-up stool for fecal elastase in 4 weeks please make sure she does this with a solid stool sample.  Orders placed    Vicky I think it is important to repeat this at least 1 more time with a solid stool sample let us do this in 4 weeks recommend you avoid all alcohol in the interim

## 2025-01-03 NOTE — PROGRESS NOTES
Please call the patient to discuss the below biopsy results and to schedule for Mohs surgery.  Thank you,  Dr. Finch    Final Pathologic Diagnosis   1. Skin,  Midline anterior neck,  shave biopsy:   - FRAGMENTS OF BASAL CELL CARCINOMA, NODULAR TYPE

## 2025-01-03 NOTE — TELEPHONE ENCOUNTER
Pt informed it can take a few days for us to contact her about her biopsy results and that we will call after Dr has time to review----- Message from Kim sent at 1/3/2025  1:16 PM CST -----  Contact: [phone]  [email or web address]     Patient is calling for Medical Advice regarding:asking for a follow up appt from her last appt     How long has patient had these symptoms:    Pharmacy name and phone#:    Patient wants a call back or thru [facility]:call back     Comments:  Pt states she got her results back and she states it says skin cancer so she is needing a follow up appt ASAP please advise   Please advise patient replies from provider may take up to 48 hours.

## 2025-01-06 ENCOUNTER — PATIENT MESSAGE (OUTPATIENT)
Dept: DERMATOLOGY | Facility: CLINIC | Age: 60
End: 2025-01-06
Payer: COMMERCIAL

## 2025-01-07 ENCOUNTER — TELEPHONE (OUTPATIENT)
Dept: PRIMARY CARE CLINIC | Facility: CLINIC | Age: 60
End: 2025-01-07
Payer: COMMERCIAL

## 2025-01-07 NOTE — TELEPHONE ENCOUNTER
----- Message from Indy sent at 1/6/2025  2:03 PM CST -----  Contact: [phone]  Patient called, would like to know if the PCP is able to reach out Dr Mercer in regards an appointment for the patient since is a skin cancer. Patient was told that someone will be reaching out to her but no one has call her back.

## 2025-01-08 ENCOUNTER — TELEPHONE (OUTPATIENT)
Dept: ENDOSCOPY | Facility: HOSPITAL | Age: 60
End: 2025-01-08
Payer: COMMERCIAL

## 2025-01-08 NOTE — TELEPHONE ENCOUNTER
Ma called pt to discussed procedure arrival time no answer message left on pt vm Pt case needs to be moved up to 915am or 11am

## 2025-01-08 NOTE — TELEPHONE ENCOUNTER
Received Pt's Called: Pt called stated she has three different arrival times. I provided pt with the correct time

## 2025-01-15 ENCOUNTER — HOSPITAL ENCOUNTER (OUTPATIENT)
Facility: HOSPITAL | Age: 60
Discharge: HOME | End: 2025-01-15
Attending: INTERNAL MEDICINE | Admitting: INTERNAL MEDICINE
Payer: COMMERCIAL

## 2025-01-15 ENCOUNTER — ANESTHESIA EVENT (OUTPATIENT)
Dept: ENDOSCOPY | Facility: HOSPITAL | Age: 60
End: 2025-01-15
Payer: COMMERCIAL

## 2025-01-15 ENCOUNTER — ANESTHESIA (OUTPATIENT)
Dept: ENDOSCOPY | Facility: HOSPITAL | Age: 60
End: 2025-01-15
Payer: COMMERCIAL

## 2025-01-15 VITALS
HEIGHT: 66 IN | DIASTOLIC BLOOD PRESSURE: 61 MMHG | HEART RATE: 58 BPM | BODY MASS INDEX: 20.09 KG/M2 | RESPIRATION RATE: 16 BRPM | SYSTOLIC BLOOD PRESSURE: 118 MMHG | OXYGEN SATURATION: 100 % | WEIGHT: 125 LBS | TEMPERATURE: 98 F

## 2025-01-15 DIAGNOSIS — R19.7 DIARRHEA IN ADULT PATIENT: ICD-10-CM

## 2025-01-15 PROCEDURE — 88305 TISSUE EXAM BY PATHOLOGIST: CPT | Performed by: PATHOLOGY

## 2025-01-15 PROCEDURE — 25000003 PHARM REV CODE 250: Performed by: NURSE ANESTHETIST, CERTIFIED REGISTERED

## 2025-01-15 PROCEDURE — 63600175 PHARM REV CODE 636 W HCPCS: Performed by: NURSE ANESTHETIST, CERTIFIED REGISTERED

## 2025-01-15 PROCEDURE — 88305 TISSUE EXAM BY PATHOLOGIST: CPT | Mod: 26,,, | Performed by: PATHOLOGY

## 2025-01-15 PROCEDURE — 45380 COLONOSCOPY AND BIOPSY: CPT | Mod: ,,, | Performed by: INTERNAL MEDICINE

## 2025-01-15 PROCEDURE — 45380 COLONOSCOPY AND BIOPSY: CPT | Performed by: INTERNAL MEDICINE

## 2025-01-15 PROCEDURE — A4216 STERILE WATER/SALINE, 10 ML: HCPCS | Performed by: NURSE ANESTHETIST, CERTIFIED REGISTERED

## 2025-01-15 PROCEDURE — 37000009 HC ANESTHESIA EA ADD 15 MINS: Performed by: INTERNAL MEDICINE

## 2025-01-15 PROCEDURE — 37000008 HC ANESTHESIA 1ST 15 MINUTES: Performed by: INTERNAL MEDICINE

## 2025-01-15 PROCEDURE — 27201012 HC FORCEPS, HOT/COLD, DISP: Performed by: INTERNAL MEDICINE

## 2025-01-15 PROCEDURE — E9220 PRA ENDO ANESTHESIA: HCPCS | Mod: ,,, | Performed by: NURSE ANESTHETIST, CERTIFIED REGISTERED

## 2025-01-15 RX ORDER — PROPOFOL 10 MG/ML
VIAL (ML) INTRAVENOUS
Status: DISCONTINUED | OUTPATIENT
Start: 2025-01-15 | End: 2025-01-15

## 2025-01-15 RX ORDER — LIDOCAINE HYDROCHLORIDE 20 MG/ML
INJECTION, SOLUTION EPIDURAL; INFILTRATION; INTRACAUDAL; PERINEURAL
Status: DISCONTINUED | OUTPATIENT
Start: 2025-01-15 | End: 2025-01-15

## 2025-01-15 RX ORDER — PROPOFOL 10 MG/ML
INJECTION, EMULSION INTRAVENOUS CONTINUOUS PRN
Status: DISCONTINUED | OUTPATIENT
Start: 2025-01-15 | End: 2025-01-15

## 2025-01-15 RX ORDER — SODIUM CHLORIDE 9 MG/ML
INJECTION, SOLUTION INTRAVENOUS CONTINUOUS
Status: DISCONTINUED | OUTPATIENT
Start: 2025-01-15 | End: 2025-01-15 | Stop reason: HOSPADM

## 2025-01-15 RX ORDER — SODIUM CHLORIDE 0.9 % (FLUSH) 0.9 %
SYRINGE (ML) INJECTION
Status: DISCONTINUED | OUTPATIENT
Start: 2025-01-15 | End: 2025-01-15

## 2025-01-15 RX ADMIN — LIDOCAINE HYDROCHLORIDE 100 MG: 20 INJECTION, SOLUTION EPIDURAL; INFILTRATION; INTRACAUDAL; PERINEURAL at 11:01

## 2025-01-15 RX ADMIN — SODIUM CHLORIDE 10 ML: 9 INJECTION, SOLUTION INTRAMUSCULAR; INTRAVENOUS; SUBCUTANEOUS at 11:01

## 2025-01-15 RX ADMIN — PROPOFOL 80 MG: 10 INJECTION, EMULSION INTRAVENOUS at 11:01

## 2025-01-15 RX ADMIN — PROPOFOL 150 MCG/KG/MIN: 10 INJECTION, EMULSION INTRAVENOUS at 11:01

## 2025-01-15 NOTE — ANESTHESIA POSTPROCEDURE EVALUATION
Anesthesia Post Evaluation    Patient: Vicky Brand    Procedure(s) Performed: Procedure(s) (LRB):  COLONOSCOPY (N/A)    Final Anesthesia Type: general      Patient location during evaluation: GI PACU  Patient participation: Yes- Able to Participate  Level of consciousness: awake and alert and oriented  Post-procedure vital signs: reviewed and stable  Pain management: adequate  Airway patency: patent    PONV status at discharge: No PONV  Anesthetic complications: no      Cardiovascular status: hemodynamically stable  Respiratory status: unassisted  Hydration status: euvolemic  Follow-up not needed.              Vitals Value Taken Time   /61 01/15/25 1205   Temp 36.6 °C (97.9 °F) 01/15/25 1132   Pulse 58 01/15/25 1205   Resp 16 01/15/25 1205   SpO2 100 % 01/15/25 1205         Event Time   Out of Recovery 12:26:35         Pain/Alan Score: Alan Score: 10 (1/15/2025 12:05 PM)

## 2025-01-15 NOTE — PROVATION PATIENT INSTRUCTIONS
Discharge Summary/Instructions after an Endoscopic Procedure  Patient Name: Vicky Brand  Patient MRN: [id number]  Patient YOB: 1965  Wednesday, January 15, 2025  Umer Winters MD  Dear patient,  As a result of recent federal legislation (The Federal Cures Act), you may   receive lab or pathology results from your procedure in your [facility]   account before your physician is able to contact you. Your physician or   their representative will relay the results to you with their   recommendations at their soonest availability.  Thank you,  RESTRICTIONS:  During your procedure today, you received medications for sedation.  These   medications may affect your judgment, balance and coordination.  Therefore,   for 24 hours, you have the following restrictions:   - DO NOT drive a car, operate machinery, make legal/financial decisions,   sign important papers or drink alcohol.    ACTIVITY:  Today: no heavy lifting, straining or running due to procedural   sedation/anesthesia.  The following day: return to full activity including work.  DIET:  Eat and drink normally unless instructed otherwise.     TREATMENT FOR COMMON SIDE EFFECTS:  - Mild abdominal pain, nausea, belching, bloating or excessive gas:  rest,   eat lightly and use a heating pad.  - Sore Throat: treat with throat lozenges and/or gargle with warm salt   water.  - Because air was used during the procedure, expelling large amounts of air   from your rectum or belching is normal.  - If a bowel prep was taken, you may not have a bowel movement for 1-3 days.    This is normal.  SYMPTOMS TO WATCH FOR AND REPORT TO YOUR PHYSICIAN:  1. Abdominal pain or bloating, other than gas cramps.  2. Chest pain.  3. Back pain.  4. Signs of infection such as: chills or fever occurring within 24 hours   after the procedure.  5. Rectal bleeding, which would show as bright red, maroon, or black stools.   (A tablespoon of blood from the rectum is not serious,  especially if   hemorrhoids are present.)  6. Vomiting.  7. Weakness or dizziness.  GO DIRECTLY TO THE NEAREST EMERGENCY ROOM IF YOU HAVE ANY OF THE FOLLOWING:      Difficulty breathing              Chills and/or fever over 101 F   Persistent vomiting and/or vomiting blood   Severe abdominal pain   Severe chest pain   Black, tarry stools   Bleeding- more than one tablespoon   Any other symptom or condition that you feel may need urgent attention  Your doctor recommends these additional instructions:  If any biopsies were taken, your doctors clinic will contact you in 1 to 2   weeks with any results.  - Discharge patient to home.   - Await pathology results.   - Telephone endoscopist for pathology results in 3 weeks.   - Repeat colonoscopy in 10 years for screening purposes.   - THIS RECOMMENDATION of 10-Years FOR NEXT SCREENING COLONOSCOPY ASSUMES   THAT YOU WILL NOT HAVE HAD OR NOT HAD A FIRST OR SECOND DEGREE RELATIVE/S   WITH COLORECTAL CANCER OR AN ADVANCE COLON ADENOMAS BEFORE THE AGE OF 60   YEARS OF AGE OF THOSE INDIVIDUALS BY THE TIME OF YOUR NEXT SCREENING   COLONOSCOPY.              - Return to GI clinic at the next available appointment.   - The findings and recommendations were discussed with the patient.  For questions, problems or results please call your physician - Umer Winters MD at Work:  [phone].  [facility], EMERGENCY ROOM PHONE NUMBER: [phone]  IF A COMPLICATION OR EMERGENCY SITUATION ARISES AND YOU ARE UNABLE TO REACH   YOUR PHYSICIAN - GO DIRECTLY TO THE EMERGENCY ROOM.  Umer Winters MD  1/15/2025 11:48:01 AM  This report has been verified and signed electronically.  Dear patient,  As a result of recent federal legislation (The Federal Cures Act), you may   receive lab or pathology results from your procedure in your [facility]   account before your physician is able to contact you. Your physician or   their representative will relay the results to you with  their   recommendations at their soonest availability.  Thank you,  PROVATION

## 2025-01-15 NOTE — TRANSFER OF CARE
"Anesthesia Transfer of Care Note    Patient: Vicky Brand    Procedure(s) Performed: Procedure(s) (LRB):  COLONOSCOPY (N/A)    Patient location: GI    Anesthesia Type: general    Transport from OR: Transported from OR on room air with adequate spontaneous ventilation    Post pain: adequate analgesia    Post assessment: no apparent anesthetic complications    Post vital signs: stable    Level of consciousness: responds to stimulation and sedated    Nausea/Vomiting: no nausea/vomiting    Complications: none    Transfer of care protocol was followed      Last vitals: Visit Vitals  BP (!) 121/58 (BP Location: Left arm, Patient Position: Lying)   Pulse 80   Temp 36.6 °C (97.9 °F) (Skin)   Resp 20   Ht 5' 6" (1.676 m)   Wt 56.7 kg (125 lb)   LMP  (LMP Unknown)   SpO2 100%   Breastfeeding No   BMI 20.18 kg/m²     "

## 2025-01-15 NOTE — H&P
[facility]  History & Physical    Subjective:      Chief Complaint/Reason for Admission:     Colonoscopy for Diagnoses  Diarrhea, unspecified type [R19.7]  Change in bowel habits [R19.4]     Vicky Brand is a 59 y.o. female.    Past Medical History:   Diagnosis Date    SAMANTHA positive     Celiac disease 09/29/2022    Dyspareunia in female 07/27/2021    Osphena, pelvic floor tx 2021, GYN [facility]    Eye abnormalities     early yellowing of lens, precursor to cataracts, Dr Wesbter,  wear glasses at all times    Fracture of fifth toe, left, closed, with delayed healing, subsequent encounter 08/21/2018    Helicobacter pylori infection     tx GI Dr José Luis Irizarry 2022    Intramural leiomyoma of uterus 05/22/2019    Irregular, dysmenorrhea    Nocturia 06/17/2021    Ocular migraine     vision fuzzy x 3 in 2013, metallic shapes in her vision, Dr Webster,  resolved after 30 min , no pain    CALEB (obstructive sleep apnea)     Lysenko, CPAP start 7/21    Palpitations 09/29/2022    Paradoxical insomnia 10/30/2017    prior to menses, previously took amitryptiline 2.5 mg prn  qhs     Posterior vitreous detachment of left eye 05/22/2019    Flashes of light, ocular migranes, Ophthalmologist Dr Orourke, retinal specialist Dr Soni, rec avoid exercise until she follows up 5/2019    Shoulder pain, left 2007    LEFT, after fall, better with PT at [facility]    Stress and adjustment reaction 09/29/2022    Urticaria      Past Surgical History:   Procedure Laterality Date    COLONOSCOPY N/A 06/09/2017    Procedure: COLONOSCOPY;  Surgeon: Davy Prince MD;  Location: [facility] ([address] [place] [address]);  Service: Endoscopy;  Laterality: N/A;    DE QUERVAIN'S RELEASE Right 06/08/2022    Procedure: RELEASE, HAND, FOR DEQUERVAIN'S TENOSYNOVITIS - RIGHT;  Surgeon: Devan Wagner MD;  Location: [facility] OR;  Service: Orthopedics;  Laterality: Right;    DILATION AND CURETTAGE OF UTERUS      ENDOMETRIAL ABLATION N/A 2017     ESOPHAGOGASTRODUODENOSCOPY N/A 11/11/2022    Procedure: EGD (ESOPHAGOGASTRODUODENOSCOPY);  Surgeon: José Luis Irizarry MD;  [facility]  Service: Endoscopy;  Laterality: N/A;  Fully vaccinated/ inst portal-RB    ESOPHAGOGASTRODUODENOSCOPY N/A 11/26/2024    Procedure: EGD (ESOPHAGOGASTRODUODENOSCOPY);  Surgeon: Umer Winters MD;  Location: [facility] (4TH FLR);  Service: Endoscopy;  Laterality: N/A;  Referred by Dr. Winters, 11/26/24, portal -ml  11/12-lvm notifying pt of new arrival time (215pm), updated instructions sent to [place]-KP[facility]  11/19-lvm for pre call-tb  11/21-LVM for pre call-JM  11/21 pt confirmed.cf    HYSTEROSCOPY WITH DILATION AND CURETTAGE OF UTERUS N/A 01/19/2022    Procedure: HYSTEROSCOPY, WITH DILATION AND CURETTAGE OF UTERUS;  Surgeon: Jannette Jarrell MD;  Location: [facility];  Service: OB/GYN;  Laterality: N/A;    TUBAL LIGATION       Social History     Tobacco Use    Smoking status: Never    Smokeless tobacco: Never    Tobacco comments:     None   Substance Use Topics    Alcohol use: No    Drug use: No       PTA Medications   Medication Sig    amitriptyline (ELAVIL) 10 MG tablet Take 1 tablet (10 mg total) by mouth nightly as needed for Insomnia (insomnia).    calcium carbonate (OS-GISSELL) 600 mg calcium (1,500 mg) Tab Take 600 mg by mouth once daily. gluten free    COVID-19 (COMIRNATY 2024-25, 12Y UP,,PF,) 30 mcg/0.3 mL IM vaccine (>/= 11 yo) Inject into the muscle.    estradioL (ESTRACE) 0.01 % (0.1 mg/gram) vaginal cream Place 1 g vaginally twice a week.    fluoride, sodium, (PREVIDENT 5000) 1.1 % Pste Place onto teeth.     Review of patient's allergies indicates:   Allergen Reactions    Sulfa (sulfonamide antibiotics) Rash    Cetirizine Other (See Comments)     Other reaction(s): Unable to Function    Other reaction(s): Unable to Function Other reaction(s): Unable to Function    Cetirizine-pseudoephedrine Other (See Comments)     Other reaction(s): Inability to  focus/function    Erythromycin Nausea And Vomiting     Other reaction(s): Nausea    Voltaren [diclofenac sodium] Diarrhea    Gluten Rash     Celiac's disease        Review of Systems   Constitutional:  Negative for fever.       Objective:      Vital Signs (Most Recent)  Temp: 97.9 °F (36.6 °C) (01/15/25 0956)  Pulse: 79 (01/15/25 0956)  Resp: 18 (01/15/25 0956)  BP: (!) 168/76 (01/15/25 0956)  SpO2: 100 % (01/15/25 0956)    Vital Signs Range (Last 24H):  Temp:  [97.9 °F (36.6 °C)]   Pulse:  [79]   Resp:  [18]   BP: (168)/(76)   SpO2:  [100 %]     Physical Exam  Cardiovascular:      Rate and Rhythm: Normal rate.   Pulmonary:      Effort: Pulmonary effort is normal.   Neurological:      Mental Status: She is alert and oriented to person, place, and time.             Assessment:      Colonoscopy for Diagnoses  Diarrhea, unspecified type [R19.7]  Change in bowel habits [R19.4]       Plan:    Colonoscopy for Diagnoses  Diarrhea, unspecified type [R19.7]  Change in bowel habits [R19.4]

## 2025-01-15 NOTE — ANESTHESIA PREPROCEDURE EVALUATION
01/15/2025  [facility]  Anesthesia Pre-Operative Evaluation     Patient Name: Vicky Brand  YOB: 1965  MRN: [id number]  [facility]: [id number]       Admit Date: 1/15/2025   Admit Team: Networked reference to record PCT   Hospital Day: 1  Date of Procedure: 1/15/2025  Anesthesia: Choice Procedure: Procedure(s) (LRB):  COLONOSCOPY (N/A)  Pre-Operative Diagnosis: Diarrhea, unspecified type [R19.7]  Change in bowel habits [R19.4]  Proceduralist:Surgeons and Role:     * Umer Winters MD - Primary  Code Status: Prior   Advanced Directive: <no information>  Isolation Precautions: No active isolations  Capacity: Full capacity     SUBJECTIVE:   Vicky Brand is a 59 y.o. female who  has a past medical history of SAMANTHA positive, Celiac disease (09/29/2022), Dyspareunia in female (07/27/2021), Eye abnormalities, Fracture of fifth toe, left, closed, with delayed healing, subsequent encounter (08/21/2018), Helicobacter pylori infection, Intramural leiomyoma of uterus (05/22/2019), Nocturia (06/17/2021), Ocular migraine, CALEB (obstructive sleep apnea), Palpitations (09/29/2022), Paradoxical insomnia (10/30/2017), Posterior vitreous detachment of left eye (05/22/2019), Shoulder pain, left (2007), Stress and adjustment reaction (09/29/2022), and Urticaria.  No notes on file     0.9% NaCl   Intravenous Continuous         Hospital LOS: 0 days  ICU LOS: Patient does not have an ICU stay during this admission.    she has a current medication list which includes the following long-term medication(s): amitriptyline, calcium carbonate, estradiol, and [DISCONTINUED] diclofenac sodium.     ALLERGIES:     Review of patient's allergies indicates:   Allergen Reactions    Sulfa (sulfonamide antibiotics) Rash    Cetirizine Other (See Comments)     Other reaction(s): Unable to  Function    Other reaction(s): Unable to Function Other reaction(s): Unable to Function    Cetirizine-pseudoephedrine Other (See Comments)     Other reaction(s): Inability to focus/function    Erythromycin Nausea And Vomiting     Other reaction(s): Nausea    Voltaren [diclofenac sodium] Diarrhea    Gluten Rash     Celiac's disease     LDA:   AIRWAY:         [unfilled]     Lines/Drains/Airways       Peripheral Intravenous Line  Duration                  Peripheral IV - Single Lumen 01/15/25 1001 20 G Anterior;Right Forearm <1 day                   Anesthesia Evaluation      Airway   Mallampati: II  Neck ROM: Normal ROM  Dental      Pulmonary    (+) sleep apnea  Cardiovascular     Neuro/Psych    (+) headaches, psychiatric history    GI/Hepatic/Renal      Endo/Other    Abdominal                    MEDICATIONS:     Current Outpatient Medications on File Prior to Encounter   Medication Sig Dispense Refill Last Dose/Taking    amitriptyline (ELAVIL) 10 MG tablet Take 1 tablet (10 mg total) by mouth nightly as needed for Insomnia (insomnia). 30 tablet 11 Past Week    calcium carbonate (OS-GISSELL) 600 mg calcium (1,500 mg) Tab Take 600 mg by mouth once daily. gluten free   Past Week    COVID-19 (COMIRNATY 2024-25, 12Y UP,,PF,) 30 mcg/0.3 mL IM vaccine (>/= 13 yo) Inject into the muscle. 0.3 mL 0     estradioL (ESTRACE) 0.01 % (0.1 mg/gram) vaginal cream Place 1 g vaginally twice a week. 42.5 g 3     fluoride, sodium, (PREVIDENT 5000) 1.1 % Pste Place onto teeth.       [DISCONTINUED] diclofenac sodium (VOLTAREN) 1 % Gel Apply 2 g topically 4 (four) times daily. Apply to affected area up to 4 times per day PRN pain 100 g 4       Inpatient Medications:  Antibiotics (From admission, onward)      None          VTE Risk Mitigation (From admission, onward)      None              Current Facility-Administered Medications   Medication Dose Route Frequency Provider Last Rate Last Admin    0.9% NaCl infusion   Intravenous Continuous  "Umer Winters MD              History:   There are no hospital problems to display for this patient.    Surgical History:    has a past surgical history that includes Dilation and curettage of uterus; Tubal ligation; Colonoscopy (N/A, 06/09/2017); Endometrial ablation (N/A, 2017); Hysteroscopy with dilation and curettage of uterus (N/A, 01/19/2022); De Quervain's release (Right, 06/08/2022); Esophagogastroduodenoscopy (N/A, 11/11/2022); and Esophagogastroduodenoscopy (N/A, 11/26/2024).   Social History:    reports that she is not currently sexually active and has had partner(s) who are male. She reports using the following method of birth control/protection: See Surgical Hx.  reports that she has never smoked. She has never used smokeless tobacco. She reports that she does not drink alcohol and does not use drugs.    Vitals:    01/15/25 0956   BP: (!) 168/76   BP Location: Left arm   Patient Position: Lying   Pulse: 79   Resp: 18   Temp: 36.6 °C (97.9 °F)   TempSrc: Temporal   SpO2: 100%   Weight: 56.7 kg (125 lb)   Height: 5' 6" (1.676 m)     Vital Signs Range (Last 24H):  Temp:  [36.6 °C (97.9 °F)]   Pulse:  [79]   Resp:  [18]   BP: (168)/(76)   SpO2:  [100 %]     Body mass index is 20.18 kg/m².  Wt Readings from Last 4 Encounters:   01/15/25 56.7 kg (125 lb)   12/16/24 59.8 kg (131 lb 13.4 oz)   11/26/24 59 kg (130 lb)   11/21/24 58.6 kg (129 lb 3 oz)        Intake/Output - Last 3 Shifts       None          Lab Results   Component Value Date    WBC 3.53 (L) 10/16/2024    HGB 12.9 10/16/2024    HCT 41.6 10/16/2024     10/16/2024     10/16/2024    K 4.5 10/16/2024     10/16/2024    CREATININE 0.9 10/16/2024    BUN 17 10/16/2024    CO2 28 10/16/2024    GLU 95 10/16/2024    CALCIUM 9.8 10/16/2024    ALKPHOS 52 (L) 10/16/2024    ALT 8 (L) 10/16/2024    AST 20 10/16/2024    ALBUMIN 3.9 10/16/2024    HGBA1C 5.4 10/02/2023    HCGQUANT <1.2 06/09/2017     No results found for this or any previous " "visit (from the past 12 hours).  No results for input(s): "WBC", "HGB", "HCT", "PLT", "NA", "K", "CREATININE", "GLU", "INR" in the last 168 hours.  No LMP recorded (lmp unknown). Patient is postmenopausal.    EKG:   No results found for this or any previous visit.  TTE:  No results found for this or any previous visit.  No results found for this or any previous visit.  FIORELLA:  No results found for this or any previous visit.  Stress Test:  No results found for this or any previous visit.     LHC:  No results found for this or any previous visit.     PFT:  No results found for: "FEV1", "FVC", "OWB3NCI", "TLC", "DLCO"           Pre-op Assessment    I have reviewed the Patient Summary Reports.     I have reviewed the Nursing Notes. I have reviewed the NPO Status.   I have reviewed the Medications.     Review of Systems  Anesthesia Hx:               Denies Personal Hx of Anesthesia complications.                    Pulmonary:        Sleep Apnea     Obstructive Sleep Apnea (CALEB).           Neurological:      Headaches      Dx of Headaches                           Psych:  Psychiatric History                  Physical Exam  General: Alert, Cooperative and Oriented    Airway:  Mallampati: II   Neck ROM: Normal ROM        Anesthesia Plan  Type of Anesthesia, risks & benefits discussed:    Anesthesia Type: Gen ETT, Gen Natural Airway  Intra-op Monitoring Plan: Standard ASA Monitors  Post Op Pain Control Plan: multimodal analgesia  Induction:  IV  Airway Plan: Direct  Informed Consent: Informed consent signed with the Patient and all parties understand the risks and agree with anesthesia plan.  All questions answered.   ASA Score: 2  Day of Surgery Review of History & Physical: H&P Update referred to the surgeon/provider.    Ready For Surgery From Anesthesia Perspective.     .      "

## 2025-01-16 LAB
FINAL PATHOLOGIC DIAGNOSIS: NORMAL
GROSS: NORMAL
Lab: NORMAL

## 2025-01-17 ENCOUNTER — PATIENT MESSAGE (OUTPATIENT)
Dept: GASTROENTEROLOGY | Facility: CLINIC | Age: 60
End: 2025-01-17
Payer: COMMERCIAL

## 2025-01-18 ENCOUNTER — PATIENT MESSAGE (OUTPATIENT)
Dept: GASTROENTEROLOGY | Facility: CLINIC | Age: 60
End: 2025-01-18
Payer: COMMERCIAL

## 2025-01-18 NOTE — PROGRESS NOTES
Lizet please offer Vicky a 12 noon video visit with me this Tuesday January 22, 2025 to discuss microscopic colitis found on colonoscopy biopsies.    Final Pathologic Diagnosis 1. TERMINAL ILEUM, BIOPSY:  - Ileal mucosa with no significant histopathologic abnormality  - No evidence of dysplasia    2. COLON, RANDOM, BIOPSY:  - Microscopic (lymphocytic) colitis  - No evidence of dysplasia  Comment: Interp By David Arguello M.D., Signed on 01/16/2025

## 2025-01-18 NOTE — PROGRESS NOTES
Important:    Lizet please schedule Vicky for telemedicine video visit with me on Tuesday January 21, 2024 at 2:00 p.m. to discuss her colon endoscopy results of microscopic colitis and treatment options.    Final Pathologic Diagnosis 1. TERMINAL ILEUM, BIOPSY:  - Ileal mucosa with no significant histopathologic abnormality  - No evidence of dysplasia    2. COLON, RANDOM, BIOPSY:  - Microscopic (lymphocytic) colitis  - No evidence of dysplasia  Comment: Interp By David Arguello M.D., Signed on 01/16/2025

## 2025-01-21 ENCOUNTER — PATIENT MESSAGE (OUTPATIENT)
Dept: DERMATOLOGY | Facility: CLINIC | Age: 60
End: 2025-01-21
Payer: COMMERCIAL

## 2025-01-23 ENCOUNTER — TELEPHONE (OUTPATIENT)
Dept: DERMATOLOGY | Facility: CLINIC | Age: 60
End: 2025-01-23
Payer: COMMERCIAL

## 2025-01-23 ENCOUNTER — PATIENT MESSAGE (OUTPATIENT)
Dept: DERMATOLOGY | Facility: CLINIC | Age: 60
End: 2025-01-23
Payer: COMMERCIAL

## 2025-01-23 NOTE — TELEPHONE ENCOUNTER
----- Message from Melania sent at 1/23/2025  9:15 AM CST -----  Type:  Needs Medical Advice    Who Called: pt  Would the patient rather a call back or a response via [facility]? call  Best Call Back Number: [phone]  Additional Information: pt calling to make sure someone will contact her to reschedule procedure  that was set for 1.22 [facility]CC/midline anterior neck/rmk/NEW

## 2025-01-23 NOTE — TELEPHONE ENCOUNTER
L message on pt's voicemail with next available [facility] date for reschedule. Also messaged through portal. Waiting for response.

## 2025-01-25 ENCOUNTER — OFFICE VISIT (OUTPATIENT)
Dept: GASTROENTEROLOGY | Facility: CLINIC | Age: 60
End: 2025-01-25
Payer: COMMERCIAL

## 2025-01-25 VITALS — WEIGHT: 125 LBS | BODY MASS INDEX: 20.09 KG/M2 | HEIGHT: 66 IN

## 2025-01-25 DIAGNOSIS — K52.832 LYMPHOCYTIC COLITIS: Primary | ICD-10-CM

## 2025-01-25 NOTE — Clinical Note
GI MA team please schedule Vicky for follow-up GI clinic appointment in 6 months shon for telemedicine video visit for celiac sprue
Chest pain

## 2025-01-25 NOTE — PROGRESS NOTES
The patient location is:  In [place]  The chief complaint leading to consultation is:  Microscopic colitis, celiac sprue, history of diarrhea, low fecal elastase  Visit type: audiovisual  Face to Face time with patient: 30 minutes of total time spent on the encounter, which includes face to face time and non-face to face time preparing to see the patient (eg, review of tests), Obtaining and/or reviewing separately obtained history, Documenting clinical information in the electronic or other health record, Independently interpreting results (not separately reported) and communicating results to the patient/family/caregiver, or Care coordination (not separately reported).   Each patient to whom he or she provides medical services by telemedicine is:  (1) informed of the relationship between the physician and patient and the respective role of any other health care provider with respect to management of the patient; and (2) notified that he or she may decline to receive medical services by telemedicine and may withdraw from such care at any time.  Notes:         [facility] Consultation Note    Reason for Consult:  The encounter diagnosis was Lymphocytic colitis.    PCP:   Lia Cristina       Referring MD:  No referring provider defined for this encounter.    Initial History of Present Illness (HPI):  This is a 59 y.o. female here for evaluation of microscopic colitis and celiac sprue.  Patient said it took several doctors in quite some time to make a diagnosis but she is been diagnosed with celiac sprue based on positive serology and small-bowel biopsies consistent with celiac sprue she also has a dermatological manifestation called dermatitis herpetiformis followed by a dermatologist she said she was informed that she did not need a biopsy of it she has seen dietitian she is on a very strict gluten free diet. She is average risk for colon rectal cancer by personal and family history nobody  with esophageal or stomach cancer nobody with small-bowel cancer nobody with pancreatitis or pancreatic cancer nobody with colon cancer or advanced colon adenomas polyps no FAP no attenuated FAP no MA P no Kerr syndrome nobody in the family with celiac sprue or inflammatory bowel disease.  She has no biological children.  Her weight has been stable on her gluten free diet.  Patient is feeling good on a gluten free diet saw a dietitian no complaints whatsoever she she had a recent EGD on November 26, 2024 which was read by pathology as 1. Duodenum, biopsy:Duodenal mucosa with patchy mild intraepithelial lymphocytosis. Villous architecture is preserved. Compatible with Marsh 0 lesion. 2. Stomach, biopsy:   Gastric antral and oxyntic mucosa with chronic inactive gastritis. Negative for Helicobacter pylori.  She underwent a colonoscopy on January 15, 2025 for evaluation of diarrhea colon looked normal no polyps but she had some lymphocytic colitis. 1. TERMINAL ILEUM, BIOPSY:- Ileal mucosa with no significant histopathologic abnormality- No evidence of dysplasia. 2. COLON, RANDOM, BIOPSY:- Microscopic (lymphocytic) colitis- No evidence of dysplasia.Interp By David Arguello M.D., Signed on 01/16/2025.  Patient started extra fiber in her diet now her diarrhea is completely gone away she is having 1 regular bowel movement a day never had blood in her stool she feels great she did have a low fecal elastase while she is having diarrhea show she is going to repeat her stool for fecal elastase with a solid stool sample this week.  Otherwise she feels great she is not interested in Entocort or budesonide she is not taking NSAIDs or PPIs or other medicines normally associated with lymphocytic colitis.  She will let me know if her diarrhea returns.  She is on a strict gluten free diet she eats very healthy and very clean.    Abdominal pain - no  Reflux - no  Dysphagia - no   Bowel habits - normal  GI bleeding - none  NSAID usage  - none    Interval HPI 01/25/2025:  The patient's last visit with me was on 1/22/2024.      ROS:  Constitutional: No fevers, chills, No weight loss  ENT:  No heartburn no dysphagia no odynophagia no hoarseness  CV: No chest pain, no palpitation  Pulm: No cough, No shortness of breath, no wheezing  Ophtho: No vision changes  GI: see HPI  Derm: No rash, no itching.  She says for the last day she has had what looks like a small possible bug bite on her right forearm for the last day she says she knows that is not DH she has had that before she will keep an eye on it let her primary care doctor know about it this week if it is getting worse and not getting better no history of trauma  Heme: No lymphadenopathy, No easy bruising  MSK: No significant arthritis  : No dysuria, No hematuria  Endo: No hot or cold intolerance  Neuro: No syncope, No seizure, no strokes  Psych: No uncontrolled anxiety, No uncontrolled depression    Medical History:  has a past medical history of SAMANTHA positive, Celiac disease (09/29/2022), Dyspareunia in female (07/27/2021), Eye abnormalities, Fracture of fifth toe, left, closed, with delayed healing, subsequent encounter (08/21/2018), Helicobacter pylori infection, Intramural leiomyoma of uterus (05/22/2019), Nocturia (06/17/2021), Ocular migraine, CALEB (obstructive sleep apnea), Palpitations (09/29/2022), Paradoxical insomnia (10/30/2017), Posterior vitreous detachment of left eye (05/22/2019), Shoulder pain, left (2007), Stress and adjustment reaction (09/29/2022), and Urticaria.    Surgical History:  has a past surgical history that includes Dilation and curettage of uterus; Tubal ligation; Colonoscopy (N/A, 06/09/2017); Endometrial ablation (N/A, 2017); Hysteroscopy with dilation and curettage of uterus (N/A, 01/19/2022); De Quervain's release (Right, 06/08/2022); Esophagogastroduodenoscopy (N/A, 11/11/2022); Esophagogastroduodenoscopy (N/A, 11/26/2024); and Colonoscopy (N/A,  "1/15/2025).    Family History: family history includes Diabetes in her father; Hypertension in her mother; Liver disease in her sister; Ovarian cancer in her maternal grandmother; Pacemaker/defibrilator in her mother; Stroke (age of onset: 49) in her father..     Social History:  reports that she has never smoked. She has never used smokeless tobacco. She reports that she does not drink alcohol and does not use drugs.    Review of patient's allergies indicates:   Allergen Reactions    Sulfa (sulfonamide antibiotics) Rash    Cetirizine Other (See Comments)     Other reaction(s): Unable to Function    Other reaction(s): Unable to Function Other reaction(s): Unable to Function    Cetirizine-pseudoephedrine Other (See Comments)     Other reaction(s): Inability to focus/function    Erythromycin Nausea And Vomiting     Other reaction(s): Nausea    Voltaren [diclofenac sodium] Diarrhea    Gluten Rash     Celiac's disease       Medication List with Changes/Refills   Current Medications    AMITRIPTYLINE (ELAVIL) 10 MG TABLET    Take 1 tablet (10 mg total) by mouth nightly as needed for Insomnia (insomnia).    CALCIUM CARBONATE (OS-GISSELL) 600 MG CALCIUM (1,500 MG) TAB    Take 600 mg by mouth once daily. gluten free    COVID-19 (COMIRNATY 2024-25, 12Y UP,,PF,) 30 MCG/0.3 ML IM VACCINE (>/= 13 YO)    Inject into the muscle.    ESTRADIOL (ESTRACE) 0.01 % (0.1 MG/GRAM) VAGINAL CREAM    Place 1 g vaginally twice a week.    FLUORIDE, SODIUM, (PREVIDENT 5000) 1.1 % PSTE    Place onto teeth.         Objective Findings:    Vital Signs:  Ht 5' 6" (1.676 m)   Wt 56.7 kg (125 lb)   LMP  (LMP Unknown)   BMI 20.18 kg/m²   Body mass index is 20.18 kg/m².    Physical Exam:  Telemedicine video visit  General Appearance: Well appearing in no acute distress  Eyes:    No scleral icterus  Neurologic:  Alert and oriented x4  Psychiatric:  Normal speech mentation and affect    Labs:  Lab Results   Component Value Date    WBC 3.53 (L) 10/16/2024    " HGB 12.9 10/16/2024    HCT 41.6 10/16/2024     10/16/2024    CHOL 177 10/16/2024    TRIG 73 10/16/2024    HDL 52 10/16/2024    ALT 8 (L) 10/16/2024    AST 20 10/16/2024     10/16/2024    K 4.5 10/16/2024     10/16/2024    CREATININE 0.9 10/16/2024    BUN 17 10/16/2024    CO2 28 10/16/2024    TSH 1.780 08/23/2023    HGBA1C 5.4 10/02/2023       Medical Decision Making:  Lab work reviewed  Lymphocytic colitis talk given  Celiac sprue talk given  Follow-up for her bug bite skin lesion talk given if not better or getting worse  The [employer] scope EG-760R                          ([id number]) was introduced through the mouth, and                          advanced to the third part of duodenum. The upper                          GI endoscopy was accomplished without difficulty.                          The patient tolerated the procedure well.   Findings:       The examined duodenum was normal. Biopsies for histology were taken        with a cold forceps for evaluation of celiac disease. Biopsies were        taken with a cold forceps for histology. Estimated blood loss was        minimal.        Patchy mildly erythematous mucosa without bleeding was found in the        prepyloric region of the stomach and at the pylorus. Biopsies were        taken with a cold forceps for histology. Biopsies were taken with a        cold forceps for Helicobacter pylori testing. Estimated blood loss        was minimal.        The cardia and gastric fundus were normal on retroflexion.        The examined esophagus was normal. Z-line was sharp. No esophagitis        and no columnar esophagus and no lesions seen with NBI or white        light.        The Z-line was regular and was found 40 cm from the incisors.   Impression:            - Normal examined duodenum. Biopsied.                          - Erythematous mucosa in the prepyloric region of                          the stomach and pylorus. Biopsied.                           - Normal esophagus.                          - Z-line regular, 40 cm from the incisors.   Recommendation:        - Discharge patient to home.                          - Await pathology results.                          - Telephone endoscopist for pathology results in 3                          weeks.                          - Return to GI clinic at the next available                          appointment.                          - Return to primary care physician.                          - Gluten free diet indefinitely.                          - Repeat upper endoscopy in 3 years for                          surveillance.                          - The findings and recommendations were discussed                          with the patient.   Attending Participation:        I personally performed the entire procedure.   Umer Winters MD   11/26/2024     The [facility] scope EC-760S-V/L ([id number]) was                          introduced through the anus and advanced to the                          terminal ileum, with identification of the                          appendiceal orifice and IC valve. The colonoscopy                          was performed without difficulty. The patient                          tolerated the procedure well. The quality of the                          bowel preparation was excellent. The terminal                          ileum, ileocecal valve, appendiceal orifice, and                          rectum were photographed. Scope insertion time was                          5 minutes. Scope withdrawal time was 11 minutes.                          The total duration of the procedure was 16                          minutes. Colonoscopy polyp detection was aided by                          The [employer] intelligent endoscopy module. Good                          look.   Findings:       The terminal ileum appeared normal. Biopsies were taken with a cold        forceps for histology. Estimated  blood loss was minimal.        The colon (entire examined portion) appeared normal. Biopsies for        histology were taken with a cold forceps from the entire colon for        evaluation of microscopic colitis. Estimated blood loss was minimal.        A few diverticula were found in the recto-sigmoid colon, sigmoid        colon, descending colon and transverse colon.        The perianal and digital rectal examinations were normal.        The retroflexed view of the distal rectum and anal verge was normal        and showed no anal or rectal abnormalities.        Non-bleeding internal hemorrhoids were found during retroflexion.        The hemorrhoids were mild.   Impression:            - The examined portion of the ileum was normal.                          Biopsied.                          - The entire examined colon is normal. Biopsied.                          - Diverticulosis in the recto-sigmoid colon, in                          the sigmoid colon, in the descending colon and in                          the transverse colon.                          - Non-bleeding internal hemorrhoids.   Recommendation:        - Discharge patient to home.                          - Await pathology results.                          - Telephone endoscopist for pathology results in 3                          weeks.                          - Repeat colonoscopy in 10 years for screening                          purposes.                          - THIS RECOMMENDATION of 10-Years FOR NEXT                          SCREENING COLONOSCOPY ASSUMES THAT YOU WILL NOT                          HAVE HAD OR NOT HAD A FIRST OR SECOND DEGREE                          RELATIVE/S WITH COLORECTAL CANCER OR AN ADVANCE                          COLON ADENOMAS BEFORE THE AGE OF 60 YEARS OF AGE                          OF THOSE INDIVIDUALS BY THE TIME OF YOUR NEXT                          SCREENING COLONOSCOPY.                          - Return to GI  clinic at the next available                          appointment.                          - The findings and recommendations were discussed                          with the patient.   Attending Participation:        I personally performed the entire procedure.   Umer Winters MD   1/15/2025   Final Pathologic Diagnosis 1. TERMINAL ILEUM, BIOPSY:  - Ileal mucosa with no significant histopathologic abnormality  - No evidence of dysplasia    2. COLON, RANDOM, BIOPSY:  - Microscopic (lymphocytic) colitis  - No evidence of dysplasia   Comment: Interp By David Arguello M.D., Signed on 01/16/2025     Final Pathologic Diagnosis 1. Duodenum, biopsy:Duodenal mucosa with patchy mild intraepithelial lymphocytosis.  Villous architecture is preserved.  Compatible with Marsh 0 lesion.    2. Stomach, biopsy:  Gastric antral and oxyntic mucosa with chronic inactive gastritis.  Negative for Helicobacter pylori.   Comment: Interp By Chuckie Elmore M.D., Signed on 11/29/2024     Assessment:  1. Lymphocytic colitis    2.     Celiac sprue     Recommendations:  1. Celiac sprue on a gluten free diet for life lab work looks good small-bowel biopsies look good  2. Lymphocytic colitis but not symptomatic currently with 1 solid bowel movement a day with her fiber currently not interested in treatment with Entocort or budesonide  3. Small red bump on her right forearm possible bug bite which developed about a day ago if it gets any worse or progresses she will follow-up with her primary care doctor urgent care she definitely says it is not DH as she has had DH in the past.  4. Repeat stool for fecal elastase with a solid stool sample this week  5. Return GI clinic every 6 months for follow-up in labs for celiac disease.      Follow up in about 6 months (around 7/25/2025).      Order summary:         Thank you so much for allowing me to participate in the care of Vicky Brand    Umer Winters MD    DISCLAIMER: This note  was prepared with [employer] voice recognition transcription software. Garbled syntax, mangled or inadvertent pronouns, and other bizarre constructions may be attributed to that software system. While efforts were made to correct any mistakes made by this voice recognition program, some errors and/or omissions may remain in the note that were missed when the note was originally created.

## 2025-01-28 ENCOUNTER — LAB VISIT (OUTPATIENT)
Dept: LAB | Facility: HOSPITAL | Age: 60
End: 2025-01-28
Attending: INTERNAL MEDICINE
Payer: COMMERCIAL

## 2025-01-28 DIAGNOSIS — R19.5 LOW FECAL ELASTASE LEVEL: ICD-10-CM

## 2025-01-28 PROCEDURE — 82653 EL-1 FECAL QUANTITATIVE: CPT | Performed by: INTERNAL MEDICINE

## 2025-01-31 ENCOUNTER — PROCEDURE VISIT (OUTPATIENT)
Dept: DERMATOLOGY | Facility: CLINIC | Age: 60
End: 2025-01-31
Payer: COMMERCIAL

## 2025-01-31 VITALS — SYSTOLIC BLOOD PRESSURE: 116 MMHG | DIASTOLIC BLOOD PRESSURE: 77 MMHG | HEART RATE: 73 BPM

## 2025-01-31 DIAGNOSIS — C44.41 BASAL CELL CARCINOMA, SCALP/NECK: Primary | ICD-10-CM

## 2025-01-31 PROCEDURE — 13131 CMPLX RPR F/C/C/M/N/AX/G/H/F: CPT | Mod: S$GLB,,, | Performed by: DERMATOLOGY

## 2025-01-31 PROCEDURE — 99499 UNLISTED E&M SERVICE: CPT | Mod: S$GLB,,, | Performed by: DERMATOLOGY

## 2025-01-31 PROCEDURE — 17311 MOHS 1 STAGE H/N/HF/G: CPT | Mod: 51,S$GLB,, | Performed by: DERMATOLOGY

## 2025-01-31 NOTE — PROGRESS NOTES
PROCEDURE: Mohs' Micrographic Surgery    INDICATION: Biopsy-proven skin cancer of cosmetically and functionally important areas, including head, neck, genital, hand, foot, or areas known for having difficulty in healing, such as the lower anterior legs. Tumor with ill-defined borders.    REFERRING PROVIDER: Rocio Finch M.D.    CASE NUMBER:     ANESTHETIC: 3 cc 0.5% Lidocaine with Epi 1:200,000 mixed 1:1 with 0.5% Bupivacaine    SURGICAL PREP: Hibiclens    SURGEON: Kelly Mercer MD    ASSISTANTS: Kylah Dc PA-C and Mohini Iglesias MA    PREOPERATIVE DIAGNOSIS: basal cell carcinoma- nodular    POSTOPERATIVE DIAGNOSIS: basal cell carcinoma    PATHOLOGIC DIAGNOSIS: basal cell carcinoma- nodular    HISTOLOGY OF SPECIMENS IN FIRST STAGE:   Tumor Type:  No tumor seen.    STAGES OF MOHS' SURGERY PERFORMED: 1    TUMOR-FREE PLANE ACHIEVED: Yes    HEMOSTASIS: electrocoagulation     SPECIMENS: 2     LOCATION: midline anterior neck. Location verified with Dr. Finch's clinical photograph. Patient also verified location with hand held mirror.    INITIAL LESION SIZE: 0.4 x 0.5 cm    FINAL DEFECT SIZE: 0.9 x 1.1 cm    WOUND REPAIR/DISPOSITION: The patient tolerated Mohs' Micrographic Surgery for a basal cell carcinoma very well. When the tumor was completely removed, a repair of the surgical defect was undertaken.    PROCEDURE: Complex Linear Repair    INDICATION: Status post Mohs' Micrographic Surgery for basal cell carcinoma.    CASE NUMBER:     SURGEON: Kelly Mercer MD    ASSISTANTS: Kylah Dc PA-C and Mohini Iglesias MA    ANESTHETIC: 0.5 cc 1% Lidocaine with Epinephrine 1:100,000    SURGICAL PREP: Hibiclens, prepped by Mohini Iglesias MA    LOCATION: midline anterior neck    DEFECT SIZE: 0.9 x 1.1 cm    WOUND REPAIR/DISPOSITION:  After the patient's carcinoma had been completely removed with Mohs' Micrographic Surgery, a repair of the surgical defect was undertaken. The patient was returned to the  operating suite where the area of midline anterior neck was prepped, draped, and anesthetized in the usual sterile fashion. The wound was widely undermined in all directions. The wound was undermined to a distance at least the maximum width of the defect as measured perpendicular to the closure line along at least one entire edge of the defect, in this case 2 cm. Then, electrocoagulation was used to obtain meticulous hemostasis. 5-0 Monocryl buried vertical mattress sutures were placed into the subcutaneous and dermal plane to close the wound and ubaldo the cutaneous wound edge. Bilateral dog ears were identified and were removed by a standard Burow's triangle technique. The cutaneous wound edges were closed using interrupted 5-0 Prolene suture.    The patient tolerated the procedure well.    The area was cleaned and dressed appropriately and the patient was given wound care instructions, as well as appointment for follow-up evaluation and suture removal in 7 days.    LENGTH OF REPAIR: 2 cm    Vitals:    01/31/25 1103 01/31/25 1233   BP: 118/75 116/77   BP Location: Left arm Right arm   Patient Position: Sitting Sitting   Pulse: 73 (P) 68

## 2025-02-01 LAB — ELASTASE 1, FECAL: 324 MCG/G

## 2025-02-06 ENCOUNTER — PATIENT MESSAGE (OUTPATIENT)
Dept: GASTROENTEROLOGY | Facility: CLINIC | Age: 60
End: 2025-02-06
Payer: COMMERCIAL

## 2025-02-06 ENCOUNTER — OFFICE VISIT (OUTPATIENT)
Dept: DERMATOLOGY | Facility: CLINIC | Age: 60
End: 2025-02-06
Payer: COMMERCIAL

## 2025-02-06 DIAGNOSIS — Z09 POSTOP CHECK: Primary | ICD-10-CM

## 2025-02-06 PROCEDURE — 1160F RVW MEDS BY RX/DR IN RCRD: CPT | Mod: CPTII,S$GLB,, | Performed by: DERMATOLOGY

## 2025-02-06 PROCEDURE — 1159F MED LIST DOCD IN RCRD: CPT | Mod: CPTII,S$GLB,, | Performed by: DERMATOLOGY

## 2025-02-06 PROCEDURE — 99999 PR PBB SHADOW E&M-EST. PATIENT-LVL II: CPT | Mod: PBBFAC,,, | Performed by: DERMATOLOGY

## 2025-02-06 PROCEDURE — 99024 POSTOP FOLLOW-UP VISIT: CPT | Mod: S$GLB,,, | Performed by: DERMATOLOGY

## 2025-02-06 NOTE — PROGRESS NOTES
59 y.o. female patient is here for suture removal following Mohs' surgery.    Patient reports no problems midline anterior neck.    WOUND PE:  The midline anterior neck sutures intact. Wound healing well. Good skin edges. No signs or symptoms of infection.    IMPRESSION:  Healing operative site from Mohs' surgery BCC, midline anterior neck s/p Mohs with CLC, postop day # 6.    PLAN:  Sutures removed today by  Roma Murdock MA . Steri-strips applied.  Continue wound care.  Keep moist with Aquaphor.    RTC:  In 3-6 months with Rocio Finch M.D. for skin check or sooner if new concern arises.

## 2025-02-17 ENCOUNTER — PATIENT MESSAGE (OUTPATIENT)
Dept: DERMATOLOGY | Facility: CLINIC | Age: 60
End: 2025-02-17
Payer: COMMERCIAL

## 2025-02-19 ENCOUNTER — PATIENT MESSAGE (OUTPATIENT)
Dept: DERMATOLOGY | Facility: CLINIC | Age: 60
End: 2025-02-19
Payer: COMMERCIAL

## 2025-02-21 ENCOUNTER — OFFICE VISIT (OUTPATIENT)
Dept: DERMATOLOGY | Facility: CLINIC | Age: 60
End: 2025-02-21
Payer: COMMERCIAL

## 2025-02-21 DIAGNOSIS — Z12.83 SCREENING EXAM FOR SKIN CANCER: ICD-10-CM

## 2025-02-21 DIAGNOSIS — D22.9 MULTIPLE BENIGN NEVI: ICD-10-CM

## 2025-02-21 DIAGNOSIS — Z85.828 HISTORY OF NONMELANOMA SKIN CANCER: ICD-10-CM

## 2025-02-21 DIAGNOSIS — L82.1 SEBORRHEIC KERATOSES: ICD-10-CM

## 2025-02-21 DIAGNOSIS — D48.5 NEOPLASM OF UNCERTAIN BEHAVIOR OF SKIN: Primary | ICD-10-CM

## 2025-02-21 DIAGNOSIS — D36.10 NEUROFIBROMA: ICD-10-CM

## 2025-02-21 DIAGNOSIS — D18.01 ANGIOMA OF SKIN: ICD-10-CM

## 2025-02-21 DIAGNOSIS — D23.9 DERMATOFIBROMA: ICD-10-CM

## 2025-02-21 DIAGNOSIS — L81.4 LENTIGO: ICD-10-CM

## 2025-02-21 NOTE — PROGRESS NOTES
"  Patient Information  Name: Vicky Brand  : 1965  MRN: [id number]     Referring Physician:  No ref. provider found   Primary Care Physician:  Lia Cristina MD   Date of Visit: 25      Subjective:     History of Present lllness:    Vicky Brand is a 59 y.o. female who presents with a chief complaint of moles.  This is a high risk patient with a personal history of nonmelanoma skin cancer who is here today to check for the development of new lesions.    Patient is here today for a "mole" check.   Today, patient has no additional complaints. Denies any new, changing, or symptomatic lesions on the skin.    Today, patient complains of lesion(s):  Location: abdomen and L arm  Duration: 3 mths  Symptoms: was flat and now raised, denies pain   Relieving factors/Previous treatments: none    Patient was last seen: 2024.  Prior notes by myself reviewed.   Clinical documentation obtained by nursing staff reviewed.    Review of Systems    Objective:   Physical Exam   Constitutional: She appears well-developed and well-nourished. No distress.   Neurological: She is alert and oriented to person, place, and time. She is not disoriented.   Psychiatric: She has a normal mood and affect.   Skin:   Areas Examined (abnormalities noted in diagram):   Scalp / Hair Palpated and Inspected  Head / Face Inspection Performed  Neck Inspection Performed  Chest / Axilla Inspection Performed  Abdomen Inspection Performed  Genitals / Buttocks / Groin Inspection Performed  Back Inspection Performed  RUE Inspected  LUE Inspection Performed  RLE Inspected  LLE Inspection Performed  Nails and Digits Inspection Performed                 Diagram Legend     Erythematous scaling macule/papule c/w actinic keratosis       Vascular papule c/w angioma      Pigmented verrucoid papule/plaque c/w seborrheic keratosis      Yellow umbilicated papule c/w sebaceous hyperplasia      Irregularly shaped tan macule c/w " lentigo     1-2 mm smooth white papules consistent with Milia      Movable subcutaneous cyst with punctum c/w epidermal inclusion cyst      Subcutaneous movable cyst c/w pilar cyst      Firm pink to brown papule c/w dermatofibroma      Pedunculated fleshy papule(s) c/w skin tag(s)      Evenly pigmented macule c/w junctional nevus     Mildly variegated pigmented, slightly irregular-bordered macule c/w mildly atypical nevus      Flesh colored to evenly pigmented papule c/w intradermal nevus       Pink pearly papule/plaque c/w basal cell carcinoma      Erythematous hyperkeratotic cursted plaque c/w SCC      Surgical scar with no sign of skin cancer recurrence      Open and closed comedones      Inflammatory papules and pustules      Verrucoid papule consistent consistent with wart     Erythematous eczematous patches and plaques     Dystrophic onycholytic nail with subungual debris c/w onychomycosis     Umbilicated papule    Erythematous-base heme-crusted tan verrucoid plaque consistent with inflamed seborrheic keratosis     Erythematous Silvery Scaling Plaque c/w Psoriasis     See annotation          [] Data reviewed  [] Prior external notes reviewed  [] Independent review of test  [] Management discussed with another provider  [] Independent historian    Assessment / Plan:      Pathology Orders:       Normal Orders This Visit    Specimen to Pathology, Dermatology     Questions:    Procedure Type: Dermatology and skin neoplasms    Number of Specimens: 1    ------------------------: -------------------------    Spec 1 Procedure: Biopsy    Spec 1 Clinical Impression: r/o atypical nevus    Spec 1 Source: right anterior shoulder    Release to patient:           Neoplasm of uncertain behavior of skin  -     Specimen to Pathology, Dermatology    Shave biopsy procedure note:  Risk, benefits, and alternatives of biopsy are discussed with the patient, including risk of infection, scar, recurrence, and need for additional  treatment of site. The patient agrees to the procedure by verbal consent. The area is marked and prepped with alcohol.  Approximately 1 mL of lidocaine 1% with epinephrine is used for local anesthesia. A sharp blade is used to remove the lesion. The specimen is sent for pathology. Hemostasis is obtained with aluminum chloride and/or monopolar hyfrecation if needed. The area is then dressed and bandaged. The patient tolerated the procedure well without adverse event. Written instructions on wound care were given and were reviewed with the patient, who is to call for any signs of bleeding or infection. The patient will be notified of the pathology results.    Multiple benign nevi  Multiple benign-appearing nevi present on exam today. Reassurance provided. Counseled patient to periodically examine moles and return to clinic if any changes in size, shape, or color are noted or if it becomes symptomatic (bleeding, itching, pain, etc).  Recommend using a broad-spectrum, water-resistant sunscreen with SPF of 30 or higher--reapply every 2 hours. Seek shade, wear sun-protective clothing, and perform regular skin self-exams.    Lentigo  These are benign sun spots which should be monitored for changes. Daily sun protection will reduce the number of new lesions.   Recommend using a broad-spectrum, water-resistant sunscreen with SPF of 30 or higher--reapply every 2 hours. Seek shade, wear sun-protective clothing, and perform regular skin self-exams.    Angioma of skin  These are benign vascular lesions that are inherited. Treatment is not necessary.    Neurofibroma  Reassurance was given to the patient. No treatment is necessary.    Seborrheic keratoses  These are benign, inherited growths without a malignant potential. Reassurance given to patient. No treatment is necessary.    Dermatofibroma  These growths are benign bundles of scar tissue that can arise spontaneously or from minor trauma, such as a bug bite or a shaving nick.  They are commonly found on the lower legs, arms above the elbows, and trunk.   Removal is not recommended as the lesion is just replaced with an additional scar; however, if it is symptomatic, removal can be considered. Lesions can also recur following removal.    History of nonmelanoma skin cancer   - stable and chronic  Area(s) of previous nonmelanoma skin cancer evaluated with no evidence of recurrence. Reassurance provided.  Recommend using a broad-spectrum, water-resistant sunscreen with SPF of 30 or higher--reapply every 2 hours. Seek shade, wear sun-protective clothing, and perform regular skin self-exams.    Screening exam for skin cancer  Total body skin examination performed today as noted in physical exam. Suspicious lesion(s) were noted and/or biopsied as above.  Recommend using a broad-spectrum, water-resistant sunscreen with SPF of 30 or higher--reapply every 2 hours. Seek shade, wear sun-protective clothing, and perform regular skin self-exams.       Follow up in about 1 year (around 2/21/2026) for TBSE, or sooner dependent on pathology results.      Rocio Finch MD, FAAD  [facility]

## 2025-02-21 NOTE — PATIENT INSTRUCTIONS
Biopsy Wound Care Instructions    Leave the bandage on for 24 hours without getting it wet.   Clean the area once a day with a gentle soap and water, then pat dry and apply Vaseline and a bandaid.  The site should be kept moist with Vaseline at all times to improve healing. Reapply a thick coating as needed. Do not let the site air out or form a scab, as this will delay healing and worsen scarring.  If any bleeding or oozing occurs once you return home, apply firm pressure to the area for 30 minutes straight without peeking. If bleeding continues, call the office immediately.  Please message us via [facility], call us at [phone], or return to the office at any sign of increasing redness, swelling, tenderness, pain, heat, yellow drainage/discharge, or continued bleeding.      Receiving Your Pathology Results    Your pathology results will be released to you on [facility] at the same time that Dr. Finch receives them.   Dr. Finch will then message you with her interpretation of the results and/or with the plan going forward.  If you do not use [facility] or if your pathology results require more of an explanation, you will receive your results via a phone call.  If 2 weeks go by and you have not received your results, please message us via [facility] or call us at [phone] to inform us.      Hydrocolloid Bandage    A hydrocolloid bandage is an occlusive, waterproof dressing that will protect the wound and provide an optimal healing environment.   These bandages can be found at your local pharmacy in the first aid section or on [employer] (see below for examples).  Apply the bandage to clean, dry skin, making sure that the wound bed is in the center of the bandage.   Once it is applied, press and hold your hand on top of the bandage to warm it and help it adhere to the skin.  Please note: The area where the bandage adheres to the wound bed will become white and puffy. This is not infection and is a normal  part of the healing process.  Do not change the bandage until the edges roll up and it starts to peel away.  When bandage is ready to be changed, remove carefully and slowly. Run it under water if necessary to help it release from the skin.  Wash wound with a gentle cleanser and fingertips.  Make sure area is completely dry before applying a new bandage.  Repeat process until fresh pink skin covers the wound bed or until bandage no longer becomes white and puffy.       Examples:  Nexcare Advanced Healing [place] Bandages  [facility] Clear Advanced Healing Hydrocolloid Bandages  Band-Aid® Hydro Seal All-Purpose Adhesive Bandages  [facility] Hydrocolloid Spot Bandages  DuoDERM® Extra Thin Dressing (can be cut to size)

## 2025-02-25 ENCOUNTER — RESULTS FOLLOW-UP (OUTPATIENT)
Dept: DERMATOLOGY | Facility: CLINIC | Age: 60
End: 2025-02-25

## 2025-02-25 ENCOUNTER — PATIENT MESSAGE (OUTPATIENT)
Dept: DERMATOLOGY | Facility: CLINIC | Age: 60
End: 2025-02-25
Payer: COMMERCIAL

## 2025-02-26 ENCOUNTER — CLINICAL SUPPORT (OUTPATIENT)
Dept: REHABILITATION | Facility: OTHER | Age: 60
End: 2025-02-26
Attending: OBSTETRICS & GYNECOLOGY
Payer: COMMERCIAL

## 2025-02-26 DIAGNOSIS — N95.2 VAGINAL ATROPHY: ICD-10-CM

## 2025-02-26 DIAGNOSIS — M62.89 PELVIC FLOOR DYSFUNCTION: Primary | ICD-10-CM

## 2025-02-26 PROCEDURE — 97161 PT EVAL LOW COMPLEX 20 MIN: CPT

## 2025-02-26 PROCEDURE — 97530 THERAPEUTIC ACTIVITIES: CPT

## 2025-02-26 NOTE — PROGRESS NOTES
[facility]   Pelvic Health Physical Therapy Initial Evaluation        Date: 2/26/2025   Name: Vicky Brand  Clinic Number: [id number]    Therapy Diagnosis:   Encounter Diagnoses   Name Primary?    Vaginal atrophy     Pelvic floor dysfunction Yes     Referring Provider: Jannette Jarrell MD    Referring Provider Orders: PT Eval and Treat  Medical Diagnosis from Referral:   1. Pelvic floor dysfunction    2. Vaginal atrophy      Evaluation Date: 2/26/2025  Authorization Period Expiration: 12/16/2025  Plan of Care Expiration: 05/26/2025  Visit # / Visits authorized: 1/pending  FOTO: 1/3 (2/26/2025)    Precautions: standard    Time In: 3:30 pm  Time Out: 4:15 pm  Total Appointment Time (timed & untimed codes): 45 minutes    SUBJECTIVE     History of current condition: Vicky reports she saw sancho in 2021. She developed a rash in elbow and groin that she found out was from celiac disease. She stopped eating gluten. She wants to resume the exercises that she got before     OB/GYN HISTORY -     BLADDER HISTORY  Frequency of urination:   Day: 5           Night: 0  Difficulty initiating urine stream: No  Urine stream: strong  Complete emptying: Yes  Urinary Urgency: Yes  Bladder leakage: No    BOWEL HISTORY  Frequency of bowel movements: once a day  Difficulty initiating BM: No  Quality/Shape of BM: Patriot Stool Chart type 4  Incomplete Emptying? No  Fiber Supplements or Laxative Use? No makes a point to get as much fiber as she can     SEXUAL/PELVIC PAIN HISTORY  Sexually active? Yes   Pain with vaginal exams, intercourse or tampon use? Yes  Pain with wearing certain clothes, sitting on certain surfaces? Yes  Tailbone Injury? No   Feeling of pelvic heaviness? No    Imaging: none pertinent to the pelvic floor    Prior Therapy: yes with [facility]   Social History: lives in a house with steps to enter, with family   Current exercise: 30 minutes of walking 5 days a week    Occupation:  "teacher   Prior Level of Function: ind with all ADL's   Current Level of Function: ind with all ADL's with pelvic floor dysfunction     Types of fluid intake: water- 80 ounces  Diet: unremarkable  Habitus: well developed, well nourished  Abuse/Neglect: No     Patients goals: "learn these exercises that I need"      Medical History: Vicky  has a past medical history of SAMANTHA positive, BCC (basal cell carcinoma) (01/31/2025), Celiac disease (09/29/2022), Dyspareunia in female (07/27/2021), Eye abnormalities, Fracture of fifth toe, left, closed, with delayed healing, subsequent encounter (08/21/2018), Helicobacter pylori infection, Intramural leiomyoma of uterus (05/22/2019), Nocturia (06/17/2021), Ocular migraine, CALEB (obstructive sleep apnea), Palpitations (09/29/2022), Paradoxical insomnia (10/30/2017), Posterior vitreous detachment of left eye (05/22/2019), Shoulder pain, left (2007), Stress and adjustment reaction (09/29/2022), and Urticaria.     Surgical History: Vicky Brand  has a past surgical history that includes Dilation and curettage of uterus; Tubal ligation; Colonoscopy (N/A, 06/09/2017); Endometrial ablation (N/A, 2017); Hysteroscopy with dilation and curettage of uterus (N/A, 01/19/2022); De Quervain's release (Right, 06/08/2022); Esophagogastroduodenoscopy (N/A, 11/11/2022); Esophagogastroduodenoscopy (N/A, 11/26/2024); and Colonoscopy (N/A, 1/15/2025).    Medications: Vicky has a current medication list which includes the following prescription(s): amitriptyline, calcium carbonate, comirnaty 2024-25 (12y up)(pf), estradiol, fluoride (sodium), and [DISCONTINUED] diclofenac sodium.    Allergies:   Review of patient's allergies indicates:   Allergen Reactions    Sulfa (sulfonamide antibiotics) Rash    Cetirizine Other (See Comments)     Other reaction(s): Unable to Function    Other reaction(s): Unable to Function Other reaction(s): Unable to Function    Cetirizine-pseudoephedrine Other " (See Comments)     Other reaction(s): Inability to focus/function    Erythromycin Nausea And Vomiting     Other reaction(s): Nausea    Voltaren [diclofenac sodium] Diarrhea    Gluten Rash     Celiac's disease        OBJECTIVE     See EMR under MEDIA for written consent provided 2/26/2025  Chaperone: declined    ORTHO SCREEN  Posture in sitting: WNL  Posture in standing: WNL  Standing load transfer: GOOD pelvic girdle stability with single-leg balance    Hip Strength 2/26/2025   R hip flexion 4/5   R hip abduction 4/5   R hip external rotation 4/5   L hip flexion 4/5   L hip external rotation 4/5   L hip abduction  4/5     Hip Range of Motion 2/26/2025   R internal rotation WNL   L internal rotation WNL     ABDOMINAL WALL ASSESSMENT  Palpation: tender  Pelvic Girdle Stability: Pt demonstrates moderately impaired ability to stabilize pelvis with Active Straight Leg Raise Test. Able to improve moderately with verbal/manual cues for transverse abdominus activation.   Motor Control: able to demonstrate appropriate transverse abdominis contraction on command, with verbal and tactile cues     BREATHING MECHANICS ASSESSMENT   Thorax Assessment During Quiet Respiration: WNL excursion of ribcage and WNL excursion of abdominal wall  Thorax Assessment During Deep Respiration: WNL excursion of ribcage and WNL excursion of abdominal wall    VAGINAL PELVIC FLOOR EXAM - external assessment  Introitus: WNL  Skin condition: redness noted  Scarring: none   Sensation: WNL   Voluntary contraction: visible lift  Voluntary relaxation: nil  Bearing down: bulge     VAGINAL PELVIC FLOOR EXAM - internal assessment  Pain: tenderness to palpation of bilateral coccygeus and obturator internus  Sensation: able to localized pressure appropriately   Vaginal vault: stenotic   Muscle Bulk: increased tone  Muscle Power: 3/5  Muscle Endurance: 5 seconds    Quality of contraction: slow relaxation and incomplete relaxation   Specificity: patient  contracts: GLUTES   Coordination: tends to hold breath during PFM contration   Limitation/Restriction for FOTO Pelvic Floor Surveys    Therapist reviewed FOTO scores for Vicky Brand on 2/26/2025  FOTO documents entered into [facility] - see Media section.    Limitation Score:   See FOTO     TREATMENT     Total Treatment time (time-based codes) separate from the evaluation: 15 minutes     Therapeutic activities to improve functional performance for 15 minutes -  [x] Instruction on diaphragmatic breathing and hip-opening stretches for pelvic floor relaxation  [x] Education on visual cues/mental imagery for pelvic floor relaxation  [x] HEP building/HEP review    PATIENT EDUCATION AND HOME EXERCISES     Education provided: general anatomy/physiology of urinary & bowel system, benefits of treatment, and alternative methods of treatment were discussed with the patient. Additionally, Vicky was provided education on pt prognosis, PT plan of care, pelvic floor anatomy & function, pelvic floor muscle assessment, consequences of elevated pelvic floor muscle tension, relationship between TrA & PFM, relationship between hips & PFM, relationship between pelvic floor dysfunction & lumbar spine/hip/pelvic girdle dysfunction, timeline for muscle changes with consistent strength training, hip-opening stretches for pelvic floor relaxation, etiology of constipation, conservative management of constipation (water, fiber, exercise), neuroplasticity, autonomic nervous system function/dysfunction, using breath to stimulate the vagus nerve/parasympathetic nervous system, and impact of anxiety/stress/trauma on the pelvic floor    Written Home Exercises provided: yes  Exercises were reviewed, and Vicky was able to demonstrate them prior to the end of the session. Vicky demonstrated good understanding of the education provided. See EMR under 'Patient Instructions' for exercises and education provided during  session.    ASSESSMENT     Vicky is a 59 y.o. female referred to outpatient physical therapy with a medical diagnosis of Vaginal atrophy [N95.2] . Pt presents with deficits to pelvic floor muscle strength, endurance, and coordination, as well as hip weakness, functional core weakness, pelvic girdle stability deficits, and myofascial pain. Symptoms appear consistent with pelvic floor dysfunction,. Symptoms impair the patient's ability to perform ADLs and functional mobility, as well as remain continent.    Patient prognosis is good  Vicky will benefit from skilled outpatient physical therapy to address the deficits stated above and in the chart below, provide patient/family education, and to maximize the patient's level of independence.     Plan of care discussed with patient: yes  Patient's spiritual, cultural and educational needs considered, and the patient is agreeable to the plan of care and goals as stated below:     Anticipated Barriers for therapy: none    Medical Necessity is demonstrated by the following -  History  Co-morbidities and personal factors that may impact the plan of care Co-morbidities   None    Personal Factors  no deficits     low   Examination  Body structures and functions, activity limitations and participation restrictions that may impact the plan of care Body Regions/Systems/Functions:  altered posture, pelvic asymmetry, decreased pelvic muscle strength, decreased phasic ability of the pelvic muscles, increased tension of the pelvic muscles, poor quality of pelvic muscle contraction, increased frequency of urination, increased nocturia, and dysfunctional voiding     Activity Limitations:  urgency , delaying urge to urinate, delaying urge to urinate or have a BM, and bathroom mapping    Participation Restrictions:  all ADLs/iADLs uninterrupted by urinary incontinence/urgency/frequency    Activity limitations:   Learning and applying knowledge  no deficits    General Tasks and  Commands  no deficits    Communication  no deficits    Mobility  no deficits    Self care  no deficits    Domestic Life  no deficits    Interactions/Relationships  no deficits    Life Areas  no deficits    Community and Social Life  Community life, recreation & leisure       low   Clinical Presentation stable and uncomplicated low   Decision Making/ Complexity Score: low         Short-Term Goals (Achievable within 4-6 weeks)  Patient will track vaginal symptoms (e.g., dryness, discomfort, pain), urinary function, and pelvic tension levels in a symptom diary at least 5 days per week for 4 weeks to identify patterns and triggers.  Patient will perform diaphragmatic breathing and pelvic floor relaxation exercises (e.g., reverse Kegels) for 5 minutes, 2x/day for 4 weeks to reduce muscle tension and improve pelvic mobility.  Patient will apply prescribed vaginal moisturizer or estrogen therapy (if applicable) at least 5x/week for 4 weeks to improve vaginal tissue hydration and elasticity.  Patient will complete gentle perineal massage or external trigger point release techniques 3x/week for 4 weeks to improve circulation and reduce pelvic floor tension.  Patient will gradually introduce pain-free vaginal insertion exercises (e.g., using a small dilator or lubricated finger) at least 3x/week for 4 weeks, as tolerated, to improve tissue flexibility and reduce discomfort.    Long-Term Goals (Achievable within 3-6 months or longer)  Patient will report a 50% reduction in pelvic pain and vaginal discomfort during daily activities and intimacy within 6 months, as measured by a pain scale or validated questionnaire.  Patient will achieve pain-free vaginal penetration (if applicable) or significantly reduced discomfort in at least 80% of instances within 6 months, demonstrating improved tissue flexibility and pelvic floor relaxation.  Patient will maintain a consistent vaginal care routine (e.g., use of vaginal moisturizer,  estrogen therapy, or lubricants) at least 5x/week for 6 months to prevent atrophy-related symptoms.  Patient will demonstrate improved pelvic floor coordination and the ability to fully relax the muscles in at least 80% of instances within 6 months, verified through self-awareness or PT assessment.  Patient will adhere to a personalized pelvic health program (including relaxation exercises, vaginal care, and hydration) at least 4x/week for 6 months, as reported in follow-up visits.    PLAN     Plan of Care certification: 2/26/2025 to 05/26/2025    Outpatient Physical Therapy - 1 time per week for 12 weeks to include the following interventions: Therapeutic Exercise, Manual Therapy, Therapeutic Activity, Neuromuscular Re-education, Electrical Stimulation Unattended, Self-Care, Patient Education Progress HEP towards D/C. Recommend F/U with MD if symptoms worsen or do not resolve. Patient may be seen by a PTA for treatment to carry out their plan of care.  Face-to-face conferences will be held.        Juve Tidwell, PT, DPT            I CERTIFY THE NEED FOR THESE SERVICES FURNISHED UNDER THIS PLAN OF TREATMENT AND WHILE UNDER MY CARE   Physician's comments:     Physician's Signature: ___________________________________________________

## 2025-03-03 PROBLEM — M62.89 PELVIC FLOOR DYSFUNCTION: Status: ACTIVE | Noted: 2025-03-03

## 2025-03-04 ENCOUNTER — PATIENT MESSAGE (OUTPATIENT)
Dept: GASTROENTEROLOGY | Facility: CLINIC | Age: 60
End: 2025-03-04
Payer: COMMERCIAL

## 2025-03-05 DIAGNOSIS — L98.9 SKIN LESION: Primary | ICD-10-CM

## 2025-03-07 ENCOUNTER — LAB VISIT (OUTPATIENT)
Dept: LAB | Facility: HOSPITAL | Age: 60
End: 2025-03-07
Attending: INTERNAL MEDICINE
Payer: COMMERCIAL

## 2025-03-07 DIAGNOSIS — L98.9 SKIN LESION: ICD-10-CM

## 2025-03-07 LAB
ALBUMIN SERPL BCP-MCNC: 3.6 G/DL (ref 3.5–5.2)
ALP SERPL-CCNC: 64 U/L (ref 40–150)
ALT SERPL W/O P-5'-P-CCNC: 12 U/L (ref 10–44)
ANION GAP SERPL CALC-SCNC: 6 MMOL/L (ref 8–16)
AST SERPL-CCNC: 24 U/L (ref 10–40)
BASOPHILS # BLD AUTO: 0.02 K/UL (ref 0–0.2)
BASOPHILS NFR BLD: 0.5 % (ref 0–1.9)
BILIRUB SERPL-MCNC: 0.5 MG/DL (ref 0.1–1)
BUN SERPL-MCNC: 14 MG/DL (ref 6–20)
CALCIUM SERPL-MCNC: 9.2 MG/DL (ref 8.7–10.5)
CHLORIDE SERPL-SCNC: 106 MMOL/L (ref 95–110)
CO2 SERPL-SCNC: 29 MMOL/L (ref 23–29)
CREAT SERPL-MCNC: 0.9 MG/DL (ref 0.5–1.4)
DIFFERENTIAL METHOD BLD: ABNORMAL
EOSINOPHIL # BLD AUTO: 0.1 K/UL (ref 0–0.5)
EOSINOPHIL NFR BLD: 1.9 % (ref 0–8)
ERYTHROCYTE [DISTWIDTH] IN BLOOD BY AUTOMATED COUNT: 13 % (ref 11.5–14.5)
EST. GFR  (NO RACE VARIABLE): >60 ML/MIN/1.73 M^2
FERRITIN SERPL-MCNC: 104 NG/ML (ref 20–300)
GLUCOSE SERPL-MCNC: 88 MG/DL (ref 70–110)
HCT VFR BLD AUTO: 41.4 % (ref 37–48.5)
HGB BLD-MCNC: 13 G/DL (ref 12–16)
IGA SERPL-MCNC: 252 MG/DL (ref 40–350)
IMM GRANULOCYTES # BLD AUTO: 0.01 K/UL (ref 0–0.04)
IMM GRANULOCYTES NFR BLD AUTO: 0.2 % (ref 0–0.5)
IRON SERPL-MCNC: 84 UG/DL (ref 30–160)
LYMPHOCYTES # BLD AUTO: 2 K/UL (ref 1–4.8)
LYMPHOCYTES NFR BLD: 46.9 % (ref 18–48)
MCH RBC QN AUTO: 29.1 PG (ref 27–31)
MCHC RBC AUTO-ENTMCNC: 31.4 G/DL (ref 32–36)
MCV RBC AUTO: 93 FL (ref 82–98)
MONOCYTES # BLD AUTO: 0.4 K/UL (ref 0.3–1)
MONOCYTES NFR BLD: 10.6 % (ref 4–15)
NEUTROPHILS # BLD AUTO: 1.7 K/UL (ref 1.8–7.7)
NEUTROPHILS NFR BLD: 39.9 % (ref 38–73)
NRBC BLD-RTO: 0 /100 WBC
PLATELET # BLD AUTO: 210 K/UL (ref 150–450)
PMV BLD AUTO: 10.9 FL (ref 9.2–12.9)
POTASSIUM SERPL-SCNC: 4.3 MMOL/L (ref 3.5–5.1)
PROT SERPL-MCNC: 7.8 G/DL (ref 6–8.4)
RBC # BLD AUTO: 4.46 M/UL (ref 4–5.4)
SATURATED IRON: 29 % (ref 20–50)
SODIUM SERPL-SCNC: 141 MMOL/L (ref 136–145)
TOTAL IRON BINDING CAPACITY: 292 UG/DL (ref 250–450)
TRANSFERRIN SERPL-MCNC: 197 MG/DL (ref 200–375)
WBC # BLD AUTO: 4.16 K/UL (ref 3.9–12.7)

## 2025-03-07 PROCEDURE — 80053 COMPREHEN METABOLIC PANEL: CPT | Performed by: INTERNAL MEDICINE

## 2025-03-07 PROCEDURE — 85025 COMPLETE CBC W/AUTO DIFF WBC: CPT | Performed by: INTERNAL MEDICINE

## 2025-03-07 PROCEDURE — 86364 TISS TRNSGLTMNASE EA IG CLAS: CPT | Performed by: INTERNAL MEDICINE

## 2025-03-07 PROCEDURE — 82728 ASSAY OF FERRITIN: CPT | Performed by: INTERNAL MEDICINE

## 2025-03-07 PROCEDURE — 83540 ASSAY OF IRON: CPT | Performed by: INTERNAL MEDICINE

## 2025-03-07 PROCEDURE — 82784 ASSAY IGA/IGD/IGG/IGM EACH: CPT | Performed by: INTERNAL MEDICINE

## 2025-03-10 LAB — TTG IGA SER-ACNC: 0.9 U/ML

## 2025-03-20 ENCOUNTER — TELEPHONE (OUTPATIENT)
Dept: DERMATOLOGY | Facility: CLINIC | Age: 60
End: 2025-03-20
Payer: COMMERCIAL

## 2025-03-20 NOTE — TELEPHONE ENCOUNTER
----- Message from Jil sent at 3/20/2025  8:13 AM CDT -----  Regarding: Sooner Appointment  Contact: [phone]  Type:  Sooner Apoointment RequestCaller is requesting a sooner appointment.  Caller declined first available appointment listed below.  Caller will not accept being placed on the waitlist and is requesting a message be sent to doctor.Name of Caller:Sofia is the first available appointment?4/15/25Symptoms:new spot on skinWould the patient rather a call back or a response via [facility]? [facility] Call Back Number:[phone] Information:

## 2025-03-22 ENCOUNTER — RESULTS FOLLOW-UP (OUTPATIENT)
Dept: GASTROENTEROLOGY | Facility: CLINIC | Age: 60
End: 2025-03-22

## 2025-03-22 ENCOUNTER — PATIENT MESSAGE (OUTPATIENT)
Dept: GASTROENTEROLOGY | Facility: CLINIC | Age: 60
End: 2025-03-22
Payer: COMMERCIAL

## 2025-03-22 NOTE — PROGRESS NOTES
Vicky your lab work overall looks good your just slightly low anion gap could be related to calcium carbonate supplements.

## 2025-03-23 ENCOUNTER — DOCUMENTATION ONLY (OUTPATIENT)
Dept: GASTROENTEROLOGY | Facility: CLINIC | Age: 60
End: 2025-03-23
Payer: COMMERCIAL

## 2025-03-23 ENCOUNTER — PATIENT MESSAGE (OUTPATIENT)
Dept: GASTROENTEROLOGY | Facility: CLINIC | Age: 60
End: 2025-03-23
Payer: COMMERCIAL

## 2025-03-23 DIAGNOSIS — K90.0 CELIAC SPRUE: Primary | ICD-10-CM

## 2025-03-27 ENCOUNTER — PATIENT MESSAGE (OUTPATIENT)
Dept: PODIATRY | Facility: CLINIC | Age: 60
End: 2025-03-27
Payer: COMMERCIAL

## 2025-03-28 ENCOUNTER — PATIENT MESSAGE (OUTPATIENT)
Dept: PODIATRY | Facility: CLINIC | Age: 60
End: 2025-03-28
Payer: COMMERCIAL

## 2025-04-02 ENCOUNTER — OFFICE VISIT (OUTPATIENT)
Dept: PODIATRY | Facility: CLINIC | Age: 60
End: 2025-04-02
Payer: COMMERCIAL

## 2025-04-02 VITALS
DIASTOLIC BLOOD PRESSURE: 73 MMHG | SYSTOLIC BLOOD PRESSURE: 118 MMHG | HEART RATE: 76 BPM | WEIGHT: 130.06 LBS | BODY MASS INDEX: 20.99 KG/M2

## 2025-04-02 DIAGNOSIS — L60.0 INGROWN NAIL: ICD-10-CM

## 2025-04-02 DIAGNOSIS — L60.9 DISEASE OF NAIL: Primary | ICD-10-CM

## 2025-04-02 PROCEDURE — 1160F RVW MEDS BY RX/DR IN RCRD: CPT | Mod: CPTII,S$GLB,, | Performed by: PODIATRIST

## 2025-04-02 PROCEDURE — 3074F SYST BP LT 130 MM HG: CPT | Mod: CPTII,S$GLB,, | Performed by: PODIATRIST

## 2025-04-02 PROCEDURE — 99999 PR PBB SHADOW E&M-EST. PATIENT-LVL III: CPT | Mod: PBBFAC,,, | Performed by: PODIATRIST

## 2025-04-02 PROCEDURE — 3078F DIAST BP <80 MM HG: CPT | Mod: CPTII,S$GLB,, | Performed by: PODIATRIST

## 2025-04-02 PROCEDURE — 1159F MED LIST DOCD IN RCRD: CPT | Mod: CPTII,S$GLB,, | Performed by: PODIATRIST

## 2025-04-02 PROCEDURE — 3008F BODY MASS INDEX DOCD: CPT | Mod: CPTII,S$GLB,, | Performed by: PODIATRIST

## 2025-04-02 PROCEDURE — 99214 OFFICE O/P EST MOD 30 MIN: CPT | Mod: S$GLB,,, | Performed by: PODIATRIST

## 2025-04-02 RX ORDER — AMMONIUM LACTATE 12 G/100G
1 CREAM TOPICAL DAILY
Qty: 140 G | Refills: 1 | Status: SHIPPED | OUTPATIENT
Start: 2025-04-02

## 2025-04-02 NOTE — PROGRESS NOTES
Subjective:      Patient ID: Vicky Brand is a 59 y.o. female.    Chief Complaint:   Nail Problem (Left hallux nail curving inward, pt here for 2nd opinion.)    Vicky is a 59 y.o. female who presents to the clinic complaining of nail changes on  toenail on the left foot.      Pt new to me.    Patient relates it has been curving in for about a month no pedicure no shoe gear changes no vacation or activity changes    No pain  She did try the antifungal Penlac for couple months  February she trimmed her nail  Here for evaluation    Saw Dr. Johnson 10/2024:      Left 1st toenail lateral border dystrophy   -Slant back procedure performed as a courtesy, no apparent fungal changes.   -No treatment needed at this time, if ingrown nail develops then consider slant back vs partial nail avulsion.     Also saw Dr. Jones for previous foot pain 2022     Past Medical History:   Diagnosis Date    SAMANTHA positive     BCC (basal cell carcinoma) 01/31/2025    midline anterior neck    Celiac disease 09/29/2022    Dyspareunia in female 07/27/2021    Osphena, pelvic floor tx 2021, GYN [facility]    Eye abnormalities     early yellowing of lens, precursor to cataracts, Dr Webster,  wear glasses at all times    Fracture of fifth toe, left, closed, with delayed healing, subsequent encounter 08/21/2018    Helicobacter pylori infection     tx GI Dr José Luis Irizarry 2022    Intramural leiomyoma of uterus 05/22/2019    Irregular, dysmenorrhea    Nocturia 06/17/2021    Ocular migraine     vision fuzzy x 3 in 2013, metallic shapes in her vision, Dr Webster,  resolved after 30 min , no pain    CALEB (obstructive sleep apnea)     Lysenko, CPAP start 7/21    Palpitations 09/29/2022    Paradoxical insomnia 10/30/2017    prior to menses, previously took amitryptiline 2.5 mg prn  qhs     Posterior vitreous detachment of left eye 05/22/2019    Flashes of light, ocular migranes, Ophthalmologist Dr Orourke, retinal specialist Dr Soni, rec avoid exercise  until she follows up 5/2019    Shoulder pain, left 2007    LEFT, after fall, better with PT at [facility]    Stress and adjustment reaction 09/29/2022    Urticaria      Past Surgical History:   Procedure Laterality Date    COLONOSCOPY N/A 06/09/2017    Procedure: COLONOSCOPY;  Surgeon: Davy Prince MD;  Location: [facility] (4TH FLR);  Service: Endoscopy;  Laterality: N/A;    COLONOSCOPY N/A 1/15/2025    Procedure: COLONOSCOPY;  Surgeon: Umer Winters MD;  Location: [facility] (4TH FLR);  Service: Endoscopy;  Laterality: N/A;  12/24 Ref by Dr. Winters [facility], portal. edilberto  Jm/case lengths adjusted only  1/8-pre call complete-tb    DE QUERVAIN'S RELEASE Right 06/08/2022    Procedure: RELEASE, HAND, FOR DEQUERVAIN'S TENOSYNOVITIS - RIGHT;  Surgeon: Devan Wagner MD;  Location: [facility] OR;  Service: Orthopedics;  Laterality: Right;    DILATION AND CURETTAGE OF UTERUS      ENDOMETRIAL ABLATION N/A 2017    ESOPHAGOGASTRODUODENOSCOPY N/A 11/11/2022    Procedure: EGD (ESOPHAGOGASTRODUODENOSCOPY);  Surgeon: Joés Luis Irizarry MD;  Location: [facility] (4TH FLR);  Service: Endoscopy;  Laterality: N/A;  Fully vaccinated/ inst portal-RB    ESOPHAGOGASTRODUODENOSCOPY N/A 11/26/2024    Procedure: EGD (ESOPHAGOGASTRODUODENOSCOPY);  Surgeon: Umer Winters MD;  Location: [facility] (4TH FLR);  Service: Endoscopy;  Laterality: N/A;  Referred by Dr. Winters, 11/26/24, portal -ml  11/12-lvm notifying pt of new arrival time (215pm), updated instructions sent to portal-KPvt  11/19-lvm for pre call-tb  11/21-LVM for pre call-JM  11/21 pt confirmed.cf    HYSTEROSCOPY WITH DILATION AND CURETTAGE OF UTERUS N/A 01/19/2022    Procedure: HYSTEROSCOPY, WITH DILATION AND CURETTAGE OF UTERUS;  Surgeon: Jannette Jarrell MD;  Location: [facility];  Service: OB/GYN;  Laterality: N/A;    TUBAL LIGATION       Medications Ordered Prior to Encounter[1]  Review of patient's allergies indicates:   Allergen Reactions    Sulfa (sulfonamide  antibiotics) Rash    Cetirizine Other (See Comments)     Other reaction(s): Unable to Function    Other reaction(s): Unable to Function Other reaction(s): Unable to Function    Cetirizine-pseudoephedrine Other (See Comments)     Other reaction(s): Inability to focus/function    Erythromycin Nausea And Vomiting     Other reaction(s): Nausea    Voltaren [diclofenac sodium] Diarrhea    Gluten Rash     Celiac's disease       Review of Systems   Constitutional: Negative for chills, decreased appetite, fever, malaise/fatigue, night sweats, weight gain and weight loss.   Cardiovascular:  Negative for chest pain, claudication, dyspnea on exertion, leg swelling, palpitations and syncope.   Respiratory:  Negative for cough and shortness of breath.    Endocrine: Negative for cold intolerance and heat intolerance.   Hematologic/Lymphatic: Negative for bleeding problem. Does not bruise/bleed easily.   Skin:  Positive for nail changes. Negative for color change, dry skin, flushing, itching, poor wound healing, rash, skin cancer, suspicious lesions and unusual hair distribution.   Musculoskeletal:  Negative for arthritis, back pain, falls, gout, joint pain, joint swelling, muscle cramps, muscle weakness, myalgias, neck pain and stiffness.   Gastrointestinal:  Negative for diarrhea, nausea and vomiting.   Neurological:  Negative for dizziness, focal weakness, light-headedness, numbness, paresthesias, tremors, vertigo and weakness.   Psychiatric/Behavioral:  Negative for altered mental status and depression. The patient does not have insomnia.    Allergic/Immunologic: Negative.            Objective:       Vitals:    04/02/25 1523   BP: 118/73   Pulse: 76   Weight: 59 kg (130 lb 1.1 oz)   59 kg (130 lb 1.1 oz)     Physical Exam  Vitals reviewed.   Constitutional:       General: She is not in acute distress.     Appearance: She is well-developed. She is not ill-appearing, toxic-appearing or diaphoretic.      Comments: Proper  supportive shoegear      Cardiovascular:      Pulses:           Dorsalis pedis pulses are 2+ on the right side and 2+ on the left side.        Posterior tibial pulses are 2+ on the right side and 2+ on the left side.   Musculoskeletal:         General: No tenderness or deformity.      Right lower leg: No edema.      Left lower leg: No edema.      Right ankle: Normal.      Right Achilles Tendon: Normal.      Left ankle: Normal.      Left Achilles Tendon: Normal.      Right foot: Decreased range of motion. No deformity.      Left foot: Decreased range of motion. No deformity.      Comments: Reducible extensor and flexor contractures at the MTPJ and PIPJ of toes 2-5, bilat.       Feet:      Right foot:      Protective Sensation: 10 sites tested.  10 sites sensed.      Skin integrity: Dry skin present. No ulcer, blister, skin breakdown, erythema, warmth or callus.      Toenail Condition: Right toenails are normal.      Left foot:      Protective Sensation: 10 sites tested.  10 sites sensed.      Skin integrity: Dry skin present. No ulcer, blister, skin breakdown, erythema, warmth or callus.      Toenail Condition: Left toenails are abnormally thick.   Skin:     General: Skin is warm.      Capillary Refill: Capillary refill takes 2 to 3 seconds.      Coloration: Skin is not pale.      Findings: No erythema or rash.   Neurological:      Mental Status: She is alert and oriented to person, place, and time.      Sensory: No sensory deficit.      Gait: Gait is intact.   Psychiatric:         Attention and Perception: Attention normal.         Mood and Affect: Mood normal.         Speech: Speech normal.         Behavior: Behavior normal.         Thought Content: Thought content normal.         Cognition and Memory: Cognition normal.         Judgment: Judgment normal.               Assessment:       Encounter Diagnoses   Name Primary?    Disease of nail Yes    Ingrown nail          Plan:       Vicky was seen today for nail  problem.    Diagnoses and all orders for this visit:    Disease of nail    Ingrown nail    Other orders  -     ammonium lactate 12 % Crea; Apply 1 g topically Daily.      I counseled the patient on her conditions, their implications and medical management.    Reassurance    D/w pt proper shoegear : length/width    Utilizing sterile toenail clippers I aggressively debrided the offending nail border  area was cleansed with alcohol. Patient tolerated the procedure well     Rx amm lac    Consider nail sample in future          Follow up in about 3 months (around 7/2/2025).          [1]   Current Outpatient Medications on File Prior to Visit   Medication Sig Dispense Refill    amitriptyline (ELAVIL) 10 MG tablet Take 1 tablet (10 mg total) by mouth nightly as needed for Insomnia (insomnia). 30 tablet 11    calcium carbonate (OS-GISSELL) 600 mg calcium (1,500 mg) Tab Take 600 mg by mouth once daily. gluten free      COVID-19 (COMIRNATY 2024-25, 12Y UP,,PF,) 30 mcg/0.3 mL IM vaccine (>/= 13 yo) Inject into the muscle. 0.3 mL 0    estradioL (ESTRACE) 0.01 % (0.1 mg/gram) vaginal cream Place 1 g vaginally twice a week. 42.5 g 3    fluoride, sodium, (PREVIDENT 5000) 1.1 % Pste Place onto teeth.      [DISCONTINUED] diclofenac sodium (VOLTAREN) 1 % Gel Apply 2 g topically 4 (four) times daily. Apply to affected area up to 4 times per day PRN pain 100 g 4     No current facility-administered medications on file prior to visit.

## 2025-04-15 ENCOUNTER — OFFICE VISIT (OUTPATIENT)
Dept: DERMATOLOGY | Facility: CLINIC | Age: 60
End: 2025-04-15
Payer: COMMERCIAL

## 2025-04-15 DIAGNOSIS — L90.5 SCAR: ICD-10-CM

## 2025-04-15 DIAGNOSIS — D48.5 NEOPLASM OF UNCERTAIN BEHAVIOR OF SKIN: Primary | ICD-10-CM

## 2025-04-15 DIAGNOSIS — Z85.828 HISTORY OF NONMELANOMA SKIN CANCER: ICD-10-CM

## 2025-04-15 DIAGNOSIS — D22.9 MULTIPLE BENIGN NEVI: ICD-10-CM

## 2025-04-15 PROCEDURE — 88305 TISSUE EXAM BY PATHOLOGIST: CPT | Mod: 26,,, | Performed by: DERMATOLOGY

## 2025-04-15 PROCEDURE — 1159F MED LIST DOCD IN RCRD: CPT | Mod: CPTII,S$GLB,, | Performed by: DERMATOLOGY

## 2025-04-15 PROCEDURE — 11102 TANGNTL BX SKIN SINGLE LES: CPT | Mod: S$GLB,,, | Performed by: DERMATOLOGY

## 2025-04-15 PROCEDURE — 88305 TISSUE EXAM BY PATHOLOGIST: CPT | Mod: TC | Performed by: DERMATOLOGY

## 2025-04-15 PROCEDURE — 99213 OFFICE O/P EST LOW 20 MIN: CPT | Mod: 25,S$GLB,, | Performed by: DERMATOLOGY

## 2025-04-15 PROCEDURE — 1160F RVW MEDS BY RX/DR IN RCRD: CPT | Mod: CPTII,S$GLB,, | Performed by: DERMATOLOGY

## 2025-04-15 NOTE — PROGRESS NOTES
Patient Information  Name: Vicky Brand  : 1965  MRN: [id number]     Referring Physician:  No ref. provider found   Primary Care Physician:  Lia Cristina MD   Date of Visit: 4/15/25      Subjective:     History of Present lllness:    Vicky Brand is a 59 y.o. female who presents with a chief complaint of spot and scar.    Spot  Location: back  Duration: years, but recently noticed growth  Signs/Symptoms: growing  Exacerbating factors: none  Relieving factors/Prior treatments: none    She would also like the area where Mohs (25) was performed for BCC to be checked.    Patient was last seen: 2025.  Prior notes by myself reviewed.   Clinical documentation obtained by nursing staff reviewed.    Review of Systems    Objective:   Physical Exam   Constitutional: She appears well-developed and well-nourished. No distress.   Neurological: She is alert and oriented to person, place, and time. She is not disoriented.   Psychiatric: She has a normal mood and affect.   Skin:   Areas Examined (abnormalities noted in diagram):   Neck Inspection Performed  Back Inspection Performed            Diagram Legend     Erythematous scaling macule/papule c/w actinic keratosis       Vascular papule c/w angioma      Pigmented verrucoid papule/plaque c/w seborrheic keratosis      Yellow umbilicated papule c/w sebaceous hyperplasia      Irregularly shaped tan macule c/w lentigo     1-2 mm smooth white papules consistent with Milia      Movable subcutaneous cyst with punctum c/w epidermal inclusion cyst      Subcutaneous movable cyst c/w pilar cyst      Firm pink to brown papule c/w dermatofibroma      Pedunculated fleshy papule(s) c/w skin tag(s)      Evenly pigmented macule c/w junctional nevus     Mildly variegated pigmented, slightly irregular-bordered macule c/w mildly atypical nevus      Flesh colored to evenly pigmented papule c/w intradermal nevus       Pink pearly papule/plaque c/w basal  cell carcinoma      Erythematous hyperkeratotic cursted plaque c/w SCC      Surgical scar with no sign of skin cancer recurrence      Open and closed comedones      Inflammatory papules and pustules      Verrucoid papule consistent consistent with wart     Erythematous eczematous patches and plaques     Dystrophic onycholytic nail with subungual debris c/w onychomycosis     Umbilicated papule    Erythematous-base heme-crusted tan verrucoid plaque consistent with inflamed seborrheic keratosis     Erythematous Silvery Scaling Plaque c/w Psoriasis     See annotation          [] Data reviewed  [] Prior external notes reviewed  [] Independent review of test  [] Management discussed with another provider  [] Independent historian    Assessment / Plan:      Pathology Orders:       Normal Orders This Visit    Specimen to Pathology, Dermatology     Questions:    Procedure Type: Dermatology and skin neoplasms    Number of Specimens: 1    ------------------------: -------------------------    Spec 1 Procedure: Shave Biopsy    Spec 1 Clinical Impression: r/o irritated vs dysplastic nevus    Spec 1 Source: left upper back    Clinical Information: see EPIC    Clinical History: see EPIC    Specimen Source: Skin    Release to patient: Immediate    Send normal result to authorizing provider's In Basket if patient is active on MyChart: Yes          Neoplasm of uncertain behavior of skin  -     Specimen to Pathology, Dermatology    Shave biopsy procedure note:  Risk, benefits, and alternatives of biopsy are discussed with the patient, including risk of infection, scar, recurrence, and need for additional treatment of site. The patient agrees to the procedure by verbal consent. The area is marked and prepped with alcohol.  Approximately 1 mL of lidocaine 1% with epinephrine is used for local anesthesia. A sharp blade is used to remove the lesion. The specimen is sent for pathology. Hemostasis is obtained with aluminum chloride and/or  monopolar hyfrecation if needed. The area is then dressed and bandaged. The patient tolerated the procedure well without adverse event. Written instructions on wound care were given and were reviewed with the patient, who is to call for any signs of bleeding or infection. The patient will be notified of the pathology results.    Multiple benign nevi  Multiple benign-appearing nevi present on exam today. Reassurance provided. Counseled patient to periodically examine moles and return to clinic if any changes in size, shape, or color are noted or if it becomes symptomatic (bleeding, itching, pain, etc).  Recommend using a broad-spectrum, water-resistant sunscreen with SPF of 30 or higher--reapply every 2 hours. Seek shade, wear sun-protective clothing, and perform regular skin self-exams.    Scar  History of nonmelanoma skin cancer   - stable and chronic  Area(s) of previous nonmelanoma skin cancer evaluated with no evidence of recurrence. Reassurance provided.  Recommend using a broad-spectrum, water-resistant sunscreen with SPF of 30 or higher--reapply every 2 hours. Seek shade, wear sun-protective clothing, and perform regular skin self-exams.       Follow up in about 4 months (around 8/15/2025) for TBSE, or sooner dependent on pathology results.      Rocio Finch MD, FAAD  [facility]

## 2025-04-15 NOTE — PATIENT INSTRUCTIONS
Biopsy Wound Care Instructions    Leave the bandage on for 24 hours without getting it wet.   Clean the area once a day with a gentle soap and water, then pat dry and apply Vaseline and a bandaid.  The site should be kept moist with Vaseline at all times to improve healing. Reapply a thick coating as needed. Do not let the site air out or form a scab, as this will delay healing and worsen scarring.  If any bleeding or oozing occurs once you return home, apply firm pressure to the area for 30 minutes straight without peeking. If bleeding continues, call the office immediately.  Please message us via [facility], call us at [phone], or return to the office at any sign of increasing redness, swelling, tenderness, pain, heat, yellow drainage/discharge, or continued bleeding.      Receiving Your Pathology Results    Your pathology results will be released to you on [facility] at the same time that Dr. Finch receives them.   Dr. Finch will then message you with her interpretation of the results and/or with the plan going forward.  If you do not use [facility] or if your pathology results require more of an explanation, you will receive your results via a phone call.  If 2 weeks go by and you have not received your results, please message us via [facility] or call us at [phone] to inform us.      Hydrocolloid Bandage    A hydrocolloid bandage also can used in place of Vaseline and a bandaid. It is not to be used with Vaseline.  A hydrocolloid bandage is an occlusive, waterproof dressing that will protect the wound and provide an optimal healing environment.   These bandages can be found at your local pharmacy in the first aid section or on [employer] (see below for examples).  Apply the bandage to clean, dry skin, making sure that the wound bed is in the center of the bandage.   Once it is applied, press and hold your hand on top of the bandage to warm it and help it adhere to the skin.  Please note: The area  where the bandage adheres to the wound bed will become white and puffy. This is not infection and is a normal part of the healing process.  Do not change the bandage until the edges roll up and it starts to peel away.  When bandage is ready to be changed, remove carefully and slowly. Run it under water if necessary to help it release from the skin.  Wash wound with a gentle cleanser and fingertips.  Make sure area is completely dry before applying a new bandage.  Repeat process until fresh pink skin covers the wound bed or until bandage no longer becomes white and puffy.       Examples:  Nexcare Advanced Healing Holiday Bandages  [facility] Clear Advanced Healing Hydrocolloid Bandages  Band-Aid® Hydro Seal All-Purpose Adhesive Bandages  [facility] Hydrocolloid Spot Bandages  DuoDERM® Extra Thin Dressing (can be cut to size)

## 2025-04-17 LAB
ESTROGEN SERPL-MCNC: NORMAL PG/ML
INSULIN SERPL-ACNC: NORMAL U[IU]/ML
LAB AP CLINICAL INFORMATION: NORMAL
LAB AP GROSS DESCRIPTION: NORMAL
LAB AP REPORT FOOTNOTES: NORMAL
T3RU NFR SERPL: NORMAL %

## 2025-04-21 ENCOUNTER — RESULTS FOLLOW-UP (OUTPATIENT)
Dept: DERMATOLOGY | Facility: CLINIC | Age: 60
End: 2025-04-21

## 2025-04-23 ENCOUNTER — OFFICE VISIT (OUTPATIENT)
Dept: PRIMARY CARE CLINIC | Facility: CLINIC | Age: 60
End: 2025-04-23
Payer: COMMERCIAL

## 2025-04-23 VITALS
WEIGHT: 134.06 LBS | SYSTOLIC BLOOD PRESSURE: 130 MMHG | BODY MASS INDEX: 21.55 KG/M2 | OXYGEN SATURATION: 100 % | HEIGHT: 66 IN | DIASTOLIC BLOOD PRESSURE: 82 MMHG | HEART RATE: 73 BPM

## 2025-04-23 DIAGNOSIS — R09.81 SINUS CONGESTION: ICD-10-CM

## 2025-04-23 DIAGNOSIS — J32.9 SINUSITIS, UNSPECIFIED CHRONICITY, UNSPECIFIED LOCATION: Primary | ICD-10-CM

## 2025-04-23 PROCEDURE — 1160F RVW MEDS BY RX/DR IN RCRD: CPT | Mod: CPTII,S$GLB,, | Performed by: NURSE PRACTITIONER

## 2025-04-23 PROCEDURE — 99214 OFFICE O/P EST MOD 30 MIN: CPT | Mod: S$GLB,,, | Performed by: NURSE PRACTITIONER

## 2025-04-23 PROCEDURE — 1159F MED LIST DOCD IN RCRD: CPT | Mod: CPTII,S$GLB,, | Performed by: NURSE PRACTITIONER

## 2025-04-23 PROCEDURE — 3079F DIAST BP 80-89 MM HG: CPT | Mod: CPTII,S$GLB,, | Performed by: NURSE PRACTITIONER

## 2025-04-23 PROCEDURE — 99999 PR PBB SHADOW E&M-EST. PATIENT-LVL IV: CPT | Mod: PBBFAC,,, | Performed by: NURSE PRACTITIONER

## 2025-04-23 PROCEDURE — 3008F BODY MASS INDEX DOCD: CPT | Mod: CPTII,S$GLB,, | Performed by: NURSE PRACTITIONER

## 2025-04-23 PROCEDURE — 3075F SYST BP GE 130 - 139MM HG: CPT | Mod: CPTII,S$GLB,, | Performed by: NURSE PRACTITIONER

## 2025-04-23 RX ORDER — AZITHROMYCIN 250 MG/1
TABLET, FILM COATED ORAL
Qty: 6 TABLET | Refills: 0 | Status: SHIPPED | OUTPATIENT
Start: 2025-04-23 | End: 2025-04-28

## 2025-04-23 RX ORDER — PREDNISONE 20 MG/1
20 TABLET ORAL DAILY
Qty: 5 TABLET | Refills: 0 | Status: SHIPPED | OUTPATIENT
Start: 2025-04-23

## 2025-04-23 NOTE — PROGRESS NOTES
[facility] Note    Chief Complaint      Chief Complaint   Patient presents with    sneezing    Nasal Congestion     X 2 weeks       History of Present Illness      Vicky Brand is a 59 y.o. female who presents today for   Chief Complaint   Patient presents with    sneezing    Nasal Congestion     X 2 weeks         CHIEF COMPLAINT:  Patient presents with persistent nasal discharge and recent sneezing, following a two-week illness involving sore throat, cough, and mucus production.    HPI:  Patient reports a significant illness approximately 2 weeks ago, characterized by a sore throat, cough with mucus production, and nasal congestion with thick mucus discharge. She believed she had recovered about 2 days ago. Last night, she was awakened by sudden sneezing. This morning, she noticed a change in her nasal discharge, describing it as thin, clear, and profuse, dripping onto surfaces before she can wipe it away. 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 Her symptoms have been ongoing since at least 8/14 or 8/15, indicating a duration of about 2 weeks. She mentions wearing a CPAP mask for sleep, which she sneezed off last night due to the intensity of her sneezing. To manage sleep with the CPAP mask, she takes half an amitriptyline, OTC evening primrose, and OTC melatonin as prescribed by a sleep specialist.    MEDICAL HISTORY:  Patient has a history of Celiac disease.      ROS:  General: -fever, -chills, -fatigue, -weight gain, -weight loss  Eyes: -vision changes, -redness, -discharge  ENT: -ear pain, +nasal congestion, +sore throat, +nasal discharge, +frequent sneezing, +runny nose  Cardiovascular: -chest pain, -palpitations, -lower extremity edema  Respiratory: +cough, -shortness of breath, +productive cough  Gastrointestinal: -abdominal pain, -nausea, -vomiting, -diarrhea, -constipation, -blood in  stool  Genitourinary: -dysuria, -hematuria, -frequency  Musculoskeletal: -joint pain, -muscle pain  Skin: -rash, -lesion  Neurological: -headache, -dizziness, -numbness, -tingling, +sleep disturbances  Psychiatric: -anxiety, -depression, -sleep difficulty         Review of Systems   HENT:  Positive for congestion.    Respiratory:  Positive for cough.    All 12 systems otherwise negative.       Family History:  family history includes Diabetes in her father; Hypertension in her mother; Liver disease in her sister; Ovarian cancer in her maternal grandmother; Pacemaker/defibrilator in her mother; Stroke (age of onset: 49) in her father.   Family history was reviewed with patient.     Medications:  Encounter Medications[1]    Allergies:  Review of patient's allergies indicates:   Allergen Reactions    Sulfa (sulfonamide antibiotics) Rash    Cetirizine Other (See Comments)     Other reaction(s): Unable to Function    Other reaction(s): Unable to Function Other reaction(s): Unable to Function    Cetirizine-pseudoephedrine Other (See Comments)     Other reaction(s): Inability to focus/function    Erythromycin Nausea And Vomiting     Other reaction(s): Nausea    Voltaren [diclofenac sodium] Diarrhea    Gluten Rash     Celiac's disease       Health Maintenance:  Health Maintenance   Topic Date Due    Mammogram  12/23/2025    DEXA Scan  12/02/2026    Cervical Cancer Screening  12/13/2026    Lipid Panel  10/16/2029    TETANUS VACCINE  07/27/2031    Colorectal Cancer Screening  01/15/2035    RSV Vaccine (Age 60+ and Pregnant patients) (1 - 1-dose 75+ series) 11/11/2041    Hepatitis C Screening  Completed    Shingles Vaccine  Completed    Influenza Vaccine  Completed    HIV Screening  Completed    COVID-19 Vaccine  Completed    Pneumococcal Vaccines (Age 50+)  Completed     Health Maintenance Topics with due status: Not Due       Topic Last Completion Date    TETANUS VACCINE 07/27/2021    Cervical Cancer Screening 12/13/2023     "Lipid Panel 10/16/2024    DEXA Scan 12/02/2024    Mammogram 12/23/2024    Colorectal Cancer Screening 01/15/2025    RSV Vaccine (Age 60+ and Pregnant patients) Not Due       Physical Exam      Vital Signs  Pulse: 73  SpO2: 100 %  BP: 130/82  BP Location: Right arm  Patient Position: Sitting  Pain Score: 0-No pain  Height and Weight  Height: 5' 6" (167.6 cm)  Weight: 60.8 kg (134 lb 0.6 oz)  BSA (Calculated - sq m): 1.68 sq meters  BMI (Calculated): 21.6  Weight in (lb) to have BMI = 25: 154.6]    Physical Exam  Constitutional:       Appearance: Normal appearance. She is normal weight.   HENT:      Head: Normocephalic and atraumatic.      Nose: Congestion and rhinorrhea present.      Mouth/Throat:      Mouth: Mucous membranes are moist.      Pharynx: Oropharynx is clear.   Eyes:      Extraocular Movements: Extraocular movements intact.      Conjunctiva/sclera: Conjunctivae normal.      Pupils: Pupils are equal, round, and reactive to light.   Cardiovascular:      Rate and Rhythm: Normal rate and regular rhythm.      Pulses: Normal pulses.      Heart sounds: Normal heart sounds.   Pulmonary:      Effort: Pulmonary effort is normal.      Breath sounds: Normal breath sounds.   Musculoskeletal:         General: Normal range of motion.      Cervical back: Normal range of motion and neck supple.   Skin:     General: Skin is warm and dry.      Capillary Refill: Capillary refill takes less than 2 seconds.   Neurological:      General: No focal deficit present.      Mental Status: She is alert and oriented to person, place, and time. Mental status is at baseline.   Psychiatric:         Mood and Affect: Mood normal.         Behavior: Behavior normal.         Thought Content: Thought content normal.         Judgment: Judgment normal.            Assessment/Plan     Vicky Brand is a 59 y.o.female with:    Sinusitis, unspecified chronicity, unspecified location  -     azithromycin (Z-OBI) 250 MG tablet; Take 2 tablets " (500 mg total) by mouth once daily for 1 day, THEN 1 tablet (250 mg total) once daily for 4 days.  Dispense: 6 tablet; Refill: 0    Sinus congestion  -     predniSONE (DELTASONE) 20 MG tablet; Take 1 tablet (20 mg total) by mouth once daily.  Dispense: 5 tablet; Refill: 0        As above, continue current medications and maintain follow up with specialists.  Return to clinic as needed.    Greater than 50% of visit was spent face to face with patient.  All questions were answered to patient's satisfaction.          Karen L Spencer, NP-C [facility]                     [1]   Outpatient Encounter Medications as of 4/23/2025   Medication Sig Dispense Refill    amitriptyline (ELAVIL) 10 MG tablet Take 1 tablet (10 mg total) by mouth nightly as needed for Insomnia (insomnia). 30 tablet 11    ammonium lactate 12 % Crea Apply 1 g topically Daily. 140 g 1    azithromycin (Z-OBI) 250 MG tablet Take 2 tablets (500 mg total) by mouth once daily for 1 day, THEN 1 tablet (250 mg total) once daily for 4 days. 6 tablet 0    COVID-19 (COMIRNATY 2024-25, 12Y UP,,PF,) 30 mcg/0.3 mL IM vaccine (>/= 11 yo) Inject into the muscle. 0.3 mL 0    fluoride, sodium, (PREVIDENT 5000) 1.1 % Pste Place onto teeth.      predniSONE (DELTASONE) 20 MG tablet Take 1 tablet (20 mg total) by mouth once daily. 5 tablet 0    [DISCONTINUED] calcium carbonate (OS-GISSELL) 600 mg calcium (1,500 mg) Tab Take 600 mg by mouth once daily. gluten free (Patient not taking: Reported on 4/23/2025)      [DISCONTINUED] diclofenac sodium (VOLTAREN) 1 % Gel Apply 2 g topically 4 (four) times daily. Apply to affected area up to 4 times per day PRN pain 100 g 4    [DISCONTINUED] estradioL (ESTRACE) 0.01 % (0.1 mg/gram) vaginal cream Place 1 g vaginally twice a week. (Patient not taking: Reported on 4/23/2025) 42.5 g 3     No facility-administered encounter medications on file as of 4/23/2025.

## 2025-04-23 NOTE — PROGRESS NOTES
[facility] Note    Chief Complaint      Chief Complaint   Patient presents with    sneezing    Nasal Congestion     X 2 weeks       History of Present Illness      Vicky Brand is a 59 y.o. female who presents today for   Chief Complaint   Patient presents with    sneezing    Nasal Congestion     X 2 weeks         History of Present Illness    CHIEF COMPLAINT:  Patient presents with persistent nasal discharge and recent sneezing, following a two-week illness involving sore throat, cough, and mucus production.    HPI:  Patient reports a significant illness approximately 2 weeks ago, characterized by a sore throat, cough with mucus production, and nasal congestion with thick mucus discharge. She believed she had recovered about 2 days ago. Last night, she was awakened by sudden sneezing. This morning, she noticed a change in her nasal discharge, describing it as thin, clear, and profuse, dripping onto surfaces before she can wipe it away. 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 Her symptoms have been ongoing since at least 8/14 or 8/15, indicating a duration of about 2 weeks. She mentions wearing a CPAP mask for sleep, which she sneezed off last night due to the intensity of her sneezing. To manage sleep with the CPAP mask, she takes half an amitriptyline, OTC evening primrose, and OTC melatonin as prescribed by a sleep specialist.    MEDICAL HISTORY:  Patient has a history of Celiac disease.      ROS:  General: -fever, -chills, -fatigue, -weight gain, -weight loss  Eyes: -vision changes, -redness, -discharge  ENT: -ear pain, +nasal congestion, +sore throat, +nasal discharge, +frequent sneezing, +runny nose  Cardiovascular: -chest pain, -palpitations, -lower extremity edema  Respiratory: +cough, -shortness of breath, +productive cough  Gastrointestinal: -abdominal pain, -nausea, -vomiting, -diarrhea,  -constipation, -blood in stool  Genitourinary: -dysuria, -hematuria, -frequency  Musculoskeletal: -joint pain, -muscle pain  Skin: -rash, -lesion  Neurological: -headache, -dizziness, -numbness, -tingling, +sleep disturbances  Psychiatric: -anxiety, -depression, -sleep difficulty           Family History:  family history includes Diabetes in her father; Hypertension in her mother; Liver disease in her sister; Ovarian cancer in her maternal grandmother; Pacemaker/defibrilator in her mother; Stroke (age of onset: 49) in her father.   Family history was reviewed with patient.     Medications:  Encounter Medications[1]    Allergies:  Review of patient's allergies indicates:   Allergen Reactions    Sulfa (sulfonamide antibiotics) Rash    Cetirizine Other (See Comments)     Other reaction(s): Unable to Function    Other reaction(s): Unable to Function Other reaction(s): Unable to Function    Cetirizine-pseudoephedrine Other (See Comments)     Other reaction(s): Inability to focus/function    Erythromycin Nausea And Vomiting     Other reaction(s): Nausea    Voltaren [diclofenac sodium] Diarrhea    Gluten Rash     Celiac's disease       Health Maintenance:  Health Maintenance   Topic Date Due    Mammogram  12/23/2025    DEXA Scan  12/02/2026    Cervical Cancer Screening  12/13/2026    Lipid Panel  10/16/2029    TETANUS VACCINE  07/27/2031    Colorectal Cancer Screening  01/15/2035    RSV Vaccine (Age 60+ and Pregnant patients) (1 - 1-dose 75+ series) 11/11/2041    Hepatitis C Screening  Completed    Shingles Vaccine  Completed    Influenza Vaccine  Completed    HIV Screening  Completed    COVID-19 Vaccine  Completed    Pneumococcal Vaccines (Age 50+)  Completed     Health Maintenance Topics with due status: Not Due       Topic Last Completion Date    TETANUS VACCINE 07/27/2021    Cervical Cancer Screening 12/13/2023    Lipid Panel 10/16/2024    DEXA Scan 12/02/2024    Mammogram 12/23/2024    Colorectal Cancer Screening  "01/15/2025    RSV Vaccine (Age 60+ and Pregnant patients) Not Due       Physical Exam      Vital Signs  Pulse: 73  SpO2: 100 %  BP: 130/82  BP Location: Right arm  Patient Position: Sitting  Pain Score: 0-No pain  Height and Weight  Height: 5' 6" (167.6 cm)  Weight: 60.8 kg (134 lb 0.6 oz)  BSA (Calculated - sq m): 1.68 sq meters  BMI (Calculated): 21.6  Weight in (lb) to have BMI = 25: 154.6]    Physical Exam    General: No acute distress. Well-developed. Well-nourished.  Eyes: EOMI. Sclerae anicteric.  HENT: Normocephalic. Atraumatic. Nares patent. Moist oral mucosa.  Cardiovascular: Regular rate. Regular rhythm. No murmurs. No rubs. No gallops. Normal S1, S2. Good heartbeat.  Respiratory: Normal respiratory effort. Clear to auscultation bilaterally. No rales. No rhonchi. No wheezing.  Musculoskeletal: No  obvious deformity.  Extremities: No lower extremity edema.  Neurological: Alert & oriented x3. No slurred speech. Normal gait.  Psychiatric: Normal mood. Normal affect. Good insight. Good judgment.  Skin: Warm. Dry. No rash.            Assessment/Plan     Vicky Brand is a 59 y.o.female with:    Assessment & Plan    J06.9 Acute upper respiratory infection, unspecified  G47.33 Obstructive sleep apnea (adult) (pediatric)  K90.0 Celiac disease  Z88.1 Allergy status to other antibiotic agents  T78.40XA Allergy, unspecified, initial encounter    IMPRESSION:  - Assessed symptoms as likely due to prolonged allergic response to environmental allergens, particularly oak pollen.  - Determined upper respiratory tract inflammation causing retention of mucus, leading to potential infection.    ACUTE UPPER RESPIRATORY INFECTION:  - Prescribed Z-Milo (azithromycin) to address potential infection, to begin immediately.  - Instructed the patient to take the medication with food to mitigate potential GI side effects.  - Initiated a 5-day course of oral prednisone to reduce inflammation and promote drainage, to begin " the following morning.  - Noted the patient's symptoms of sore throat, cough, mucus production, and nasal congestion from 2 weeks ago, with current symptoms including sneezing and rhinorrhea.  - Performed physical exam, including auscultation of the patient's lungs and heart.  - Assessed lungs as clear, but noted congestion in the upper respiratory tract.  - Explained the body's immune response to allergens, particularly how swollen nasal passages can trap mucus and lead to infection.  - Described how thick, sticky mucus is the body's attempt to trap allergens and prevent them from entering the lungs.    OBSTRUCTIVE SLEEP APNEA:  - Noted the patient's use of a CPAP mask for sleep apnea.  - Continued current medications as a sleep aid while using CPAP mask, including amitriptyline, evening primrose, and melatonin.    CELIAC DISEASE:  - Noted the patient's celiac disease diagnosis and adherence to a gluten-free diet.    ALLERGY TO ANTIBIOTICS:  - Prescribed Z-Milo (azithromycin) to address potential infection despite history of erythromycin allergy, based on successful prior use of Z-Paks.  - Noted the patient's history of allergy to erythromycin, causing nausea and vomiting.  - Considered the patient's allergy history when prescribing antibiotics.    ALLERGIES:  - Diagnosed the condition as an allergic response to environmental factors, particularly pollen.  - Noted the patient's allergy-like symptoms, including sneezing and rhinorrhea, possibly due to environmental allergens.  - Assessed the symptoms as an allergic response to environmental factors, particularly pollen.  - Prescribed prednisone for 5 days to help with allergy symptoms.          As above, continue current medications and maintain follow up with specialists.  Return to clinic as needed.    Greater than 50% of visit was spent face to face with patient.  All questions were answered to patient's satisfaction.          Karen L Spencer, NP-C [facility]      This note was generated with the assistance of ambient listening technology. Verbal consent was obtained by the patient and accompanying visitor(s) for the recording of patient appointment to facilitate this note. I attest to having reviewed and edited the generated note for accuracy, though some syntax or spelling errors may persist. Please contact the author of this note for any clarification.                   [1]   Outpatient Encounter Medications as of 4/23/2025   Medication Sig Dispense Refill    amitriptyline (ELAVIL) 10 MG tablet Take 1 tablet (10 mg total) by mouth nightly as needed for Insomnia (insomnia). 30 tablet 11    ammonium lactate 12 % Crea Apply 1 g topically Daily. 140 g 1    azithromycin (Z-OBI) 250 MG tablet Take 2 tablets (500 mg total) by mouth once daily for 1 day, THEN 1 tablet (250 mg total) once daily for 4 days. 6 tablet 0    COVID-19 (COMIRNATY 2024-25, 12Y UP,,PF,) 30 mcg/0.3 mL IM vaccine (>/= 11 yo) Inject into the muscle. 0.3 mL 0    fluoride, sodium, (PREVIDENT 5000) 1.1 % Pste Place onto teeth.      predniSONE (DELTASONE) 20 MG tablet Take 1 tablet (20 mg total) by mouth once daily. 5 tablet 0    [DISCONTINUED] calcium carbonate (OS-GISSELL) 600 mg calcium (1,500 mg) Tab Take 600 mg by mouth once daily. gluten free (Patient not taking: Reported on 4/23/2025)      [DISCONTINUED] diclofenac sodium (VOLTAREN) 1 % Gel Apply 2 g topically 4 (four) times daily. Apply to affected area up to 4 times per day PRN pain 100 g 4    [DISCONTINUED] estradioL (ESTRACE) 0.01 % (0.1 mg/gram) vaginal cream Place 1 g vaginally twice a week. (Patient not taking: Reported on 4/23/2025) 42.5 g 3     No facility-administered encounter medications on file as of 4/23/2025.

## 2025-05-08 ENCOUNTER — PATIENT MESSAGE (OUTPATIENT)
Dept: GASTROENTEROLOGY | Facility: CLINIC | Age: 60
End: 2025-05-08
Payer: COMMERCIAL

## 2025-05-09 ENCOUNTER — PATIENT MESSAGE (OUTPATIENT)
Dept: GASTROENTEROLOGY | Facility: CLINIC | Age: 60
End: 2025-05-09
Payer: COMMERCIAL

## 2025-05-09 ENCOUNTER — PATIENT MESSAGE (OUTPATIENT)
Dept: DERMATOLOGY | Facility: CLINIC | Age: 60
End: 2025-05-09
Payer: COMMERCIAL

## 2025-05-09 RX ORDER — CLOBETASOL PROPIONATE 0.5 MG/G
CREAM TOPICAL
Qty: 60 G | Refills: 2 | Status: CANCELLED | OUTPATIENT
Start: 2025-05-09

## 2025-05-09 NOTE — TELEPHONE ENCOUNTER
Called into [facility] per Dr. GARCIA's request.     Compound clobetasol 0.05% / dapsone 5% / lidocaine 5% cream.     SIG: Apply to affected areas of body BID prn bumps. 60g R2

## 2025-05-10 ENCOUNTER — PATIENT MESSAGE (OUTPATIENT)
Dept: DERMATOLOGY | Facility: CLINIC | Age: 60
End: 2025-05-10
Payer: COMMERCIAL

## 2025-05-10 DIAGNOSIS — K90.0 CELIAC DISEASE: Primary | ICD-10-CM

## 2025-05-13 ENCOUNTER — LAB VISIT (OUTPATIENT)
Dept: LAB | Facility: HOSPITAL | Age: 60
End: 2025-05-13
Attending: INTERNAL MEDICINE
Payer: COMMERCIAL

## 2025-05-13 ENCOUNTER — OFFICE VISIT (OUTPATIENT)
Dept: DERMATOLOGY | Facility: CLINIC | Age: 60
End: 2025-05-13
Payer: COMMERCIAL

## 2025-05-13 DIAGNOSIS — M35.09 SJOGREN'S SYNDROME WITH OTHER ORGAN INVOLVEMENT: ICD-10-CM

## 2025-05-13 DIAGNOSIS — L73.9 FOLLICULITIS: ICD-10-CM

## 2025-05-13 DIAGNOSIS — R76.8 ANA POSITIVE: ICD-10-CM

## 2025-05-13 DIAGNOSIS — K90.0 CELIAC DISEASE: ICD-10-CM

## 2025-05-13 DIAGNOSIS — L13.0 DERMATITIS HERPETIFORMIS: ICD-10-CM

## 2025-05-13 DIAGNOSIS — L13.0 DERMATITIS HERPETIFORMIS: Primary | ICD-10-CM

## 2025-05-13 LAB
AMYLASE SERPL-CCNC: 125 UNIT/L (ref 20–110)
C3 SERPL-MCNC: 112 MG/DL (ref 50–180)
CRP SERPL-MCNC: 0.8 MG/L
ERYTHROCYTE [SEDIMENTATION RATE] IN BLOOD BY PHOTOMETRIC METHOD: 26 MM/HR

## 2025-05-13 PROCEDURE — 99213 OFFICE O/P EST LOW 20 MIN: CPT | Mod: S$GLB,,, | Performed by: DERMATOLOGY

## 2025-05-13 PROCEDURE — 86160 COMPLEMENT ANTIGEN: CPT

## 2025-05-13 PROCEDURE — G2211 COMPLEX E/M VISIT ADD ON: HCPCS | Mod: S$GLB,,, | Performed by: DERMATOLOGY

## 2025-05-13 PROCEDURE — 85652 RBC SED RATE AUTOMATED: CPT

## 2025-05-13 PROCEDURE — 86334 IMMUNOFIX E-PHORESIS SERUM: CPT

## 2025-05-13 PROCEDURE — 86364 TISS TRNSGLTMNASE EA IG CLAS: CPT

## 2025-05-13 PROCEDURE — 84165 PROTEIN E-PHORESIS SERUM: CPT

## 2025-05-13 PROCEDURE — 86140 C-REACTIVE PROTEIN: CPT

## 2025-05-13 PROCEDURE — 84165 PROTEIN E-PHORESIS SERUM: CPT | Mod: ,,, | Performed by: PATHOLOGY

## 2025-05-13 PROCEDURE — 36415 COLL VENOUS BLD VENIPUNCTURE: CPT | Mod: PO

## 2025-05-13 PROCEDURE — 1159F MED LIST DOCD IN RCRD: CPT | Mod: CPTII,S$GLB,, | Performed by: DERMATOLOGY

## 2025-05-13 PROCEDURE — 82150 ASSAY OF AMYLASE: CPT

## 2025-05-13 PROCEDURE — 1160F RVW MEDS BY RX/DR IN RCRD: CPT | Mod: CPTII,S$GLB,, | Performed by: DERMATOLOGY

## 2025-05-13 NOTE — PROGRESS NOTES
Patient Information  Name: Vicky Brand  : 1965  MRN: [id number]     Referring Physician:  No ref. provider found   Primary Care Physician:  Lia Cristina MD   Date of Visit: 25      Subjective:     History of Present lllness:    Vicky Brnad is a 59 y.o. female who presents with a chief complaint of lesion.  Location: left buttock  Duration: 5 days  Signs/Symptoms: starts as a feeling but she can not see anything, the next day the spot will be tender then turned purple. This is a little different from previous DH spots that she has had in the past (not as painful or blistering). Her last DH flare was about 23-24.  Exacerbating factors: gluten  Relieving factors/Prior treatments: blood work ordered by GI, has not started compounded medication yet.    Patient was last seen: 4/15/2025.  Prior notes by myself reviewed.   Clinical documentation obtained by nursing staff reviewed.    Review of Systems    Objective:   Physical Exam   Constitutional: She appears well-developed and well-nourished. No distress.   Neurological: She is alert and oriented to person, place, and time. She is not disoriented.   Psychiatric: She has a normal mood and affect.   Skin:   Areas Examined (abnormalities noted in diagram):   Genitals / Buttocks / Groin Inspection Performed  RLE Inspected  LLE Inspection Performed            Diagram Legend     Erythematous scaling macule/papule c/w actinic keratosis       Vascular papule c/w angioma      Pigmented verrucoid papule/plaque c/w seborrheic keratosis      Yellow umbilicated papule c/w sebaceous hyperplasia      Irregularly shaped tan macule c/w lentigo     1-2 mm smooth white papules consistent with Milia      Movable subcutaneous cyst with punctum c/w epidermal inclusion cyst      Subcutaneous movable cyst c/w pilar cyst      Firm pink to brown papule c/w dermatofibroma      Pedunculated fleshy papule(s) c/w skin tag(s)      Evenly pigmented macule c/w  junctional nevus     Mildly variegated pigmented, slightly irregular-bordered macule c/w mildly atypical nevus      Flesh colored to evenly pigmented papule c/w intradermal nevus       Pink pearly papule/plaque c/w basal cell carcinoma      Erythematous hyperkeratotic cursted plaque c/w SCC      Surgical scar with no sign of skin cancer recurrence      Open and closed comedones      Inflammatory papules and pustules      Verrucoid papule consistent consistent with wart     Erythematous eczematous patches and plaques     Dystrophic onycholytic nail with subungual debris c/w onychomycosis     Umbilicated papule    Erythematous-base heme-crusted tan verrucoid plaque consistent with inflamed seborrheic keratosis     Erythematous Silvery Scaling Plaque c/w Psoriasis     See annotation    No images are attached to the encounter or orders placed in the encounter.      [] Data reviewed  [] Prior external notes reviewed  [] Independent review of test  [] Management discussed with another provider  [] Independent historian    Assessment / Plan:        Dermatitis herpetiformis   - stable and chronic  Discussed that risks of oral dapsone outweigh benefits for mild disease such as hers.   Use Rx lidocaine 5% -  clobetasol 0.05% - dapsone 5% gel as needed for flares.   Continue gluten-free diet.    Folliculitis  Recommended an OTC benzoyl peroxide (2-5%) wash, such as CeraVe Acne Foaming Cream Cleanser, PanOxyl 4% Creamy Wash, or Neutrogena Clear Pore Cleanser/Mask. It may be best to use benzoyl peroxide while in the shower and to dry off with a white towel, as it can bleach towels, sheets, and clothing if not rinsed well from the skin. Use benzoyl peroxide wash at least 2-3 times per week to prevent bacterial resistance.      Follow up if symptoms worsen or fail to improve.      Rocio Finch MD, FAAD  [facility]

## 2025-05-14 LAB
ALBUMIN, SPE (OHS): 4.04 G/DL (ref 3.35–5.55)
ALPHA 1 GLOB (OHS): 0.23 GM/DL (ref 0.17–0.41)
ALPHA 2 GLOB (OHS): 0.64 GM/DL (ref 0.43–0.99)
BETA GLOB (OHS): 0.77 GM/DL (ref 0.5–1.1)
GAMMA GLOBULIN (OHS): 1.83 GM/DL (ref 0.67–1.58)
PATHOLOGIST INTERPRETATION - IFE SERUM (OHS): NORMAL
PATHOLOGIST REVIEW - SPE (OHS): NORMAL
PROT SERPL-MCNC: 7.5 GM/DL (ref 6–8.4)

## 2025-05-15 ENCOUNTER — PATIENT MESSAGE (OUTPATIENT)
Dept: GASTROENTEROLOGY | Facility: CLINIC | Age: 60
End: 2025-05-15
Payer: COMMERCIAL

## 2025-05-15 ENCOUNTER — TELEPHONE (OUTPATIENT)
Dept: OBSTETRICS AND GYNECOLOGY | Facility: CLINIC | Age: 60
End: 2025-05-15
Payer: COMMERCIAL

## 2025-05-15 NOTE — TELEPHONE ENCOUNTER
----- Message from Kim sent at 5/15/2025  1:49 PM CDT -----  Contact: [phone]  [email or web address] Patient is calling for Medical Advice regarding:asking for an appt How long has patient had these symptoms:Pharmacy name and phone#:Patient wants a call back or thru [facility]:call back Comments:She states she has itchiness by her vagina she lookes and she sees black spots and is asking for an appt ASAP Please advise patient replies from provider may take up to 48 hours.

## 2025-05-15 NOTE — TELEPHONE ENCOUNTER
I contacted pt back in regards to getting scheduled. I was able to get pt scheduled per her request for soonest appt with NP.

## 2025-05-16 LAB — W TISSUE TRANSGLUTAMINASE IGA AB: 1.4 U/ML

## 2025-06-02 ENCOUNTER — PATIENT MESSAGE (OUTPATIENT)
Dept: NUTRITION | Facility: CLINIC | Age: 60
End: 2025-06-02
Payer: COMMERCIAL

## 2025-06-13 ENCOUNTER — OFFICE VISIT (OUTPATIENT)
Dept: PRIMARY CARE CLINIC | Facility: CLINIC | Age: 60
End: 2025-06-13
Payer: COMMERCIAL

## 2025-06-13 VITALS
OXYGEN SATURATION: 100 % | DIASTOLIC BLOOD PRESSURE: 90 MMHG | SYSTOLIC BLOOD PRESSURE: 136 MMHG | HEIGHT: 66 IN | WEIGHT: 132.5 LBS | TEMPERATURE: 100 F | BODY MASS INDEX: 21.29 KG/M2 | HEART RATE: 90 BPM

## 2025-06-13 DIAGNOSIS — J32.9 SINUSITIS, UNSPECIFIED CHRONICITY, UNSPECIFIED LOCATION: ICD-10-CM

## 2025-06-13 DIAGNOSIS — R09.81 SINUS CONGESTION: ICD-10-CM

## 2025-06-13 DIAGNOSIS — R05.9 COUGH, UNSPECIFIED TYPE: Primary | ICD-10-CM

## 2025-06-13 PROCEDURE — 99999 PR PBB SHADOW E&M-EST. PATIENT-LVL IV: CPT | Mod: PBBFAC,,, | Performed by: NURSE PRACTITIONER

## 2025-06-13 RX ORDER — PREDNISONE 20 MG/1
20 TABLET ORAL DAILY
Qty: 5 TABLET | Refills: 0 | Status: SHIPPED | OUTPATIENT
Start: 2025-06-13

## 2025-06-13 RX ORDER — DOXYCYCLINE HYCLATE 100 MG
100 TABLET ORAL EVERY 12 HOURS
Qty: 10 TABLET | Refills: 0 | Status: SHIPPED | OUTPATIENT
Start: 2025-06-13

## 2025-06-13 RX ORDER — PROMETHAZINE HYDROCHLORIDE AND DEXTROMETHORPHAN HYDROBROMIDE 6.25; 15 MG/5ML; MG/5ML
5 SYRUP ORAL EVERY 4 HOURS PRN
Qty: 118 ML | Refills: 0 | Status: SHIPPED | OUTPATIENT
Start: 2025-06-13 | End: 2025-06-23

## 2025-06-13 NOTE — PROGRESS NOTES
[facility] Note    Chief Complaint      Chief Complaint   Patient presents with    Cough    Sinus Problem    sneezing     X 1 day       History of Present Illness      Vicky Brand is a 59 y.o. female who presents today for   Chief Complaint   Patient presents with    Cough    Sinus Problem    sneezing     X 1 day         CHIEF COMPLAINT:  Patient presents with symptoms of an upper respiratory infection, including sinus congestion and cough.    HPI:  Patient reports onset of illness yesterday with symptoms including sneezing, rhinorrhea, cough, and congestion. She sought medical attention promptly after symptom onset, in contrast to a previous illness in April when she waited 2 weeks before seeking care. Her cough is productive with yellowish-gray sputum. 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 She reports difficulty maintaining hygiene at work despite frequent hand washing. She notes that prednisone was effective for decongestion during her previous illness.    She denies expectorating green sputum or any known medical diagnoses.      ROS:  General: -fever, -chills, -fatigue, -weight gain, -weight loss  Eyes: -vision changes, -redness, -discharge  ENT: -ear pain, +nasal congestion, -sore throat, +frequent sneezing, +runny nose  Cardiovascular: -chest pain, -palpitations, -lower extremity edema  Respiratory: +cough, -shortness of breath, +productive cough  Gastrointestinal: -abdominal pain, -nausea, -vomiting, -diarrhea, -constipation, -blood in stool  Genitourinary: -dysuria, -hematuria, -frequency  Musculoskeletal: -joint pain, -muscle pain  Skin: -rash, -lesion  Neurological: -headache, -dizziness, -numbness, -tingling  Psychiatric: -anxiety, -depression, -sleep difficulty    Cough    Sinus Problem  Associated symptoms include coughing.        Review of Systems   Respiratory:  Positive for cough.   "       Family History:  family history includes Diabetes in her father; Hypertension in her mother; Liver disease in her sister; Ovarian cancer in her maternal grandmother; Pacemaker/defibrilator in her mother; Stroke (age of onset: 49) in her father.   Family history was reviewed with patient.     Medications:  Encounter Medications[1]    Allergies:  Review of patient's allergies indicates:   Allergen Reactions    Sulfa (sulfonamide antibiotics) Rash    Cetirizine Other (See Comments)     Other reaction(s): Unable to Function    Other reaction(s): Unable to Function Other reaction(s): Unable to Function    Cetirizine-pseudoephedrine Other (See Comments)     Other reaction(s): Inability to focus/function    Erythromycin Nausea And Vomiting     Other reaction(s): Nausea    Voltaren [diclofenac sodium] Diarrhea    Gluten Rash     Celiac's disease       Health Maintenance:  Health Maintenance   Topic Date Due    Mammogram  12/23/2025    DEXA Scan  12/02/2026    Cervical Cancer Screening  12/13/2026    Lipid Panel  10/16/2029    TETANUS VACCINE  07/27/2031    Colorectal Cancer Screening  01/15/2035    RSV Vaccine (Age 60+ and Pregnant patients) (1 - 1-dose 75+ series) 11/11/2041    Hepatitis C Screening  Completed    Shingles Vaccine  Completed    Influenza Vaccine  Completed    HIV Screening  Completed    COVID-19 Vaccine  Completed    Pneumococcal Vaccines (Age 50+)  Completed     Health Maintenance Topics with due status: Not Due       Topic Last Completion Date    TETANUS VACCINE 07/27/2021    Cervical Cancer Screening 12/13/2023    Lipid Panel 10/16/2024    DEXA Scan 12/02/2024    Mammogram 12/23/2024    Colorectal Cancer Screening 01/15/2025    RSV Vaccine (Age 60+ and Pregnant patients) Not Due       Physical Exam      Vital Signs  Temp: 99.5 °F (37.5 °C)  Pulse: 90  SpO2: 100 %  BP: (!) 136/90  BP Location: Right arm  Patient Position: Sitting  Pain Score: 0-No pain  Height and Weight  Height: 5' 6" (167.6 " cm)  Weight: 60.1 kg (132 lb 7.9 oz)  BSA (Calculated - sq m): 1.67 sq meters  BMI (Calculated): 21.4  Weight in (lb) to have BMI = 25: 154.6]    Physical Exam  Vitals reviewed.   Constitutional:       Appearance: Normal appearance. She is normal weight.   HENT:      Head: Normocephalic and atraumatic.      Nose: Congestion and rhinorrhea present.      Mouth/Throat:      Mouth: Mucous membranes are moist.      Pharynx: Oropharynx is clear.   Eyes:      Extraocular Movements: Extraocular movements intact.      Conjunctiva/sclera: Conjunctivae normal.      Pupils: Pupils are equal, round, and reactive to light.   Cardiovascular:      Rate and Rhythm: Normal rate and regular rhythm.      Pulses: Normal pulses.      Heart sounds: Normal heart sounds.   Pulmonary:      Effort: Pulmonary effort is normal.      Breath sounds: Normal breath sounds.   Musculoskeletal:         General: Normal range of motion.      Cervical back: Normal range of motion and neck supple.   Skin:     General: Skin is warm and dry.      Capillary Refill: Capillary refill takes less than 2 seconds.   Neurological:      General: No focal deficit present.      Mental Status: She is alert and oriented to person, place, and time. Mental status is at baseline.   Psychiatric:         Mood and Affect: Mood normal.         Behavior: Behavior normal.         Thought Content: Thought content normal.         Judgment: Judgment normal.            Assessment/Plan     Vicky Brand is a 59 y.o.female with:    Cough, unspecified type  -     promethazine-dextromethorphan (PROMETHAZINE-DM) 6.25-15 mg/5 mL Syrp; Take 5 mLs by mouth every 4 (four) hours as needed (cough).  Dispense: 118 mL; Refill: 0    Sinus congestion  -     predniSONE (DELTASONE) 20 MG tablet; Take 1 tablet (20 mg total) by mouth once daily.  Dispense: 5 tablet; Refill: 0    Sinusitis, unspecified chronicity, unspecified location  -     doxycycline (VIBRA-TABS) 100 MG tablet; Take 1  tablet (100 mg total) by mouth every 12 (twelve) hours.  Dispense: 10 tablet; Refill: 0        As above, continue current medications and maintain follow up with specialists.  Return to clinic as needed.    Greater than 50% of visit was spent face to face with patient.  All questions were answered to patient's satisfaction.          Karen L Spencer, NP-C [facility]                     [1]   Outpatient Encounter Medications as of 6/13/2025   Medication Sig Dispense Refill    amitriptyline (ELAVIL) 10 MG tablet Take 1 tablet (10 mg total) by mouth nightly as needed for Insomnia (insomnia). 30 tablet 11    ammonium lactate 12 % Crea Apply 1 g topically Daily. 140 g 1    fluoride, sodium, (PREVIDENT 5000) 1.1 % Pste Place onto teeth.      COVID-19 (COMIRNATY 2024-25, 12Y UP,,PF,) 30 mcg/0.3 mL IM vaccine (>/= 11 yo) Inject into the muscle. (Patient not taking: Reported on 6/13/2025) 0.3 mL 0    doxycycline (VIBRA-TABS) 100 MG tablet Take 1 tablet (100 mg total) by mouth every 12 (twelve) hours. 10 tablet 0    predniSONE (DELTASONE) 20 MG tablet Take 1 tablet (20 mg total) by mouth once daily. 5 tablet 0    promethazine-dextromethorphan (PROMETHAZINE-DM) 6.25-15 mg/5 mL Syrp Take 5 mLs by mouth every 4 (four) hours as needed (cough). 118 mL 0    [DISCONTINUED] diclofenac sodium (VOLTAREN) 1 % Gel Apply 2 g topically 4 (four) times daily. Apply to affected area up to 4 times per day PRN pain 100 g 4    [DISCONTINUED] predniSONE (DELTASONE) 20 MG tablet Take 1 tablet (20 mg total) by mouth once daily. 5 tablet 0     No facility-administered encounter medications on file as of 6/13/2025.

## 2025-06-14 ENCOUNTER — RESULTS FOLLOW-UP (OUTPATIENT)
Dept: GASTROENTEROLOGY | Facility: CLINIC | Age: 60
End: 2025-06-14

## 2025-06-19 ENCOUNTER — TELEPHONE (OUTPATIENT)
Dept: PODIATRY | Facility: CLINIC | Age: 60
End: 2025-06-19
Payer: COMMERCIAL

## 2025-06-19 NOTE — TELEPHONE ENCOUNTER
Left  for pt with callback number of [phone] in regards to r/s 7/2 due to provider being out of clinic. Portal message sent. R/s to first available and appt reminder mailed.

## 2025-07-02 ENCOUNTER — PATIENT MESSAGE (OUTPATIENT)
Dept: PODIATRY | Facility: CLINIC | Age: 60
End: 2025-07-02
Payer: COMMERCIAL

## 2025-07-09 ENCOUNTER — OFFICE VISIT (OUTPATIENT)
Dept: PODIATRY | Facility: CLINIC | Age: 60
End: 2025-07-09
Payer: COMMERCIAL

## 2025-07-09 VITALS
WEIGHT: 129.31 LBS | BODY MASS INDEX: 20.78 KG/M2 | SYSTOLIC BLOOD PRESSURE: 123 MMHG | DIASTOLIC BLOOD PRESSURE: 75 MMHG | HEIGHT: 66 IN | HEART RATE: 74 BPM

## 2025-07-09 DIAGNOSIS — L60.8 CHANGE IN NAIL APPEARANCE: Primary | ICD-10-CM

## 2025-07-09 PROCEDURE — 3074F SYST BP LT 130 MM HG: CPT | Mod: CPTII,S$GLB,, | Performed by: PODIATRIST

## 2025-07-09 PROCEDURE — 1160F RVW MEDS BY RX/DR IN RCRD: CPT | Mod: CPTII,S$GLB,, | Performed by: PODIATRIST

## 2025-07-09 PROCEDURE — 3078F DIAST BP <80 MM HG: CPT | Mod: CPTII,S$GLB,, | Performed by: PODIATRIST

## 2025-07-09 PROCEDURE — 99999 PR PBB SHADOW E&M-EST. PATIENT-LVL III: CPT | Mod: PBBFAC,,, | Performed by: PODIATRIST

## 2025-07-09 PROCEDURE — 1159F MED LIST DOCD IN RCRD: CPT | Mod: CPTII,S$GLB,, | Performed by: PODIATRIST

## 2025-07-09 PROCEDURE — 99214 OFFICE O/P EST MOD 30 MIN: CPT | Mod: S$GLB,,, | Performed by: PODIATRIST

## 2025-07-09 PROCEDURE — 3008F BODY MASS INDEX DOCD: CPT | Mod: CPTII,S$GLB,, | Performed by: PODIATRIST

## 2025-07-09 NOTE — PROGRESS NOTES
Subjective:      Patient ID: Vicky Brand is a 59 y.o. female.    Chief Complaint:   Nail Problem (Bl great nails discoloration)    Vicky is a 59 y.o. female who presents to the clinic for nail eval. Cutting straight across.   No pain    Using rx amm lac     Also bought new balance... feels good in wider shoes.   Also wearing sandals .              Past Medical History:   Diagnosis Date    SAMANTHA positive     BCC (basal cell carcinoma) 01/31/2025    midline anterior neck    Celiac disease 09/29/2022    Dyspareunia in female 07/27/2021    Osphena, pelvic floor tx 2021, GYN [facility]    Eye abnormalities     early yellowing of lens, precursor to cataracts, Dr Webster,  wear glasses at all times    Fracture of fifth toe, left, closed, with delayed healing, subsequent encounter 08/21/2018    Helicobacter pylori infection     tx GI Dr José Luis Irizarry 2022    Intramural leiomyoma of uterus 05/22/2019    Irregular, dysmenorrhea    Nocturia 06/17/2021    Ocular migraine     vision fuzzy x 3 in 2013, metallic shapes in her vision, Dr Webster,  resolved after 30 min , no pain    CALEB (obstructive sleep apnea)     Lysenko, CPAP start 7/21    Palpitations 09/29/2022    Paradoxical insomnia 10/30/2017    prior to menses, previously took amitryptiline 2.5 mg prn  qhs     Posterior vitreous detachment of left eye 05/22/2019    Flashes of light, ocular migranes, Ophthalmologist Dr Orourke, retinal specialist Dr Soni, rec avoid exercise until she follows up 5/2019    Shoulder pain, left 2007    LEFT, after fall, better with PT at [facility] [place]    Stress and adjustment reaction 09/29/2022    Urticaria      Past Surgical History:   Procedure Laterality Date    COLONOSCOPY N/A 06/09/2017    Procedure: COLONOSCOPY;  Surgeon: Davy Prince MD;  Location: [facility] ([address] [place] [address]);  Service: Endoscopy;  Laterality: N/A;    COLONOSCOPY N/A 1/15/2025    Procedure: COLONOSCOPY;  Surgeon: Umer Winters MD;  Location: [facility]  (4TH FLR);  Service: Endoscopy;  Laterality: N/A;  12/24 Ref by Jos Sanchez, portal. [email or web address]  Jm/case lengths adjusted only  1/8-pre call complete-tb    DE QUERVAIN'S RELEASE Right 06/08/2022    Procedure: RELEASE, HAND, FOR DEQUERVAIN'S TENOSYNOVITIS - RIGHT;  Surgeon: Devan Wagner MD;  Location: [facility] OR;  Service: Orthopedics;  Laterality: Right;    DILATION AND CURETTAGE OF UTERUS      ENDOMETRIAL ABLATION N/A 2017    ESOPHAGOGASTRODUODENOSCOPY N/A 11/11/2022    Procedure: EGD (ESOPHAGOGASTRODUODENOSCOPY);  Surgeon: José Luis Irizarry MD;  Location: [facility] (4TH FLR);  Service: Endoscopy;  Laterality: N/A;  Fully vaccinated/ inst portal-RB    ESOPHAGOGASTRODUODENOSCOPY N/A 11/26/2024    Procedure: EGD (ESOPHAGOGASTRODUODENOSCOPY);  Surgeon: Umer Winters MD;  Location: [facility] (4TH FLR);  Service: Endoscopy;  Laterality: N/A;  Referred by Dr. Winters, 11/26/24, portal -ml  11/12-lvm notifying pt of new arrival time (215pm), updated instructions sent to portal-KPvt  11/19-lvm for pre call-tb  11/21-LVM for pre call-JM  11/21 pt confirmed.cf    HYSTEROSCOPY WITH DILATION AND CURETTAGE OF UTERUS N/A 01/19/2022    Procedure: HYSTEROSCOPY, WITH DILATION AND CURETTAGE OF UTERUS;  Surgeon: Jannette Jarrell MD;  Location: [facility];  Service: OB/GYN;  Laterality: N/A;    TUBAL LIGATION       Medications Ordered Prior to Encounter[1]  Review of patient's allergies indicates:   Allergen Reactions    Sulfa (sulfonamide antibiotics) Rash    Cetirizine Other (See Comments)     Other reaction(s): Unable to Function    Other reaction(s): Unable to Function Other reaction(s): Unable to Function    Cetirizine-pseudoephedrine Other (See Comments)     Other reaction(s): Inability to focus/function    Erythromycin Nausea And Vomiting     Other reaction(s): Nausea    Voltaren [diclofenac sodium] Diarrhea    Gluten Rash     Celiac's disease       Review of Systems   Constitutional: Negative for chills, decreased  "appetite, fever, malaise/fatigue, night sweats, weight gain and weight loss.   Cardiovascular:  Negative for chest pain, claudication, dyspnea on exertion, leg swelling, palpitations and syncope.   Respiratory:  Negative for cough and shortness of breath.    Endocrine: Negative for cold intolerance and heat intolerance.   Hematologic/Lymphatic: Negative for bleeding problem. Does not bruise/bleed easily.   Skin:  Positive for nail changes. Negative for color change, dry skin, flushing, itching, poor wound healing, rash, skin cancer, suspicious lesions and unusual hair distribution.   Musculoskeletal:  Negative for arthritis, back pain, falls, gout, joint pain, joint swelling, muscle cramps, muscle weakness, myalgias, neck pain and stiffness.   Gastrointestinal:  Negative for diarrhea, nausea and vomiting.   Neurological:  Negative for dizziness, focal weakness, light-headedness, numbness, paresthesias, tremors, vertigo and weakness.   Psychiatric/Behavioral:  Negative for altered mental status and depression. The patient does not have insomnia.    Allergic/Immunologic: Negative.            Objective:       Vitals:    07/09/25 0730   BP: 123/75   Pulse: 74   Weight: 58.7 kg (129 lb 4.8 oz)   Height: 5' 6" (1.676 m)   PainSc: 0-No pain   PainLoc: Toe   58.7 kg (129 lb 4.8 oz)     Physical Exam  Vitals reviewed.   Constitutional:       General: She is not in acute distress.     Appearance: She is well-developed. She is not ill-appearing, toxic-appearing or diaphoretic.      Comments: Proper supportive shoegear      Cardiovascular:      Pulses:           Dorsalis pedis pulses are 2+ on the right side and 2+ on the left side.        Posterior tibial pulses are 2+ on the right side and 2+ on the left side.   Musculoskeletal:      Right lower leg: No edema.      Left lower leg: No edema.      Right ankle: Normal.      Right Achilles Tendon: Normal.      Left ankle: Normal.      Left Achilles Tendon: Normal.      Right " foot: Decreased range of motion. Deformity and bunion present. No tenderness or bony tenderness.      Left foot: Decreased range of motion. Deformity and bunion present. No tenderness or bony tenderness.      Comments: Reducible extensor and flexor contractures at the MTPJ and PIPJ of toes 2-5, bilat.      No pop    Feet:      Right foot:      Protective Sensation: 10 sites tested.  10 sites sensed.      Skin integrity: Dry skin present. No ulcer, blister, skin breakdown, erythema, warmth or callus.      Left foot:      Protective Sensation: 10 sites tested.  10 sites sensed.      Skin integrity: Dry skin present. No ulcer, blister, skin breakdown, erythema, warmth or callus.      Comments: Lateral incurvation.  Thin clear nails  No edema to nail borders   Skin:     General: Skin is warm.      Capillary Refill: Capillary refill takes 2 to 3 seconds.      Coloration: Skin is not pale.      Findings: No erythema or rash.   Neurological:      Mental Status: She is alert and oriented to person, place, and time.      Sensory: No sensory deficit.   Psychiatric:         Attention and Perception: Attention normal.         Mood and Affect: Mood normal.         Speech: Speech normal.         Behavior: Behavior normal.         Thought Content: Thought content normal.         Cognition and Memory: Cognition normal.         Judgment: Judgment normal.               Assessment:       Encounter Diagnosis   Name Primary?    Change in nail appearance Yes           Plan:       Vicky was seen today for nail problem.    Diagnoses and all orders for this visit:    Change in nail appearance        I counseled the patient on her conditions, their implications and medical management.    Reassurance       Utilizing sterile toenail clippers I aggressively debrided the offending nail border  area was cleansed with alcohol. Patient tolerated the procedure well     Discussed with pt shoegear options.     Cotinue amm lac    Prn                   [1]   Current Outpatient Medications on File Prior to Visit   Medication Sig Dispense Refill    ammonium lactate 12 % Crea Apply 1 g topically Daily. 140 g 1    COVID-19 (COMIRNATY 2024-25, 12Y UP,,PF,) 30 mcg/0.3 mL IM vaccine (>/= 13 yo) Inject into the muscle. 0.3 mL 0    fluoride, sodium, (PREVIDENT 5000) 1.1 % Pste Place onto teeth.      amitriptyline (ELAVIL) 10 MG tablet Take 1 tablet (10 mg total) by mouth nightly as needed for Insomnia (insomnia). (Patient not taking: Reported on 7/9/2025) 30 tablet 11    doxycycline (VIBRA-TABS) 100 MG tablet Take 1 tablet (100 mg total) by mouth every 12 (twelve) hours. 10 tablet 0    predniSONE (DELTASONE) 20 MG tablet Take 1 tablet (20 mg total) by mouth once daily. 5 tablet 0    [DISCONTINUED] diclofenac sodium (VOLTAREN) 1 % Gel Apply 2 g topically 4 (four) times daily. Apply to affected area up to 4 times per day PRN pain 100 g 4     No current facility-administered medications on file prior to visit.

## 2025-07-14 ENCOUNTER — TELEPHONE (OUTPATIENT)
Dept: PRIMARY CARE CLINIC | Facility: CLINIC | Age: 60
End: 2025-07-14
Payer: COMMERCIAL

## 2025-07-14 DIAGNOSIS — Z00.00 ROUTINE GENERAL MEDICAL EXAMINATION AT A HEALTH CARE FACILITY: Primary | ICD-10-CM

## 2025-07-14 NOTE — TELEPHONE ENCOUNTER
Copied from CRM #[id number]. Topic: General Inquiry - Patient Advice  >> Jul 14, 2025  9:57 AM Christina wrote:  Type: Orders Request    What orders/ testing are being requested?Annual Labs     Is there a future appointment scheduled for the patient with PCP?Yes    When?10/28     Would you prefer a response via [email or web address]? My chart     Best call back number: [phone]    Comments:

## 2025-07-15 ENCOUNTER — OFFICE VISIT (OUTPATIENT)
Dept: GASTROENTEROLOGY | Facility: CLINIC | Age: 60
End: 2025-07-15
Payer: COMMERCIAL

## 2025-07-15 ENCOUNTER — TELEPHONE (OUTPATIENT)
Dept: GASTROENTEROLOGY | Facility: CLINIC | Age: 60
End: 2025-07-15
Payer: COMMERCIAL

## 2025-07-15 ENCOUNTER — PATIENT MESSAGE (OUTPATIENT)
Dept: GASTROENTEROLOGY | Facility: CLINIC | Age: 60
End: 2025-07-15

## 2025-07-15 DIAGNOSIS — K90.0 CELIAC SPRUE: Primary | ICD-10-CM

## 2025-07-15 DIAGNOSIS — K52.832 LYMPHOCYTIC COLITIS: ICD-10-CM

## 2025-07-15 PROCEDURE — 1160F RVW MEDS BY RX/DR IN RCRD: CPT | Mod: CPTII,95,, | Performed by: INTERNAL MEDICINE

## 2025-07-15 PROCEDURE — 1159F MED LIST DOCD IN RCRD: CPT | Mod: CPTII,95,, | Performed by: INTERNAL MEDICINE

## 2025-07-15 PROCEDURE — 98006 SYNCH AUDIO-VIDEO EST MOD 30: CPT | Mod: 95,,, | Performed by: INTERNAL MEDICINE

## 2025-07-15 NOTE — PROGRESS NOTES
The patient location is:  In [place]  The chief complaint leading to consultation is:  Celiac sprue microscopic colitis  Visit type: audiovisual  Face to Face time with patient: 30 minutes of total time spent on the encounter, which includes face to face time and non-face to face time preparing to see the patient (eg, review of tests), Obtaining and/or reviewing separately obtained history, Documenting clinical information in the electronic or other health record, Independently interpreting results (not separately reported) and communicating results to the patient/family/caregiver, or Care coordination (not separately reported). Each patient to whom he or she provides medical services by telemedicine is:  (1) informed of the relationship between the physician and patient and the respective role of any other health care provider with respect to management of the patient; and (2) notified that he or she may decline to receive medical services by telemedicine and may withdraw from such care at any time.    Notes:         [facility] Consultation Note    Reason for Consult:  The primary encounter diagnosis was Celiac sprue. A diagnosis of Lymphocytic colitis was also pertinent to this visit.    PCP:   Lia Cristina       Referring MD:  No referring provider defined for this encounter.    Initial History of Present Illness (HPI):  This is a 59 y.o. female here for evaluation of microscopic colitis and celiac sprue.  Patient said it took several doctors in quite some time to make a diagnosis but she is been diagnosed with celiac sprue based on positive serology and small-bowel biopsies consistent with celiac sprue she also has a dermatological manifestation called dermatitis herpetiformis followed by a dermatologist she said she was informed that she did not need a biopsy of it she has seen dietitian she is on a very strict gluten free diet. She is average risk for colon rectal cancer by personal  and family history nobody with esophageal or stomach cancer nobody with small-bowel cancer nobody with pancreatitis or pancreatic cancer nobody with colon cancer or advanced colon adenomas polyps no FAP no attenuated FAP no MA P no Kerr syndrome nobody in the family with celiac sprue or inflammatory bowel disease.  She has no biological children.  Her weight has been stable on her gluten free diet.  Patient is feeling good on a gluten free diet saw a dietitian no complaints whatsoever she she had a recent EGD on November 26, 2024 which was read by pathology as 1. Duodenum, biopsy:Duodenal mucosa with patchy mild intraepithelial lymphocytosis. Villous architecture is preserved. Compatible with Marsh 0 lesion. 2. Stomach, biopsy:   Gastric antral and oxyntic mucosa with chronic inactive gastritis. Negative for Helicobacter pylori.  She underwent a colonoscopy on January 15, 2025 for evaluation of diarrhea colon looked normal no polyps but she had some lymphocytic colitis. 1. TERMINAL ILEUM, BIOPSY:- Ileal mucosa with no significant histopathologic abnormality- No evidence of dysplasia. 2. COLON, RANDOM, BIOPSY:- Microscopic (lymphocytic) colitis- No evidence of dysplasia.Interp By David Arguello M.D., Signed on 01/16/2025.  Back when she started extra fiber in her diet now her diarrhea completely gone away she is having 1 regular bowel movement a day never had blood in her stool she feels great she did have a low fecal elastase while she is having diarrhea show she is going to repeat her stool for fecal elastase with a solid stool sample this week.  Otherwise she feels great she is not interested in Entocort or budesonide she is not taking NSAIDs or PPIs or other medicines normally associated with lymphocytic colitis.  She will let me know if her diarrhea returns. She is on a strict gluten free diet she eats very healthy and very clean.    Abdominal pain - no  Reflux - no  Dysphagia - no   Bowel habits - normal  GI  bleeding - none  NSAID usage - none    Interval HPI 07/15/2025:  The patient's last visit with me was on 1/25/2025.      ROS:  Constitutional: No fevers, chills, No weight loss  ENT:  No heartburn no dysphagia no odynophagia no hoarseness  CV: No chest pain, no palpitation  Pulm: No cough, No shortness of breath, no wheezing  Ophtho: No vision changes  GI: see HPI  Derm: No rash, no itching  Heme: No lymphadenopathy, No easy bruising  MSK: No significant arthritis  : No dysuria, No hematuria  Endo: No hot or cold intolerance  Neuro: No syncope, No seizure, no strokes  Psych: No uncontrolled anxiety, No uncontrolled depression    Medical History:  has a past medical history of SAMANTHA positive, BCC (basal cell carcinoma) (01/31/2025), Celiac disease (09/29/2022), Dyspareunia in female (07/27/2021), Eye abnormalities, Fracture of fifth toe, left, closed, with delayed healing, subsequent encounter (08/21/2018), Helicobacter pylori infection, Intramural leiomyoma of uterus (05/22/2019), Nocturia (06/17/2021), Ocular migraine, CALEB (obstructive sleep apnea), Palpitations (09/29/2022), Paradoxical insomnia (10/30/2017), Posterior vitreous detachment of left eye (05/22/2019), Shoulder pain, left (2007), Stress and adjustment reaction (09/29/2022), and Urticaria.    Surgical History:  has a past surgical history that includes Dilation and curettage of uterus; Tubal ligation; Colonoscopy (N/A, 06/09/2017); Endometrial ablation (N/A, 2017); Hysteroscopy with dilation and curettage of uterus (N/A, 01/19/2022); De Quervain's release (Right, 06/08/2022); Esophagogastroduodenoscopy (N/A, 11/11/2022); Esophagogastroduodenoscopy (N/A, 11/26/2024); and Colonoscopy (N/A, 1/15/2025).    Family History: family history includes Diabetes in her father; Hypertension in her mother; Liver disease in her sister; Ovarian cancer in her maternal grandmother; Pacemaker/defibrilator in her mother; Stroke (age of onset: 49) in her father..     Social  History:  reports that she has never smoked. She has never used smokeless tobacco. She reports that she does not drink alcohol and does not use drugs.    Review of patient's allergies indicates:   Allergen Reactions    Sulfa (sulfonamide antibiotics) Rash    Cetirizine Other (See Comments)     Other reaction(s): Unable to Function    Other reaction(s): Unable to Function Other reaction(s): Unable to Function    Cetirizine-pseudoephedrine Other (See Comments)     Other reaction(s): Inability to focus/function    Erythromycin Nausea And Vomiting     Other reaction(s): Nausea    Voltaren [diclofenac sodium] Diarrhea    Gluten Rash     Celiac's disease       Medication List with Changes/Refills   Current Medications    AMITRIPTYLINE (ELAVIL) 10 MG TABLET    Take 1 tablet (10 mg total) by mouth nightly as needed for Insomnia (insomnia).    AMMONIUM LACTATE 12 % CREA    Apply 1 g topically Daily.    COVID-19 (COMIRNATY 2024-25, 12Y UP,,PF,) 30 MCG/0.3 ML IM VACCINE (>/= 13 YO)    Inject into the muscle.    DOXYCYCLINE (VIBRA-TABS) 100 MG TABLET    Take 1 tablet (100 mg total) by mouth every 12 (twelve) hours.    FLUORIDE, SODIUM, (PREVIDENT 5000) 1.1 % PSTE    Place onto teeth.    PREDNISONE (DELTASONE) 20 MG TABLET    Take 1 tablet (20 mg total) by mouth once daily.         Objective Findings:    Vital Signs:  LMP  (LMP Unknown)   There is no height or weight on file to calculate BMI.    Physical Exam:  Telemedicine video visit  General Appearance: Well appearing in no acute distress  Eyes:    No scleral icterus  Neurologic:  Alert and oriented x4  Psychiatric:  Normal speech mentation and affect    Labs:  Lab Results   Component Value Date    WBC 4.16 03/07/2025    HGB 13.0 03/07/2025    HCT 41.4 03/07/2025     03/07/2025    CHOL 177 10/16/2024    TRIG 73 10/16/2024    HDL 52 10/16/2024    ALT 12 03/07/2025    AST 24 03/07/2025     03/07/2025    K 4.3 03/07/2025     03/07/2025    CREATININE 0.9  03/07/2025    BUN 14 03/07/2025    CO2 29 03/07/2025    TSH 1.780 08/23/2023    HGBA1C 5.4 10/02/2023       Medical Decision Making:  Lab work reviewed  Lymphocytic colitis talk given given at last visit  Celiac sprue she knows gluten free diet for life  The [employer] scope EG-760R                          ([id number]) was introduced through the mouth, and                          advanced to the third part of duodenum. The upper                          GI endoscopy was accomplished without difficulty.                          The patient tolerated the procedure well.   Findings:       The examined duodenum was normal. Biopsies for histology were taken        with a cold forceps for evaluation of celiac disease. Biopsies were        taken with a cold forceps for histology. Estimated blood loss was        minimal.        Patchy mildly erythematous mucosa without bleeding was found in the        prepyloric region of the stomach and at the pylorus. Biopsies were        taken with a cold forceps for histology. Biopsies were taken with a        cold forceps for Helicobacter pylori testing. Estimated blood loss        was minimal.        The cardia and gastric fundus were normal on retroflexion.        The examined esophagus was normal. Z-line was sharp. No esophagitis        and no columnar esophagus and no lesions seen with NBI or white        light.        The Z-line was regular and was found 40 cm from the incisors.   Impression:            - Normal examined duodenum. Biopsied.                          - Erythematous mucosa in the prepyloric region of                          the stomach and pylorus. Biopsied.                          - Normal esophagus.                          - Z-line regular, 40 cm from the incisors.   Recommendation:        - Discharge patient to home.                          - Await pathology results.                          - Telephone endoscopist for pathology results in 3                           weeks.                          - Return to GI clinic at the next available                          appointment.                          - Return to primary care physician.                          - Gluten free diet indefinitely.                          - Repeat upper endoscopy in 3 years for                          surveillance.                          - The findings and recommendations were discussed                          with the patient.   Attending Participation:        I personally performed the entire procedure.   Umer Winters MD   11/26/2024      The [employer] scope EC-760S-V/L ([id number]) was                          introduced through the anus and advanced to the                          terminal ileum, with identification of the                          appendiceal orifice and IC valve. The colonoscopy                          was performed without difficulty. The patient                          tolerated the procedure well. The quality of the                          bowel preparation was excellent. The terminal                          ileum, ileocecal valve, appendiceal orifice, and                          rectum were photographed. Scope insertion time was                          5 minutes. Scope withdrawal time was 11 minutes.                          The total duration of the procedure was 16                          minutes. Colonoscopy polyp detection was aided by                          The [employer] intelligent endoscopy module. Good                          look.   Findings:       The terminal ileum appeared normal. Biopsies were taken with a cold        forceps for histology. Estimated blood loss was minimal.        The colon (entire examined portion) appeared normal. Biopsies for        histology were taken with a cold forceps from the entire colon for        evaluation of microscopic colitis. Estimated blood loss was minimal.        A few diverticula were found in the  recto-sigmoid colon, sigmoid        colon, descending colon and transverse colon.        The perianal and digital rectal examinations were normal.        The retroflexed view of the distal rectum and anal verge was normal        and showed no anal or rectal abnormalities.        Non-bleeding internal hemorrhoids were found during retroflexion.        The hemorrhoids were mild.   Impression:            - The examined portion of the ileum was normal.                          Biopsied.                          - The entire examined colon is normal. Biopsied.                          - Diverticulosis in the recto-sigmoid colon, in                          the sigmoid colon, in the descending colon and in                          the transverse colon.                          - Non-bleeding internal hemorrhoids.   Recommendation:        - Discharge patient to home.                          - Await pathology results.                          - Telephone endoscopist for pathology results in 3                          weeks.                          - Repeat colonoscopy in 10 years for screening                          purposes.                          - THIS RECOMMENDATION of 10-Years FOR NEXT                          SCREENING COLONOSCOPY ASSUMES THAT YOU WILL NOT                          HAVE HAD OR NOT HAD A FIRST OR SECOND DEGREE                          RELATIVE/S WITH COLORECTAL CANCER OR AN ADVANCE                          COLON ADENOMAS BEFORE THE AGE OF 60 YEARS OF AGE                          OF THOSE INDIVIDUALS BY THE TIME OF YOUR NEXT                          SCREENING COLONOSCOPY.                          - Return to GI clinic at the next available                          appointment.                          - The findings and recommendations were discussed                          with the patient.   Attending Participation:        I personally performed the entire procedure.   Umer Winters MD    1/15/2025   Final Pathologic Diagnosis 1. TERMINAL ILEUM, BIOPSY:  - Ileal mucosa with no significant histopathologic abnormality  - No evidence of dysplasia    2. COLON, RANDOM, BIOPSY:  - Microscopic (lymphocytic) colitis  - No evidence of dysplasia   Comment: Interp By David Arguello M.D., Signed on 01/16/2025      Final Pathologic Diagnosis 1. Duodenum, biopsy:Duodenal mucosa with patchy mild intraepithelial lymphocytosis.  Villous architecture is preserved.  Compatible with Marsh 0 lesion.    2. Stomach, biopsy:  Gastric antral and oxyntic mucosa with chronic inactive gastritis.  Negative for Helicobacter pylori.   Comment: Interp By Chuckie Elmore M.D., Signed on 11/29/2024         Assessment:  1. Celiac sprue    2. Lymphocytic colitis         Recommendations:  1. Celiac sprue Gluten free diet for life  2. Lymphocytic colitis but not symptomatic currently with normal bowel movement once daily with her fiber currently not interested in treatment with Entocort or budesonide  3. Rash has resolved Dermatology thought it was folliculitis not DH  4. Patient has stool for fecal elastase was normal  5. Colonoscopy up-to-date normal  6. Return GI clinic in 6 months for follow-up okay for telemedicine video visit     No follow-ups on file.      Order summary:  Orders Placed This Encounter    Folate    Vitamin E    Vitamin A    TSH         Thank you so much for allowing me to participate in the care of Vicky Winters MD    DISCLAIMER: This note was prepared with 1[employer] voice recognition transcription software. Garbled syntax, mangled or inadvertent pronouns, and other bizarre constructions may be attributed to that software system. While efforts were made to correct any mistakes made by this voice recognition program, some errors and/or omissions may remain in the note that were missed when the note was originally created.

## 2025-07-21 ENCOUNTER — TELEPHONE (OUTPATIENT)
Dept: DERMATOLOGY | Facility: CLINIC | Age: 60
End: 2025-07-21
Payer: COMMERCIAL

## 2025-07-22 ENCOUNTER — TELEPHONE (OUTPATIENT)
Dept: DERMATOLOGY | Facility: CLINIC | Age: 60
End: 2025-07-22
Payer: COMMERCIAL

## 2025-07-22 NOTE — TELEPHONE ENCOUNTER
Copied from CRM #[id number]. Topic: General Inquiry - Return Call  >> Jul 22, 2025  1:06 PM Polly wrote:  Who Called:Vicky    Who Left Message for Patient:Oliver    Does the patient know what this is regarding?:schedule appt    Would the patient rather a call back or a response via [facility]? Call back    Best Call Back Number:[phone]    Additional Information:  Appointment has been made per pt request

## 2025-07-22 NOTE — TELEPHONE ENCOUNTER
----- Message from Med Assistant Boyd sent at 2025  2:14 PM CDT -----  Reason for call: returning call from Oliver                          Patient requesting call back or [facility] [phone]

## 2025-08-16 ENCOUNTER — PATIENT MESSAGE (OUTPATIENT)
Dept: DERMATOLOGY | Facility: CLINIC | Age: 60
End: 2025-08-16
Payer: COMMERCIAL

## (undated) DEVICE — SOL BETADINE 5%

## (undated) DEVICE — KIT DEV NOVASURE ENDOMETRIAL.

## (undated) DEVICE — CORD FOR BIPOLAR FORCEPS 12

## (undated) DEVICE — ADHESIVE DERMABOND ADVANCED

## (undated) DEVICE — GLOVE BIOGEL SKINSENSE PI 6.5

## (undated) DEVICE — DRESSING XEROFORM FOIL PK 1X8

## (undated) DEVICE — GLOVE PI ULTRA TOUCH G SURGEON

## (undated) DEVICE — PAD CAST SPECIALIST STRL 4

## (undated) DEVICE — PACK FLUENT DISPOSABLE

## (undated) DEVICE — SEAL LENS SCOPE MYOSURE

## (undated) DEVICE — TRAY DRY SKIN SCRUB PREP

## (undated) DEVICE — DRAPE STERI-DRAPE 1000 17X11IN

## (undated) DEVICE — STRIP STERI REIN CLSR 1/2X2IN

## (undated) DEVICE — SLING ARM MEDIUM FOAM STRAP

## (undated) DEVICE — BANDAGE MATRIX HK LOOP 4IN 5YD

## (undated) DEVICE — SOL IRR NACL .9% 3000ML

## (undated) DEVICE — DEVICE TRUECLEAR INCISOR 2.9

## (undated) DEVICE — SEE MEDLINE ITEM 146313

## (undated) DEVICE — GAUZE SPONGE 4X4 12PLY

## (undated) DEVICE — PAD CAST 2 IN X 4YDS STERILE

## (undated) DEVICE — SET BASIN 48X48IN 6000ML RING

## (undated) DEVICE — SUT MCRYL PLUS 4-0 PS2 27IN

## (undated) DEVICE — UNDERGLOVES BIOGEL PI SIZE 8

## (undated) DEVICE — SOL PVP-I SCRUB 7.5% 4OZ

## (undated) DEVICE — Device

## (undated) DEVICE — SEE L#120831

## (undated) DEVICE — TOURNIQUET SB QC DP 18X4IN

## (undated) DEVICE — BANDAGE MATRIX HK LOOP 2IN 5YD

## (undated) DEVICE — SPLINT PLASTER EXT FAST 4X15

## (undated) DEVICE — SET INFLOW TUBE HYSTER

## (undated) DEVICE — PAD ABD 8X10 STERILE

## (undated) DEVICE — SUT VICRYL 4-0 RB1 27IN UD

## (undated) DEVICE — DRAPE PLASTIC U 60X72

## (undated) DEVICE — SOL 9P NACL IRR PIC IL

## (undated) DEVICE — CLOSURE SKIN STERI STRIP 1/2X4

## (undated) DEVICE — STOCKINETTE DBL PLY ST 4X

## (undated) DEVICE — ADHESIVE MASTISOL VIAL 48/BX

## (undated) DEVICE — NDL 22GA X1 1/2 REG BEVEL